# Patient Record
Sex: FEMALE | Race: WHITE | NOT HISPANIC OR LATINO | Employment: FULL TIME | URBAN - METROPOLITAN AREA
[De-identification: names, ages, dates, MRNs, and addresses within clinical notes are randomized per-mention and may not be internally consistent; named-entity substitution may affect disease eponyms.]

---

## 2017-01-06 ENCOUNTER — CLINICAL SUPPORT (OUTPATIENT)
Dept: REHABILITATION | Facility: HOSPITAL | Age: 57
End: 2017-01-06
Attending: OBSTETRICS & GYNECOLOGY
Payer: COMMERCIAL

## 2017-01-06 DIAGNOSIS — R10.2 PELVIC PAIN IN FEMALE: Primary | ICD-10-CM

## 2017-01-06 PROCEDURE — 97163 PT EVAL HIGH COMPLEX 45 MIN: CPT | Mod: PO

## 2017-01-06 PROCEDURE — 97014 ELECTRIC STIMULATION THERAPY: CPT | Mod: PO

## 2017-01-06 NOTE — PROGRESS NOTES
Patient: Berenice Azar [facility] #:  [id number]    Date of treatment: 2017   Time in: 8:02  Time out: 9:00    Berenice is a 56 y.o. female evaluated on 2017    Physician:  Kerline Vincent MD   Diagnosis:   Pelvic pain in female; sciatica    Treatment ordered: PT    Medical History:   Past Medical History   Diagnosis Date    Asthma with allergic rhinitis     Bladder spasm     Dense breasts      heterogeneously/fibrocystic disease    Elevated antinuclear antibody (GUICHO) level     Endometriosis of cervix     Erythromelalgia     Fibromyalgia     Ganglion cyst      left toe    Goiter      MNG    Hashimoto's disease     Hashimoto's thyroiditis     Headache(784.0)     Hypothyroidism      Hashimoto with + Tg ab    Osteoporosis      femoral neck    Raynaud's phenomenon     Rhinitis     Scoliosis     Sleep disorder      type of Narcolepsy ( resolved)    Vitamin D deficiency disease     Vulvodynia         Surgical History:   Past Surgical History   Procedure Laterality Date    Breast surgery       fibrocystic tumor removed    Sinus surgery       2x    Cyst removal       laparoscopic cyst on ovary    Intradermal cyst        removed from left index finger     section       2x    Hysterectomy          Medications:   Current Outpatient Prescriptions   Medication Sig    albuterol (PROVENTIL HFA/VENTOLIN HFA) 200 puff inhaler Inhale 2 puffs into the lungs every 6 (six) hours as needed.      amitriptyline (ELAVIL) 50 MG tablet Take one tablet by mouth nightly. Take with the 10 mg amitriptyline (Patient taking differently: 50 mg. Take one tablet by mouth nightly.)    aspirin (ECOTRIN) 81 MG EC tablet Take 81 mg by mouth once daily.    cholecalciferol, vitamin D3, (VITAMIN D3) 1,000 unit capsule Take 2,000 Units by mouth once daily.     DICLOFENAC SODIUM TOP Apply 1 application topically. Use up tp 5x/day Diclofenac 0.15%/lidocaine 2.25%/prilocaine 2.25% [facility]:  [phone]    estradiol (VAGIFEM) 10 mcg Tab Place 1 tablet (10 mcg total) vaginally twice a week.    estradiol 0.05 mg/24 hr td ptsw (VIVELLE-DOT) 0.05 mg/24 hr Place 1 patch onto the skin once a week.    fluticasone (FLONASE) 50 mcg/actuation nasal spray 1 spray by Each Nare route once daily.    FLUZONE QUAD 5151-0496, PF, 60 mcg (15 mcg x 4)/0.5 mL Syrg     gabapentin (NEURONTIN) 100 MG capsule TAKE 2 CAPSULES (200 MG TOTAL) THREE TIMES A DAY (TAKE 6 CAPSULES DAILY)    guaifenesin (MUCINEX) 600 mg 12 hr tablet Take 600 mg by mouth 2 (two) times daily.    liothyronine (CYTOMEL) 5 MCG Tab Take 3 tablets (15 mcg total) by mouth once daily.    lorazepam (ATIVAN) 1 MG tablet Take 1 tablet (1 mg total) by mouth as directed. 1 tab 30 min prior to MRI, may repeat once if needed    MECOBAL/LEVOMEFOLAT CA/B6 PHOS (METANX ORAL) Take by mouth.    metaxalone (SKELAXIN) 800 MG tablet TAKE 1 TABLET EVERY EVENING (SPASM)    multivitamin capsule Take 1 capsule by mouth once daily.      pilocarpine (SALAGEN) 5 MG Tab Take 1 tablet (5 mg total) by mouth 3 (three) times daily as needed.    PREVIDENT 5000 DRY MOUTH 1.1 % Gel     RESTASIS 0.05 % ophthalmic emulsion     SYNTHROID 25 mcg tablet Take 1 tablet (25 mcg total) by mouth before breakfast.    zolpidem (AMBIEN) 10 mg Tab TAKE ONE TABLET BY MOUTH EVERY NIGHT AT BEDTIME AS NEEDED     No current facility-administered medications for this visit.     Allergies: No Known Allergies   Precautions: universal   OB:GYN History: x 2; endometriosis; Had uterine prolapse through her rectum   Bladder/Bowel History: trouble initiating urine stream, slow stream, trouble emptying bladder completely, urinary incontinence, constipation/straining for movement and dysuria which was relieved with vaginal estrogen. She has interrupted stream.     SUBJECTIVE:   History of current complaint:   Pt reports acupuncture has been helping with her vaginal pain.  GBA cream and  "neurontin still on board as well.  Now increased pudendal pain, that pt attributes to R buttock pain.  Pt has seen a neurologist who ordered an MRI.  She reports that Dr. Vincent would like her to have a TENS unit.  She would like to wait until her MRI to have exercises for hip strengthening per Dr. Vincent's request.  She is making changes to her sleep posture- she now sleeps on her back to minimize her low back/buttock pain and this seems to be working.  She continues to flare in pain in multiple regions due to fibromyalgia.       Date of Onset: buttock symptoms increased in Sept 2016  Symptoms are: increasing   Frequency of Urination: Daytime: about 10 times per day; her ability to empty her bladder varies- she attributes this to her thyroid condition  Nighttime: none   Urine Stream: weak and interrupted   Frequency of Bowel Movements: daily (she reports "great!")  Types of Fluid Intake: water, hot green tea, almond milk  Bladder Leakage: no  Colon Leakage: No    Current Exercise:    Is walking 20 minutes in her house 7 days per week      Pain: Patient reports 2/10 with 0 being the lowest and 10 being the highest in the R anterior hip and groin;  She reports "pudendal" pain as R sided pain at the IT radiating to the posterior thigh- rates 3-4/10.      OBJECTIVE:   Patient received 50 minutes of treatment which consisted of evaluation and 10 minutes of ther ex.     ORTHO SCREEN   Palpation: tenderness to palpation noted over B SI joints, R>L IT, and anterior pelvis on R.    MMT: B LE as follows: B hip flexors and abd/add 4/5; quads and hamstrings 4-/5; ankle DF/PF 4/5.    Treatment Given: instructed on general anatomy/physiology of urinary/bowel system; discussed plan of care with patient; instructed in benefits/risks of treatment; instructed in alternative methods of treatment; instructed in risks of refusing treatment; patient agreed to treatment plan; pt rec'd 10 minutes of TENS to the anterior hip and pelvis " with dual channel setup, hip and pelvic presets on EMPI Select unit.  Intensity dialed to pt's sensory threshold (2.5 on this date).  Pt tolerated treatment well.  She was instructed to monitor her symptoms for the rest of the day, and that we would look at gentle hip strengthening exercises after her MRI results are known (per her request).  Pt verbalized understanding of all.        ASSESSMENT:     History  Co-morbidities and personal factors that may impact the plan of care Examination  Body Structures and Functions, activity limitations and participation restrictions that may impact the plan of care Clinical Presentation   Decision Making/ Complexity Score   Co-morbidities:   Fibromyalgia,               Personal Factors:    Body Regions:pelvis, hip, LE's    Body Systems: musculoskeletal system          Activity limitations: limited sitting tolerance at work, difficulty putting on shoes (bending) due to pain, reports cannot sit in a tub to bathe      Participation Restrictions:          Evolving, unstable 79% impairment noted via pt's score of 79% on PFDI Pain survey (FOTO)       Problem List:   altered posture, pelvic asymmetry, poor knowledge of body mechanics and posture, poor trunk stability, decreased pelvic muscle strength, decreased hip and pelvic flexibility,  Decreased hip and LE strength, decreased sitting tolerance.      Barriers to Learning: none   Educational/Spiritual/Cultural needs:   none   Environmental Barriers: none noted  Abuse/Neglect: no signs  Nutritional Status: thin WF  Fall Risk: none    FUNCTIONAL GOALS: 3 months   1. Pt to report decreased pain with putting on her shoes in the morning.    2. Pt to be I with self mgmt. of symptoms.   3. Pt to be I with HEP for hip girdle strengthening.       PATIENTS GOALS: to be able to manage her symptoms with TENS, and to be able to put on her shoes more easily with less pain.      PLAN:   Therapeutic exercise, Electrical stimulation, manual therapy  as indicated. Will issue EMPI TENS unit if no adverse effects noted after today's treatment.       Physical Therapy Education: anatomy/physiology of pelvis, posture/body mechanics      Frequency/Duration: once per 1-2 weeks for 1-2 months.

## 2017-01-09 ENCOUNTER — PATIENT MESSAGE (OUTPATIENT)
Dept: INTERNAL MEDICINE | Facility: CLINIC | Age: 57
End: 2017-01-09

## 2017-01-09 RX ORDER — ZOLPIDEM TARTRATE 10 MG/1
TABLET ORAL
Qty: 30 TABLET | Refills: 3 | Status: SHIPPED | OUTPATIENT
Start: 2017-01-09 | End: 2017-04-18 | Stop reason: SDUPTHER

## 2017-01-09 NOTE — TELEPHONE ENCOUNTER
Pt called in requesting a refill of the pended medication to be sent to [facility].

## 2017-01-10 ENCOUNTER — LAB VISIT (OUTPATIENT)
Dept: LAB | Facility: HOSPITAL | Age: 57
End: 2017-01-10
Attending: INTERNAL MEDICINE
Payer: COMMERCIAL

## 2017-01-10 DIAGNOSIS — E03.8 OTHER SPECIFIED HYPOTHYROIDISM: ICD-10-CM

## 2017-01-10 LAB
T3FREE SERPL-MCNC: 3.5 PG/ML
T4 FREE SERPL-MCNC: 0.7 NG/DL
TSH SERPL DL<=0.005 MIU/L-ACNC: 0.28 UIU/ML

## 2017-01-10 PROCEDURE — 36415 COLL VENOUS BLD VENIPUNCTURE: CPT

## 2017-01-10 PROCEDURE — 84439 ASSAY OF FREE THYROXINE: CPT

## 2017-01-10 PROCEDURE — 84443 ASSAY THYROID STIM HORMONE: CPT

## 2017-01-10 PROCEDURE — 84481 FREE ASSAY (FT-3): CPT

## 2017-01-16 ENCOUNTER — HOSPITAL ENCOUNTER (OUTPATIENT)
Dept: RADIOLOGY | Facility: HOSPITAL | Age: 57
Discharge: HOME | End: 2017-01-16
Attending: PSYCHIATRY & NEUROLOGY
Payer: COMMERCIAL

## 2017-01-16 DIAGNOSIS — M54.16 LEFT LUMBAR RADICULOPATHY: ICD-10-CM

## 2017-01-16 PROCEDURE — 72148 MRI LUMBAR SPINE W/O DYE: CPT | Mod: TC

## 2017-01-16 PROCEDURE — 72148 MRI LUMBAR SPINE W/O DYE: CPT | Mod: 26,,, | Performed by: RADIOLOGY

## 2017-01-17 ENCOUNTER — OFFICE VISIT (OUTPATIENT)
Dept: INTERNAL MEDICINE | Facility: CLINIC | Age: 57
End: 2017-01-17
Payer: COMMERCIAL

## 2017-01-17 VITALS
HEART RATE: 68 BPM | BODY MASS INDEX: 17.95 KG/M2 | SYSTOLIC BLOOD PRESSURE: 100 MMHG | WEIGHT: 95 LBS | DIASTOLIC BLOOD PRESSURE: 60 MMHG

## 2017-01-17 DIAGNOSIS — E03.8 HYPOTHYROIDISM DUE TO HASHIMOTO'S THYROIDITIS: Primary | ICD-10-CM

## 2017-01-17 DIAGNOSIS — E04.1 THYROID NODULE: ICD-10-CM

## 2017-01-17 DIAGNOSIS — E06.3 HYPOTHYROIDISM DUE TO HASHIMOTO'S THYROIDITIS: Primary | ICD-10-CM

## 2017-01-17 PROCEDURE — 99999 PR PBB SHADOW E&M-EST. PATIENT-LVL III: CPT | Mod: PBBFAC,,, | Performed by: INTERNAL MEDICINE

## 2017-01-17 PROCEDURE — 99214 OFFICE O/P EST MOD 30 MIN: CPT | Mod: S$GLB,,, | Performed by: INTERNAL MEDICINE

## 2017-01-17 PROCEDURE — 1159F MED LIST DOCD IN RCRD: CPT | Mod: S$GLB,,, | Performed by: INTERNAL MEDICINE

## 2017-01-17 NOTE — PROGRESS NOTES
Subjective:       Patient ID: Berenice Hayes is a 56 y.o. female.    Chief Complaint: Follow-up    HPI   Patient is here in follow up of thyroid studies: patient's pain is doing bettter    TSH:0.275  A little low  T4 .7 slightly low  T3  3.5 in good range    Last appt with Endocrine 12/14/2016 Dr. Peguero    Reviewed consult to Neurology: Dr. Seymour    Reviewed   MRI  Impression    Early degenerative disc and facet disease in this patient with ectasia of the nerve root sheath cysts in the lumbar and sacral regions as well as a mild levoscoliosis.      Electronically signed by: EUGENIA OAKES MD  Date: 01/16/17         Ultrasound thyroid  Impression       Multinodular thyroid, with 2 nodules in the right thyroid lobe meeting size criteria for FNA.  Correlate with prior biopsy finding.    Increased vascularity, may reflect thyroiditis.           Impression     Review of Systems  no other new complaints  Objective:      Physical Exam    Thyroid:enlarged nodule  Assessment:       1. Hypothyroidism due to Hashimoto's thyroiditis    2. Thyroid nodule        Plan:       Berenice was seen today for follow-up.    Diagnoses and all orders for this visit:    Hypothyroidism due to Hashimoto's thyroiditis: pain is better, doing on current medications synthroid 37.5 and cytomel 15 daily and will check every 6 weeks  -     TSH; Future  -     T4, free; Future  -     T3, free; Future    Thyroid nodule: ultrasound , no recommendations made by Dr. Espinal, and message sent to Dr. Espinzoa.      Return in about 7 weeks (around 3/7/2017) for Prior tsh, free T4 and free T3.    New Prescriptions    No medications on file       Modified Medications    No medications on file       Orders Placed This Encounter   Procedures    TSH     Standing Status:   Future     Standing Expiration Date:   3/18/2017    T4, free     Standing Status:   Future     Standing Expiration Date:   1/17/2018    T3, free     Standing Status:   Future      Standing Expiration Date:   3/18/2018       Labs, studies and consults associated with this visit were reviewed

## 2017-01-17 NOTE — MR AVS SNAPSHOT
[facility]  [address]  [facility] [address]  Phone: [phone]  Fax: [phone]                  Berenice Hayes   2017 8:00 AM   Office Visit    Description:  Female : 1960   Provider:  Estella Serrano MD   Department:  [facility]           Reason for Visit     Follow-up           Diagnoses this Visit        Comments    Hypothyroidism due to Hashimoto's thyroiditis    -  Primary     Thyroid nodule                To Do List           Future Appointments        Provider Department Dept Phone    2017 5:00 PM La Israel PT, BCB-PMD [facility] [phone]    2/3/2017 8:00 AM La Israel PT, BCB-PMD [facility] [phone]    2017 10:00 AM Leandro Espinoza MD [facility] - Endo/Diab/Metab [phone]      Goals (5 Years of Data)     None      Follow-Up and Disposition     Return in about 7 weeks (around 3/7/2017) for Prior tsh, free T4 and free T3.      [facility]     [facility] -  Assistance  Registered nurses in the [facility] provide clinical advisement, health education, appointment booking, and other advisory services.  Call for this free service at [phone].             Medications           Message regarding Medications     Verify the changes and/or additions to your medication regime listed below are the same as discussed with your clinician today.  If any of these changes or additions are incorrect, please notify your healthcare provider.             Verify that the below list of medications is an accurate representation of the medications you are currently taking.  If none reported, the list may be blank. If incorrect, please contact your healthcare provider. Carry this list with you in case of emergency.           Current Medications     albuterol (PROVENTIL HFA/VENTOLIN HFA) 200 puff inhaler Inhale 2 puffs into the lungs every 6  (six) hours as needed.      amitriptyline (ELAVIL) 50 MG tablet Take one tablet by mouth nightly. Take with the 10 mg amitriptyline    aspirin (ECOTRIN) 81 MG EC tablet Take 81 mg by mouth once daily.    cholecalciferol, vitamin D3, (VITAMIN D3) 1,000 unit capsule Take 2,000 Units by mouth once daily.     DICLOFENAC SODIUM TOP Apply 1 application topically. Use up tp 5x/day Diclofenac 0.15%/lidocaine 2.25%/prilocaine 2.25% [facility]: [phone]    estradiol (VAGIFEM) 10 mcg Tab Place 1 tablet (10 mcg total) vaginally twice a week.    estradiol 0.05 mg/24 hr td ptsw (VIVELLE-DOT) 0.05 mg/24 hr Place 1 patch onto the skin once a week.    fluticasone (FLONASE) 50 mcg/actuation nasal spray 1 spray by Each Nare route once daily.    FLUZONE QUAD 3298-8256, PF, 60 mcg (15 mcg x 4)/0.5 mL Syrg     gabapentin (NEURONTIN) 100 MG capsule TAKE 2 CAPSULES (200 MG TOTAL) THREE TIMES A DAY (TAKE 6 CAPSULES DAILY)    guaifenesin (MUCINEX) 600 mg 12 hr tablet Take 600 mg by mouth 2 (two) times daily.    liothyronine (CYTOMEL) 5 MCG Tab Take 3 tablets (15 mcg total) by mouth once daily.    lorazepam (ATIVAN) 1 MG tablet Take 1 tablet (1 mg total) by mouth as directed. 1 tab 30 min prior to MRI, may repeat once if needed    MECOBAL/LEVOMEFOLAT CA/B6 PHOS (METANX ORAL) Take by mouth.    metaxalone (SKELAXIN) 800 MG tablet TAKE 1 TABLET EVERY EVENING (SPASM)    multivitamin capsule Take 1 capsule by mouth once daily.      pilocarpine (SALAGEN) 5 MG Tab Take 1 tablet (5 mg total) by mouth 3 (three) times daily as needed.    PREVIDENT 5000 DRY MOUTH 1.1 % Gel     RESTASIS 0.05 % ophthalmic emulsion     SYNTHROID 25 mcg tablet Take 1 tablet (25 mcg total) by mouth before breakfast.    zolpidem (AMBIEN) 10 mg Tab TAKE ONE TABLET BY MOUTH EVERY NIGHT AT BEDTIME AS NEEDED           Clinical Reference Information           Vital Signs - Last Recorded  Most recent update: 1/17/2017  8:17 AM by Estella Serrano MD    BP Pulse  Wt LMP BMI    100/60 68 43.1 kg (95 lb) (LMP Unknown) 17.95 kg/m2      Blood Pressure          Most Recent Value    BP  100/60      Allergies as of 1/17/2017     No Known Allergies      Immunizations Administered on Date of Encounter - 1/17/2017     None      Orders Placed During Today's Visit     Future Labs/Procedures Expected by Expires    T3, free  1/17/2017 3/18/2018    T4, free  1/17/2017 1/17/2018    TSH  1/17/2017 (Approximate) 3/18/2017

## 2017-01-23 ENCOUNTER — TELEPHONE (OUTPATIENT)
Dept: INTERNAL MEDICINE | Facility: CLINIC | Age: 57
End: 2017-01-23

## 2017-01-23 ENCOUNTER — PATIENT MESSAGE (OUTPATIENT)
Dept: INTERNAL MEDICINE | Facility: CLINIC | Age: 57
End: 2017-01-23

## 2017-01-23 NOTE — TELEPHONE ENCOUNTER
----- Message from Leandro Espinoza MD sent at 1/23/2017  9:05 AM CST -----  Contact: Estella Serrano  I looked at her ultrasound. Looks like both of the nodules have been biopsied recently and were benign. They haven't grown significantly so I would not repeat the biopsy at this time. She can wait until April to see me.    -Leandro   ----- Message -----     From: Estella Serrano MD     Sent: 1/17/2017   8:29 AM       To: Leandro Espinoza MD    This is a patient of Dr. Espinal who has been referred to you with an appt 4/25. Dr. Espinal had a ultrasound done which stated 2 areas were in the range for biopsy. Do you think she should wait until 4/25.     Estella

## 2017-01-26 ENCOUNTER — PATIENT MESSAGE (OUTPATIENT)
Dept: INTERNAL MEDICINE | Facility: CLINIC | Age: 57
End: 2017-01-26

## 2017-01-26 ENCOUNTER — CLINICAL SUPPORT (OUTPATIENT)
Dept: REHABILITATION | Facility: HOSPITAL | Age: 57
End: 2017-01-26
Attending: OBSTETRICS & GYNECOLOGY
Payer: COMMERCIAL

## 2017-01-26 DIAGNOSIS — R10.2 PELVIC PAIN IN FEMALE: ICD-10-CM

## 2017-01-26 PROCEDURE — 97014 ELECTRIC STIMULATION THERAPY: CPT

## 2017-01-26 PROCEDURE — 97110 THERAPEUTIC EXERCISES: CPT

## 2017-01-26 NOTE — PROGRESS NOTES
Patient: Berenice Azar [facility] #: [id number]   Diagnosis:   Encounter Diagnosis   Name Primary?    Pelvic pain in female       Date of Start of care: 1/6/2017  Date of treatment: 01/26/2017   Time in: 5:00  Time out: 6:00  Total Treatment time: 55 minutes    Subjective: pt reports that she had some vaginal pain after last session's exam.  Lasted about 3 days.      Berenice reported 6/10 pain today.Pain located in the R anterior groin.      Objective:      Treatment:    Patient was instructed in and performed therapeutic exercises to develop strength and coordination for 45 minutes including: walking gym laps for warmup; supine bridging, heel slides, hip abduction, SAQ, unilateral clams.  10 reps each- pt was instructed to stick with 10 reps at home until she can assess her response.  Berenice verbalized understanding of all instruction and was provided with a handout.      The patient received the following supervised modalities: TENS for pain control applied to the pelvis and sacrum (sacral modulation)  x 10 minutes.  Low back presets used, with intensity increased to pt tolerance.      Assessment:  Pt with minimal post exercise soreness- will need to slowly progress her HEP with goal of increasing her hip strength (reports cannot lift her leg to put socks on).       Will the patient continue to benefit from skilled PT intervention? Yes  Medical Necessity is demonstrated by:  Requires skilled supervision to complete and progress HEP  Progress towards goals: fair    New/Revised Goals:  none    Plan:  Continue with established Plan of Care toward PT goals.        PT/PTA met face to face to discuss patient's treatment plan and progress towards established goals.  Treatment will be continued as described in initial report/eval and progress notes.  Patient will be seen by physical therapist every sixth visit and minimally once per month.

## 2017-02-13 ENCOUNTER — PATIENT MESSAGE (OUTPATIENT)
Dept: INTERNAL MEDICINE | Facility: CLINIC | Age: 57
End: 2017-02-13

## 2017-02-20 ENCOUNTER — LAB VISIT (OUTPATIENT)
Dept: LAB | Facility: HOSPITAL | Age: 57
End: 2017-02-20
Attending: INTERNAL MEDICINE
Payer: COMMERCIAL

## 2017-02-20 DIAGNOSIS — E03.8 HYPOTHYROIDISM DUE TO HASHIMOTO'S THYROIDITIS: ICD-10-CM

## 2017-02-20 DIAGNOSIS — E06.3 HYPOTHYROIDISM DUE TO HASHIMOTO'S THYROIDITIS: ICD-10-CM

## 2017-02-20 LAB
T3FREE SERPL-MCNC: 3.3 PG/ML
T4 FREE SERPL-MCNC: 0.77 NG/DL
TSH SERPL DL<=0.005 MIU/L-ACNC: 0.16 UIU/ML

## 2017-02-20 PROCEDURE — 84443 ASSAY THYROID STIM HORMONE: CPT

## 2017-02-20 PROCEDURE — 84481 FREE ASSAY (FT-3): CPT

## 2017-02-20 PROCEDURE — 36415 COLL VENOUS BLD VENIPUNCTURE: CPT

## 2017-02-20 PROCEDURE — 84439 ASSAY OF FREE THYROXINE: CPT

## 2017-02-22 ENCOUNTER — PATIENT MESSAGE (OUTPATIENT)
Dept: OBSTETRICS AND GYNECOLOGY | Facility: CLINIC | Age: 57
End: 2017-02-22

## 2017-02-23 DIAGNOSIS — N95.2 ATROPHIC VAGINITIS: ICD-10-CM

## 2017-02-23 RX ORDER — ESTRADIOL 0.05 MG/D
1 FILM, EXTENDED RELEASE TRANSDERMAL
Qty: 24 PATCH | Refills: 3 | Status: SHIPPED | OUTPATIENT
Start: 2017-02-23 | End: 2017-11-14 | Stop reason: SDUPTHER

## 2017-03-07 ENCOUNTER — OFFICE VISIT (OUTPATIENT)
Dept: INTERNAL MEDICINE | Facility: CLINIC | Age: 57
End: 2017-03-07
Payer: COMMERCIAL

## 2017-03-07 VITALS
DIASTOLIC BLOOD PRESSURE: 58 MMHG | WEIGHT: 97.69 LBS | HEART RATE: 93 BPM | HEIGHT: 62 IN | SYSTOLIC BLOOD PRESSURE: 100 MMHG | BODY MASS INDEX: 17.98 KG/M2

## 2017-03-07 DIAGNOSIS — E03.4 HYPOTHYROIDISM DUE TO ACQUIRED ATROPHY OF THYROID: Primary | ICD-10-CM

## 2017-03-07 PROCEDURE — 1160F RVW MEDS BY RX/DR IN RCRD: CPT | Mod: S$GLB,,, | Performed by: INTERNAL MEDICINE

## 2017-03-07 PROCEDURE — 99999 PR PBB SHADOW E&M-EST. PATIENT-LVL IV: CPT | Mod: PBBFAC,,, | Performed by: INTERNAL MEDICINE

## 2017-03-07 PROCEDURE — 99213 OFFICE O/P EST LOW 20 MIN: CPT | Mod: S$GLB,,, | Performed by: INTERNAL MEDICINE

## 2017-03-07 RX ORDER — AMITRIPTYLINE HYDROCHLORIDE 10 MG/1
10 TABLET, FILM COATED ORAL DAILY
COMMUNITY
Start: 2016-12-16 | End: 2017-03-22 | Stop reason: SDUPTHER

## 2017-03-07 NOTE — MR AVS SNAPSHOT
[facility]  [address]  [facility] [address]  Phone: [phone]  Fax: [phone]                  Berenice Hayes   3/7/2017 8:00 AM   Office Visit    Description:  Female : 1960   Provider:  Estella Serrano MD   Department:  [facility]           Reason for Visit     Follow-up           Diagnoses this Visit        Comments    Hypothyroidism due to acquired atrophy of thyroid    -  Primary            To Do List           Future Appointments        Provider Department Dept Phone    2017 8:30 AM LAB, APPOINTMENT [facility] [phone]    2017 10:00 AM MD [facility] - Endo/Diab/Metab [phone]    2017 8:30 AM MD [facility] - Internal Medicine [phone]      Goals (5 Years of Data)     None      Follow-Up and Disposition     Return in about 8 weeks (around 2017) for Follow up.      [facility]     [facility] -  Assistance  Registered nurses in the [facility] provide clinical advisement, health education, appointment booking, and other advisory services.  Call for this free service at [phone].             Medications           Message regarding Medications     Verify the changes and/or additions to your medication regime listed below are the same as discussed with your clinician today.  If any of these changes or additions are incorrect, please notify your healthcare provider.             Verify that the below list of medications is an accurate representation of the medications you are currently taking.  If none reported, the list may be blank. If incorrect, please contact your healthcare provider. Carry this list with you in case of emergency.           Current Medications     albuterol (PROVENTIL HFA/VENTOLIN HFA) 200 puff inhaler Inhale 2 puffs into the lungs every 6 (six) hours as needed.      amitriptyline (ELAVIL) 10  "MG tablet Take 10 mg by mouth once daily.    amitriptyline (ELAVIL) 50 MG tablet Take one tablet by mouth nightly. Take with the 10 mg amitriptyline    aspirin (ECOTRIN) 81 MG EC tablet Take 81 mg by mouth once daily.    cholecalciferol, vitamin D3, (VITAMIN D3) 1,000 unit capsule Take 2,000 Units by mouth once daily.     DICLOFENAC SODIUM TOP Apply 1 application topically. Use up tp 5x/day Diclofenac 0.15%/lidocaine 2.25%/prilocaine 2.25% [facility]: [phone]    estradiol (VAGIFEM) 10 mcg Tab Place 1 tablet (10 mcg total) vaginally twice a week.    estradiol 0.05 mg/24 hr td ptsw (VIVELLE-DOT) 0.05 mg/24 hr Place 1 patch onto the skin twice a week.    fluticasone (FLONASE) 50 mcg/actuation nasal spray 1 spray by Each Nare route once daily.    gabapentin (NEURONTIN) 100 MG capsule TAKE 2 CAPSULES (200 MG TOTAL) THREE TIMES A DAY (TAKE 6 CAPSULES DAILY)    guaifenesin (MUCINEX) 600 mg 12 hr tablet Take 600 mg by mouth 2 (two) times daily.    liothyronine (CYTOMEL) 5 MCG Tab Take 3 tablets (15 mcg total) by mouth once daily.    MECOBAL/LEVOMEFOLAT CA/B6 PHOS (METANX ORAL) Take by mouth.    metaxalone (SKELAXIN) 800 MG tablet TAKE 1 TABLET EVERY EVENING (SPASM)    multivitamin capsule Take 1 capsule by mouth once daily.      pilocarpine (SALAGEN) 5 MG Tab Take 1 tablet (5 mg total) by mouth 3 (three) times daily as needed.    PREVIDENT 5000 DRY MOUTH 1.1 % Gel     RESTASIS 0.05 % ophthalmic emulsion     SYNTHROID 25 mcg tablet Take 1 tablet (25 mcg total) by mouth before breakfast.    zolpidem (AMBIEN) 10 mg Tab TAKE ONE TABLET BY MOUTH EVERY NIGHT AT BEDTIME AS NEEDED    FLUZONE QUAD 6360-5579, PF, 60 mcg (15 mcg x 4)/0.5 mL Syrg     lorazepam (ATIVAN) 1 MG tablet Take 1 tablet (1 mg total) by mouth as directed. 1 tab 30 min prior to MRI, may repeat once if needed           Clinical Reference Information           Your Vitals Were     BP Pulse Height Weight Last Period BMI    100/58 93 5' 1.5" " (1.562 m) 44.3 kg (97 lb 10.6 oz) (LMP Unknown) 18.15 kg/m2      Blood Pressure          Most Recent Value    BP  (!)  100/58      Allergies as of 3/7/2017     No Known Allergies      Immunizations Administered on Date of Encounter - 3/7/2017     None      Orders Placed During Today's Visit     Future Labs/Procedures Expected by Expires    T3, free  3/7/2017 5/6/2018    T4, free  3/7/2017 3/7/2018    TSH  3/7/2017 (Approximate) 5/6/2017      Language Assistance Services     ATTENTION: Language assistance services are available, free of charge. Please call [phone].      ATENCIÓN: Si habla rafaela, tiene a mcgraw disposición servicios gratuitos de asistencia lingüística. Llame al [phone].     CHÚ Ý: N?u b?n nói Ti?ng Vi?t, có các d?ch v? h? tr? ngôn ng? mi?n phí dành cho b?n. G?i s? [phone].         [facility] complies with applicable Federal civil rights laws and does not discriminate on the basis of race, color, national origin, age, disability, or sex.

## 2017-03-07 NOTE — PROGRESS NOTES
Subjective:       Patient ID: Berenice Hayes is a 56 y.o. female.    Chief Complaint: Follow-up (hypothroidism)    HPI   The last appt was 1/17/2017, last labs 2/20/2017  Last lab  Free T3 3.3 feels the best at 3.5  TSH 0.162  Free T4 0.77    Cytomel 30 mg daily  Synthroid 25 mcg and taking 1.5    Now :  Cytomel 30 mg  Synthroid 25mcg ( when this change her feet swelled, heat increased and palpitations)      Review of Systems    See above  Objective:      Physical Exam   Constitutional: She appears well-developed and well-nourished.   Neck: No JVD present. No thyromegaly present.   Cardiovascular: Normal rate, normal heart sounds and intact distal pulses.    Pulmonary/Chest: Effort normal and breath sounds normal. No respiratory distress.       Assessment:       1. Hypothyroidism due to acquired atrophy of thyroid        Plan:       Plan:  Alternate Cytomel 30/35  Alternate Synthroid 25/37.5     Berenice was seen today for follow-up.    Diagnoses and all orders for this visit:    Hypothyroidism due to acquired atrophy of thyroid    Return in about 8 weeks (around 5/2/2017) for Follow up.    New Prescriptions    No medications on file       Modified Medications    No medications on file       No orders of the defined types were placed in this encounter.      Labs, studies and consults associated with this visit were reviewed

## 2017-03-13 ENCOUNTER — PATIENT MESSAGE (OUTPATIENT)
Dept: INTERNAL MEDICINE | Facility: CLINIC | Age: 57
End: 2017-03-13

## 2017-03-16 ENCOUNTER — PATIENT MESSAGE (OUTPATIENT)
Dept: INTERNAL MEDICINE | Facility: CLINIC | Age: 57
End: 2017-03-16

## 2017-03-16 DIAGNOSIS — E04.2 MULTINODULAR GOITER: ICD-10-CM

## 2017-03-16 DIAGNOSIS — E03.8 HYPOTHYROIDISM DUE TO HASHIMOTO'S THYROIDITIS: ICD-10-CM

## 2017-03-16 DIAGNOSIS — E06.3 HYPOTHYROIDISM DUE TO HASHIMOTO'S THYROIDITIS: ICD-10-CM

## 2017-03-21 DIAGNOSIS — E03.8 HYPOTHYROIDISM DUE TO HASHIMOTO'S THYROIDITIS: ICD-10-CM

## 2017-03-21 DIAGNOSIS — E06.3 HYPOTHYROIDISM DUE TO HASHIMOTO'S THYROIDITIS: ICD-10-CM

## 2017-03-21 DIAGNOSIS — E04.2 MULTINODULAR GOITER: ICD-10-CM

## 2017-03-21 RX ORDER — LIOTHYRONINE SODIUM 5 UG/1
15 TABLET ORAL DAILY
Qty: 270 TABLET | Refills: 3 | Status: CANCELLED | OUTPATIENT
Start: 2017-03-21

## 2017-03-21 RX ORDER — LEVOTHYROXINE SODIUM 25 UG/1
25 TABLET ORAL
Qty: 90 TABLET | Refills: 3 | Status: CANCELLED | OUTPATIENT
Start: 2017-03-21

## 2017-03-21 RX ORDER — AMITRIPTYLINE HYDROCHLORIDE 10 MG/1
10 TABLET, FILM COATED ORAL DAILY
Status: CANCELLED | OUTPATIENT
Start: 2017-03-21

## 2017-03-21 NOTE — TELEPHONE ENCOUNTER
----- Message from Karen Davis MA sent at 3/21/2017  8:36 AM CDT -----  Contact: self - [phone]   Type: Rx    Name of medication(s): SYNTHROID 25 mcg tablet  liothyronine (CYTOMEL) 5 MCG Tab  amitriptyline (ELAVIL) 10 MG tablet    Is this a refill? New rx? Refills     Who prescribed medication?    Pharmacy Name, Phone, & Location: [facility] - [address]    Comments: Please call. Thanks!

## 2017-03-22 RX ORDER — LIOTHYRONINE SODIUM 5 UG/1
TABLET ORAL
Qty: 295 TABLET | Refills: 3 | Status: SHIPPED | OUTPATIENT
Start: 2017-03-22 | End: 2017-11-03 | Stop reason: SDUPTHER

## 2017-03-22 RX ORDER — LEVOTHYROXINE SODIUM 25 UG/1
TABLET ORAL
Qty: 125 TABLET | Refills: 3 | Status: SHIPPED | OUTPATIENT
Start: 2017-03-22 | End: 2017-11-09

## 2017-03-22 RX ORDER — AMITRIPTYLINE HYDROCHLORIDE 10 MG/1
10 TABLET, FILM COATED ORAL DAILY
Qty: 90 TABLET | Refills: 3 | Status: SHIPPED | OUTPATIENT
Start: 2017-03-22 | End: 2018-04-10 | Stop reason: SDUPTHER

## 2017-04-18 DIAGNOSIS — N95.2 ATROPHIC VAGINITIS: ICD-10-CM

## 2017-04-18 RX ORDER — ESTRADIOL 10 UG/1
1 INSERT VAGINAL
Qty: 24 TABLET | Refills: 4 | Status: SHIPPED | OUTPATIENT
Start: 2017-04-20 | End: 2018-11-14 | Stop reason: SDUPTHER

## 2017-04-18 RX ORDER — ZOLPIDEM TARTRATE 10 MG/1
TABLET ORAL
Qty: 30 TABLET | Refills: 2 | Status: SHIPPED | OUTPATIENT
Start: 2017-04-18 | End: 2017-07-20 | Stop reason: SDUPTHER

## 2017-04-19 ENCOUNTER — LAB VISIT (OUTPATIENT)
Dept: LAB | Facility: HOSPITAL | Age: 57
End: 2017-04-19
Attending: INTERNAL MEDICINE
Payer: COMMERCIAL

## 2017-04-19 ENCOUNTER — PATIENT MESSAGE (OUTPATIENT)
Dept: INTERNAL MEDICINE | Facility: CLINIC | Age: 57
End: 2017-04-19

## 2017-04-19 DIAGNOSIS — E03.4 HYPOTHYROIDISM DUE TO ACQUIRED ATROPHY OF THYROID: ICD-10-CM

## 2017-04-19 LAB
T3FREE SERPL-MCNC: 3.2 PG/ML
T4 FREE SERPL-MCNC: 0.67 NG/DL
TSH SERPL DL<=0.005 MIU/L-ACNC: 0.1 UIU/ML

## 2017-04-19 PROCEDURE — 36415 COLL VENOUS BLD VENIPUNCTURE: CPT

## 2017-04-19 PROCEDURE — 84481 FREE ASSAY (FT-3): CPT

## 2017-04-19 PROCEDURE — 84439 ASSAY OF FREE THYROXINE: CPT

## 2017-04-19 PROCEDURE — 84443 ASSAY THYROID STIM HORMONE: CPT

## 2017-04-21 ENCOUNTER — TELEPHONE (OUTPATIENT)
Dept: PAIN MEDICINE | Facility: CLINIC | Age: 57
End: 2017-04-21

## 2017-04-21 NOTE — TELEPHONE ENCOUNTER
----- Message from Dionne Vaca sent at 4/20/2017  5:07 PM CDT -----  Contact: PT  PT would like to change appointment date and time if possible.  PT would like it on 4/25, if there is a 8am slot available.     PT callback @ [phone]

## 2017-04-21 NOTE — TELEPHONE ENCOUNTER
Patient states she was experiencing additional nerve pain on top of her foot. Staff offered patient 4/24/17 or 4/28/17 appointment dates and 7am or 7:15am appointment times for both dates.Patient states she did not want to schedule that early and risk something coming up and her not being able to make it. Patient stated she will see how she does over the weekend and keep the appointment that she has already for 5/11/17 at 8am.    Staff told patient to give us a call if she needs anything further.

## 2017-04-25 ENCOUNTER — OFFICE VISIT (OUTPATIENT)
Dept: ENDOCRINOLOGY | Facility: CLINIC | Age: 57
End: 2017-04-25
Payer: COMMERCIAL

## 2017-04-25 VITALS
WEIGHT: 98.56 LBS | HEART RATE: 92 BPM | SYSTOLIC BLOOD PRESSURE: 102 MMHG | HEIGHT: 61 IN | BODY MASS INDEX: 18.61 KG/M2 | DIASTOLIC BLOOD PRESSURE: 64 MMHG

## 2017-04-25 DIAGNOSIS — E04.2 MULTINODULAR GOITER: ICD-10-CM

## 2017-04-25 DIAGNOSIS — E03.8 HYPOTHYROIDISM DUE TO HASHIMOTO'S THYROIDITIS: Primary | ICD-10-CM

## 2017-04-25 DIAGNOSIS — M81.0 OSTEOPOROSIS, POSTMENOPAUSAL: ICD-10-CM

## 2017-04-25 DIAGNOSIS — E06.3 HYPOTHYROIDISM DUE TO HASHIMOTO'S THYROIDITIS: Primary | ICD-10-CM

## 2017-04-25 PROCEDURE — 1160F RVW MEDS BY RX/DR IN RCRD: CPT | Mod: S$GLB,,, | Performed by: INTERNAL MEDICINE

## 2017-04-25 PROCEDURE — 99999 PR PBB SHADOW E&M-EST. PATIENT-LVL III: CPT | Mod: PBBFAC,,, | Performed by: INTERNAL MEDICINE

## 2017-04-25 PROCEDURE — 99214 OFFICE O/P EST MOD 30 MIN: CPT | Mod: S$GLB,,, | Performed by: INTERNAL MEDICINE

## 2017-04-25 NOTE — PROGRESS NOTES
Subjective:      Patient ID: Berenice Salter is a 57 y.o. female.    Chief Complaint:  Hypothyroidism      History of Present Illness  Ms. Salter presents for follow up of hypothyroidism and osteopenia.    With Hashimoto thyroiditis with high thyroglobulin ab and negative TPO ab that is taking Synthroid 37.5 mcg and 25 mcg on alternating days as well as Cytomel 15 mcg and 17.5 mcg on alternating days.    Also has MNG with right thyroid nodule s/p benign FNA on 8/27/15 and of left thyroid nodule on 8/18/16 consistent with follicular adenoma.  No dysphagia or hoarseness.    Has Osteoporosis and she took Atelvia 35 mg weekly since August/2012 until 2015 that she restarted ERT ( vaginal and transdermal).     She takes a multivitamin and Vit D 2000 units daily. She feels hot and cold. Occ hot flashes. + Tiredness. Weight loss of 5 lbs since last visit on 8/4/16. She is following a gluten free, milk and corn starch free diet.     She feels significant fluctuations in the way she feels based on her T3 levels. Has been having ringing in her ears, brain fog, heart palpitations, nerve pain, heat intolerance and fatigue.     Review of Systems   Constitutional: Negative for chills and fever.   Gastrointestinal: Negative for nausea.   Remainder as above in HPI    Objective:   Physical Exam   Nursing note and vitals reviewed.  Thyroid upper limits of normal in size with multiple nodules palpated  DTR's 2 +  No tremor  No edema  RRR  Lungs CTA b/l    Lab Review:   Results for BERENICE SALTER (MRN [id number]) as of 4/25/2017 10:45   Ref. Range 1/10/2017 08:03 2/20/2017 08:17 4/19/2017 08:25   TSH Latest Ref Range: 0.400 - 4.000 uIU/mL 0.275 (L) 0.162 (L) 0.099 (L)   T3, Free Latest Ref Range: 2.3 - 4.2 pg/mL 3.5 3.3 3.2   Free T4 Latest Ref Range: 0.71 - 1.51 ng/dL 0.70 (L) 0.77 0.67 (L)     Thyroid ultrasound dated 12/2016:  The thyroid gland is not enlarged.  The right lobe measures 5.3 x 1.6 x 1.8 cm and the left lobe measures  3.9 with a 1.2 x 1.2 cm.  There is increased vascularity to both lobes.    There is a heterogeneous nodule in the upper pole of the right thyroid lobe measuring 1.6 x 1.3 x 1.0 cm (previously 1.7 x 0.8 x 1.5 cm).  There is a heterogeneous nodule in the lower pole measuring 2.4 x 1.4 x 2.2 cm (previously 3.1 x 1.3 x 2.1 cm).  These nodules meet size criteria for FNA.  Additionally there are 2 nodules measuring approximately 1 cm not meeting size criteria for FNA.    There is a heterogeneous nodule in the upper pole of the left thyroid lobe measuring 1.4 x 0.8 x 0.8 cm (previously 1.2 x 1.0 x 1.0 cm), and there is a 6 mm nodule in the lower pole of the left thyroid lobe.  These nodules do not meet size criteria for FNA.    There is no evidence of abnormal lymph nodes.    Assessment:     1. Hypothyroidism due to Hashimoto's thyroiditis    2. Multinodular goiter    3. Osteoporosis, postmenopausal      Plan:        1- Hashimoto hypothyroidism and she is clinically euthyroid but biochemically hyperthyroid. Explained risks of worsening osteoporosis and increasing arrhythmia risk. Will repeat TFT's in 1 month and may need to adjust doses if still biochemically hyperthyroid. Explained that I would prefer to measure total T3 levels as this assay is generally more accurate when making dose adjustments.     2- MNG with dominant nodule in right lobe with benign FNA done on 8/27/15 and benign FNA of left thyroid nodule on 8/18/16 that is consistent with a benign follicular adenoma. Will repeat thyroid US in 12/2017.    3- Osteoporosis of the femoral neck by last BMD in 2015 and she is clinically stable. Her vitamin D replaced. She is on ERT by patch and vaginal tabs twice a week. Has been on ERT since May 2015 and off Atelvia 35 mg weekly that was started on August 2012. To continue taking vitamin D 2000 units daily with a multivitamin daily. Will plan to repeat BMD in July 2017.    Will formally test for Celiacs disease.    C  in 6 months    Leandro Espinoza M.D. Staff Endocrinology

## 2017-04-25 NOTE — MR AVS SNAPSHOT
[facility]  [address]  [facility] [address]  Phone: [phone]  Fax: [phone]                  Berenice Hayes   2017 10:00 AM   Office Visit    Description:  Female : 1960   Provider:  Leandro Espinoza MD   Department:  [facility]ab           Reason for Visit     Hypothyroidism           Diagnoses this Visit        Comments    Hypothyroidism due to Hashimoto's thyroiditis    -  Primary     Multinodular goiter         Osteoporosis, postmenopausal                To Do List           Future Appointments        Provider Department Dept Phone    2017 8:30 AM Estella Serrano MD [facility] - Internal Medicine [phone]    2017 8:00 AM Monique Philip MD [facility] - Pain Management [phone]    2017 8:00 AM LAB, APPOINTMENT [facility] [phone]    2017 8:20 AM [facility], DEXA1 [facility]-Bone Mineral Density [phone]      Goals (5 Years of Data)     None      [facility]     [facility] -  Assistance  Unless otherwise directed by your provider, please contact [facility], our nurse care line that is available for  assistance.     Registered nurses in the [facility] provide: appointment scheduling, clinical advisement, health education, and other advisory services.  Call: [phone] (toll free)               Medications           Message regarding Medications     Verify the changes and/or additions to your medication regime listed below are the same as discussed with your clinician today.  If any of these changes or additions are incorrect, please notify your healthcare provider.             Verify that the below list of medications is an accurate representation of the medications you are currently taking.  If none reported, the list may be blank. If incorrect, please contact your healthcare provider. Carry this list with you in case of emergency.            Current Medications     albuterol (PROVENTIL HFA/VENTOLIN HFA) 200 puff inhaler Inhale 2 puffs into the lungs every 6 (six) hours as needed.      amitriptyline (ELAVIL) 10 MG tablet Take 1 tablet (10 mg total) by mouth once daily.    amitriptyline (ELAVIL) 50 MG tablet Take one tablet by mouth nightly. Take with the 10 mg amitriptyline    aspirin (ECOTRIN) 81 MG EC tablet Take 81 mg by mouth once daily.    cholecalciferol, vitamin D3, (VITAMIN D3) 1,000 unit capsule Take 2,000 Units by mouth once daily.     DICLOFENAC SODIUM TOP Apply 1 application topically. Use up tp 5x/day Diclofenac 0.15%/lidocaine 2.25%/prilocaine 2.25% [facility]: [phone]    estradiol (VAGIFEM) 10 mcg Tab Place 1 tablet (10 mcg total) vaginally twice a week.    estradiol 0.05 mg/24 hr td ptsw (VIVELLE-DOT) 0.05 mg/24 hr Place 1 patch onto the skin twice a week.    fluticasone (FLONASE) 50 mcg/actuation nasal spray 1 spray by Each Nare route once daily.    gabapentin (NEURONTIN) 100 MG capsule TAKE 2 CAPSULES (200 MG TOTAL) THREE TIMES A DAY (TAKE 6 CAPSULES DAILY)    guaifenesin (MUCINEX) 600 mg 12 hr tablet Take 600 mg by mouth 2 (two) times daily.    liothyronine (CYTOMEL) 5 MCG Tab Alternate days 3 (15mcg) and 3.5 (17.5) tablets    metaxalone (SKELAXIN) 800 MG tablet TAKE 1 TABLET EVERY EVENING (SPASM)    multivitamin capsule Take 1 capsule by mouth once daily.      pilocarpine (SALAGEN) 5 MG Tab Take 1 tablet (5 mg total) by mouth 3 (three) times daily as needed.    PREVIDENT 5000 DRY MOUTH 1.1 % Gel     RESTASIS 0.05 % ophthalmic emulsion     SYNTHROID 25 mcg tablet Alternate days of 1 (25mcg) and 1.5 (37.5mcg) tablets    zolpidem (AMBIEN) 10 mg Tab TAKE ONE TABLET BY MOUTH EVERY NIGHT AT BEDTIME AS NEEDED    FLUZONE QUAD 3265-1384, PF, 60 mcg (15 mcg x 4)/0.5 mL Syrg     lorazepam (ATIVAN) 1 MG tablet Take 1 tablet (1 mg total) by mouth as directed. 1 tab 30 min prior to MRI, may repeat once if needed     "MECOBAL/LEVOMEFOLAT CA/B6 PHOS (METANX ORAL) Take by mouth.           Clinical Reference Information           Your Vitals Were     BP Pulse Height Weight Last Period BMI    102/64 92 5' 1" (1.549 m) 44.7 kg (98 lb 8.7 oz) (LMP Unknown) 18.62 kg/m2      Blood Pressure          Most Recent Value    BP  102/64      Allergies as of 4/25/2017     No Known Allergies      Immunizations Administered on Date of Encounter - 4/25/2017     None      Orders Placed During Today's Visit     Future Labs/Procedures Expected by Expires    DXA Bone Density Spine And Hip  4/25/2017 4/25/2018    IgA  4/25/2017 6/24/2018    T3  4/25/2017 6/24/2018    T4, free  4/25/2017 6/24/2018    TISSUE TRANSGLUTAMINASE, IGA  4/25/2017 6/24/2018    TSH  4/25/2017 6/24/2018    US Soft Tissue Head Neck Thyroid  4/25/2017 4/25/2018      Instructions    Selenium 200 mcg over the counter once daily       Language Assistance Services     ATTENTION: Language assistance services are available, free of charge. Please call [phone].      ATENCIÓN: Si habla español, tiene a mcgraw disposición servicios gratuitos de asistencia lingüística. Llame al [phone].     SAVANNA Ý: N?u b?n nói Ti?ng Vi?t, có các d?ch v? h? tr? ngôn ng? mi?n phí dành cho b?n. G?i s? [phone].         [facility] complies with applicable Federal civil rights laws and does not discriminate on the basis of race, color, national origin, age, disability, or sex.        "

## 2017-05-02 ENCOUNTER — OFFICE VISIT (OUTPATIENT)
Dept: INTERNAL MEDICINE | Facility: CLINIC | Age: 57
End: 2017-05-02
Payer: COMMERCIAL

## 2017-05-02 VITALS
SYSTOLIC BLOOD PRESSURE: 94 MMHG | DIASTOLIC BLOOD PRESSURE: 62 MMHG | BODY MASS INDEX: 18.73 KG/M2 | HEART RATE: 79 BPM | HEIGHT: 61 IN | WEIGHT: 99.19 LBS | OXYGEN SATURATION: 92 %

## 2017-05-02 DIAGNOSIS — E06.3 HYPOTHYROIDISM DUE TO HASHIMOTO'S THYROIDITIS: Primary | ICD-10-CM

## 2017-05-02 DIAGNOSIS — E03.8 HYPOTHYROIDISM DUE TO HASHIMOTO'S THYROIDITIS: Primary | ICD-10-CM

## 2017-05-02 PROCEDURE — 99214 OFFICE O/P EST MOD 30 MIN: CPT | Mod: S$GLB,,, | Performed by: INTERNAL MEDICINE

## 2017-05-02 PROCEDURE — 99999 PR PBB SHADOW E&M-EST. PATIENT-LVL IV: CPT | Mod: PBBFAC,,, | Performed by: INTERNAL MEDICINE

## 2017-05-02 PROCEDURE — 1160F RVW MEDS BY RX/DR IN RCRD: CPT | Mod: S$GLB,,, | Performed by: INTERNAL MEDICINE

## 2017-05-02 RX ORDER — MELOXICAM 7.5 MG/1
7.5 TABLET ORAL DAILY
Qty: 30 TABLET | Refills: 0 | Status: SHIPPED | OUTPATIENT
Start: 2017-05-02 | End: 2017-05-30 | Stop reason: SDUPTHER

## 2017-05-02 NOTE — MR AVS SNAPSHOT
[facility]  [address]  [facility] [address]  Phone: [phone]  Fax: [phone]                  Berenice Hayes   2017 8:30 AM   Office Visit    Description:  Female : 1960   Provider:  Estella Serrano MD   Department:  [facility]           Reason for Visit     Follow-up           Diagnoses this Visit        Comments    Hypothyroidism due to Hashimoto's thyroiditis    -  Primary            To Do List           Future Appointments        Provider Department Dept Phone    2017 8:00 AM Monique Philip MD [facility] - Pain Management [phone]    2017 8:00 AM LAB, APPOINTMENT [facility] [phone]    2017 8:20 AM [facility], DEXA1 [facility] [phone]      Goals (5 Years of Data)     None      Follow-Up and Disposition     Return in about 7 weeks (around 2017) for Follow up.       These Medications        Disp Refills Start End    meloxicam (MOBIC) 7.5 MG tablet 30 tablet 0 2017     Take 1 tablet (7.5 mg total) by mouth once daily. For inflammation - Oral    Pharmacy: [id number] - [place], [place] - [address]  #: [phone]         [facility]     [facility] -  Assistance  Unless otherwise directed by your provider, please contact [facility], our nurse care line that is available for  assistance.     Registered nurses in the [facility] provide: appointment scheduling, clinical advisement, health education, and other advisory services.  Call: [phone] (toll free)               Medications           Message regarding Medications     Verify the changes and/or additions to your medication regime listed below are the same as discussed with your clinician today.  If any of these changes or additions are incorrect, please notify your healthcare provider.        START taking these NEW medications         Refills    meloxicam (MOBIC) 7.5 MG tablet 0    Sig: Take 1 tablet (7.5 mg total) by mouth once daily. For inflammation    Class: Normal    Route: Oral           Verify that the below list of medications is an accurate representation of the medications you are currently taking.  If none reported, the list may be blank. If incorrect, please contact your healthcare provider. Carry this list with you in case of emergency.           Current Medications     albuterol (PROVENTIL HFA/VENTOLIN HFA) 200 puff inhaler Inhale 2 puffs into the lungs every 6 (six) hours as needed.      amitriptyline (ELAVIL) 10 MG tablet Take 1 tablet (10 mg total) by mouth once daily.    amitriptyline (ELAVIL) 50 MG tablet Take one tablet by mouth nightly. Take with the 10 mg amitriptyline    aspirin (ECOTRIN) 81 MG EC tablet Take 81 mg by mouth once daily.    cholecalciferol, vitamin D3, (VITAMIN D3) 1,000 unit capsule Take 2,000 Units by mouth once daily.     DICLOFENAC SODIUM TOP Apply 1 application topically. Use up tp 5x/day Diclofenac 0.15%/lidocaine 2.25%/prilocaine 2.25% [facility]: [phone]    estradiol (VAGIFEM) 10 mcg Tab Place 1 tablet (10 mcg total) vaginally twice a week.    estradiol 0.05 mg/24 hr td ptsw (VIVELLE-DOT) 0.05 mg/24 hr Place 1 patch onto the skin twice a week.    fluticasone (FLONASE) 50 mcg/actuation nasal spray 1 spray by Each Nare route once daily.    FLUZONE QUAD 8236-6772, PF, 60 mcg (15 mcg x 4)/0.5 mL Syrg     gabapentin (NEURONTIN) 100 MG capsule TAKE 2 CAPSULES (200 MG TOTAL) THREE TIMES A DAY (TAKE 6 CAPSULES DAILY)    guaifenesin (MUCINEX) 600 mg 12 hr tablet Take 600 mg by mouth 2 (two) times daily.    liothyronine (CYTOMEL) 5 MCG Tab Alternate days 3 (15mcg) and 3.5 (17.5) tablets    lorazepam (ATIVAN) 1 MG tablet Take 1 tablet (1 mg total) by mouth as directed. 1 tab 30 min prior to MRI, may repeat once if needed    MECOBAL/LEVOMEFOLAT CA/B6 PHOS (METANX ORAL) Take by mouth.     "metaxalone (SKELAXIN) 800 MG tablet TAKE 1 TABLET EVERY EVENING (SPASM)    multivitamin capsule Take 1 capsule by mouth once daily.      pilocarpine (SALAGEN) 5 MG Tab Take 1 tablet (5 mg total) by mouth 3 (three) times daily as needed.    PREVIDENT 5000 DRY MOUTH 1.1 % Gel     RESTASIS 0.05 % ophthalmic emulsion     SYNTHROID 25 mcg tablet Alternate days of 1 (25mcg) and 1.5 (37.5mcg) tablets    zolpidem (AMBIEN) 10 mg Tab TAKE ONE TABLET BY MOUTH EVERY NIGHT AT BEDTIME AS NEEDED    meloxicam (MOBIC) 7.5 MG tablet Take 1 tablet (7.5 mg total) by mouth once daily. For inflammation           Clinical Reference Information           Your Vitals Were     BP Pulse Height Weight Last Period SpO2    94/62 79 5' 1" (1.549 m) 45 kg (99 lb 3.3 oz) (LMP Unknown) 92%    BMI                18.74 kg/m2          Blood Pressure          Most Recent Value    BP  94/62      Allergies as of 5/2/2017     No Known Allergies      Immunizations Administered on Date of Encounter - 5/2/2017     None      Language Assistance Services     ATTENTION: Language assistance services are available, free of charge. Please call [phone].      ATENCIÓN: Si sethla rafaela, tiene a mcgraw disposición servicios gratuitos de asistencia lingüística. Llame al [phone].     SAVANNA Ý: N?u b?n nói Ti?ng Vi?t, có các d?ch v? h? tr? ngôn ng? mi?n phí dành cho b?n. G?i s? [phone].         [facility] complies with applicable Federal civil rights laws and does not discriminate on the basis of race, color, national origin, age, disability, or sex.        "

## 2017-05-02 NOTE — PROGRESS NOTES
Subjective:       Patient ID: Berenice Hayes is a 57 y.o. female.    Chief Complaint: Follow-up (hypothyroidism)    HPI   Patient feels a little more stable from the thyroid status. At this time biochemically she is hyperthyroid. Patient completed an appt with Dr. Espinoza 4/25/2017. He has ordered celiac antibodies , T3, free T4 and TSH.    Thyroid nodule:MNG    Osteoporosis: BMD ordered in July of 2017    For months patient has been having pain in her shoulders from the pain to the biceps. She has fibromyalgia so at times she cannot tell whether this is different.    Knee pain may be either: notes a pain at the knee.    Several weeks ago she had an 8/10 pain at the top of her foot has affected the dorsal surface. She has an appt with Dr. Philip in [facility] as she tried compounding cream but it is not covered by her insurance and she has had to change prescriptions.    Discussed prescription for Mobic    Lump on the back  Review of Systems    Objective:      Physical Exam   Constitutional: She is oriented to person, place, and time. She appears well-developed and well-nourished. No distress.   Neck: Carotid bruit is not present. No thyromegaly present.   Cardiovascular: Normal rate, regular rhythm and normal heart sounds.  PMI is not displaced.    Pulmonary/Chest: Effort normal and breath sounds normal. No respiratory distress.   Abdominal: Soft. Bowel sounds are normal. She exhibits no distension. There is no tenderness.   Musculoskeletal: She exhibits no edema.   Neurological: She is alert and oriented to person, place, and time.       Assessment:       1. Hypothyroidism due to Hashimoto's thyroiditis        Plan:       Berenice was seen today for follow-up.    Diagnoses and all orders for this visit:    Hypothyroidism due to Hashimoto's thyroiditis: We discussed hypothyroidism, her endocrine appointment as well as her current medications and how this may fit into her overall autoimmune syndromes.    Other  orders  -     meloxicam (MOBIC) 7.5 MG tablet; Take 1 tablet (7.5 mg total) by mouth once daily. For inflammation      Return in about 7 weeks (around 6/22/2017) for Follow up.    New Prescriptions    MELOXICAM (MOBIC) 7.5 MG TABLET    Take 1 tablet (7.5 mg total) by mouth once daily. For inflammation       Modified Medications    No medications on file       No orders of the defined types were placed in this encounter.      Labs, studies and consults associated with this visit were reviewed

## 2017-05-11 ENCOUNTER — OFFICE VISIT (OUTPATIENT)
Dept: PAIN MEDICINE | Facility: CLINIC | Age: 57
End: 2017-05-11
Attending: ANESTHESIOLOGY
Payer: COMMERCIAL

## 2017-05-11 VITALS
HEIGHT: 61 IN | BODY MASS INDEX: 18.73 KG/M2 | RESPIRATION RATE: 20 BRPM | HEART RATE: 86 BPM | TEMPERATURE: 98 F | DIASTOLIC BLOOD PRESSURE: 61 MMHG | WEIGHT: 99.19 LBS | SYSTOLIC BLOOD PRESSURE: 100 MMHG

## 2017-05-11 DIAGNOSIS — G57.01 PIRIFORMIS SYNDROME OF RIGHT SIDE: ICD-10-CM

## 2017-05-11 DIAGNOSIS — R10.2 VAGINAL PAIN: ICD-10-CM

## 2017-05-11 DIAGNOSIS — G57.92 ILIOINGUINAL NEURALGIA OF LEFT SIDE: ICD-10-CM

## 2017-05-11 DIAGNOSIS — M79.671 RIGHT FOOT PAIN: ICD-10-CM

## 2017-05-11 DIAGNOSIS — G57.91 ILIOINGUINAL NEURALGIA, RIGHT: ICD-10-CM

## 2017-05-11 DIAGNOSIS — F41.1 GAD (GENERALIZED ANXIETY DISORDER): ICD-10-CM

## 2017-05-11 DIAGNOSIS — M62.81 MUSCLE WEAKNESS: Primary | ICD-10-CM

## 2017-05-11 DIAGNOSIS — M79.7 FIBROMYALGIA: ICD-10-CM

## 2017-05-11 DIAGNOSIS — G89.4 CHRONIC PAIN DISORDER: ICD-10-CM

## 2017-05-11 DIAGNOSIS — R10.2 VULVAR PAIN: ICD-10-CM

## 2017-05-11 DIAGNOSIS — G57.02 PIRIFORMIS SYNDROME OF LEFT SIDE: ICD-10-CM

## 2017-05-11 PROCEDURE — 99215 OFFICE O/P EST HI 40 MIN: CPT | Mod: S$GLB,,, | Performed by: ANESTHESIOLOGY

## 2017-05-11 PROCEDURE — 1160F RVW MEDS BY RX/DR IN RCRD: CPT | Mod: S$GLB,,, | Performed by: ANESTHESIOLOGY

## 2017-05-11 PROCEDURE — 99999 PR PBB SHADOW E&M-EST. PATIENT-LVL III: CPT | Mod: PBBFAC,,, | Performed by: ANESTHESIOLOGY

## 2017-05-11 NOTE — MR AVS SNAPSHOT
[facility]  [address]  [place] [address]  Phone: [phone]  Fax: [phone]                  Berenice Hayes   2017 8:00 AM   Office Visit    Description:  Female : 1960   Provider:  Monique Philip MD   Department:  [facility]           Diagnoses this Visit        Comments    Muscle weakness    -  Primary     Chronic pain disorder         Fibromyalgia         Piriformis syndrome of right side         Piriformis syndrome of left side         Vaginal pain         Vulvar pain         MARIIA (generalized anxiety disorder)         Right foot pain         Ilioinguinal neuralgia of left side         Ilioinguinal neuralgia, right                To Do List           Future Appointments        Provider Department Dept Phone    2017 8:00 AM Johny Collins DPM [facility] - Podiatry [phone]    2017 8:00 AM LAB, APPOINTMENT [facility] [phone]    2017 8:30 AM Estella Serrano MD [facility] - Internal Medicine [phone]    2017 8:20 AM [facility], DEXA1 [facility] [phone]      Goals (5 Years of Data)     None      [facility]     [facility] -  Assistance  Unless otherwise directed by your provider, please contact [facility], our nurse care line that is available for  assistance.     Registered nurses in the [facility] provide: appointment scheduling, clinical advisement, health education, and other advisory services.  Call: [phone] (toll free)               Medications           Message regarding Medications     Verify the changes and/or additions to your medication regime listed below are the same as discussed with your clinician today.  If any of these changes or additions are incorrect, please notify your healthcare provider.             Verify that the below list of medications is an accurate representation of the medications  you are currently taking.  If none reported, the list may be blank. If incorrect, please contact your healthcare provider. Carry this list with you in case of emergency.           Current Medications     albuterol (PROVENTIL HFA/VENTOLIN HFA) 200 puff inhaler Inhale 2 puffs into the lungs every 6 (six) hours as needed.      amitriptyline (ELAVIL) 10 MG tablet Take 1 tablet (10 mg total) by mouth once daily.    amitriptyline (ELAVIL) 50 MG tablet Take one tablet by mouth nightly. Take with the 10 mg amitriptyline    aspirin (ECOTRIN) 81 MG EC tablet Take 81 mg by mouth once daily.    cholecalciferol, vitamin D3, (VITAMIN D3) 1,000 unit capsule Take 2,000 Units by mouth once daily.     DICLOFENAC SODIUM TOP Apply 1 application topically. Use up tp 5x/day Diclofenac 0.15%/lidocaine 2.25%/prilocaine 2.25% [facility]: [phone]    estradiol (VAGIFEM) 10 mcg Tab Place 1 tablet (10 mcg total) vaginally twice a week.    estradiol 0.05 mg/24 hr td ptsw (VIVELLE-DOT) 0.05 mg/24 hr Place 1 patch onto the skin twice a week.    fluticasone (FLONASE) 50 mcg/actuation nasal spray 1 spray by Each Nare route once daily.    FLUZONE QUAD 9352-3233, PF, 60 mcg (15 mcg x 4)/0.5 mL Syrg     gabapentin (NEURONTIN) 100 MG capsule TAKE 2 CAPSULES (200 MG TOTAL) THREE TIMES A DAY (TAKE 6 CAPSULES DAILY)    guaifenesin (MUCINEX) 600 mg 12 hr tablet Take 600 mg by mouth 2 (two) times daily.    liothyronine (CYTOMEL) 5 MCG Tab Alternate days 3 (15mcg) and 3.5 (17.5) tablets    meloxicam (MOBIC) 7.5 MG tablet Take 1 tablet (7.5 mg total) by mouth once daily. For inflammation    metaxalone (SKELAXIN) 800 MG tablet TAKE 1 TABLET EVERY EVENING (SPASM)    multivitamin capsule Take 1 capsule by mouth once daily.      pilocarpine (SALAGEN) 5 MG Tab Take 1 tablet (5 mg total) by mouth 3 (three) times daily as needed.    PREVIDENT 5000 DRY MOUTH 1.1 % Gel     RESTASIS 0.05 % ophthalmic emulsion     SYNTHROID 25 mcg tablet Alternate  "days of 1 (25mcg) and 1.5 (37.5mcg) tablets    zolpidem (AMBIEN) 10 mg Tab TAKE ONE TABLET BY MOUTH EVERY NIGHT AT BEDTIME AS NEEDED    lorazepam (ATIVAN) 1 MG tablet Take 1 tablet (1 mg total) by mouth as directed. 1 tab 30 min prior to MRI, may repeat once if needed    MECOBAL/LEVOMEFOLAT CA/B6 PHOS (METANX ORAL) Take by mouth.           Clinical Reference Information           Your Vitals Were     BP Pulse Temp Resp Height Weight    100/61 86 97.8 °F (36.6 °C) (Oral) 20 5' 1" (1.549 m) 45 kg (99 lb 3.2 oz)    Last Period BMI             (LMP Unknown) 18.74 kg/m2         Blood Pressure          Most Recent Value    BP  100/61      Allergies as of 5/11/2017     No Known Allergies      Immunizations Administered on Date of Encounter - 5/11/2017     None      Orders Placed During Today's Visit      Normal Orders This Visit    Ambulatory consult to Podiatry       Language Assistance Services     ATTENTION: Language assistance services are available, free of charge. Please call [phone].      ATENCIÓN: Si josef russo, tiene a mcgraw disposición servicios gratuitos de asistencia lingüística. Llame al [phone].     SAVANNA Ý: N?u b?n nói Ti?ng Vi?t, có các d?ch v? h? tr? ngôn ng? mi?n phí dành cho b?n. G?i s? [phone].         [facility] complies with applicable Federal civil rights laws and does not discriminate on the basis of race, color, national origin, age, disability, or sex.        "

## 2017-05-11 NOTE — PROGRESS NOTES
"Subjective:      Patient ID: Berenice Hayes is a 57 y.o. female.    Chief Complaint: No chief complaint on file.    Referred by: No ref. provider found       HPI:     is a 53 yo female who presents today with multiple pain complaints, mostly vaginal pain.  She has been undergoing pelvic floor PT for multiple pelvic pain generators.  During this, she sat on a heating pad, which started the vaginal pain .     Interval History (2/26/2015)  Patient notes 50% pain relief after left pudendal nerve block.  She states relief started within the last 10 days.  After the nerve block, patient sates her pain became worse and she needed to take additional pain medicine to help.  She would like to proceed with the right pudendal nerve block and is requesting a prescription for pain medicine after the injection.    Interval History (5/04/2015):  She returns today for follow up.  She reports that her pain has now slowly returned to baseline.  She reports that the inner thigh pain and posterior "leg crease" pain has all but disappeared after the left pudendal block; however, now it has returned.  She never did get the right-sided pudendal block.  She would like to schedule this today.  She like to schedule this for the first week in June, as her daughter will be having surgery for thyroid cancer coming up.    Of note, she is focusing on multiple areas of pain today, including her right groin/genitofemoral region, lateral ischial bursae regions, right posterior hamstring, and her general pelvic pain.  In addition to this, she also reports occasional numbness and tingling in her right lower leg and her entire foot.  This is worse when sitting    Interval History (10/8/2015):  She returns today for follow up.  She reports that the piriformis injection and bilateral pudendal nerve blocks have been helpful for the pain.  Her right sided pain began worsening following a hormone injection.  She inquires as to whether that " "injection could have irritated her pudendal nerve.  She is also considering hormone pellets and asks whether this could affect it as well.  She also notes some swelling in her left lower leg that is new.    Interval History (11/18/2015):  She returns today for follow up.  She reports that this most recent pudendal block was not helpful for the pain.  She has increased burning following this block, but not the increase in pelvic pain as before.  She is scheduled to try acupuncture.  She is scheduled to see Dr. Feliz for pelvic floor TPIs.  Reports "tailbone pain" that is not worse with sitting or BMs.  Pain in posterior thighs occurs with sitting.    Interval History (5/11/2017):  She returns today for follow up.  She reports a complicated medical course since her last visit. She reports continued groin pain that she reports is the worst today; however, she is focusing more on her back with erh narrative.  She reports low back pain that is bilateral, radiates into her bilateral hips and sometimes into her posterior thighs.  Also, she reports pain in the top of her right foot that hurts when she dorsiflexes her foot and when she plantar flexes her right foot.  Amitriptyline (increased to 60 mg), gabapentin 400 mg QS, and topical cream has been helpful for the pain.    Physical Therapy: Finished with pelvic PT, which is helpful.      Non-pharmacologic Treatment: Rest and deep breathing helps (abd wall pain)         · TENS? No    Pain Medications:         · Currently taking: amitriptyline 60 mg QHS, Rx topical cream.  Gabapentin 400 daily    · Has tried in the past:      · Has not tried: does not want opioids    Blood thinners: No    Interventional Therapies:   · Previous bilateral pudendal nerve blocks:  Worsening pain followed by relief  · Most recent right pudendal:  No relief  · Right piriformis injection:  Temporary relief    Relevant Surgeries:     Affecting sleep? Yes    Affecting daily activities? " Yes    Depressive symptoms? Yes          · SI/HI? 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 This is a new problem. The current episode started 1 to 4 weeks ago. The problem occurs constantly. The quality of the pain is described as burning, throbbing, hot and tingling (deep and sore). The pain is at a severity of 4/10. The symptoms are aggravated by activity, sitting, standing, lying down, bending, touching and walking (pressure). She has tried oral narcotics and injection treatment for the symptoms.   Leg Pain      Vaginal Pain   Associated symptoms include joint swelling.   Pain   Associated symptoms include joint swelling.       Review of Systems   Constitution: Positive for malaise/fatigue.   HENT: Positive for ear pain.         Runny nose   Eyes: Positive for pain.        Vision change   Respiratory: Negative.    Endocrine:        Thyroidism   Hematologic/Lymphatic: Bruises/bleeds easily.   Skin: Positive for color change.        Texture change   Musculoskeletal: Positive for back pain and joint swelling.        Joint stiffness   Gastrointestinal: Positive for constipation.   Genitourinary: Positive for frequency, pelvic pain and vaginal pain.   Neurological: Positive for loss of balance.        Memory loss   Psychiatric/Behavioral: The patient has insomnia and is nervous/anxious.              Past Medical History:   Diagnosis Date    Asthma with allergic rhinitis     Bladder spasm     Dense breasts     heterogeneously/fibrocystic disease    Elevated antinuclear antibody (GUICHO) level     Endometriosis of cervix     Erythromelalgia     Fibromyalgia     Ganglion cyst     left toe    Goiter     MNG    Hashimoto's disease     Hashimoto's thyroiditis     Headache     Hypothyroidism     Hashimoto with + Tg ab     Osteoporosis     femoral neck    Raynaud's phenomenon     Rhinitis     Scoliosis     Sleep disorder     type of Narcolepsy ( resolved)    Vitamin D deficiency disease     Vulvodynia        Past Surgical History:   Procedure Laterality Date    BREAST SURGERY      fibrocystic tumor removed     SECTION      2x    CYST REMOVAL      laparoscopic cyst on ovary    HYSTERECTOMY      intradermal cyst       removed from left index finger    SINUS SURGERY      2x       Review of patient's allergies indicates:  No Known Allergies    Current Outpatient Prescriptions   Medication Sig Dispense Refill    albuterol (PROVENTIL HFA/VENTOLIN HFA) 200 puff inhaler Inhale 2 puffs into the lungs every 6 (six) hours as needed.        amitriptyline (ELAVIL) 10 MG tablet Take 1 tablet (10 mg total) by mouth once daily. 90 tablet 3    amitriptyline (ELAVIL) 50 MG tablet Take one tablet by mouth nightly. Take with the 10 mg amitriptyline (Patient taking differently: 50 mg. Take one tablet by mouth nightly.) 90 tablet 4    aspirin (ECOTRIN) 81 MG EC tablet Take 81 mg by mouth once daily.      cholecalciferol, vitamin D3, (VITAMIN D3) 1,000 unit capsule Take 2,000 Units by mouth once daily.       DICLOFENAC SODIUM TOP Apply 1 application topically. Use up tp 5x/day Diclofenac 0.15%/lidocaine 2.25%/prilocaine 2.25% [facility]: [phone]      estradiol (VAGIFEM) 10 mcg Tab Place 1 tablet (10 mcg total) vaginally twice a week. 24 tablet 4    estradiol 0.05 mg/24 hr td ptsw (VIVELLE-DOT) 0.05 mg/24 hr Place 1 patch onto the skin twice a week. 24 patch 3    fluticasone (FLONASE) 50 mcg/actuation nasal spray 1 spray by Each Nare route once daily. 3 Bottle 3    FLUZONE QUAD 4986-9799, PF, 60 mcg (15 mcg x 4)/0.5 mL Syrg       gabapentin (NEURONTIN) 100 MG capsule TAKE 2 CAPSULES (200 MG TOTAL) THREE TIMES A DAY (TAKE 6 CAPSULES DAILY) 540 capsule 2    guaifenesin (MUCINEX) 600 mg 12 hr tablet Take 600  mg by mouth 2 (two) times daily.      liothyronine (CYTOMEL) 5 MCG Tab Alternate days 3 (15mcg) and 3.5 (17.5) tablets 295 tablet 3    meloxicam (MOBIC) 7.5 MG tablet Take 1 tablet (7.5 mg total) by mouth once daily. For inflammation 30 tablet 0    metaxalone (SKELAXIN) 800 MG tablet TAKE 1 TABLET EVERY EVENING (SPASM) 90 tablet 2    multivitamin capsule Take 1 capsule by mouth once daily.        pilocarpine (SALAGEN) 5 MG Tab Take 1 tablet (5 mg total) by mouth 3 (three) times daily as needed. 270 tablet 3    PREVIDENT 5000 DRY MOUTH 1.1 % Gel       RESTASIS 0.05 % ophthalmic emulsion       SYNTHROID 25 mcg tablet Alternate days of 1 (25mcg) and 1.5 (37.5mcg) tablets 125 tablet 3    zolpidem (AMBIEN) 10 mg Tab TAKE ONE TABLET BY MOUTH EVERY NIGHT AT BEDTIME AS NEEDED 30 tablet 2    lorazepam (ATIVAN) 1 MG tablet Take 1 tablet (1 mg total) by mouth as directed. 1 tab 30 min prior to MRI, may repeat once if needed 2 tablet 0    MECOBAL/LEVOMEFOLAT CA/B6 PHOS (METANX ORAL) Take by mouth.       No current facility-administered medications for this visit.        Family History   Problem Relation Age of Onset    Diabetes Mother     Fibromyalgia Mother     Polymyalgia rheumatica Mother     Macular degeneration Mother     Arthritis Mother     Hypertension Mother     Kidney disease Mother     Pneumonia Mother     Adrenal disorder Mother      adrenal insufficiency    Coronary artery disease Father     Arthritis Father      osteoarthritis    Hypertension Father     Heart disease Father     Diabetes Father     Hyperlipidemia Father     Hyperlipidemia Brother     Cancer Brother      oral    Thyroid cancer Daughter     Cancer Daughter      thyroid    Hypothyroidism Daughter     Breast cancer Neg Hx     Ovarian cancer Neg Hx     Colon cancer Neg Hx        Social History     Social History    Marital status:      Spouse name: N/A    Number of children: N/A    Years of education: N/A  "    Occupational History     [employer]     Social History Main Topics    Smoking status: Never Smoker    Smokeless tobacco: Never Used    Alcohol use No    Drug use: No    Sexual activity: Not Currently     Birth control/ protection: Surgical      Comment: single, RAMOS ov in situ      Other Topics Concern    Not on file     Social History Narrative           Objective:     Vitals:    05/11/17 0815   BP: 100/61   Pulse: 86   Resp: 20   Temp: 97.8 °F (36.6 °C)   TempSrc: Oral   Weight: 45 kg (99 lb 3.2 oz)   Height: 5' 1" (1.549 m)   PainSc:   4   PainLoc: Foot     GEN:  Well developed, well nourished.  No acute distress.   HEENT:  No trauma.  Mucous membranes moist.  Nares patent bilaterally.  PSYCH: Normal affect. Thought content appropriate.  CHEST:  Breathing symmetric.  No audible wheezing.  ABD: Soft, non-tender, non-distended.  SKIN:  Warm, pink, dry.  No rash on exposed areas.    EXT:  No cyanosis, clubbing, or edema.  No color change or changes in nail or hair growth.  NEURO/MUSCULOSKELETAL:  Fully alert, oriented, and appropriate. Speech normal rick. No cranial nerve deficits.   Gait: Normal.  No focal motor deficits.   TTP over bilateral gluteal muscles and right > left piriformis muscle.      Allodynia noted in bilateral ilioinguinal region  TTP over dorsum of right foot around the transtarsal joint on the medial side.  Pain with dorsiflexion and plantar flexion of ankle.        Imaging:        Result Narrative    Two views obtained. lumbar vertebral bodies are normal in height and alignment without significant degenerative change.      Result Impression    As above      Electronically signed by: Valentine Thomas MD  Date: 12/05/14  Time: 16:23         Assessment:       Encounter Diagnoses   Name Primary?    Muscle weakness of core Yes    Chronic pain disorder     Fibromyalgia     Piriformis syndrome of right side     Piriformis syndrome of left side     Vaginal pain     Vulvar pain "     MARIIA (generalized anxiety disorder)     Right foot pain     Ilioinguinal neuralgia of left side     Ilioinguinal neuralgia, right          Plan:       Diagnoses and all orders for this visit:    Muscle weakness of core    Chronic pain disorder    Fibromyalgia    Piriformis syndrome of right side    Piriformis syndrome of left side    Vaginal pain    Vulvar pain    MARIIA (generalized anxiety disorder)    Right foot pain  -     Ambulatory consult to Podiatry    Ilioinguinal neuralgia of left side    Ilioinguinal neuralgia, right     Her pain is consistent with vaginal pain, possibly due to pudendal neuralgia.  She also has a large myofascial component of her pain, that is partially responsible for the relief that she received from previous pudendal blocks. I do believe that she would benefit from pelvic floor trigger point injections.    An MRI is not currently indicated for her symptoms.    I discussed the treatment options with her today, including risks, benefits, and alternatives. All available images were reviewed.     1. Will schedule for bilateral TAP blocks under US and IV sedation.  2. She will work on increasing the gabapentin to 6 pills daily  3. Continue other current medications. Consider increasing amitriptyline to 70 mg QHS.  4. Consider low dose naltrexone therapy.  5. Recommend continuing to perform piriformis stretch as shown in office  6. Recommend TPIs with Dr. Feliz if offered.  7. Let me know how acupuncture goes.  8. Recommend follow-up with Dr. Marinelli  9. Recommend following up on the referral to [facility], as [facility] was not accepting any new patients with her insurance ([employer])  10. Continue Compounding cream for topical use. New Rx sent to [facility] (previous from [facility])  11. RTC 3 weeks after procedure or sooner if needed.    Greater than 60 minutes spent in total in todays visit with the patient, with more than half that time direct face to  face counseling and education with the patient today. We discussed the disease process, prognosis, treatment plan, and risks and benefits.          Ortho/SPM Exam

## 2017-05-12 ENCOUNTER — PATIENT MESSAGE (OUTPATIENT)
Dept: PAIN MEDICINE | Facility: CLINIC | Age: 57
End: 2017-05-12

## 2017-05-19 ENCOUNTER — LAB VISIT (OUTPATIENT)
Dept: LAB | Facility: HOSPITAL | Age: 57
End: 2017-05-19
Payer: COMMERCIAL

## 2017-05-19 DIAGNOSIS — E03.8 HYPOTHYROIDISM DUE TO HASHIMOTO'S THYROIDITIS: ICD-10-CM

## 2017-05-19 DIAGNOSIS — M81.0 OSTEOPOROSIS, POSTMENOPAUSAL: ICD-10-CM

## 2017-05-19 DIAGNOSIS — E06.3 HYPOTHYROIDISM DUE TO HASHIMOTO'S THYROIDITIS: ICD-10-CM

## 2017-05-19 DIAGNOSIS — E04.2 MULTINODULAR GOITER: ICD-10-CM

## 2017-05-19 LAB
IGA SERPL-MCNC: 113 MG/DL
T3 SERPL-MCNC: 111 NG/DL
T4 FREE SERPL-MCNC: 0.58 NG/DL
TSH SERPL DL<=0.005 MIU/L-ACNC: 0.25 UIU/ML

## 2017-05-19 PROCEDURE — 36415 COLL VENOUS BLD VENIPUNCTURE: CPT

## 2017-05-19 PROCEDURE — 83516 IMMUNOASSAY NONANTIBODY: CPT

## 2017-05-19 PROCEDURE — 84439 ASSAY OF FREE THYROXINE: CPT

## 2017-05-19 PROCEDURE — 82784 ASSAY IGA/IGD/IGG/IGM EACH: CPT

## 2017-05-19 PROCEDURE — 84480 ASSAY TRIIODOTHYRONINE (T3): CPT

## 2017-05-19 PROCEDURE — 84443 ASSAY THYROID STIM HORMONE: CPT

## 2017-05-22 LAB — TTG IGA SER IA-ACNC: 3 UNITS

## 2017-05-23 ENCOUNTER — PATIENT MESSAGE (OUTPATIENT)
Dept: ENDOCRINOLOGY | Facility: CLINIC | Age: 57
End: 2017-05-23

## 2017-05-24 ENCOUNTER — PATIENT MESSAGE (OUTPATIENT)
Dept: ENDOCRINOLOGY | Facility: CLINIC | Age: 57
End: 2017-05-24

## 2017-05-30 RX ORDER — MELOXICAM 7.5 MG/1
7.5 TABLET ORAL DAILY
Qty: 30 TABLET | Refills: 3 | Status: SHIPPED | OUTPATIENT
Start: 2017-05-30 | End: 2020-11-18

## 2017-06-02 ENCOUNTER — OFFICE VISIT (OUTPATIENT)
Dept: PODIATRY | Facility: CLINIC | Age: 57
End: 2017-06-02
Payer: COMMERCIAL

## 2017-06-02 VITALS — BODY MASS INDEX: 19.05 KG/M2 | WEIGHT: 100.88 LBS | HEIGHT: 61 IN

## 2017-06-02 DIAGNOSIS — M79.7 FIBROMYALGIA: Primary | ICD-10-CM

## 2017-06-02 DIAGNOSIS — I73.00 RAYNAUD'S DISEASE WITHOUT GANGRENE: ICD-10-CM

## 2017-06-02 DIAGNOSIS — I99.9 VASCULAR DISORDER: ICD-10-CM

## 2017-06-02 DIAGNOSIS — D36.10 NEUROMA: ICD-10-CM

## 2017-06-02 PROCEDURE — 99999 PR PBB SHADOW E&M-EST. PATIENT-LVL IV: CPT | Mod: PBBFAC,,, | Performed by: PODIATRIST

## 2017-06-02 PROCEDURE — 99203 OFFICE O/P NEW LOW 30 MIN: CPT | Mod: S$GLB,,, | Performed by: PODIATRIST

## 2017-06-02 RX ORDER — ALCOHOL 2.38 KG/3.79L
1 GEL TOPICAL 2 TIMES DAILY
Qty: 60 CAPSULE | Refills: 11 | Status: SHIPPED | OUTPATIENT
Start: 2017-06-02 | End: 2017-07-21 | Stop reason: ALTCHOICE

## 2017-06-02 NOTE — PATIENT INSTRUCTIONS
Magnilife pain cream daily.    Metanx    - Pedifix metatarsal pads, Gel ball of foot pad/protector sleeve    (found at [employer], [facility], [facility]....)    - Supportive shoes with stiff or rocker sole (HOKA,  Shape-up, Dansko)    - Full length orthotic brands to consider: spenco, superfeet, sof sole fit series, Powerstep    ([employer], [employer], [employer], [place] [employer] company, [employer] shoes, [employer] store, [employer]ile[employer])    What is Metatarsalgia?  Metatarsalgia is pain in the ball of your foot. It is often caused by wearing shoes with thin soles and high heels. This puts extra pressure on the bones in the ball of the foot. Standing or walking on a hard surface for long periods also puts added pressure on the bones, causing pain. The pain can occur under any of the five metatarsal bones, although it's most common in the second. Bent or twisted toes and bunions can make the problem worse. So can being overweight. Sometimes high arches or arthritis can also cause metatarsalgia.    Inside the ball of your foot  The long bones in the middle of your foot are called the metatarsal bones. Each metatarsal bone ends in the ball of the foot. When you walk, these bones bear the weight of your body as your foot pushes off the ground. If there is more pressure on the end of one bone, it presses on the skin beneath it. This causes pain and inflammation in the ball of the foot (metatarsalgia). A callus (a hard growth of skin) may also form on the ball of the foot.    Symptoms  The most common symptom of metatarsalgia is pain in the ball of the foot. It may feel as if you have a stone in your shoe. The ball of the foot may also become red and inflamed, and a callus may form under the end of the metatarsal bone.   Date Last Reviewed: 9/29/2015  © 5703-1376 [employer]. [address] [place] [address], [place] [address]. All rights reserved. This information is not intended as a  "substitute for professional medical care. Always follow your healthcare professional's instructions.        Treating Metatarsalgia  Metatarsalgia is pain in the "ball of your foot," the area between your arch and your toes. The pain usually starts in one or more of the five bones in this area under the toes (these bones are called the metatarsals). Often, a callus builds up in this area.In most cases, wearing low-heeled, well-cushioned shoes and filing down the callus will relieve the pain. If these steps dont work, your healthcare provider may suggest surgery to remove part of the bone. To take pressure off the ball of your foot and relieve the pain, your healthcare provider may suggest that you do one or more of the following.  Wear shoes with padded soles  Wear shoes with thick padding in the soles. Keep heels low, and make sure the shoe is wide across the ball of your foot and your toes.    Using a metatarsal pad  Put a metatarsal pad in your shoe. This lifts and takes pressure off the painful area. To insert the pad:  · Put a small lipstick percy on the callus.  · Step into the shoe to leave a percy on the insole.  · Peel the backing off the pad. Then put the pad in the shoe just behind the lipstick percy.  Inserts with built-in metatarsal pads may also be available.  Filing the callus  · Soak your foot in warm water for a few minutes to soften the skin.  · Dry the foot.  · Then gently rub the callus with a pumice stone or nail file to remove the hard skin. Stop if bleeding or pain occurs.  · Diabetes should get foot care from healthcare providers familiar with diabetes care.  Date Last Reviewed: 9/29/2015 © 2000-2016 [employer]. [address] [place] [address], [place] [address]. All rights reserved. This information is not intended as a substitute for professional medical care. Always follow your healthcare professional's instructions.    "

## 2017-06-02 NOTE — PROGRESS NOTES
Subjective:      Patient ID: Berenice Hayes is a 57 y.o. female.    Chief Complaint: Foot Problem (right ft.,pat think a pinched nerv) and Foot Pain (PCP Dr. Serrano 5/2/17)    Berenice is a 57 y.o. female who presents to the podiatry clinic  with complaint of  right foot pain. Onset of the symptoms was several months ago. Precipitating event: none known. Current symptoms include: sharp and tingling pains to the top of her right foot especially with pressure to the area. Aggravating factors: tight shoes, anything touching the foot. Symptoms have waxed and waned. Patient has had prior foot problems. Evaluation to date: none. Treatment to date: Compound pain creams and wearing orthotic inserts from the [employer], this has helped some. Patients rates pain 4/10 on pain scale. Of note she also has a history of Raynaud's bilateral and generalized nerve pain in her lower back coming down her legs. She also has a history of Fibromyalgia.     Review of Systems   Constitution: Positive for malaise/fatigue. Negative for chills, fever and weakness.   Cardiovascular: Positive for leg swelling. Negative for claudication.   Respiratory: Negative for shortness of breath.    Skin: Negative for itching, nail changes, poor wound healing and rash.   Musculoskeletal: Positive for arthritis, back pain, joint pain and myalgias. Negative for muscle weakness.   Gastrointestinal: Negative for nausea and vomiting.   Neurological: Positive for numbness and paresthesias. Negative for focal weakness and loss of balance.           Objective:      Physical Exam   Constitutional: She is oriented to person, place, and time. She appears well-developed and well-nourished. No distress.   Cardiovascular:   Pulses:       Dorsalis pedis pulses are 2+ on the right side, and 2+ on the left side.        Posterior tibial pulses are 1+ on the right side, and 1+ on the left side.   < 3 sec capillary refill time to toes 1-5 bilateral. Toes and feet are warm  to touch proximally with distal cooling b/l. There is no hair growth on the feet and toes b/l. There is no edema b/l. There are noted varicosities present b/l.      Musculoskeletal:   Equinus noted b/l ankles with < 10 deg DF noted. MMT 5/5 in DF/PF/Inv/Ev resistance with no reproduction of pain in any direction. Passive range of motion of ankle and pedal joints is painless b/l.    Pain with palpation to the second and third intermetatarsal spaces right foot.      Neurological: She is alert and oriented to person, place, and time. She has normal strength. She displays no atrophy and no tremor. No sensory deficit. She exhibits normal muscle tone.   Negative tinel sign bilateral. Positive tinel sign bilateral. Positive paraesthesias noted to the right foot when palpated along the deep peroneal nerve.   Skin: Skin is warm, dry and intact. No abrasion, no bruising, no burn, no ecchymosis, no laceration, no lesion, no petechiae and no rash noted. She is not diaphoretic. No cyanosis or erythema. No pallor. Nails show no clubbing.   Skin distally is cool to touch. Skin is slightly thin and atrophic throughout.   Psychiatric: She has a normal mood and affect. Her behavior is normal.             Assessment:       Encounter Diagnoses   Name Primary?    Vascular disorder: Erytnromelalgia Yes    Raynaud's disease without gangrene     Fibromyalgia     Neuroma - Right Foot          Plan:       Berenice was seen today for foot problem and foot pain.    Diagnoses and all orders for this visit:    Vascular disorder: Erytnromelalgia    Raynaud's disease without gangrene    Fibromyalgia    Neuroma - Right Foot    Other orders  -     [id number]-algal oil 3 mg-35 mg-2 mg -90.314 mg Cap; Take 1 tablet by mouth 2 (two) times daily.      I counseled the patient on her conditions, their implications and medical management.    Recommended wearing a more supportive shoe with possible metatarsal pads added to her orthotics versus  getting new custom made orthotics with built in metatarsal pads. Gave her a card for Toni Castillo, orthotist, who can recommend shoes and make the orthotic inserts for her.    Recommend continue usage of the pain creams she is already using    Metanx ordered for nerve pain.  Continue 600 mg gabapentin a day, she can go up on this recommend talking with her primary care about increasing this dose.    Return in 6 weeks for further evaluation, orthotics evaluation, recommend she return to her neurologist.    Mauri Mccallum DPM PGY-3  Pager [phone]      I have reviewed and concur with the resident's history, physical, assessment, and plan.  I have personally interviewed and examined the patient at bedside.

## 2017-06-20 ENCOUNTER — OFFICE VISIT (OUTPATIENT)
Dept: INTERNAL MEDICINE | Facility: CLINIC | Age: 57
End: 2017-06-20
Payer: COMMERCIAL

## 2017-06-20 VITALS
HEIGHT: 61 IN | DIASTOLIC BLOOD PRESSURE: 74 MMHG | HEART RATE: 102 BPM | WEIGHT: 100.31 LBS | BODY MASS INDEX: 18.94 KG/M2 | OXYGEN SATURATION: 98 % | SYSTOLIC BLOOD PRESSURE: 119 MMHG

## 2017-06-20 DIAGNOSIS — E05.90 HYPERTHYROIDISM: ICD-10-CM

## 2017-06-20 DIAGNOSIS — R00.2 PALPITATIONS: Primary | ICD-10-CM

## 2017-06-20 PROCEDURE — 93010 ELECTROCARDIOGRAM REPORT: CPT | Mod: S$GLB,,, | Performed by: INTERNAL MEDICINE

## 2017-06-20 PROCEDURE — 99214 OFFICE O/P EST MOD 30 MIN: CPT | Mod: S$GLB,,, | Performed by: INTERNAL MEDICINE

## 2017-06-20 PROCEDURE — 99999 PR PBB SHADOW E&M-EST. PATIENT-LVL V: CPT | Mod: PBBFAC,,, | Performed by: INTERNAL MEDICINE

## 2017-06-20 PROCEDURE — 93005 ELECTROCARDIOGRAM TRACING: CPT | Mod: S$GLB,,, | Performed by: INTERNAL MEDICINE

## 2017-06-20 NOTE — PROGRESS NOTES
Subjective:       Patient ID: Berenice Hayes is a 57 y.o. female.    Chief Complaint: Follow-up    HPI   Patient has noted heart palpitations about 10 days ago, she notes them today and they are better  Cytomel:alternating 15 mcg ( 25 mcg Synthoid) /17.5mcg  ( 37.5)    Dr. Espinoza  T3 111  Free T4 0.58  tsh .252    We reviewed Dr. Espinoza's note.    Patient was on Metanx, and stopped and put on B12 injections.    Review of Systems   Constitutional: Negative for chills, fever and unexpected weight change.   HENT: Negative for trouble swallowing.    Respiratory: Negative for cough, shortness of breath and wheezing.    Cardiovascular: Positive for palpitations. Negative for chest pain.   Gastrointestinal: Negative for abdominal distention, abdominal pain, blood in stool and vomiting.   Musculoskeletal: Negative for back pain.       Objective:      Physical Exam   Constitutional: She is oriented to person, place, and time. She appears well-developed and well-nourished. No distress.   Neck: Carotid bruit is not present. No thyromegaly present.   Cardiovascular: Regular rhythm and normal heart sounds.  PMI is not displaced.    Increased rate   Pulmonary/Chest: Effort normal and breath sounds normal. No respiratory distress.   Abdominal: Soft. Bowel sounds are normal. She exhibits no distension. There is no tenderness.   Musculoskeletal: She exhibits no edema.   Neurological: She is alert and oriented to person, place, and time.       Assessment:       1. Palpitations    2. Hyperthyroidism        Plan:       Synthroid alternating 25/37.5  Cytomel  10 mcg everyday and a prn 2.5 mcg ( this is a change)  This is a reduction in cytomel in the hope of increasing the tsh and decrease the free T4    Form to be filled out    Berenice was seen today for follow-up.    Diagnoses and all orders for this visit:    Palpitations: sinus on EKG no atrial fib, likely related to hyperthyroidism  -     EKG 12-lead; Future    Hyperthyroidism: see  above after discussion      Return in about 1 month (around 7/20/2017) for Follow up thyroid.    New Prescriptions    No medications on file       Modified Medications    No medications on file       Orders Placed This Encounter   Procedures    EKG 12-lead     Standing Status:   Future     Standing Expiration Date:   8/19/2017       Labs, studies and consults associated with this visit were reviewed

## 2017-06-22 ENCOUNTER — DOCUMENTATION ONLY (OUTPATIENT)
Dept: REHABILITATION | Facility: HOSPITAL | Age: 57
End: 2017-06-22

## 2017-06-22 NOTE — DISCHARGE SUMMARY
6/22/2017    Pt has not attended PT sessions since 1/26/2017 and will be discharged from PT services with goal status unknown.

## 2017-06-26 DIAGNOSIS — N95.2 ATROPHIC VAGINITIS: ICD-10-CM

## 2017-06-26 RX ORDER — ESTRADIOL 10 UG/1
INSERT VAGINAL
Qty: 24 TABLET | Refills: 3 | Status: SHIPPED | OUTPATIENT
Start: 2017-06-26 | End: 2017-07-21 | Stop reason: SDUPTHER

## 2017-07-06 ENCOUNTER — PATIENT MESSAGE (OUTPATIENT)
Dept: INTERNAL MEDICINE | Facility: CLINIC | Age: 57
End: 2017-07-06

## 2017-07-12 ENCOUNTER — LAB VISIT (OUTPATIENT)
Dept: LAB | Facility: HOSPITAL | Age: 57
End: 2017-07-12
Attending: INTERNAL MEDICINE
Payer: COMMERCIAL

## 2017-07-12 DIAGNOSIS — Z00.00 WELLNESS EXAMINATION: ICD-10-CM

## 2017-07-12 LAB — T4 FREE SERPL-MCNC: 0.73 NG/DL

## 2017-07-12 PROCEDURE — 84439 ASSAY OF FREE THYROXINE: CPT

## 2017-07-12 PROCEDURE — 84443 ASSAY THYROID STIM HORMONE: CPT

## 2017-07-12 PROCEDURE — 36415 COLL VENOUS BLD VENIPUNCTURE: CPT

## 2017-07-12 PROCEDURE — 84480 ASSAY TRIIODOTHYRONINE (T3): CPT

## 2017-07-14 ENCOUNTER — TELEPHONE (OUTPATIENT)
Dept: INTERNAL MEDICINE | Facility: CLINIC | Age: 57
End: 2017-07-14

## 2017-07-14 ENCOUNTER — PATIENT MESSAGE (OUTPATIENT)
Dept: INTERNAL MEDICINE | Facility: CLINIC | Age: 57
End: 2017-07-14

## 2017-07-14 LAB
T3 SERPL-MCNC: 64 NG/DL
TSH SERPL DL<=0.005 MIU/L-ACNC: 1.29 UIU/ML

## 2017-07-14 NOTE — TELEPHONE ENCOUNTER
Alfie , please make sure that the lab has been called today Friday and that the TSH and T3 were add to the lab that was done on 7/12. Please document this. GML

## 2017-07-14 NOTE — TELEPHONE ENCOUNTER
The tsh and T3 in orders should have been done with the t4 from yesterday. Can you please call the lab and add it to the tube that was done. GML

## 2017-07-19 ENCOUNTER — PATIENT MESSAGE (OUTPATIENT)
Dept: INTERNAL MEDICINE | Facility: CLINIC | Age: 57
End: 2017-07-19

## 2017-07-20 ENCOUNTER — OFFICE VISIT (OUTPATIENT)
Dept: INTERNAL MEDICINE | Facility: CLINIC | Age: 57
End: 2017-07-20
Payer: COMMERCIAL

## 2017-07-20 VITALS
SYSTOLIC BLOOD PRESSURE: 122 MMHG | DIASTOLIC BLOOD PRESSURE: 72 MMHG | BODY MASS INDEX: 19.73 KG/M2 | WEIGHT: 104.5 LBS | HEART RATE: 79 BPM | OXYGEN SATURATION: 99 % | HEIGHT: 61 IN

## 2017-07-20 DIAGNOSIS — R10.2 PELVIC PAIN IN FEMALE: ICD-10-CM

## 2017-07-20 DIAGNOSIS — E07.9 THYROID DISORDER: Primary | ICD-10-CM

## 2017-07-20 PROCEDURE — 99213 OFFICE O/P EST LOW 20 MIN: CPT | Mod: S$GLB,,, | Performed by: INTERNAL MEDICINE

## 2017-07-20 PROCEDURE — 99999 PR PBB SHADOW E&M-EST. PATIENT-LVL III: CPT | Mod: PBBFAC,,, | Performed by: INTERNAL MEDICINE

## 2017-07-20 RX ORDER — PILOCARPINE HYDROCHLORIDE 5 MG/1
5 TABLET, FILM COATED ORAL 3 TIMES DAILY PRN
Qty: 270 TABLET | Refills: 3 | Status: SHIPPED | OUTPATIENT
Start: 2017-07-20 | End: 2018-05-28 | Stop reason: SDUPTHER

## 2017-07-20 RX ORDER — GABAPENTIN 100 MG/1
CAPSULE ORAL
Qty: 630 CAPSULE | Refills: 1 | Status: SHIPPED | OUTPATIENT
Start: 2017-07-20 | End: 2018-01-22 | Stop reason: SDUPTHER

## 2017-07-20 RX ORDER — THYROID 15 MG/1
30 TABLET ORAL DAILY
Qty: 60 TABLET | Refills: 2 | Status: SHIPPED | OUTPATIENT
Start: 2017-07-20 | End: 2017-11-03

## 2017-07-20 RX ORDER — ZOLPIDEM TARTRATE 10 MG/1
TABLET ORAL
Qty: 30 TABLET | Refills: 2 | Status: SHIPPED | OUTPATIENT
Start: 2017-07-20 | End: 2017-08-11 | Stop reason: SDUPTHER

## 2017-07-20 NOTE — PROGRESS NOTES
Subjective:      Patient ID: Berenice Hayes is a 57 y.o. female.    Chief Complaint: Follow-up    HPI:  HPI   Patient is here to discuss thyroid medication  Lab Results   Component Value Date    TSH 1.293 07/12/2017         T3 64         Free T4 .73         4/19/2017: 3.2  At this time she is taking  Synthroid: 25 mcg  Cytomel:5 bid prior to blood work    At this regulation: feet are affected, can hardly work    The patient has not tried armour thyroid and this may work. Assessment feet, fatigue and palpitations and brain    Patient Active Problem List   Diagnosis    Chronic idiopathic constipation    Fibromyalgia    Bladder spasm    Osteoporosis, postmenopausal    Fibrocystic breast changes    Vulvar vestibulodynia    Vulvar pain    Vascular disorder: Erytnromelalgia    Raynaud's disease    Incomplete defecation    Straining during bowel movements    Chronic constipation    Rectocele    Disorder of muscle    Anxiety    Bilateral hand pain    Fatigue    Perimenopause vs Memopause    Menopause syndrome    Pudendal neuralgia    Pelvic pain in female    Beryl    Urinary hesitancy    Hypothyroidism due to Hashimoto's thyroiditis    Multinodular goiter    Encounter for herb and vitamin supplement management    Gluten free diet    Sicca     Past Medical History:   Diagnosis Date    Asthma with allergic rhinitis     Bladder spasm     Dense breasts     heterogeneously/fibrocystic disease    Elevated antinuclear antibody (GUICHO) level     Endometriosis of cervix     Erythromelalgia     Fibromyalgia     Ganglion cyst     left toe    Goiter     MNG    Hashimoto's disease     Hashimoto's thyroiditis     Headache(784.0)     Hypothyroidism     Hashimoto with + Tg ab    Osteoporosis     femoral neck    Raynaud's phenomenon     Rhinitis     Scoliosis     Sleep disorder     type of Narcolepsy ( resolved)    Vitamin D deficiency disease     Vulvodynia      Past Surgical  "History:   Procedure Laterality Date    BREAST SURGERY      fibrocystic tumor removed     SECTION      2x    CYST REMOVAL      laparoscopic cyst on ovary    HYSTERECTOMY      intradermal cyst       removed from left index finger    SINUS SURGERY      2x     Family History   Problem Relation Age of Onset    Diabetes Mother     Fibromyalgia Mother     Polymyalgia rheumatica Mother     Macular degeneration Mother     Arthritis Mother     Hypertension Mother     Kidney disease Mother     Pneumonia Mother     Adrenal disorder Mother      adrenal insufficiency    Coronary artery disease Father     Arthritis Father      osteoarthritis    Hypertension Father     Heart disease Father     Diabetes Father     Hyperlipidemia Father     Hyperlipidemia Brother     Cancer Brother      oral    Thyroid cancer Daughter     Cancer Daughter      thyroid    Hypothyroidism Daughter     Breast cancer Neg Hx     Ovarian cancer Neg Hx     Colon cancer Neg Hx      Review of Systems   More palpitations  Brain fog  Feet are worse  Fatigue is stable  Objective:     Vitals:    17 0901   BP: 122/72   Pulse: 79   SpO2: 99%   Weight: 47.4 kg (104 lb 8 oz)   Height: 5' 1" (1.549 m)   PainSc:   5   PainLoc: Generalized     Body mass index is 19.74 kg/m².  Physical Exam   Constitutional: She is oriented to person, place, and time. She appears well-developed and well-nourished. No distress.   Neck: Carotid bruit is not present. No thyromegaly present.   Cardiovascular: Normal rate, regular rhythm and normal heart sounds.  PMI is not displaced.    Pulmonary/Chest: Effort normal and breath sounds normal. No respiratory distress.   Abdominal: Soft. Bowel sounds are normal. She exhibits no distension. There is no tenderness.   Musculoskeletal: She exhibits no edema.   Neurological: She is alert and oriented to person, place, and time.     Assessment:     1. Thyroid disorder    2. Pelvic pain in female      Plan: "   Berenice was seen today for follow-up.    Diagnoses and all orders for this visit:    Thyroid disorder  Comments:  Will change medication to armour thyroid and begin with 15 mg and may increase to 30 mg a day    Pelvic pain in female  -     gabapentin (NEURONTIN) 100 MG capsule; One in the am, one at noon , one at 5 pm and four at bedtime    Other orders  -     thyroid, pork, (ARMOUR THYROID) 15 mg Tab; Take 2 tablets (30 mg total) by mouth once daily at 6am.  -     zolpidem (AMBIEN) 10 mg Tab; TAKE ONE TABLET BY MOUTH EVERY NIGHT AT BEDTIME AS NEEDED  -     pilocarpine (SALAGEN) 5 MG Tab; Take 1 tablet (5 mg total) by mouth 3 (three) times daily as needed.       Return in about 6 weeks (around 8/31/2017) for Follow up with tsh, t3 , free t4 and free t3.      Medication List          Accurate as of 7/20/17  9:49 AM. If you have any questions, ask your nurse or doctor.               START taking these medications    thyroid (pork) 15 mg Tab  Commonly known as:  ARMOUR THYROID  Take 2 tablets (30 mg total) by mouth once daily at 6am.  Started by:  Estella Serrano MD        CHANGE how you take these medications    * amitriptyline 50 MG tablet  Commonly known as:  ELAVIL  Take one tablet by mouth nightly. Take with the 10 mg amitriptyline  What changed:  · how much to take  · additional instructions  Changed by:  Estella Serrano MD     * amitriptyline 10 MG tablet  Commonly known as:  ELAVIL  Take 1 tablet (10 mg total) by mouth once daily.  What changed:  Another medication with the same name was changed. Make sure you understand how and when to take each.  Changed by:  Estella Serrano MD     gabapentin 100 MG capsule  Commonly known as:  NEURONTIN  One in the am, one at noon , one at 5 pm and four at bedtime  What changed:  See the new instructions.  Changed by:  Estella Serrano MD     liothyronine 5 MCG Tab  Commonly known as:  CYTOMEL  Alternate days 3 (15mcg) and 3.5 (17.5) tablets  What changed:  additional  instructions     SYNTHROID 25 MCG tablet  Generic drug:  levothyroxine  Alternate days of 1 (25mcg) and 1.5 (37.5mcg) tablets  What changed:  additional instructions     zolpidem 10 mg Tab  Commonly known as:  AMBIEN  TAKE ONE TABLET BY MOUTH EVERY NIGHT AT BEDTIME AS NEEDED  What changed:  See the new instructions.  Changed by:  Estella Serrano MD        * This list has 2 medication(s) that are the same as other medications prescribed for you. Read the directions carefully, and ask your doctor or other care provider to review them with you.            CONTINUE taking these medications    aspirin 81 MG EC tablet  Commonly known as:  ECOTRIN     DICLOFENAC SODIUM TOP     * estradiol 0.05 mg/24 hr td ptsw 0.05 mg/24 hr  Commonly known as:  VIVELLE-DOT  Place 1 patch onto the skin twice a week.     * estradiol 10 mcg Tab  Commonly known as:  VAGIFEM  Place 1 tablet (10 mcg total) vaginally twice a week.     * estradiol 10 mcg Tab  INSERT 1 TABLET VAGINALLY TWICE A WEEK     fluticasone 50 mcg/actuation nasal spray  Commonly known as:  FLONASE  1 spray by Each Nare route once daily.     FLUZONE QUAD 2982-8561 (PF) 60 mcg (15 mcg x 4)/0.5 mL Syrg  Generic drug:  flu vac [id number] 36mos up(PF)     dcfmmmuix-B8-[id number]-algal oil 3 mg-35 mg-2 mg -90.314 mg Cap  Take 1 tablet by mouth 2 (two) times daily.     lorazepam 1 MG tablet  Commonly known as:  ATIVAN  Take 1 tablet (1 mg total) by mouth as directed. 1 tab 30 min prior to MRI, may repeat once if needed     meloxicam 7.5 MG tablet  Commonly known as:  MOBIC  TAKE 1 TABLET (7.5 MG TOTAL) BY MOUTH ONCE DAILY. FOR INFLAMMATION     METANX ORAL     metaxalone 800 MG tablet  Commonly known as:  SKELAXIN  TAKE 1 TABLET EVERY EVENING (SPASM)     MUCINEX 600 mg 12 hr tablet  Generic drug:  guaifenesin     multivitamin capsule     pilocarpine 5 MG Tab  Commonly known as:  SALAGEN  Take 1 tablet (5 mg total) by mouth 3 (three) times daily as needed.     PREVIDENT 5000 DRY MOUTH 1.1  % Gel  Generic drug:  sodium fluoride     PROAIR HFA 90 mcg/actuation inhaler  Generic drug:  albuterol     RESTASIS 0.05 % ophthalmic emulsion  Generic drug:  cycloSPORINE     VITAMIN D3 1,000 unit capsule  Generic drug:  cholecalciferol (vitamin D3)        * This list has 3 medication(s) that are the same as other medications prescribed for you. Read the directions carefully, and ask your doctor or other care provider to review them with you.               Where to Get Your Medications      These medications were sent to [facility] [address] [address]    Phone:  [phone]   · gabapentin 100 MG capsule  · pilocarpine 5 MG Tab     These medications were sent to [id number] - [place], [place] - [address]  [address] [facility] [address]    Phone:  [phone]   · thyroid (pork) 15 mg Tab  · zolpidem 10 mg Tab

## 2017-07-21 ENCOUNTER — LAB VISIT (OUTPATIENT)
Dept: LAB | Facility: HOSPITAL | Age: 57
End: 2017-07-21
Attending: INTERNAL MEDICINE
Payer: COMMERCIAL

## 2017-07-21 ENCOUNTER — OFFICE VISIT (OUTPATIENT)
Dept: RHEUMATOLOGY | Facility: CLINIC | Age: 57
End: 2017-07-21
Payer: COMMERCIAL

## 2017-07-21 VITALS
BODY MASS INDEX: 19.29 KG/M2 | TEMPERATURE: 98 F | HEART RATE: 88 BPM | WEIGHT: 104.81 LBS | HEIGHT: 62 IN | SYSTOLIC BLOOD PRESSURE: 103 MMHG | DIASTOLIC BLOOD PRESSURE: 73 MMHG

## 2017-07-21 DIAGNOSIS — M35.00 SICCA, UNSPECIFIED TYPE: ICD-10-CM

## 2017-07-21 DIAGNOSIS — M79.642 BILATERAL HAND PAIN: ICD-10-CM

## 2017-07-21 DIAGNOSIS — M79.7 FIBROMYALGIA: ICD-10-CM

## 2017-07-21 DIAGNOSIS — R53.83 FATIGUE, UNSPECIFIED TYPE: ICD-10-CM

## 2017-07-21 DIAGNOSIS — M79.641 BILATERAL HAND PAIN: ICD-10-CM

## 2017-07-21 DIAGNOSIS — M79.7 FIBROMYALGIA: Primary | ICD-10-CM

## 2017-07-21 LAB
ALBUMIN SERPL BCP-MCNC: 4.2 G/DL
ALP SERPL-CCNC: 67 U/L
ALT SERPL W/O P-5'-P-CCNC: 21 U/L
ANION GAP SERPL CALC-SCNC: 7 MMOL/L
AST SERPL-CCNC: 20 U/L
BASOPHILS # BLD AUTO: 0.04 K/UL
BASOPHILS NFR BLD: 0.8 %
BILIRUB SERPL-MCNC: 0.3 MG/DL
BUN SERPL-MCNC: 19 MG/DL
CALCIUM SERPL-MCNC: 9.2 MG/DL
CCP AB SER IA-ACNC: <0.5 U/ML
CHLORIDE SERPL-SCNC: 99 MMOL/L
CK SERPL-CCNC: 47 U/L
CO2 SERPL-SCNC: 33 MMOL/L
CREAT SERPL-MCNC: 0.7 MG/DL
CRP SERPL-MCNC: 0.3 MG/L
DIFFERENTIAL METHOD: ABNORMAL
EOSINOPHIL # BLD AUTO: 0.3 K/UL
EOSINOPHIL NFR BLD: 4.8 %
ERYTHROCYTE [DISTWIDTH] IN BLOOD BY AUTOMATED COUNT: 12.8 %
EST. GFR  (AFRICAN AMERICAN): >60 ML/MIN/1.73 M^2
EST. GFR  (NON AFRICAN AMERICAN): >60 ML/MIN/1.73 M^2
GLUCOSE SERPL-MCNC: 92 MG/DL
HCT VFR BLD AUTO: 40.3 %
HGB BLD-MCNC: 13.6 G/DL
LYMPHOCYTES # BLD AUTO: 1.1 K/UL
LYMPHOCYTES NFR BLD: 21.1 %
MCH RBC QN AUTO: 29.6 PG
MCHC RBC AUTO-ENTMCNC: 33.7 G/DL
MCV RBC AUTO: 88 FL
MONOCYTES # BLD AUTO: 0.3 K/UL
MONOCYTES NFR BLD: 5.3 %
NEUTROPHILS # BLD AUTO: 3.6 K/UL
NEUTROPHILS NFR BLD: 67.6 %
PLATELET # BLD AUTO: 186 K/UL
PMV BLD AUTO: 9 FL
POTASSIUM SERPL-SCNC: 3.7 MMOL/L
PROT SERPL-MCNC: 7.8 G/DL
RBC # BLD AUTO: 4.59 M/UL
RHEUMATOID FACT SERPL-ACNC: <10 IU/ML
SODIUM SERPL-SCNC: 139 MMOL/L
WBC # BLD AUTO: 5.25 K/UL

## 2017-07-21 PROCEDURE — 99215 OFFICE O/P EST HI 40 MIN: CPT | Mod: S$GLB,,, | Performed by: INTERNAL MEDICINE

## 2017-07-21 PROCEDURE — 86235 NUCLEAR ANTIGEN ANTIBODY: CPT | Mod: 59

## 2017-07-21 PROCEDURE — 83516 IMMUNOASSAY NONANTIBODY: CPT

## 2017-07-21 PROCEDURE — 82550 ASSAY OF CK (CPK): CPT

## 2017-07-21 PROCEDURE — 85025 COMPLETE CBC W/AUTO DIFF WBC: CPT

## 2017-07-21 PROCEDURE — 82085 ASSAY OF ALDOLASE: CPT

## 2017-07-21 PROCEDURE — 86200 CCP ANTIBODY: CPT

## 2017-07-21 PROCEDURE — 86431 RHEUMATOID FACTOR QUANT: CPT

## 2017-07-21 PROCEDURE — 83520 IMMUNOASSAY QUANT NOS NONAB: CPT | Mod: 59

## 2017-07-21 PROCEDURE — 86140 C-REACTIVE PROTEIN: CPT

## 2017-07-21 PROCEDURE — 83520 IMMUNOASSAY QUANT NOS NONAB: CPT

## 2017-07-21 PROCEDURE — 99999 PR PBB SHADOW E&M-EST. PATIENT-LVL IV: CPT | Mod: PBBFAC,,, | Performed by: INTERNAL MEDICINE

## 2017-07-21 PROCEDURE — 86038 ANTINUCLEAR ANTIBODIES: CPT

## 2017-07-21 PROCEDURE — 86235 NUCLEAR ANTIGEN ANTIBODY: CPT

## 2017-07-21 PROCEDURE — 80053 COMPREHEN METABOLIC PANEL: CPT

## 2017-07-21 ASSESSMENT — ROUTINE ASSESSMENT OF PATIENT INDEX DATA (RAPID3)
PAIN SCORE: 10
TOTAL RAPID3 SCORE: 7.33
AM STIFFNESS SCORE: 1, YES
PSYCHOLOGICAL DISTRESS SCORE: 2.2
PATIENT GLOBAL ASSESSMENT SCORE: 8
WHEN YOU AWAKENED IN THE MORNING OVER THE LAST WEEK, PLEASE INDICATE THE AMOUNT OF TIME IT TAKES UNTIL YOU ARE AS LIMBER AS YOU WILL BE FOR THE DAY: HOURS
MDHAQ FUNCTION SCORE: 1.2
FATIGUE SCORE: 7

## 2017-07-21 NOTE — PROGRESS NOTES
"Subjective:       Patient ID: Berenice Hyaes is a 57 y.o. female.    Chief Complaint: Fibromyalgia and Disease Management      HPI:  Berenice Hayes is a 57 y.o. female followed for concerns of autoimmune issues.   Diagnosed with fibromyalgia at age 34.   At age 42 had erythromyelgia with redness all over. She was treated by vascular with a medication but caused Raynauds to develop.  She saw Dr. Hill in vascular.      She has history significant pelvic discomfort since 2009.  She saw therapist for pelvic floor therapy which has helped. 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  She had pudenal nerve block 2/3/15.    She saw Dr. Harmon in the past who felt she had fibriomyalgia.  She tried amitriptyline for some time.   She takes Atelvia for OP.       She has felt bad since 2014.  "Hit with massive heat wave beyond what others feel with menopause."  Mouth was very dry and primary gave pilocarpine.  Spring of 2015 GUICHO was negative.  Previously had hand pains.  X-rays of hands normal.  She was found by endocrine to be hypoglycemic.  Ultrasound thyroid showed nodules that were negative on biopsy.  August 2015 diagnosed as having Hashimotos disease.   Synthroid did not help levels.  She is on Cytomel now.    Saw ENT for throat pain in neck since ultrasound was done.  ENT will biopsy a solid nodule on left lobe thyroid.  CT with contrast showed enlargement in the saliva gland.  Her mother had fibromyalgia and PMR as well as history ETOH abuse as an adult.  Father with history osteoarthritis and required complete thumb reconstruction on one thumb.     Daughter with Hashimotos and thyroid Ca.    INTERVAL HISTORY:  Patient reports steady declined.  She feels heat sensation has improved.  Currently dealing with Synthroid not converting to T3 so started T3 therapy.  She will start [place] thyroid tomorrow.   Abnormal thyroid T3 she feels has worsened her foot pain.    She has trouble staying on feet at work as a " " at [employer].  She has a clean diet off dairy, corn and gluten.  To help with thyroid is using selenium from brazille nuts (4 daily), idodine supplement 2 times a day.  To help foot pain cataplex B and G.  She had rash on left elbow that lasted a month.   Sensitive to bra touching her and seat belt touch.  Two hours of morning stiffness.  Goes to bed at 9 pm and awakens at 4 am.        Review of Systems   Constitutional: Positive for fatigue.   HENT:        Dry mouth   Eyes:        Dry eyes  Red eyes   Respiratory: Negative.    Cardiovascular: Positive for chest pain (Intermittent "once in a while" for couple seconds).   Gastrointestinal: Positive for constipation.   Musculoskeletal: Positive for myalgias.   Skin: Positive for color change and rash.   Allergic/Immunologic: Negative.    Neurological: Positive for headaches.   Hematological: Bruises/bleeds easily.   Psychiatric/Behavioral: Negative.          Objective:   /73 (BP Location: Right arm, Patient Position: Sitting, BP Method: Automatic)   Pulse 88   Temp 97.8 °F (36.6 °C) (Oral)   Ht 5' 1.5" (1.562 m)   Wt 47.5 kg (104 lb 12.8 oz)   LMP  (LMP Unknown)   BMI 19.48 kg/m²      Physical Exam   Constitutional: She is well-developed, well-nourished, and in no distress.   HENT:   Head: Normocephalic and atraumatic.   Eyes: Conjunctivae and EOM are normal.   Neck: Neck supple.   Cardiovascular: Normal rate, regular rhythm and normal heart sounds.    Pulmonary/Chest: Effort normal and breath sounds normal.   Abdominal: Soft. Bowel sounds are normal.   Skin: Skin is warm and dry.     Erythema and prominent vessels around nails  Chest with small pink/red spots that inna   Psychiatric: Mood and affect normal.   Musculoskeletal:   10/18 FM points painful  28 joint count: 0 swollen and 0 tender             LAB    Component      Latest Ref Rng & Units 7/12/2017 5/19/2017 11/23/2016 9/15/2016   Hepatitis C Ab          Negative   Vit D, " 25-Hydroxy      30 - 96 ng/mL   36    TSH      0.400 - 4.000 uIU/mL 1.293 0.252 (L)     Free T4      0.71 - 1.51 ng/dL 0.73 0.58 (L)     T3, Total      60 - 180 ng/dL 64 111     TTG IgA      <20 UNITS  3     IgA      40 - 350 mg/dL  113       Component      Latest Ref Rng & Units 7/23/2016   Anti-SSA Antibody      0.00 - 19.99 EU 2.52   Anti-SSA Interpretation      Negative Negative   Anti-SSB Antibody      0.00 - 19.99 EU 0.69   Anti-SSB Interpretation      Negative Negative   GUICHO Screen      Negative <1:160 Negative <1:160   CRP      0.0 - 8.2 mg/L 0.2   Sed Rate      0 - 20 mm/Hr 1        Assessment:       1. Fibromyalgia. Managed with amitriptyline. Persistent pain and unable to increase amitriptyline due to worsening fatigue.  2. Raynauds. Symptomatic management.  3. Sicca. Chronic.  Negative SSA and SSB in past.  Continue restasis.  Repeat labs.   4. Hand pain b/l. X-ray without changes. Has periodic episodes of pain.  5. Osteoporosis.  No longer on Atelvia now on estradiol patch.  6. Erythromyelgia  7. Sleep disorder. History of narcolepsy. Sleep doctor has given Ritalin bid which helps.  8. Increased heat in body. Improved  9. Menopause  10.  Hashimotos disease  11.  Multinodular goiter   12.  Pudenal nerve pain.  Improved with acupuncture. Worsened with water therapy so stopped.    Plan:       1.  No evidence of rheumatologic disorder.  Patient feels pain improved with treatment of Hashimotos.  Symptoms may be influenced by estradiol therapy.  Repeat GUICHO, SSA and SSB, CRP and ESR, CPK, myositis panel, UA  2.  Patient to keep regular follow ups to monitor for progression to rheumatologic disorder.  3.  Continue fibromyalgia treatment.    RTO 12 months/prn  Patient seen face to face for 40 minutes and greater than 50% spent in counseling regarding concerns for rheumatologic causes for her multiple symptoms.

## 2017-07-22 LAB
ANTI SM/RNP ANTIBODY: 8.63 EU
ANTI-SM/RNP INTERPRETATION: NEGATIVE
ANTI-SSA ANTIBODY: 9.04 EU
ANTI-SSA INTERPRETATION: NEGATIVE
ANTI-SSB ANTIBODY: 1.32 EU
ANTI-SSB INTERPRETATION: NEGATIVE
ENA SCL70 IGG SER IA-ACNC: <0.2 U

## 2017-07-24 ENCOUNTER — PATIENT MESSAGE (OUTPATIENT)
Dept: RHEUMATOLOGY | Facility: CLINIC | Age: 57
End: 2017-07-24

## 2017-07-24 ENCOUNTER — OFFICE VISIT (OUTPATIENT)
Dept: CARDIOLOGY | Facility: CLINIC | Age: 57
End: 2017-07-24
Payer: COMMERCIAL

## 2017-07-24 VITALS
WEIGHT: 103 LBS | DIASTOLIC BLOOD PRESSURE: 67 MMHG | HEIGHT: 61 IN | BODY MASS INDEX: 19.45 KG/M2 | SYSTOLIC BLOOD PRESSURE: 96 MMHG

## 2017-07-24 DIAGNOSIS — I99.9 VASCULAR DISORDER: ICD-10-CM

## 2017-07-24 DIAGNOSIS — Z82.49 FAMILY HISTORY OF ISCHEMIC HEART DISEASE (IHD): ICD-10-CM

## 2017-07-24 DIAGNOSIS — I73.00 RAYNAUD'S DISEASE WITHOUT GANGRENE: Primary | ICD-10-CM

## 2017-07-24 PROBLEM — Z82.79 FAMILY HISTORY OF CONGENITAL HEART DISEASE: Status: ACTIVE | Noted: 2017-07-24

## 2017-07-24 LAB
ALDOLASE SERPL-CCNC: 3.3 U/L
ANA SER QL IF: NORMAL
ENA SCL70 AB SER-ACNC: 12 UNITS

## 2017-07-24 PROCEDURE — 99999 PR PBB SHADOW E&M-EST. PATIENT-LVL II: CPT | Mod: PBBFAC,,, | Performed by: INTERNAL MEDICINE

## 2017-07-24 PROCEDURE — 99214 OFFICE O/P EST MOD 30 MIN: CPT | Mod: S$GLB,,, | Performed by: INTERNAL MEDICINE

## 2017-07-24 NOTE — PROGRESS NOTES
Subjective:   Patient ID:  Berenice Hayes is a 57 y.o. female who presents for follow up of Raynaud Disease; cyanosis of fingers and toes; and erytnromelalgia      HPI:       She is here for follow up of extensive raynaud and erytnromelalgia complicated with severe distal digits pain. No ulcers. Overtime she has found a way to adjust her living and working conditions to avoid any severe issues. She takes aspirin 81 mg daily. She has no macrovascular disease. LUIS MANUEL and WBI in 2013 were unremarkable. ECG 6/2017: NSR.          Patient Active Problem List    Diagnosis Date Noted    Family history of ischemic heart disease (IHD) 07/24/2017       Father with PCI in his 60'        Sicca 06/30/2016    Encounter for herb and vitamin supplement management 03/22/2016     Started on supplements in January ( Teagan Maurer) Neurotrophin PMG ,  Immuplex, Echinacea Premium, Rehmannia Complex, DHEA, Pregnenelone  Cataplex B    Metanx        Gluten free diet 03/22/2016     Gluten free and corn free      Hypothyroidism due to Hashimoto's thyroiditis 10/23/2015    Multinodular goiter 10/23/2015    Pelvic pain in female 05/10/2015    Mittelschmerz 05/10/2015    Urinary hesitancy 05/10/2015    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    Perimenopause vs Memopause 01/27/2015    Menopause syndrome 01/27/2015    Bilateral hand pain 01/22/2015    Fatigue 01/22/2015    Anxiety 09/26/2014    Disorder of muscle 09/17/2014    Incomplete defecation 09/09/2014    Straining during bowel movements 09/09/2014    Chronic constipation 09/09/2014    Rectocele 09/09/2014    Raynaud's disease 04/03/2014    Vascular disorder: Erytnromelalgia 09/27/2013     Consult Vascular Medicine: 9/27/2013: LUIS MANUEL with WBI      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    Vulvar pain 09/10/2013    Fibrocystic breast changes 08/22/2012    Osteoporosis, postmenopausal 08/17/2012    Fibromyalgia     Bladder spasm     Chronic idiopathic constipation 07/16/2012            Right Arm BP - Sittin/60  Left Arm BP - Sittin/67        LABS    LAST HbA1c  Lab Results   Component Value Date    HGBA1C 5.3 2016       Lipid panel  Lab Results   Component Value Date    CHOL 168 2016    CHOL 168 2016    CHOL 151 2015     Lab Results   Component Value Date    HDL 79 (H) 2016    HDL 79 (H) 2016    HDL 62 2015     Lab Results   Component Value Date    LDLCALC 82.0 2016    LDLCALC 82.0 2016    LDLCALC 80.4 2015     Lab Results   Component Value Date    TRIG 35 2016    TRIG 35 2016    TRIG 43 2015     Lab Results   Component Value Date    CHOLHDL 47.0 2016    CHOLHDL 47.0 2016    CHOLHDL 41.1 2015            Review of Systems   Constitution: Positive for weakness. Negative for diaphoresis, night sweats, weight gain and weight loss.   HENT: Negative for congestion.    Eyes: Negative for blurred vision, discharge and double vision.   Cardiovascular: Negative for chest pain, claudication, cyanosis, dyspnea on exertion, irregular heartbeat, leg swelling, near-syncope, orthopnea, palpitations, paroxysmal nocturnal dyspnea and syncope.   Respiratory: Negative for cough, shortness of breath and wheezing.    Endocrine: Negative for cold intolerance, heat intolerance and polyphagia.   Hematologic/Lymphatic: Negative for adenopathy and bleeding problem. Does not bruise/bleed easily.   Skin: Positive for color change. Negative for dry skin and nail changes.   Musculoskeletal: Positive for joint pain and muscle weakness. Negative for arthritis, back pain, falls, myalgias and neck pain.   Gastrointestinal: Negative for bloating, abdominal pain, change in bowel habit and constipation.   Genitourinary: Negative for bladder incontinence, dysuria, flank pain, genital sores and missed menses.   Neurological: Negative for aphonia, brief paralysis, difficulty with concentration and dizziness.    Psychiatric/Behavioral: Negative for altered mental status and memory loss. The patient does not have insomnia.    Allergic/Immunologic: Negative for environmental allergies.       Objective:   Physical Exam   Constitutional: She is oriented to person, place, and time. She appears well-developed and well-nourished. She is not intubated.   HENT:   Head: Normocephalic and atraumatic.   Right Ear: External ear normal.   Left Ear: External ear normal.   Mouth/Throat: Oropharynx is clear and moist.   Eyes: Conjunctivae and EOM are normal. Pupils are equal, round, and reactive to light. Right eye exhibits no discharge. Left eye exhibits no discharge. No scleral icterus.   Neck: Normal range of motion. Neck supple. Normal carotid pulses, no hepatojugular reflux and no JVD present. Carotid bruit is not present. No tracheal deviation present. No thyromegaly present.   Cardiovascular: Normal rate, regular rhythm, S1 normal and S2 normal.   No extrasystoles are present. PMI is not displaced.  Exam reveals no gallop, no S3, no distant heart sounds, no friction rub and no midsystolic click.    No murmur heard.  Pulses:       Carotid pulses are 2+ on the right side, and 2+ on the left side.       Radial pulses are 2+ on the right side, and 2+ on the left side.        Femoral pulses are 2+ on the right side, and 2+ on the left side.       Popliteal pulses are 2+ on the right side, and 2+ on the left side.        Dorsalis pedis pulses are 2+ on the right side, and 2+ on the left side.        Posterior tibial pulses are 2+ on the right side, and 2+ on the left side.   Pulmonary/Chest: Effort normal and breath sounds normal. No accessory muscle usage or stridor. No apnea, no tachypnea and no bradypnea. She is not intubated. No respiratory distress. She has no decreased breath sounds. She has no wheezes. She has no rales. She exhibits no tenderness and no bony tenderness.   Abdominal: She exhibits no distension, no pulsatile liver,  no abdominal bruit, no ascites, no pulsatile midline mass and no mass. There is no tenderness. There is no rebound and no guarding.   Musculoskeletal: Normal range of motion. She exhibits no edema or tenderness.   Lymphadenopathy:     She has no cervical adenopathy.   Neurological: She is alert and oriented to person, place, and time. She has normal reflexes. No cranial nerve deficit. Coordination normal.   Skin: Skin is warm. No rash noted. No erythema. No pallor.       Blanching cyanosis of all toes      No pain      No ulcers           Psychiatric: She has a normal mood and affect. Her behavior is normal. Judgment and thought content normal.       Assessment:     1. Raynaud's disease without gangrene    2. Vascular disorder: Erytnromelalgia    3. Family history of ischemic heart disease (IHD)        Plan:         Continue with current medical plan and lifestyle changes.  Return sooner for concerns or questions. If symptoms persist go to the ED  I have reviewed all pertinent data on this patient       Continue to adjust lifestyle around triggers for either Raynaud or Erytnromelalgia   Exposure to cold or extreme heat      No invasive procedures         I have reviewed the patient's medical history in detail and updated the computerized patient record.    Orders Placed This Encounter   Procedures    Cardiology Lab LUIS MANUEL Resting, Lower Extremities     Standing Status:   Future     Standing Expiration Date:   7/24/2018    Cardiology Lab Wrist Brachial Index (WBI)     Standing Status:   Future     Standing Expiration Date:   7/24/2018       Follow up as scheduled. Return sooner for concerns or questions  Follow up in a year             She expressed verbal understanding and agreed with the plan        Patient's Medications   New Prescriptions    No medications on file   Previous Medications    ALBUTEROL (PROVENTIL HFA/VENTOLIN HFA) 200 PUFF INHALER    Inhale 2 puffs into the lungs every 6 (six) hours as needed.       AMITRIPTYLINE (ELAVIL) 10 MG TABLET    Take 1 tablet (10 mg total) by mouth once daily.    AMITRIPTYLINE (ELAVIL) 50 MG TABLET    Take one tablet by mouth nightly. Take with the 10 mg amitriptyline    ASPIRIN (ECOTRIN) 81 MG EC TABLET    Take 81 mg by mouth once daily.    CHOLECALCIFEROL, VITAMIN D3, (VITAMIN D3) 1,000 UNIT CAPSULE    Take 2,000 Units by mouth once daily.     DICLOFENAC SODIUM TOP    Apply 1 application topically. Use up tp 5x/day Diclofenac 0.15%/lidocaine 2.25%/prilocaine 2.25% [facility]: [phone]    ESTRADIOL (VAGIFEM) 10 MCG TAB    Place 1 tablet (10 mcg total) vaginally twice a week.    ESTRADIOL 0.05 MG/24 HR TD PTSW (VIVELLE-DOT) 0.05 MG/24 HR    Place 1 patch onto the skin twice a week.    FLUTICASONE (FLONASE) 50 MCG/ACTUATION NASAL SPRAY    1 spray by Each Nare route once daily.    FLUZONE QUAD 5850-2474, PF, 60 MCG (15 MCG X 4)/0.5 ML SYRG        GABAPENTIN (NEURONTIN) 100 MG CAPSULE    One in the am, one at noon , one at 5 pm and four at bedtime    GUAIFENESIN (MUCINEX) 600 MG 12 HR TABLET    Take 600 mg by mouth 2 (two) times daily.    LIOTHYRONINE (CYTOMEL) 5 MCG TAB    Alternate days 3 (15mcg) and 3.5 (17.5) tablets    LORAZEPAM (ATIVAN) 1 MG TABLET    Take 1 tablet (1 mg total) by mouth as directed. 1 tab 30 min prior to MRI, may repeat once if needed    MELOXICAM (MOBIC) 7.5 MG TABLET    TAKE 1 TABLET (7.5 MG TOTAL) BY MOUTH ONCE DAILY. FOR INFLAMMATION    METAXALONE (SKELAXIN) 800 MG TABLET    TAKE 1 TABLET EVERY EVENING (SPASM)    MULTIVITAMIN CAPSULE    Take 1 capsule by mouth once daily.      PILOCARPINE (SALAGEN) 5 MG TAB    Take 1 tablet (5 mg total) by mouth 3 (three) times daily as needed.    PREVIDENT 5000 DRY MOUTH 1.1 % GEL        RESTASIS 0.05 % OPHTHALMIC EMULSION        SYNTHROID 25 MCG TABLET    Alternate days of 1 (25mcg) and 1.5 (37.5mcg) tablets    THYROID, PORK, (ARMOUR THYROID) 15 MG TAB    Take 2 tablets (30 mg total) by mouth once daily at  6am.    ZOLPIDEM (AMBIEN) 10 MG TAB    TAKE ONE TABLET BY MOUTH EVERY NIGHT AT BEDTIME AS NEEDED   Modified Medications    No medications on file   Discontinued Medications    No medications on file

## 2017-07-24 NOTE — PATIENT INSTRUCTIONS
Leg Artery Emergencies: Critical Limb Ischemia (CLI)  Critical limb ischemia (CLI) is a condition that can occur over time when your leg arteries are damaged. It is a severe form of peripheral arterial disease (PAD). PAD is caused when leg arteries are narrowed, reducing blood flow. If blood flow to the toe, foot, or leg is completely blocked, the tissue begins to die (gangrene). If this happens, you need medical care right away to restore blood flow and possibly save the leg. But even with the best medical care, it might not be possible to save a severely affected limb.  When do you need emergency care?    CLI can get worse and cause an urgent problem. For example, if you have a wound, it may not heal. This can lead to gangrene. Go to the emergency room right away if you have any of these symptoms:  · A wound that is foul smelling, draining pus, or discolored  · Severe foot or leg pain that occurs suddenly without injury, especially if the foot or leg is cold or numb  Call your healthcare provider if:  · You have a cut or ulcer that does not heal  · You have foot or leg pain that happens when walking but goes away with rest. This may be a sign of blocked arteries.  How is critical limb ischemia diagnosed?  Certain tests may be done to find out if you have CLI. First your provider will carefully examine you, checking for pulses at several places. Other common tests include:  · Ankle-brachial index (LUIS MANUEL). The blood pressure in your ankle is compared with the blood pressure in your arm.  · Duplex ultrasound. Harmless sound waves are used to create images of blood flow in your legs.  · Arteriography. X-ray dye (contrast medium) is injected into the artery using a thin, flexible tube (catheter). This allows blood vessels to be seen easily on X-rays.  How is critical limb ischemia treated?  Possible treatments for CLI include:  · Dissolving or removing a blood clot. To dissolve a clot, a tube (catheter) is put into an  artery in your groin. Clot-busting medicine is put into the tube to dissolve the clot. Or surgery may be done to remove the clot. A cut (incision) is made in the artery at the blocked area. The clot is then removed.  · Angioplasty. A tiny, uninflated balloon is sent to the narrowed area by a catheter. It is then inflated to widen the artery. The balloon is deflated and removed.  · Stenting. After angioplasty, a tiny wire mesh tube (stent) may be put in the artery to help hold it open. The stent is also put in using a catheter.  · Endarterectomy. An incision is made in the artery at the blocked area. The material that blocks the artery is then removed from artery walls.  · Peripheral bypass surgery. A natural or artificial graft is used to bypass the blocked area.  · Amputation. If left untreated, the affected area may have to be removed (amputated).  How can critical limb ischemia emergencies be prevented?  Know the signs and symptoms of a leg artery emergency. If you have diabetes or poor blood circulation, check your feet daily for wounds, sores, blisters, and color changes. If you smoke, stop smoking.  Date Last Reviewed: 6/1/2016  © 9186-2127 [employer]. [address] [place] [address], [place] [address]. All rights reserved. This information is not intended as a substitute for professional medical care. Always follow your healthcare professional's instructions.        Raynaud Disease  Your healthcare provider has told you that you have Raynaud disease. It is also called Raynaud phenomenon or Raynaud syndrome. There is no cure for Raynaud disease, but you can manage it to help prevent attacks.    What are the symptoms of Raynaud disease?  A Raynaud disease attack is often triggered by cold or stress. During an attack, blood vessels suddenly narrow (called vasospasm).  This most often happens in fingers and toes. In rare cases, the nose, ears, or even tongue are affected. Narrowed blood vessels reduce the  blood supply to the area. The area then turns white, then blue. The area may feel numb or painful. As the attack passes, the blood vessels open. The affected area may turn bright red as it warms up, then returns to normal color.  What is the cause of Raynaud disease?  With Raynaud disease, it is believed that blood vessels in the affected areas overrespond to certain triggers, such as cold. This makes them narrow (called vasospasm) much more than in people without the disease. What causes the blood vessels to react so strongly to certain triggers is unknown. In between attacks, the blood vessels are normal and healthy. Attacks dont permanently damage the blood vessels, but may thicken the artery walls.   In some cases, Raynaud disease happens along with another disease or condition. This is often a connective tissue disorder, such as lupus, scleroderma, or rheumatoid arthritis. This is called secondary Raynaud disease (as opposed to primary Raynaud disease discussed above) and may be more severe. If this is the case for you, you and your healthcare provider can discuss treatment for the underlying condition.  What are the risk factors?  Risk factors for Raynaud disease include:  · Women are more likely to get Raynaud disease than men.  · Younger individuals are at higher risk, usually ages 15 to 30.  · Living in colder climates increases risk.  · Having a family member with Raynaud disease increases one's risk.  · Underlying rheumatoid conditions may increase one's risk.   What are possible triggers?  Triggers for Raynaud disease include:   · Cold  · Stress  · Caffeine  · Smoking  · Repetitive movements  · Certain medicines, such as beta-blockers, migraine medicine, birth control pills and others  · Injury  How is Raynaud disease diagnosed?  Your description of your symptoms, a health history, and a physical exam are often enough for a diagnosis. Blood tests and other tests may be done to see if any underlying  conditions are present and rule out other problems.  How is Raynaud disease treated?  There is no cure for Raynaud disease. But you can control symptoms and reduce the number and severity of attacks. For most people, avoiding triggers is enough to limit attacks. Your healthcare provider may suggest the following:  · Take precautions to help prevent your hands and feet from losing circulation. This includes:  ¨ Dressing warmly in cold weather.  ¨ Wear gloves or mittens when your hands may become cold, such as when you use the refrigerator or freezer.  ¨ Avoid stress and caffeine.  ¨ Exercise regularly. This may reduce the number and severity of attacks.   ¨ If you smoke, quitting may improve the condition. This is because smoking causes your blood vessels to narrow and reduces blood flow.  · Soak your hands or feet in warm (not hot) water. Do this at the first sign of attack. Keep soaking until your skin color returns to normal.  In some people, symptoms are persistent or troubling. For these cases, other treatments are a choice. Your healthcare provider can tell you more about the following:  · Prescription medicines that relax and widen blood vessels, such as calcium channel blockers. These may help relieve symptoms.  · Nerve surgery for severe cases that dont respond to other treatments. Surgery removes the nerves that surround the blood vessels in the hands and feet. Without nerve stimulation, the blood vessels stay more relaxed. They are less likely to become very narrow due to stimulus. Nerves may be blocked using injections in some cases.  Most cases of Raynaud disease are not cause for concern. The disease doesnt get worse and isnt likely to cause any permanent damage. If attacks are severe, very prolonged, or very often, skin damage may result. Controlling attacks can help prevent this.  When to seek medical care  The following problems happen rarely, but they can be serious. Call your healthcare provider  right away if you notice any of the following:  · Infection or sores on the skin  · A finger or toe turns black  · The skin breaks open on its own  · A rash develops  · A finger or toe joint becomes painful or swollen   Date Last Reviewed: 1/27/2016  © 1324-9817 [employer]. [address] [place] [address]. All rights reserved. This information is not intended as a substitute for professional medical care. Always follow your healthcare professional's instructions.

## 2017-07-26 LAB — RNA POLYMERASE III ANTIBODIES, IGG, SERUM: 11.5 U

## 2017-08-03 LAB
ANTI-PM/SCL AB: <20 UNITS
ANTI-SS-A 52 KD AB, IGG: 58 UNITS
EJ AB SER QL: NEGATIVE
ENA JO1 AB SER IA-ACNC: <20 UNITS
ENA SM+RNP AB SER IA-ACNC: <20 UNITS
FIBRILLARIN (U3 RNP): NEGATIVE
KU AB SER QL: NEGATIVE
MDA-5 (P140): <20 UNITS
MI2 AB SER QL: NEGATIVE
NXP-2 (P140): <20 UNITS
OJ AB SER QL: NEGATIVE
PL12 AB SER QL: NEGATIVE
PL7 AB SER QL: NEGATIVE
SRP AB SERPL QL: NEGATIVE
TIF1 GAMMA (P155/140): <20 UNITS
U2 SNRNP: NEGATIVE

## 2017-08-09 ENCOUNTER — PATIENT MESSAGE (OUTPATIENT)
Dept: INTERNAL MEDICINE | Facility: CLINIC | Age: 57
End: 2017-08-09

## 2017-08-11 DIAGNOSIS — M79.7 FIBROMYALGIA: ICD-10-CM

## 2017-08-11 RX ORDER — METAXALONE 800 MG/1
TABLET ORAL
Qty: 90 TABLET | Refills: 1 | Status: SHIPPED | OUTPATIENT
Start: 2017-08-11 | End: 2018-01-22 | Stop reason: SDUPTHER

## 2017-08-12 RX ORDER — ZOLPIDEM TARTRATE 10 MG/1
TABLET ORAL
Qty: 30 TABLET | Refills: 1 | Status: SHIPPED | OUTPATIENT
Start: 2017-08-12 | End: 2017-11-09 | Stop reason: SDUPTHER

## 2017-08-22 ENCOUNTER — PATIENT MESSAGE (OUTPATIENT)
Dept: PAIN MEDICINE | Facility: CLINIC | Age: 57
End: 2017-08-22

## 2017-09-01 ENCOUNTER — PATIENT MESSAGE (OUTPATIENT)
Dept: INTERNAL MEDICINE | Facility: CLINIC | Age: 57
End: 2017-09-01

## 2017-09-01 ENCOUNTER — LAB VISIT (OUTPATIENT)
Dept: LAB | Facility: HOSPITAL | Age: 57
End: 2017-09-01
Attending: INTERNAL MEDICINE
Payer: COMMERCIAL

## 2017-09-01 DIAGNOSIS — E07.9 THYROID DISORDER: ICD-10-CM

## 2017-09-01 LAB
T3 SERPL-MCNC: 85 NG/DL
T3FREE SERPL-MCNC: 2.5 PG/ML
T4 FREE SERPL-MCNC: 0.52 NG/DL
TSH SERPL DL<=0.005 MIU/L-ACNC: 2.01 UIU/ML

## 2017-09-01 PROCEDURE — 36415 COLL VENOUS BLD VENIPUNCTURE: CPT

## 2017-09-01 PROCEDURE — 84443 ASSAY THYROID STIM HORMONE: CPT

## 2017-09-01 PROCEDURE — 84481 FREE ASSAY (FT-3): CPT

## 2017-09-01 PROCEDURE — 84439 ASSAY OF FREE THYROXINE: CPT

## 2017-09-01 PROCEDURE — 84480 ASSAY TRIIODOTHYRONINE (T3): CPT

## 2017-09-11 ENCOUNTER — OFFICE VISIT (OUTPATIENT)
Dept: INTERNAL MEDICINE | Facility: CLINIC | Age: 57
End: 2017-09-11
Payer: COMMERCIAL

## 2017-09-11 VITALS
BODY MASS INDEX: 20.27 KG/M2 | HEART RATE: 66 BPM | OXYGEN SATURATION: 100 % | DIASTOLIC BLOOD PRESSURE: 64 MMHG | WEIGHT: 107.38 LBS | SYSTOLIC BLOOD PRESSURE: 119 MMHG | HEIGHT: 61 IN

## 2017-09-11 DIAGNOSIS — E03.9 HYPOTHYROIDISM, UNSPECIFIED TYPE: Primary | ICD-10-CM

## 2017-09-11 PROCEDURE — 3008F BODY MASS INDEX DOCD: CPT | Mod: S$GLB,,, | Performed by: INTERNAL MEDICINE

## 2017-09-11 PROCEDURE — 99214 OFFICE O/P EST MOD 30 MIN: CPT | Mod: S$GLB,,, | Performed by: INTERNAL MEDICINE

## 2017-09-11 PROCEDURE — 99999 PR PBB SHADOW E&M-EST. PATIENT-LVL V: CPT | Mod: PBBFAC,,, | Performed by: INTERNAL MEDICINE

## 2017-09-11 NOTE — PROGRESS NOTES
Subjective:      Patient ID: Berenice Hayes is a 57 y.o. female.    Chief Complaint: Follow-up    HPI:  HPI   Patient is here for follow up of thyroid.  She was not able to tolerate the Meridian thyroid that was given as a trial.    After stopping the Meridian   1/4/ of 5 mcg Cytomel three times a day  Starting on August  By Sept 1 10-12.5 mcg a day  3.75 mcg four times a day  Sat and Sunday: 3.75mcg 5 mcg 3.75 mcg 3.75mcg    She has not had any heart racing for the last 2 weeks. For the last 2 weeks she had a better functioning brain. The best result was that the swelling in the feet and the foot pain has improved.    Patient may have to go by the way she feels. Her current readings reflect 4 weeks but not a true 6 weeks    (low thyroid may cause swelling in the muscles of the feet that traps the nerves according to the research the patient has done)    Patient    I reviewed the Vascular Consult and Rheumatology Consult: no changes were made.      Patient Active Problem List   Diagnosis    Chronic idiopathic constipation    Fibromyalgia    Bladder spasm    Osteoporosis, postmenopausal    Fibrocystic breast changes    Vulvar vestibulodynia    Vulvar pain    Vascular disorder: Erytnromelalgia    Raynaud's disease    Incomplete defecation    Straining during bowel movements    Chronic constipation    Rectocele    Disorder of muscle    Anxiety    Bilateral hand pain    Fatigue    Perimenopause vs Memopause    Menopause syndrome    Pudendal neuralgia    Pelvic pain in female    Mittetammy    Urinary hesitancy    Hypothyroidism due to Hashimoto's thyroiditis    Multinodular goiter    Encounter for herb and vitamin supplement management    Gluten free diet    Sicca    Family history of ischemic heart disease (IHD)     Past Medical History:   Diagnosis Date    Asthma with allergic rhinitis     Bladder spasm     Dense breasts     heterogeneously/fibrocystic disease    Elevated antinuclear  "antibody (GUICHO) level     Endometriosis of cervix     Erythromelalgia     Fibromyalgia     Ganglion cyst     left toe    Goiter     MNG    Hashimoto's disease     Hashimoto's thyroiditis     Headache(784.0)     Hypothyroidism     Hashimoto with + Tg ab    Osteoporosis     femoral neck    Raynaud's phenomenon     Rhinitis     Scoliosis     Sleep disorder     type of Narcolepsy ( resolved)    Vitamin D deficiency disease     Vulvodynia      Past Surgical History:   Procedure Laterality Date    BREAST SURGERY      fibrocystic tumor removed     SECTION      2x    CYST REMOVAL      laparoscopic cyst on ovary    HYSTERECTOMY      intradermal cyst       removed from left index finger    SINUS SURGERY      2x     Family History   Problem Relation Age of Onset    Diabetes Mother     Fibromyalgia Mother     Polymyalgia rheumatica Mother     Macular degeneration Mother     Arthritis Mother     Hypertension Mother     Kidney disease Mother     Pneumonia Mother     Adrenal disorder Mother      adrenal insufficiency    Coronary artery disease Father     Arthritis Father      osteoarthritis    Hypertension Father     Heart disease Father     Diabetes Father     Hyperlipidemia Father     Hyperlipidemia Brother     Cancer Brother      oral    Thyroid cancer Daughter     Cancer Daughter      thyroid    Hypothyroidism Daughter     Breast cancer Neg Hx     Ovarian cancer Neg Hx     Colon cancer Neg Hx      Review of Systems  Objective:     Vitals:    17 0803   BP: 119/64   Pulse: 66   SpO2: 100%   Weight: 48.7 kg (107 lb 5.8 oz)   Height: 5' 1" (1.549 m)   PainSc:   3   PainLoc: Generalized     Body mass index is 20.29 kg/m².  Physical Exam   Constitutional: She is oriented to person, place, and time. She appears well-developed and well-nourished. No distress.   Neck: Carotid bruit is not present. No thyromegaly present.   Cardiovascular: Normal rate, regular rhythm and normal " heart sounds.  PMI is not displaced.    Pulmonary/Chest: Effort normal and breath sounds normal. No respiratory distress.   Abdominal: Soft. Bowel sounds are normal. She exhibits no distension. There is no tenderness.   Musculoskeletal: She exhibits no edema.   Neurological: She is alert and oriented to person, place, and time.     Assessment:     1. Hypothyroidism, unspecified type      Plan:   Berenice was seen today for follow-up.    Diagnoses and all orders for this visit:    Hypothyroidism, unspecified type: difficult to understand her thyroid condition but seems to be responding to Cytomel alone with better mental functions and relief of erythmelalgia. Discussion last 30 -45 minutes.  -     TSH; Future  -     T4, free; Future  -     T3, free; Future  -     T3; Future  -     THYROGLOBULIN AB SCREEN; Future    Return in about 4 weeks (around 10/9/2017) for about October 9th.         Medication List          Accurate as of 9/11/17  8:54 AM. If you have any questions, ask your nurse or doctor.               CHANGE how you take these medications    * amitriptyline 50 MG tablet  Commonly known as:  ELAVIL  Take one tablet by mouth nightly. Take with the 10 mg amitriptyline  What changed:  · how much to take  · additional instructions  Changed by:  Estella Serrano MD     * amitriptyline 10 MG tablet  Commonly known as:  ELAVIL  Take 1 tablet (10 mg total) by mouth once daily.  What changed:  Another medication with the same name was changed. Make sure you understand how and when to take each.  Changed by:  Estella Serrano MD     liothyronine 5 MCG Tab  Commonly known as:  CYTOMEL  Alternate days 3 (15mcg) and 3.5 (17.5) tablets  What changed:  additional instructions     SYNTHROID 25 MCG tablet  Generic drug:  levothyroxine  Alternate days of 1 (25mcg) and 1.5 (37.5mcg) tablets  What changed:  additional instructions        * This list has 2 medication(s) that are the same as other medications prescribed for you. Read  the directions carefully, and ask your doctor or other care provider to review them with you.            CONTINUE taking these medications    aspirin 81 MG EC tablet  Commonly known as:  ECOTRIN     DICLOFENAC SODIUM TOP     * estradiol 0.05 mg/24 hr td ptsw 0.05 mg/24 hr  Commonly known as:  VIVELLE-DOT  Place 1 patch onto the skin twice a week.     * estradiol 10 mcg Tab  Commonly known as:  VAGIFEM  Place 1 tablet (10 mcg total) vaginally twice a week.     fluticasone 50 mcg/actuation nasal spray  Commonly known as:  FLONASE  1 spray by Each Nare route once daily.     FLUZONE QUAD 1830-0943 (PF) 60 mcg (15 mcg x 4)/0.5 mL Syrg  Generic drug:  flu vac [id number] 36mos up(PF)     gabapentin 100 MG capsule  Commonly known as:  NEURONTIN  One in the am, one at noon , one at 5 pm and four at bedtime     lorazepam 1 MG tablet  Commonly known as:  ATIVAN  Take 1 tablet (1 mg total) by mouth as directed. 1 tab 30 min prior to MRI, may repeat once if needed     meloxicam 7.5 MG tablet  Commonly known as:  MOBIC  TAKE 1 TABLET (7.5 MG TOTAL) BY MOUTH ONCE DAILY. FOR INFLAMMATION     metaxalone 800 MG tablet  Commonly known as:  SKELAXIN  TAKE 1 TABLET EVERY EVENING (SPASM)     MUCINEX 600 mg 12 hr tablet  Generic drug:  guaifenesin     multivitamin capsule     pilocarpine 5 MG Tab  Commonly known as:  SALAGEN  Take 1 tablet (5 mg total) by mouth 3 (three) times daily as needed.     PREVIDENT 5000 DRY MOUTH 1.1 % Gel  Generic drug:  sodium fluoride     PROAIR HFA 90 mcg/actuation inhaler  Generic drug:  albuterol     RESTASIS 0.05 % ophthalmic emulsion  Generic drug:  cycloSPORINE     thyroid (pork) 15 mg Tab  Commonly known as:  ARMOUR THYROID  Take 2 tablets (30 mg total) by mouth once daily at 6am.     VITAMIN D3 1,000 unit capsule  Generic drug:  cholecalciferol (vitamin D3)     zolpidem 10 mg Tab  Commonly known as:  AMBIEN  TAKE ONE TABLET BY MOUTH AT BEDTIME AS NEEDED.        * This list has 2 medication(s) that are  the same as other medications prescribed for you. Read the directions carefully, and ask your doctor or other care provider to review them with you.

## 2017-09-11 NOTE — PATIENT INSTRUCTIONS
Procedures Ordered This Visit     T3         T3, free         T4, free         THYROGLOBULIN AB SCREEN         TSH          Change/Modify 0 Orders

## 2017-09-26 ENCOUNTER — PATIENT MESSAGE (OUTPATIENT)
Dept: OBSTETRICS AND GYNECOLOGY | Facility: CLINIC | Age: 57
End: 2017-09-26

## 2017-09-28 ENCOUNTER — PATIENT MESSAGE (OUTPATIENT)
Dept: INTERNAL MEDICINE | Facility: CLINIC | Age: 57
End: 2017-09-28

## 2017-10-02 ENCOUNTER — LAB VISIT (OUTPATIENT)
Dept: LAB | Facility: HOSPITAL | Age: 57
End: 2017-10-02
Attending: INTERNAL MEDICINE
Payer: COMMERCIAL

## 2017-10-02 ENCOUNTER — HOSPITAL ENCOUNTER (OUTPATIENT)
Dept: RADIOLOGY | Facility: CLINIC | Age: 57
Discharge: HOME | End: 2017-10-02
Attending: INTERNAL MEDICINE
Payer: COMMERCIAL

## 2017-10-02 DIAGNOSIS — E04.2 MULTINODULAR GOITER: ICD-10-CM

## 2017-10-02 DIAGNOSIS — E06.3 HYPOTHYROIDISM DUE TO HASHIMOTO'S THYROIDITIS: ICD-10-CM

## 2017-10-02 DIAGNOSIS — E03.9 HYPOTHYROIDISM, UNSPECIFIED TYPE: ICD-10-CM

## 2017-10-02 DIAGNOSIS — M81.0 OSTEOPOROSIS, POSTMENOPAUSAL: ICD-10-CM

## 2017-10-02 DIAGNOSIS — E03.8 HYPOTHYROIDISM DUE TO HASHIMOTO'S THYROIDITIS: ICD-10-CM

## 2017-10-02 LAB
T3 SERPL-MCNC: 86 NG/DL
T3FREE SERPL-MCNC: 2.6 PG/ML
T4 FREE SERPL-MCNC: 0.45 NG/DL
THYROGLOB AB SERPL IA-ACNC: 9.7 IU/ML
TSH SERPL DL<=0.005 MIU/L-ACNC: 0.84 UIU/ML

## 2017-10-02 PROCEDURE — 86800 THYROGLOBULIN ANTIBODY: CPT

## 2017-10-02 PROCEDURE — 84481 FREE ASSAY (FT-3): CPT

## 2017-10-02 PROCEDURE — 36415 COLL VENOUS BLD VENIPUNCTURE: CPT

## 2017-10-02 PROCEDURE — 77080 DXA BONE DENSITY AXIAL: CPT | Mod: 26,,, | Performed by: INTERNAL MEDICINE

## 2017-10-02 PROCEDURE — 77080 DXA BONE DENSITY AXIAL: CPT | Mod: TC

## 2017-10-02 PROCEDURE — 84443 ASSAY THYROID STIM HORMONE: CPT

## 2017-10-02 PROCEDURE — 84480 ASSAY TRIIODOTHYRONINE (T3): CPT

## 2017-10-02 PROCEDURE — 84439 ASSAY OF FREE THYROXINE: CPT

## 2017-10-10 ENCOUNTER — LAB VISIT (OUTPATIENT)
Dept: LAB | Facility: HOSPITAL | Age: 57
End: 2017-10-10
Attending: INTERNAL MEDICINE
Payer: COMMERCIAL

## 2017-10-10 ENCOUNTER — OFFICE VISIT (OUTPATIENT)
Dept: INTERNAL MEDICINE | Facility: CLINIC | Age: 57
End: 2017-10-10
Payer: COMMERCIAL

## 2017-10-10 ENCOUNTER — TELEPHONE (OUTPATIENT)
Dept: UROGYNECOLOGY | Facility: CLINIC | Age: 57
End: 2017-10-10

## 2017-10-10 VITALS
SYSTOLIC BLOOD PRESSURE: 118 MMHG | WEIGHT: 108 LBS | HEIGHT: 60 IN | HEART RATE: 74 BPM | OXYGEN SATURATION: 96 % | BODY MASS INDEX: 21.2 KG/M2 | DIASTOLIC BLOOD PRESSURE: 72 MMHG

## 2017-10-10 DIAGNOSIS — E03.9 ACQUIRED HYPOTHYROIDISM: Primary | ICD-10-CM

## 2017-10-10 DIAGNOSIS — E03.9 ACQUIRED HYPOTHYROIDISM: ICD-10-CM

## 2017-10-10 PROCEDURE — 99999 PR PBB SHADOW E&M-EST. PATIENT-LVL V: CPT | Mod: PBBFAC,,, | Performed by: INTERNAL MEDICINE

## 2017-10-10 PROCEDURE — 84442 ASSAY OF THYROID ACTIVITY: CPT

## 2017-10-10 PROCEDURE — 99214 OFFICE O/P EST MOD 30 MIN: CPT | Mod: S$GLB,,, | Performed by: INTERNAL MEDICINE

## 2017-10-10 PROCEDURE — 84482 T3 REVERSE: CPT

## 2017-10-10 NOTE — TELEPHONE ENCOUNTER
Spoke to pt and scheduled her on 12/7/17 and informed her I'd send an appointment letter in the mail. Pt voiced understanding and call ended.

## 2017-10-10 NOTE — TELEPHONE ENCOUNTER
----- Message from Farrah Daniel sent at 10/10/2017  9:16 AM CDT -----  Pt is wanting a follow up appt with Dr Vincent 737 [phone]

## 2017-10-10 NOTE — PROGRESS NOTES
Subjective:      Patient ID: Berenice Hayes is a 57 y.o. female.    Chief Complaint: Follow-up    HPI:  HPI   9/19/2017 a temporary reduction of cytomel  9/26/2017 3.75 mcg for 3 doses and one 5 mcg  10/3/2017: 3.75 mcg for 2 doses and 5 mg for 2 doses    Symptoms:  Heat sensation for the last few months much better  Feet: much better  Chest bone pain: tried to return  Foggy brain: better    Patient feels that she is better than on the  Synthroid      Patient Active Problem List   Diagnosis    Chronic idiopathic constipation    Fibromyalgia    Bladder spasm    Osteoporosis, postmenopausal    Fibrocystic breast changes    Vulvar vestibulodynia    Vulvar pain    Vascular disorder: Erytnromelalgia    Raynaud's disease    Incomplete defecation    Straining during bowel movements    Chronic constipation    Rectocele    Disorder of muscle    Anxiety    Bilateral hand pain    Fatigue    Perimenopause vs Memopause    Menopause syndrome    Pudendal neuralgia    Pelvic pain in female    Beryl    Urinary hesitancy    Hypothyroidism due to Hashimoto's thyroiditis    Multinodular goiter    Encounter for herb and vitamin supplement management    Gluten free diet    Sicca    Family history of ischemic heart disease (IHD)     Past Medical History:   Diagnosis Date    Asthma with allergic rhinitis     Bladder spasm     Dense breasts     heterogeneously/fibrocystic disease    Elevated antinuclear antibody (GUICHO) level     Endometriosis of cervix     Erythromelalgia     Fibromyalgia     Ganglion cyst     left toe    Goiter     MNG    Hashimoto's disease     Hashimoto's thyroiditis     Headache(784.0)     Hypothyroidism     Hashimoto with + Tg ab    Osteoporosis     femoral neck    Raynaud's phenomenon     Rhinitis     Scoliosis     Sleep disorder     type of Narcolepsy ( resolved)    Vitamin D deficiency disease     Vulvodynia      Past Surgical History:   Procedure Laterality  Date    BREAST SURGERY      fibrocystic tumor removed     SECTION      2x    CYST REMOVAL      laparoscopic cyst on ovary    HYSTERECTOMY      intradermal cyst       removed from left index finger    SINUS SURGERY      2x     Family History   Problem Relation Age of Onset    Diabetes Mother     Fibromyalgia Mother     Polymyalgia rheumatica Mother     Macular degeneration Mother     Arthritis Mother     Hypertension Mother     Kidney disease Mother     Pneumonia Mother     Adrenal disorder Mother      adrenal insufficiency    Coronary artery disease Father     Arthritis Father      osteoarthritis    Hypertension Father     Heart disease Father     Diabetes Father     Hyperlipidemia Father     Hyperlipidemia Brother     Cancer Brother      oral    Thyroid cancer Daughter     Cancer Daughter      thyroid    Hypothyroidism Daughter     Breast cancer Neg Hx     Ovarian cancer Neg Hx     Colon cancer Neg Hx      Review of Systems   See above  Objective:     Vitals:    10/10/17 0820   BP: 118/72   Pulse: 74   SpO2: 96%   Weight: 49 kg (108 lb 0.4 oz)   Height: 5' (1.524 m)   PainSc:   4   PainLoc: Generalized     Body mass index is 21.1 kg/m².  Physical Exam   Constitutional: She is oriented to person, place, and time. She appears well-developed and well-nourished. No distress.   Neck: Carotid bruit is not present. No thyromegaly present.   Cardiovascular: Normal rate, regular rhythm and normal heart sounds.  PMI is not displaced.    Pulmonary/Chest: Effort normal and breath sounds normal. No respiratory distress.   Abdominal: Soft. Bowel sounds are normal. She exhibits no distension. There is no tenderness.   Musculoskeletal: She exhibits no edema.   Neurological: She is alert and oriented to person, place, and time.     Assessment:     1. Acquired hypothyroidism      Plan:   Berenice was seen today for follow-up.    Diagnoses and all orders for this visit:    Acquired hypothyroidism: At  this time will continue to investigate hypothyroid state and will test the following  -     T3, REVERSE; Future  -     THYROXINE BINDING GLOBULIN (TBG); Future      Return in about 1 month (around 11/10/2017) for Annual exam.     Medication List          Accurate as of 10/10/17  9:02 AM. If you have any questions, ask your nurse or doctor.               CHANGE how you take these medications    * amitriptyline 50 MG tablet  Commonly known as:  ELAVIL  Take one tablet by mouth nightly. Take with the 10 mg amitriptyline  What changed:  · how much to take  · additional instructions     * amitriptyline 10 MG tablet  Commonly known as:  ELAVIL  Take 1 tablet (10 mg total) by mouth once daily.  What changed:  Another medication with the same name was changed. Make sure you understand how and when to take each.     liothyronine 5 MCG Tab  Commonly known as:  CYTOMEL  Alternate days 3 (15mcg) and 3.5 (17.5) tablets  What changed:  additional instructions     SYNTHROID 25 MCG tablet  Generic drug:  levothyroxine  Alternate days of 1 (25mcg) and 1.5 (37.5mcg) tablets  What changed:  additional instructions        * This list has 2 medication(s) that are the same as other medications prescribed for you. Read the directions carefully, and ask your doctor or other care provider to review them with you.            CONTINUE taking these medications    aspirin 81 MG EC tablet  Commonly known as:  ECOTRIN     DICLOFENAC SODIUM TOP     * estradiol 0.05 mg/24 hr td ptsw 0.05 mg/24 hr  Commonly known as:  VIVELLE-DOT  Place 1 patch onto the skin twice a week.     * estradiol 10 mcg Tab  Commonly known as:  VAGIFEM  Place 1 tablet (10 mcg total) vaginally twice a week.     fluticasone 50 mcg/actuation nasal spray  Commonly known as:  FLONASE  1 spray by Each Nare route once daily.     FLUZONE QUAD 6136-5775 (PF) 60 mcg (15 mcg x 4)/0.5 mL Syrg  Generic drug:  flu vac [id number] 36mos up(PF)     gabapentin 100 MG capsule  Commonly known  as:  NEURONTIN  One in the am, one at noon , one at 5 pm and four at bedtime     lorazepam 1 MG tablet  Commonly known as:  ATIVAN  Take 1 tablet (1 mg total) by mouth as directed. 1 tab 30 min prior to MRI, may repeat once if needed     meloxicam 7.5 MG tablet  Commonly known as:  MOBIC  TAKE 1 TABLET (7.5 MG TOTAL) BY MOUTH ONCE DAILY. FOR INFLAMMATION     metaxalone 800 MG tablet  Commonly known as:  SKELAXIN  TAKE 1 TABLET EVERY EVENING (SPASM)     MUCINEX 600 mg 12 hr tablet  Generic drug:  guaifenesin     multivitamin capsule     pilocarpine 5 MG Tab  Commonly known as:  SALAGEN  Take 1 tablet (5 mg total) by mouth 3 (three) times daily as needed.     PREVIDENT 5000 DRY MOUTH 1.1 % Gel  Generic drug:  fluoride (sodium)     PROAIR HFA 90 mcg/actuation inhaler  Generic drug:  albuterol     RESTASIS 0.05 % ophthalmic emulsion  Generic drug:  cycloSPORINE     thyroid (pork) 15 mg Tab  Commonly known as:  ARMOUR THYROID  Take 2 tablets (30 mg total) by mouth once daily at 6am.     VITAMIN D3 1,000 unit capsule  Generic drug:  cholecalciferol (vitamin D3)     zolpidem 10 mg Tab  Commonly known as:  AMBIEN  TAKE ONE TABLET BY MOUTH AT BEDTIME AS NEEDED.        * This list has 2 medication(s) that are the same as other medications prescribed for you. Read the directions carefully, and ask your doctor or other care provider to review them with you.

## 2017-10-13 ENCOUNTER — OFFICE VISIT (OUTPATIENT)
Dept: PAIN MEDICINE | Facility: CLINIC | Age: 57
End: 2017-10-13
Attending: ANESTHESIOLOGY
Payer: COMMERCIAL

## 2017-10-13 VITALS — BODY MASS INDEX: 19.88 KG/M2 | WEIGHT: 108 LBS | HEIGHT: 62 IN

## 2017-10-13 DIAGNOSIS — R10.2 VAGINAL PAIN: ICD-10-CM

## 2017-10-13 DIAGNOSIS — R10.2 PELVIC PAIN IN FEMALE: ICD-10-CM

## 2017-10-13 DIAGNOSIS — M62.81 MUSCLE WEAKNESS: ICD-10-CM

## 2017-10-13 DIAGNOSIS — M53.3 COCCYDYNIA: Primary | ICD-10-CM

## 2017-10-13 DIAGNOSIS — M41.20 OTHER IDIOPATHIC SCOLIOSIS, UNSPECIFIED SPINAL REGION: ICD-10-CM

## 2017-10-13 DIAGNOSIS — M79.7 FIBROMYALGIA: ICD-10-CM

## 2017-10-13 DIAGNOSIS — G58.8 PUDENDAL NEURALGIA: ICD-10-CM

## 2017-10-13 DIAGNOSIS — G89.4 CHRONIC PAIN DISORDER: ICD-10-CM

## 2017-10-13 PROCEDURE — 99214 OFFICE O/P EST MOD 30 MIN: CPT | Mod: S$GLB,,, | Performed by: ANESTHESIOLOGY

## 2017-10-13 PROCEDURE — 99999 PR PBB SHADOW E&M-EST. PATIENT-LVL III: CPT | Mod: PBBFAC,,, | Performed by: ANESTHESIOLOGY

## 2017-10-13 NOTE — PROGRESS NOTES
"Subjective:      Patient ID: Berenice Hayes is a 57 y.o. female.    Chief Complaint: No chief complaint on file.    Referred by: No ref. provider found       HPI:     is a 55 yo female who presents today with multiple pain complaints, mostly vaginal pain.  She has been undergoing pelvic floor PT for multiple pelvic pain generators.  During this, she sat on a heating pad, which started the vaginal pain .     Interval History (2/26/2015)  Patient notes 50% pain relief after left pudendal nerve block.  She states relief started within the last 10 days.  After the nerve block, patient sates her pain became worse and she needed to take additional pain medicine to help.  She would like to proceed with the right pudendal nerve block and is requesting a prescription for pain medicine after the injection.    Interval History (5/04/2015):  She returns today for follow up.  She reports that her pain has now slowly returned to baseline.  She reports that the inner thigh pain and posterior "leg crease" pain has all but disappeared after the left pudendal block; however, now it has returned.  She never did get the right-sided pudendal block.  She would like to schedule this today.  She like to schedule this for the first week in June, as her daughter will be having surgery for thyroid cancer coming up.    Of note, she is focusing on multiple areas of pain today, including her right groin/genitofemoral region, lateral ischial bursae regions, right posterior hamstring, and her general pelvic pain.  In addition to this, she also reports occasional numbness and tingling in her right lower leg and her entire foot.  This is worse when sitting    Interval History (10/8/2015):  She returns today for follow up.  She reports that the piriformis injection and bilateral pudendal nerve blocks have been helpful for the pain.  Her right sided pain began worsening following a hormone injection.  She inquires as to whether that " "injection could have irritated her pudendal nerve.  She is also considering hormone pellets and asks whether this could affect it as well.  She also notes some swelling in her left lower leg that is new.    Interval History (11/18/2015):  She returns today for follow up.  She reports that this most recent pudendal block was not helpful for the pain.  She has increased burning following this block, but not the increase in pelvic pain as before.  She is scheduled to try acupuncture.  She is scheduled to see Dr. Feliz for pelvic floor TPIs.  Reports "tailbone pain" that is not worse with sitting or BMs.  Pain in posterior thighs occurs with sitting.    Interval History (5/11/2017):  She returns today for follow up.  She reports a complicated medical course since her last visit. She reports continued groin pain that she reports is the worst today; however, she is focusing more on her back with erh narrative.  She reports low back pain that is bilateral, radiates into her bilateral hips and sometimes into her posterior thighs.  Also, she reports pain in the top of her right foot that hurts when she dorsiflexes her foot and when she plantar flexes her right foot.  Amitriptyline (increased to 60 mg), gabapentin 400 mg QS, and topical cream has been helpful for the pain.    Interval History (10/13/2017):  She returns today for follow up.  She reports that the cream is helpful, but it is too expensive.  Her ilioinguinal pain is improved, as is her vaginal and pudendal pain.  The worse of her pain is in her tailbone, more so on the right.  The pain is worse with sitting.  She also reports that she feels that her shoulders are twisted, which she is attributing to scoliosis.    Physical Therapy: Finished with pelvic PT, which is helpful.      Non-pharmacologic Treatment: Rest and deep breathing helps (abd wall pain).  Acupuncture is helpful         · TENS? No    Pain Medications:         · Currently taking: amitriptyline 60 mg " QHS, Rx topical cream.  Gabapentin 700 daily    · Has tried in the past:      · Has not tried: does not want opioids    Blood thinners: No    Interventional Therapies:   · Previous bilateral pudendal nerve blocks:  Worsening pain followed by relief  · Most recent right pudendal:  No relief  · Right piriformis injection:  Temporary relief    Relevant Surgeries:     Affecting sleep? Yes    Affecting daily activities? Yes    Depressive symptoms? Yes          · SI/HI? 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 This is a new problem. The current episode started 1 to 4 weeks ago. The problem occurs constantly. The quality of the pain is described as burning, throbbing, hot and tingling (deep and sore). The pain is at a severity of 4/10. The symptoms are aggravated by activity, sitting, standing, lying down, bending, touching and walking (pressure). She has tried oral narcotics and injection treatment for the symptoms.   Leg Pain      Vaginal Pain   Associated symptoms include joint swelling.   Pain   Associated symptoms include joint swelling.       Review of Systems   Constitution: Positive for malaise/fatigue.   HENT: Positive for ear pain.         Runny nose   Eyes: Positive for pain.        Vision change   Respiratory: Negative.    Endocrine:        Thyroidism   Hematologic/Lymphatic: Bruises/bleeds easily.   Skin: Positive for color change.        Texture change   Musculoskeletal: Positive for back pain and joint swelling.        Joint stiffness   Gastrointestinal: Positive for constipation.   Genitourinary: Positive for frequency, pelvic pain and vaginal pain.   Neurological: Positive for loss of balance.        Memory loss   Psychiatric/Behavioral: The patient has insomnia and is nervous/anxious.              Past Medical History:    Diagnosis Date    Asthma with allergic rhinitis     Bladder spasm     Dense breasts     heterogeneously/fibrocystic disease    Elevated antinuclear antibody (GUICHO) level     Endometriosis of cervix     Erythromelalgia     Fibromyalgia     Ganglion cyst     left toe    Goiter     MNG    Hashimoto's disease     Hashimoto's thyroiditis     Headache(784.0)     Hypothyroidism     Hashimoto with + Tg ab    Osteoporosis     femoral neck    Raynaud's phenomenon     Rhinitis     Scoliosis     Sleep disorder     type of Narcolepsy ( resolved)    Vitamin D deficiency disease     Vulvodynia        Past Surgical History:   Procedure Laterality Date    BREAST SURGERY      fibrocystic tumor removed     SECTION      2x    CYST REMOVAL      laparoscopic cyst on ovary    HYSTERECTOMY      intradermal cyst       removed from left index finger    SINUS SURGERY      2x       Review of patient's allergies indicates:  No Known Allergies    Current Outpatient Prescriptions   Medication Sig Dispense Refill    albuterol (PROVENTIL HFA/VENTOLIN HFA) 200 puff inhaler Inhale 2 puffs into the lungs every 6 (six) hours as needed.        amitriptyline (ELAVIL) 10 MG tablet Take 1 tablet (10 mg total) by mouth once daily. 90 tablet 3    amitriptyline (ELAVIL) 50 MG tablet Take one tablet by mouth nightly. Take with the 10 mg amitriptyline (Patient taking differently: 50 mg. Take one tablet by mouth nightly.) 90 tablet 4    aspirin (ECOTRIN) 81 MG EC tablet Take 81 mg by mouth once daily.      cholecalciferol, vitamin D3, (VITAMIN D3) 1,000 unit capsule Take 2,000 Units by mouth once daily.       DICLOFENAC SODIUM TOP Apply 1 application topically. Use up tp 5x/day Diclofenac 0.15%/lidocaine 2.25%/prilocaine 2.25% [facility]: [phone]      estradiol (VAGIFEM) 10 mcg Tab Place 1 tablet (10 mcg total) vaginally twice a week. 24 tablet 4    estradiol 0.05 mg/24 hr td ptsw (VIVELLE-DOT) 0.05  mg/24 hr Place 1 patch onto the skin twice a week. 24 patch 3    fluticasone (FLONASE) 50 mcg/actuation nasal spray 1 spray by Each Nare route once daily. 3 Bottle 3    FLUZONE QUAD 7707-5902, PF, 60 mcg (15 mcg x 4)/0.5 mL Syrg       gabapentin (NEURONTIN) 100 MG capsule One in the am, one at noon , one at 5 pm and four at bedtime 630 capsule 1    guaifenesin (MUCINEX) 600 mg 12 hr tablet Take 600 mg by mouth 2 (two) times daily.      liothyronine (CYTOMEL) 5 MCG Tab Alternate days 3 (15mcg) and 3.5 (17.5) tablets (Patient taking differently: On hold) 295 tablet 3    meloxicam (MOBIC) 7.5 MG tablet TAKE 1 TABLET (7.5 MG TOTAL) BY MOUTH ONCE DAILY. FOR INFLAMMATION 30 tablet 3    metaxalone (SKELAXIN) 800 MG tablet TAKE 1 TABLET EVERY EVENING (SPASM) 90 tablet 1    multivitamin capsule Take 1 capsule by mouth once daily.        pilocarpine (SALAGEN) 5 MG Tab Take 1 tablet (5 mg total) by mouth 3 (three) times daily as needed. 270 tablet 3    PREVIDENT 5000 DRY MOUTH 1.1 % Gel       RESTASIS 0.05 % ophthalmic emulsion       SYNTHROID 25 mcg tablet Alternate days of 1 (25mcg) and 1.5 (37.5mcg) tablets (Patient taking differently: On hold at present 7/20) 125 tablet 3    thyroid, pork, (ARMOUR THYROID) 15 mg Tab Take 2 tablets (30 mg total) by mouth once daily at 6am. 60 tablet 2    zolpidem (AMBIEN) 10 mg Tab TAKE ONE TABLET BY MOUTH AT BEDTIME AS NEEDED. 30 tablet 1    lorazepam (ATIVAN) 1 MG tablet Take 1 tablet (1 mg total) by mouth as directed. 1 tab 30 min prior to MRI, may repeat once if needed 2 tablet 0     No current facility-administered medications for this visit.        Family History   Problem Relation Age of Onset    Diabetes Mother     Fibromyalgia Mother     Polymyalgia rheumatica Mother     Macular degeneration Mother     Arthritis Mother     Hypertension Mother     Kidney disease Mother     Pneumonia Mother     Adrenal disorder Mother      adrenal insufficiency    Coronary  "artery disease Father     Arthritis Father      osteoarthritis    Hypertension Father     Heart disease Father     Diabetes Father     Hyperlipidemia Father     Hyperlipidemia Brother     Cancer Brother      oral    Thyroid cancer Daughter     Cancer Daughter      thyroid    Hypothyroidism Daughter     Breast cancer Neg Hx     Ovarian cancer Neg Hx     Colon cancer Neg Hx        Social History     Social History    Marital status:      Spouse name: N/A    Number of children: N/A    Years of education: N/A     Occupational History     [employer]     Social History Main Topics    Smoking status: Never Smoker    Smokeless tobacco: Never Used    Alcohol use No    Drug use: No    Sexual activity: Not Currently     Birth control/ protection: Surgical      Comment: single, RAMOS ov in situ      Other Topics Concern    Not on file     Social History Narrative           Objective:     Vitals:    10/13/17 0820   Weight: 49 kg (108 lb)   Height: 5' 1.5" (1.562 m)   PainSc:   4     GEN:  Well developed, well nourished.  No acute distress.   HEENT:  No trauma.  Mucous membranes moist.  Nares patent bilaterally.  PSYCH: Normal affect. Thought content appropriate.  CHEST:  Breathing symmetric.  No audible wheezing.  ABD: Soft, non-tender, non-distended.  SKIN:  Warm, pink, dry.  No rash on exposed areas.    EXT:  No cyanosis, clubbing, or edema.  No color change or changes in nail or hair growth.  NEURO/MUSCULOSKELETAL:  Fully alert, oriented, and appropriate. Speech normal rick. No cranial nerve deficits.   Gait: Normal.  No focal motor deficits.   TTP over coccyx.  Right shoulder appears higher than left on observation.  This is correctable with suggestion.  No TTP over paraspinals.  Shoulder shrug normal.        Imaging:        Result Narrative    Two views obtained. lumbar vertebral bodies are normal in height and alignment without significant degenerative change.      Result Impression "    As above      Electronically signed by: Valentine Thomas MD  Date: 12/05/14  Time: 16:23         Assessment:       Encounter Diagnoses   Name Primary?    Coccydynia Yes    Chronic pain disorder     Fibromyalgia     Vaginal pain     Pudendal neuralgia     Pelvic pain in female     Muscle weakness of core          Plan:       Diagnoses and all orders for this visit:    Coccydynia    Chronic pain disorder    Fibromyalgia    Vaginal pain    Pudendal neuralgia    Pelvic pain in female    Muscle weakness of core    Other idiopathic scoliosis, unspecified spinal region  -     X-Ray Cervical Spine Complete 5 view; Future  -     X-Ray Thoracic Spine AP Lateral; Future       Her pain is consistent with vaginal pain, possibly due to pudendal neuralgia.  She also has a large myofascial component of her pain, that is partially responsible for the relief that she received from previous pudendal blocks. I do believe that she would benefit from pelvic floor trigger point injections.    An MRI is not currently indicated for her symptoms.    I discussed the treatment options with her today, including risks, benefits, and alternatives. All available images were reviewed.     1. Will schedule for bilateral coccygeal nerve blocks  1. If good relief, repeat PRN  2. If good, but not durable relief, RFA  3. If limited benefit, ganglion impar  2. Continue gabapentin 700 mg daily (1, 1, 1, 4)  3. Continue other current medications. Consider increasing amitriptyline to 70 mg QHS.  4. Consider low dose naltrexone therapy.  5. Will obtain new x-rays to eval for any progress in her scoliosis, though we discuss that this was more likely a myofascial issue.  6. Recommend continuing to perform piriformis stretch as shown in office  7. Recommend TPIs with Dr. Feliz if offered.  8. Continue acupuncture  9. Recommend follow-up with Dr. Marinelli  10. Recommend following up on the referral to [facility] for psychotherapy, as [facility]  [place] was not accepting any new patients with her insurance ([employer])  11. Continue Compounding cream for topical use. New Rx sent to [facility] (previous from [facility])  12. RTC 3 weeks after procedure or sooner if needed.    Greater than 30 minutes spent in total in todays visit with the patient, with more than half that time direct face to face counseling and education with the patient today. We discussed the disease process, prognosis, treatment plan, and risks and benefits.          Ortho/SPM Exam

## 2017-10-16 LAB
T3REVERSE SERPL-MCNC: 6.8 NG/DL (ref 9–27)
T4BG SERPL-MCNC: 11.9 MCG/ML

## 2017-10-17 ENCOUNTER — HOSPITAL ENCOUNTER (OUTPATIENT)
Dept: RADIOLOGY | Facility: HOSPITAL | Age: 57
Discharge: HOME | End: 2017-10-17
Attending: ANESTHESIOLOGY
Payer: COMMERCIAL

## 2017-10-17 DIAGNOSIS — M41.20 OTHER IDIOPATHIC SCOLIOSIS, UNSPECIFIED SPINAL REGION: ICD-10-CM

## 2017-10-17 PROCEDURE — 72070 X-RAY EXAM THORAC SPINE 2VWS: CPT | Mod: 26,,, | Performed by: RADIOLOGY

## 2017-10-17 PROCEDURE — 72050 X-RAY EXAM NECK SPINE 4/5VWS: CPT | Mod: TC

## 2017-10-17 PROCEDURE — 72050 X-RAY EXAM NECK SPINE 4/5VWS: CPT | Mod: 26,,, | Performed by: RADIOLOGY

## 2017-10-17 PROCEDURE — 72070 X-RAY EXAM THORAC SPINE 2VWS: CPT | Mod: TC

## 2017-10-20 ENCOUNTER — OFFICE VISIT (OUTPATIENT)
Dept: OBSTETRICS AND GYNECOLOGY | Facility: CLINIC | Age: 57
End: 2017-10-20
Payer: COMMERCIAL

## 2017-10-20 VITALS
DIASTOLIC BLOOD PRESSURE: 60 MMHG | SYSTOLIC BLOOD PRESSURE: 98 MMHG | WEIGHT: 106.81 LBS | BODY MASS INDEX: 19.66 KG/M2 | HEIGHT: 62 IN

## 2017-10-20 DIAGNOSIS — R10.2 PELVIC PRESSURE IN FEMALE: Primary | ICD-10-CM

## 2017-10-20 LAB
BILIRUB SERPL-MCNC: NEGATIVE MG/DL
BLOOD, POC UA: NEGATIVE
CANDIDA RRNA VAG QL PROBE: NEGATIVE
G VAGINALIS RRNA GENITAL QL PROBE: NEGATIVE
GLUCOSE UR QL STRIP: NORMAL
KETONES UR QL STRIP: NEGATIVE
LEUKOCYTE ESTERASE URINE, POC: NEGATIVE
NITRITE, POC UA: NEGATIVE
PH, POC UA: 7
PROTEIN, POC: NEGATIVE
SPECIFIC GRAVITY, POC UA: 1.01
T VAGINALIS RRNA GENITAL QL PROBE: NEGATIVE
TH/TO: NEGATIVE
UROBILINOGEN, POC UA: NEGATIVE

## 2017-10-20 PROCEDURE — 87480 CANDIDA DNA DIR PROBE: CPT

## 2017-10-20 PROCEDURE — 99999 PR PBB SHADOW E&M-EST. PATIENT-LVL IV: CPT | Mod: PBBFAC,,, | Performed by: ADVANCED PRACTICE MIDWIFE

## 2017-10-20 PROCEDURE — 87660 TRICHOMONAS VAGIN DIR PROBE: CPT

## 2017-10-20 PROCEDURE — 99214 OFFICE O/P EST MOD 30 MIN: CPT | Mod: 25,S$GLB,, | Performed by: ADVANCED PRACTICE MIDWIFE

## 2017-10-20 PROCEDURE — 81000 URINALYSIS NONAUTO W/SCOPE: CPT | Mod: S$GLB,,, | Performed by: ADVANCED PRACTICE MIDWIFE

## 2017-10-20 NOTE — PROGRESS NOTES
Berenice Hayes is a 57 y.o. female  p[facility] with complaint of lower abdominal pressure and difficulty emptying her bladder for 2 weeks. Pt reports that she is followed per Dr. Pichardo for pelvic flooe dysfunction and per Dr. Feliz for vaginal issues.     Pt sees Dr. Feliz for her OB/GYN care.    ROS:  GENERAL: No fever, chills, fatigability or weight loss.  VULVAR: No pain, no lesions and no itching.  VAGINAL: No relaxation, no abnormal bleeding and no lesions.  ABDOMEN: Reports lower abd pressure. Denies nausea. Denies vomiting. No diarrhea. No constipation  BREAST: Denies pain. No lumps. No discharge.  URINARY: No incontinence, no nocturia, no frequency and no dysuria. Reports difficulty emptying her bladder  CARDIOVASCULAR: No chest pain. No shortness of breath. No leg cramps.  NEUROLOGICAL: No headaches. No vision changes.      Review of patient's allergies indicates:  No Known Allergies    Current Outpatient Prescriptions:     albuterol (PROVENTIL HFA/VENTOLIN HFA) 200 puff inhaler, Inhale 2 puffs into the lungs every 6 (six) hours as needed.  , Disp: , Rfl:     amitriptyline (ELAVIL) 10 MG tablet, Take 1 tablet (10 mg total) by mouth once daily., Disp: 90 tablet, Rfl: 3    amitriptyline (ELAVIL) 50 MG tablet, Take one tablet by mouth nightly. Take with the 10 mg amitriptyline (Patient taking differently: 50 mg. Take one tablet by mouth nightly.), Disp: 90 tablet, Rfl: 4    aspirin (ECOTRIN) 81 MG EC tablet, Take 81 mg by mouth once daily., Disp: , Rfl:     cholecalciferol, vitamin D3, (VITAMIN D3) 1,000 unit capsule, Take 2,000 Units by mouth once daily. , Disp: , Rfl:     DICLOFENAC SODIUM TOP, Apply 1 application topically. Use up tp 5x/day Diclofenac 0.15%/lidocaine 2.25%/prilocaine 2.25% [facility]: [phone], Disp: , Rfl:     estradiol (VAGIFEM) 10 mcg Tab, Place 1 tablet (10 mcg total) vaginally twice a week., Disp: 24 tablet, Rfl: 4    estradiol  0.05 mg/24 hr td ptsw (VIVELLE-DOT) 0.05 mg/24 hr, Place 1 patch onto the skin twice a week., Disp: 24 patch, Rfl: 3    fluticasone (FLONASE) 50 mcg/actuation nasal spray, 1 spray by Each Nare route once daily., Disp: 3 Bottle, Rfl: 3    FLUZONE QUAD 6407-5874, PF, 60 mcg (15 mcg x 4)/0.5 mL Syrg, , Disp: , Rfl:     gabapentin (NEURONTIN) 100 MG capsule, One in the am, one at noon , one at 5 pm and four at bedtime, Disp: 630 capsule, Rfl: 1    guaifenesin (MUCINEX) 600 mg 12 hr tablet, Take 600 mg by mouth 2 (two) times daily., Disp: , Rfl:     liothyronine (CYTOMEL) 5 MCG Tab, Alternate days 3 (15mcg) and 3.5 (17.5) tablets (Patient taking differently: On hold), Disp: 295 tablet, Rfl: 3    meloxicam (MOBIC) 7.5 MG tablet, TAKE 1 TABLET (7.5 MG TOTAL) BY MOUTH ONCE DAILY. FOR INFLAMMATION, Disp: 30 tablet, Rfl: 3    metaxalone (SKELAXIN) 800 MG tablet, TAKE 1 TABLET EVERY EVENING (SPASM), Disp: 90 tablet, Rfl: 1    multivitamin capsule, Take 1 capsule by mouth once daily.  , Disp: , Rfl:     pilocarpine (SALAGEN) 5 MG Tab, Take 1 tablet (5 mg total) by mouth 3 (three) times daily as needed., Disp: 270 tablet, Rfl: 3    PREVIDENT 5000 DRY MOUTH 1.1 % Gel, , Disp: , Rfl:     RESTASIS 0.05 % ophthalmic emulsion, , Disp: , Rfl:     SYNTHROID 25 mcg tablet, Alternate days of 1 (25mcg) and 1.5 (37.5mcg) tablets (Patient taking differently: On hold at present 7/20), Disp: 125 tablet, Rfl: 3    thyroid, pork, (ARMOUR THYROID) 15 mg Tab, Take 2 tablets (30 mg total) by mouth once daily at 6am., Disp: 60 tablet, Rfl: 2    zolpidem (AMBIEN) 10 mg Tab, TAKE ONE TABLET BY MOUTH AT BEDTIME AS NEEDED., Disp: 30 tablet, Rfl: 1    lorazepam (ATIVAN) 1 MG tablet, Take 1 tablet (1 mg total) by mouth as directed. 1 tab 30 min prior to MRI, may repeat once if needed, Disp: 2 tablet, Rfl: 0    Past Medical History:   Diagnosis Date    Asthma with allergic rhinitis     Bladder spasm     Dense breasts      "heterogeneously/fibrocystic disease    Elevated antinuclear antibody (GUICHO) level     Endometriosis of cervix     Erythromelalgia     Fibromyalgia     Ganglion cyst     left toe    Goiter     MNG    Hashimoto's disease     Hashimoto's thyroiditis     Headache(784.0)     Hypothyroidism     Hashimoto with + Tg ab    Osteoporosis     femoral neck    Raynaud's phenomenon     Rhinitis     Scoliosis     Sleep disorder     type of Narcolepsy ( resolved)    Vitamin D deficiency disease     Vulvodynia      Past Surgical History:   Procedure Laterality Date    BREAST SURGERY      fibrocystic tumor removed     SECTION      2x    CYST REMOVAL      laparoscopic cyst on ovary    HYSTERECTOMY      intradermal cyst       removed from left index finger    SINUS SURGERY      2x     Social History   Substance Use Topics    Smoking status: Never Smoker    Smokeless tobacco: Never Used    Alcohol use No     OB History    Para Term  AB Living   2 2       2   SAB TAB Ectopic Multiple Live Births                  # Outcome Date GA Lbr Jarrod/2nd Weight Sex Delivery Anes PTL Lv   2 Para            1 Para                   BP 98/60   Ht 5' 1.5" (1.562 m)   Wt 48.5 kg (106 lb 13 oz)   LMP  (LMP Unknown)   BMI 19.86 kg/m²     PHYSICAL EXAM:  GENERAL: Calm and appropriate affect, alert, oriented x4  SKIN: Color appropriate for race, warm and dry, clean and intact with no rashes.  RESP: Even, unlabored breathing  ABDOMEN: Soft, nontender, no masses.  :   Normal external female genitalia without lesions. Normal hair distribution. Adequate perineal body, normal urethral meatus.  Vagina pink and well rugated, no lesions, vaginal discharge - minimal, AFFIRM obtained. Pt reports severe pain with insertion of small speculum  No significant cystocele or rectocele.  Cervix -surgically absent  Uterus - surgically absent.  Adnexa: normal adnexa in size, nontender and no masses. Reports discomfort lower " edge of abdomen with exam        ASSESSMENT / PLAN:    ICD-10-CM ICD-9-CM    1. Pelvic pressure in female R10.2 625.8 Vaginosis Screen by DNA Probe     Pelvic pressure in female  -     Vaginosis Screen by DNA Probe      Discussed negative urinalysis, discussed possible etiology of lower abd pressure. Advised AFFIRM today to rule out any vaginitis and follow up with scheduled appointment with Dr. Feliz and Dr. Pichardo        FOLLOW UP:   Pending lab results, PRN lack of improvement.

## 2017-10-24 ENCOUNTER — TELEPHONE (OUTPATIENT)
Dept: PAIN MEDICINE | Facility: CLINIC | Age: 57
End: 2017-10-24

## 2017-10-24 ENCOUNTER — PATIENT MESSAGE (OUTPATIENT)
Dept: PAIN MEDICINE | Facility: OTHER | Age: 57
End: 2017-10-24

## 2017-10-24 NOTE — TELEPHONE ENCOUNTER
Called patient to let her know her procedure needs a pre determination and that could take up to 15 days , so we would need to reschedule.   Patient very unhappy and wants to know why this was not done   When procedure was first scheduled and she is going to contact her insurance to see why a pre-determination is necessary and will call me back in the morning.

## 2017-10-26 ENCOUNTER — HOSPITAL ENCOUNTER (OUTPATIENT)
Facility: OTHER | Age: 57
Discharge: HOME | End: 2017-10-26
Attending: ANESTHESIOLOGY | Admitting: ANESTHESIOLOGY
Payer: COMMERCIAL

## 2017-10-26 ENCOUNTER — SURGERY (OUTPATIENT)
Age: 57
End: 2017-10-26

## 2017-10-26 VITALS
DIASTOLIC BLOOD PRESSURE: 70 MMHG | HEART RATE: 78 BPM | OXYGEN SATURATION: 100 % | WEIGHT: 106 LBS | RESPIRATION RATE: 18 BRPM | SYSTOLIC BLOOD PRESSURE: 115 MMHG | HEIGHT: 61 IN | TEMPERATURE: 98 F | BODY MASS INDEX: 20.01 KG/M2

## 2017-10-26 DIAGNOSIS — G58.8 PUDENDAL NEURALGIA: Primary | ICD-10-CM

## 2017-10-26 PROCEDURE — S0020 INJECTION, BUPIVICAINE HYDRO: HCPCS | Performed by: ANESTHESIOLOGY

## 2017-10-26 PROCEDURE — 63600175 PHARM REV CODE 636 W HCPCS: Performed by: ANESTHESIOLOGY

## 2017-10-26 PROCEDURE — 25000003 PHARM REV CODE 250: Performed by: ANESTHESIOLOGY

## 2017-10-26 PROCEDURE — 64493 INJ PARAVERT F JNT L/S 1 LEV: CPT | Mod: 50 | Performed by: ANESTHESIOLOGY

## 2017-10-26 PROCEDURE — 64450 NJX AA&/STRD OTHER PN/BRANCH: CPT | Mod: ,,, | Performed by: ANESTHESIOLOGY

## 2017-10-26 PROCEDURE — 25500020 PHARM REV CODE 255: Performed by: ANESTHESIOLOGY

## 2017-10-26 RX ORDER — METHYLPREDNISOLONE ACETATE 40 MG/ML
40 INJECTION, SUSPENSION INTRA-ARTICULAR; INTRALESIONAL; INTRAMUSCULAR; SOFT TISSUE ONCE
Status: DISCONTINUED | OUTPATIENT
Start: 2017-10-26 | End: 2017-10-26 | Stop reason: HOSPADM

## 2017-10-26 RX ORDER — LIDOCAINE HYDROCHLORIDE 10 MG/ML
10 INJECTION INFILTRATION; PERINEURAL ONCE
Status: COMPLETED | OUTPATIENT
Start: 2017-10-26 | End: 2017-10-26

## 2017-10-26 RX ORDER — DEXAMETHASONE SODIUM PHOSPHATE 10 MG/ML
INJECTION INTRAMUSCULAR; INTRAVENOUS
Status: DISCONTINUED | OUTPATIENT
Start: 2017-10-26 | End: 2017-10-26 | Stop reason: HOSPADM

## 2017-10-26 RX ORDER — BUPIVACAINE HYDROCHLORIDE 5 MG/ML
3 INJECTION, SOLUTION EPIDURAL; INTRACAUDAL ONCE
Status: COMPLETED | OUTPATIENT
Start: 2017-10-26 | End: 2017-10-26

## 2017-10-26 RX ORDER — SODIUM CHLORIDE 9 MG/ML
INJECTION, SOLUTION INTRAVENOUS CONTINUOUS
Status: DISCONTINUED | OUTPATIENT
Start: 2017-10-26 | End: 2017-10-26 | Stop reason: HOSPADM

## 2017-10-26 RX ADMIN — BUPIVACAINE HYDROCHLORIDE 10 ML: 5 INJECTION, SOLUTION EPIDURAL; INTRACAUDAL; PERINEURAL at 11:10

## 2017-10-26 RX ADMIN — LIDOCAINE HYDROCHLORIDE 10 ML: 10 INJECTION, SOLUTION INFILTRATION; PERINEURAL at 11:10

## 2017-10-26 RX ADMIN — DEXAMETHASONE SODIUM PHOSPHATE 10 MG: 10 INJECTION, SOLUTION INTRAMUSCULAR; INTRAVENOUS at 11:10

## 2017-10-26 RX ADMIN — IOHEXOL 5 ML: 300 INJECTION, SOLUTION INTRAVENOUS at 11:10

## 2017-10-26 NOTE — DISCHARGE INSTRUCTIONS

## 2017-10-26 NOTE — OP NOTE
"Date: 10/26/2017    Procedure: Coccygeal nerve block    Pre-op diagnosis: Coccydynia [M53.3]    Post-op diagnosis: Coccydynia [M53.3]     Physician: Dr. Monique Philip     Assistant: None    Anesthestia: local/oral niravam    All medications, allergies, and relevant histories were reviewed. No recent antibiotics or infections.  A time-out was taken to verify the correct patient, procedure, laterality, and appropriate medications/allergies.    Procedure: Coccyx injection    Coccyx injection    The procedure was discussed with the patient including complications of nerve damage, bleeding, infection, and failure of pain relief.     Patient was transferred to the procedure for the procedure. NIBP, O2 saturation, and EKG were monitored.  The patient was placed prone with blankets under the abdomen. Fluoroscopic view was obtained for localization of the sacrococcygeal ligament in the lateral view. The sacral area was prepped with chlorhexidine  x3, sterile drape placed over the site, and sterile precautions observed throughout the procedure. Local Xylocaine 1% was injected over the coccyx then a 25 gauge 1.5" needle was advanced to the coccyx. Next, the needle was walked off laterally on the left and advanced 1 mm.  Position was confirmed with fluoroscopy.  After negative aspiration, 0.5cc of Omnipaque 300 contrast showed excellent spread. 1.5 cc of a mixture of 2 cc of bupivacaine 0.5% with Depo-Medrol 10 mg was injected without complication.  Then the needle was redirected to the coccyx at midline then walked off laterally on the right side and advanced 1 mm.  Position was confirmed with fluoroscopy.  After negative aspiration, 0.5cc of Omnipaque 300 contrast showed excellent spread. 1.5 cc of a mixture of 2 cc of bupivacaine 0.5% with Depo-Medrol 80 mg was injected without complication.  The needle was then removed and a Band-Aid dressing applied.     Patient tolerated the procedure well without any complications. The " patient was discharged with a responsible adult.    Future Management:   If helpful, can repeat as needed.      I certify that I provided the above services.  I was present for the entire procedure, which was performed by myself with the assistance of the resident physician.  There were no parts of the procedure that were performed not by myself or without my direct supervision.  .

## 2017-10-31 ENCOUNTER — PATIENT MESSAGE (OUTPATIENT)
Dept: INTERNAL MEDICINE | Facility: CLINIC | Age: 57
End: 2017-10-31

## 2017-11-01 ENCOUNTER — PATIENT MESSAGE (OUTPATIENT)
Dept: ENDOCRINOLOGY | Facility: CLINIC | Age: 57
End: 2017-11-01

## 2017-11-02 ENCOUNTER — TELEPHONE (OUTPATIENT)
Dept: INTERNAL MEDICINE | Facility: CLINIC | Age: 57
End: 2017-11-02

## 2017-11-02 ENCOUNTER — PATIENT MESSAGE (OUTPATIENT)
Dept: INTERNAL MEDICINE | Facility: CLINIC | Age: 57
End: 2017-11-02

## 2017-11-02 DIAGNOSIS — E03.9 HYPOTHYROIDISM, UNSPECIFIED TYPE: Primary | ICD-10-CM

## 2017-11-03 ENCOUNTER — OFFICE VISIT (OUTPATIENT)
Dept: ENDOCRINOLOGY | Facility: CLINIC | Age: 57
End: 2017-11-03
Payer: COMMERCIAL

## 2017-11-03 VITALS
BODY MASS INDEX: 20.14 KG/M2 | DIASTOLIC BLOOD PRESSURE: 56 MMHG | HEART RATE: 72 BPM | HEIGHT: 61 IN | WEIGHT: 106.69 LBS | RESPIRATION RATE: 18 BRPM | SYSTOLIC BLOOD PRESSURE: 90 MMHG

## 2017-11-03 DIAGNOSIS — M81.0 OSTEOPOROSIS, POSTMENOPAUSAL: ICD-10-CM

## 2017-11-03 DIAGNOSIS — E03.8 HYPOTHYROIDISM DUE TO HASHIMOTO'S THYROIDITIS: Primary | ICD-10-CM

## 2017-11-03 DIAGNOSIS — E04.2 MULTINODULAR GOITER: ICD-10-CM

## 2017-11-03 DIAGNOSIS — E06.3 HYPOTHYROIDISM DUE TO HASHIMOTO'S THYROIDITIS: Primary | ICD-10-CM

## 2017-11-03 PROCEDURE — 99999 PR PBB SHADOW E&M-EST. PATIENT-LVL III: CPT | Mod: PBBFAC,,, | Performed by: INTERNAL MEDICINE

## 2017-11-03 PROCEDURE — 99214 OFFICE O/P EST MOD 30 MIN: CPT | Mod: S$GLB,,, | Performed by: INTERNAL MEDICINE

## 2017-11-03 RX ORDER — RISEDRONATE SODIUM 35 MG/1
35 TABLET, DELAYED RELEASE ORAL WEEKLY
Qty: 12 TABLET | Refills: 3 | Status: SHIPPED | OUTPATIENT
Start: 2017-11-03 | End: 2018-11-19 | Stop reason: SDUPTHER

## 2017-11-03 RX ORDER — LIOTHYRONINE SODIUM 5 UG/1
TABLET ORAL
Qty: 350 TABLET | Refills: 3 | Status: SHIPPED | OUTPATIENT
Start: 2017-11-03 | End: 2018-04-11 | Stop reason: SDUPTHER

## 2017-11-03 NOTE — Clinical Note
Needs ultrasound of the thyroid when able. She would like me to do it, so I would schedule it on a Wed. Thank you.

## 2017-11-03 NOTE — PROGRESS NOTES
Subjective:      Patient ID: Berenice Salter is a 57 y.o. female.    Chief Complaint:  Hypothyroidism      History of Present Illness  Ms. Salter presents for follow up of hypothyroidism and osteopenia.    With Hashimoto thyroiditis with high thyroglobulin ab and negative TPO ab that is taking Cytomel 3.75 mcg PO QID. Previously had palpitations when Synthroid was added to the Cytomel. No longer having palpitations on the current regimen and last TSH WNL.    Also has MNG with right thyroid nodule s/p benign FNA on 8/27/15 and of left thyroid nodule on 8/18/16 consistent with follicular adenoma.  No dysphagia or hoarseness. Due for repeat thyroid ultrasound now    Has Osteoporosis and she took Atelvia 35 mg weekly since August/2012 until 2015 that she restarted ERT ( vaginal and transdermal). Has had decrease in BMD at the hip despite ERT.     She takes a multivitamin and Vit D 2000 units daily. She feels hot and cold. Occ hot flashes. + Tiredness.     She feels significant fluctuations in the way she feels based on her T3 levels. Has been having ringing in her ears, brain fog, heart palpitations, nerve pain, heat intolerance and fatigue.     Review of Systems   Constitutional: Negative for chills and fever.   Gastrointestinal: Negative for nausea.   Remainder as above in HPI    Objective:   Physical Exam   Nursing note and vitals reviewed.  Thyroid upper limits of normal in size with multiple nodules palpated  DTR's 2 +  No tremor  No edema  RRR  Lungs CTA b/l    Lab Review:   Results for BERENICE SALTER (MRN [id number]) as of 11/3/2017 18:04   Ref. Range 5/19/2017 08:14 7/12/2017 08:10 9/1/2017 07:45 10/2/2017 09:16 10/10/2017 09:25   TSH Latest Ref Range: 0.400 - 4.000 uIU/mL 0.252 (L) 1.293 2.013 0.845    T3, Total Latest Ref Range: 60 - 180 ng/dL 111 64 85 86    T3, Free Latest Ref Range: 2.3 - 4.2 pg/mL   2.5 2.6    Free T4 Latest Ref Range: 0.71 - 1.51 ng/dL 0.58 (L) 0.73 0.52 (L) 0.45 (L)    Thyroxine Bind  Glob Latest Ref Range: 13.5 - 30.9 mcg/mL     11.9 (L)       Thyroid ultrasound dated 12/2016:  The thyroid gland is not enlarged.  The right lobe measures 5.3 x 1.6 x 1.8 cm and the left lobe measures 3.9 with a 1.2 x 1.2 cm.  There is increased vascularity to both lobes.    There is a heterogeneous nodule in the upper pole of the right thyroid lobe measuring 1.6 x 1.3 x 1.0 cm (previously 1.7 x 0.8 x 1.5 cm).  There is a heterogeneous nodule in the lower pole measuring 2.4 x 1.4 x 2.2 cm (previously 3.1 x 1.3 x 2.1 cm).  These nodules meet size criteria for FNA.  Additionally there are 2 nodules measuring approximately 1 cm not meeting size criteria for FNA.    There is a heterogeneous nodule in the upper pole of the left thyroid lobe measuring 1.4 x 0.8 x 0.8 cm (previously 1.2 x 1.0 x 1.0 cm), and there is a 6 mm nodule in the lower pole of the left thyroid lobe.  These nodules do not meet size criteria for FNA.    There is no evidence of abnormal lymph nodes.    Bone Density 2017:  BONE MINERAL DENSITY RESULTS:  Lumbar Spine: Lumbar bone mineral density L1-L4 is 0.999g/cm2, which is a t-score of -0.4. The z-score is 0.7.    Total Hip: The total hip bone mineral density is 0.706g/cm2.  The t-score is -1.9, and the z-score is -1.2.  Femoral neck BMD is 0.549g/cm2 and the t-score is -2.7.    COMPARISONS:  Date Location BMD T-score  07/21/15 L-spine 0.967 -0.7  Total Hip 0.704 -2.0   Impression       Osteoporosis of the femoral neck. There is a significant increase in BMD at the lumbar spine when compared to previous study (3.2%).     RECOMMENDATIONS:  1) Adequate calcium and Vitamin D therapy  2) Appropriate exercise  3) Consider medical therapy including bisphosphonates, or denosumab.  4) Consider repeat BMD in 2 years.       Assessment:     1. Hypothyroidism due to Hashimoto's thyroiditis    2. Multinodular goiter    3. Osteoporosis, postmenopausal      Plan:        1- Hashimoto hypothyroidism   --Explained  goals of treatment is to keep the TSH in the normal range to avoid CVS and bone complications  --Will continue Cytomel 3.75 mcg QID for now pending upcoming labs    2- MNG with dominant nodule in right lobe with benign FNA done on 8/27/15 and benign FNA of left thyroid nodule on 8/18/16 that is consistent with a benign follicular adenoma  --Will repeat the thyroid ultrasound now    3- Osteoporosis of the femoral neck by last BMD in 2017  --On ERT  --Will restart Actonel 35 mg weekly    RTC in 6 months    Leandro Espinoza M.D. Staff Endocrinology

## 2017-11-04 ENCOUNTER — LAB VISIT (OUTPATIENT)
Dept: LAB | Facility: HOSPITAL | Age: 57
End: 2017-11-04
Attending: INTERNAL MEDICINE
Payer: COMMERCIAL

## 2017-11-04 DIAGNOSIS — E03.9 HYPOTHYROIDISM, UNSPECIFIED TYPE: ICD-10-CM

## 2017-11-04 LAB
ALBUMIN SERPL BCP-MCNC: 3.9 G/DL
ALP SERPL-CCNC: 41 U/L
ALT SERPL W/O P-5'-P-CCNC: 21 U/L
ANION GAP SERPL CALC-SCNC: 8 MMOL/L
AST SERPL-CCNC: 18 U/L
BASOPHILS # BLD AUTO: 0.04 K/UL
BASOPHILS NFR BLD: 0.5 %
BILIRUB SERPL-MCNC: 0.4 MG/DL
BUN SERPL-MCNC: 14 MG/DL
CALCIUM SERPL-MCNC: 9.6 MG/DL
CHLORIDE SERPL-SCNC: 101 MMOL/L
CO2 SERPL-SCNC: 30 MMOL/L
CREAT SERPL-MCNC: 0.7 MG/DL
DIFFERENTIAL METHOD: ABNORMAL
EOSINOPHIL # BLD AUTO: 0.2 K/UL
EOSINOPHIL NFR BLD: 3.1 %
ERYTHROCYTE [DISTWIDTH] IN BLOOD BY AUTOMATED COUNT: 12.5 %
EST. GFR  (AFRICAN AMERICAN): >60 ML/MIN/1.73 M^2
EST. GFR  (NON AFRICAN AMERICAN): >60 ML/MIN/1.73 M^2
GLUCOSE SERPL-MCNC: 93 MG/DL
HCT VFR BLD AUTO: 41.5 %
HGB BLD-MCNC: 13.9 G/DL
LYMPHOCYTES # BLD AUTO: 1.1 K/UL
LYMPHOCYTES NFR BLD: 14.6 %
MCH RBC QN AUTO: 30 PG
MCHC RBC AUTO-ENTMCNC: 33.5 G/DL
MCV RBC AUTO: 89 FL
MONOCYTES # BLD AUTO: 0.6 K/UL
MONOCYTES NFR BLD: 7.7 %
NEUTROPHILS # BLD AUTO: 5.7 K/UL
NEUTROPHILS NFR BLD: 73.8 %
PLATELET # BLD AUTO: 209 K/UL
PMV BLD AUTO: 8.9 FL
POTASSIUM SERPL-SCNC: 5 MMOL/L
PROT SERPL-MCNC: 6.8 G/DL
RBC # BLD AUTO: 4.64 M/UL
SODIUM SERPL-SCNC: 139 MMOL/L
T3 SERPL-MCNC: 106 NG/DL
T3FREE SERPL-MCNC: 2.4 PG/ML
T4 FREE SERPL-MCNC: 0.57 NG/DL
TSH SERPL DL<=0.005 MIU/L-ACNC: 0.72 UIU/ML
WBC # BLD AUTO: 7.75 K/UL

## 2017-11-04 PROCEDURE — 85025 COMPLETE CBC W/AUTO DIFF WBC: CPT

## 2017-11-04 PROCEDURE — 36415 COLL VENOUS BLD VENIPUNCTURE: CPT

## 2017-11-04 PROCEDURE — 84480 ASSAY TRIIODOTHYRONINE (T3): CPT

## 2017-11-04 PROCEDURE — 84439 ASSAY OF FREE THYROXINE: CPT

## 2017-11-04 PROCEDURE — 84481 FREE ASSAY (FT-3): CPT

## 2017-11-04 PROCEDURE — 80053 COMPREHEN METABOLIC PANEL: CPT

## 2017-11-04 PROCEDURE — 84443 ASSAY THYROID STIM HORMONE: CPT

## 2017-11-06 ENCOUNTER — TELEPHONE (OUTPATIENT)
Dept: ENDOCRINOLOGY | Facility: CLINIC | Age: 57
End: 2017-11-06

## 2017-11-06 NOTE — TELEPHONE ENCOUNTER
Ultrasound of the Thyroid scheduled for 12/6/2017 at 8:15am.  Appointment reminder letter mailed to patient's address on file.

## 2017-11-08 ENCOUNTER — PATIENT MESSAGE (OUTPATIENT)
Dept: ENDOCRINOLOGY | Facility: CLINIC | Age: 57
End: 2017-11-08

## 2017-11-09 ENCOUNTER — OFFICE VISIT (OUTPATIENT)
Dept: INTERNAL MEDICINE | Facility: CLINIC | Age: 57
End: 2017-11-09
Payer: COMMERCIAL

## 2017-11-09 ENCOUNTER — DOCUMENTATION ONLY (OUTPATIENT)
Dept: INTERNAL MEDICINE | Facility: CLINIC | Age: 57
End: 2017-11-09

## 2017-11-09 VITALS
BODY MASS INDEX: 20.11 KG/M2 | OXYGEN SATURATION: 97 % | HEIGHT: 61 IN | DIASTOLIC BLOOD PRESSURE: 78 MMHG | SYSTOLIC BLOOD PRESSURE: 118 MMHG | HEART RATE: 66 BPM | WEIGHT: 106.5 LBS

## 2017-11-09 DIAGNOSIS — Z00.00 PHYSICAL EXAM: Primary | ICD-10-CM

## 2017-11-09 DIAGNOSIS — M81.0 OSTEOPOROSIS, POSTMENOPAUSAL: ICD-10-CM

## 2017-11-09 DIAGNOSIS — E06.3 HYPOTHYROIDISM DUE TO HASHIMOTO'S THYROIDITIS: ICD-10-CM

## 2017-11-09 DIAGNOSIS — Z12.31 ENCOUNTER FOR SCREENING MAMMOGRAM FOR BREAST CANCER: ICD-10-CM

## 2017-11-09 DIAGNOSIS — E03.8 HYPOTHYROIDISM DUE TO HASHIMOTO'S THYROIDITIS: ICD-10-CM

## 2017-11-09 DIAGNOSIS — Z12.11 COLON CANCER SCREENING: ICD-10-CM

## 2017-11-09 PROCEDURE — 99396 PREV VISIT EST AGE 40-64: CPT | Mod: S$GLB,,, | Performed by: INTERNAL MEDICINE

## 2017-11-09 PROCEDURE — 99999 PR PBB SHADOW E&M-EST. PATIENT-LVL III: CPT | Mod: PBBFAC,,, | Performed by: INTERNAL MEDICINE

## 2017-11-09 RX ORDER — ZOLPIDEM TARTRATE 10 MG/1
TABLET ORAL
Qty: 30 TABLET | Refills: 2 | Status: SHIPPED | OUTPATIENT
Start: 2017-11-09 | End: 2018-02-09 | Stop reason: SDUPTHER

## 2017-11-09 NOTE — PROGRESS NOTES
Subjective:      Patient ID: Berenice Hayes is a 57 y.o. female.    Chief Complaint: Annual Exam    HPI:  HPI   Patient is here for her annual exam:    Hypothyroidism: Patient is seeing Dr. Espinoza in Endocrine.   Goal: TSH should not go below 0.4  Goal: Free T4 (no adjustment)  Free T3: goal for patient to slowly increase to 3.0 slowly by adjust cytomel.    Current treatment:  Cytomel 5 mg is the pill she works with ( divides a pill in 4 1.25 and will add 1.25 an extra 2 does a week)    3.75 micrograms four times a day  Scheduled an ultrasound Dec 6, 2017: if no problems likely in 6 months  It takes about 6 weeks for the tsh to catch up with treatment change    Went to the ENT: Dr. Kip Martin: 2 shots and antibiotics      Annual exam: 11/9/2017  Colonoscopy 8/6/2012 repeat in 10 years  Mammogram: schedule  Gyn: Nov 14  Optho: 2017  Flu: done  Tetanus:discussed  Shingles:done 12/17/2015  Pneumovax 9/30/2014      Patient Active Problem List   Diagnosis    Fibromyalgia    Bladder spasm    Osteoporosis, postmenopausal    Fibrocystic breast changes    Vulvar vestibulodynia    Vulvar pain    Vascular disorder: Erytnromelalgia    Raynaud's disease    Incomplete defecation    Straining during bowel movements    Chronic constipation    Rectocele    Disorder of muscle    Anxiety    Bilateral hand pain    Fatigue    Perimenopause vs Memopause    Menopause syndrome    Pudendal neuralgia    Pelvic pain in female    Mittetammy    Urinary hesitancy    Hypothyroidism due to Hashimoto's thyroiditis    Multinodular goiter    Encounter for herb and vitamin supplement management    Gluten free diet    Sicca    Family history of ischemic heart disease (IHD)     Past Medical History:   Diagnosis Date    Asthma with allergic rhinitis     Bladder spasm     Dense breasts     heterogeneously/fibrocystic disease    Elevated antinuclear antibody (GUICHO) level     Endometriosis of cervix      "Erythromelalgia     Fibromyalgia     Ganglion cyst     left toe    Goiter     MNG    Hashimoto's disease     Hashimoto's thyroiditis     Headache(784.0)     Hypothyroidism     Hashimoto with + Tg ab    Osteoporosis     femoral neck    Raynaud's phenomenon     Rhinitis     Scoliosis     Sleep disorder     type of Narcolepsy ( resolved)    Vitamin D deficiency disease     Vulvodynia      Past Surgical History:   Procedure Laterality Date    BREAST SURGERY      fibrocystic tumor removed     SECTION      2x    CYST REMOVAL      laparoscopic cyst on ovary    HYSTERECTOMY      intradermal cyst       removed from left index finger    SINUS SURGERY      2x     Family History   Problem Relation Age of Onset    Diabetes Mother     Fibromyalgia Mother     Polymyalgia rheumatica Mother     Macular degeneration Mother     Arthritis Mother     Hypertension Mother     Kidney disease Mother     Pneumonia Mother     Adrenal disorder Mother      adrenal insufficiency    Coronary artery disease Father     Arthritis Father      osteoarthritis    Hypertension Father     Heart disease Father     Diabetes Father     Hyperlipidemia Father     Hyperlipidemia Brother     Cancer Brother      oral    Thyroid cancer Daughter     Cancer Daughter      thyroid    Hypothyroidism Daughter     Breast cancer Neg Hx     Ovarian cancer Neg Hx     Colon cancer Neg Hx      Review of Systems   Constitutional: Negative for chills, fever and unexpected weight change.   HENT: Negative for trouble swallowing.    Respiratory: Negative for cough, shortness of breath and wheezing.    Cardiovascular: Negative for chest pain and palpitations.   Gastrointestinal: Negative for abdominal distention, abdominal pain, blood in stool and vomiting.   Musculoskeletal: Negative for back pain.     Objective:     Vitals:    17 0842   BP: 118/78   Pulse: 66   SpO2: 97%   Weight: 48.3 kg (106 lb 7.7 oz)   Height: 5' 1" " (1.549 m)   PainSc:   4   PainLoc: Foot     Body mass index is 20.12 kg/m².  Physical Exam   Constitutional: She is oriented to person, place, and time. She appears well-developed and well-nourished. No distress.   Neck: Carotid bruit is not present. No thyromegaly present.   Cardiovascular: Normal rate, regular rhythm and normal heart sounds.  PMI is not displaced.    Pulmonary/Chest: Effort normal and breath sounds normal. No respiratory distress.   Abdominal: Soft. Bowel sounds are normal. She exhibits no distension. There is no tenderness.   Musculoskeletal: She exhibits no edema.   Neurological: She is alert and oriented to person, place, and time.     Assessment:     1. Physical exam    2. Colon cancer screening    3. Encounter for screening mammogram for breast cancer    4. Hypothyroidism due to Hashimoto's thyroiditis    5. Osteoporosis, postmenopausal      Plan:   Berenice was seen today for annual exam.    Diagnoses and all orders for this visit:    Physical exam : updated and reviewed    Colon cancer screening  -     Fecal Immunochemical Test (iFOBT); Future    Encounter for screening mammogram for breast cancer  -     Mammo Digital Screening Bilat with Tomosynthesis CAD; Future    Hypothyroidism due to Hashimoto's thyroiditis: discussed Endocrine consult and after ultrasound will decide on interval of labs    Osteoporosis, postmenopausal      Return in about 6 weeks (around 12/19/2017) for Follow up.     Medication List          Accurate as of 11/9/17 11:59 PM. If you have any questions, ask your nurse or doctor.               CHANGE how you take these medications    * amitriptyline 50 MG tablet  Commonly known as:  ELAVIL  Take one tablet by mouth nightly. Take with the 10 mg amitriptyline  What changed:  · how much to take  · additional instructions  Changed by:  Estella Serrano MD     * amitriptyline 10 MG tablet  Commonly known as:  ELAVIL  Take 1 tablet (10 mg total) by mouth once daily.  What changed:   Another medication with the same name was changed. Make sure you understand how and when to take each.  Changed by:  Estella Serrano MD     liothyronine 5 MCG Tab  Commonly known as:  CYTOMEL  Alternate days 3 (15mcg) and 3.5 (17.5) tablets  What changed:  additional instructions     zolpidem 10 mg Tab  Commonly known as:  AMBIEN  TAKE ONE TABLET BY MOUTH AT BEDTIME AS NEEDED.  What changed:  See the new instructions.  Changed by:  Estella Serrano MD        * This list has 2 medication(s) that are the same as other medications prescribed for you. Read the directions carefully, and ask your doctor or other care provider to review them with you.            CONTINUE taking these medications    aspirin 81 MG EC tablet  Commonly known as:  ECOTRIN     DICLOFENAC SODIUM TOP     * estradiol 0.05 mg/24 hr td ptsw 0.05 mg/24 hr  Commonly known as:  VIVELLE-DOT  Place 1 patch onto the skin twice a week.     * estradiol 10 mcg Tab  Commonly known as:  VAGIFEM  Place 1 tablet (10 mcg total) vaginally twice a week.     fluticasone 50 mcg/actuation nasal spray  Commonly known as:  FLONASE  1 spray by Each Nare route once daily.     gabapentin 100 MG capsule  Commonly known as:  NEURONTIN  One in the am, one at noon , one at 5 pm and four at bedtime     meloxicam 7.5 MG tablet  Commonly known as:  MOBIC  TAKE 1 TABLET (7.5 MG TOTAL) BY MOUTH ONCE DAILY. FOR INFLAMMATION     metaxalone 800 MG tablet  Commonly known as:  SKELAXIN  TAKE 1 TABLET EVERY EVENING (SPASM)     MUCINEX 600 mg 12 hr tablet  Generic drug:  guaiFENesin     multivitamin capsule     pilocarpine 5 MG Tab  Commonly known as:  SALAGEN  Take 1 tablet (5 mg total) by mouth 3 (three) times daily as needed.     PREVIDENT 5000 DRY MOUTH 1.1 % Gel  Generic drug:  fluoride (sodium)     PROAIR HFA 90 mcg/actuation inhaler  Generic drug:  albuterol     RESTASIS 0.05 % ophthalmic emulsion  Generic drug:  cycloSPORINE     risedronate 35 mg Tbec  Take 1 tablet (35 mg total)  by mouth once a week.     VITAMIN D3 1,000 unit capsule  Generic drug:  cholecalciferol (vitamin D3)        * This list has 2 medication(s) that are the same as other medications prescribed for you. Read the directions carefully, and ask your doctor or other care provider to review them with you.            STOP taking these medications    FLUZONE QUAD [id number] (PF) 60 mcg (15 mcg x 4)/0.5 mL Syrg  Generic drug:  flu vac [id number] 36mos up(PF)  Stopped by:  Estella Serrano MD     SYNTHROID 25 MCG tablet  Generic drug:  levothyroxine  Stopped by:  Estella Serrano MD           Where to Get Your Medications      These medications were sent to [id number] - [place], [place] - [address] [facility]  [address], [place] [address]    Phone:  [phone]   · zolpidem 10 mg Tab

## 2017-11-11 ENCOUNTER — OFFICE VISIT (OUTPATIENT)
Dept: URGENT CARE | Facility: CLINIC | Age: 57
End: 2017-11-11
Payer: COMMERCIAL

## 2017-11-11 ENCOUNTER — NURSE TRIAGE (OUTPATIENT)
Dept: ADMINISTRATIVE | Facility: CLINIC | Age: 57
End: 2017-11-11

## 2017-11-11 VITALS
HEART RATE: 91 BPM | DIASTOLIC BLOOD PRESSURE: 86 MMHG | WEIGHT: 105 LBS | OXYGEN SATURATION: 96 % | RESPIRATION RATE: 18 BRPM | TEMPERATURE: 98 F | BODY MASS INDEX: 19.83 KG/M2 | HEIGHT: 61 IN | SYSTOLIC BLOOD PRESSURE: 96 MMHG

## 2017-11-11 DIAGNOSIS — R52 BODY ACHES: ICD-10-CM

## 2017-11-11 DIAGNOSIS — H10.9 CONJUNCTIVITIS OF BOTH EYES, UNSPECIFIED CONJUNCTIVITIS TYPE: Primary | ICD-10-CM

## 2017-11-11 DIAGNOSIS — J01.00 ACUTE NON-RECURRENT MAXILLARY SINUSITIS: ICD-10-CM

## 2017-11-11 LAB
CTP QC/QA: YES
FLUAV AG NPH QL: NEGATIVE
FLUBV AG NPH QL: NEGATIVE

## 2017-11-11 PROCEDURE — 99214 OFFICE O/P EST MOD 30 MIN: CPT | Mod: S$GLB,,, | Performed by: SURGERY

## 2017-11-11 PROCEDURE — 87804 INFLUENZA ASSAY W/OPTIC: CPT | Mod: 59,QW,S$GLB, | Performed by: SURGERY

## 2017-11-11 RX ORDER — GENTAMICIN SULFATE 3 MG/ML
1 SOLUTION/ DROPS OPHTHALMIC 3 TIMES DAILY
Qty: 5 ML | Refills: 0 | Status: SHIPPED | OUTPATIENT
Start: 2017-11-11 | End: 2017-11-18

## 2017-11-11 RX ORDER — FLUTICASONE PROPIONATE 50 MCG
2 SPRAY, SUSPENSION (ML) NASAL DAILY
Qty: 1 BOTTLE | Refills: 2 | Status: SHIPPED | OUTPATIENT
Start: 2017-11-11 | End: 2017-12-11

## 2017-11-11 NOTE — TELEPHONE ENCOUNTER
"  Reason for Disposition   MODERATE eye pain (e.g., interferes with normal activities)    Answer Assessment - Initial Assessment Questions  1. EYE DISCHARGE: "Is the discharge in one or both eyes?" "What color is it?" "How much is there?" "When did the discharge start?"       Mucus milky   2. REDNESS OF SCLERA: "Is the redness in one or both eyes?" "When did the redness start?"       Yes   3. EYELIDS: "Are the eyelids red or swollen?" If so, ask: "How much?"       Left eye   4. VISION: "Is there any difficulty seeing clearly?"       Left glassy   5. PAIN: "Is there any pain? If so, ask: "How bad is it?" (Scale 1-10; or mild, moderate, severe)     - MILD (1-3): doesn't interfere with normal activities      - MODERATE (4-7): interferes with normal activities or awakens from sleep     - SEVERE (8-10): excruciating pain, unable to do any normal activities        Left eye pain irritated-moderate    6. CONTACT LENS: "Do you wear contacts?"      No   7. OTHER SYMPTOMS: "Do you have any other symptoms?" (e.g., fever, runny nose, cough)      Sore throat left side and runny nose   8. PREGNANCY: "Is there any chance you are pregnant?       N/A    Protocols used: ST EYE - PUS OR [id number]    "

## 2017-11-11 NOTE — PROGRESS NOTES
"Subjective:       Patient ID: Berenice Hayes is a 57 y.o. female.    Vitals:  height is 5' 1" (1.549 m) and weight is 47.6 kg (105 lb). Her oral temperature is 98 °F (36.7 °C). Her blood pressure is 96/86 and her pulse is 91. Her respiration is 18 and oxygen saturation is 96%.     Chief Complaint: Eye Problem    This is a 57 y.o. female with Past Medical History:  No date: Asthma with allergic rhinitis  No date: Bladder spasm  No date: Dense breasts      Comment: heterogeneously/fibrocystic disease  No date: Elevated antinuclear antibody (GUICHO) level  No date: Endometriosis of cervix  No date: Erythromelalgia  No date: Fibromyalgia  No date: Ganglion cyst      Comment: left toe  No date: Goiter      Comment: MNG  No date: Hashimoto's disease  No date: Hashimoto's thyroiditis  No date: Headache(784.0)  No date: Hypothyroidism      Comment: Hashimoto with + Tg ab  No date: Osteoporosis      Comment: femoral neck  No date: Raynaud's phenomenon  No date: Rhinitis  No date: Scoliosis  No date: Sleep disorder      Comment: type of Narcolepsy ( resolved)  No date: Vitamin D deficiency disease  No date: Vulvodynia   who presents today with a chief complaint of left and right eye redness. She is being treated for sinusitis and feels worsening left maxilla pressure, persistent, purulent mucous drainage, productive cough, body aches, takes care of 88 yo and want s to be sure she doesn't have flu.She is taking cefdinir and bromfed dm.She reports having 2 shots. This was started about 5 days ago. No daytime decongestant or nasal spray. No fever      Eye Problem    Both eyes are affected.This is a new problem. The current episode started today. The problem occurs constantly. The problem has been gradually worsening. There was no injury mechanism. The pain is at a severity of 4/10. The pain is mild. There is no known exposure to pink eye. She does not wear contacts. Associated symptoms include blurred vision and eye redness. " Pertinent negatives include no fever, nausea, photophobia or vomiting. She has tried nothing for the symptoms.     Review of Systems   Constitution: Negative for chills and fever.   HENT: Positive for congestion and sore throat.    Eyes: Positive for blurred vision, pain and redness. Negative for photophobia.   Respiratory: Positive for cough.         Running nose on thurs/Fri.   Gastrointestinal: Negative for nausea and vomiting.   Neurological: Positive for headaches.       Objective:      Physical Exam   Constitutional: She is oriented to person, place, and time. She appears well-developed and well-nourished. She is cooperative.  Non-toxic appearance. She does not appear ill. No distress.   HENT:   Head: Normocephalic and atraumatic.   Right Ear: Hearing, tympanic membrane, external ear and ear canal normal.   Left Ear: Hearing, tympanic membrane, external ear and ear canal normal.   Nose: Mucosal edema and rhinorrhea present. No nasal deformity. No epistaxis. Right sinus exhibits maxillary sinus tenderness. Right sinus exhibits no frontal sinus tenderness. Left sinus exhibits maxillary sinus tenderness. Left sinus exhibits no frontal sinus tenderness.   Mouth/Throat: Uvula is midline, oropharynx is clear and moist and mucous membranes are normal. No trismus in the jaw. Normal dentition. No uvula swelling. No posterior oropharyngeal erythema.   Purulent nasal discharge   Eyes: Lids are normal. Right conjunctiva is injected. Left conjunctiva is injected. No scleral icterus.   Sclera injected bilat   Neck: Trachea normal, full passive range of motion without pain and phonation normal. Neck supple.   Cardiovascular: Normal rate, regular rhythm, normal heart sounds, intact distal pulses and normal pulses.    Pulmonary/Chest: Effort normal and breath sounds normal. No respiratory distress.   Abdominal: Soft. Normal appearance and bowel sounds are normal. She exhibits no distension. There is no tenderness.    Musculoskeletal: Normal range of motion. She exhibits no edema or deformity.   Neurological: She is alert and oriented to person, place, and time. She exhibits normal muscle tone. Coordination normal.   Skin: Skin is warm, dry and intact. She is not diaphoretic. No pallor.   Psychiatric: She has a normal mood and affect. Her speech is normal and behavior is normal. Judgment and thought content normal. Cognition and memory are normal.   Nursing note and vitals reviewed.      Assessment:       1. Conjunctivitis of both eyes, unspecified conjunctivitis type    2. Body aches    3. Acute non-recurrent maxillary sinusitis        Plan:         Conjunctivitis of both eyes, unspecified conjunctivitis type  -     gentamicin (GARAMYCIN) 0.3 % ophthalmic solution; Place 1 drop into both eyes 3 (three) times daily.  Dispense: 5 mL; Refill: 0    Body aches  -     POCT Influenza A/B    Acute non-recurrent maxillary sinusitis  -     loratadine-pseudoephedrine 5-120 mg (CLARITIN-D 12-HOUR) 5-120 mg per tablet; Take 1 tablet by mouth 2 (two) times daily as needed for Allergies.  Dispense: 28 tablet; Refill: 0  -     fluticasone (FLONASE) 50 mcg/actuation nasal spray; 2 sprays by Each Nare route once daily.  Dispense: 1 Bottle; Refill: 2          Patient Instructions     Conjunctivitis, Bacterial    You have an infection in the membranes covering the white part of the eye. This part of the eye is called the conjunctiva. The infection is called conjunctivitis. The most common symptoms of conjunctivitis include a thick, pus-like discharge from the eye, swollen eyelids, redness, eyelids sticking together upon awakening, and a gritty or scratchy feeling in the eye. Your infection was caused by bacteria. It may be treated with medicine. With treatment, the infection takes about 7 to 10 days to resolve.  Home care  · Use prescribed antibiotic eye drops or ointment as directed to treat the infection.  · Apply a warm compress (towel soaked  in warm water) to the affected eye 3 to 4 times a day. Do this just before applying medicine to the eye.  · Use a warm, wet cloth to wipe away crusting of the eyelids in the morning. This is caused by mucus drainage during the night. You may also use saline irrigating solution or artificial tears to rinse away mucus in the eye. Do not put a patch over the eye.  · Wash your hands before and after touching the infected eye. This is to prevent spreading the infection to the other eye, and to other people. Do not share your towels or washcloths with others.  · You may use acetaminophen or ibuprofen to control pain, unless another medicine was prescribed. (Note: If you have chronic liver or kidney disease or have ever had a stomach ulcer or gastrointestinal bleeding, talk with your doctor before using these medicines.)  · Do not wear contact lenses until your eyes have healed and all symptoms are gone.  Follow-up care  Follow up with your healthcare provider, or as advised.  When to seek medical advice  Call your healthcare provider right away if any of these occur:  · Worsening vision  · Increasing pain in the eye  · Increasing swelling or redness of the eyelid  · Redness spreading around the eye  Date Last Reviewed: 6/14/2015  © 5655-1094 [employer]. [address] [place] [address], [place] [address]. All rights reserved. This information is not intended as a substitute for professional medical care. Always follow your healthcare professional's instructions.

## 2017-11-11 NOTE — PATIENT INSTRUCTIONS
Conjunctivitis, Bacterial    You have an infection in the membranes covering the white part of the eye. This part of the eye is called the conjunctiva. The infection is called conjunctivitis. The most common symptoms of conjunctivitis include a thick, pus-like discharge from the eye, swollen eyelids, redness, eyelids sticking together upon awakening, and a gritty or scratchy feeling in the eye. Your infection was caused by bacteria. It may be treated with medicine. With treatment, the infection takes about 7 to 10 days to resolve.  Home care  · Use prescribed antibiotic eye drops or ointment as directed to treat the infection.  · Apply a warm compress (towel soaked in warm water) to the affected eye 3 to 4 times a day. Do this just before applying medicine to the eye.  · Use a warm, wet cloth to wipe away crusting of the eyelids in the morning. This is caused by mucus drainage during the night. You may also use saline irrigating solution or artificial tears to rinse away mucus in the eye. Do not put a patch over the eye.  · Wash your hands before and after touching the infected eye. This is to prevent spreading the infection to the other eye, and to other people. Do not share your towels or washcloths with others.  · You may use acetaminophen or ibuprofen to control pain, unless another medicine was prescribed. (Note: If you have chronic liver or kidney disease or have ever had a stomach ulcer or gastrointestinal bleeding, talk with your doctor before using these medicines.)  · Do not wear contact lenses until your eyes have healed and all symptoms are gone.  Follow-up care  Follow up with your healthcare provider, or as advised.  When to seek medical advice  Call your healthcare provider right away if any of these occur:  · Worsening vision  · Increasing pain in the eye  · Increasing swelling or redness of the eyelid  · Redness spreading around the eye  Date Last Reviewed: 6/14/2015  © 0720-6772 [employer] [address] [place] [address], [place] [address]. All rights reserved. This information is not intended as a substitute for professional medical care. Always follow your healthcare professional's instructions.

## 2017-11-13 ENCOUNTER — PATIENT MESSAGE (OUTPATIENT)
Dept: PAIN MEDICINE | Facility: CLINIC | Age: 57
End: 2017-11-13

## 2017-11-14 ENCOUNTER — OFFICE VISIT (OUTPATIENT)
Dept: OBSTETRICS AND GYNECOLOGY | Facility: CLINIC | Age: 57
End: 2017-11-14
Attending: OBSTETRICS & GYNECOLOGY
Payer: COMMERCIAL

## 2017-11-14 VITALS
DIASTOLIC BLOOD PRESSURE: 62 MMHG | SYSTOLIC BLOOD PRESSURE: 106 MMHG | WEIGHT: 104.5 LBS | HEIGHT: 61 IN | BODY MASS INDEX: 19.73 KG/M2

## 2017-11-14 DIAGNOSIS — R10.2 PELVIC PAIN IN FEMALE: Primary | ICD-10-CM

## 2017-11-14 DIAGNOSIS — Z01.419 WELL WOMAN EXAM WITH ROUTINE GYNECOLOGICAL EXAM: ICD-10-CM

## 2017-11-14 DIAGNOSIS — N95.2 ATROPHIC VAGINITIS: ICD-10-CM

## 2017-11-14 PROCEDURE — 99396 PREV VISIT EST AGE 40-64: CPT | Mod: S$GLB,,, | Performed by: OBSTETRICS & GYNECOLOGY

## 2017-11-14 RX ORDER — ESTRADIOL 0.05 MG/D
1 FILM, EXTENDED RELEASE TRANSDERMAL
Qty: 24 PATCH | Refills: 3 | Status: SHIPPED | OUTPATIENT
Start: 2017-11-16 | End: 2018-02-22 | Stop reason: SDUPTHER

## 2017-11-14 NOTE — PROGRESS NOTES
Subjective:       Patient ID: Berenice Hayes is a 57 y.o. female.    Chief Complaint:  No chief complaint on file.      History of Present Illness  HPI  This 57 yr old P2 female is here for routine exam.  She had pap in  and mammogram is scheduled.   She is on vivelle 0.075 and femhrt.  She is also on synthroid and something like armour thyroid for T3.  Recently had pressure and issue with bladder emptying and pain in pelvis for several days.  She has ovaries. She also has more incont with cough and sneeze and is seeing Dr Dodd in Dec    GYN & OB History  No LMP recorded (lmp unknown). Patient has had a hysterectomy.   Date of Last Pap: 2015    OB History    Para Term  AB Living   2 2       2   SAB TAB Ectopic Multiple Live Births                  # Outcome Date GA Lbr Jarrod/2nd Weight Sex Delivery Anes PTL Lv   2 Para            1 Para                   Review of Systems  Review of Systems   Constitutional: Negative for chills and fever.   Respiratory: Negative for shortness of breath.    Cardiovascular: Negative for chest pain.   Gastrointestinal: Negative for abdominal pain, nausea and vomiting.   Endocrine: Positive for heat intolerance.   Genitourinary: Positive for vaginal pain. Negative for difficulty urinating, dyspareunia, genital sores, menstrual problem, pelvic pain, vaginal bleeding and vaginal discharge.   Skin: Negative for wound.   Hematological: Negative for adenopathy.           Objective:    Physical Exam:   Constitutional: She is oriented to person, place, and time. She appears well-developed and well-nourished.    HENT:   Head: Normocephalic.    Eyes: EOM are normal.    Neck: Normal range of motion.    Cardiovascular: Normal rate.     Pulmonary/Chest: Effort normal. She exhibits no mass and no tenderness. Right breast exhibits no inverted nipple, no mass, no skin change and no tenderness. Left breast exhibits no inverted nipple, no mass, no skin change and no  tenderness.        Abdominal: Soft. She exhibits no distension. There is no tenderness.     Genitourinary: Vagina normal. There is no rash, tenderness or lesion on the right labia. There is no rash, tenderness or lesion on the left labia. Uterus is absent. Uterus is not tender. Cervix is normal. Right adnexum displays no mass, no tenderness and no fullness. Left adnexum displays no mass, no tenderness and no fullness. Cervix exhibits no discharge.           Musculoskeletal: Normal range of motion.       Neurological: She is alert and oriented to person, place, and time.    Skin: Skin is warm and dry.    Psychiatric: She has a normal mood and affect.          Assessment:        1. Pelvic pain in female    2. Atrophic vaginitis    3. Well woman exam with routine gynecological exam               Plan:       schedule ultrasound   contnue estrogen  Follow up with Dr Dodd

## 2017-11-15 ENCOUNTER — PATIENT MESSAGE (OUTPATIENT)
Dept: INTERNAL MEDICINE | Facility: CLINIC | Age: 57
End: 2017-11-15

## 2017-11-15 RX ORDER — FLUCONAZOLE 150 MG/1
150 TABLET ORAL WEEKLY
Qty: 2 TABLET | Refills: 0 | Status: SHIPPED | OUTPATIENT
Start: 2017-11-15 | End: 2017-11-16

## 2017-11-21 ENCOUNTER — HOSPITAL ENCOUNTER (OUTPATIENT)
Dept: RADIOLOGY | Facility: HOSPITAL | Age: 57
Discharge: HOME | End: 2017-11-21
Attending: INTERNAL MEDICINE
Payer: COMMERCIAL

## 2017-11-21 VITALS — WEIGHT: 104 LBS | BODY MASS INDEX: 19.63 KG/M2 | HEIGHT: 61 IN

## 2017-11-21 DIAGNOSIS — Z12.31 ENCOUNTER FOR SCREENING MAMMOGRAM FOR BREAST CANCER: ICD-10-CM

## 2017-11-21 PROCEDURE — 77067 SCR MAMMO BI INCL CAD: CPT | Mod: 26,,, | Performed by: RADIOLOGY

## 2017-11-21 PROCEDURE — 77067 SCR MAMMO BI INCL CAD: CPT | Mod: TC

## 2017-11-21 PROCEDURE — 77063 BREAST TOMOSYNTHESIS BI: CPT | Mod: 26,,, | Performed by: RADIOLOGY

## 2017-12-06 ENCOUNTER — HOSPITAL ENCOUNTER (OUTPATIENT)
Dept: ENDOCRINOLOGY | Facility: CLINIC | Age: 57
Discharge: HOME | End: 2017-12-06
Attending: INTERNAL MEDICINE
Payer: COMMERCIAL

## 2017-12-06 DIAGNOSIS — E04.2 MULTINODULAR GOITER: Primary | ICD-10-CM

## 2017-12-06 DIAGNOSIS — E06.3 HYPOTHYROIDISM DUE TO HASHIMOTO'S THYROIDITIS: ICD-10-CM

## 2017-12-06 DIAGNOSIS — E04.2 MULTINODULAR GOITER: ICD-10-CM

## 2017-12-06 DIAGNOSIS — E03.8 HYPOTHYROIDISM DUE TO HASHIMOTO'S THYROIDITIS: ICD-10-CM

## 2017-12-06 PROCEDURE — 76536 US EXAM OF HEAD AND NECK: CPT | Mod: S$GLB,,, | Performed by: INTERNAL MEDICINE

## 2017-12-07 ENCOUNTER — OFFICE VISIT (OUTPATIENT)
Dept: UROGYNECOLOGY | Facility: CLINIC | Age: 57
End: 2017-12-07
Payer: COMMERCIAL

## 2017-12-07 ENCOUNTER — HOSPITAL ENCOUNTER (OUTPATIENT)
Dept: RADIOLOGY | Facility: OTHER | Age: 57
Discharge: HOME | End: 2017-12-07
Attending: OBSTETRICS & GYNECOLOGY
Payer: COMMERCIAL

## 2017-12-07 VITALS
HEIGHT: 61 IN | WEIGHT: 106.94 LBS | BODY MASS INDEX: 20.19 KG/M2 | DIASTOLIC BLOOD PRESSURE: 70 MMHG | SYSTOLIC BLOOD PRESSURE: 104 MMHG

## 2017-12-07 DIAGNOSIS — F41.9 ANXIETY: ICD-10-CM

## 2017-12-07 DIAGNOSIS — M53.3 COCCYX PAIN: ICD-10-CM

## 2017-12-07 DIAGNOSIS — R19.8 STRAINING DURING BOWEL MOVEMENTS: ICD-10-CM

## 2017-12-07 DIAGNOSIS — R10.2 PELVIC PAIN IN FEMALE: ICD-10-CM

## 2017-12-07 DIAGNOSIS — K59.09 CHRONIC CONSTIPATION: ICD-10-CM

## 2017-12-07 DIAGNOSIS — M62.9 DISORDER OF MUSCLE: ICD-10-CM

## 2017-12-07 DIAGNOSIS — N81.11 CYSTOCELE, MIDLINE: ICD-10-CM

## 2017-12-07 DIAGNOSIS — M81.0 OSTEOPOROSIS, POSTMENOPAUSAL: ICD-10-CM

## 2017-12-07 DIAGNOSIS — N94.810 VULVAR VESTIBULITIS: ICD-10-CM

## 2017-12-07 DIAGNOSIS — I73.00 RAYNAUD'S DISEASE WITHOUT GANGRENE: ICD-10-CM

## 2017-12-07 DIAGNOSIS — M79.7 FIBROMYALGIA: ICD-10-CM

## 2017-12-07 DIAGNOSIS — G58.8 PUDENDAL NEURALGIA: Primary | ICD-10-CM

## 2017-12-07 DIAGNOSIS — R15.0 INCOMPLETE DEFECATION: ICD-10-CM

## 2017-12-07 DIAGNOSIS — R10.2 VULVAR PAIN: ICD-10-CM

## 2017-12-07 DIAGNOSIS — N81.6 RECTOCELE: ICD-10-CM

## 2017-12-07 PROCEDURE — 99999 PR PBB SHADOW E&M-EST. PATIENT-LVL III: CPT | Mod: PBBFAC,,, | Performed by: OBSTETRICS & GYNECOLOGY

## 2017-12-07 PROCEDURE — 99214 OFFICE O/P EST MOD 30 MIN: CPT | Mod: S$GLB,,, | Performed by: OBSTETRICS & GYNECOLOGY

## 2017-12-07 PROCEDURE — 76830 TRANSVAGINAL US NON-OB: CPT | Mod: 26,,, | Performed by: RADIOLOGY

## 2017-12-07 PROCEDURE — 76856 US EXAM PELVIC COMPLETE: CPT | Mod: 26,,, | Performed by: RADIOLOGY

## 2017-12-07 PROCEDURE — 76856 US EXAM PELVIC COMPLETE: CPT | Mod: TC

## 2017-12-07 RX ORDER — CEFDINIR 300 MG/1
CAPSULE ORAL
COMMUNITY
Start: 2017-11-06 | End: 2017-12-07 | Stop reason: ALTCHOICE

## 2017-12-07 RX ORDER — LIDOCAINE AND PRILOCAINE 25; 25 MG/G; MG/G
CREAM TOPICAL
COMMUNITY
Start: 2017-11-20 | End: 2018-11-19 | Stop reason: SDUPTHER

## 2017-12-07 RX ORDER — BROMPHENIRAMINE MALEATE, PSEUDOEPHEDRINE HYDROCHLORIDE, AND DEXTROMETHORPHAN HYDROBROMIDE 2; 30; 10 MG/5ML; MG/5ML; MG/5ML
SYRUP ORAL
COMMUNITY
Start: 2017-11-06 | End: 2018-07-10

## 2017-12-07 NOTE — PATIENT INSTRUCTIONS
Bladder Irritants  Certain foods and drinks have been associated with worsening symptoms of urinary frequency, urgency, urge incontinence, or bladder pain. If you suffer from any of these conditions, you may wish to try eliminating one or more of these foods from your diet and see if your symptoms improve. If bladder symptoms are related to dietary factors, strict adherence to a diet thateliminates the food should bring marked relief in 10 days. Once you are feeling better, you can begin to add foods back into your diet, one at a time. If symptoms return, you will be able to identify the irritant. As you add foods back to your diet it is very important that you drink significant amounts of water.    -----------------------------------------------------------------------------------------------  List of Common Bladder Irritants*  Alcoholic beverages  Apples and apple juice  Cantaloupe  Carbonated beverages  Chili and spicy foods  Chocolate  Citrus fruit  Coffee (including decaffeinated)  Cranberries and cranberry juice  Grapes  Guava  Milk Products: milk, cheese, cottage cheese, yogurt, ice cream  Peaches  Pineapple  Plums  Strawberries  Sugar especially artificial sweeteners, saccharin, aspartame, corn sweeteners, honey, fructose, sucrose, lactose  Tea  Tomatoes and tomato juice  Vitamin B complex  Vinegar  *Most people are not sensitive to ALL of these products; your goal is to find the foods that make YOUR symptoms worse.  ---------------------------------------------------------------------------------------------------    Low-acid fruit substitutions include apricots, papaya, pears and watermelon. Coffee drinkers can drink Kava or other lowacid instant drinks. Tea drinkers can substitute non-citrus herbal and sun brewed teas. Calcium carbonate co-buffered with calcium ascorbate can be substituted for Vitamin C. Prelief is a dietary supplement that works as an acid blocker for the bladder.    Where to get more  information:        Overcoming Bladder Disorders by Chyna Olvera and Gabrielle Cedillo, 1990        You Dont Have to Live with Cystitis! By Grace Thomas, 1988  · [email or web address]://[email or web address]    -----------------------------------------------------------------------    1) Fecal hesitancy, incomplete evacuation:  --continue previous PT exercises  --work on controlling thyroid  --control anxiety/stress  --consider contribution rectocele    2) Constipation:   --continue 2 tsps of fiber every AM and PM (increase if needed)  --try to avoid miralax  --continue oral magnesium  --work on thyroid control    3) Urinary hesitancy:  --PVR normal today  --pelvic floor PT--continue home breathing exercises    4) vaginal pain/pudendal n. Pain in setting of fibromyalgia; new tailbone pain:  --continue elavil 50 mg nightly  --continue gabapentin:  100 mg every AM, 100 mg at 1PM, 100 mg at 5PM,  and 400 mg at night.      --continue using diclofenac cream as needed along EXTERNAL areas of discomfort; do not use internally.  Refill sent (Diclofenac 0.15%/lidocaine 2.25%/prilocaine 2.25%, 3 pumps up to 5 times/day liberally to affected area, rub in thoroughly).  [facility]: [phone].  Needs large tube (120g).  Will call this in for you.    --try to limit ambien use with this  --was seeing Dr. Corona and therapy with Melody Birmingham  --s/p visits with Dr. Weiss for pudendal n. Block left; completed  --repeat pelvic US (5/15):  Normal, cyst resolved  --start core strengthening exercises (modify as needed)  --new tailbone pain somewhat triggered with levator ani palpation  --consider need to restart therapy  --restart PT with La  --continue acupuncture    5)  LUQ/flank fullness  --5/15 US normal (cyst resolved); 12/7/17 US WNL  --no mass palpated  --consider GI consult in future if issue again    6)  thyroid goiter/Hasimoto's:  --follow up with endocrine and   Zach  --follow up with ENT re: nodules    8)  Xerostomia:  --started pilocarpine; prevident mouth shampoo  --try Biotene if desired  --continue to work on thyroid control  --if notice loose stool, may have to increase fiber    7)  Well-woman:  Last pap: None after hyst  Last mammogram:11/17 negatve  Colonoscopy: 8/6/12: Normal, commented on excessive looping.  Repeat due 2022.   DEXA: 2013: Osteoporosis.  Followed by endocrine.     8)  Mixed urinary incontinence, urge > stress:    --Empty bladder every 3 hours.  Empty well: wait a minute, lean forward on toilet.    --Avoid dietary irritants (see sheet).  Keep diary x 3-5 days to determine your irritants.  --KEGELS: do 10 in AM and 10 in PM, holding each x 10 seconds.    --restart pelvic floor PT with La.  GOAL:  See her a few times to get back on track with a daily regimen you can practice to keep vaginal pain at bay (levator ani and obturator internus muscles mostly), use TENS on a weekly basis, learn exercises to help with urinary incontinence, and learn exercises/TENS use to help tailbone pain.   Want to come up with daily regimen you can practice/TENS use long-term to help all of these issues and plan for flares if they happen.   --URGE: consider medication in the future.  Takes 2-4 weeks to see if will have effect.  For dry mouth: get sour, sugar free lozenge or gum.    --STRESS:  Pessary vs. Sling.     9)   Stage 2 cystocele/rectocele:  --very mild currently: not causing major symptoms; CTM  --may be making insertion of vagifem more difficult: to make insertion easier, use some KY jelly on applicator and insert lying down    10)  PLAN: restart PT with La: GOAL:  See her a few times to get back on track with a daily regimen you can practice to keep vaginal pain at bay (levator ani and obturator internus muscles mostly), use TENS on a weekly basis, learn exercises to help with urinary incontinence, and learn exercises/TENS use to help tailbone pain.    Want to come up with daily regimen you can practice/TENS use long-term to help all of these issues and plan for flares if they happen.  Follow up 1 year.    ------------------------------  PT info:  [facility] ([facility]): (p) [phone]. (f) [phone]. Established patients call:  [phone].
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
## 2017-12-20 ENCOUNTER — PATIENT MESSAGE (OUTPATIENT)
Dept: INTERNAL MEDICINE | Facility: CLINIC | Age: 57
End: 2017-12-20

## 2017-12-20 DIAGNOSIS — E06.3 HYPOTHYROIDISM DUE TO HASHIMOTO'S THYROIDITIS: Primary | ICD-10-CM

## 2017-12-20 DIAGNOSIS — E03.8 HYPOTHYROIDISM DUE TO HASHIMOTO'S THYROIDITIS: Primary | ICD-10-CM

## 2017-12-22 ENCOUNTER — PATIENT MESSAGE (OUTPATIENT)
Dept: REHABILITATION | Facility: HOSPITAL | Age: 57
End: 2017-12-22

## 2017-12-22 NOTE — TELEPHONE ENCOUNTER
Ordered This Visit    TSH              T4, free              T3, free              T3               Please schedule

## 2017-12-29 ENCOUNTER — LAB VISIT (OUTPATIENT)
Dept: LAB | Facility: HOSPITAL | Age: 57
End: 2017-12-29
Attending: INTERNAL MEDICINE
Payer: COMMERCIAL

## 2017-12-29 DIAGNOSIS — E06.3 HYPOTHYROIDISM DUE TO HASHIMOTO'S THYROIDITIS: ICD-10-CM

## 2017-12-29 DIAGNOSIS — E03.8 HYPOTHYROIDISM DUE TO HASHIMOTO'S THYROIDITIS: ICD-10-CM

## 2017-12-29 LAB
T3 SERPL-MCNC: 96 NG/DL
T3FREE SERPL-MCNC: 2.6 PG/ML
T4 FREE SERPL-MCNC: 0.56 NG/DL
TSH SERPL DL<=0.005 MIU/L-ACNC: 0.65 UIU/ML

## 2017-12-29 PROCEDURE — 84480 ASSAY TRIIODOTHYRONINE (T3): CPT

## 2017-12-29 PROCEDURE — 84443 ASSAY THYROID STIM HORMONE: CPT

## 2017-12-29 PROCEDURE — 84481 FREE ASSAY (FT-3): CPT

## 2017-12-29 PROCEDURE — 36415 COLL VENOUS BLD VENIPUNCTURE: CPT

## 2017-12-29 PROCEDURE — 84439 ASSAY OF FREE THYROXINE: CPT

## 2018-01-03 ENCOUNTER — HOSPITAL ENCOUNTER (OUTPATIENT)
Dept: ENDOCRINOLOGY | Facility: CLINIC | Age: 58
Discharge: HOME | End: 2018-01-03
Attending: INTERNAL MEDICINE
Payer: COMMERCIAL

## 2018-01-03 DIAGNOSIS — E04.2 MULTINODULAR GOITER: ICD-10-CM

## 2018-01-03 PROCEDURE — 88173 CYTOPATH EVAL FNA REPORT: CPT | Mod: 26,,, | Performed by: PATHOLOGY

## 2018-01-03 PROCEDURE — 76942 ECHO GUIDE FOR BIOPSY: CPT | Mod: S$GLB,,, | Performed by: INTERNAL MEDICINE

## 2018-01-03 PROCEDURE — 10022 US FINE NEEDLE ASPIRATION WITH IMAGING: CPT | Mod: S$GLB,,, | Performed by: INTERNAL MEDICINE

## 2018-01-03 PROCEDURE — 88173 CYTOPATH EVAL FNA REPORT: CPT | Performed by: PATHOLOGY

## 2018-01-05 ENCOUNTER — TELEPHONE (OUTPATIENT)
Dept: ENDOCRINOLOGY | Facility: CLINIC | Age: 58
End: 2018-01-05

## 2018-01-05 NOTE — TELEPHONE ENCOUNTER
Called patient and let her know that the FNA result for the right mid lobe thyroid nodule is benign.

## 2018-01-11 ENCOUNTER — OFFICE VISIT (OUTPATIENT)
Dept: INTERNAL MEDICINE | Facility: CLINIC | Age: 58
End: 2018-01-11
Payer: COMMERCIAL

## 2018-01-11 VITALS
BODY MASS INDEX: 20.19 KG/M2 | OXYGEN SATURATION: 98 % | WEIGHT: 106.94 LBS | DIASTOLIC BLOOD PRESSURE: 64 MMHG | HEART RATE: 76 BPM | SYSTOLIC BLOOD PRESSURE: 116 MMHG | HEIGHT: 61 IN

## 2018-01-11 DIAGNOSIS — E06.3 HYPOTHYROIDISM DUE TO HASHIMOTO'S THYROIDITIS: Primary | ICD-10-CM

## 2018-01-11 DIAGNOSIS — E03.8 HYPOTHYROIDISM DUE TO HASHIMOTO'S THYROIDITIS: Primary | ICD-10-CM

## 2018-01-11 PROCEDURE — 99999 PR PBB SHADOW E&M-EST. PATIENT-LVL IV: CPT | Mod: PBBFAC,,, | Performed by: INTERNAL MEDICINE

## 2018-01-11 PROCEDURE — 99213 OFFICE O/P EST LOW 20 MIN: CPT | Mod: S$GLB,,, | Performed by: INTERNAL MEDICINE

## 2018-01-11 NOTE — PROGRESS NOTES
Subjective:      Patient ID: Berenice Hayes is a 57 y.o. female.    Chief Complaint: Follow-up    HPI:  HPI   Thyroid studies: patient is feeling better, more stable, cannot increase the cytomel any further without triggering her heart rate. She is only on cytomel for hypothyroidism. She had a recent FNA of the right mid lobe of the thyroid which was benign.    Cytomel: 5mcg  3/4 of a pill 4 times a day    TSH 0.647  Free T3 2.6 ( feels well at a  3.5)  Free T4 0.56 ( will only follow the TSH)    Pain: has been able to use less pain cream and gabapentin.    Patient is back on the Actonel, and same vit D  Patient Active Problem List   Diagnosis    Fibromyalgia    Bladder spasm    Osteoporosis, postmenopausal    Fibrocystic breast changes    Vulvar vestibulodynia    Vulvar pain    Vascular disorder: Erytnromelalgia    Raynaud's disease    Incomplete defecation    Straining during bowel movements    Chronic constipation    Rectocele    Disorder of muscle    Anxiety    Bilateral hand pain    Fatigue    Perimenopause vs Memopause    Menopause syndrome    Pudendal neuralgia    Pelvic pain in female    Rommeliza    Urinary hesitancy    Hypothyroidism due to Hashimoto's thyroiditis    Multinodular goiter    Encounter for herb and vitamin supplement management    Gluten free diet    Sicca    Family history of ischemic heart disease (IHD)    Cystocele, midline     Past Medical History:   Diagnosis Date    Asthma with allergic rhinitis     Bladder spasm     Dense breasts     heterogeneously/fibrocystic disease    Elevated antinuclear antibody (GUICHO) level     Endometriosis of cervix     Erythromelalgia     Fibromyalgia     Ganglion cyst     left toe    Goiter     MNG    Hashimoto's disease     Hashimoto's thyroiditis     Headache(784.0)     Hypothyroidism     Hashimoto with + Tg ab    Osteoporosis     femoral neck    Raynaud's phenomenon     Rhinitis     Scoliosis     Sleep  "disorder     type of Narcolepsy ( resolved)    Vitamin D deficiency disease     Vulvodynia      Past Surgical History:   Procedure Laterality Date    BREAST SURGERY      fibrocystic tumor removed     SECTION      2x    CYST REMOVAL      laparoscopic cyst on ovary    HYSTERECTOMY      intradermal cyst       removed from left index finger    SINUS SURGERY      2x     Family History   Problem Relation Age of Onset    Diabetes Mother     Fibromyalgia Mother     Polymyalgia rheumatica Mother     Macular degeneration Mother     Arthritis Mother     Hypertension Mother     Kidney disease Mother     Pneumonia Mother     Adrenal disorder Mother      adrenal insufficiency    Coronary artery disease Father     Arthritis Father      osteoarthritis    Hypertension Father     Heart disease Father     Diabetes Father     Hyperlipidemia Father     Hyperlipidemia Brother     Cancer Brother      oral    Thyroid cancer Daughter     Cancer Daughter      thyroid    Hypothyroidism Daughter     Breast cancer Neg Hx     Ovarian cancer Neg Hx     Colon cancer Neg Hx      Review of Systems   See above  Patient has a good month with the feet  She continues to work with the acupuncturist: castor oil to the feet  Objective:     Vitals:    18 0807   BP: 116/64   Pulse: 76   SpO2: 98%   Weight: 48.5 kg (106 lb 14.8 oz)   Height: 5' 1" (1.549 m)   PainSc: 0-No pain     Body mass index is 20.2 kg/m².  Physical Exam   Constitutional: She is oriented to person, place, and time. She appears well-developed and well-nourished. No distress.   Neck: Carotid bruit is not present. No thyromegaly present.   Cardiovascular: Normal rate, regular rhythm and normal heart sounds.  PMI is not displaced.    Pulmonary/Chest: Effort normal and breath sounds normal. No respiratory distress.   Abdominal: Soft. Bowel sounds are normal. She exhibits no distension. There is no tenderness.   Musculoskeletal: She exhibits no " edema.   Neurological: She is alert and oriented to person, place, and time.     Assessment:     1. Hypothyroidism due to Hashimoto's thyroiditis      Plan:   Berenice was seen today for follow-up.    Diagnoses and all orders for this visit:    Hypothyroidism due to Hashimoto's thyroiditis  -     TSH; Future  -     T4, free; Future  -     CBC auto differential; Future  -     Comprehensive metabolic panel; Future  -     Lipid panel; Future  -     T3, free; Future  -     T3; Future      Follow-up in about 3 months (around 4/11/2018) for cbc, cmp, lipid tsh t3 free t3 and fee t4.     Medication List          Accurate as of 1/11/18  8:59 AM. If you have any questions, ask your nurse or doctor.               CHANGE how you take these medications    * amitriptyline 50 MG tablet  Commonly known as:  ELAVIL  Take one tablet by mouth nightly. Take with the 10 mg amitriptyline  What changed:  · how much to take  · additional instructions     * amitriptyline 10 MG tablet  Commonly known as:  ELAVIL  Take 1 tablet (10 mg total) by mouth once daily.  What changed:  Another medication with the same name was changed. Make sure you understand how and when to take each.     liothyronine 5 MCG Tab  Commonly known as:  CYTOMEL  Alternate days 3 (15mcg) and 3.5 (17.5) tablets  What changed:  additional instructions        * This list has 2 medication(s) that are the same as other medications prescribed for you. Read the directions carefully, and ask your doctor or other care provider to review them with you.            CONTINUE taking these medications    aspirin 81 MG EC tablet  Commonly known as:  ECOTRIN     brompheniramine-pseudoeph-DM 2-30-10 mg/5 mL Syrp     DICLOFENAC SODIUM TOP     * estradiol 10 mcg Tab  Commonly known as:  VAGIFEM  Place 1 tablet (10 mcg total) vaginally twice a week.     * estradiol 0.05 mg/24 hr td ptsw 0.05 mg/24 hr  Commonly known as:  VIVELLE-DOT  Place 1 patch onto the skin twice a week.     fluticasone 50  mcg/actuation nasal spray  Commonly known as:  FLONASE  1 spray by Each Nare route once daily.     gabapentin 100 MG capsule  Commonly known as:  NEURONTIN  One in the am, one at noon , one at 5 pm and four at bedtime     lidocaine-prilocaine cream  Commonly known as:  EMLA     meloxicam 7.5 MG tablet  Commonly known as:  MOBIC  TAKE 1 TABLET (7.5 MG TOTAL) BY MOUTH ONCE DAILY. FOR INFLAMMATION     metaxalone 800 MG tablet  Commonly known as:  SKELAXIN  TAKE 1 TABLET EVERY EVENING (SPASM)     MUCINEX 600 mg 12 hr tablet  Generic drug:  guaiFENesin     multivitamin capsule     pilocarpine 5 MG Tab  Commonly known as:  SALAGEN  Take 1 tablet (5 mg total) by mouth 3 (three) times daily as needed.     PREVIDENT 5000 DRY MOUTH 1.1 % Gel  Generic drug:  fluoride (sodium)     PROAIR HFA 90 mcg/actuation inhaler  Generic drug:  albuterol     RESTASIS 0.05 % ophthalmic emulsion  Generic drug:  cycloSPORINE     risedronate 35 mg Tbec  Take 1 tablet (35 mg total) by mouth once a week.     VITAMIN D3 1,000 unit capsule  Generic drug:  cholecalciferol (vitamin D3)     zolpidem 10 mg Tab  Commonly known as:  AMBIEN  TAKE ONE TABLET BY MOUTH AT BEDTIME AS NEEDED.        * This list has 2 medication(s) that are the same as other medications prescribed for you. Read the directions carefully, and ask your doctor or other care provider to review them with you.

## 2018-01-22 DIAGNOSIS — R10.2 PELVIC PAIN IN FEMALE: ICD-10-CM

## 2018-01-22 DIAGNOSIS — M79.7 FIBROMYALGIA: ICD-10-CM

## 2018-01-22 RX ORDER — AMITRIPTYLINE HYDROCHLORIDE 50 MG/1
TABLET, FILM COATED ORAL
Qty: 90 TABLET | Refills: 4 | Status: SHIPPED | OUTPATIENT
Start: 2018-01-22 | End: 2018-11-01 | Stop reason: SDUPTHER

## 2018-01-22 RX ORDER — METAXALONE 800 MG/1
TABLET ORAL
Qty: 90 TABLET | Refills: 1 | Status: SHIPPED | OUTPATIENT
Start: 2018-01-22 | End: 2018-07-20 | Stop reason: SDUPTHER

## 2018-01-22 RX ORDER — GABAPENTIN 100 MG/1
CAPSULE ORAL
Qty: 630 CAPSULE | Refills: 1 | Status: SHIPPED | OUTPATIENT
Start: 2018-01-22 | End: 2018-07-24 | Stop reason: SDUPTHER

## 2018-02-09 RX ORDER — ZOLPIDEM TARTRATE 10 MG/1
TABLET ORAL
Qty: 30 TABLET | Refills: 1 | Status: SHIPPED | OUTPATIENT
Start: 2018-02-09 | End: 2018-04-13 | Stop reason: SDUPTHER

## 2018-02-22 DIAGNOSIS — N95.2 ATROPHIC VAGINITIS: ICD-10-CM

## 2018-02-22 RX ORDER — ESTRADIOL 0.05 MG/D
FILM, EXTENDED RELEASE TRANSDERMAL
Qty: 24 PATCH | Refills: 3 | Status: SHIPPED | OUTPATIENT
Start: 2018-02-22 | End: 2018-11-14 | Stop reason: SDUPTHER

## 2018-02-25 ENCOUNTER — PATIENT MESSAGE (OUTPATIENT)
Dept: INTERNAL MEDICINE | Facility: CLINIC | Age: 58
End: 2018-02-25

## 2018-04-03 ENCOUNTER — PATIENT MESSAGE (OUTPATIENT)
Dept: INTERNAL MEDICINE | Facility: CLINIC | Age: 58
End: 2018-04-03

## 2018-04-07 ENCOUNTER — OFFICE VISIT (OUTPATIENT)
Dept: URGENT CARE | Facility: CLINIC | Age: 58
End: 2018-04-07
Payer: COMMERCIAL

## 2018-04-07 VITALS
RESPIRATION RATE: 18 BRPM | BODY MASS INDEX: 20.01 KG/M2 | HEART RATE: 90 BPM | OXYGEN SATURATION: 100 % | DIASTOLIC BLOOD PRESSURE: 75 MMHG | WEIGHT: 106 LBS | TEMPERATURE: 97 F | HEIGHT: 61 IN | SYSTOLIC BLOOD PRESSURE: 112 MMHG

## 2018-04-07 DIAGNOSIS — H92.02 LEFT EAR PAIN: Primary | ICD-10-CM

## 2018-04-07 DIAGNOSIS — R51.9 PAIN OF SCALP: ICD-10-CM

## 2018-04-07 PROCEDURE — 99214 OFFICE O/P EST MOD 30 MIN: CPT | Mod: S$GLB,,, | Performed by: PHYSICIAN ASSISTANT

## 2018-04-07 RX ORDER — TRAMADOL HYDROCHLORIDE 50 MG/1
50 TABLET ORAL EVERY 6 HOURS PRN
Qty: 12 TABLET | Refills: 0 | Status: SHIPPED | OUTPATIENT
Start: 2018-04-07 | End: 2018-04-17

## 2018-04-07 NOTE — PROGRESS NOTES
"Subjective:       Patient ID: Berenice Hayes is a 58 y.o. female.    Vitals:  height is 5' 1" (1.549 m) and weight is 48.1 kg (106 lb). Her tympanic temperature is 97.4 °F (36.3 °C). Her blood pressure is 112/75 and her pulse is 90. Her respiration is 18 and oxygen saturation is 100%.     Chief Complaint: Otalgia (left since today )    This is a 58 y.o. female with Past Medical History:  No date: Asthma with allergic rhinitis  No date: Bladder spasm  No date: Dense breasts      Comment: heterogeneously/fibrocystic disease  No date: Elevated antinuclear antibody (GUICHO) level  No date: Endometriosis of cervix  No date: Erythromelalgia  No date: Fibromyalgia  No date: Ganglion cyst      Comment: left toe  No date: Goiter      Comment: MNG  No date: Hashimoto's disease  No date: Hashimoto's thyroiditis  No date: Headache(784.0)  No date: Hypothyroidism      Comment: Hashimoto with + Tg ab  No date: Osteoporosis      Comment: femoral neck  No date: Raynaud's phenomenon  No date: Rhinitis  No date: Scoliosis  No date: Sleep disorder      Comment: type of Narcolepsy ( resolved)  No date: Vitamin D deficiency disease  No date: Vulvodynia   Past Surgical History:  No date: BREAST SURGERY      Comment: fibrocystic tumor removed  No date:  SECTION      Comment: 2x  No date: CYST REMOVAL      Comment: laparoscopic cyst on ovary  No date: HYSTERECTOMY  No date: intradermal cyst       Comment: removed from left index finger  No date: SINUS SURGERY      Comment: 2x  who presents today with a chief complaint of left ear pain today.        Otalgia    There is pain in the left ear. This is a new problem. The current episode started today. The problem occurs constantly. The problem has been gradually worsening. There has been no fever. The pain is at a severity of 6/10. The pain is moderate. Associated symptoms include headaches. Pertinent negatives include no abdominal pain, coughing or sore throat. She has tried nothing " for the symptoms.     Review of Systems   Constitution: Negative for chills, fever and malaise/fatigue.   HENT: Positive for ear pain (left ). Negative for congestion, hoarse voice and sore throat.    Eyes: Negative for discharge and redness.   Cardiovascular: Negative for chest pain, dyspnea on exertion and leg swelling.   Respiratory: Negative for cough, shortness of breath, sputum production and wheezing.    Musculoskeletal: Negative for myalgias.   Gastrointestinal: Negative for abdominal pain and nausea.   Neurological: Positive for headaches.       Objective:      Physical Exam   Constitutional: She is oriented to person, place, and time. She appears well-developed and well-nourished.   HENT:   Head: Normocephalic and atraumatic.       Right Ear: Hearing, tympanic membrane, external ear and ear canal normal.   Left Ear: Hearing, tympanic membrane, external ear and ear canal normal. There is tenderness.   Nose: Nose normal. Right sinus exhibits no maxillary sinus tenderness and no frontal sinus tenderness. Left sinus exhibits no maxillary sinus tenderness and no frontal sinus tenderness.   Mouth/Throat: Uvula is midline and oropharynx is clear and moist.   Eyes: Conjunctivae are normal.   Neck: Normal range of motion. Neck supple. No thyromegaly present.   Cardiovascular: Normal rate and regular rhythm.  Exam reveals no gallop and no friction rub.    No murmur heard.  Pulmonary/Chest: Effort normal and breath sounds normal. She has no wheezes. She has no rales.   Musculoskeletal: Normal range of motion.   Lymphadenopathy:     She has no cervical adenopathy.   Neurological: She is alert and oriented to person, place, and time.   Skin: Skin is warm and dry. No rash noted. No erythema.   Psychiatric: She has a normal mood and affect. Her behavior is normal. Judgment and thought content normal.   Nursing note and vitals reviewed.      4:47 PM - Patient is complaining of left ear and surrounding area pain that  started today.  She has no evidence of otitis media or externa on exam.  No obvious abscess. No rash.  She is TTP in the scalp above and behind the ear.  No palpable lymph nodes in the area.  No erythema or swelling.  This could be nerve pain or possibly early shingles before the rash starts.  I have advised her to watch for any signs of rash or redness, and to return here is something changes.      Assessment:       1. Left ear pain    2. Pain of scalp        Plan:         Left ear pain  -     traMADol (ULTRAM) 50 mg tablet; Take 1 tablet (50 mg total) by mouth every 6 (six) hours as needed for Pain.  Dispense: 12 tablet; Refill: 0    Pain of scalp  -     traMADol (ULTRAM) 50 mg tablet; Take 1 tablet (50 mg total) by mouth every 6 (six) hours as needed for Pain.  Dispense: 12 tablet; Refill: 0      Berenice was seen today for otalgia.    Diagnoses and all orders for this visit:    Left ear pain  -     traMADol (ULTRAM) 50 mg tablet; Take 1 tablet (50 mg total) by mouth every 6 (six) hours as needed for Pain.    Pain of scalp  -     traMADol (ULTRAM) 50 mg tablet; Take 1 tablet (50 mg total) by mouth every 6 (six) hours as needed for Pain.      Patient Instructions     - Rest.    - Drink plenty of fluids.    - Tylenol or Ibuprofen as directed as needed for fever/pain.    - Follow up with your PCP or specialty clinic as directed in the next 1-2 weeks if not improved or as needed.  You can call [phone] to schedule an appointment with the appropriate provider.    - Go to the ED if your symptoms worsen.    Earache, No Infection (Adult)  Earaches can happen without an infection. This occurs when air and fluid build up behind the eardrum causing a feeling of fullness and discomfort and reduced hearing. This is called otitis media with effusion (OME) or serous otitis media. It means there is fluid in the middle ear. It is not the same as acute otitis media, which is typically from infection.  OME can happen when you  have a cold if congestion blocks the passage that drains the middle ear. This passage is called the eustachian tube. OME may also occur with nasal allergies or after a bacterial middle ear infection.    The pain or discomfort may come and go. You may hear clicking or popping sounds when you chew or swallow. You may feel that your balance is off. Or you may hear ringing in the ear.  It often takes from several weeks up to 3 months for the fluid to clear on its own. Oral pain relievers and ear drops help if there is pain. Decongestants and antihistamines sometimes help. Antibiotics don't help since there is no infection. Your doctor may prescribe a nasal spray to help reduce swelling in the nose and eustachian tube. This can allow the ear to drain.  If your OME doesn't improve after 3 months, surgery may be used to drain the fluid and insert a small tube in the eardrum to allow continued drainage.  Because the middle ear fluid can become infected, it is important to watch for signs of an ear infection which may develop later. These signs include increased ear pain, fever, or drainage from the ear.  Home care  The following guidelines will help you care for yourself at home:  · You may use over-the-counter medicine as directed to control pain, unless another medicine was prescribed. If you have chronic liver or kidney disease or ever had a stomach ulcer or GI bleeding, talk with your doctor before using these medicines. Aspirin should never be used in anyone under 18 years of age who is ill with a fever. It may cause severe liver damage.  · You may use over-the-counter decongestants such as phenylephrine or pseudoephedrine. But they are not always helpful. Don't use nasal spray decongestants more than 3 days. Longer use can make congestion worse. Prescription nasal sprays from your doctor don't typically have those restrictions.  · Antihistamines may help if you are also having allergy symptoms.  · You may use  medicines such as guaifenesin to thin mucus and promote drainage.  Follow-up care  Follow up with your healthcare provider or as advised if you are not feeling better after 3 days.  When to seek medical advice  Call your healthcare provider right away if any of the following occur:  · Your ear pain gets worse or does not start to improve   · Fever of 100.4°F (38°C) or higher, or as directed by your healthcare provider  · Fluid or blood draining from the ear  · Headache or sinus pain  · Stiff neck  · Unusual drowsiness or confusion  Date Last Reviewed: 10/1/2016  © 8049-9663 [email or web address]. [address] [place] [address]. All rights reserved. This information is not intended as a substitute for professional medical care. Always follow your healthcare professional's instructions.        Shingles (Herpes Zoster)     Talk to your healthcare provider about the shingles vaccine.     Shingles is also called herpes zoster. It is a painful skin rash caused by the herpes zoster virus. This is the same virus that causes chickenpox. After a person has chickenpox, the virus remains inactive in the nerve cells. Years later, the virus can become active again and travel to the skin. Most people have shingles only once, but it is possible to have it more than once.  What are the risk factors for shingles?  Anyone who has ever had chickenpox can develop shingles. But your risk is greater if you:  · Are 50 years of age or older  · Have an illness that weakens your immune system, such as HIV/AIDS  · Have cancer, especially Hodgkin disease or lymphoma  · Take medicines that weaken your immune system  What are the symptoms of shingles?  · The first sign of shingles is usually pain, burning, tingling, or itching on one part of your face or body. You may also feel as if you have the flu, with fever and chills.  · A red rash with small blisters appears within a few days. The rash may appear as follows:   ¨ The blisters  can occur anywhere, but theyre most common on the back, chest, or abdomen.  ¨ They usually appear on only one side of the body, spreading along the nerve pathway where the virus was inactive.   ¨ The rash can also form around an eye, along one side of the face or neck, or in the mouth.  ¨ In a few people, usually those with weakened immune systems, shingles appear on more than one part of the body at once.  · After a few days, the blisters become dry and form a crust. The crust falls off in days to weeks. The blisters generally do not leave scars.  How is shingles treated?  For most people, shingles heals on its own in a few weeks. But treatment is recommended to help relieve pain, speed healing, and reduce the risk of complications. Antiviral medicines are prescribed within the first 72 hours of the appearance of the rash. To lessen symptoms:  · Apply ice packs (wrapped in a thin towel) or cool compresses, or soak in a cool bath.  · Use calamine lotion to calm itchy skin.  · Ask your healthcare provider about over-the-counter pain relievers. If your pain is severe, your healthcare provider may prescribe stronger pain medicines.  What are the complications of shingles?  Shingles often goes away with no lasting effects. But some people have serious problems long after the blisters have healed:  · Postherpetic neuralgia. This is the most common complication. It is severe nerve pain at the place where the rash used to be. It can last for months, or even years after you have had shingles. Medicines can be prescribed to help relieve the pain and improve quality of life.  · Bacterial infection. Shingles blisters may become infected with bacteria. Antibiotic medicine is used to treat the infection.  · Eye problems. A person with shingles on the face should see his or her healthcare provider right away. Shingles can cause serious problems with vision, and even blindness.  Very rarely shingles can also lead to pneumonia,  hearing problems, brain inflammation, or even death.   When to seek medical care  Contact your healthcare provider if you experience any of the following:  · Symptoms that dont go away with treatment  · A rash or blisters near your eye  · Increased drainage, fever, or rash after treatment, or severe pain that doesnt go away   How can shingles be prevented?  You can only get shingles if you have had chicken pox in the past. Those who have never had chickenpox can get the virus from you. Although instead of developing shingles, the person may get chickenpox. Until your blisters form scabs, avoid contact with others, especially the following:  · Pregnant women who have never had chickenpox or the vaccine  · Infants who were born early (prematurely) or who had low weight at birth  · People with weak immune system (for example, people receiving chemotherapy for cancer, people who have had organ transplants, or people with HIV infections)     The shingles vaccine  If youre 60 years of age or older, ask your healthcare provider if you should receive the shingles vaccine. The vaccine makes it less likely that you will develop shingles. If you do develop shingles, your symptoms will likely be milder than if you hadnt been vaccinated. Note: Although the vaccine is licensed for people 50 years of age or older, the CDC does not recommend the vaccine for those who are 50 to 59 years old.   Date Last Reviewed: 10/1/2016  © 4293-5450 [employer]. [address] [place] [address]. All rights reserved. This information is not intended as a substitute for professional medical care. Always follow your healthcare professional's instructions.        Shingles  Shingles is a viral infection caused by the same virus as chicken pox. Anyone who has had chicken pox may get shingles later in life. The virus stays in the body, but remains dormant (asleep). Shingles often occurs in older persons or persons with lowered  immunity. But it can affect anyone at any age.  Shingles starts as a tingling patch of skin on one side of the body. Small, painful blisters may then appear. The rash does not spread to the rest of the body.  Exposure to shingles cannot cause shingles. However, it can cause chicken pox in anyone who has not had chicken pox or has not been vaccinated. The contagious period ends when all blisters have crusted over (generally about 2 weeks after the illness begins).  After the blisters heal, the affected skin may be sensitive or painful for months (neuralgia). This often gradually goes away.  A shingles vaccine is available. This can help prevent shingles or make it less painful. It is generally recommended for adults over the age of 60 who have had chicken pox in the past, but who have never had shingles. Adults over 60 who have had neither chicken pox nor shingles can prevent both diseases with the chicken pox vaccine. Ask your healthcare provider about these vaccines.  Home care  · Medicines may be prescribed to help relieve pain. Take these medicines as directed. Ask your healthcare provider or pharmacist before using over-the-counter medicines for helping treat pain and itching.  · In certain cases, antiviral medicines may be prescribed to reduce pain, shorten the illness, and prevent neuralgia. Take these medicines as directed.  · Compresses made from a solution of cool water mixed with cornstarch or baking soda may help relieve pain and itching.   · Gently wash skin daily with soap and water to help prevent infection.  Be certain to rinse off all of the soap, which can be irritating.  · Trim fingernails and try not to scratch. Scratching the sores may leave scars.  · Stay home from work or school until all blisters have formed a crust and you are no longer contagious.  Follow-up care  Follow up with your healthcare provider or as directed by our staff.  When to seek medical advice  · Fever of 100.4°F (38°C) or  higher, or as directed by your healthcare provider  · Affected skin is on the face or neck and any of the following occur:  ¨ Headache  ¨ Eye pain  ¨ Changes in vision  ¨ Sores near the eye  ¨ Weakness of facial muscles  · Pain, redness, or swelling of a joint  · Signs of skin infection: colored drainage from the sores, warmth, increasing redness, or increasing pain  Date Last Reviewed: 9/25/2015  © 8005-9953 [employer]. [address] [place] [address]. All rights reserved. This information is not intended as a substitute for professional medical care. Always follow your healthcare professional's instructions.

## 2018-04-07 NOTE — PATIENT INSTRUCTIONS
- Rest.    - Drink plenty of fluids.    - Tylenol or Ibuprofen as directed as needed for fever/pain.    - Follow up with your PCP or specialty clinic as directed in the next 1-2 weeks if not improved or as needed.  You can call [phone] to schedule an appointment with the appropriate provider.    - Go to the ED if your symptoms worsen.    Earache, No Infection (Adult)  Earaches can happen without an infection. This occurs when air and fluid build up behind the eardrum causing a feeling of fullness and discomfort and reduced hearing. This is called otitis media with effusion (OME) or serous otitis media. It means there is fluid in the middle ear. It is not the same as acute otitis media, which is typically from infection.  OME can happen when you have a cold if congestion blocks the passage that drains the middle ear. This passage is called the eustachian tube. OME may also occur with nasal allergies or after a bacterial middle ear infection.    The pain or discomfort may come and go. You may hear clicking or popping sounds when you chew or swallow. You may feel that your balance is off. Or you may hear ringing in the ear.  It often takes from several weeks up to 3 months for the fluid to clear on its own. Oral pain relievers and ear drops help if there is pain. Decongestants and antihistamines sometimes help. Antibiotics don't help since there is no infection. Your doctor may prescribe a nasal spray to help reduce swelling in the nose and eustachian tube. This can allow the ear to drain.  If your OME doesn't improve after 3 months, surgery may be used to drain the fluid and insert a small tube in the eardrum to allow continued drainage.  Because the middle ear fluid can become infected, it is important to watch for signs of an ear infection which may develop later. These signs include increased ear pain, fever, or drainage from the ear.  Home care  The following guidelines will help you care for yourself at  home:  · You may use over-the-counter medicine as directed to control pain, unless another medicine was prescribed. If you have chronic liver or kidney disease or ever had a stomach ulcer or GI bleeding, talk with your doctor before using these medicines. Aspirin should never be used in anyone under 18 years of age who is ill with a fever. It may cause severe liver damage.  · You may use over-the-counter decongestants such as phenylephrine or pseudoephedrine. But they are not always helpful. Don't use nasal spray decongestants more than 3 days. Longer use can make congestion worse. Prescription nasal sprays from your doctor don't typically have those restrictions.  · Antihistamines may help if you are also having allergy symptoms.  · You may use medicines such as guaifenesin to thin mucus and promote drainage.  Follow-up care  Follow up with your healthcare provider or as advised if you are not feeling better after 3 days.  When to seek medical advice  Call your healthcare provider right away if any of the following occur:  · Your ear pain gets worse or does not start to improve   · Fever of 100.4°F (38°C) or higher, or as directed by your healthcare provider  · Fluid or blood draining from the ear  · Headache or sinus pain  · Stiff neck  · Unusual drowsiness or confusion  Date Last Reviewed: 10/1/2016  © 3487-4544 [employer]. [address] [place] [address], [place] [address]. All rights reserved. This information is not intended as a substitute for professional medical care. Always follow your healthcare professional's instructions.        Shingles (Herpes Zoster)     Talk to your healthcare provider about the shingles vaccine.     Shingles is also called herpes zoster. It is a painful skin rash caused by the herpes zoster virus. This is the same virus that causes chickenpox. After a person has chickenpox, the virus remains inactive in the nerve cells. Years later, the virus can become active again and  travel to the skin. Most people have shingles only once, but it is possible to have it more than once.  What are the risk factors for shingles?  Anyone who has ever had chickenpox can develop shingles. But your risk is greater if you:  · Are 50 years of age or older  · Have an illness that weakens your immune system, such as HIV/AIDS  · Have cancer, especially Hodgkin disease or lymphoma  · Take medicines that weaken your immune system  What are the symptoms of shingles?  · The first sign of shingles is usually pain, burning, tingling, or itching on one part of your face or body. You may also feel as if you have the flu, with fever and chills.  · A red rash with small blisters appears within a few days. The rash may appear as follows:   ¨ The blisters can occur anywhere, but theyre most common on the back, chest, or abdomen.  ¨ They usually appear on only one side of the body, spreading along the nerve pathway where the virus was inactive.   ¨ The rash can also form around an eye, along one side of the face or neck, or in the mouth.  ¨ In a few people, usually those with weakened immune systems, shingles appear on more than one part of the body at once.  · After a few days, the blisters become dry and form a crust. The crust falls off in days to weeks. The blisters generally do not leave scars.  How is shingles treated?  For most people, shingles heals on its own in a few weeks. But treatment is recommended to help relieve pain, speed healing, and reduce the risk of complications. Antiviral medicines are prescribed within the first 72 hours of the appearance of the rash. To lessen symptoms:  · Apply ice packs (wrapped in a thin towel) or cool compresses, or soak in a cool bath.  · Use calamine lotion to calm itchy skin.  · Ask your healthcare provider about over-the-counter pain relievers. If your pain is severe, your healthcare provider may prescribe stronger pain medicines.  What are the complications of  shingles?  Shingles often goes away with no lasting effects. But some people have serious problems long after the blisters have healed:  · Postherpetic neuralgia. This is the most common complication. It is severe nerve pain at the place where the rash used to be. It can last for months, or even years after you have had shingles. Medicines can be prescribed to help relieve the pain and improve quality of life.  · Bacterial infection. Shingles blisters may become infected with bacteria. Antibiotic medicine is used to treat the infection.  · Eye problems. A person with shingles on the face should see his or her healthcare provider right away. Shingles can cause serious problems with vision, and even blindness.  Very rarely shingles can also lead to pneumonia, hearing problems, brain inflammation, or even death.   When to seek medical care  Contact your healthcare provider if you experience any of the following:  · Symptoms that dont go away with treatment  · A rash or blisters near your eye  · Increased drainage, fever, or rash after treatment, or severe pain that doesnt go away   How can shingles be prevented?  You can only get shingles if you have had chicken pox in the past. Those who have never had chickenpox can get the virus from you. Although instead of developing shingles, the person may get chickenpox. Until your blisters form scabs, avoid contact with others, especially the following:  · Pregnant women who have never had chickenpox or the vaccine  · Infants who were born early (prematurely) or who had low weight at birth  · People with weak immune system (for example, people receiving chemotherapy for cancer, people who have had organ transplants, or people with HIV infections)     The shingles vaccine  If youre 60 years of age or older, ask your healthcare provider if you should receive the shingles vaccine. The vaccine makes it less likely that you will develop shingles. If you do develop shingles, your  symptoms will likely be milder than if you hadnt been vaccinated. Note: Although the vaccine is licensed for people 50 years of age or older, the CDC does not recommend the vaccine for those who are 50 to 59 years old.   Date Last Reviewed: 10/1/2016  © 0280-7950 [employer]. [address] [place] [address]. All rights reserved. This information is not intended as a substitute for professional medical care. Always follow your healthcare professional's instructions.        Shingles  Shingles is a viral infection caused by the same virus as chicken pox. Anyone who has had chicken pox may get shingles later in life. The virus stays in the body, but remains dormant (asleep). Shingles often occurs in older persons or persons with lowered immunity. But it can affect anyone at any age.  Shingles starts as a tingling patch of skin on one side of the body. Small, painful blisters may then appear. The rash does not spread to the rest of the body.  Exposure to shingles cannot cause shingles. However, it can cause chicken pox in anyone who has not had chicken pox or has not been vaccinated. The contagious period ends when all blisters have crusted over (generally about 2 weeks after the illness begins).  After the blisters heal, the affected skin may be sensitive or painful for months (neuralgia). This often gradually goes away.  A shingles vaccine is available. This can help prevent shingles or make it less painful. It is generally recommended for adults over the age of 60 who have had chicken pox in the past, but who have never had shingles. Adults over 60 who have had neither chicken pox nor shingles can prevent both diseases with the chicken pox vaccine. Ask your healthcare provider about these vaccines.  Home care  · Medicines may be prescribed to help relieve pain. Take these medicines as directed. Ask your healthcare provider or pharmacist before using over-the-counter medicines for helping treat  pain and itching.  · In certain cases, antiviral medicines may be prescribed to reduce pain, shorten the illness, and prevent neuralgia. Take these medicines as directed.  · Compresses made from a solution of cool water mixed with cornstarch or baking soda may help relieve pain and itching.   · Gently wash skin daily with soap and water to help prevent infection.  Be certain to rinse off all of the soap, which can be irritating.  · Trim fingernails and try not to scratch. Scratching the sores may leave scars.  · Stay home from work or school until all blisters have formed a crust and you are no longer contagious.  Follow-up care  Follow up with your healthcare provider or as directed by our staff.  When to seek medical advice  · Fever of 100.4°F (38°C) or higher, or as directed by your healthcare provider  · Affected skin is on the face or neck and any of the following occur:  ¨ Headache  ¨ Eye pain  ¨ Changes in vision  ¨ Sores near the eye  ¨ Weakness of facial muscles  · Pain, redness, or swelling of a joint  · Signs of skin infection: colored drainage from the sores, warmth, increasing redness, or increasing pain  Date Last Reviewed: 9/25/2015  © 1664-7682 [employer]. [address] [place] [address], [place] [address]. All rights reserved. This information is not intended as a substitute for professional medical care. Always follow your healthcare professional's instructions.

## 2018-04-10 RX ORDER — AMITRIPTYLINE HYDROCHLORIDE 10 MG/1
TABLET, FILM COATED ORAL
Qty: 90 TABLET | Refills: 3 | Status: SHIPPED | OUTPATIENT
Start: 2018-04-10 | End: 2018-11-01 | Stop reason: SDUPTHER

## 2018-04-11 DIAGNOSIS — Z80.8 FAMILY HISTORY OF THYROID CANCER: ICD-10-CM

## 2018-04-11 DIAGNOSIS — E03.8 HYPOTHYROIDISM DUE TO HASHIMOTO'S THYROIDITIS: Primary | ICD-10-CM

## 2018-04-11 DIAGNOSIS — E04.2 MULTINODULAR GOITER: ICD-10-CM

## 2018-04-11 DIAGNOSIS — E06.3 HYPOTHYROIDISM DUE TO HASHIMOTO'S THYROIDITIS: Primary | ICD-10-CM

## 2018-04-11 RX ORDER — LIOTHYRONINE SODIUM 5 UG/1
TABLET ORAL
Qty: 350 TABLET | Refills: 3 | Status: SHIPPED | OUTPATIENT
Start: 2018-04-11 | End: 2019-02-25 | Stop reason: SDUPTHER

## 2018-04-13 RX ORDER — ZOLPIDEM TARTRATE 10 MG/1
TABLET ORAL
Qty: 30 TABLET | Refills: 3 | Status: SHIPPED | OUTPATIENT
Start: 2018-04-13 | End: 2018-04-16 | Stop reason: SDUPTHER

## 2018-04-15 ENCOUNTER — PATIENT MESSAGE (OUTPATIENT)
Dept: ENDOCRINOLOGY | Facility: CLINIC | Age: 58
End: 2018-04-15

## 2018-04-15 ENCOUNTER — PATIENT MESSAGE (OUTPATIENT)
Dept: INTERNAL MEDICINE | Facility: CLINIC | Age: 58
End: 2018-04-15

## 2018-04-16 RX ORDER — ZOLPIDEM TARTRATE 10 MG/1
TABLET ORAL
Qty: 30 TABLET | Refills: 3 | Status: SHIPPED | OUTPATIENT
Start: 2018-04-16 | End: 2018-08-28 | Stop reason: SDUPTHER

## 2018-04-17 ENCOUNTER — TELEPHONE (OUTPATIENT)
Dept: ENDOCRINOLOGY | Facility: CLINIC | Age: 58
End: 2018-04-17

## 2018-04-17 NOTE — TELEPHONE ENCOUNTER
----- Message from Ana Beth sent at 4/17/2018  2:44 PM CDT -----  Contact: [facility] -[phone] Ref #[id number]  Name heide not covered -CYTOMEL, but generic is- liothyronine.

## 2018-04-17 NOTE — TELEPHONE ENCOUNTER
Spoke to [facility] and let them know that it was ok to switch the Rx Cytomel to the Generic brand Rx Liothyroinine.

## 2018-05-16 ENCOUNTER — PATIENT MESSAGE (OUTPATIENT)
Dept: INTERNAL MEDICINE | Facility: CLINIC | Age: 58
End: 2018-05-16

## 2018-05-19 ENCOUNTER — LAB VISIT (OUTPATIENT)
Dept: LAB | Facility: HOSPITAL | Age: 58
End: 2018-05-19
Attending: INTERNAL MEDICINE
Payer: COMMERCIAL

## 2018-05-19 DIAGNOSIS — E06.3 HYPOTHYROIDISM DUE TO HASHIMOTO'S THYROIDITIS: ICD-10-CM

## 2018-05-19 DIAGNOSIS — E03.8 HYPOTHYROIDISM DUE TO HASHIMOTO'S THYROIDITIS: ICD-10-CM

## 2018-05-19 LAB
ALBUMIN SERPL BCP-MCNC: 3.9 G/DL
ALP SERPL-CCNC: 38 U/L
ALT SERPL W/O P-5'-P-CCNC: 19 U/L
ANION GAP SERPL CALC-SCNC: 8 MMOL/L
AST SERPL-CCNC: 17 U/L
BASOPHILS # BLD AUTO: 0.03 K/UL
BASOPHILS NFR BLD: 0.6 %
BILIRUB SERPL-MCNC: 0.4 MG/DL
BUN SERPL-MCNC: 13 MG/DL
CALCIUM SERPL-MCNC: 9.4 MG/DL
CHLORIDE SERPL-SCNC: 102 MMOL/L
CHOLEST SERPL-MCNC: 151 MG/DL
CHOLEST/HDLC SERPL: 2 {RATIO}
CO2 SERPL-SCNC: 31 MMOL/L
CREAT SERPL-MCNC: 0.6 MG/DL
DIFFERENTIAL METHOD: ABNORMAL
EOSINOPHIL # BLD AUTO: 0.2 K/UL
EOSINOPHIL NFR BLD: 3.2 %
ERYTHROCYTE [DISTWIDTH] IN BLOOD BY AUTOMATED COUNT: 13.1 %
EST. GFR  (AFRICAN AMERICAN): >60 ML/MIN/1.73 M^2
EST. GFR  (NON AFRICAN AMERICAN): >60 ML/MIN/1.73 M^2
GLUCOSE SERPL-MCNC: 91 MG/DL
HCT VFR BLD AUTO: 42.5 %
HDLC SERPL-MCNC: 77 MG/DL
HDLC SERPL: 51 %
HGB BLD-MCNC: 14.1 G/DL
LDLC SERPL CALC-MCNC: 67.6 MG/DL
LYMPHOCYTES # BLD AUTO: 1.1 K/UL
LYMPHOCYTES NFR BLD: 21 %
MCH RBC QN AUTO: 29.4 PG
MCHC RBC AUTO-ENTMCNC: 33.2 G/DL
MCV RBC AUTO: 89 FL
MONOCYTES # BLD AUTO: 0.5 K/UL
MONOCYTES NFR BLD: 8.4 %
NEUTROPHILS # BLD AUTO: 3.6 K/UL
NEUTROPHILS NFR BLD: 66.6 %
NONHDLC SERPL-MCNC: 74 MG/DL
PLATELET # BLD AUTO: 209 K/UL
PMV BLD AUTO: 8.9 FL
POTASSIUM SERPL-SCNC: 4.4 MMOL/L
PROT SERPL-MCNC: 6.8 G/DL
RBC # BLD AUTO: 4.8 M/UL
SODIUM SERPL-SCNC: 141 MMOL/L
T3 SERPL-MCNC: 102 NG/DL
T3FREE SERPL-MCNC: 2.8 PG/ML
T4 FREE SERPL-MCNC: 0.5 NG/DL
TRIGL SERPL-MCNC: 32 MG/DL
TSH SERPL DL<=0.005 MIU/L-ACNC: 0.9 UIU/ML
WBC # BLD AUTO: 5.37 K/UL

## 2018-05-19 PROCEDURE — 80053 COMPREHEN METABOLIC PANEL: CPT

## 2018-05-19 PROCEDURE — 36415 COLL VENOUS BLD VENIPUNCTURE: CPT

## 2018-05-19 PROCEDURE — 84443 ASSAY THYROID STIM HORMONE: CPT

## 2018-05-19 PROCEDURE — 84439 ASSAY OF FREE THYROXINE: CPT

## 2018-05-19 PROCEDURE — 80061 LIPID PANEL: CPT

## 2018-05-19 PROCEDURE — 84480 ASSAY TRIIODOTHYRONINE (T3): CPT

## 2018-05-19 PROCEDURE — 85025 COMPLETE CBC W/AUTO DIFF WBC: CPT

## 2018-05-19 PROCEDURE — 84481 FREE ASSAY (FT-3): CPT

## 2018-05-28 ENCOUNTER — OFFICE VISIT (OUTPATIENT)
Dept: INTERNAL MEDICINE | Facility: CLINIC | Age: 58
End: 2018-05-28
Payer: COMMERCIAL

## 2018-05-28 VITALS
BODY MASS INDEX: 19.02 KG/M2 | SYSTOLIC BLOOD PRESSURE: 98 MMHG | DIASTOLIC BLOOD PRESSURE: 60 MMHG | WEIGHT: 100.75 LBS | OXYGEN SATURATION: 99 % | HEART RATE: 91 BPM | HEIGHT: 61 IN

## 2018-05-28 DIAGNOSIS — E03.9 HYPOTHYROIDISM IN ADULT: Primary | ICD-10-CM

## 2018-05-28 PROCEDURE — 99213 OFFICE O/P EST LOW 20 MIN: CPT | Mod: S$GLB,,, | Performed by: INTERNAL MEDICINE

## 2018-05-28 PROCEDURE — 3008F BODY MASS INDEX DOCD: CPT | Mod: CPTII,S$GLB,, | Performed by: INTERNAL MEDICINE

## 2018-05-28 PROCEDURE — 99999 PR PBB SHADOW E&M-EST. PATIENT-LVL III: CPT | Mod: PBBFAC,,, | Performed by: INTERNAL MEDICINE

## 2018-05-28 RX ORDER — PILOCARPINE HYDROCHLORIDE 5 MG/1
5 TABLET, FILM COATED ORAL 3 TIMES DAILY PRN
Qty: 270 TABLET | Refills: 3 | Status: SHIPPED | OUTPATIENT
Start: 2018-05-28 | End: 2019-08-05 | Stop reason: SDUPTHER

## 2018-05-28 NOTE — PROGRESS NOTES
Subjective:      Patient ID: Berenice Hayes is a 58 y.o. female.    Chief Complaint: Follow-up    HPI:  HPI   Will discuss the following  Mole on the back  Area on left ankle:   Some trouble with ankle swelling but not as much foot swelling. This seems to be improved  She has noted changes with the Raynauds, face now involved in Raynauds and also ears.  Area on left side: feels like it may be a kink on the left side.: Cologard versus xray  Left side of through feels different: questions whether she should see Dr. Singh  Thyroid studies  Patient Active Problem List   Diagnosis    Fibromyalgia    Bladder spasm    Osteoporosis, postmenopausal    Fibrocystic breast changes    Vulvar vestibulodynia    Vulvar pain    Vascular disorder: Erytnromelalgia    Raynaud's disease    Incomplete defecation    Straining during bowel movements    Chronic constipation    Rectocele    Disorder of muscle    Anxiety    Bilateral hand pain    Fatigue    Perimenopause vs Memopause    Menopause syndrome    Pudendal neuralgia    Pelvic pain in female    [facility]    Urinary hesitancy    Hypothyroidism due to Hashimoto's thyroiditis    Multinodular goiter    Encounter for herb and vitamin supplement management    Gluten free diet    Sicca    Family history of ischemic heart disease (IHD)    Cystocele, midline     Past Medical History:   Diagnosis Date    Asthma with allergic rhinitis     Bladder spasm     Dense breasts     heterogeneously/fibrocystic disease    Elevated antinuclear antibody (GUICHO) level     Endometriosis of cervix     Erythromelalgia     Fibromyalgia     Ganglion cyst     left toe    Goiter     MNG    Hashimoto's disease     Hashimoto's thyroiditis     Headache(784.0)     Hypothyroidism     Hashimoto with + Tg ab    Osteoporosis     femoral neck    Raynaud's phenomenon     Rhinitis     Scoliosis     Sleep disorder     type of Narcolepsy ( resolved)    Vitamin D deficiency  "disease     Vulvodynia      Past Surgical History:   Procedure Laterality Date    BREAST SURGERY      fibrocystic tumor removed     SECTION      2x    CYST REMOVAL      laparoscopic cyst on ovary    HYSTERECTOMY      intradermal cyst       removed from left index finger    SINUS SURGERY      2x     Family History   Problem Relation Age of Onset    Diabetes Mother     Fibromyalgia Mother     Polymyalgia rheumatica Mother     Macular degeneration Mother     Arthritis Mother     Hypertension Mother     Kidney disease Mother     Pneumonia Mother     Adrenal disorder Mother         adrenal insufficiency    Coronary artery disease Father     Arthritis Father         osteoarthritis    Hypertension Father     Heart disease Father     Diabetes Father     Hyperlipidemia Father     Hyperlipidemia Brother     Cancer Brother         oral    Thyroid cancer Daughter     Cancer Daughter         thyroid    Hypothyroidism Daughter     Breast cancer Neg Hx     Ovarian cancer Neg Hx     Colon cancer Neg Hx      Review of Systems: discussion of symptoms    Objective:     Vitals:    18 1502   BP: 98/60   Pulse: 91   SpO2: 99%   Weight: 45.7 kg (100 lb 12 oz)   Height: 5' 1" (1.549 m)   PainSc:   4     Body mass index is 19.04 kg/m².  Physical Exam   Constitutional: She is oriented to person, place, and time. No distress.   Thin, autoimmune disorder   Neck: Carotid bruit is not present. No thyromegaly present.   Cardiovascular: Normal rate, regular rhythm and normal heart sounds.  PMI is not displaced.    Pulmonary/Chest: Effort normal and breath sounds normal. No respiratory distress.   Abdominal: Soft. Bowel sounds are normal. She exhibits no distension. There is no tenderness.   Musculoskeletal: She exhibits no edema.   Neurological: She is alert and oriented to person, place, and time.     Assessment:     1. Hypothyroidism in adult      Plan:   Berenice was seen today for " follow-up.    Diagnoses and all orders for this visit:    Hypothyroidism in adult: reviewed labs and no change in medication.    Other orders  -     pilocarpine (SALAGEN) 5 MG Tab; Take 1 tablet (5 mg total) by mouth 3 (three) times daily as needed.      Follow-up in about 6 months (around 11/12/2018) for Annual exam.     Medication List          Accurate as of 5/28/18 11:59 PM. If you have any questions, ask your nurse or doctor.               CONTINUE taking these medications    * amitriptyline 50 MG tablet  Commonly known as:  ELAVIL  TAKE 1 TABLET NIGHTLY WITH THE 10 MG AMITRIPTYLINE     * amitriptyline 10 MG tablet  Commonly known as:  ELAVIL  TAKE 1 TABLET DAILY     aspirin 81 MG EC tablet  Commonly known as:  ECOTRIN     brompheniramine-pseudoeph-DM 2-30-10 mg/5 mL Syrp     DICLOFENAC SODIUM TOP     * estradiol 10 mcg Tab  Commonly known as:  VAGIFEM  Place 1 tablet (10 mcg total) vaginally twice a week.     * estradiol 0.05 mg/24 hr td ptsw 0.05 mg/24 hr  Commonly known as:  VIVELLE-DOT  APPLY 1 PATCH ONTO SKIN TWICE A WEEK     fluticasone 50 mcg/actuation nasal spray  Commonly known as:  FLONASE  1 spray by Each Nare route once daily.     gabapentin 100 MG capsule  Commonly known as:  NEURONTIN  TAKE 1 CAPSULE IN THE MORNING, 1 CAPSULE AT NOON, 1 CAPSULE AT 5 P.M. AND 4 CAPSULES AT BEDTIME     lidocaine-prilocaine cream  Commonly known as:  EMLA     liothyronine 5 MCG Tab  Commonly known as:  CYTOMEL  Alternate days 3 (15mcg) and 3.5 (17.5) tablets, Brand name medically  necessary     meloxicam 7.5 MG tablet  Commonly known as:  MOBIC  TAKE 1 TABLET (7.5 MG TOTAL) BY MOUTH ONCE DAILY. FOR INFLAMMATION     metaxalone 800 MG tablet  Commonly known as:  SKELAXIN  TAKE 1 TABLET EVERY EVENING (SPASM)     MUCINEX 600 mg 12 hr tablet  Generic drug:  guaiFENesin     multivitamin capsule     pilocarpine 5 MG Tab  Commonly known as:  SALAGEN  Take 1 tablet (5 mg total) by mouth 3 (three) times daily as needed.      PREVIDENT 5000 DRY MOUTH 1.1 % Gel  Generic drug:  fluoride (sodium)     PROAIR HFA 90 mcg/actuation inhaler  Generic drug:  albuterol     RESTASIS 0.05 % ophthalmic emulsion  Generic drug:  cycloSPORINE     risedronate 35 mg Tbec  Take 1 tablet (35 mg total) by mouth once a week.     VITAMIN D3 1,000 unit capsule  Generic drug:  cholecalciferol (vitamin D3)     zolpidem 10 mg Tab  Commonly known as:  AMBIEN  TAKE ONE TABLET BY MOUTH EVERY NIGHT AT BEDTIME AS NEEDED        * This list has 4 medication(s) that are the same as other medications prescribed for you. Read the directions carefully, and ask your doctor or other care provider to review them with you.               Where to Get Your Medications      These medications were sent to [facility] - [address] [address]    Phone:  [phone]   · pilocarpine 5 MG Tab

## 2018-06-04 ENCOUNTER — PATIENT MESSAGE (OUTPATIENT)
Dept: PAIN MEDICINE | Facility: CLINIC | Age: 58
End: 2018-06-04

## 2018-06-13 ENCOUNTER — TELEPHONE (OUTPATIENT)
Dept: CARDIOLOGY | Facility: CLINIC | Age: 58
End: 2018-06-13

## 2018-06-13 ENCOUNTER — PATIENT MESSAGE (OUTPATIENT)
Dept: CARDIOLOGY | Facility: CLINIC | Age: 58
End: 2018-06-13

## 2018-06-13 NOTE — TELEPHONE ENCOUNTER
----- Message from Amalia Park sent at 6/12/2018  4:05 PM CDT -----  Contact: [phone]/ self  Patient requesting to speak with you regarding scheduling ordered US for the beginning of July. Please advise.

## 2018-07-10 ENCOUNTER — OFFICE VISIT (OUTPATIENT)
Dept: RHEUMATOLOGY | Facility: CLINIC | Age: 58
End: 2018-07-10
Payer: COMMERCIAL

## 2018-07-10 VITALS
HEIGHT: 61 IN | HEART RATE: 100 BPM | SYSTOLIC BLOOD PRESSURE: 97 MMHG | BODY MASS INDEX: 18.54 KG/M2 | DIASTOLIC BLOOD PRESSURE: 63 MMHG | WEIGHT: 98.19 LBS

## 2018-07-10 DIAGNOSIS — M79.7 FIBROMYALGIA: Primary | ICD-10-CM

## 2018-07-10 DIAGNOSIS — M35.00 SICCA SYNDROME: ICD-10-CM

## 2018-07-10 DIAGNOSIS — R53.83 FATIGUE, UNSPECIFIED TYPE: ICD-10-CM

## 2018-07-10 PROCEDURE — 3008F BODY MASS INDEX DOCD: CPT | Mod: CPTII,S$GLB,, | Performed by: INTERNAL MEDICINE

## 2018-07-10 PROCEDURE — 99215 OFFICE O/P EST HI 40 MIN: CPT | Mod: S$GLB,,, | Performed by: INTERNAL MEDICINE

## 2018-07-10 PROCEDURE — 99999 PR PBB SHADOW E&M-EST. PATIENT-LVL III: CPT | Mod: PBBFAC,,, | Performed by: INTERNAL MEDICINE

## 2018-07-10 ASSESSMENT — ROUTINE ASSESSMENT OF PATIENT INDEX DATA (RAPID3)
PATIENT GLOBAL ASSESSMENT SCORE: 6.5
FATIGUE SCORE: 3.5
AM STIFFNESS SCORE: 1, YES
MDHAQ FUNCTION SCORE: 1.3
PAIN SCORE: 6.5
TOTAL RAPID3 SCORE: 5.78
PSYCHOLOGICAL DISTRESS SCORE: 4.4
WHEN YOU AWAKENED IN THE MORNING OVER THE LAST WEEK, PLEASE INDICATE THE AMOUNT OF TIME IT TAKES UNTIL YOU ARE AS LIMBER AS YOU WILL BE FOR THE DAY: 2 HOURS

## 2018-07-10 NOTE — PROGRESS NOTES
Rapid3 Question Responses and Scores 7/7/2018   MDHAQ Score 1.3   Psychologic Score 4.4   Pain Score 6.5   When you awakened in the morning OVER THE LAST WEEK, did you feel stiff? Yes   If Yes, please indicate the number of hours until you are as limber as you will be for the day 2   Fatigue Score 3.5   Global Health Score 6.5   RAPID3 Score 5.77       Answers for HPI/ROS submitted by the patient on 7/7/2018   fever: No  eye redness: Yes  headaches: Yes  shortness of breath: No  chest pain: No  trouble swallowing: Yes  diarrhea: No  constipation: Yes  unexpected weight change: No  genital sore: No  dysuria: No  During the last 3 days, have you had a skin rash?: No  Bruises or bleeds easily: Yes  cough: No

## 2018-07-10 NOTE — PROGRESS NOTES
"Subjective:       Patient ID: Berenice Hayes is a 58 y.o. female.    Chief Complaint: Pain      HPI:  Berenice Hayes is a 58 y.o. female followed for concerns of autoimmune issues.   Diagnosed with fibromyalgia at age 34.   At age 42 had erythromyelgia with redness all over. She was treated by vascular with a medication but caused Raynauds to develop.  She saw Dr. Hill in vascular.      She has history significant pelvic discomfort since 2009.  She saw therapist for pelvic floor therapy which has helped. 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  She had pudenal nerve block 2/3/15.     She saw Dr. Harmon in the past who felt she had fibriomyalgia.  She tried amitriptyline for some time.   She takes Atelvia for OP.        She has felt bad since 2014.  "Hit with massive heat wave beyond what others feel with menopause."  Mouth was very dry and primary gave pilocarpine.  Spring of 2015 GUICHO was negative.  Previously had hand pains.  X-rays of hands normal.  She was found by endocrine to be hypoglycemic.  Ultrasound thyroid showed nodules that were negative on biopsy.  August 2015 diagnosed as having Hashimotos disease.   Synthroid did not help levels.  She is on Cytomel now.    Saw ENT for throat pain in neck since ultrasound was done.  ENT will biopsy a solid nodule on left lobe thyroid.  CT with contrast showed enlargement in the saliva gland.  Her mother had fibromyalgia and PMR as well as history ETOH abuse as an adult.  Father had osteoarthritis and required complete thumb reconstruction on one thumb.     Daughter with Hashimotos and thyroid Ca.    Interval History:    Was not converting T4 to T3.  Stopped Cytomel due to heart rate issues.  Stopped Synthroid and increased Cytomel back up slowly.  Sees Dr. Espinoza in endocrine who put her back on Actonel.    Had stressful situation due to losing her father.  Nerve pain responded to treatment.   Knees were starting to give out but improved with nightly " "squats.  Notices significant Raynauds symptoms involving the feet more than erythromyelgia.  Has had bilateral ankle swelling at end of day.  Compression socks have helped some but have to be a little loose.  She is an  and sits long periods which cause more pain.      Hips hurt intermittently R>L.  Feels it is nerve generated.    6/28/18 had severe right hip pain and pain in right second finger tip, wrist and arm.  Was concerned that Ambien may have caused her to get up and injure herself but no bruising occurred.   Cold air creates pain in gums and teeth.  She will see cardiology for ABIs.     Review of Systems   Constitutional: Negative for fever and unexpected weight change.   HENT: Positive for trouble swallowing.    Eyes: Positive for redness.   Respiratory: Negative for cough and shortness of breath.    Cardiovascular: Negative for chest pain.   Gastrointestinal: Positive for constipation. Negative for diarrhea.   Endocrine: Negative.    Genitourinary: Negative for dysuria and genital sores.   Musculoskeletal: Positive for arthralgias.   Skin: Negative for rash.   Allergic/Immunologic: Negative.    Neurological: Positive for headaches.   Hematological: Bruises/bleeds easily.   Psychiatric/Behavioral: Negative.          Objective:   BP 97/63   Pulse 100   Ht 5' 1" (1.549 m)   Wt 44.5 kg (98 lb 3.2 oz)   LMP  (LMP Unknown)   BMI 18.55 kg/m²       Physical Exam   Constitutional: She is oriented to person, place, and time and well-developed, well-nourished, and in no distress.   HENT:   Head: Normocephalic and atraumatic.   Decreased salivary pooling   Eyes: Conjunctivae and EOM are normal.   Decreased tear production   Neck: Neck supple.   Cardiovascular: Normal rate and regular rhythm.    Pulmonary/Chest: Effort normal and breath sounds normal.   Abdominal: Soft. Bowel sounds are normal.   Neurological: She is alert and oriented to person, place, and time. Gait normal.   Skin: Skin is " warm and dry.     Psychiatric: Mood and affect normal.   Musculoskeletal: Normal range of motion.           LABS    Component      Latest Ref Rng & Units 5/19/2018   WBC      3.90 - 12.70 K/uL 5.37   RBC      4.00 - 5.40 M/uL 4.80   Hemoglobin      12.0 - 16.0 g/dL 14.1   Hematocrit      37.0 - 48.5 % 42.5   MCV      82 - 98 fL 89   MCH      27.0 - 31.0 pg 29.4   MCHC      32.0 - 36.0 g/dL 33.2   RDW      11.5 - 14.5 % 13.1   Platelets      150 - 350 K/uL 209   MPV      9.2 - 12.9 fL 8.9 (L)   Gran # (ANC)      1.8 - 7.7 K/uL 3.6   Lymph #      1.0 - 4.8 K/uL 1.1   Mono #      0.3 - 1.0 K/uL 0.5   Eos #      0.0 - 0.5 K/uL 0.2   Baso #      0.00 - 0.20 K/uL 0.03   Gran%      38.0 - 73.0 % 66.6   Lymph%      18.0 - 48.0 % 21.0   Mono%      4.0 - 15.0 % 8.4   Eosinophil%      0.0 - 8.0 % 3.2   Basophil%      0.0 - 1.9 % 0.6   Differential Method       Automated   Sodium      136 - 145 mmol/L 141   Potassium      3.5 - 5.1 mmol/L 4.4   Chloride      95 - 110 mmol/L 102   CO2      23 - 29 mmol/L 31 (H)   Glucose      70 - 110 mg/dL 91   BUN, Bld      6 - 20 mg/dL 13   Creatinine      0.5 - 1.4 mg/dL 0.6   Calcium      8.7 - 10.5 mg/dL 9.4   Total Protein      6.0 - 8.4 g/dL 6.8   Albumin      3.5 - 5.2 g/dL 3.9   Total Bilirubin      0.1 - 1.0 mg/dL 0.4   Alkaline Phosphatase      55 - 135 U/L 38 (L)   AST      10 - 40 U/L 17   ALT      10 - 44 U/L 19   Anion Gap      8 - 16 mmol/L 8   eGFR if African American      >60 mL/min/1.73 m:2 >60   eGFR if non African American      >60 mL/min/1.73 m:2 >60   Cholesterol      120 - 199 mg/dL 151   Triglycerides      30 - 150 mg/dL 32   HDL      40 - 75 mg/dL 77 (H)   LDL Cholesterol      63.0 - 159.0 mg/dL 67.6   HDL/Chol Ratio      20.0 - 50.0 % 51.0 (H)   Total Cholesterol/HDL Ratio      2.0 - 5.0 2.0   Non-HDL Cholesterol      mg/dL 74   TSH      0.400 - 4.000 uIU/mL 0.897   Free T4      0.71 - 1.51 ng/dL 0.50 (L)   T3, Free      2.3 - 4.2 pg/mL 2.8   T3, Total      60 - 180  ng/dL 102        Component      Latest Ref Rng & Units 5/19/2018 7/21/2017   WBC      3.90 - 12.70 K/uL 5.37 5.25   RBC      4.00 - 5.40 M/uL 4.80 4.59   Hemoglobin      12.0 - 16.0 g/dL 14.1 13.6   Hematocrit      37.0 - 48.5 % 42.5 40.3   MCV      82 - 98 fL 89 88   MCH      27.0 - 31.0 pg 29.4 29.6   MCHC      32.0 - 36.0 g/dL 33.2 33.7   RDW      11.5 - 14.5 % 13.1 12.8   Platelets      150 - 350 K/uL 209 186   MPV      9.2 - 12.9 fL 8.9 (L) 9.0 (L)   Gran # (ANC)      1.8 - 7.7 K/uL 3.6 3.6   Lymph #      1.0 - 4.8 K/uL 1.1 1.1   Mono #      0.3 - 1.0 K/uL 0.5 0.3   Eos #      0.0 - 0.5 K/uL 0.2 0.3   Baso #      0.00 - 0.20 K/uL 0.03 0.04   Gran%      38.0 - 73.0 % 66.6 67.6   Lymph%      18.0 - 48.0 % 21.0 21.1   Mono%      4.0 - 15.0 % 8.4 5.3   Eosinophil%      0.0 - 8.0 % 3.2 4.8   Basophil%      0.0 - 1.9 % 0.6 0.8   Differential Method       Automated Automated   Sodium      136 - 145 mmol/L 141 139   Potassium      3.5 - 5.1 mmol/L 4.4 3.7   Chloride      95 - 110 mmol/L 102 99   CO2      23 - 29 mmol/L 31 (H) 33 (H)   Glucose      70 - 110 mg/dL 91 92   BUN, Bld      6 - 20 mg/dL 13 19   Creatinine      0.5 - 1.4 mg/dL 0.6 0.7   Calcium      8.7 - 10.5 mg/dL 9.4 9.2   Total Protein      6.0 - 8.4 g/dL 6.8 7.8   Albumin      3.5 - 5.2 g/dL 3.9 4.2   Total Bilirubin      0.1 - 1.0 mg/dL 0.4 0.3   Alkaline Phosphatase      55 - 135 U/L 38 (L) 67   AST      10 - 40 U/L 17 20   ALT      10 - 44 U/L 19 21   Anion Gap      8 - 16 mmol/L 8 7 (L)   eGFR if African American      >60 mL/min/1.73 m:2 >60 >60.0   eGFR if non African American      >60 mL/min/1.73 m:2 >60 >60.0   Anti-Ashley-1 Antibody      <20 Units  <20   PL-7      Negative  Negative   PL-12      Negative  Negative   EJ      Negative  Negative   OJ      Negative  Negative   SRP      Negative  Negative   MI-2      Negative  Negative   TIF1 GAMMA (P155/140)      <20 Units  <20   MDA-5 (P140) (CADM-140)      <20 Units  <20   NXP-2 (P140)      <20 Units   <20   Anti-PM/Scl Ab      <20 Units  <20   Fibrillarin (U3 RNP)      Negative  Negative   U2 snRNP      Negative  Negative   Anti-U1-RNP  Ab      <20 Units  <20   Ku      Negative  Negative   Anti-SS-A 52 kD Ab, IgG      <20 Units  58 (H)   Specimen UA        Urine, Unspecified   Color, UA      Yellow, Straw, Kari  Yellow   Appearance, UA      Clear  Hazy (A)   pH, UA      5.0 - 8.0  7.0   Specific Gravity, UA      1.005 - 1.030  1.010   Protein, UA      Negative  Negative   Glucose, UA      Negative  Negative   Ketones, UA      Negative  Negative   Bilirubin (UA)      Negative  Negative   Occult Blood UA      Negative  Negative   Nitrite, UA      Negative  Negative   Urobilinogen, UA      <2.0 EU/dL  Negative   Leukocytes, UA      Negative  Negative   Cholesterol      120 - 199 mg/dL 151    Triglycerides      30 - 150 mg/dL 32    HDL      40 - 75 mg/dL 77 (H)    LDL Cholesterol      63.0 - 159.0 mg/dL 67.6    HDL/Chol Ratio      20.0 - 50.0 % 51.0 (H)    Total Cholesterol/HDL Ratio      2.0 - 5.0 2.0    Non-HDL Cholesterol      mg/dL 74    Anti-SSA Antibody      0.00 - 19.99 EU  9.04   Anti-SSA Interpretation      Negative  Negative   Anti-SSB Antibody      0.00 - 19.99 EU  1.32   Anti-SSB Interpretation      Negative  Negative   Anti Sm/RNP Antibody      0.00 - 19.99 EU  8.63   Anti-Sm/RNP Interpretation      Negative  Negative   Aldolase      1.2 - 7.6 U/L  3.3   GUICHO Screen      Negative <1:160  Negative <1:160   CRP      0.0 - 8.2 mg/L  0.3   CPK      20 - 180 U/L  47   CCP Antibodies      <5.0 U/mL  <0.5   Rheumatoid Factor      0.0 - 15.0 IU/mL  <10.0   Scleroderma SCL-      <20 UNITS  12   RNA Polymerase III Antibodies, IgG, Serum      <20.0 (Negative) U  11.5   SCL-70 Antibody      <1.0 (Negative) U  <0.2   Th/To      Not Detected  Negative   TSH      0.400 - 4.000 uIU/mL 0.897    Free T4      0.71 - 1.51 ng/dL 0.50 (L)    T3, Free      2.3 - 4.2 pg/mL 2.8    T3, Total      60 - 180 ng/dL 102       Assessment:       1. Fibromyalgia. Managed with amitriptyline. Persistent pain and unable to increase amitriptyline due to worsening fatigue.  2. Raynauds. Symptomatic management.  3. Sicca. Chronic.  Negative SSA and SSB in past now SSA positive. Continue restasis.  Repeat labs.  Currently doing symptomatic management with Restasis and pilocarpine.   4. Hand pain b/l. X-ray without changes. Has periodic episodes of pain.  5. Osteoporosis.  No longer on Atelvia now on estradiol patch.  6. Erythromyelgia  7. Sleep disorder. History of narcolepsy. Sleep doctor has given Ritalin bid which helps.  8. Increased heat in body. Improved  9. Menopause  10.  Hashimotos disease  11.  Multinodular goiter   12.  Pudenal nerve pain.  Improved with acupuncture which she continues every 2 weeks. Worsened with water therapy so stopped.    Plan:       1.  In past no evidence of rheumatologic disorder.  Now SSA positive on myomarker panel but negative separately.   Repeat GUICHO, SSA and SSB, CRP and ESR, CPK, UA, myomarker panel.  Consider Plaquenil but concerned about polypharymacy.    2.  Patient to keep regular follow ups to monitor for progression to rheumatologic disorder.  3.  Continue fibromyalgia treatment.     RTO 12 months/prn  Patient seen face to face for 40 minutes and greater than 50% spent in counseling regarding concerns for rheumatologic causes for her multiple symptoms.

## 2018-07-11 ENCOUNTER — PATIENT MESSAGE (OUTPATIENT)
Dept: RHEUMATOLOGY | Facility: CLINIC | Age: 58
End: 2018-07-11

## 2018-07-12 ENCOUNTER — PATIENT MESSAGE (OUTPATIENT)
Dept: INTERNAL MEDICINE | Facility: CLINIC | Age: 58
End: 2018-07-12

## 2018-07-20 DIAGNOSIS — M79.7 FIBROMYALGIA: ICD-10-CM

## 2018-07-20 RX ORDER — METAXALONE 800 MG/1
TABLET ORAL
Qty: 90 TABLET | Refills: 1 | Status: SHIPPED | OUTPATIENT
Start: 2018-07-20 | End: 2018-11-01 | Stop reason: SDUPTHER

## 2018-07-23 ENCOUNTER — HOSPITAL ENCOUNTER (OUTPATIENT)
Dept: CARDIOLOGY | Facility: HOSPITAL | Age: 58
Discharge: HOME | End: 2018-07-23
Attending: INTERNAL MEDICINE
Payer: COMMERCIAL

## 2018-07-23 DIAGNOSIS — I73.00 RAYNAUD'S DISEASE WITHOUT GANGRENE: ICD-10-CM

## 2018-07-23 DIAGNOSIS — I99.9 VASCULAR DISORDER: ICD-10-CM

## 2018-07-23 PROCEDURE — 93922 UPR/L XTREMITY ART 2 LEVELS: CPT

## 2018-07-23 PROCEDURE — 93922 UPR/L XTREMITY ART 2 LEVELS: CPT | Mod: 26,,, | Performed by: INTERNAL MEDICINE

## 2018-07-24 DIAGNOSIS — R10.2 PELVIC PAIN IN FEMALE: ICD-10-CM

## 2018-07-24 RX ORDER — GABAPENTIN 100 MG/1
CAPSULE ORAL
Qty: 630 CAPSULE | Refills: 1 | Status: SHIPPED | OUTPATIENT
Start: 2018-07-24 | End: 2018-11-01 | Stop reason: SDUPTHER

## 2018-07-26 ENCOUNTER — PATIENT MESSAGE (OUTPATIENT)
Dept: RHEUMATOLOGY | Facility: CLINIC | Age: 58
End: 2018-07-26

## 2018-07-26 LAB — VASCULAR ANKLE BRACHIAL INDEX (ABI) LEFT: 1.06 (ref 0.9–1.2)

## 2018-07-27 ENCOUNTER — PATIENT MESSAGE (OUTPATIENT)
Dept: RHEUMATOLOGY | Facility: CLINIC | Age: 58
End: 2018-07-27

## 2018-08-13 ENCOUNTER — OFFICE VISIT (OUTPATIENT)
Dept: CARDIOLOGY | Facility: CLINIC | Age: 58
End: 2018-08-13
Payer: COMMERCIAL

## 2018-08-13 VITALS
HEIGHT: 61 IN | BODY MASS INDEX: 18.69 KG/M2 | SYSTOLIC BLOOD PRESSURE: 90 MMHG | WEIGHT: 99 LBS | DIASTOLIC BLOOD PRESSURE: 55 MMHG | HEART RATE: 79 BPM

## 2018-08-13 DIAGNOSIS — I73.00 RAYNAUD'S DISEASE WITHOUT GANGRENE: Primary | ICD-10-CM

## 2018-08-13 DIAGNOSIS — I99.9 VASCULAR DISORDER: ICD-10-CM

## 2018-08-13 PROCEDURE — 99215 OFFICE O/P EST HI 40 MIN: CPT | Mod: S$GLB,,, | Performed by: INTERNAL MEDICINE

## 2018-08-13 PROCEDURE — 3008F BODY MASS INDEX DOCD: CPT | Mod: CPTII,S$GLB,, | Performed by: INTERNAL MEDICINE

## 2018-08-13 PROCEDURE — 99999 PR PBB SHADOW E&M-EST. PATIENT-LVL IV: CPT | Mod: PBBFAC,,, | Performed by: INTERNAL MEDICINE

## 2018-08-13 NOTE — PROGRESS NOTES
Subjective:   Patient ID:  Berenice Hayes is a 58 y.o. female who presents for follow up of Raynaud Disease and erytnomelalgia      HPI:       She is here for follow up of extensive raynaud and erytnromelalgia complicated with severe distal digits pain. No ulcers. Overtime she has found a way to adjust her living and working conditions to avoid any severe issues. Last year has been a tough with more symptoms involving her face in addition to her digits. She takes aspirin 81 mg daily. She has no macrovascular disease. LUIS MANUEL and WBI in 2013 were unremarkable. ECG 6/2017: NSR. She sees rheumatology for hashimoto + sicca.         LUIS MANUEL 7/2018     R 1.06   L 1.06      Patient Active Problem List    Diagnosis Date Noted    Sicca syndrome 07/10/2018    Cystocele, midline 12/07/2017    Family history of ischemic heart disease (IHD) 07/24/2017       Father with PCI in his 60'        Sicca 06/30/2016    Encounter for herb and vitamin supplement management 03/22/2016     Started on supplements in January ( Teagan Maurer) Neurotrophin PMG ,  Immuplex, Echinacea Premium, Rehmannia Complex, DHEA, Pregnenelone  Cataplex B    Metanx        Gluten free diet 03/22/2016     Gluten free and corn free      Hypothyroidism due to Hashimoto's thyroiditis 10/23/2015    Multinodular goiter 10/23/2015    Pelvic pain in female 05/10/2015    Mittelschmerz 05/10/2015    Urinary hesitancy 05/10/2015    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    Perimenopause vs Memopause 01/27/2015    Menopause syndrome 01/27/2015    Bilateral hand pain 01/22/2015    Fatigue 01/22/2015    Anxiety 09/26/2014    Disorder of muscle 09/17/2014    Incomplete defecation 09/09/2014    Straining during bowel movements 09/09/2014    Chronic constipation 09/09/2014    Rectocele 09/09/2014    Raynaud's disease 04/03/2014    Vascular disorder: Erytnromelalgia 09/27/2013     Consult Vascular Medicine: 9/27/2013: LUIS MANUEL with WBI      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     Vulvar pain 09/10/2013    Fibrocystic breast changes 2012    Osteoporosis, postmenopausal 2012    Fibromyalgia     Bladder spasm            Right Arm BP - Sittin/60  Left Arm BP - Sittin/55        LABS    LAST HbA1c  Lab Results   Component Value Date    HGBA1C 5.3 2016       Lipid panel  Lab Results   Component Value Date    CHOL 151 2018    CHOL 168 2016    CHOL 168 2016     Lab Results   Component Value Date    HDL 77 (H) 2018    HDL 79 (H) 2016    HDL 79 (H) 2016     Lab Results   Component Value Date    LDLCALC 67.6 2018    LDLCALC 82.0 2016    LDLCALC 82.0 2016     Lab Results   Component Value Date    TRIG 32 2018    TRIG 35 2016    TRIG 35 2016     Lab Results   Component Value Date    CHOLHDL 51.0 (H) 2018    CHOLHDL 47.0 2016    CHOLHDL 47.0 2016            Review of Systems   Constitution: Positive for weakness. Negative for diaphoresis, night sweats, weight gain and weight loss.   HENT: Negative for congestion.    Eyes: Negative for blurred vision, discharge and double vision.   Cardiovascular: Negative for chest pain, claudication, cyanosis, dyspnea on exertion, irregular heartbeat, leg swelling, near-syncope, orthopnea, palpitations, paroxysmal nocturnal dyspnea and syncope.   Respiratory: Negative for cough, shortness of breath and wheezing.    Endocrine: Negative for cold intolerance, heat intolerance and polyphagia.   Hematologic/Lymphatic: Negative for adenopathy and bleeding problem. Does not bruise/bleed easily.   Skin: Positive for color change. Negative for dry skin and nail changes.   Musculoskeletal: Positive for joint pain and muscle weakness. Negative for arthritis, back pain, falls, myalgias and neck pain.   Gastrointestinal: Negative for bloating, abdominal pain, change in bowel habit and constipation.   Genitourinary: Negative for bladder incontinence, dysuria,  flank pain, genital sores and missed menses.   Neurological: Negative for aphonia, brief paralysis, difficulty with concentration and dizziness.   Psychiatric/Behavioral: Negative for altered mental status and memory loss. The patient does not have insomnia.    Allergic/Immunologic: Negative for environmental allergies.       Objective:   Physical Exam   Constitutional: She is oriented to person, place, and time. She appears well-developed and well-nourished. She is not intubated.   HENT:   Head: Normocephalic and atraumatic.   Right Ear: External ear normal.   Left Ear: External ear normal.   Mouth/Throat: Oropharynx is clear and moist.   Eyes: Conjunctivae and EOM are normal. Pupils are equal, round, and reactive to light. Right eye exhibits no discharge. Left eye exhibits no discharge. No scleral icterus.   Neck: Normal range of motion. Neck supple. Normal carotid pulses, no hepatojugular reflux and no JVD present. Carotid bruit is not present. No tracheal deviation present. No thyromegaly present.   Cardiovascular: Normal rate, regular rhythm, S1 normal and S2 normal.  No extrasystoles are present. PMI is not displaced. Exam reveals no gallop, no S3, no distant heart sounds, no friction rub and no midsystolic click.   No murmur heard.  Pulses:       Carotid pulses are 2+ on the right side, and 2+ on the left side.       Radial pulses are 2+ on the right side, and 2+ on the left side.        Femoral pulses are 2+ on the right side, and 2+ on the left side.       Popliteal pulses are 2+ on the right side, and 2+ on the left side.        Dorsalis pedis pulses are 2+ on the right side, and 2+ on the left side.        Posterior tibial pulses are 2+ on the right side, and 2+ on the left side.   Pulmonary/Chest: Effort normal and breath sounds normal. No accessory muscle usage or stridor. No apnea, no tachypnea and no bradypnea. She is not intubated. No respiratory distress. She has no decreased breath sounds. She has  no wheezes. She has no rales. She exhibits no tenderness and no bony tenderness.   Abdominal: She exhibits no distension, no pulsatile liver, no abdominal bruit, no ascites, no pulsatile midline mass and no mass. There is no tenderness. There is no rebound and no guarding.   Musculoskeletal: Normal range of motion. She exhibits no edema or tenderness.   Lymphadenopathy:     She has no cervical adenopathy.   Neurological: She is alert and oriented to person, place, and time. She has normal reflexes. No cranial nerve deficit. Coordination normal.   Skin: Skin is warm. No rash noted. No erythema. No pallor.       Blanching cyanosis of all toes      No pain      No ulcers           Psychiatric: She has a normal mood and affect. Her behavior is normal. Judgment and thought content normal.       Assessment:     1. Raynaud's disease without gangrene    2. Vascular disorder: Erytnromelalgia        Plan:         Continue with current medical plan and lifestyle changes.  Return sooner for concerns or questions. If symptoms persist go to the ED  I have reviewed all pertinent data on this patient       Continue to adjust lifestyle around triggers for either Raynaud or Erytnromelalgia   Exposure to cold or extreme heat      No invasive procedures   Follow up with PCP, Rheumatology, and Neurology      I have reviewed the patient's medical history in detail and updated the computerized patient record.    Orders Placed This Encounter   Procedures    Cardiology Lab Ankle Brachial Indices W Stress     Standing Status:   Future     Standing Expiration Date:   8/13/2019       Follow up as scheduled. Return sooner for concerns or questions  Follow up in a year             She expressed verbal understanding and agreed with the plan        Patient's Medications   New Prescriptions    No medications on file   Previous Medications    ALBUTEROL (PROVENTIL HFA/VENTOLIN HFA) 200 PUFF INHALER    Inhale 2 puffs into the lungs every 6 (six)  hours as needed.      AMITRIPTYLINE (ELAVIL) 10 MG TABLET    TAKE 1 TABLET DAILY    AMITRIPTYLINE (ELAVIL) 50 MG TABLET    TAKE 1 TABLET NIGHTLY WITH THE 10 MG AMITRIPTYLINE    ASPIRIN (ECOTRIN) 81 MG EC TABLET    Take 81 mg by mouth once daily.    CHOLECALCIFEROL, VITAMIN D3, (VITAMIN D3) 1,000 UNIT CAPSULE    Take 2,000 Units by mouth once daily.     DICLOFENAC SODIUM TOP    Apply 1 application topically. Use up tp 5x/day Diclofenac 0.15%/lidocaine 2.25%/prilocaine 2.25% [facility]: [phone]    ESTRADIOL (VAGIFEM) 10 MCG TAB    Place 1 tablet (10 mcg total) vaginally twice a week.    ESTRADIOL 0.05 MG/24 HR TD PTSW (VIVELLE-DOT) 0.05 MG/24 HR    APPLY 1 PATCH ONTO SKIN TWICE A WEEK    FLUTICASONE (FLONASE) 50 MCG/ACTUATION NASAL SPRAY    1 spray by Each Nare route once daily.    GABAPENTIN (NEURONTIN) 100 MG CAPSULE    TAKE 1 CAPSULE IN THE MORNING, 1 CAPSULE AT NOON, 1 CAPSULE AT 5 P.M. AND 4 CAPSULES AT BEDTIME    GUAIFENESIN (MUCINEX) 600 MG 12 HR TABLET    Take 600 mg by mouth 2 (two) times daily.    LIDOCAINE-PRILOCAINE (EMLA) CREAM        LIOTHYRONINE (CYTOMEL) 5 MCG TAB    Alternate days 3 (15mcg) and 3.5 (17.5) tablets, Brand name medically  necessary    MELOXICAM (MOBIC) 7.5 MG TABLET    TAKE 1 TABLET (7.5 MG TOTAL) BY MOUTH ONCE DAILY. FOR INFLAMMATION    METAXALONE (SKELAXIN) 800 MG TABLET    TAKE 1 TABLET EVERY EVENING (SPASM)    MULTIVITAMIN CAPSULE    Take 1 capsule by mouth once daily.      PILOCARPINE (SALAGEN) 5 MG TAB    Take 1 tablet (5 mg total) by mouth 3 (three) times daily as needed.    PREVIDENT 5000 DRY MOUTH 1.1 % GEL        RESTASIS 0.05 % OPHTHALMIC EMULSION        RISEDRONATE 35 MG TBEC    Take 1 tablet (35 mg total) by mouth once a week.    ZOLPIDEM (AMBIEN) 10 MG TAB    TAKE ONE TABLET BY MOUTH EVERY NIGHT AT BEDTIME AS NEEDED   Modified Medications    No medications on file   Discontinued Medications    No medications on file

## 2018-08-28 ENCOUNTER — DOCUMENTATION ONLY (OUTPATIENT)
Dept: INTERNAL MEDICINE | Facility: CLINIC | Age: 58
End: 2018-08-28

## 2018-08-28 ENCOUNTER — OFFICE VISIT (OUTPATIENT)
Dept: INTERNAL MEDICINE | Facility: CLINIC | Age: 58
End: 2018-08-28
Attending: INTERNAL MEDICINE
Payer: COMMERCIAL

## 2018-08-28 VITALS
DIASTOLIC BLOOD PRESSURE: 62 MMHG | HEIGHT: 61 IN | HEART RATE: 78 BPM | OXYGEN SATURATION: 97 % | BODY MASS INDEX: 18.48 KG/M2 | SYSTOLIC BLOOD PRESSURE: 118 MMHG | WEIGHT: 97.88 LBS

## 2018-08-28 DIAGNOSIS — G47.00 INSOMNIA, UNSPECIFIED TYPE: ICD-10-CM

## 2018-08-28 DIAGNOSIS — E03.8 HYPOTHYROIDISM DUE TO HASHIMOTO'S THYROIDITIS: Primary | ICD-10-CM

## 2018-08-28 DIAGNOSIS — Z12.11 COLON CANCER SCREENING: ICD-10-CM

## 2018-08-28 DIAGNOSIS — G89.4 CHRONIC PAIN SYNDROME: ICD-10-CM

## 2018-08-28 DIAGNOSIS — E06.3 HYPOTHYROIDISM DUE TO HASHIMOTO'S THYROIDITIS: Primary | ICD-10-CM

## 2018-08-28 PROBLEM — Q02: Status: ACTIVE | Noted: 2018-08-28

## 2018-08-28 PROBLEM — Q87.89: Status: ACTIVE | Noted: 2018-08-28

## 2018-08-28 PROBLEM — G80.1: Status: ACTIVE | Noted: 2018-08-28

## 2018-08-28 PROCEDURE — 99214 OFFICE O/P EST MOD 30 MIN: CPT | Mod: S$GLB,,, | Performed by: INTERNAL MEDICINE

## 2018-08-28 PROCEDURE — 3008F BODY MASS INDEX DOCD: CPT | Mod: CPTII,S$GLB,, | Performed by: INTERNAL MEDICINE

## 2018-08-28 PROCEDURE — 99999 PR PBB SHADOW E&M-EST. PATIENT-LVL III: CPT | Mod: PBBFAC,,, | Performed by: INTERNAL MEDICINE

## 2018-08-28 RX ORDER — ZOLPIDEM TARTRATE 10 MG/1
TABLET ORAL
Qty: 30 TABLET | Refills: 3 | Status: SHIPPED | OUTPATIENT
Start: 2018-08-28 | End: 2019-01-16 | Stop reason: SDUPTHER

## 2018-08-28 NOTE — ASSESSMENT & PLAN NOTE
Patient is treated by Dr. Espinoza she is on Cytomel only .  Will check tsh, Free T3 Free T4 and T3

## 2018-09-12 DIAGNOSIS — N95.2 ATROPHIC VAGINITIS: ICD-10-CM

## 2018-09-12 RX ORDER — ESTRADIOL 10 UG/1
TABLET VAGINAL
Qty: 24 TABLET | Refills: 0 | Status: SHIPPED | OUTPATIENT
Start: 2018-09-12 | End: 2022-03-17

## 2018-09-14 ENCOUNTER — PATIENT MESSAGE (OUTPATIENT)
Dept: INTERNAL MEDICINE | Facility: CLINIC | Age: 58
End: 2018-09-14

## 2018-09-27 ENCOUNTER — OFFICE VISIT (OUTPATIENT)
Dept: NEUROLOGY | Facility: CLINIC | Age: 58
End: 2018-09-27
Payer: COMMERCIAL

## 2018-09-27 VITALS
DIASTOLIC BLOOD PRESSURE: 68 MMHG | BODY MASS INDEX: 17.64 KG/M2 | WEIGHT: 95.88 LBS | HEART RATE: 109 BPM | HEIGHT: 62 IN | SYSTOLIC BLOOD PRESSURE: 103 MMHG

## 2018-09-27 DIAGNOSIS — R10.2 PELVIC PAIN IN FEMALE: ICD-10-CM

## 2018-09-27 DIAGNOSIS — M25.551 HIP PAIN, RIGHT: ICD-10-CM

## 2018-09-27 DIAGNOSIS — M79.7 FIBROMYALGIA: ICD-10-CM

## 2018-09-27 DIAGNOSIS — G50.0 TRIGEMINAL NEURALGIA: ICD-10-CM

## 2018-09-27 PROCEDURE — 99999 PR PBB SHADOW E&M-EST. PATIENT-LVL III: CPT | Mod: PBBFAC,,, | Performed by: PSYCHIATRY & NEUROLOGY

## 2018-09-27 PROCEDURE — 99214 OFFICE O/P EST MOD 30 MIN: CPT | Mod: S$GLB,,, | Performed by: PSYCHIATRY & NEUROLOGY

## 2018-09-27 PROCEDURE — 3008F BODY MASS INDEX DOCD: CPT | Mod: CPTII,S$GLB,, | Performed by: PSYCHIATRY & NEUROLOGY

## 2018-09-27 RX ORDER — DULOXETIN HYDROCHLORIDE 20 MG/1
20 CAPSULE, DELAYED RELEASE ORAL DAILY
Qty: 90 CAPSULE | Refills: 3 | Status: SHIPPED | OUTPATIENT
Start: 2018-09-27 | End: 2019-07-02

## 2018-09-27 NOTE — PROGRESS NOTES
[facility] NEUROLOGY  [facility] [place]    Date: September 27, 2018   Patient Name: Berenice Hayes   MRN: [id number]   PCP: Estella Serrano  Referring Provider: No ref. provider found    Assessment:      This is Berenice Hayes, 58 y.o. female with a history of diffuse neuropathic pain now with features of trigeminal neuralgia.  Will out Cymbalta to pain control regimen with increases as needed.  Have urged the patient to follow up with PCP regarding her right hip pain which does not appear to be neuropathic in nature.  Personally reviewed MRI spine which reveals mild levoscoliosis.  Plan:      Problem List Items Addressed This Visit        Neuro    Trigeminal neuralgia    Current Assessment & Plan     -- start cymbalta 20 mg daily  -- continue current amitriptyline  -- continue gabapentin            GI    Pelvic pain in female    Overview     Follows with pain management            Orthopedic    Fibromyalgia    Current Assessment & Plan     -- start cymbalta 20 mg daily  -- continue current amitriptyline  -- continue gabapentin         Hip pain, right    Current Assessment & Plan     -- patient to discuss with PCP                Pipo Seymour MD  [facility]   Department of Neurology    Patient note was created using Dragon Dictation.  Any errors in syntax or even information may not have been identified and edited on initial review prior to signing this note.  Subjective:        HPI:   Ms. Berenice Hayes is a 58 y.o. female presenting in follow-up for multifocal neuropathic pain.  The patient reports that she is experiencing burning neuropathic pain in her hands, ft, and hips her Elavil and gabapentin.  She would usually reports onset of new burning and stinging pain radiating from her years into her lower jaw bilaterally typically provoked cold air.  She states she has to work with a scarf wrapped around her face at her desk.  She states that she notices hip pain  particularly when lying on her right hip that improves with movement.  She states that movement does loosen it at however states that she is only able to tolerate very minimal movement noting that movements as small as bending her neck forward sets off pain throughout her entire body.  She is currently working with an acupuncturist which she does feel has been helpful for her overall pain control.    PAST MEDICAL HISTORY:  Past Medical History:   Diagnosis Date    Asthma with allergic rhinitis     Bladder spasm     Dense breasts     heterogeneously/fibrocystic disease    Elevated antinuclear antibody (GUICHO) level     Endometriosis of cervix     Erythromelalgia     Fibromyalgia     Ganglion cyst     left toe    Goiter     MNG    Hashimoto's disease     Hashimoto's thyroiditis     Headache(784.0)     Hypothyroidism     Hashimoto with + Tg ab    Osteoporosis     femoral neck    Raynaud's phenomenon     Rhinitis     Scoliosis     Sleep disorder     type of Narcolepsy ( resolved)    Vitamin D deficiency disease     Vulvodynia      PAST SURGICAL HISTORY:  Past Surgical History:   Procedure Laterality Date    BLOCK-NERVE-PUDENDAL Bilateral 10/26/2017    Performed by [facility] at [facility] PAIN [facility]GT    BLOCK-NERVE-PUDENDAL Right 10/28/2015    Performed by [facility] at [facility] PAIN [facility]GT    BLOCK-NERVE-PUDENDAL Right 2015    [facility] by [facility] at [facility] PA[facility]GT    BLOCK-NERVE-PUDENDAL Left 2/3/2015    Performed by [facility] at [facility] PAIN [facility]GT    BREAST SURGERY      fibrocystic tumor removed     SECTION      2x    CYST REMOVAL      laparoscopic cyst on ovary    HYSTERECTOMY      intradermal cyst       removed from left index finger    SINUS SURGERY      2x     CURRENT MEDS:  Current Outpatient Medications   Medication Sig Dispense Refill    albuterol (PROVENTIL HFA/VENTOLIN HFA) 200 puff inhaler Inhale 2 puffs into the lungs every 6 (six) hours as  needed.        amitriptyline (ELAVIL) 10 MG tablet TAKE 1 TABLET DAILY 90 tablet 3    amitriptyline (ELAVIL) 50 MG tablet TAKE 1 TABLET NIGHTLY WITH THE 10 MG AMITRIPTYLINE 90 tablet 4    aspirin (ECOTRIN) 81 MG EC tablet Take 81 mg by mouth once daily.      cholecalciferol, vitamin D3, (VITAMIN D3) 1,000 unit capsule Take 2,000 Units by mouth once daily.       estradiol (VAGIFEM) 10 mcg Tab Place 1 tablet (10 mcg total) vaginally twice a week. 24 tablet 4    estradiol 0.05 mg/24 hr td ptsw (VIVELLE-DOT) 0.05 mg/24 hr APPLY 1 PATCH ONTO SKIN TWICE A WEEK 24 patch 3    fluticasone (FLONASE) 50 mcg/actuation nasal spray 1 spray by Each Nare route once daily. 3 Bottle 3    gabapentin (NEURONTIN) 100 MG capsule TAKE 1 CAPSULE IN THE MORNING, 1 CAPSULE AT NOON, 1 CAPSULE AT 5 P.M. AND 4 CAPSULES AT BEDTIME 630 capsule 1    guaifenesin (MUCINEX) 600 mg 12 hr tablet Take 600 mg by mouth 2 (two) times daily.      lidocaine-prilocaine (EMLA) cream       liothyronine (CYTOMEL) 5 MCG Tab Alternate days 3 (15mcg) and 3.5 (17.5) tablets, Brand name medically  necessary 350 tablet 3    meloxicam (MOBIC) 7.5 MG tablet TAKE 1 TABLET (7.5 MG TOTAL) BY MOUTH ONCE DAILY. FOR INFLAMMATION 30 tablet 3    metaxalone (SKELAXIN) 800 MG tablet TAKE 1 TABLET EVERY EVENING (SPASM) 90 tablet 1    multivitamin capsule Take 1 capsule by mouth once daily.        pilocarpine (SALAGEN) 5 MG Tab Take 1 tablet (5 mg total) by mouth 3 (three) times daily as needed. 270 tablet 3    PREVIDENT 5000 DRY MOUTH 1.1 % Gel       RESTASIS 0.05 % ophthalmic emulsion       risedronate 35 mg TbEC Take 1 tablet (35 mg total) by mouth once a week. 12 tablet 3    YUVAFEM 10 mcg Tab INSERT 1 TABLET VAGINALLY TWICE WEEKLY 24 tablet 0    zolpidem (AMBIEN) 10 mg Tab TAKE ONE TABLET BY MOUTH EVERY NIGHT AT BEDTIME AS NEEDED 30 tablet 03    DICLOFENAC SODIUM TOP Apply 1 application topically. Use up tp 5x/day Diclofenac 0.15%/lidocaine  "2.25%/prilocaine 2.25% [facility]: [phone]      DULoxetine (CYMBALTA) 20 MG capsule Take 1 capsule (20 mg total) by mouth once daily. 90 capsule 3    flu vac [id number] 36mos up,PF, 60 mcg (15 mcg x 4)/0.5 mL Syrg to be administered by pharmacist into the muscle 0.5 mL 0     No current facility-administered medications for this visit.      ALLERGIES:  Review of patient's allergies indicates:  No Known Allergies    FAMILY HISTORY:  Family History   Problem Relation Age of Onset    Diabetes Mother     Fibromyalgia Mother     Polymyalgia rheumatica Mother     Macular degeneration Mother     Arthritis Mother     Hypertension Mother     Kidney disease Mother     Pneumonia Mother     Adrenal disorder Mother         adrenal insufficiency    Coronary artery disease Father     Arthritis Father         osteoarthritis    Hypertension Father     Heart disease Father     Diabetes Father     Hyperlipidemia Father     Hyperlipidemia Brother     Cancer Brother         oral    Thyroid cancer Daughter     Cancer Daughter         thyroid    Hypothyroidism Daughter     Breast cancer Neg Hx     Ovarian cancer Neg Hx     Colon cancer Neg Hx      SOCIAL HISTORY:  Social History     Tobacco Use    Smoking status: Never Smoker    Smokeless tobacco: Never Used   Substance Use Topics    Alcohol use: No    Drug use: No     Review of Systems:  12 review of systems is negative except for the symptoms mentioned in HPI.      Objective:     Vitals:    09/27/18 0845   BP: 103/68   Pulse: 109   Weight: 43.5 kg (95 lb 14.4 oz)   Height: 5' 1.5" (1.562 m)     General: NAD, well nourished   Eyes: no tearing, discharge, no erythema   ENT: moist mucous membranes of the oral cavity, nares patent    Neck: Supple, full range of motion  Cardiovascular: Warm and well perfused, pulses equal and symmetrical  Lungs: Normal work of breathing, normal chest wall excursions  Skin: No rash, lesions, or breakdown on " exposed skin  Psychiatry: Mood and affect are appropriate   Abdomen: soft, non tender, non distended  Extremeties: No cyanosis, clubbing or edema. Positive straight leg raise bilaterally.    Neurological   MENTAL STATUS: Alert and oriented to person, place, and time. Attention and concentration within normal limits. Speech without dysarthria, able to name and repeat without difficulty. Recent and remote memory within normal limits   CRANIAL NERVES: Visual fields intact. PERRL. EOMI. Facial sensation intact. Face symmetrical. Hearing grossly intact. Full shoulder shrug bilaterally. Tongue protrudes midline   SENSORY: Sensation is intact to pin and light touch throughout.    MOTOR: Normal bulk and tone.  5/5 deltoid, biceps, triceps, interosseous, hand  bilaterally. 5/5 iliopsoas, knee extension/flexion, foot dorsi/plantarflexion bilaterally.    REFLEXES: Symmetric and1+ throughout.  CEREBELLAR/COORDINATION/GAIT: Gait steady with normal arm swing and stride length. Normal rapid alternating movements.

## 2018-09-27 NOTE — PATIENT INSTRUCTIONS
General Neck and Back Pain    Both neck and back pain are usually caused by injury to the muscles or ligaments of the spine. Sometimes the disks that separate each bone of the spine may cause pain by pressing on a nearby nerve. Back and neck pain may appear after a sudden twisting or bending force (such as in a car accident), or sometimes after a simple awkward movement. In either case, muscle spasm is often present and adds to the pain.  Acute neck and back pain usually gets better in 1 to 2 weeks. Pain related to disk disease, arthritis in the spinal joints or spinal stenosis (narrowing of the spinal canal) can become chronic and last for months or years.  Back and neck pain are common problems. Most people feel better in 1 or 2 weeks, and most of the rest in 1 to 2 months. Most people can remain active.  People experience and describe pain differently.  · Pain can be sharp, stabbing, shooting, aching, cramping, or burning  · Movement, standing, bending, lifting, sitting, or walking may worsen the pain  · Pain can be localized to one spot or area, or it can be more generalized  · Pain can spread or radiate upwards, downwards, to the front, or go down your arms  · Muscle spasm may occur.  Most of the time mechanical problems with the muscles or spine cause the pain. it is usually caused by an injury, whether known or not, to the muscles or ligaments. While illnesses can cause back pain, it is usually not caused by a serious illness. Pain is usually related to physical activity, whether sports, exercise, work, or normal activity. Sometimes it can occur without an identifiable cause. This can happen simply by stretching or moving wrong, without noting pain at the time. Other causes include:  · Overexertion, lifting, pushing, pulling incorrectly or too aggressively.  · Sudden twisting, bending or stretching from an accident (car or fall), or accidental movement.  · Poor posture  · Poor conditioning, lack of regular  exercise  · Spinal disc disease or arthritis  · Stress  · Pregnancy, or illness like appendicitis, bladder or kidney infection, pelvic infections   Home care  · For neck pain: Use a comfortable pillow that supports the head and keeps the spine in a neutral position. The position of the head should not be tilted forward or backward.  · When in bed, try to find a position of comfort. A firm mattress is best. Try lying flat on your back with pillows under your knees. You can also try lying on your side with your knees bent up towards your chest and a pillow between your knees.  · At first, do not try to stretch out the sore spots. If there is a strain, it is not like the good soreness you get after exercising without an injury. In this case, stretching may make it worse.  · Avoid prolonged sitting, long car rides or travel. This puts more stress on the lower back than standing or walking.  · During the first 24 to 72 hours after an injury, apply an ice pack to the painful area for 20 minutes and then remove it for 20 minutes over a period of 60 to 90 minutes or several times a day.   · You can alternate ice and heat therapies. Talk with your healthcare provider about the best treatment for your back or neck pain. As a safety precaution, do not use a heating pad at bedtime. Sleeping with a heating pad can lead to skin burns or tissue damage.  · Therapeutic massage can help relax the back and neck muscles without stretching them.  · Be aware of safe lifting methods and do not lift anything over 15 pounds until all the pain is gone.  Medications  Talk to your healthcare provider before using medicine, especially if you have other medical problems or are taking other medicines.  · You may use over-the-counter medicine to control pain, unless another pain medicine was prescribed. If you have chronic conditions like diabetes, liver or kidney disease, stomach ulcers,  gastrointestinal bleeding, or are taking blood thinner  medicines.  · Be careful if you are given pain medicines, narcotics, or medicine for muscle spasm. They can cause drowsiness, and can affect your coordination, reflexes, and judgment. Do not drive or operate heavy machinery.  Follow-up care  Follow up with your healthcare provider, or as advised. Physical therapy or further tests may be needed.  If X-rays were taken, you will be notified of any new findings that may affect your care.  Call 911  Seek emergency medical care if any of the following occur:  · Trouble breathing  · Confusion  · Very drowsy or trouble awakening  · Fainting or loss of consciousness  · Rapid or very slow heart rate  · Loss of bowel or bladder control  When to seek medical advice  Call your healthcare provider right away if any of these occur:  · Pain becomes worse or spreads into your arms or legs  · Weakness, numbness or pain in one or both arms or legs  · Numbness in the groin area  · Difficulty walking  · Fever of 100.4ºF (38ºC) or higher, or as directed by your healthcare provider  Date Last Reviewed: 7/1/2016 © 2000-2017 [employer]. [address] [place] [address], [place] [address]. All rights reserved. This information is not intended as a substitute for professional medical care. Always follow your healthcare professional's instructions.

## 2018-10-12 ENCOUNTER — PATIENT MESSAGE (OUTPATIENT)
Dept: INTERNAL MEDICINE | Facility: CLINIC | Age: 58
End: 2018-10-12

## 2018-10-12 DIAGNOSIS — E03.9 HYPOTHYROIDISM, UNSPECIFIED TYPE: Primary | ICD-10-CM

## 2018-10-26 ENCOUNTER — LAB VISIT (OUTPATIENT)
Dept: LAB | Facility: HOSPITAL | Age: 58
End: 2018-10-26
Attending: INTERNAL MEDICINE
Payer: COMMERCIAL

## 2018-10-26 DIAGNOSIS — E03.9 HYPOTHYROIDISM, UNSPECIFIED TYPE: ICD-10-CM

## 2018-10-26 LAB
T3 SERPL-MCNC: 89 NG/DL
T3FREE SERPL-MCNC: 2.5 PG/ML
T4 FREE SERPL-MCNC: 0.44 NG/DL
TSH SERPL DL<=0.005 MIU/L-ACNC: 1.34 UIU/ML

## 2018-10-26 PROCEDURE — 84481 FREE ASSAY (FT-3): CPT

## 2018-10-26 PROCEDURE — 84439 ASSAY OF FREE THYROXINE: CPT

## 2018-10-26 PROCEDURE — 84443 ASSAY THYROID STIM HORMONE: CPT

## 2018-10-26 PROCEDURE — 84480 ASSAY TRIIODOTHYRONINE (T3): CPT

## 2018-10-26 PROCEDURE — 36415 COLL VENOUS BLD VENIPUNCTURE: CPT

## 2018-11-01 ENCOUNTER — OFFICE VISIT (OUTPATIENT)
Dept: INTERNAL MEDICINE | Facility: CLINIC | Age: 58
End: 2018-11-01
Payer: COMMERCIAL

## 2018-11-01 VITALS
HEART RATE: 83 BPM | WEIGHT: 92.56 LBS | HEIGHT: 62 IN | OXYGEN SATURATION: 75 % | DIASTOLIC BLOOD PRESSURE: 60 MMHG | BODY MASS INDEX: 17.03 KG/M2 | SYSTOLIC BLOOD PRESSURE: 92 MMHG

## 2018-11-01 DIAGNOSIS — Z00.00 WELLNESS EXAMINATION: ICD-10-CM

## 2018-11-01 DIAGNOSIS — Z00.00 PHYSICAL EXAM: Primary | ICD-10-CM

## 2018-11-01 DIAGNOSIS — M79.7 FIBROMYALGIA: ICD-10-CM

## 2018-11-01 DIAGNOSIS — R10.2 PELVIC PAIN IN FEMALE: ICD-10-CM

## 2018-11-01 DIAGNOSIS — R10.2 PELVIC PAIN: ICD-10-CM

## 2018-11-01 DIAGNOSIS — E03.8 HYPOTHYROIDISM DUE TO HASHIMOTO'S THYROIDITIS: ICD-10-CM

## 2018-11-01 DIAGNOSIS — Z12.31 ENCOUNTER FOR SCREENING MAMMOGRAM FOR BREAST CANCER: ICD-10-CM

## 2018-11-01 DIAGNOSIS — E06.3 HYPOTHYROIDISM DUE TO HASHIMOTO'S THYROIDITIS: ICD-10-CM

## 2018-11-01 PROCEDURE — 99396 PREV VISIT EST AGE 40-64: CPT | Mod: S$GLB,,, | Performed by: INTERNAL MEDICINE

## 2018-11-01 PROCEDURE — 99999 PR PBB SHADOW E&M-EST. PATIENT-LVL IV: CPT | Mod: PBBFAC,,, | Performed by: INTERNAL MEDICINE

## 2018-11-01 RX ORDER — AMITRIPTYLINE HYDROCHLORIDE 50 MG/1
TABLET, FILM COATED ORAL
Qty: 90 TABLET | Refills: 4 | Status: SHIPPED | OUTPATIENT
Start: 2018-11-01 | End: 2019-11-28 | Stop reason: SDUPTHER

## 2018-11-01 RX ORDER — METAXALONE 800 MG/1
TABLET ORAL
Qty: 90 TABLET | Refills: 1 | Status: SHIPPED | OUTPATIENT
Start: 2018-11-01 | End: 2019-06-11 | Stop reason: SDUPTHER

## 2018-11-01 RX ORDER — GABAPENTIN 100 MG/1
CAPSULE ORAL
Qty: 630 CAPSULE | Refills: 1 | Status: SHIPPED | OUTPATIENT
Start: 2018-11-01 | End: 2019-08-05 | Stop reason: SDUPTHER

## 2018-11-01 RX ORDER — AMITRIPTYLINE HYDROCHLORIDE 10 MG/1
10 TABLET, FILM COATED ORAL DAILY
Qty: 90 TABLET | Refills: 3 | Status: SHIPPED | OUTPATIENT
Start: 2018-11-01 | End: 2019-12-20 | Stop reason: SDUPTHER

## 2018-11-01 NOTE — PROGRESS NOTES
Subjective:      Patient ID: Berenice Hayes is a 58 y.o. female.    Chief Complaint: Annual Exam    HPI:  HPI   Discussion:  Added a quarter tablet a day now on:cytomel only  15 mcg plus a 1.25   7 doses spread    Now adding a quarter of a pill to see where she tolerates it  She is a low weight    Current treatment:  Cytomel 5 mcg is the pill she works with ( divides a pill in 4 1.25:    Dr. Seymour : trigeminal neuralgia: wanted one week of cymbalta :could not tolerate med    Concern is she losing weight :gluten free diet    Patient is here for her annual exam:     Hypothyroidism: Patient is seeing Dr. Espinoza in Endocrine.   Goal: TSH should not go below 0.4  Goal: Free T4 (no adjustment)  Free T3: goal for patient to slowly increase to 3.0 slowly by adjust cytomel.        Anat: from the accupuncturist      Annual exam: 11/1/2018  Colonoscopy 8/6/2012 repeat in 10 years  Mammogram: schedule  Gyn: schedule  Optho: yearly  Flu: done  Tetanus:discussed  Shingles:done 12/17/2015  Pneumovax 9/30/2014    Continues band pain along the right groin      Patient Active Problem List   Diagnosis    Fibromyalgia    Bladder spasm    Osteoporosis, postmenopausal    Fibrocystic breast changes    Vulvar vestibulodynia    Vulvar pain    Vascular disorder: Erytnromelalgia    Raynaud's disease    Incomplete defecation    Straining during bowel movements    Chronic constipation    Rectocele    Disorder of muscle    Anxiety    Bilateral hand pain    Fatigue    Perimenopause vs Memopause    Menopause syndrome    Pudendal neuralgia    Pelvic pain in female    Urinary hesitancy    Hypothyroidism due to Hashimoto's thyroiditis    Multinodular goiter    Encounter for herb and vitamin supplement management    Gluten free diet    Sicca    Family history of ischemic heart disease (IHD)    Cystocele, midline    Sicca syndrome    Estefani syndrome    Chronic pain syndrome    Trigeminal neuralgia    Hip pain,  right     Past Medical History:   Diagnosis Date    Asthma with allergic rhinitis     Bladder spasm     Dense breasts     heterogeneously/fibrocystic disease    Elevated antinuclear antibody (GUICHO) level     Endometriosis of cervix     Erythromelalgia     Fibromyalgia     Ganglion cyst     left toe    Goiter     MNG    Hashimoto's disease     Hashimoto's thyroiditis     Headache(784.0)     Hypothyroidism     Hashimoto with + Tg ab    Osteoporosis     femoral neck    Raynaud's phenomenon     Rhinitis     Scoliosis     Sleep disorder     type of Narcolepsy ( resolved)    Vitamin D deficiency disease     Vulvodynia      Past Surgical History:   Procedure Laterality Date    BLOCK-NERVE-PUDENDAL Bilateral 10/26/2017    Performed by [facility]    BLOCK-NERVE-PUDENDAL Right 10/28/2015    [facility]    BLOCK-NERVE-PUDENDAL Right 2015    [facility]    BLOCK-NERVE-PUDENDAL Left 2/3/2015    [facility]    BREAST SURGERY      fibrocystic tumor removed     SECTION      2x    CYST REMOVAL      laparoscopic cyst on ovary    HYSTERECTOMY      intradermal cyst       removed from left index finger    SINUS SURGERY      2x     Family History   Problem Relation Age of Onset    Diabetes Mother     Fibromyalgia Mother     Polymyalgia rheumatica Mother     Macular degeneration Mother     Arthritis Mother     Hypertension Mother     Kidney disease Mother     Pneumonia Mother     Adrenal disorder Mother         adrenal insufficiency    Coronary artery disease Father     Arthritis Father         osteoarthritis    Hypertension Father     Heart disease Father     Diabetes Father     Hyperlipidemia Father     Hyperlipidemia Brother     Cancer Brother         oral    Thyroid cancer Daughter     Cancer Daughter         thyroid     "Hypothyroidism Daughter     Breast cancer Neg Hx     Ovarian cancer Neg Hx     Colon cancer Neg Hx      Review of Systems   discussion  Objective:     Vitals:    11/01/18 0900   BP: 92/60   Pulse: 83   SpO2: (!) 75%   Weight: 42 kg (92 lb 9.5 oz)   Height: 5' 1.5" (1.562 m)   PainSc:   4     Body mass index is 17.21 kg/m².  Physical Exam   Constitutional: She is oriented to person, place, and time. She appears well-developed and well-nourished. No distress.   Neck: Carotid bruit is not present. No thyromegaly present.   Cardiovascular: Normal rate, regular rhythm and normal heart sounds. PMI is not displaced.   Pulmonary/Chest: Effort normal and breath sounds normal. No respiratory distress.   Abdominal: Soft. Bowel sounds are normal. She exhibits no distension. There is no tenderness.   Musculoskeletal: She exhibits no edema.   Neurological: She is alert and oriented to person, place, and time.     Assessment:     1. Physical exam    2. Wellness examination    3. Encounter for screening mammogram for breast cancer    4. Pelvic pain    5. Hypothyroidism due to Hashimoto's thyroiditis    6. Fibromyalgia    7. Pelvic pain in female      Plan:   Berenice was seen today for annual exam.    Diagnoses and all orders for this visit:    Physical exam: updated and reviewed    Wellness examination: updated and reviewed    Encounter for screening mammogram for breast cancer  -     Mammo Digital Screening Bilat w/ Be; Future    Pelvic pain: will see Gyn    Hypothyroidism due to Hashimoto's thyroiditis: adjusting    Fibromyalgia  -     metaxalone (SKELAXIN) 800 MG tablet; TAKE 1 TABLET EVERY EVENING (SPASM)    Pelvic pain in female  -     gabapentin (NEURONTIN) 100 MG capsule; TAKE 1 CAPSULE IN THE MORNING, 1 CAPSULE AT NOON, 1 CAPSULE AT 5 P.M. AND 4 CAPSULES AT BEDTIME    Other orders  -     amitriptyline (ELAVIL) 10 MG tablet; Take 1 tablet (10 mg total) by mouth once daily.  -     amitriptyline (ELAVIL) 50 MG tablet; TAKE " 1 TABLET NIGHTLY WITH THE 10 MG AMITRIPTYLINE      My Fitness Pal    Add 1.25 mcg as tolerated    Consider:  Tdap: tetanus pertussis  New shingles vaccine: SHINGRIX ( 2018) not live, 90%,  2 shots, one at day zero and the 2nd at 2-6 months: any pharmacy can give it.        Mammogram: due after 11/9    Problem List Items Addressed This Visit        pelvic floor PT 2015    Pelvic pain in female    Overview     Follows with pain management         Relevant Medications    gabapentin (NEURONTIN) 100 MG capsule       pelvic floor PT 2017    Pelvic pain in female    Overview     Follows with pain management         Relevant Medications    gabapentin (NEURONTIN) 100 MG capsule       Other    Fibromyalgia    Relevant Medications    metaxalone (SKELAXIN) 800 MG tablet    Hypothyroidism due to Hashimoto's thyroiditis      Other Visit Diagnoses     Physical exam    -  Primary    Wellness examination        Encounter for screening mammogram for breast cancer        Relevant Orders    Mammo Digital Screening Bilat w/ Be    Pelvic pain            Orders Placed This Encounter   Procedures    Mammo Digital Screening Bilat w/ Be     Standing Status:   Future     Standing Expiration Date:   1/1/2020     Order Specific Question:   May the Radiologist modify the order per protocol to meet the clinical needs of the patient?     Answer:   Yes     No Follow-up on file.     Medication List           Accurate as of 11/1/18  9:42 AM. If you have any questions, ask your nurse or doctor.               CHANGE how you take these medications    * amitriptyline 10 MG tablet  Commonly known as:  ELAVIL  Take 1 tablet (10 mg total) by mouth once daily.  What changed:  additional instructions     * amitriptyline 50 MG tablet  Commonly known as:  ELAVIL  TAKE 1 TABLET NIGHTLY WITH THE 10 MG AMITRIPTYLINE  What changed:  Another medication with the same name was changed. Make sure you understand how and when to take each.         * This list has 2  medication(s) that are the same as other medications prescribed for you. Read the directions carefully, and ask your doctor or other care provider to review them with you.            CONTINUE taking these medications    aspirin 81 MG EC tablet  Commonly known as:  ECOTRIN     DICLOFENAC SODIUM TOP     DULoxetine 20 MG capsule  Commonly known as:  CYMBALTA  Take 1 capsule (20 mg total) by mouth once daily.     * estradiol 10 mcg Tab  Commonly known as:  VAGIFEM  Place 1 tablet (10 mcg total) vaginally twice a week.     * estradiol 0.05 mg/24 hr td ptsw 0.05 mg/24 hr  Commonly known as:  VIVELLE-DOT  APPLY 1 PATCH ONTO SKIN TWICE A WEEK     * YUVAFEM 10 mcg Tab  Generic drug:  estradiol  INSERT 1 TABLET VAGINALLY TWICE WEEKLY     fluticasone 50 mcg/actuation nasal spray  Commonly known as:  FLONASE  1 spray by Each Nare route once daily.     FLUZONE QUAD 5103-3528 (PF) 60 mcg (15 mcg x 4)/0.5 mL Syrg  Generic drug:  flu vac [id number] 36mos up(PF)  to be administered by pharmacist into the muscle     gabapentin 100 MG capsule  Commonly known as:  NEURONTIN  TAKE 1 CAPSULE IN THE MORNING, 1 CAPSULE AT NOON, 1 CAPSULE AT 5 P.M. AND 4 CAPSULES AT BEDTIME     lidocaine-prilocaine cream  Commonly known as:  EMLA     liothyronine 5 MCG Tab  Commonly known as:  CYTOMEL  Alternate days 3 (15mcg) and 3.5 (17.5) tablets, Brand name medically  necessary     meloxicam 7.5 MG tablet  Commonly known as:  MOBIC  TAKE 1 TABLET (7.5 MG TOTAL) BY MOUTH ONCE DAILY. FOR INFLAMMATION     metaxalone 800 MG tablet  Commonly known as:  SKELAXIN  TAKE 1 TABLET EVERY EVENING (SPASM)     MUCINEX 600 mg 12 hr tablet  Generic drug:  guaiFENesin     multivitamin capsule     pilocarpine 5 MG Tab  Commonly known as:  SALAGEN  Take 1 tablet (5 mg total) by mouth 3 (three) times daily as needed.     PREVIDENT 5000 DRY MOUTH 1.1 % Gel  Generic drug:  fluoride (sodium)     PROAIR HFA 90 mcg/actuation inhaler  Generic drug:  albuterol     RESTASIS 0.05 %  ophthalmic emulsion  Generic drug:  cycloSPORINE     risedronate 35 mg Tbec  Take 1 tablet (35 mg total) by mouth once a week.     VITAMIN D3 1,000 unit capsule  Generic drug:  cholecalciferol (vitamin D3)     zolpidem 10 mg Tab  Commonly known as:  AMBIEN  TAKE ONE TABLET BY MOUTH EVERY NIGHT AT BEDTIME AS NEEDED         * This list has 3 medication(s) that are the same as other medications prescribed for you. Read the directions carefully, and ask your doctor or other care provider to review them with you.               Where to Get Your Medications      These medications were sent to [facility]  [address] [address]    Phone:  [phone]   · amitriptyline 10 MG tablet  · amitriptyline 50 MG tablet  · gabapentin 100 MG capsule  · metaxalone 800 MG tablet

## 2018-11-01 NOTE — PATIENT INSTRUCTIONS
My Fitness Pal    Add 1.25 mcg as tolerated    Consider:  Tdap: tetanus pertussis  New shingles vaccine: SHINGRIX ( 2018) not live, 90%,  2 shots, one at day zero and the 2nd at 2-6 months: any pharmacy can give it.        Mammogram: due after 11/9

## 2018-11-14 ENCOUNTER — OFFICE VISIT (OUTPATIENT)
Dept: OBSTETRICS AND GYNECOLOGY | Facility: CLINIC | Age: 58
End: 2018-11-14
Payer: COMMERCIAL

## 2018-11-14 VITALS
DIASTOLIC BLOOD PRESSURE: 62 MMHG | WEIGHT: 92 LBS | BODY MASS INDEX: 17.37 KG/M2 | HEIGHT: 61 IN | SYSTOLIC BLOOD PRESSURE: 90 MMHG

## 2018-11-14 DIAGNOSIS — N95.2 ATROPHIC VAGINITIS: ICD-10-CM

## 2018-11-14 DIAGNOSIS — Z12.4 PAP SMEAR FOR CERVICAL CANCER SCREENING: Primary | ICD-10-CM

## 2018-11-14 DIAGNOSIS — Z12.31 SCREENING MAMMOGRAM, ENCOUNTER FOR: ICD-10-CM

## 2018-11-14 PROCEDURE — 99396 PREV VISIT EST AGE 40-64: CPT | Mod: S$GLB,,, | Performed by: OBSTETRICS & GYNECOLOGY

## 2018-11-14 PROCEDURE — 99999 PR PBB SHADOW E&M-EST. PATIENT-LVL III: CPT | Mod: PBBFAC,,, | Performed by: OBSTETRICS & GYNECOLOGY

## 2018-11-14 RX ORDER — ESTRADIOL 0.05 MG/D
1 FILM, EXTENDED RELEASE TRANSDERMAL
Qty: 24 PATCH | Refills: 3 | Status: SHIPPED | OUTPATIENT
Start: 2018-11-15 | End: 2019-11-04 | Stop reason: SDUPTHER

## 2018-11-14 RX ORDER — ESTRADIOL 10 UG/1
1 INSERT VAGINAL
Qty: 24 TABLET | Refills: 4 | Status: SHIPPED | OUTPATIENT
Start: 2018-11-15 | End: 2019-08-21

## 2018-11-14 NOTE — PROGRESS NOTES
"Subjective:       Patient ID: Berenice Hayes is a 58 y.o. female.    Chief Complaint:  Well Woman      History of Present Illness  HPI  This 58 yr old P2 female is here for routine exam and has no gyn complaints.  She is starting to have the "heat" issues.  She was thinking of decreasing her estrogen patches but due to heat issues again will not .  Her thyroid is low again and we discussed and feel her heat issues are due to this.  This usually happens after her evening meal.    She is being worked up for Lupus and getting a plus/minus results.  She is working with her internist with this.  Her father  in May of this yr and they lived to [facility]  She is still employed full time    GYN & OB History  No LMP recorded (lmp unknown). Patient has had a hysterectomy.   Date of Last Pap: 2015    OB History    Para Term  AB Living   2 2 2     2   SAB TAB Ectopic Multiple Live Births                  # Outcome Date GA Lbr Jarrod/2nd Weight Sex Delivery Anes PTL Lv   2 Term            1 Term                   Review of Systems  Review of Systems   Constitutional: Negative for chills and fever.   Respiratory: Negative for shortness of breath.    Cardiovascular: Negative for chest pain.   Gastrointestinal: Negative for abdominal pain, nausea and vomiting.   Endocrine: Positive for heat intolerance.   Genitourinary: Negative for difficulty urinating, dyspareunia, genital sores, menstrual problem, pelvic pain, vaginal bleeding, vaginal discharge and vaginal pain.   Musculoskeletal: Positive for arthralgias and myalgias.   Skin: Negative for wound.   Hematological: Negative for adenopathy.   Psychiatric/Behavioral: Positive for decreased concentration.           Objective:   Physical Exam:   Constitutional: She is oriented to person, place, and time. She appears well-developed and well-nourished.    HENT:   Head: Normocephalic.    Eyes: EOM are normal.    Neck: Normal range of motion.    Cardiovascular: " Normal rate.     Pulmonary/Chest: Effort normal. She exhibits no mass and no tenderness. Right breast exhibits no inverted nipple, no mass, no skin change and no tenderness. Left breast exhibits no inverted nipple, no mass, no skin change and no tenderness.        Abdominal: Soft. She exhibits no distension. There is no tenderness.     Genitourinary: Vagina normal. There is no rash, tenderness or lesion on the right labia. There is no rash, tenderness or lesion on the left labia. Uterus is absent. Uterus is not tender. Cervix is normal. Right adnexum displays no mass, no tenderness and no fullness. Left adnexum displays no mass, no tenderness and no fullness. Cervix exhibits no discharge.           Musculoskeletal: Normal range of motion.       Neurological: She is alert and oriented to person, place, and time.    Skin: Skin is warm and dry.    Psychiatric: She has a normal mood and affect.          Assessment:        1. Pap smear for cervical cancer screening    2. Screening mammogram, encounter for               Plan:      We discussed exercise and she needs all the calories she can take right now.  Unable to do   Will continue the vivelle 0.05  Mammogram yearly  Pap every 5 yrs

## 2018-11-19 RX ORDER — RISEDRONATE SODIUM 35 MG/1
TABLET, DELAYED RELEASE ORAL
Qty: 12 TABLET | Refills: 3 | Status: SHIPPED | OUTPATIENT
Start: 2018-11-19 | End: 2019-10-30 | Stop reason: SDUPTHER

## 2018-11-20 RX ORDER — LIDOCAINE AND PRILOCAINE 25; 25 MG/G; MG/G
CREAM TOPICAL
Qty: 90 G | Refills: 4 | Status: SHIPPED | OUTPATIENT
Start: 2018-11-20 | End: 2020-11-02 | Stop reason: SDUPTHER

## 2018-11-22 ENCOUNTER — PATIENT MESSAGE (OUTPATIENT)
Dept: INTERNAL MEDICINE | Facility: CLINIC | Age: 58
End: 2018-11-22

## 2018-11-27 ENCOUNTER — PATIENT MESSAGE (OUTPATIENT)
Dept: INTERNAL MEDICINE | Facility: CLINIC | Age: 58
End: 2018-11-27

## 2018-11-28 ENCOUNTER — PATIENT MESSAGE (OUTPATIENT)
Dept: INTERNAL MEDICINE | Facility: CLINIC | Age: 58
End: 2018-11-28

## 2018-12-13 ENCOUNTER — HOSPITAL ENCOUNTER (OUTPATIENT)
Dept: RADIOLOGY | Facility: HOSPITAL | Age: 58
Discharge: HOME | End: 2018-12-13
Attending: OBSTETRICS & GYNECOLOGY
Payer: COMMERCIAL

## 2018-12-13 DIAGNOSIS — Z12.31 SCREENING MAMMOGRAM, ENCOUNTER FOR: ICD-10-CM

## 2018-12-13 PROCEDURE — 77063 BREAST TOMOSYNTHESIS BI: CPT | Mod: TC

## 2018-12-13 PROCEDURE — 77063 BREAST TOMOSYNTHESIS BI: CPT | Mod: 26,,, | Performed by: RADIOLOGY

## 2018-12-13 PROCEDURE — 77067 SCR MAMMO BI INCL CAD: CPT | Mod: 26,,, | Performed by: RADIOLOGY

## 2018-12-17 ENCOUNTER — PATIENT MESSAGE (OUTPATIENT)
Dept: OBSTETRICS AND GYNECOLOGY | Facility: CLINIC | Age: 58
End: 2018-12-17

## 2018-12-19 ENCOUNTER — PATIENT MESSAGE (OUTPATIENT)
Dept: INTERNAL MEDICINE | Facility: CLINIC | Age: 58
End: 2018-12-19

## 2018-12-19 RX ORDER — ALBUTEROL SULFATE 90 UG/1
2 AEROSOL, METERED RESPIRATORY (INHALATION) EVERY 6 HOURS PRN
Qty: 18 G | Refills: 2 | Status: SHIPPED | OUTPATIENT
Start: 2018-12-19 | End: 2021-08-20 | Stop reason: SDUPTHER

## 2019-01-16 ENCOUNTER — PATIENT MESSAGE (OUTPATIENT)
Dept: INTERNAL MEDICINE | Facility: CLINIC | Age: 59
End: 2019-01-16

## 2019-01-16 DIAGNOSIS — E06.3 HYPOTHYROIDISM DUE TO HASHIMOTO'S THYROIDITIS: ICD-10-CM

## 2019-01-16 DIAGNOSIS — Z12.11 COLON CANCER SCREENING: ICD-10-CM

## 2019-01-16 DIAGNOSIS — G47.00 INSOMNIA, UNSPECIFIED TYPE: ICD-10-CM

## 2019-01-16 DIAGNOSIS — E03.8 HYPOTHYROIDISM DUE TO HASHIMOTO'S THYROIDITIS: ICD-10-CM

## 2019-01-16 RX ORDER — ZOLPIDEM TARTRATE 10 MG/1
TABLET ORAL
Qty: 30 TABLET | Refills: 3 | Status: SHIPPED | OUTPATIENT
Start: 2019-01-16 | End: 2019-01-16 | Stop reason: SDUPTHER

## 2019-01-17 RX ORDER — ZOLPIDEM TARTRATE 10 MG/1
TABLET ORAL
Qty: 30 TABLET | Refills: 3 | Status: SHIPPED | OUTPATIENT
Start: 2019-01-17 | End: 2019-05-30 | Stop reason: SDUPTHER

## 2019-01-19 ENCOUNTER — OFFICE VISIT (OUTPATIENT)
Dept: URGENT CARE | Facility: CLINIC | Age: 59
End: 2019-01-19
Payer: COMMERCIAL

## 2019-01-19 VITALS
SYSTOLIC BLOOD PRESSURE: 92 MMHG | OXYGEN SATURATION: 96 % | BODY MASS INDEX: 16.99 KG/M2 | HEIGHT: 61 IN | HEART RATE: 99 BPM | TEMPERATURE: 98 F | RESPIRATION RATE: 18 BRPM | DIASTOLIC BLOOD PRESSURE: 63 MMHG | WEIGHT: 90 LBS

## 2019-01-19 DIAGNOSIS — L03.011 CELLULITIS OF RIGHT MIDDLE FINGER: Primary | ICD-10-CM

## 2019-01-19 PROCEDURE — 3008F BODY MASS INDEX DOCD: CPT | Mod: CPTII,S$GLB,, | Performed by: FAMILY MEDICINE

## 2019-01-19 PROCEDURE — 99213 OFFICE O/P EST LOW 20 MIN: CPT | Mod: S$GLB,,, | Performed by: FAMILY MEDICINE

## 2019-01-19 PROCEDURE — 3008F PR BODY MASS INDEX (BMI) DOCUMENTED: ICD-10-PCS | Mod: CPTII,S$GLB,, | Performed by: FAMILY MEDICINE

## 2019-01-19 PROCEDURE — 99213 PR OFFICE/OUTPT VISIT, EST, LEVL III, 20-29 MIN: ICD-10-PCS | Mod: S$GLB,,, | Performed by: FAMILY MEDICINE

## 2019-01-19 RX ORDER — MUPIROCIN 20 MG/G
OINTMENT TOPICAL
Qty: 22 G | Refills: 1 | Status: SHIPPED | OUTPATIENT
Start: 2019-01-19 | End: 2019-07-02

## 2019-01-19 RX ORDER — SULFAMETHOXAZOLE AND TRIMETHOPRIM 800; 160 MG/1; MG/1
1 TABLET ORAL 2 TIMES DAILY
Qty: 20 TABLET | Refills: 0 | Status: SHIPPED | OUTPATIENT
Start: 2019-01-19 | End: 2019-05-08 | Stop reason: ALTCHOICE

## 2019-01-19 NOTE — PROGRESS NOTES
"Subjective:       Patient ID: Berenice Hayes is a 58 y.o. female.    Vitals:  height is 5' 1" (1.549 m) and weight is 40.8 kg (90 lb). Her tympanic temperature is 98 °F (36.7 °C). Her blood pressure is 92/63 and her pulse is 99. Her respiration is 18 and oxygen saturation is 96%.     Chief Complaint: Rash    This is a 58 y.o. female who presents today with a chief complaint of   Fingers on both hands infected. Pt has an autoimmune disease and Raynauds        Rash   This is a new problem. The current episode started more than 1 month ago. The problem has been gradually worsening since onset. The affected locations include the left hand, right hand, right fingers and left fingers. The rash is characterized by blistering, draining, dryness, itchiness, pain, peeling, redness and swelling. It is unknown if there was an exposure to a precipitant. Pertinent negatives include no cough, fever or sore throat. Past treatments include antibiotic cream. The treatment provided mild relief.       Constitution: Negative for chills and fever.   HENT: Negative for facial swelling and sore throat.    Neck: Negative for painful lymph nodes.   Eyes: Negative for eye itching and eyelid swelling.   Respiratory: Negative for cough.    Musculoskeletal: Negative for joint swelling.   Skin: Positive for rash, abrasion and erythema (distal 10 fingers noted). Negative for color change, pale, wound, laceration, lesion, skin thickening/induration, puncture wound, bruising, abscess, avulsion and hives.   Allergic/Immunologic: Negative for environmental allergies, immunocompromised state and hives.   Hematologic/Lymphatic: Negative for swollen lymph nodes.       Objective:      Physical Exam   Skin: Lesion (ulcer noted distal righ middle finger july surrounding erythema with scant clear drainage noted) noted. There is erythema (distal 10 fingers noted).       Assessment:       1. Cellulitis of right middle finger        Plan:         Cellulitis " of right middle finger  -     mupirocin (BACTROBAN) 2 % ointment; Apply to affected area 3 times daily  Dispense: 22 g; Refill: 1  -     sulfamethoxazole-trimethoprim 800-160mg (BACTRIM DS) 800-160 mg Tab; Take 1 tablet by mouth 2 (two) times daily.  Dispense: 20 tablet; Refill: 0    discussed wound care with patient.

## 2019-01-19 NOTE — PATIENT INSTRUCTIONS
Cellulitis  Cellulitis is an infection of the deep layers of skin. A break in the skin, such as a cut or scratch, can let bacteria under the skin. If the bacteria get to deep layers of the skin, it can be serious. If not treated, cellulitis can get into the bloodstream and lymph nodes. The infection can then spread throughout the body. This causes serious illness.  Cellulitis causes the affected skin to become red, swollen, warm, and sore. The reddened areas have a visible border. An open sore may leak fluid (pus). You may have a fever, chills, and pain.  Cellulitis is treated with antibiotics taken for 7 to 10 days. An open sore may be cleaned and covered with cool wet gauze. Symptoms should get better 1 to 2 days after treatment is started. Make sure to take all the antibiotics for the full number of days until they are gone. Keep taking the medicine even if your symptoms go away.  Home care  Follow these tips:  · Limit the use of the part of your body with cellulitis.   · If the infection is on your leg, keep your leg raised while sitting. This will help to reduce swelling.  · Take all of the antibiotic medicine exactly as directed until it is gone. Do not miss any doses, especially during the first 7 days. Dont stop taking the medicine when your symptoms get better.  · Keep the affected area clean and dry.  · Wash your hands with soap and warm water before and after touching your skin. Anyone else who touches your skin should also wash his or her hands. Don't share towels.  Follow-up care  Follow up with your healthcare provider, or as advised. If your infection does not go away on the first antibiotic, your healthcare provider will prescribe a different one.  When to seek medical advice  Call your healthcare provider right away if any of these occur:  · Red areas that spread  · Swelling or pain that gets worse  · Fluid leaking from the skin (pus)  · Fever higher of 100.4º F (38.0º C) or higher after 2 days  on antibiotics  Date Last Reviewed: 9/1/2016  © 8663-5472 [employer]. [address] [place] [address], [place] [address]. All rights reserved. This information is not intended as a substitute for professional medical care. Always follow your healthcare professional's instructions.

## 2019-01-21 ENCOUNTER — TELEPHONE (OUTPATIENT)
Dept: INTERNAL MEDICINE | Facility: CLINIC | Age: 59
End: 2019-01-21

## 2019-01-21 ENCOUNTER — PATIENT MESSAGE (OUTPATIENT)
Dept: INTERNAL MEDICINE | Facility: CLINIC | Age: 59
End: 2019-01-21

## 2019-01-21 ENCOUNTER — PATIENT MESSAGE (OUTPATIENT)
Dept: RHEUMATOLOGY | Facility: CLINIC | Age: 59
End: 2019-01-21

## 2019-01-21 NOTE — TELEPHONE ENCOUNTER
----- Message from Dora Carpenter sent at 1/21/2019 10:24 AM CST -----  Contact: Sen# [facility] Phone# [phone], Fax# [phone]  Alejandro is calling in regards to an escript for the patient's medication for zolpidem (AMBIEN) 10 mg Tabr that was sent over to them at [facility]'s pharmacy on 01/17/2019. Then it was sent to the mail order on the same day and now its coming back as refilled to soon. She said that mail order [facility] sent out the script on the 01/18/2019 but the patient said that she haven't received it yet and she's out of medication. They would like to know if they can have an earlier refill on the medication? Alejandro would like a call back please.

## 2019-01-24 ENCOUNTER — OFFICE VISIT (OUTPATIENT)
Dept: RHEUMATOLOGY | Facility: CLINIC | Age: 59
End: 2019-01-24
Payer: COMMERCIAL

## 2019-01-24 ENCOUNTER — LAB VISIT (OUTPATIENT)
Dept: LAB | Facility: HOSPITAL | Age: 59
End: 2019-01-24
Attending: INTERNAL MEDICINE
Payer: COMMERCIAL

## 2019-01-24 VITALS
HEART RATE: 97 BPM | DIASTOLIC BLOOD PRESSURE: 64 MMHG | SYSTOLIC BLOOD PRESSURE: 98 MMHG | HEIGHT: 61 IN | BODY MASS INDEX: 17.11 KG/M2 | WEIGHT: 90.63 LBS

## 2019-01-24 DIAGNOSIS — R53.83 FATIGUE, UNSPECIFIED TYPE: ICD-10-CM

## 2019-01-24 DIAGNOSIS — M35.00 SICCA SYNDROME: ICD-10-CM

## 2019-01-24 DIAGNOSIS — L53.9 ERYTHEMA OF FINGER: ICD-10-CM

## 2019-01-24 DIAGNOSIS — M35.00 SICCA SYNDROME: Primary | ICD-10-CM

## 2019-01-24 DIAGNOSIS — I73.00 RAYNAUD'S DISEASE WITHOUT GANGRENE: ICD-10-CM

## 2019-01-24 DIAGNOSIS — I73.81 ERYTHROMELALGIA: ICD-10-CM

## 2019-01-24 LAB
BILIRUB UR QL STRIP: NEGATIVE
CLARITY UR REFRACT.AUTO: CLEAR
COLOR UR AUTO: YELLOW
CREAT UR-MCNC: 20 MG/DL
GLUCOSE UR QL STRIP: NEGATIVE
HGB UR QL STRIP: NEGATIVE
KETONES UR QL STRIP: NEGATIVE
LEUKOCYTE ESTERASE UR QL STRIP: NEGATIVE
NITRITE UR QL STRIP: NEGATIVE
PH UR STRIP: 7 [PH] (ref 5–8)
PROT UR QL STRIP: NEGATIVE
PROT UR-MCNC: <7 MG/DL
PROT/CREAT UR: NORMAL MG/G{CREAT}
SP GR UR STRIP: 1.01 (ref 1–1.03)
URN SPEC COLLECT METH UR: NORMAL

## 2019-01-24 PROCEDURE — 99999 PR PBB SHADOW E&M-EST. PATIENT-LVL III: CPT | Mod: PBBFAC,,, | Performed by: INTERNAL MEDICINE

## 2019-01-24 PROCEDURE — 99999 PR PBB SHADOW E&M-EST. PATIENT-LVL III: ICD-10-PCS | Mod: PBBFAC,,, | Performed by: INTERNAL MEDICINE

## 2019-01-24 PROCEDURE — 3008F PR BODY MASS INDEX (BMI) DOCUMENTED: ICD-10-PCS | Mod: CPTII,S$GLB,, | Performed by: INTERNAL MEDICINE

## 2019-01-24 PROCEDURE — 99215 PR OFFICE/OUTPT VISIT, EST, LEVL V, 40-54 MIN: ICD-10-PCS | Mod: S$GLB,,, | Performed by: INTERNAL MEDICINE

## 2019-01-24 PROCEDURE — 99215 OFFICE O/P EST HI 40 MIN: CPT | Mod: S$GLB,,, | Performed by: INTERNAL MEDICINE

## 2019-01-24 PROCEDURE — 3008F BODY MASS INDEX DOCD: CPT | Mod: CPTII,S$GLB,, | Performed by: INTERNAL MEDICINE

## 2019-01-24 PROCEDURE — 81003 URINALYSIS AUTO W/O SCOPE: CPT

## 2019-01-24 PROCEDURE — 82570 ASSAY OF URINE CREATININE: CPT

## 2019-01-24 NOTE — PROGRESS NOTES
"Subjective:       Patient ID: Berenice Hayes is a 58 y.o. female.    Chief Complaint: No chief complaint on file.      HPI:  Berenice Hayes is a 58 y.o. female followed for concerns of autoimmune issues.   Diagnosed with fibromyalgia at age 34.   At age 42 had erythromyelgia with redness all over. She was treated by vascular with a medication but caused Raynauds to develop.  She saw Dr. Hill in vascular.      She has history significant pelvic discomfort since 2009.  She saw therapist for pelvic floor therapy which has helped. 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  She had pudenal nerve block 2/3/15.     She saw Dr. Harmon in the past who felt she had fibriomyalgia.  She tried amitriptyline for some time.   She takes Atelvia for OP.        She has felt bad since 2014.  "Hit with massive heat wave beyond what others feel with menopause."  Mouth was very dry and primary gave pilocarpine.  Spring of 2015 GUICHO was negative.  Previously had hand pains.  X-rays of hands normal.  She was found by endocrine to be hypoglycemic.  Ultrasound thyroid showed nodules that were negative on biopsy.  August 2015 diagnosed as having Hashimotos disease.   Synthroid did not help levels.  She is on Cytomel now.    Saw ENT for throat pain in neck since ultrasound was done.  ENT will biopsy a solid nodule on left lobe thyroid.  CT with contrast showed enlargement in the saliva gland.  Her mother had fibromyalgia and PMR as well as history ETOH abuse as an adult.  Father had osteoarthritis and required complete thumb reconstruction on one thumb.     Daughter with Hashimotos and thyroid Ca.    Interval History:    Now with redness around nails and skin breakdown that began 3 weeks ago.  Seen in ED and given Bactrim and Bactroban for cellulitis.  She did not take pills but is using the ointment.    She has history of erythromyelgia and notices more of the feet.         Review of Systems   Constitutional: Negative for " "fever and unexpected weight change.   HENT: Positive for trouble swallowing.    Eyes: Positive for redness.   Respiratory: Negative for cough and shortness of breath.    Cardiovascular: Negative for chest pain.   Gastrointestinal: Positive for constipation. Negative for diarrhea.   Endocrine: Negative.    Genitourinary: Negative for dysuria and genital sores.   Musculoskeletal: Positive for arthralgias.   Skin: Negative for rash.   Allergic/Immunologic: Negative.    Neurological: Positive for headaches.   Hematological: Bruises/bleeds easily.   Psychiatric/Behavioral: Negative.          Objective:   BP 98/64   Pulse 97   Ht 5' 1" (1.549 m)   Wt 41.1 kg (90 lb 9.7 oz)   LMP  (LMP Unknown)   BMI 17.12 kg/m²      Physical Exam   Constitutional: She is oriented to person, place, and time and well-developed, well-nourished, and in no distress.   HENT:   Head: Normocephalic and atraumatic.   Decreased salivary pooling   Eyes: Conjunctivae and EOM are normal.   Decreased tear production   Neck: Neck supple.   Cardiovascular: Normal rate and regular rhythm.    Pulmonary/Chest: Effort normal and breath sounds normal.   Abdominal: Soft. Bowel sounds are normal.   Neurological: She is alert and oriented to person, place, and time. Gait normal.   Skin: Skin is warm and dry. There is erythema.     Psychiatric: Mood and affect normal.   Musculoskeletal: Normal range of motion.                               LABS    Component      Latest Ref Rng & Units 10/26/2018 7/10/2018   WBC      3.90 - 12.70 K/uL  5.22   RBC      4.00 - 5.40 M/uL  4.85   Hemoglobin      12.0 - 16.0 g/dL  14.4   Hematocrit      37.0 - 48.5 %  44.4   MCV      82 - 98 fL  92   MCH      27.0 - 31.0 pg  29.7   MCHC      32.0 - 36.0 g/dL  32.4   RDW      11.5 - 14.5 %  13.2   Platelets      150 - 350 K/uL  244   MPV      9.2 - 12.9 fL  9.4   Immature Granulocytes      0.0 - 0.5 %  0.4   Gran # (ANC)      1.8 - 7.7 K/uL  3.6   Immature Grans (Abs)      0.00 - " 0.04 K/uL  0.02   Lymph #      1.0 - 4.8 K/uL  1.0   Mono #      0.3 - 1.0 K/uL  0.3   Eos #      0.0 - 0.5 K/uL  0.2   Baso #      0.00 - 0.20 K/uL  0.09   nRBC      0 /100 WBC  0   Gran%      38.0 - 73.0 %  69.5   Lymph%      18.0 - 48.0 %  18.8   Mono%      4.0 - 15.0 %  6.5   Eosinophil%      0.0 - 8.0 %  3.1   Basophil%      0.0 - 1.9 %  1.7   Differential Method        Automated   Sodium      136 - 145 mmol/L  139   Potassium      3.5 - 5.1 mmol/L  3.9   Chloride      95 - 110 mmol/L  96   CO2      23 - 29 mmol/L  33 (H)   Glucose      70 - 110 mg/dL  92   BUN, Bld      6 - 20 mg/dL  16   Creatinine      0.5 - 1.4 mg/dL  0.7   Calcium      8.7 - 10.5 mg/dL  10.3   Total Protein      6.0 - 8.4 g/dL  8.3   Albumin      3.5 - 5.2 g/dL  4.6   Total Bilirubin      0.1 - 1.0 mg/dL  0.3   Alkaline Phosphatase      55 - 135 U/L  64   AST      10 - 40 U/L  22   ALT      10 - 44 U/L  19   Anion Gap      8 - 16 mmol/L  10   eGFR if African American      >60 mL/min/1.73 m:2  >60.0   eGFR if non African American      >60 mL/min/1.73 m:2  >60.0   Anti-Ashley-1 Antibody      <20 Units  <20   PL-7      Negative  Negative   PL-12      Negative  Negative   EJ      Negative  Negative   OJ      Negative  Negative   SRP      Negative  Negative   MI-2      Negative  Negative   TIF1 GAMMA (P155/140)      <20 Units  <20   MDA-5 (P140) (CADM-140)      <20 Units  <20   NXP-2 (P140)      <20 Units  <20   Anti-PM/Scl Ab      <20 Units  <20   Fibrillarin (U3 RNP)      Negative  Negative   U2 snRNP      Negative  Negative   Anti-U1-RNP  Ab      <20 Units  <20   Ku      Negative  Negative   Anti-SS-A 52 kD Ab, IgG      <20 Units  50 (H)   Specimen UA        Urine, Unspecified   Color, UA      Yellow, Straw, Kari  Yellow   Appearance, UA      Clear  Hazy (A)   pH, UA      5.0 - 8.0  7.0   Specific Gravity, UA      1.005 - 1.030  1.010   Protein, UA      Negative  Negative   Glucose, UA      Negative  Negative   Ketones, UA      Negative  1+ (A)    Bilirubin (UA)      Negative  Negative   Occult Blood UA      Negative  Negative   Nitrite, UA      Negative  Negative   Urobilinogen, UA      <2.0 EU/dL  Negative   Leukocytes, UA      Negative  Negative   RBC, UA      0 - 4 /hpf  0   WBC, UA      0 - 5 /hpf  1   Bacteria, UA      None-Occ /hpf  Moderate (A)   Squam Epithel, UA      /hpf  2   Microscopic Comment        SEE COMMENT   Protein, Urine Random      0 - 15 mg/dL  <7   Creatinine, Random Ur      15.0 - 325.0 mg/dL  19.0   Prot/Creat Ratio, Ur      0.00 - 0.20  Unable to calculate   Anti-SSA Antibody      0.00 - 19.99 EU  10.08   Anti-SSA Interpretation      Negative  Negative   Anti-SSB Antibody      0.00 - 19.99 EU  1.63   Anti-SSB Interpretation      Negative  Negative   Complement (C-3)      50 - 180 mg/dL  99   Complement (C-4)      11 - 44 mg/dL  25   ds DNA Ab      Negative 1:10  Negative 1:10   CPK      20 - 180 U/L  48   CRP      0.0 - 8.2 mg/L  0.6   Sed Rate      0 - 20 mm/Hr  10   Vit D, 25-Hydroxy      30 - 96 ng/mL  53   GUICHO Screen      Negative <1:160  Negative <1:160   TSH      0.400 - 4.000 uIU/mL 1.338    Free T4      0.71 - 1.51 ng/dL 0.44 (L)    T3, Free      2.3 - 4.2 pg/mL 2.5    T3, Total      60 - 180 ng/dL 89          Assessment:       1. Fibromyalgia. Managed with amitriptyline. Persistent pain and unable to increase amitriptyline due to worsening fatigue.  2. Raynauds. Symptomatic management. Now with changes around nails but no digital ulcers.   3. Sicca. Chronic.  Negative SSA and SSB in past now SSA positive. Continue restasis.  Repeat labs.  Currently doing symptomatic management with Restasis and pilocarpine.   4. Hand pain b/l. X-ray without changes. Has periodic episodes of pain.  5. Osteoporosis.  No longer on Atelvia now on estradiol patch.  6. Erythromelalgia.  Takes baby aspirin  7. Sleep disorder. History of narcolepsy. Sleep doctor has given Ritalin bid which helps.  8. Increased heat in body. Improved  9.  Menopause  10.  Hashimotos disease  11.  Multinodular goiter   12.  Pudenal nerve pain.  Improved with acupuncture which she continues every 2 weeks. Worsened with water therapy so stopped.    Plan:       1.  In past no evidence of rheumatologic disorder but now with skin changes around nails. Will get dermatology consult.     2.  Patient to keep regular follow ups to monitor for progression to rheumatologic disorder.  3.  Continue fibromyalgia treatment.  4.  Patient unable to use vasodilator for Raynaud's due to low BP  5.  Consider Plaquenil but concerned about polypharymacy.      RTO 4 months/prn  Patient seen face to face for 40 minutes and greater than 50% spent in counseling regarding concerns for rheumatologic causes for her multiple symptoms.

## 2019-01-28 ENCOUNTER — PATIENT MESSAGE (OUTPATIENT)
Dept: RHEUMATOLOGY | Facility: CLINIC | Age: 59
End: 2019-01-28

## 2019-01-30 ENCOUNTER — INITIAL CONSULT (OUTPATIENT)
Dept: DERMATOLOGY | Facility: CLINIC | Age: 59
End: 2019-01-30
Payer: COMMERCIAL

## 2019-01-30 DIAGNOSIS — L98.9 DISEASE OF SKIN AND SUBCUTANEOUS TISSUE: Primary | ICD-10-CM

## 2019-01-30 DIAGNOSIS — I73.81 ERYTHROMELALGIA: ICD-10-CM

## 2019-01-30 DIAGNOSIS — I73.00 RAYNAUD'S DISEASE WITHOUT GANGRENE: ICD-10-CM

## 2019-01-30 PROCEDURE — 99999 PR PBB SHADOW E&M-EST. PATIENT-LVL II: CPT | Mod: PBBFAC,,, | Performed by: DERMATOLOGY

## 2019-01-30 PROCEDURE — 99999 PR PBB SHADOW E&M-EST. PATIENT-LVL II: ICD-10-PCS | Mod: PBBFAC,,, | Performed by: DERMATOLOGY

## 2019-01-30 PROCEDURE — 99202 OFFICE O/P NEW SF 15 MIN: CPT | Mod: S$GLB,,, | Performed by: DERMATOLOGY

## 2019-01-30 PROCEDURE — 99202 PR OFFICE/OUTPT VISIT, NEW, LEVL II, 15-29 MIN: ICD-10-PCS | Mod: S$GLB,,, | Performed by: DERMATOLOGY

## 2019-01-30 RX ORDER — TRIAMCINOLONE ACETONIDE 1 MG/G
OINTMENT TOPICAL
Qty: 30 G | Refills: 1 | Status: SHIPPED | OUTPATIENT
Start: 2019-01-30 | End: 2019-07-02

## 2019-01-30 NOTE — Clinical Note
Hi Dr. Landrum,I know Ms. Hayes already carries a diagnosis of Raynaud's and erythromelalgia, but I think her current findings are more extensive and suggest an underlying CTD like lupus. I offered biopsy of her fingers, but she refused, so instead I gave her a topical steroid to try. This should help with the ulcerations/fissures if they are lupus-related (and not just from her erythromelalgia). Let me know your thoughts or if there's anything else I can do to help.Thanks,Olga

## 2019-01-30 NOTE — PROGRESS NOTES
Subjective:       Patient ID:  Berenice Hayes is a 58 y.o. female who presents for   Chief Complaint   Patient presents with    Rash     Right fingers, x 6 months, painful, redness, bactroban cream for treatment     History of Present Illness: The patient presents with chief complaint of worsening rash on the hands x6 months. Has history of Raynauds and erythromelalgia. Around 6 weeks ago, fingers began getting very edematous at the distal joints and she began developing fissures and ulcerations on the dorsal fingers. Patient went to rheumatologist and had a full lab work up to assess for underlying connective tissue disease, all of which was negative except an anti-SSA Ab.  Location: dorsal hands, distal fingers  Duration: worsening for the past 6 months and more severely 6 weeks ago           Rash  - Initial  Affected locations: right fingers and left fingers  Duration: 6 months  Signs / symptoms: scaling, cracking, pain and redness  Aggravated by: nothing  Relieving factors/Treatments tried: antibiotics  Improvement on treatment: no relief      Pt has had Raynaud's since her early 40's, but fingers did not stay purple until the last 6 months. R hand is worse than L.  Had symptoms of erythromelalgia prior to Raynaud's but it was not formally diagnosed until about 8 yrs ago.  Pain from EM is worse than pain from Raynaud's.   Erythromelalgia affects hands and feet.  Pt has low blood pressure without medication so hasn't been prescribed any calcium channel blockers.   Pt has had weight loss despite trying to increase food intake. States she is up to date with her routine cancer screenings with her PCP.    Pt has had a + GUICHO in the past and her mother and daughter have both had a + GUICHO.    Review of Systems   Constitutional: Positive for weight loss and fatigue. Negative for fever, chills, weight gain, night sweats and malaise.   Skin: Positive for rash, daily sunscreen use and activity-related sunscreen use.  Negative for recent sunburn.   Hematologic/Lymphatic: Bruises/bleeds easily.        Objective:    Physical Exam   Constitutional: She appears well-developed and well-nourished. No distress.   Neurological: She is alert and oriented to person, place, and time. She is not disoriented.   Psychiatric: She has a normal mood and affect.   Skin:   Areas Examined (abnormalities noted in diagram):   Head / Face Inspection Performed  Neck Inspection Performed  Chest / Axilla Inspection Performed  RUE Inspected  LUE Inspection Performed  Nails and Digits Inspection Performed                 Diagram Legend     Erythematous scaling macule/papule c/w actinic keratosis       Vascular papule c/w angioma      Pigmented verrucoid papule/plaque c/w seborrheic keratosis      Yellow umbilicated papule c/w sebaceous hyperplasia      Irregularly shaped tan macule c/w lentigo     1-2 mm smooth white papules consistent with Milia      Movable subcutaneous cyst with punctum c/w epidermal inclusion cyst      Subcutaneous movable cyst c/w pilar cyst      Firm pink to brown papule c/w dermatofibroma      Pedunculated fleshy papule(s) c/w skin tag(s)      Evenly pigmented macule c/w junctional nevus     Mildly variegated pigmented, slightly irregular-bordered macule c/w mildly atypical nevus      Flesh colored to evenly pigmented papule c/w intradermal nevus       Pink pearly papule/plaque c/w basal cell carcinoma      Erythematous hyperkeratotic cursted plaque c/w SCC      Surgical scar with no sign of skin cancer recurrence      Open and closed comedones      Inflammatory papules and pustules      Verrucoid papule consistent consistent with wart     Erythematous eczematous patches and plaques     Dystrophic onycholytic nail with subungual debris c/w onychomycosis     Umbilicated papule    Erythematous-base heme-crusted tan verrucoid plaque consistent with inflamed seborrheic keratosis     Erythematous Silvery Scaling Plaque c/w Psoriasis      See annotation      Assessment / Plan:        Erythromelalgia, Raynaud's, and Disease of skin and subcutaneous tissue  Counseled pt that the skin changes of her fingertips and the dilated capillary loops of her proximal nail folds are concerning for an underlying connective tissue disease such as lupus. Erythromelalgia and Raynaud's are both associated with SLE (and other autoimmune CTDs).  Offered skin biopsy of affected areas to aid in making the diagnosis; however, pt declined biopsy of her fingers today.  Recommended trying a topical steroid since this may help if the ulcerations are due to lupus and not just erythromelalgia. Pt agreed to try this for 2 wks and let me know how her skin responds.  -     triamcinolone acetonide 0.1% (KENALOG) 0.1 % ointment; AAA BID x 1-2 wks then prn flares only  Dispense: 30 g; Refill: 1    She is well aware of the need to regulate her temperature to help with her symptoms.    Regarding other treatment options for erythromelalgia, she is already taking aspirin, gabapentin, and amitriptyline. Pt unable to take calcium channel blockers due to low BP. Can consider adding 10% capsaicin cream, but not while ulcerations are present.    rtc 2-3 wks

## 2019-01-30 NOTE — LETTER
February 1, 2019      Katt Arredondo MD  [address]  [place] [address]           [facility]  [address]  [place] [address]  Phone: [phone]  Fax: [phone]          Patient: Berenice Hayes   MR Number: [id number]   YOB: 1960   Date of Visit: 1/30/2019       Dear Dr. Katt Arredondo:    Thank you for referring Berenice Hayes to me for evaluation. Attached you will find relevant portions of my assessment and plan of care.    If you have questions, please do not hesitate to call me. I look forward to following Berenice Hayes along with you.    Sincerely,    Olga Cobb MD    Enclosure  CC:  No Recipients    If you would like to receive this communication electronically, please contact [email or web address] or [phone] to request more information on [employer] access.    For providers and/or their staff who would like to refer a patient to [facility], please contact us through our one-stop-shop provider referral line, [facility], at [phone].    If you feel you have received this communication in error or would no longer like to receive these types of communications, please e-mail [email or web address]

## 2019-02-05 ENCOUNTER — PATIENT MESSAGE (OUTPATIENT)
Dept: INTERNAL MEDICINE | Facility: CLINIC | Age: 59
End: 2019-02-05

## 2019-02-05 ENCOUNTER — OFFICE VISIT (OUTPATIENT)
Dept: INTERNAL MEDICINE | Facility: CLINIC | Age: 59
End: 2019-02-05
Payer: COMMERCIAL

## 2019-02-05 ENCOUNTER — DOCUMENTATION ONLY (OUTPATIENT)
Dept: INTERNAL MEDICINE | Facility: CLINIC | Age: 59
End: 2019-02-05

## 2019-02-05 VITALS
BODY MASS INDEX: 17.91 KG/M2 | SYSTOLIC BLOOD PRESSURE: 98 MMHG | HEART RATE: 78 BPM | OXYGEN SATURATION: 97 % | DIASTOLIC BLOOD PRESSURE: 60 MMHG | HEIGHT: 60 IN | WEIGHT: 91.25 LBS

## 2019-02-05 DIAGNOSIS — I96 NECROTIC ULCERATION OF FINGERS: Primary | ICD-10-CM

## 2019-02-05 DIAGNOSIS — R63.4 UNINTENTIONAL WEIGHT LOSS: ICD-10-CM

## 2019-02-05 DIAGNOSIS — Z12.11 COLON CANCER SCREENING: ICD-10-CM

## 2019-02-05 PROCEDURE — 3008F PR BODY MASS INDEX (BMI) DOCUMENTED: ICD-10-PCS | Mod: CPTII,S$GLB,, | Performed by: INTERNAL MEDICINE

## 2019-02-05 PROCEDURE — 3008F BODY MASS INDEX DOCD: CPT | Mod: CPTII,S$GLB,, | Performed by: INTERNAL MEDICINE

## 2019-02-05 PROCEDURE — 99214 PR OFFICE/OUTPT VISIT, EST, LEVL IV, 30-39 MIN: ICD-10-PCS | Mod: S$GLB,,, | Performed by: INTERNAL MEDICINE

## 2019-02-05 PROCEDURE — 99999 PR PBB SHADOW E&M-EST. PATIENT-LVL V: CPT | Mod: PBBFAC,,, | Performed by: INTERNAL MEDICINE

## 2019-02-05 PROCEDURE — 99999 PR PBB SHADOW E&M-EST. PATIENT-LVL V: ICD-10-PCS | Mod: PBBFAC,,, | Performed by: INTERNAL MEDICINE

## 2019-02-05 PROCEDURE — 99214 OFFICE O/P EST MOD 30 MIN: CPT | Mod: S$GLB,,, | Performed by: INTERNAL MEDICINE

## 2019-02-05 NOTE — PROGRESS NOTES
Subjective:      Patient ID: Berenice Hayes is a 58 y.o. female.    Chief Complaint: Follow-up    HPI:  HPI   2013 LUIS MANUEL and WBI with digital tracings. Now with marked changes to the fingers of the right hand. She is under the care of Rheumatology and Dermatology. Years ago she had been under the care of Dr. Ferro and in 2013 had WBI with digital screening.    Patient is also losing weight and uncertain of etiology certainly under stress:            6 weeks since the start of changes  Patient Active Problem List   Diagnosis    Fibromyalgia    Bladder spasm    Osteoporosis, postmenopausal    Fibrocystic breast changes    Vulvar vestibulodynia    Vulvar pain    Vascular disorder: Erytnromelalgia    Raynaud's disease    Incomplete defecation    Straining during bowel movements    Chronic constipation    Rectocele    Disorder of muscle    Anxiety    Bilateral hand pain    Fatigue    Perimenopause vs Memopause    Menopause syndrome    Pudendal neuralgia    Pelvic pain in female    Urinary hesitancy    Hypothyroidism due to Hashimoto's thyroiditis    Multinodular goiter    Encounter for herb and vitamin supplement management    Gluten free diet    Sicca    Family history of ischemic heart disease (IHD)    Cystocele, midline    Sicca syndrome    Estefani syndrome    Chronic pain syndrome    Trigeminal neuralgia    Hip pain, right    Erythema of finger    Erythromelalgia     Past Medical History:   Diagnosis Date    Asthma with allergic rhinitis     Bladder spasm     Dense breasts     heterogeneously/fibrocystic disease    Elevated antinuclear antibody (GUICHO) level     Endometriosis of cervix     Erythromelalgia     Fibromyalgia     Ganglion cyst     left toe    Goiter     MNG    Hashimoto's disease     Hashimoto's thyroiditis     Headache(784.0)     Hypothyroidism     Hashimoto with + Tg ab    Osteoporosis     femoral neck    Raynaud's phenomenon     Rhinitis      Scoliosis     Sleep disorder     type of Narcolepsy ( resolved)    Vitamin D deficiency disease     Vulvodynia      Past Surgical History:   Procedure Laterality Date    BLOCK-NERVE-PUDENDAL Bilateral 10/26/2017    [facility]    BLOCK-NERVE-PUDENDAL Right 10/28/2015    [facility]    BLOCK-NERVE-PUDENDAL Right 2015    [facility]    BLOCK-NERVE-PUDENDAL Left 2/3/2015    [facility]    BREAST SURGERY      fibrocystic tumor removed     SECTION      2x    CYST REMOVAL      laparoscopic cyst on ovary    HYSTERECTOMY      intradermal cyst       removed from left index finger    SINUS SURGERY      2x     Family History   Problem Relation Age of Onset    Diabetes Mother     Fibromyalgia Mother     Polymyalgia rheumatica Mother     Macular degeneration Mother     Arthritis Mother     Hypertension Mother     Kidney disease Mother     Pneumonia Mother     Adrenal disorder Mother         adrenal insufficiency    Coronary artery disease Father     Arthritis Father         osteoarthritis    Hypertension Father     Heart disease Father     Diabetes Father     Hyperlipidemia Father     Hyperlipidemia Brother     Cancer Brother         oral    Thyroid cancer Daughter     Cancer Daughter         thyroid    Hypothyroidism Daughter     Breast cancer Neg Hx     Ovarian cancer Neg Hx     Colon cancer Neg Hx     Melanoma Neg Hx      Review of Systems    no other significant  Objective:     Vitals:    19 0834 19 0908   BP:  98/60   Pulse: 78    SpO2: 97%    Weight: 41.4 kg (91 lb 4.3 oz)    Height: 5' (1.524 m)    PainSc:   4    PainLoc: Generalized      Body mass index is 17.83 kg/m².  Physical Exam   Constitutional: She appears well-developed and well-nourished.   Neck: No JVD present. No thyromegaly present.    Cardiovascular: Normal rate, normal heart sounds and intact distal pulses.   Pulmonary/Chest: Effort normal and breath sounds normal. No respiratory distress.   Fingers as above  Assessment:     1. Necrotic ulceration of fingers    2. Unintentional weight loss    3. Colon cancer screening      Plan:   Berenice was seen today for follow-up.    Diagnoses and all orders for this visit:    Necrotic ulceration of fingers  -     Wrist Brachial Indices Extended (Cupid Only); Future    Unintentional weight loss  -     CT Chest Without Contrast; Future  -     CT Abdomen Pelvis  Without Contrast; Future  -     US Soft Tissue Head Neck Thyroid; Future    Colon cancer screening  -     Fecal Immunochemical Test (iFOBT); Future        Problem List Items Addressed This Visit     None      Visit Diagnoses     Necrotic ulceration of fingers    -  Primary    Relevant Orders    Wrist Brachial Indices Extended (Cupid Only)    Unintentional weight loss        Relevant Orders    CT Chest Without Contrast    CT Abdomen Pelvis  Without Contrast    US Soft Tissue Head Neck Thyroid    Colon cancer screening        Relevant Orders    Fecal Immunochemical Test (iFOBT)        Orders Placed This Encounter   Procedures    CT Chest Without Contrast     Standing Status:   Future     Standing Expiration Date:   4/6/2019    CT Abdomen Pelvis  Without Contrast     Standing Status:   Future     Standing Expiration Date:   2/5/2020     Order Specific Question:   Oral/Rectal Contrast instructions:     Answer:   NO Oral Contrast     Order Specific Question:   Special CT ABD Protocol Request?     Answer:   Routine     Order Specific Question:   May the Radiologist modify the order per protocol to meet the clinical needs of the patient?     Answer:   Yes    US Soft Tissue Head Neck Thyroid     Standing Status:   Future     Standing Expiration Date:   2/5/2020     Order Specific Question:   May the Radiologist modify the order per protocol to meet the  clinical needs of the patient?     Answer:   Yes    Fecal Immunochemical Test (iFOBT)     Standing Status:   Future     Standing Expiration Date:   4/5/2020    Wrist Brachial Indices Extended (Cupid Only)     Standing Status:   Future     Standing Expiration Date:   2/5/2020     Scheduling Instructions:      With Digital Tracing and please compare to 11/20/2013     Follow-up in about 2 weeks (around 2/19/2019) for Follow up.     Medication List           Accurate as of 2/5/19  9:38 AM. If you have any questions, ask your nurse or doctor.               CONTINUE taking these medications    albuterol 90 mcg/actuation inhaler  Commonly known as:  VENTOLIN HFA  Inhale 2 puffs into the lungs every 6 (six) hours as needed for Wheezing. Rescue     * amitriptyline 10 MG tablet  Commonly known as:  ELAVIL  Take 1 tablet (10 mg total) by mouth once daily.     * amitriptyline 50 MG tablet  Commonly known as:  ELAVIL  TAKE 1 TABLET NIGHTLY WITH THE 10 MG AMITRIPTYLINE     aspirin 81 MG EC tablet  Commonly known as:  ECOTRIN     DULoxetine 20 MG capsule  Commonly known as:  CYMBALTA  Take 1 capsule (20 mg total) by mouth once daily.     fluticasone 50 mcg/actuation nasal spray  Commonly known as:  FLONASE  1 spray by Each Nare route once daily.     FLUZONE QUAD 2634-6617 (PF) 60 mcg (15 mcg x 4)/0.5 mL Syrg  Generic drug:  flu vac [id number] 36mos up(PF)  to be administered by pharmacist into the muscle     gabapentin 100 MG capsule  Commonly known as:  NEURONTIN  TAKE 1 CAPSULE IN THE MORNING, 1 CAPSULE AT NOON, 1 CAPSULE AT 5 P.M. AND 4 CAPSULES AT BEDTIME     lidocaine-prilocaine cream  Commonly known as:  EMLA  USE AS DIRECTED BY PRESCRIBER OR PACKAGE INSTRUCTIONS     liothyronine 5 MCG Tab  Commonly known as:  CYTOMEL  Alternate days 3 (15mcg) and 3.5 (17.5) tablets, Brand name medically  necessary     meloxicam 7.5 MG tablet  Commonly known as:  MOBIC  TAKE 1 TABLET (7.5 MG TOTAL) BY MOUTH ONCE DAILY. FOR INFLAMMATION      metaxalone 800 MG tablet  Commonly known as:  SKELAXIN  TAKE 1 TABLET EVERY EVENING (SPASM)     MUCINEX 600 mg 12 hr tablet  Generic drug:  guaiFENesin     multivitamin capsule     mupirocin 2 % ointment  Commonly known as:  BACTROBAN  Apply to affected area 3 times daily     pilocarpine 5 MG Tab  Commonly known as:  SALAGEN  Take 1 tablet (5 mg total) by mouth 3 (three) times daily as needed.     PREVIDENT 5000 DRY MOUTH 1.1 % Gel  Generic drug:  fluoride (sodium)     RESTASIS 0.05 % ophthalmic emulsion  Generic drug:  cycloSPORINE     risedronate 35 mg Tbec  TAKE 1 TABLET ONCE A WEEK     sulfamethoxazole-trimethoprim 800-160mg 800-160 mg Tab  Commonly known as:  BACTRIM DS  Take 1 tablet by mouth 2 (two) times daily.     triamcinolone acetonide 0.1% 0.1 % ointment  Commonly known as:  KENALOG  AAA BID x 1-2 wks then prn flares only     VITAMIN D3 1,000 unit capsule  Generic drug:  cholecalciferol (vitamin D3)     * YUVAFEM 10 mcg Tab  Generic drug:  estradiol  INSERT 1 TABLET VAGINALLY TWICE WEEKLY     * estradiol 10 mcg Tab  Commonly known as:  VAGIFEM  Place 1 tablet (10 mcg total) vaginally twice a week.     * estradiol 0.05 mg/24 hr td ptsw 0.05 mg/24 hr  Commonly known as:  VIVELLE-DOT  Place 1 patch onto the skin twice a week.     zolpidem 10 mg Tab  Commonly known as:  AMBIEN  TAKE 1 TABLET BY MOUTH AT BEDTIME AS NEEDED         * This list has 5 medication(s) that are the same as other medications prescribed for you. Read the directions carefully, and ask your doctor or other care provider to review them with you.

## 2019-02-07 ENCOUNTER — HOSPITAL ENCOUNTER (OUTPATIENT)
Dept: RADIOLOGY | Facility: HOSPITAL | Age: 59
Discharge: HOME | End: 2019-02-07
Attending: INTERNAL MEDICINE
Payer: COMMERCIAL

## 2019-02-07 ENCOUNTER — CLINICAL SUPPORT (OUTPATIENT)
Dept: CARDIOLOGY | Facility: CLINIC | Age: 59
End: 2019-02-07
Attending: INTERNAL MEDICINE
Payer: COMMERCIAL

## 2019-02-07 DIAGNOSIS — R63.4 UNINTENTIONAL WEIGHT LOSS: ICD-10-CM

## 2019-02-07 DIAGNOSIS — I96 NECROTIC ULCERATION OF FINGERS: ICD-10-CM

## 2019-02-07 LAB
LEFT BRACHIAL BP: 101 MMHG
LEFT RADIAL BP CV US: 97 MMHG
LEFT ULNAR BP CV US: 112 MMHG
RIGHT BRACHIAL BP CV US: 102 MMHG
RIGHT RADIAL BP CV US: 96 MMHG
RIGHT ULNAR BP CV US: 96 MMHG

## 2019-02-07 PROCEDURE — 74176 CT ABD & PELVIS W/O CONTRAST: CPT | Mod: 26,,, | Performed by: RADIOLOGY

## 2019-02-07 PROCEDURE — 76536 US EXAM OF HEAD AND NECK: CPT | Mod: TC

## 2019-02-07 PROCEDURE — 71250 CT THORAX DX C-: CPT | Mod: 26,,, | Performed by: RADIOLOGY

## 2019-02-07 PROCEDURE — 74176 CT ABD & PELVIS W/O CONTRAST: CPT | Mod: TC

## 2019-02-07 PROCEDURE — 76536 US SOFT TISSUE HEAD NECK THYROID: ICD-10-PCS | Mod: 26,,, | Performed by: RADIOLOGY

## 2019-02-07 PROCEDURE — 71250 CT THORAX DX C-: CPT | Mod: TC

## 2019-02-07 PROCEDURE — 71250 CT CHEST WITHOUT CONTRAST: ICD-10-PCS | Mod: 26,,, | Performed by: RADIOLOGY

## 2019-02-07 PROCEDURE — 74176 CT ABDOMEN PELVIS WITHOUT CONTRAST: ICD-10-PCS | Mod: 26,,, | Performed by: RADIOLOGY

## 2019-02-07 PROCEDURE — 93923 UPR/LXTR ART STDY 3+ LVLS: CPT | Mod: S$GLB,,, | Performed by: INTERNAL MEDICINE

## 2019-02-07 PROCEDURE — 93923 CV US WRIST BRACHIAL INDICES EXTENDED (CUPID ONLY): ICD-10-PCS | Mod: S$GLB,,, | Performed by: INTERNAL MEDICINE

## 2019-02-07 PROCEDURE — 76536 US EXAM OF HEAD AND NECK: CPT | Mod: 26,,, | Performed by: RADIOLOGY

## 2019-02-08 ENCOUNTER — LAB VISIT (OUTPATIENT)
Dept: LAB | Facility: HOSPITAL | Age: 59
End: 2019-02-08
Attending: INTERNAL MEDICINE
Payer: COMMERCIAL

## 2019-02-08 ENCOUNTER — PATIENT MESSAGE (OUTPATIENT)
Dept: INTERNAL MEDICINE | Facility: CLINIC | Age: 59
End: 2019-02-08

## 2019-02-08 ENCOUNTER — PATIENT MESSAGE (OUTPATIENT)
Dept: ENDOCRINOLOGY | Facility: CLINIC | Age: 59
End: 2019-02-08

## 2019-02-08 DIAGNOSIS — E04.2 MULTINODULAR GOITER: ICD-10-CM

## 2019-02-08 DIAGNOSIS — E03.8 HYPOTHYROIDISM DUE TO HASHIMOTO'S THYROIDITIS: Primary | ICD-10-CM

## 2019-02-08 DIAGNOSIS — Z12.11 COLON CANCER SCREENING: ICD-10-CM

## 2019-02-08 DIAGNOSIS — E06.3 HYPOTHYROIDISM DUE TO HASHIMOTO'S THYROIDITIS: Primary | ICD-10-CM

## 2019-02-08 LAB — HEMOCCULT STL QL IA: NEGATIVE

## 2019-02-08 PROCEDURE — 82274 ASSAY TEST FOR BLOOD FECAL: CPT

## 2019-02-09 ENCOUNTER — PATIENT MESSAGE (OUTPATIENT)
Dept: INTERNAL MEDICINE | Facility: CLINIC | Age: 59
End: 2019-02-09

## 2019-02-10 NOTE — TELEPHONE ENCOUNTER
Berenice had a recent thyroid ultrasound looking for malignancy that Dermatology asked us to assess for because of skin changes to her fingers .

## 2019-02-11 ENCOUNTER — PATIENT MESSAGE (OUTPATIENT)
Dept: DERMATOLOGY | Facility: CLINIC | Age: 59
End: 2019-02-11

## 2019-02-12 ENCOUNTER — TELEPHONE (OUTPATIENT)
Dept: ENDOCRINOLOGY | Facility: CLINIC | Age: 59
End: 2019-02-12

## 2019-02-12 NOTE — TELEPHONE ENCOUNTER
----- Message from Juliet Harris RN sent at 2/11/2019  1:56 PM CST -----  Contact: pt      ----- Message -----  From: Ita Hays  Sent: 2/11/2019   1:50 PM  To: [facility] Staff    Pt calling     Dr Espinoza wants pt to have a biopsy       Pt cannot do on the 2/21      Her f/u appt is on the 2/25    Please call with an appt    Ph [phone]     Thanks

## 2019-02-13 ENCOUNTER — PATIENT MESSAGE (OUTPATIENT)
Dept: DERMATOLOGY | Facility: CLINIC | Age: 59
End: 2019-02-13

## 2019-02-14 ENCOUNTER — TELEPHONE (OUTPATIENT)
Dept: DERMATOLOGY | Facility: CLINIC | Age: 59
End: 2019-02-14

## 2019-02-14 ENCOUNTER — PATIENT MESSAGE (OUTPATIENT)
Dept: RHEUMATOLOGY | Facility: CLINIC | Age: 59
End: 2019-02-14

## 2019-02-14 NOTE — TELEPHONE ENCOUNTER
----- Message from Ayaka Huang sent at 2/14/2019  2:04 PM CST -----  Contact: pt at [phone]  Reza pt-Ref to when she can come in for a biopsy.  Has been messaging Reza through [facility].

## 2019-02-15 ENCOUNTER — HOSPITAL ENCOUNTER (OUTPATIENT)
Dept: ENDOCRINOLOGY | Facility: CLINIC | Age: 59
Discharge: HOME | End: 2019-02-15
Attending: INTERNAL MEDICINE
Payer: COMMERCIAL

## 2019-02-15 DIAGNOSIS — E04.2 MULTINODULAR GOITER: ICD-10-CM

## 2019-02-15 PROCEDURE — 10006 US FINE NEEDLE ASPIRATION BIOPSY , ADDL LESION: ICD-10-PCS | Mod: RT,S$GLB,, | Performed by: INTERNAL MEDICINE

## 2019-02-15 PROCEDURE — 10005 PR FINE NEEDLE ASP BIOPSY, W/US GUIDANCE, 1ST LESION: ICD-10-PCS | Mod: RT,S$GLB,, | Performed by: INTERNAL MEDICINE

## 2019-02-15 PROCEDURE — 88173 CYTOLOGY SPECIMEN-FNA NON-RADIOLOGY CLINICIAN PERFORMED W/O ON SITE: ICD-10-PCS | Mod: 26,,, | Performed by: PATHOLOGY

## 2019-02-15 PROCEDURE — 88173 CYTOPATH EVAL FNA REPORT: CPT | Performed by: PATHOLOGY

## 2019-02-15 PROCEDURE — 10006 FNA BX W/US GDN EA ADDL: CPT | Mod: RT,S$GLB,, | Performed by: INTERNAL MEDICINE

## 2019-02-15 PROCEDURE — 88173 CYTOPATH EVAL FNA REPORT: CPT | Mod: 26,,, | Performed by: PATHOLOGY

## 2019-02-15 PROCEDURE — 10005 FNA BX W/US GDN 1ST LES: CPT | Mod: RT,S$GLB,, | Performed by: INTERNAL MEDICINE

## 2019-02-18 ENCOUNTER — PATIENT MESSAGE (OUTPATIENT)
Dept: RHEUMATOLOGY | Facility: CLINIC | Age: 59
End: 2019-02-18

## 2019-02-18 ENCOUNTER — PATIENT MESSAGE (OUTPATIENT)
Dept: ENDOCRINOLOGY | Facility: CLINIC | Age: 59
End: 2019-02-18

## 2019-02-18 ENCOUNTER — LAB VISIT (OUTPATIENT)
Dept: LAB | Facility: HOSPITAL | Age: 59
End: 2019-02-18
Attending: INTERNAL MEDICINE
Payer: COMMERCIAL

## 2019-02-18 DIAGNOSIS — E03.8 HYPOTHYROIDISM DUE TO HASHIMOTO'S THYROIDITIS: ICD-10-CM

## 2019-02-18 DIAGNOSIS — E04.2 MULTINODULAR GOITER: ICD-10-CM

## 2019-02-18 DIAGNOSIS — E06.3 HYPOTHYROIDISM DUE TO HASHIMOTO'S THYROIDITIS: ICD-10-CM

## 2019-02-18 LAB
T3 SERPL-MCNC: 73 NG/DL
T3FREE SERPL-MCNC: 2.1 PG/ML
T4 FREE SERPL-MCNC: 0.47 NG/DL
TSH SERPL DL<=0.005 MIU/L-ACNC: 1.1 UIU/ML

## 2019-02-18 PROCEDURE — 84481 FREE ASSAY (FT-3): CPT

## 2019-02-18 PROCEDURE — 84439 ASSAY OF FREE THYROXINE: CPT

## 2019-02-18 PROCEDURE — 36415 COLL VENOUS BLD VENIPUNCTURE: CPT

## 2019-02-18 PROCEDURE — 84480 ASSAY TRIIODOTHYRONINE (T3): CPT

## 2019-02-18 PROCEDURE — 84443 ASSAY THYROID STIM HORMONE: CPT

## 2019-02-19 ENCOUNTER — OFFICE VISIT (OUTPATIENT)
Dept: INTERNAL MEDICINE | Facility: CLINIC | Age: 59
End: 2019-02-19
Payer: COMMERCIAL

## 2019-02-19 VITALS
DIASTOLIC BLOOD PRESSURE: 60 MMHG | SYSTOLIC BLOOD PRESSURE: 90 MMHG | WEIGHT: 89.31 LBS | BODY MASS INDEX: 16.86 KG/M2 | HEART RATE: 83 BPM | HEIGHT: 61 IN | OXYGEN SATURATION: 99 %

## 2019-02-19 DIAGNOSIS — R63.4 WEIGHT LOSS: ICD-10-CM

## 2019-02-19 DIAGNOSIS — L53.9 ERYTHEMA OF FINGER: Primary | ICD-10-CM

## 2019-02-19 DIAGNOSIS — E04.1 THYROID NODULE: ICD-10-CM

## 2019-02-19 PROCEDURE — 99999 PR PBB SHADOW E&M-EST. PATIENT-LVL V: ICD-10-PCS | Mod: PBBFAC,,, | Performed by: INTERNAL MEDICINE

## 2019-02-19 PROCEDURE — 3008F BODY MASS INDEX DOCD: CPT | Mod: CPTII,S$GLB,, | Performed by: INTERNAL MEDICINE

## 2019-02-19 PROCEDURE — 99214 OFFICE O/P EST MOD 30 MIN: CPT | Mod: S$GLB,,, | Performed by: INTERNAL MEDICINE

## 2019-02-19 PROCEDURE — 3008F PR BODY MASS INDEX (BMI) DOCUMENTED: ICD-10-PCS | Mod: CPTII,S$GLB,, | Performed by: INTERNAL MEDICINE

## 2019-02-19 PROCEDURE — 99214 PR OFFICE/OUTPT VISIT, EST, LEVL IV, 30-39 MIN: ICD-10-PCS | Mod: S$GLB,,, | Performed by: INTERNAL MEDICINE

## 2019-02-19 PROCEDURE — 99999 PR PBB SHADOW E&M-EST. PATIENT-LVL V: CPT | Mod: PBBFAC,,, | Performed by: INTERNAL MEDICINE

## 2019-02-19 NOTE — PROGRESS NOTES
Subjective:      Patient ID: Berenice Hayes is a 58 y.o. female.    Chief Complaint: Follow-up    HPI:  HPI   Fingers, was given Kenalog cream, plan is to change to plaquenil once the eye exam is received by Dr. Landrum. Will then consider biopsy with Dr. Rodriguez.     Dr. Espinoza did receive the message and did biopsy the nodules. The right mid lobe nodule was benign but unfortunately the right inferior nodule was unsatisfactory.    Patient has lost an additional 2 pounds. Her tsh is normal. Discussed protein  Patient Active Problem List   Diagnosis    Fibromyalgia    Bladder spasm    Osteoporosis, postmenopausal    Fibrocystic breast changes    Vulvar vestibulodynia    Vulvar pain    Vascular disorder: Erytnromelalgia    Raynaud's disease    Incomplete defecation    Straining during bowel movements    Chronic constipation    Rectocele    Disorder of muscle    Anxiety    Bilateral hand pain    Fatigue    Perimenopause vs Memopause    Menopause syndrome    Pudendal neuralgia    Pelvic pain in female    Urinary hesitancy    Hypothyroidism due to Hashimoto's thyroiditis    Multinodular goiter    Encounter for herb and vitamin supplement management    Gluten free diet    Sicca    Family history of ischemic heart disease (IHD)    Cystocele, midline    Sicca syndrome    Estefani syndrome    Chronic pain syndrome    Trigeminal neuralgia    Hip pain, right    Erythema of finger    Erythromelalgia     Past Medical History:   Diagnosis Date    Asthma with allergic rhinitis     Bladder spasm     Dense breasts     heterogeneously/fibrocystic disease    Elevated antinuclear antibody (GUICHO) level     Endometriosis of cervix     Erythromelalgia     Fibromyalgia     Ganglion cyst     left toe    Goiter     MNG    Hashimoto's disease     Hashimoto's thyroiditis     Headache(784.0)     Hypothyroidism     Hashimoto with + Tg ab    Osteoporosis     femoral neck    Raynaud's phenomenon      "Rhinitis     Scoliosis     Sleep disorder     type of Narcolepsy ( resolved)    Vitamin D deficiency disease     Vulvodynia      Past Surgical History:   Procedure Laterality Date    BLOCK-NERVE-PUDENDAL Bilateral 10/26/2017    [facility]    BLOCK-NERVE-PUDENDAL Right 10/28/2015    [facility]    BLOCK-NERVE-PUDENDAL Right 2015    [facility]    BLOCK-NERVE-PUDENDAL Left 2/3/2015    [facility]    BREAST SURGERY      fibrocystic tumor removed     SECTION      2x    CYST REMOVAL      laparoscopic cyst on ovary    HYSTERECTOMY      intradermal cyst       removed from left index finger    SINUS SURGERY      2x     Family History   Problem Relation Age of Onset    Diabetes Mother     Fibromyalgia Mother     Polymyalgia rheumatica Mother     Macular degeneration Mother     Arthritis Mother     Hypertension Mother     Kidney disease Mother     Pneumonia Mother     Adrenal disorder Mother         adrenal insufficiency    Coronary artery disease Father     Arthritis Father         osteoarthritis    Hypertension Father     Heart disease Father     Diabetes Father     Hyperlipidemia Father     Hyperlipidemia Brother     Cancer Brother         oral    Thyroid cancer Daughter     Cancer Daughter         thyroid    Hypothyroidism Daughter     Breast cancer Neg Hx     Ovarian cancer Neg Hx     Colon cancer Neg Hx     Melanoma Neg Hx      Review of Systems   No other new systems  No chest pain  No new lung symptoms  Abdomen no new systems  Objective:     Vitals:    19 1303   BP: 90/60   Pulse: 83   SpO2: 99%   Weight: 40.5 kg (89 lb 4.6 oz)   Height: 5' 1" (1.549 m)   PainSc:   4     Body mass index is 16.87 kg/m².  Physical Exam   Constitutional: She appears well-developed and well-nourished.   Neck: No JVD present. No " thyromegaly present.   Cardiovascular: Normal rate, normal heart sounds and intact distal pulses.   Pulmonary/Chest: Effort normal and breath sounds normal. No respiratory distress.      Fingers are less red and do appear to be healing  Assessment:     1. Erythema of finger    2. Thyroid nodule    3. Weight loss      Plan:   Berenice was seen today for follow-up.    Diagnoses and all orders for this visit:    Erythema of finger  Comments:  Improving, plan to start plaquenil and follow up with Rheumatology /Dermatology    Thyroid nodule  Comments:  Follow up with Dr. Espinoza    Weight loss  Comments:  Make sure 50 gram of protein daily        Problem List Items Addressed This Visit     Erythema of finger - Primary      Other Visit Diagnoses     Thyroid nodule        Follow up with Dr. Espinoza    Weight loss        Make sure 50 gram of protein daily        No orders of the defined types were placed in this encounter.    Follow-up in about 2 months (around 4/19/2019) for Follow up.     Medication List           Accurate as of 2/19/19  9:49 PM. If you have any questions, ask your nurse or doctor.               CONTINUE taking these medications    albuterol 90 mcg/actuation inhaler  Commonly known as:  VENTOLIN HFA  Inhale 2 puffs into the lungs every 6 (six) hours as needed for Wheezing. Rescue     * amitriptyline 10 MG tablet  Commonly known as:  ELAVIL  Take 1 tablet (10 mg total) by mouth once daily.     * amitriptyline 50 MG tablet  Commonly known as:  ELAVIL  TAKE 1 TABLET NIGHTLY WITH THE 10 MG AMITRIPTYLINE     aspirin 81 MG EC tablet  Commonly known as:  ECOTRIN     DULoxetine 20 MG capsule  Commonly known as:  CYMBALTA  Take 1 capsule (20 mg total) by mouth once daily.     fluticasone 50 mcg/actuation nasal spray  Commonly known as:  FLONASE  1 spray by Each Nare route once daily.     FLUZONE QUAD 5922-3581 (PF) 60 mcg (15 mcg x 4)/0.5 mL Syrg  Generic drug:  flu vac [id number] 36mos up(PF)  to be administered by  pharmacist into the muscle     gabapentin 100 MG capsule  Commonly known as:  NEURONTIN  TAKE 1 CAPSULE IN THE MORNING, 1 CAPSULE AT NOON, 1 CAPSULE AT 5 P.M. AND 4 CAPSULES AT BEDTIME     lidocaine-prilocaine cream  Commonly known as:  EMLA  USE AS DIRECTED BY PRESCRIBER OR PACKAGE INSTRUCTIONS     liothyronine 5 MCG Tab  Commonly known as:  CYTOMEL  Alternate days 3 (15mcg) and 3.5 (17.5) tablets, Brand name medically  necessary     meloxicam 7.5 MG tablet  Commonly known as:  MOBIC  TAKE 1 TABLET (7.5 MG TOTAL) BY MOUTH ONCE DAILY. FOR INFLAMMATION     metaxalone 800 MG tablet  Commonly known as:  SKELAXIN  TAKE 1 TABLET EVERY EVENING (SPASM)     MUCINEX 600 mg 12 hr tablet  Generic drug:  guaiFENesin     multivitamin capsule     mupirocin 2 % ointment  Commonly known as:  BACTROBAN  Apply to affected area 3 times daily     pilocarpine 5 MG Tab  Commonly known as:  SALAGEN  Take 1 tablet (5 mg total) by mouth 3 (three) times daily as needed.     PREVIDENT 5000 DRY MOUTH 1.1 % Gel  Generic drug:  fluoride (sodium)     RESTASIS 0.05 % ophthalmic emulsion  Generic drug:  cycloSPORINE     risedronate 35 mg Tbec  TAKE 1 TABLET ONCE A WEEK     sulfamethoxazole-trimethoprim 800-160mg 800-160 mg Tab  Commonly known as:  BACTRIM DS  Take 1 tablet by mouth 2 (two) times daily.     triamcinolone acetonide 0.1% 0.1 % ointment  Commonly known as:  KENALOG  AAA BID x 1-2 wks then prn flares only     VITAMIN D3 1,000 unit capsule  Generic drug:  cholecalciferol (vitamin D3)     * YUVAFEM 10 mcg Tab  Generic drug:  estradiol  INSERT 1 TABLET VAGINALLY TWICE WEEKLY     * estradiol 10 mcg Tab  Commonly known as:  VAGIFEM  Place 1 tablet (10 mcg total) vaginally twice a week.     * estradiol 0.05 mg/24 hr td ptsw 0.05 mg/24 hr  Commonly known as:  VIVELLE-DOT  Place 1 patch onto the skin twice a week.     zolpidem 10 mg Tab  Commonly known as:  AMBIEN  TAKE 1 TABLET BY MOUTH AT BEDTIME AS NEEDED         * This list has 5  medication(s) that are the same as other medications prescribed for you. Read the directions carefully, and ask your doctor or other care provider to review them with you.

## 2019-02-20 ENCOUNTER — PATIENT MESSAGE (OUTPATIENT)
Dept: REHABILITATION | Facility: HOSPITAL | Age: 59
End: 2019-02-20

## 2019-02-20 ENCOUNTER — PATIENT MESSAGE (OUTPATIENT)
Dept: RHEUMATOLOGY | Facility: CLINIC | Age: 59
End: 2019-02-20

## 2019-02-20 DIAGNOSIS — M35.00 SICCA, UNSPECIFIED TYPE: Primary | ICD-10-CM

## 2019-02-21 RX ORDER — HYDROXYCHLOROQUINE SULFATE 200 MG/1
200 TABLET, FILM COATED ORAL DAILY
Qty: 60 TABLET | Refills: 2 | Status: SHIPPED | OUTPATIENT
Start: 2019-02-21 | End: 2019-03-23

## 2019-02-21 NOTE — TELEPHONE ENCOUNTER
Plaquenil eye exam sent by patient.  Will scanned into chart.  Will send prescription for Plaquenil 200 mg daily.  Lower does use because the patient weighs only 89 lb per the chart.

## 2019-02-22 ENCOUNTER — PATIENT MESSAGE (OUTPATIENT)
Dept: DERMATOLOGY | Facility: CLINIC | Age: 59
End: 2019-02-22

## 2019-02-25 ENCOUNTER — OFFICE VISIT (OUTPATIENT)
Dept: ENDOCRINOLOGY | Facility: CLINIC | Age: 59
End: 2019-02-25
Payer: COMMERCIAL

## 2019-02-25 VITALS
HEIGHT: 61 IN | HEART RATE: 75 BPM | SYSTOLIC BLOOD PRESSURE: 100 MMHG | WEIGHT: 92.56 LBS | BODY MASS INDEX: 17.47 KG/M2 | DIASTOLIC BLOOD PRESSURE: 57 MMHG

## 2019-02-25 DIAGNOSIS — M81.0 OSTEOPOROSIS, POSTMENOPAUSAL: ICD-10-CM

## 2019-02-25 DIAGNOSIS — E06.3 HYPOTHYROIDISM DUE TO HASHIMOTO'S THYROIDITIS: Primary | ICD-10-CM

## 2019-02-25 DIAGNOSIS — E04.2 MULTINODULAR GOITER: ICD-10-CM

## 2019-02-25 DIAGNOSIS — Z80.8 FAMILY HISTORY OF THYROID CANCER: ICD-10-CM

## 2019-02-25 DIAGNOSIS — E03.8 HYPOTHYROIDISM DUE TO HASHIMOTO'S THYROIDITIS: Primary | ICD-10-CM

## 2019-02-25 DIAGNOSIS — M35.00 SICCA SYNDROME: ICD-10-CM

## 2019-02-25 PROCEDURE — 99214 OFFICE O/P EST MOD 30 MIN: CPT | Mod: S$GLB,,, | Performed by: INTERNAL MEDICINE

## 2019-02-25 PROCEDURE — 99214 PR OFFICE/OUTPT VISIT, EST, LEVL IV, 30-39 MIN: ICD-10-PCS | Mod: S$GLB,,, | Performed by: INTERNAL MEDICINE

## 2019-02-25 PROCEDURE — 3008F PR BODY MASS INDEX (BMI) DOCUMENTED: ICD-10-PCS | Mod: CPTII,S$GLB,, | Performed by: INTERNAL MEDICINE

## 2019-02-25 PROCEDURE — 3008F BODY MASS INDEX DOCD: CPT | Mod: CPTII,S$GLB,, | Performed by: INTERNAL MEDICINE

## 2019-02-25 RX ORDER — LIOTHYRONINE SODIUM 5 UG/1
TABLET ORAL
Qty: 325 TABLET | Refills: 3 | Status: SHIPPED | OUTPATIENT
Start: 2019-02-25 | End: 2020-03-02

## 2019-02-25 NOTE — PROGRESS NOTES
"Subjective:      Patient ID: Berenice Hayes is a 58 y.o. female.    Chief Complaint:  Hypothyroidism due to Hashimoto's thyroiditis      History of Present Illness  Ms. Hayes presents for follow up of hypothyroidism and osteopenia.    With Hashimoto thyroiditis with high thyroglobulin ab and negative TPO ab that was taking Cytomel 3.75 mcg PO QID  However, she has increased her dose on her own totalling 17.5mg per day - 5mcg, 2.5mcg, 5mcg, 2.5mcg, and 2.5mcg.  Notes that she had aches/bone pain and paper cuts. Previously had palpitations when Synthroid was added to the Cytomel. No longer having palpitations on the current regimen and last TSH WNL.    Also has MNG with right thyroid nodule s/p benign FNA on 8/27/15 and of left thyroid nodule on 8/18/16 consistent with follicular adenoma.  No dysphagia or hoarseness. Had repeat US that showed concern for nodules in R lobe.  Subsequent FNA of R mid and R inferior lobe complete.  R mid - benign and R inferior - unsatisfactory. Currently - complains of globus sensation citing that her pills require follow up with food to ensure that it goes down.    Has Osteoporosis and she took Atelvia 35 mg weekly since August/2012 until 2015 that she restarted ERT (vaginal and transdermal). Has had decrease in BMD at the hip despite ERT.   Restarted atlevia 11/2017.  Compliant with regimen.    She takes a multivitamin and Vit D 2000 units daily. She feels hot and cold. Occ hot flashes. + Tiredness.    She feels significant fluctuations in the way she feels based on her T3 levels - mostly when she has low T3 levels. Has been having brain fog, heart palpitations, nerve pain, heat intolerance and fatigue.     Recently started on Plaquenil for possible autoimmune condition and Raynaud's.    Review of Systems   Constitutional: Negative for chills and fever.   Gastrointestinal: Negative for nausea.   Remainder as above in HPI    Objective:  BP (!) 100/57   Pulse 75   Ht 5' 1" (1.549 m) "   Wt 42 kg (92 lb 9.5 oz)   LMP  (LMP Unknown)   BMI 17.50 kg/m²    Physical Exam   Neck: No thyromegaly (Thyroid upper limits of normal in size with multiple nodules palpated) present.   Cardiovascular: Normal rate.   Pulmonary/Chest: Effort normal.   Abdominal: Soft.   Musculoskeletal: She exhibits no edema.   Nursing note and vitals reviewed.  DTR's 2 +  No tremor  No edema      Lab Review:      Ref. Range 5/19/2017 08:14 7/12/2017 08:10 9/1/2017 07:45 10/2/2017 09:16 10/10/2017 09:25   Thyroxine Bind Glob Latest Ref Range: 13.5 - 30.9 mcg/mL     11.9 (L)     TSH   Date Value Ref Range Status   02/18/2019 1.099 0.400 - 4.000 uIU/mL Final     Free T4   Date Value Ref Range Status   02/18/2019 0.47 (L) 0.71 - 1.51 ng/dL Final     T3, Free   Date Value Ref Range Status   02/18/2019 2.1 (L) 2.3 - 4.2 pg/mL Final     Thyroglobulin Ab Screen   Date Value Ref Range Status   10/02/2017 9.7 (H) 0.0 - 3.9 IU/mL Final     Thyroperoxidase Antibodies   Date Value Ref Range Status   08/27/2015 <6.0 <6.0 IU/mL Final         Thyroid ultrasound dated 02/2019:  Thyroid is normal in size.  Right lobe: 5.8 x 1.9 x 1.7 cm.  Left lobe: 3.8 x 1.2 x 0.8 cm.  Multinodular right thyroid parenchyma.  Left thyroid parenchyma is normal.  Hypervascular right thyroid lobe.  Left thyroid lobe demonstrates normal vascularity.  Nodules: 1.1 x 1.3 x 1.0 cm (previously 1.1 x 1.1 x 0.6 cm) mostly solid, well-marginated, heterogeneously echogenic nodule at the right interpolar region with hypoechoic region, few punctate echogenic foci, and increased vascularity; 2.3 x 2.5 x 1.5 cm (previously 2.4 x 2.2 x 1.4 cm) solid, well-marginated, isoechoic nodule at the right lower pole with few punctate echogenic foci and increased peripheral vascularity.  Two additional solid nodules are visualized at the right upper pole measuring up to 1.1 cm, and two subcentimeter solid nodules are visualized in the left thyroid lobe.    Normal morphology cervical lymph  nodes.    IMPRESSION:  Thyroid ultrasound demonstrating a highly suspicious 1.1 cm nodule at the right interpolar region (TI-RADS 5) and a moderately suspicious 2.5 cm nodule at the right lower pole (TI-RADS 4).  Both of these nodules meet criteria for fine-needle aspiration.  Note is made that prior FNA has been performed on a right thyroid nodule, however is unclear which nodule was sampled.    Increased right thyroid vascularity may reflect thyroiditis.        Bone Density 2017:  BONE MINERAL DENSITY RESULTS:  Lumbar Spine: Lumbar bone mineral density L1-L4 is 0.999g/cm2, which is a t-score of -0.4. The z-score is 0.7.    Total Hip: The total hip bone mineral density is 0.706g/cm2.  The t-score is -1.9, and the z-score is -1.2.  Femoral neck BMD is 0.549g/cm2 and the t-score is -2.7.    COMPARISONS:  Date Location BMD T-score  07/21/15 L-spine 0.967 -0.7  Total Hip 0.704 -2.0   Impression   Osteoporosis of the femoral neck. There is a significant increase in BMD at the lumbar spine when compared to previous study (3.2%).     RECOMMENDATIONS:  1) Adequate calcium and Vitamin D therapy  2) Appropriate exercise  3) Consider medical therapy including bisphosphonates, or denosumab.  4) Consider repeat BMD in 2 years.      Assessment:     1. Hypothyroidism due to Hashimoto's thyroiditis    2. Multinodular goiter    3. Osteoporosis, postmenopausal    4. Sicca syndrome       Plan:        1- Hashimoto hypothyroidism   --Explained goals of treatment is to keep the TSH in the normal range to avoid CVS and bone complications.  --Will continue Cytomel 17.5mg per day - 5mcg, 2.5mcg, 5mcg, 2.5mcg, and 2.5mcg for now   --She understands that this is an atypical thyroid hormone replacement regimen and she will let me us know right away if she develops any hyperthyroid symptoms  --We discussed continuing this regimen so long as her TSH is not suppressed   --Discussed that patient should not continue to titrate medications on her  own according to symptoms as some of her symptoms may be related to SICCA syndrome, and she recently started plaquenil therapy.    2- MNG with dominant nodule in right lobe with benign FNA done on 8/27/15 and benign FNA of left thyroid nodule on 8/18/16 that is consistent with a benign follicular adenoma  --Will repeat FNA of R inferior nodule around second week of April to allow for healing.  -- Will consider holding specimen for molecular markers.     3- Osteoporosis of the femoral neck by last BMD in 2017  --On ERT  --Will also continue Actonel 35 mg weekly  --Repeat DXA due 10/2019    4- SICCA syndrome  Recently became SSA+ on labs.  Started on plaquenil (prescribed 2/20/19).  Discussed that it may take a few weeks before patient notes any improvement.    Discussed preference for patient to not titrate cytomel at this time and wait to see if some of her symptoms improve with compliance of plaquenil.  Patient v/u.         RTC in 6 months to one year      Gabriela Oakley MD  Endocrinology Fellow     This case has been reviewed with staff, Dr. Espinoza.

## 2019-02-25 NOTE — PATIENT INSTRUCTIONS
Continue cytomel as currently taking - five times daily totaling 17.5mcg.    Recommend continuing plaquenil as recommended by rheumatology.    Repeat thyroid biopsy ~4/2019.    Repeat bone mineral density study (DXA scan) due 10/2019.

## 2019-02-26 NOTE — PROGRESS NOTES
I have reviewed and concur with the fellow's history, physical, assessment, and plan.  I have personally interviewed the patient and all questions were answered.     Reviewed with patient goals of hypothyroidism treatment. She understands that she is on an atypical thyroid hormone replacement regimen. We have agreed to continue this regimen so long as she doesn't develop hyperthyroid symptoms and her TSH remains in the normal range. She will let us know if she develops hyperthyroid symptoms in the future. Will repeat FNA of right inferior nodule in 6-8 weeks. Started on Plaquenil by rheumatology with hx of Sicca syndrome and Raynaud's.

## 2019-03-01 ENCOUNTER — TELEPHONE (OUTPATIENT)
Dept: ENDOCRINOLOGY | Facility: CLINIC | Age: 59
End: 2019-03-01

## 2019-03-01 NOTE — TELEPHONE ENCOUNTER
----- Message from Ana Beth sent at 3/1/2019  2:45 PM CST -----  Contact: [facility]  .Pharmacy Calling    Reason for call: Change med/PA    Pharm[facility] Name:  [facility]    Prescription Name: liothyronine (CYTOMEL) 5 MCG Tab    Phone Number: [phone], REF [phone]    Additional Information: If generic okay, Cytomel not covered w/insurance, need PA

## 2019-03-08 ENCOUNTER — OFFICE VISIT (OUTPATIENT)
Dept: DERMATOLOGY | Facility: CLINIC | Age: 59
End: 2019-03-08
Payer: COMMERCIAL

## 2019-03-08 DIAGNOSIS — L98.9 DISEASE OF SKIN AND SUBCUTANEOUS TISSUE: Primary | ICD-10-CM

## 2019-03-08 PROCEDURE — 11104 PUNCH BX SKIN SINGLE LESION: CPT | Mod: S$GLB,,, | Performed by: DERMATOLOGY

## 2019-03-08 PROCEDURE — 11104 PR PUNCH BIOPSY, SKIN, SINGLE LESION: ICD-10-PCS | Mod: S$GLB,,, | Performed by: DERMATOLOGY

## 2019-03-08 PROCEDURE — 88305 TISSUE SPECIMEN TO PATHOLOGY, DERMATOLOGY: ICD-10-PCS | Mod: 26,,, | Performed by: PATHOLOGY

## 2019-03-08 PROCEDURE — 99999 PR PBB SHADOW E&M-EST. PATIENT-LVL II: CPT | Mod: PBBFAC,,, | Performed by: DERMATOLOGY

## 2019-03-08 PROCEDURE — 99499 UNLISTED E&M SERVICE: CPT | Mod: S$GLB,,, | Performed by: DERMATOLOGY

## 2019-03-08 PROCEDURE — 99499 NO LOS: ICD-10-PCS | Mod: S$GLB,,, | Performed by: DERMATOLOGY

## 2019-03-08 PROCEDURE — 88305 TISSUE EXAM BY PATHOLOGIST: CPT | Performed by: PATHOLOGY

## 2019-03-08 PROCEDURE — 99999 PR PBB SHADOW E&M-EST. PATIENT-LVL II: ICD-10-PCS | Mod: PBBFAC,,, | Performed by: DERMATOLOGY

## 2019-03-08 NOTE — PROGRESS NOTES
Subjective:       Patient ID:  Berenice Hayes is a 59 y.o. female who presents for   Chief Complaint   Patient presents with    Biopsy     Pt here today for f/u and possible biopsy of right fingers     Rash  - Follow-up  Symptom course: unchanged and worsening  Affected locations: right fingers, left fingers, right hand and left hand  Signs / symptoms: burning, pain, redness and tender  Severity: moderate to severe      Here for biopsy of worsening rash on the hands x6 months. Has history of Raynauds and erythromelalgia.   Currently has only one ulcer, slowly healing. Has been using TAC on fingers with slow improvement.  States she started plaquenil on 2/20/19.    Previous HPI (from 1/30/19):  Around 6 weeks ago, fingers began getting very edematous at the distal joints and she began developing fissures and ulcerations on the dorsal fingers. Patient went to rheumatologist and had a full lab work up to assess for underlying connective tissue disease, all of which was negative except an anti-SSA Ab.  Location: dorsal hands, distal fingers  Duration: worsening for the past 6 months and more severely 6 weeks ago     Pt has had Raynaud's since her early 40's, but fingers did not stay purple until the last 6 months. R hand is worse than L.  Had symptoms of erythromelalgia prior to Raynaud's but it was not formally diagnosed until about 8 yrs ago.  Pain from EM is worse than pain from Raynaud's.   Erythromelalgia affects hands and feet.  Pt has low blood pressure without medication so hasn't been prescribed any calcium channel blockers.   Pt has had weight loss despite trying to increase food intake. States she is up to date with her routine cancer screenings with her PCP.     Pt has had a + GUICHO in the past and her mother and daughter have both had a + GUICHO     Review of Systems   Constitutional: Negative for fever, chills, weight loss, weight gain, fatigue, night sweats and malaise.   Skin: Positive for rash, daily  sunscreen use and activity-related sunscreen use.        Objective:    Physical Exam   Constitutional: She appears well-developed and well-nourished. No distress.   Neurological: She is alert and oriented to person, place, and time. She is not disoriented.   Psychiatric: She has a normal mood and affect.   Skin:   Areas Examined (abnormalities noted in diagram):   Nails and Digits Inspection Performed             Diagram Legend     Erythematous scaling macule/papule c/w actinic keratosis       Vascular papule c/w angioma      Pigmented verrucoid papule/plaque c/w seborrheic keratosis      Yellow umbilicated papule c/w sebaceous hyperplasia      Irregularly shaped tan macule c/w lentigo     1-2 mm smooth white papules consistent with Milia      Movable subcutaneous cyst with punctum c/w epidermal inclusion cyst      Subcutaneous movable cyst c/w pilar cyst      Firm pink to brown papule c/w dermatofibroma      Pedunculated fleshy papule(s) c/w skin tag(s)      Evenly pigmented macule c/w junctional nevus     Mildly variegated pigmented, slightly irregular-bordered macule c/w mildly atypical nevus      Flesh colored to evenly pigmented papule c/w intradermal nevus       Pink pearly papule/plaque c/w basal cell carcinoma      Erythematous hyperkeratotic cursted plaque c/w SCC      Surgical scar with no sign of skin cancer recurrence      Open and closed comedones      Inflammatory papules and pustules      Verrucoid papule consistent consistent with wart     Erythematous eczematous patches and plaques     Dystrophic onycholytic nail with subungual debris c/w onychomycosis     Umbilicated papule    Erythematous-base heme-crusted tan verrucoid plaque consistent with inflamed seborrheic keratosis     Erythematous Silvery Scaling Plaque c/w Psoriasis     See annotation      Assessment / Plan:      Disease of skin and subcutaneous tissue  Punch biopsy procedure note:  Punch biopsy performed after verbal consent obtained.  Area marked and prepped with alcohol. Approximately 0.2cc of 1% lidocaine withOUT epinephrine injected. 3 mm disposable punch used to remove lesion. Hemostasis obtained and biopsy site closed with 2 Prolene sutures. Wound care instructions reviewed with patient and handout given.    -     Tissue Specimen To Pathology, Dermatology  Pathology Orders:     Normal Orders This Visit    Tissue Specimen To Pathology, Dermatology     Questions:    Directional Terms:  Other(comment)    Clinical Information:  r/o lupus vs. vasculitis vs. changes from erythromelalgia vs. other Comment - punch    Specific Site:  R 3rd finger          Follow-up in about 2 weeks (around 3/22/2019) for suture removal and biopsy results.

## 2019-03-08 NOTE — PATIENT INSTRUCTIONS
"Punch Biopsy Wound Care    Your doctor has performed a punch biopsy today.  A band aid and antibiotic ointment has been placed over the site.  This should remain in place for 24 hours.  It is recommended that you keep the area dry for the first 24 hours.  After 24 hours, you may remove the band aid and wash the area with warm soap and water and apply Vaseline jelly.  Many patients prefer to use Neosporin or Bacitracin ointment.  This is acceptable; however know that you can develop an allergy to this medication even if you have used it safely for years.  It is important to keep the area moist.  Letting it dry out and get air slows healing time, will worsen the scar, and make it more difficult to remove the stitches if they were placed.  Band aid is optional after first 24 hours.      If you notice increasing redness, tenderness, pain, or yellow drainage at the biopsy or surgical site, please notify your doctor.  These are signs of an infection.    If your biopsy/surgical site is bleeding, apply firm pressure for 15 minutes straight.  Repeat for another 15 minutes, if it is still bleeding.   If the surgical site continues to bleed, then please contact your doctor.      For [facility] users:   You will receive a [facility] notification after the pathologist has finished reviewing your biopsy specimen. Pathology results, however, will not be released online so you will see a "no content" message. Once your dermatologist reviews and clinically correlates your biopsy results, you will either receive a letter in the mail with the results of a phone call from your doctor's office if further explanation or treatment is warranted.       [address] [place] [address]/ [phone] FAX/ [email or web address]         "

## 2019-03-22 ENCOUNTER — CLINICAL SUPPORT (OUTPATIENT)
Dept: DERMATOLOGY | Facility: CLINIC | Age: 59
End: 2019-03-22
Payer: COMMERCIAL

## 2019-03-22 DIAGNOSIS — Z48.02 VISIT FOR SUTURE REMOVAL: Primary | ICD-10-CM

## 2019-03-22 PROCEDURE — 99999 PR PBB SHADOW E&M-EST. PATIENT-LVL III: ICD-10-PCS | Mod: PBBFAC,,,

## 2019-03-22 PROCEDURE — 99024 PR POST-OP FOLLOW-UP VISIT: ICD-10-PCS | Mod: S$GLB,,, | Performed by: DERMATOLOGY

## 2019-03-22 PROCEDURE — 99999 PR PBB SHADOW E&M-EST. PATIENT-LVL III: CPT | Mod: PBBFAC,,,

## 2019-03-22 PROCEDURE — 99024 POSTOP FOLLOW-UP VISIT: CPT | Mod: S$GLB,,, | Performed by: DERMATOLOGY

## 2019-03-22 NOTE — PROGRESS NOTES
Patient presents for suture removal. The wound is well healed without signs of infection.  The sutures are removed from right third finger. Wound care and activity instructions given. Results pending.      Return prn.

## 2019-04-04 ENCOUNTER — PATIENT MESSAGE (OUTPATIENT)
Dept: DERMATOLOGY | Facility: CLINIC | Age: 59
End: 2019-04-04

## 2019-04-10 ENCOUNTER — TELEPHONE (OUTPATIENT)
Dept: DERMATOLOGY | Facility: CLINIC | Age: 59
End: 2019-04-10

## 2019-04-10 DIAGNOSIS — T69.1XXA CHILBLAINS, INITIAL ENCOUNTER: Primary | ICD-10-CM

## 2019-04-10 RX ORDER — NIACINAMIDE 500 MG
500 TABLET ORAL 3 TIMES DAILY
Qty: 90 TABLET | Refills: 2 | Status: SHIPPED | OUTPATIENT
Start: 2019-04-10 | End: 2019-06-05

## 2019-04-10 NOTE — TELEPHONE ENCOUNTER
FINAL PATHOLOGIC DIAGNOSIS  [place] FINAL DIAGNOSIS:  RIGHT THIRD FINGER, SKIN, [id number], 3/8/2019:  -Superficial dermal telangiectasias with mild mostly lymphocytic perivascular inflammation.  Comment:  Multiple tissue levels examined. PAS-D stain highlights basement membrane zone and vessels of unremarkable  thickness. The histopathologic features are nonspecific but somewhat suggestive of pernio. Clinical correlation is  recommended to determine need for additional biopsy if the problem persists without clinical diagnosis.  Elizabeth Diallo MD      I called Ms. Hayes and discussed biopsy results. I let her know the biopsy suggests pernio, and there is no current evidence for lupus or vasculitis. I recommend checking cryoglobulin, cold agglutinin, and cryofibrinogen levels to eliminate cold-sensitive dysproteinemia and SPEP/immunofixation electrophoresis to exclude a monoclonal gammopathy. My staff will schedule these blood tests with her.    I also let her know that nifedipine is the best treatment for pernio; however, she states her systolic BP runs in the 90's normally. If she wants to try this, she will have to do it carefully under the instruction of her PCP. I also let her know that there is anecdotal evidence that nicotinamide (niacinamide) may help, and she would like to try this first.    She asked me to forward this information on to Dr. Landrum.

## 2019-04-11 ENCOUNTER — TELEPHONE (OUTPATIENT)
Dept: RHEUMATOLOGY | Facility: CLINIC | Age: 59
End: 2019-04-11

## 2019-04-11 NOTE — TELEPHONE ENCOUNTER
----- Message from Olga Cobb MD sent at 4/10/2019  6:58 PM CDT -----  Hi!    Ms. Hayes wanted me to forward these biopsy results and our phone conversation on to you... So I pasted them below!    Let me know if you have any questions,  Olga Cobb    FINAL PATHOLOGIC DIAGNOSIS  [place] FINAL DIAGNOSIS:  RIGHT THIRD FINGER, SKIN, [id number], 3/8/2019:  -Superficial dermal telangiectasias with mild mostly lymphocytic perivascular inflammation.  Comment:  Multiple tissue levels examined. PAS-D stain highlights basement membrane zone and vessels of unremarkable  thickness. The histopathologic features are nonspecific but somewhat suggestive of pernio. Clinical correlation is  recommended to determine need for additional biopsy if the problem persists without clinical diagnosis.  Elizabeth Diallo MD      I called Ms. Hayes and discussed biopsy results. I let her know the biopsy suggests pernio, and there is no current evidence for lupus or vasculitis. I recommend checking cryoglobulin, cold agglutinin, and cryofibrinogen levels to eliminate cold-sensitive dysproteinemia and SPEP/immunofixation electrophoresis to exclude a monoclonal gammopathy. My staff will schedule these blood tests with her.    I also let her know that nifedipine is the best treatment for pernio; however, she states her systolic BP runs in the 90's normally. If she wants to try this, she will have to do it carefully under the instruction of her PCP. I also let her know that there is anecdotal evidence that nicotinamide (niacinamide) may help, and she would like to try this first.

## 2019-04-11 NOTE — TELEPHONE ENCOUNTER
Biopsy suggestive of pernio without any evidence of lupus or vasculitis per derm.  Derm evaluating with additional labs.

## 2019-04-12 ENCOUNTER — LAB VISIT (OUTPATIENT)
Dept: LAB | Facility: HOSPITAL | Age: 59
End: 2019-04-12
Attending: DERMATOLOGY
Payer: COMMERCIAL

## 2019-04-12 DIAGNOSIS — T69.1XXA CHILBLAINS, INITIAL ENCOUNTER: ICD-10-CM

## 2019-04-12 PROCEDURE — 84165 PROTEIN E-PHORESIS SERUM: CPT | Mod: 26,,, | Performed by: PATHOLOGY

## 2019-04-12 PROCEDURE — 86334 IMMUNOFIX E-PHORESIS SERUM: CPT | Mod: 26,,, | Performed by: PATHOLOGY

## 2019-04-12 PROCEDURE — 82595 ASSAY OF CRYOGLOBULIN: CPT

## 2019-04-12 PROCEDURE — 86334 IMMUNOFIX E-PHORESIS SERUM: CPT

## 2019-04-12 PROCEDURE — 86334 PATHOLOGIST INTERPRETATION IFE: ICD-10-PCS | Mod: 26,,, | Performed by: PATHOLOGY

## 2019-04-12 PROCEDURE — 82585 ASSAY OF CRYOFIBRINOGEN: CPT

## 2019-04-12 PROCEDURE — 84165 PATHOLOGIST INTERPRETATION SPE: ICD-10-PCS | Mod: 26,,, | Performed by: PATHOLOGY

## 2019-04-12 PROCEDURE — 84165 PROTEIN E-PHORESIS SERUM: CPT

## 2019-04-12 PROCEDURE — 86157 COLD AGGLUTININ TITER: CPT

## 2019-04-13 LAB — CRYOFIB PLAS QL: NEGATIVE

## 2019-04-14 LAB — CA TITR SERPL: NORMAL TITER

## 2019-04-15 LAB
ALBUMIN SERPL ELPH-MCNC: 4.84 G/DL (ref 3.35–5.55)
ALPHA1 GLOB SERPL ELPH-MCNC: 0.28 G/DL (ref 0.17–0.41)
ALPHA2 GLOB SERPL ELPH-MCNC: 0.61 G/DL (ref 0.43–0.99)
B-GLOBULIN SERPL ELPH-MCNC: 0.71 G/DL (ref 0.5–1.1)
GAMMA GLOB SERPL ELPH-MCNC: 1.27 G/DL (ref 0.67–1.58)
INTERPRETATION SERPL IFE-IMP: NORMAL
PATHOLOGIST INTERPRETATION IFE: NORMAL
PATHOLOGIST INTERPRETATION SPE: NORMAL
PROT SERPL-MCNC: 7.7 G/DL (ref 6–8.4)

## 2019-04-17 ENCOUNTER — HOSPITAL ENCOUNTER (OUTPATIENT)
Dept: ENDOCRINOLOGY | Facility: CLINIC | Age: 59
Discharge: HOME | End: 2019-04-17
Attending: INTERNAL MEDICINE
Payer: COMMERCIAL

## 2019-04-17 DIAGNOSIS — E04.2 MULTINODULAR GOITER: ICD-10-CM

## 2019-04-17 PROCEDURE — 10005 FNA BX W/US GDN 1ST LES: CPT

## 2019-04-17 PROCEDURE — 10005 US FINE NEEDLE ASPIRATION THYROID, FIRST LESION: ICD-10-PCS | Mod: S$GLB,,, | Performed by: INTERNAL MEDICINE

## 2019-04-17 PROCEDURE — 88173 CYTOPATH EVAL FNA REPORT: CPT | Mod: 26,,, | Performed by: PATHOLOGY

## 2019-04-17 PROCEDURE — 88173 CYTOLOGY SPECIMEN-FNA NON-RADIOLOGY CLINICIAN PERFORMED W/O ON SITE: ICD-10-PCS | Mod: 26,,, | Performed by: PATHOLOGY

## 2019-04-17 PROCEDURE — 88173 CYTOPATH EVAL FNA REPORT: CPT | Performed by: PATHOLOGY

## 2019-04-17 PROCEDURE — 10005 FNA BX W/US GDN 1ST LES: CPT | Mod: S$GLB,,, | Performed by: INTERNAL MEDICINE

## 2019-04-22 LAB — CRYOGLOB SER QL: NORMAL

## 2019-04-23 ENCOUNTER — PATIENT MESSAGE (OUTPATIENT)
Dept: ENDOCRINOLOGY | Facility: CLINIC | Age: 59
End: 2019-04-23

## 2019-04-24 ENCOUNTER — PATIENT MESSAGE (OUTPATIENT)
Dept: ENDOCRINOLOGY | Facility: CLINIC | Age: 59
End: 2019-04-24

## 2019-04-24 DIAGNOSIS — E04.2 MULTINODULAR GOITER: Primary | ICD-10-CM

## 2019-05-08 ENCOUNTER — TELEPHONE (OUTPATIENT)
Dept: ENDOCRINOLOGY | Facility: CLINIC | Age: 59
End: 2019-05-08

## 2019-05-08 ENCOUNTER — OFFICE VISIT (OUTPATIENT)
Dept: RHEUMATOLOGY | Facility: CLINIC | Age: 59
End: 2019-05-08
Payer: COMMERCIAL

## 2019-05-08 ENCOUNTER — PATIENT MESSAGE (OUTPATIENT)
Dept: ENDOCRINOLOGY | Facility: CLINIC | Age: 59
End: 2019-05-08

## 2019-05-08 ENCOUNTER — LAB VISIT (OUTPATIENT)
Dept: LAB | Facility: HOSPITAL | Age: 59
End: 2019-05-08
Attending: INTERNAL MEDICINE
Payer: COMMERCIAL

## 2019-05-08 VITALS
BODY MASS INDEX: 17.98 KG/M2 | WEIGHT: 95.25 LBS | SYSTOLIC BLOOD PRESSURE: 102 MMHG | DIASTOLIC BLOOD PRESSURE: 62 MMHG | HEIGHT: 61 IN | HEART RATE: 102 BPM

## 2019-05-08 DIAGNOSIS — I73.00 RAYNAUD'S DISEASE WITHOUT GANGRENE: ICD-10-CM

## 2019-05-08 DIAGNOSIS — M35.00 SJOGREN'S SYNDROME, WITH UNSPECIFIED ORGAN INVOLVEMENT: ICD-10-CM

## 2019-05-08 DIAGNOSIS — R21 RASH: ICD-10-CM

## 2019-05-08 DIAGNOSIS — E03.8 HYPOTHYROIDISM DUE TO HASHIMOTO'S THYROIDITIS: Primary | ICD-10-CM

## 2019-05-08 DIAGNOSIS — M35.00 SJOGREN'S SYNDROME, WITH UNSPECIFIED ORGAN INVOLVEMENT: Primary | ICD-10-CM

## 2019-05-08 DIAGNOSIS — E04.2 MULTINODULAR GOITER: ICD-10-CM

## 2019-05-08 DIAGNOSIS — M79.7 FIBROMYALGIA: ICD-10-CM

## 2019-05-08 DIAGNOSIS — T69.1XXD CHILBLAINS, SUBSEQUENT ENCOUNTER: ICD-10-CM

## 2019-05-08 DIAGNOSIS — I73.81 ERYTHROMELALGIA: ICD-10-CM

## 2019-05-08 DIAGNOSIS — R53.83 FATIGUE, UNSPECIFIED TYPE: ICD-10-CM

## 2019-05-08 DIAGNOSIS — M35.00 SICCA SYNDROME: ICD-10-CM

## 2019-05-08 DIAGNOSIS — E06.3 HYPOTHYROIDISM DUE TO HASHIMOTO'S THYROIDITIS: Primary | ICD-10-CM

## 2019-05-08 PROBLEM — T69.1XXA PERNIO: Status: ACTIVE | Noted: 2019-05-08

## 2019-05-08 LAB
ALBUMIN SERPL BCP-MCNC: 4.1 G/DL (ref 3.5–5.2)
ALP SERPL-CCNC: 64 U/L (ref 55–135)
ALT SERPL W/O P-5'-P-CCNC: 21 U/L (ref 10–44)
ANION GAP SERPL CALC-SCNC: 11 MMOL/L (ref 8–16)
AST SERPL-CCNC: 21 U/L (ref 10–40)
BASOPHILS # BLD AUTO: 0.08 K/UL (ref 0–0.2)
BASOPHILS NFR BLD: 1.4 % (ref 0–1.9)
BILIRUB SERPL-MCNC: 0.3 MG/DL (ref 0.1–1)
BUN SERPL-MCNC: 13 MG/DL (ref 6–20)
C3 SERPL-MCNC: 102 MG/DL (ref 50–180)
C4 SERPL-MCNC: 22 MG/DL (ref 11–44)
CALCIUM SERPL-MCNC: 9.8 MG/DL (ref 8.7–10.5)
CHLORIDE SERPL-SCNC: 101 MMOL/L (ref 95–110)
CK SERPL-CCNC: 65 U/L (ref 20–180)
CO2 SERPL-SCNC: 28 MMOL/L (ref 23–29)
CREAT SERPL-MCNC: 0.7 MG/DL (ref 0.5–1.4)
CRP SERPL-MCNC: 0.3 MG/L (ref 0–8.2)
DIFFERENTIAL METHOD: ABNORMAL
EOSINOPHIL # BLD AUTO: 0.3 K/UL (ref 0–0.5)
EOSINOPHIL NFR BLD: 5.9 % (ref 0–8)
ERYTHROCYTE [DISTWIDTH] IN BLOOD BY AUTOMATED COUNT: 12.8 % (ref 11.5–14.5)
ERYTHROCYTE [SEDIMENTATION RATE] IN BLOOD BY WESTERGREN METHOD: 5 MM/HR (ref 0–36)
EST. GFR  (AFRICAN AMERICAN): >60 ML/MIN/1.73 M^2
EST. GFR  (NON AFRICAN AMERICAN): >60 ML/MIN/1.73 M^2
GLUCOSE SERPL-MCNC: 98 MG/DL (ref 70–110)
HCT VFR BLD AUTO: 44.1 % (ref 37–48.5)
HGB BLD-MCNC: 14.3 G/DL (ref 12–16)
IMM GRANULOCYTES # BLD AUTO: 0.02 K/UL (ref 0–0.04)
IMM GRANULOCYTES NFR BLD AUTO: 0.3 % (ref 0–0.5)
LYMPHOCYTES # BLD AUTO: 0.9 K/UL (ref 1–4.8)
LYMPHOCYTES NFR BLD: 15.3 % (ref 18–48)
MCH RBC QN AUTO: 30.2 PG (ref 27–31)
MCHC RBC AUTO-ENTMCNC: 32.4 G/DL (ref 32–36)
MCV RBC AUTO: 93 FL (ref 82–98)
MONOCYTES # BLD AUTO: 0.4 K/UL (ref 0.3–1)
MONOCYTES NFR BLD: 7.1 % (ref 4–15)
NEUTROPHILS # BLD AUTO: 4.1 K/UL (ref 1.8–7.7)
NEUTROPHILS NFR BLD: 70 % (ref 38–73)
NRBC BLD-RTO: 0 /100 WBC
PLATELET # BLD AUTO: 195 K/UL (ref 150–350)
PMV BLD AUTO: 8.7 FL (ref 9.2–12.9)
POTASSIUM SERPL-SCNC: 5.3 MMOL/L (ref 3.5–5.1)
PROT SERPL-MCNC: 7.5 G/DL (ref 6–8.4)
RBC # BLD AUTO: 4.73 M/UL (ref 4–5.4)
SODIUM SERPL-SCNC: 140 MMOL/L (ref 136–145)
WBC # BLD AUTO: 5.8 K/UL (ref 3.9–12.7)

## 2019-05-08 PROCEDURE — 99215 PR OFFICE/OUTPT VISIT, EST, LEVL V, 40-54 MIN: ICD-10-PCS | Mod: S$GLB,,, | Performed by: INTERNAL MEDICINE

## 2019-05-08 PROCEDURE — 85652 RBC SED RATE AUTOMATED: CPT

## 2019-05-08 PROCEDURE — 86225 DNA ANTIBODY NATIVE: CPT

## 2019-05-08 PROCEDURE — 99999 PR PBB SHADOW E&M-EST. PATIENT-LVL III: ICD-10-PCS | Mod: PBBFAC,,, | Performed by: INTERNAL MEDICINE

## 2019-05-08 PROCEDURE — 3008F PR BODY MASS INDEX (BMI) DOCUMENTED: ICD-10-PCS | Mod: CPTII,S$GLB,, | Performed by: INTERNAL MEDICINE

## 2019-05-08 PROCEDURE — 85025 COMPLETE CBC W/AUTO DIFF WBC: CPT

## 2019-05-08 PROCEDURE — 86140 C-REACTIVE PROTEIN: CPT

## 2019-05-08 PROCEDURE — 86160 COMPLEMENT ANTIGEN: CPT

## 2019-05-08 PROCEDURE — 86160 COMPLEMENT ANTIGEN: CPT | Mod: 59

## 2019-05-08 PROCEDURE — 99215 OFFICE O/P EST HI 40 MIN: CPT | Mod: S$GLB,,, | Performed by: INTERNAL MEDICINE

## 2019-05-08 PROCEDURE — 36415 COLL VENOUS BLD VENIPUNCTURE: CPT

## 2019-05-08 PROCEDURE — 99999 PR PBB SHADOW E&M-EST. PATIENT-LVL III: CPT | Mod: PBBFAC,,, | Performed by: INTERNAL MEDICINE

## 2019-05-08 PROCEDURE — 82550 ASSAY OF CK (CPK): CPT

## 2019-05-08 PROCEDURE — 80053 COMPREHEN METABOLIC PANEL: CPT

## 2019-05-08 PROCEDURE — 3008F BODY MASS INDEX DOCD: CPT | Mod: CPTII,S$GLB,, | Performed by: INTERNAL MEDICINE

## 2019-05-08 RX ORDER — HYDROXYCHLOROQUINE SULFATE 200 MG/1
200 TABLET, FILM COATED ORAL DAILY
COMMUNITY
End: 2019-05-30 | Stop reason: SDUPTHER

## 2019-05-08 ASSESSMENT — ROUTINE ASSESSMENT OF PATIENT INDEX DATA (RAPID3)
AM STIFFNESS SCORE: 1, YES
PSYCHOLOGICAL DISTRESS SCORE: 4.4
TOTAL RAPID3 SCORE: 6.28
FATIGUE SCORE: 8
PAIN SCORE: 7
WHEN YOU AWAKENED IN THE MORNING OVER THE LAST WEEK, PLEASE INDICATE THE AMOUNT OF TIME IT TAKES UNTIL YOU ARE AS LIMBER AS YOU WILL BE FOR THE DAY: 0-2 HOURS
PATIENT GLOBAL ASSESSMENT SCORE: 7.5
MDHAQ FUNCTION SCORE: 1.3

## 2019-05-08 NOTE — PROGRESS NOTES
"Subjective:       Patient ID: Berenice Hayes is a 59 y.o. female.    Chief Complaint: No chief complaint on file.      HPI:  Berenice Hayes is a 59 y.o. female followed for concerns of autoimmune issues.   Diagnosed with fibromyalgia at age 34.   At age 42 had erythromyelgia with redness all over. She was treated by vascular with a medication but caused Raynauds to develop.  She saw Dr. Hill in vascular.      She has history significant pelvic discomfort since 2009.  She saw therapist for pelvic floor therapy which has helped. 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  She had pudenal nerve block 2/3/15.     She saw Dr. Harmon in the past who felt she had fibriomyalgia.  She tried amitriptyline for some time.   She takes Atelvia for OP.        She has felt bad since 2014.  "Hit with massive heat wave beyond what others feel with menopause."  Mouth was very dry and primary gave pilocarpine.  Spring of 2015 GUICHO was negative.  Previously had hand pains.  X-rays of hands normal.  She was found by endocrine to be hypoglycemic.  Ultrasound thyroid showed nodules that were negative on biopsy.  August 2015 diagnosed as having Hashimotos disease.   Synthroid did not help levels.  She is on Cytomel now.    Saw ENT for throat pain in neck since ultrasound was done.  ENT will biopsy a solid nodule on left lobe thyroid.  CT with contrast showed enlargement in the saliva gland.  Her mother had fibromyalgia and PMR as well as history ETOH abuse as an adult.  Father had osteoarthritis and required complete thumb reconstruction on one thumb.     Daughter with Hashimotos and thyroid Ca.    Interval History:    Initially felt bad on Plaquenil started in February 2019 then 6 weeks later had best weekend of her life.  Has cold hands but at same time will have significant body heat with associated sweating lasts for 30 minutes.  This began when thyroid issues started.    Dermatology did skin biopsy and diagnosed pernio " "(no evidence of lupus vasculitis).   She has history of erythromyelgia and notices more of the feet.         Review of Systems   Constitutional: Positive for fatigue and unexpected weight change (weight gain until last year now losing). Negative for fever.   HENT: Positive for trouble swallowing.    Eyes: Positive for redness.   Respiratory: Negative for cough and shortness of breath.    Cardiovascular: Negative for chest pain.   Gastrointestinal: Positive for constipation. Negative for diarrhea.   Endocrine: Negative.    Genitourinary: Negative for dysuria and genital sores.   Musculoskeletal: Positive for arthralgias.   Skin: Negative for rash.        Pernio   Allergic/Immunologic: Negative.    Neurological: Positive for headaches.   Hematological: Positive for adenopathy. Bruises/bleeds easily.   Psychiatric/Behavioral: Negative.          Objective:   /62   Pulse 102   Ht 5' 1" (1.549 m)   Wt 43.2 kg (95 lb 3.8 oz)   LMP  (LMP Unknown)   BMI 18.00 kg/m²      Physical Exam   Constitutional: She is oriented to person, place, and time and well-developed, well-nourished, and in no distress.   HENT:   Head: Normocephalic and atraumatic.   Decreased salivary pooling   Eyes: Conjunctivae and EOM are normal.   Decreased tear production   Neck: Neck supple.   Cardiovascular: Normal rate and regular rhythm.    Pulmonary/Chest: Effort normal and breath sounds normal.   Abdominal: Soft. Bowel sounds are normal.   Neurological: She is alert and oriented to person, place, and time. Gait normal.   Skin: Skin is warm and dry. There is erythema.     Psychiatric: Mood and affect normal.   Musculoskeletal: Normal range of motion.                                       LABS    Component      Latest Ref Rng & Units 4/12/2019 2/18/2019 2/5/2019   Protein, Serum      6.0 - 8.4 g/dL 7.7     Albumin grams/dl      3.35 - 5.55 g/dL 4.84     Alpha-1 grams/dl      0.17 - 0.41 g/dL 0.28     Alpha-2 grams/dl      0.43 - 0.99 g/dL " 0.61     Beta grams/dl      0.50 - 1.10 g/dL 0.71     Gamma grams/dl      0.67 - 1.58 g/dL 1.27     Fecal Immunochemical Test (iFOBT)      Negative   Negative   TSH      0.400 - 4.000 uIU/mL  1.099    Free T4      0.71 - 1.51 ng/dL  0.47 (L)    T3, Total      60 - 180 ng/dL  73    T3, Free      2.3 - 4.2 pg/mL  2.1 (L)    Cryoglobulin, Qual      Absent Absent     Immunofix Interp.       SEE COMMENT     Cold Agglutinin Titer      <1:64 titer <1:64     Cryofibrinogen      Negative Negative     Pathologist Interpretation NEERAJ       REVIEWED     Pathologist Interpretation SPE       REVIEWED       Component      Latest Ref Rng & Units 1/24/2019   WBC      3.90 - 12.70 K/uL 5.38   RBC      4.00 - 5.40 M/uL 4.73   Hemoglobin      12.0 - 16.0 g/dL 14.4   Hematocrit      37.0 - 48.5 % 44.1   MCV      82 - 98 fL 93   MCH      27.0 - 31.0 pg 30.4   MCHC      32.0 - 36.0 g/dL 32.7   RDW      11.5 - 14.5 % 12.6   Platelets      150 - 350 K/uL 221   MPV      9.2 - 12.9 fL 9.2   Immature Granulocytes      0.0 - 0.5 % 0.2   Gran # (ANC)      1.8 - 7.7 K/uL 3.9   Immature Grans (Abs)      0.00 - 0.04 K/uL 0.01   Lymph #      1.0 - 4.8 K/uL 0.9 (L)   Mono #      0.3 - 1.0 K/uL 0.4   Eos #      0.0 - 0.5 K/uL 0.2   Baso #      0.00 - 0.20 K/uL 0.06   nRBC      0 /100 WBC 0   Gran%      38.0 - 73.0 % 72.5   Lymph%      18.0 - 48.0 % 16.0 (L)   Mono%      4.0 - 15.0 % 6.7   Eosinophil%      0.0 - 8.0 % 3.5   Basophil%      0.0 - 1.9 % 1.1   Differential Method       Automated   Sodium      136 - 145 mmol/L 140   Potassium      3.5 - 5.1 mmol/L 3.9   Chloride      95 - 110 mmol/L 98   CO2      23 - 29 mmol/L 32 (H)   Glucose      70 - 110 mg/dL 103   BUN, Bld      6 - 20 mg/dL 13   Creatinine      0.5 - 1.4 mg/dL 0.6   Calcium      8.7 - 10.5 mg/dL 9.7   PROTEIN TOTAL      6.0 - 8.4 g/dL 7.9   Albumin      3.5 - 5.2 g/dL 4.1   BILIRUBIN TOTAL      0.1 - 1.0 mg/dL 0.3   Alkaline Phosphatase      55 - 135 U/L 60   AST      10 - 40 U/L 22    ALT      10 - 44 U/L 21   Anion Gap      8 - 16 mmol/L 10   eGFR if African American      >60 mL/min/1.73 m:2 >60.0   eGFR if non African American      >60 mL/min/1.73 m:2 >60.0   Specimen UA       Urine, Unspecified   Color, UA      Yellow, Straw, Kari Yellow   Appearance, UA      Clear Clear   pH, UA      5.0 - 8.0 7.0   Specific Gravity, UA      1.005 - 1.030 1.010   Protein, UA      Negative Negative   Glucose, UA      Negative Negative   Ketones, UA      Negative Negative   Bilirubin (UA)      Negative Negative   Occult Blood UA      Negative Negative   NITRITE UA      Negative Negative   Leukocytes, UA      Negative Negative   Protein, Urine Random      0 - 15 mg/dL <7   Creatinine, Random Ur      15.0 - 325.0 mg/dL 20.0   Prot/Creat Ratio, Ur      0.00 - 0.20 Unable to calculate   Anti-SSA Antibody      0.00 - 19.99 EU 3.42   Anti-SSA Interpretation      Negative Negative   Complement (C-3)      50 - 180 mg/dL 101   Complement (C-4)      11 - 44 mg/dL 22   CPK      20 - 180 U/L 70   CRP      0.0 - 8.2 mg/L 0.3   ds DNA Ab      Negative 1:10 Negative 1:10   GUICHO Screen      Negative <1:160 Negative <1:160   Scleroderma SCL-      <20 UNITS 9         Assessment:       1. Sjogren syndrome. Chronic.  Negative SSA and SSB in past now SSA positive. Continue restasis.  Repeat labs.  Currently doing symptomatic management with Restasis and pilocarpine.   2. Raynauds. Symptomatic management. Now with changes around nails but no digital ulcers.   3. Fibromyalgia. Managed with amitriptyline. Persistent pain and unable to increase amitriptyline due to worsening fatigue  4. Hand pain b/l. X-ray without changes. Has periodic episodes of pain.  5. Osteoporosis.  No longer on Atelvia. Now on estradiol patch.  6. Erythromelalgia.  Takes baby aspirin  7. Sleep disorder. History of narcolepsy. Sleep doctor has given Ritalin bid which helps.  8. Increased heat in body. Improved  9. Menopause  10.  Hashimotos disease  11.   Multinodular goiter   12.  Pudenal nerve pain.  Improved with acupuncture which she continues every 2 weeks. Worsened with water therapy so stopped.    13.  Pernio.  On niacinamide  14.  Rash on abdomen x3 weeks.  Concern for Addisons in this patient with hypotension  Plan:       1.  Continue Plaquenil .     2.  Patient to keep regular follow ups to monitor for progression to rheumatologic disorder.  3.  Continue fibromyalgia treatment.  4.  Patient unable to use vasodilator for Raynaud's due to low BP  5.  Message to Dr. Espinoza regarding hyperpigmentation in patient with multiple symptoms       RTO 4 months/prn  Patient seen face to face for 40 minutes and greater than 50% spent in counseling regarding concerns for rheumatologic causes for her multiple symptoms.

## 2019-05-08 NOTE — Clinical Note
Ms. Hayes now has hyperpigmentation on her abdomen.  I was wondering if Addisons could be a cause.  She said you have ordered labs on 5/27.  Please review the images in my note and let me know your thoughts.  I will defer evaluation to your discretion.  Thank you.

## 2019-05-09 ENCOUNTER — PATIENT MESSAGE (OUTPATIENT)
Dept: RHEUMATOLOGY | Facility: CLINIC | Age: 59
End: 2019-05-09

## 2019-05-09 LAB — DSDNA AB SER-ACNC: NORMAL [IU]/ML

## 2019-05-14 ENCOUNTER — PATIENT MESSAGE (OUTPATIENT)
Dept: INTERNAL MEDICINE | Facility: CLINIC | Age: 59
End: 2019-05-14

## 2019-05-14 ENCOUNTER — OFFICE VISIT (OUTPATIENT)
Dept: INTERNAL MEDICINE | Facility: CLINIC | Age: 59
End: 2019-05-14
Payer: COMMERCIAL

## 2019-05-14 VITALS
WEIGHT: 92.38 LBS | HEART RATE: 97 BPM | SYSTOLIC BLOOD PRESSURE: 98 MMHG | HEIGHT: 61 IN | BODY MASS INDEX: 17.44 KG/M2 | OXYGEN SATURATION: 98 % | DIASTOLIC BLOOD PRESSURE: 60 MMHG

## 2019-05-14 DIAGNOSIS — R53.83 FATIGUE, UNSPECIFIED TYPE: Primary | ICD-10-CM

## 2019-05-14 PROCEDURE — 99999 PR PBB SHADOW E&M-EST. PATIENT-LVL III: CPT | Mod: PBBFAC,,, | Performed by: INTERNAL MEDICINE

## 2019-05-14 PROCEDURE — 99999 PR PBB SHADOW E&M-EST. PATIENT-LVL III: ICD-10-PCS | Mod: PBBFAC,,, | Performed by: INTERNAL MEDICINE

## 2019-05-14 PROCEDURE — 99213 PR OFFICE/OUTPT VISIT, EST, LEVL III, 20-29 MIN: ICD-10-PCS | Mod: S$GLB,,, | Performed by: INTERNAL MEDICINE

## 2019-05-14 PROCEDURE — 3008F PR BODY MASS INDEX (BMI) DOCUMENTED: ICD-10-PCS | Mod: CPTII,S$GLB,, | Performed by: INTERNAL MEDICINE

## 2019-05-14 PROCEDURE — 99213 OFFICE O/P EST LOW 20 MIN: CPT | Mod: S$GLB,,, | Performed by: INTERNAL MEDICINE

## 2019-05-14 PROCEDURE — 3008F BODY MASS INDEX DOCD: CPT | Mod: CPTII,S$GLB,, | Performed by: INTERNAL MEDICINE

## 2019-05-18 ENCOUNTER — LAB VISIT (OUTPATIENT)
Dept: LAB | Facility: HOSPITAL | Age: 59
End: 2019-05-18
Payer: COMMERCIAL

## 2019-05-18 DIAGNOSIS — E06.3 HYPOTHYROIDISM DUE TO HASHIMOTO'S THYROIDITIS: ICD-10-CM

## 2019-05-18 DIAGNOSIS — E03.8 HYPOTHYROIDISM DUE TO HASHIMOTO'S THYROIDITIS: ICD-10-CM

## 2019-05-18 DIAGNOSIS — E04.2 MULTINODULAR GOITER: ICD-10-CM

## 2019-05-18 LAB
CORTIS SERPL-MCNC: 13.2 UG/DL
T3 SERPL-MCNC: 86 NG/DL (ref 60–180)
T3FREE SERPL-MCNC: 2.5 PG/ML (ref 2.3–4.2)
T4 FREE SERPL-MCNC: 0.56 NG/DL (ref 0.71–1.51)
TSH SERPL DL<=0.005 MIU/L-ACNC: 1.08 UIU/ML (ref 0.4–4)

## 2019-05-18 PROCEDURE — 82533 TOTAL CORTISOL: CPT

## 2019-05-18 PROCEDURE — 84481 FREE ASSAY (FT-3): CPT

## 2019-05-18 PROCEDURE — 84439 ASSAY OF FREE THYROXINE: CPT

## 2019-05-18 PROCEDURE — 84480 ASSAY TRIIODOTHYRONINE (T3): CPT

## 2019-05-18 PROCEDURE — 84443 ASSAY THYROID STIM HORMONE: CPT

## 2019-05-18 PROCEDURE — 36415 COLL VENOUS BLD VENIPUNCTURE: CPT

## 2019-05-18 PROCEDURE — 82024 ASSAY OF ACTH: CPT

## 2019-05-21 LAB — ACTH PLAS-MCNC: 24 PG/ML (ref 0–46)

## 2019-05-22 ENCOUNTER — PATIENT MESSAGE (OUTPATIENT)
Dept: PAIN MEDICINE | Facility: CLINIC | Age: 59
End: 2019-05-22

## 2019-05-29 ENCOUNTER — PATIENT MESSAGE (OUTPATIENT)
Dept: INTERNAL MEDICINE | Facility: CLINIC | Age: 59
End: 2019-05-29

## 2019-05-29 ENCOUNTER — HOSPITAL ENCOUNTER (OUTPATIENT)
Dept: ENDOCRINOLOGY | Facility: CLINIC | Age: 59
Discharge: HOME | End: 2019-05-29
Attending: INTERNAL MEDICINE
Payer: COMMERCIAL

## 2019-05-29 ENCOUNTER — PATIENT MESSAGE (OUTPATIENT)
Dept: RHEUMATOLOGY | Facility: CLINIC | Age: 59
End: 2019-05-29

## 2019-05-29 DIAGNOSIS — Z12.11 COLON CANCER SCREENING: ICD-10-CM

## 2019-05-29 DIAGNOSIS — E03.8 HYPOTHYROIDISM DUE TO HASHIMOTO'S THYROIDITIS: ICD-10-CM

## 2019-05-29 DIAGNOSIS — E06.3 HYPOTHYROIDISM DUE TO HASHIMOTO'S THYROIDITIS: ICD-10-CM

## 2019-05-29 DIAGNOSIS — G47.00 INSOMNIA, UNSPECIFIED TYPE: ICD-10-CM

## 2019-05-29 DIAGNOSIS — E04.2 MULTINODULAR GOITER: ICD-10-CM

## 2019-05-29 DIAGNOSIS — M35.00 SJOGREN'S SYNDROME, WITH UNSPECIFIED ORGAN INVOLVEMENT: Primary | ICD-10-CM

## 2019-05-29 PROCEDURE — 10005 FNA BX W/US GDN 1ST LES: CPT | Mod: S$GLB,,, | Performed by: INTERNAL MEDICINE

## 2019-05-29 PROCEDURE — 10005 FNA BX W/US GDN 1ST LES: CPT

## 2019-05-29 PROCEDURE — 88173 CYTOLOGY SPECIMEN-FNA NON-RADIOLOGY CLINICIAN PERFORMED W/O ON SITE: ICD-10-PCS | Mod: 26,,, | Performed by: PATHOLOGY

## 2019-05-29 PROCEDURE — 88173 CYTOPATH EVAL FNA REPORT: CPT | Performed by: PATHOLOGY

## 2019-05-29 PROCEDURE — 10005 US FINE NEEDLE ASPIRATION BIOPSY, FIRST LESION: ICD-10-PCS | Mod: S$GLB,,, | Performed by: INTERNAL MEDICINE

## 2019-05-29 PROCEDURE — 88173 CYTOPATH EVAL FNA REPORT: CPT | Mod: 26,,, | Performed by: PATHOLOGY

## 2019-05-30 RX ORDER — ZOLPIDEM TARTRATE 10 MG/1
TABLET ORAL
Qty: 30 TABLET | Refills: 3 | Status: SHIPPED | OUTPATIENT
Start: 2019-05-30 | End: 2019-10-03 | Stop reason: SDUPTHER

## 2019-05-30 RX ORDER — HYDROXYCHLOROQUINE SULFATE 200 MG/1
200 TABLET, FILM COATED ORAL DAILY
Qty: 90 TABLET | Refills: 1 | Status: SHIPPED | OUTPATIENT
Start: 2019-05-30 | End: 2019-11-04 | Stop reason: SDUPTHER

## 2019-06-01 ENCOUNTER — PATIENT MESSAGE (OUTPATIENT)
Dept: DERMATOLOGY | Facility: CLINIC | Age: 59
End: 2019-06-01

## 2019-06-01 DIAGNOSIS — T69.1XXA CHILBLAINS, INITIAL ENCOUNTER: ICD-10-CM

## 2019-06-03 ENCOUNTER — PATIENT MESSAGE (OUTPATIENT)
Dept: ENDOCRINOLOGY | Facility: CLINIC | Age: 59
End: 2019-06-03

## 2019-06-04 ENCOUNTER — PATIENT MESSAGE (OUTPATIENT)
Dept: ENDOCRINOLOGY | Facility: CLINIC | Age: 59
End: 2019-06-04

## 2019-06-05 RX ORDER — NIACINAMIDE 500 MG
500 TABLET ORAL 3 TIMES DAILY
Qty: 270 TABLET | Refills: 3 | Status: SHIPPED | OUTPATIENT
Start: 2019-06-05 | End: 2023-07-18 | Stop reason: SDUPTHER

## 2019-06-11 DIAGNOSIS — M79.7 FIBROMYALGIA: ICD-10-CM

## 2019-06-11 RX ORDER — METAXALONE 800 MG/1
TABLET ORAL
Qty: 90 TABLET | Refills: 1 | Status: SHIPPED | OUTPATIENT
Start: 2019-06-11 | End: 2019-07-16

## 2019-06-12 ENCOUNTER — TELEPHONE (OUTPATIENT)
Dept: PAIN MEDICINE | Facility: CLINIC | Age: 59
End: 2019-06-12

## 2019-06-12 ENCOUNTER — PATIENT MESSAGE (OUTPATIENT)
Dept: INTERNAL MEDICINE | Facility: CLINIC | Age: 59
End: 2019-06-12

## 2019-06-13 ENCOUNTER — OFFICE VISIT (OUTPATIENT)
Dept: PAIN MEDICINE | Facility: CLINIC | Age: 59
End: 2019-06-13
Attending: ANESTHESIOLOGY
Payer: COMMERCIAL

## 2019-06-13 VITALS
HEART RATE: 98 BPM | HEIGHT: 61 IN | TEMPERATURE: 97 F | SYSTOLIC BLOOD PRESSURE: 87 MMHG | WEIGHT: 92 LBS | BODY MASS INDEX: 17.37 KG/M2 | DIASTOLIC BLOOD PRESSURE: 60 MMHG | RESPIRATION RATE: 18 BRPM

## 2019-06-13 DIAGNOSIS — R10.2 VAGINAL PAIN: ICD-10-CM

## 2019-06-13 DIAGNOSIS — G89.4 CHRONIC PAIN DISORDER: Primary | ICD-10-CM

## 2019-06-13 DIAGNOSIS — T69.1XXD CHILBLAINS, SUBSEQUENT ENCOUNTER: ICD-10-CM

## 2019-06-13 DIAGNOSIS — M53.3 COCCYDYNIA: ICD-10-CM

## 2019-06-13 DIAGNOSIS — G58.8 PUDENDAL NEURALGIA: ICD-10-CM

## 2019-06-13 DIAGNOSIS — R10.2 PELVIC PAIN IN FEMALE: ICD-10-CM

## 2019-06-13 DIAGNOSIS — M62.81 MUSCLE WEAKNESS: ICD-10-CM

## 2019-06-13 DIAGNOSIS — M79.7 FIBROMYALGIA: ICD-10-CM

## 2019-06-13 PROCEDURE — 99214 PR OFFICE/OUTPT VISIT, EST, LEVL IV, 30-39 MIN: ICD-10-PCS | Mod: S$GLB,,, | Performed by: ANESTHESIOLOGY

## 2019-06-13 PROCEDURE — 99999 PR PBB SHADOW E&M-EST. PATIENT-LVL IV: ICD-10-PCS | Mod: PBBFAC,,, | Performed by: ANESTHESIOLOGY

## 2019-06-13 PROCEDURE — 99999 PR PBB SHADOW E&M-EST. PATIENT-LVL IV: CPT | Mod: PBBFAC,,, | Performed by: ANESTHESIOLOGY

## 2019-06-13 PROCEDURE — 3008F PR BODY MASS INDEX (BMI) DOCUMENTED: ICD-10-PCS | Mod: CPTII,S$GLB,, | Performed by: ANESTHESIOLOGY

## 2019-06-13 PROCEDURE — 3008F BODY MASS INDEX DOCD: CPT | Mod: CPTII,S$GLB,, | Performed by: ANESTHESIOLOGY

## 2019-06-13 PROCEDURE — 99214 OFFICE O/P EST MOD 30 MIN: CPT | Mod: S$GLB,,, | Performed by: ANESTHESIOLOGY

## 2019-06-13 NOTE — PROGRESS NOTES
"Subjective:     Patient ID: Berenice Hayes is a 59 y.o. female.    Chief Complaint: Pain    Consulted by: Dr. Elia Walker III     Disclaimer: This note was generated using voice recognition software.  There may be a typographical errors that were missed during proofreading.      HPI:    Berenice Hayes is a 59 y.o. female who presents today with multiple pain complaints, mostly vaginal pain.  She has been undergoing pelvic floor PT for multiple pelvic pain generators.  During this, she sat on a heating pad, which started the vaginal pain .      Interval History (2/26/2015)  Patient notes 50% pain relief after left pudendal nerve block.  She states relief started within the last 10 days.  After the nerve block, patient sates her pain became worse and she needed to take additional pain medicine to help.  She would like to proceed with the right pudendal nerve block and is requesting a prescription for pain medicine after the injection.     Interval History (5/04/2015):  She returns today for follow up.  She reports that her pain has now slowly returned to baseline.  She reports that the inner thigh pain and posterior "leg crease" pain has all but disappeared after the left pudendal block; however, now it has returned.  She never did get the right-sided pudendal block.  She would like to schedule this today.  She like to schedule this for the first week in June, as her daughter will be having surgery for thyroid cancer coming up.     Of note, she is focusing on multiple areas of pain today, including her right groin/genitofemoral region, lateral ischial bursae regions, right posterior hamstring, and her general pelvic pain.  In addition to this, she also reports occasional numbness and tingling in her right lower leg and her entire foot.  This is worse when sitting     Interval History (10/8/2015):  She returns today for follow up.  She reports that the piriformis injection and bilateral pudendal nerve blocks " "have been helpful for the pain.  Her right sided pain began worsening following a hormone injection.  She inquires as to whether that injection could have irritated her pudendal nerve.  She is also considering hormone pellets and asks whether this could affect it as well.  She also notes some swelling in her left lower leg that is new.     Interval History (11/18/2015):  She returns today for follow up.  She reports that this most recent pudendal block was not helpful for the pain.  She has increased burning following this block, but not the increase in pelvic pain as before.  She is scheduled to try acupuncture.  She is scheduled to see Dr. Feliz for pelvic floor TPIs.  Reports "tailbone pain" that is not worse with sitting or BMs.  Pain in posterior thighs occurs with sitting.     Interval History (5/11/2017):  She returns today for follow up.  She reports a complicated medical course since her last visit. She reports continued groin pain that she reports is the worst today; however, she is focusing more on her back with erh narrative.  She reports low back pain that is bilateral, radiates into her bilateral hips and sometimes into her posterior thighs.  Also, she reports pain in the top of her right foot that hurts when she dorsiflexes her foot and when she plantar flexes her right foot.  Amitriptyline (increased to 60 mg), gabapentin 400 mg QS, and topical cream has been helpful for the pain.     Interval History (10/13/2017):  She returns today for follow up.  She reports that the cream is helpful, but it is too expensive.  Her ilioinguinal pain is improved, as is her vaginal and pudendal pain.  The worse of her pain is in her tailbone, more so on the right.  The pain is worse with sitting.  She also reports that she feels that her shoulders are twisted, which she is attributing to scoliosis.     Interval History (6/13/2019):  She returns today for follow up.  She has had a lot of medical issues since her last " clinic visit.  This has flared up her coccyx pain.  She reports that the compounding cream has been helpful for the pain, but it is getting to be too expensive.  The Amitriptyline, gabapentin, meloxicam have been somewhat helpful.  However, she would like to explore other options.    Physical Therapy: Finished with pelvic PT, which is helpful.       Non-pharmacologic Treatment: Rest and deep breathing helps (abd wall pain).  Acupuncture is helpful         · TENS? No     Pain Medications:         · Currently taking: amitriptyline 60 mg QHS, Rx topical cream.  Gabapentin 800 mg daily (100/100/100/100/400), meloxicam 7.5 mg      · Has tried in the past:    · Opioids: Does not want  · NSAIDs: meloxicam 7.5 mg daily  · Tylenol:  · Muscle relaxants  · TCAs: on amitriptyline  · SNRIs: Not tried  · Anticonvulsants:   · Topical creams: Helpful  · Other:      · Has not tried: does not want opioids     Blood thinners: No     Interventional Therapies:   · Previous bilateral pudendal nerve blocks:  Worsening pain followed by relief  · Most recent right pudendal:  No relief  · Right piriformis injection:  Temporary relief  · 10/2017:  Coccygeal nerve blocks:  90% relief for 3 months, followed by return of pain, but she reports that she was dealing with a lot of medical issues, so she was unsure of the total amount of benefit at the time following 3 months     Relevant Surgeries:      Affecting sleep? Yes     Affecting daily activities? Yes     Depressive symptoms? Yes          · SI/HI? 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    Last 3 PDI Scores 6/13/2019 5/11/2017 11/18/2015   Pain Disability Index (PDI) 49 37 35       Opioid Risk Score     None          GENERAL:  No weight loss, malaise or fevers.  HEENT:   No recent changes in vision or hearing  NECK:  Negative for lumps, no difficulty with  swallowing.  RESPIRATORY:  Negative for cough, wheezing or shortness of breath, patient denies any recent URI.  CARDIOVASCULAR:  Negative for chest pain, leg swelling or palpitations.  GI:  Negative for abdominal discomfort, blood in stools or black stools or change in bowel habits.  MUSCULOSKELETAL:  See HPI.  SKIN:  Negative for lesions, rash, and itching.  PSYCH:  Anxiety, depression.  No other mood disorder or recent psychosocial stressors.    HEMATOLOGY/LYMPHOLOGY:  Negative for prolonged bleeding, bruising easily or swollen nodes.    ENDO: No history of diabetes or thyroid dysfunction  NEURO:   No history of headaches, syncope, paralysis, seizures or tremors.  All other reviewed and negative other than HPI.          Past Medical History:   Diagnosis Date    Asthma with allergic rhinitis     Bladder spasm     Dense breasts     heterogeneously/fibrocystic disease    Elevated antinuclear antibody (GUICHO) level     Endometriosis of cervix     Erythromelalgia     Fibromyalgia     Ganglion cyst     left toe    Goiter     MNG    Hashimoto's disease     Hashimoto's thyroiditis     Headache(784.0)     Hypothyroidism     Hashimoto with + Tg ab    Osteoporosis     femoral neck    Raynaud's phenomenon     Rhinitis     Scoliosis     Sleep disorder     type of Narcolepsy ( resolved)    Vitamin D deficiency disease     Vulvodynia        Past Surgical History:   Procedure Laterality Date    BLOCK-NERVE-PUDENDAL Bilateral 10/26/2017    [facility]    BLOCK-NERVE-PUDENDAL Right 10/28/2015    [facility]    BLOCK-NERVE-PUDENDAL Right 2015    [facility]    BLOCK-NERVE-PUDENDAL Left 2/3/2015    [facility]    BREAST SURGERY      fibrocystic tumor removed     SECTION      2x    CYST REMOVAL      laparoscopic cyst on ovary    HYSTERECTOMY       intradermal cyst       removed from left index finger    SINUS SURGERY      2x       Review of patient's allergies indicates:  No Known Allergies    Current Outpatient Medications   Medication Sig Dispense Refill    albuterol (VENTOLIN HFA) 90 mcg/actuation inhaler Inhale 2 puffs into the lungs every 6 (six) hours as needed for Wheezing. Rescue 18 g 2    amitriptyline (ELAVIL) 10 MG tablet Take 1 tablet (10 mg total) by mouth once daily. 90 tablet 3    amitriptyline (ELAVIL) 50 MG tablet TAKE 1 TABLET NIGHTLY WITH THE 10 MG AMITRIPTYLINE 90 tablet 4    aspirin (ECOTRIN) 81 MG EC tablet Take 81 mg by mouth once daily.      cholecalciferol, vitamin D3, (VITAMIN D3) 1,000 unit capsule Take 2,000 Units by mouth once daily.       estradiol (VAGIFEM) 10 mcg Tab Place 1 tablet (10 mcg total) vaginally twice a week. 24 tablet 4    estradiol 0.05 mg/24 hr td ptsw (VIVELLE-DOT) 0.05 mg/24 hr Place 1 patch onto the skin twice a week. 24 patch 3    fluticasone (FLONASE) 50 mcg/actuation nasal spray 1 spray by Each Nare route once daily. 3 Bottle 3    gabapentin (NEURONTIN) 100 MG capsule TAKE 1 CAPSULE IN THE MORNING, 1 CAPSULE AT NOON, 1 CAPSULE AT 5 P.M. AND 4 CAPSULES AT BEDTIME 630 capsule 1    guaifenesin (MUCINEX) 600 mg 12 hr tablet Take 600 mg by mouth 2 (two) times daily.      hydroxychloroquine (PLAQUENIL) 200 mg tablet Take 1 tablet (200 mg total) by mouth once daily. 90 tablet 1    liothyronine (CYTOMEL) 5 MCG Tab Take 3.5 tablets (17.5 mcg) once daily, Brand name medically  necessary 325 tablet 3    metaxalone (SKELAXIN) 800 MG tablet TAKE 1 TABLET EVERY EVENING (SPASM) 90 tablet 1    multivitamin capsule Take 1 capsule by mouth once daily.        niacinamide 500 mg Tab Take 1 tablet (500 mg total) by mouth 3 (three) times daily. 270 tablet 3    pilocarpine (SALAGEN) 5 MG Tab Take 1 tablet (5 mg total) by mouth 3 (three) times daily as needed. 270 tablet 3    PREVIDENT 5000 DRY MOUTH 1.1 %  Gel       RESTASIS 0.05 % ophthalmic emulsion       risedronate 35 mg TbEC TAKE 1 TABLET ONCE A WEEK 12 tablet 3    YUVAFEM 10 mcg Tab INSERT 1 TABLET VAGINALLY TWICE WEEKLY 24 tablet 0    zolpidem (AMBIEN) 10 mg Tab TAKE 1 TABLET BY MOUTH AT BEDTIME AS NEEDED 30 tablet 3    DULoxetine (CYMBALTA) 20 MG capsule Take 1 capsule (20 mg total) by mouth once daily. 90 capsule 3    lidocaine-prilocaine (EMLA) cream USE AS DIRECTED BY PRESCRIBER OR PACKAGE INSTRUCTIONS 90 g 4    meloxicam (MOBIC) 7.5 MG tablet TAKE 1 TABLET (7.5 MG TOTAL) BY MOUTH ONCE DAILY. FOR INFLAMMATION 30 tablet 3    mupirocin (BACTROBAN) 2 % ointment Apply to affected area 3 times daily 22 g 1    triamcinolone acetonide 0.1% (KENALOG) 0.1 % ointment AAA BID x 1-2 wks then prn flares only 30 g 1    UNABLE TO FIND Apply 240 g topically as needed. Ketoprofen/cyclobenzaprine HCI/Lidocaine (lipomax) 10/2/5% Up to 5 times daily as needed for pain  99     No current facility-administered medications for this visit.        Family History   Problem Relation Age of Onset    Diabetes Mother     Fibromyalgia Mother     Polymyalgia rheumatica Mother     Macular degeneration Mother     Arthritis Mother     Hypertension Mother     Kidney disease Mother     Pneumonia Mother     Adrenal disorder Mother         adrenal insufficiency    Coronary artery disease Father     Arthritis Father         osteoarthritis    Hypertension Father     Heart disease Father     Diabetes Father     Hyperlipidemia Father     Hyperlipidemia Brother     Cancer Brother         oral    Thyroid cancer Daughter     Cancer Daughter         thyroid    Hypothyroidism Daughter     Breast cancer Neg Hx     Ovarian cancer Neg Hx     Colon cancer Neg Hx     Melanoma Neg Hx        Social History     Socioeconomic History    Marital status:      Spouse name: Not on file    Number of children: Not on file    Years of education: Not on file    Highest  "education level: Not on file   Occupational History     Employer: [employer]   Social Needs    Financial resource strain: Not on file    Food insecurity:     Worry: Not on file     Inability: Not on file    Transportation needs:     Medical: Not on file     Non-medical: Not on file   Tobacco Use    Smoking status: Never Smoker    Smokeless tobacco: Never Used   Substance and Sexual Activity    Alcohol use: No    Drug use: No    Sexual activity: Not Currently     Birth control/protection: Surgical     Comment: single, RAMOS ov in situ    Lifestyle    Physical activity:     Days per week: Not on file     Minutes per session: Not on file    Stress: Not on file   Relationships    Social connections:     Talks on phone: Not on file     Gets together: Not on file     Attends [employer] service: Not on file     Active member of club or organization: Not on file     Attends meetings of clubs or organizations: Not on file     Relationship status: Not on file   Other Topics Concern    Are you pregnant or think you may be? Not Asked    Breast-feeding Not Asked   Social History Narrative           Objective:     Vitals:    06/13/19 0857 06/13/19 0912   BP: (!) 87/64 (!) 87/60   Pulse: 102 98   Resp: 18    Temp: 97.2 °F (36.2 °C)    TempSrc: Oral    Weight: 41.7 kg (92 lb)    Height: 5' 1" (1.549 m)    PainSc:   5    PainLoc: Back        GEN:  Well developed, well nourished.  No acute distress.   HEENT:  No trauma.  Mucous membranes moist.  Nares patent bilaterally.  PSYCH: Normal affect. Thought content appropriate.  CHEST:  Breathing symmetric.  No audible wheezing.  ABD: Soft, non-tender, non-distended.  SKIN:  Warm, pink, dry.  No rash on exposed areas.    EXT:  No cyanosis, clubbing, or edema.  No color change or changes in nail or hair growth.  NEURO/MUSCULOSKELETAL:  Fully alert, oriented, and appropriate. Speech normal rick. No cranial nerve deficits.   Gait: Normal.  No focal motor deficits.   TTP " over coccyx.    Previous physical exam:  Right shoulder appears higher than left on observation.  This is correctable with suggestion.  No TTP over paraspinals.  Shoulder shrug normal.      Imaging:        The imaging studies listed below were independently reviewed by me, and I agree with the findings as documented below.     Narrative     EXAMINATION:  CT ABDOMEN PELVIS WITHOUT CONTRAST; CT CHEST WITHOUT CONTRAST    CLINICAL HISTORY:  Weight loss, unintended, non-localized abd pain; Abnormal weight loss    TECHNIQUE:  Axial images of the chest, abdomen, and pelvis were acquired without the use of intravenous or p.o. contrast.  Coronal and sagittal reconstructions were also obtained    COMPARISON:  Ultrasound thyroid from 02/07/2019    FINDINGS:  Thoracic soft tissues: Thyroid is mildly enlarged, better evaluated on recent ultrasound.    Aorta: Normal in course and caliber, without significant atherosclerotic plaque.    Heart: Normal in size. No pericardial effusion.    Ioana/Mediastinum: No significant lymphadenopathy    Lungs: Well aerated, without consolidation or pleural fluid.    Liver: Normal in size and attenuation, with no focal hepatic lesions.    Gallbladder: No calcified gallstones.    Bile Ducts: No evidence of dilated ducts.    Pancreas: No mass or peripancreatic fat stranding.    Spleen: Unremarkable.    Adrenals: Unremarkable.    Kidneys/ Ureters: Normal in size and location.  No hydronephrosis or nephrolithiasis. No ureteral dilatation.    Bladder: No evidence of wall thickening.    Reproductive organs: Uterus is surgically absent.    GI Tract/Mesentery: No evidence of bowel obstruction or inflammation.    Lymph nodes: No lymphadenapathy.    Abdominal wall:  Unremarkable.    Vasculature: No significant atherosclerosis or aneurysm.    Bones: No acute fracture. Age-appropriate degenerative changes.      Impression       Etiology of patient's weight loss is not seen on this exam.    Mild enlargement and  heterogeneity of the thyroid, better evaluated on ultrasound of the thyroid from 02/17/2019.      Electronically signed by: Arnel Santamaria MD  Date: 02/08/2019  Time: 06:39       Result Narrative     Two views obtained. lumbar vertebral bodies are normal in height and alignment without significant degenerative change.       Result Impression     As above      Electronically signed by: Valentine Thomas MD  Date: 12/05/14  Time: 16:23         Assessment:     Encounter Diagnoses   Name Primary?    Chronic pain disorder Yes    Pudendal neuralgia     Coccydynia     Fibromyalgia     Vaginal pain     Pelvic pain in female     Muscle weakness of core     Chilblains, subsequent encounter        Plan:     Berenice was seen today for low-back pain.    Diagnoses and all orders for this visit:    Chronic pain disorder    Pudendal neuralgia    Coccydynia    Fibromyalgia    Vaginal pain    Pelvic pain in female    Muscle weakness of core    Chilblains, subsequent encounter           Her pain is consistent with vaginal pain, possibly due to pudendal neuralgia.  She also has a large myofascial component of her pain, that is partially responsible for the relief that she received from previous pudendal blocks. I do believe that she would benefit from pelvic floor trigger point injections.     An MRI is not currently indicated for her symptoms.     I discussed the treatment options with her today, including risks, benefits, and alternatives. All available images were reviewed.      1. Will schedule for repeat bilateral coccygeal nerve blocks x 2. The procedure was explained in detail, including risks, benefits, and alternatives.  All questions answered.  Consent obtained today.  1. If good relief, repeat PRN  2. If good, but not durable relief, RFA  3. If limited benefit, ganglion impar  2. Trial Pennsaid gel to see if this can be more helpful than the compounding cream  3. Recommend purchasing a coccyx pillow  4. Continue  gabapentin 700 mg daily (1, 1, 1, 4)  5. Continue other current medications. Consider increasing amitriptyline to 70 mg QHS.  6. Consider low dose naltrexone therapy.  7. Will obtain new x-rays to eval for any progress in her scoliosis, though we discuss that this was more likely a myofascial issue.  8. Recommend continuing to perform piriformis stretch as shown in office  9. Continue acupuncture  10. Recommend following up on the referral to [facility] for psychotherapy.  This was not discussed today  11. RTC 3 weeks after procedure or sooner if needed.     Greater than 30 minutes spent in total in todays visit with the patient, with more than half that time direct face to face counseling and education with the patient today. We discussed the disease process, prognosis, treatment plan, and risks and benefits.    Monique Philip MD  06/13/2019     The above plan and management options were discussed at length with patient. Patient is in agreement with the above and verbalized understanding. It will be communicated with the referring physician via electronic record, fax, or mail.

## 2019-06-13 NOTE — PATIENT INSTRUCTIONS
Recommend obtaining a coccyx pillow for your tailbone pain.  You may use this when sitting on hard surfaces as needed.  You may obtain these from you local drug store or online.        I have sent a topical gel for you to use on the area of pain.  This comes form a 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 in [place] called [facility].  Their number is [phone].  They should give you a call, but, if not, you can call them.  You must speak to them prior to them sending the gel to you.  Your copay should be either $0 or $10.  If it is any more than that, you do not have to get this.    Start this twice daily, but you can increase to 4 times daily if needed.

## 2019-06-28 ENCOUNTER — PATIENT MESSAGE (OUTPATIENT)
Dept: PAIN MEDICINE | Facility: OTHER | Age: 59
End: 2019-06-28

## 2019-06-28 ENCOUNTER — TELEPHONE (OUTPATIENT)
Dept: PAIN MEDICINE | Facility: CLINIC | Age: 59
End: 2019-06-28

## 2019-06-28 NOTE — TELEPHONE ENCOUNTER
Let's have her come in for a follow up with Niya or Emma to evaluate and see what testing is appropriate.

## 2019-07-02 ENCOUNTER — OFFICE VISIT (OUTPATIENT)
Dept: PAIN MEDICINE | Facility: CLINIC | Age: 59
End: 2019-07-02
Payer: COMMERCIAL

## 2019-07-02 VITALS
SYSTOLIC BLOOD PRESSURE: 95 MMHG | HEART RATE: 91 BPM | DIASTOLIC BLOOD PRESSURE: 63 MMHG | BODY MASS INDEX: 17.77 KG/M2 | TEMPERATURE: 98 F | HEIGHT: 61 IN | WEIGHT: 94.13 LBS

## 2019-07-02 DIAGNOSIS — M47.816 LUMBAR SPONDYLOSIS: ICD-10-CM

## 2019-07-02 DIAGNOSIS — M51.36 DDD (DEGENERATIVE DISC DISEASE), LUMBAR: ICD-10-CM

## 2019-07-02 DIAGNOSIS — G58.8 PUDENDAL NEURALGIA: ICD-10-CM

## 2019-07-02 DIAGNOSIS — M79.7 FIBROMYALGIA: ICD-10-CM

## 2019-07-02 DIAGNOSIS — M53.3 COCCYDYNIA: Primary | ICD-10-CM

## 2019-07-02 DIAGNOSIS — M54.16 LUMBAR RADICULOPATHY: ICD-10-CM

## 2019-07-02 PROCEDURE — 99999 PR PBB SHADOW E&M-EST. PATIENT-LVL III: CPT | Mod: PBBFAC,,, | Performed by: NURSE PRACTITIONER

## 2019-07-02 PROCEDURE — 3008F BODY MASS INDEX DOCD: CPT | Mod: CPTII,S$GLB,, | Performed by: NURSE PRACTITIONER

## 2019-07-02 PROCEDURE — 99999 PR PBB SHADOW E&M-EST. PATIENT-LVL III: ICD-10-PCS | Mod: PBBFAC,,, | Performed by: NURSE PRACTITIONER

## 2019-07-02 PROCEDURE — 3008F PR BODY MASS INDEX (BMI) DOCUMENTED: ICD-10-PCS | Mod: CPTII,S$GLB,, | Performed by: NURSE PRACTITIONER

## 2019-07-02 PROCEDURE — 99213 PR OFFICE/OUTPT VISIT, EST, LEVL III, 20-29 MIN: ICD-10-PCS | Mod: S$GLB,,, | Performed by: NURSE PRACTITIONER

## 2019-07-02 PROCEDURE — 99213 OFFICE O/P EST LOW 20 MIN: CPT | Mod: S$GLB,,, | Performed by: NURSE PRACTITIONER

## 2019-07-02 NOTE — PROGRESS NOTES
"Subjective:     Patient ID: Berenice Hayes is a 59 y.o. female.    Chief Complaint: Pain    Consulted by: Dr. Elia Walker III     Disclaimer: This note was generated using voice recognition software.  There may be a typographical errors that were missed during proofreading.      HPI:    Berenice Hayes is a 59 y.o. female who presents today with multiple pain complaints, mostly vaginal pain.  She has been undergoing pelvic floor PT for multiple pelvic pain generators.  During this, she sat on a heating pad, which started the vaginal pain .      Interval History (2/26/2015)  Patient notes 50% pain relief after left pudendal nerve block.  She states relief started within the last 10 days.  After the nerve block, patient sates her pain became worse and she needed to take additional pain medicine to help.  She would like to proceed with the right pudendal nerve block and is requesting a prescription for pain medicine after the injection.     Interval History (5/04/2015):  She returns today for follow up.  She reports that her pain has now slowly returned to baseline.  She reports that the inner thigh pain and posterior "leg crease" pain has all but disappeared after the left pudendal block; however, now it has returned.  She never did get the right-sided pudendal block.  She would like to schedule this today.  She like to schedule this for the first week in June, as her daughter will be having surgery for thyroid cancer coming up.     Of note, she is focusing on multiple areas of pain today, including her right groin/genitofemoral region, lateral ischial bursae regions, right posterior hamstring, and her general pelvic pain.  In addition to this, she also reports occasional numbness and tingling in her right lower leg and her entire foot.  This is worse when sitting     Interval History (10/8/2015):  She returns today for follow up.  She reports that the piriformis injection and bilateral pudendal nerve blocks " "have been helpful for the pain.  Her right sided pain began worsening following a hormone injection.  She inquires as to whether that injection could have irritated her pudendal nerve.  She is also considering hormone pellets and asks whether this could affect it as well.  She also notes some swelling in her left lower leg that is new.     Interval History (11/18/2015):  She returns today for follow up.  She reports that this most recent pudendal block was not helpful for the pain.  She has increased burning following this block, but not the increase in pelvic pain as before.  She is scheduled to try acupuncture.  She is scheduled to see Dr. Feliz for pelvic floor TPIs.  Reports "tailbone pain" that is not worse with sitting or BMs.  Pain in posterior thighs occurs with sitting.     Interval History (5/11/2017):  She returns today for follow up.  She reports a complicated medical course since her last visit. She reports continued groin pain that she reports is the worst today; however, she is focusing more on her back with erh narrative.  She reports low back pain that is bilateral, radiates into her bilateral hips and sometimes into her posterior thighs.  Also, she reports pain in the top of her right foot that hurts when she dorsiflexes her foot and when she plantar flexes her right foot.  Amitriptyline (increased to 60 mg), gabapentin 400 mg QS, and topical cream has been helpful for the pain.     Interval History (10/13/2017):  She returns today for follow up.  She reports that the cream is helpful, but it is too expensive.  Her ilioinguinal pain is improved, as is her vaginal and pudendal pain.  The worse of her pain is in her tailbone, more so on the right.  The pain is worse with sitting.  She also reports that she feels that her shoulders are twisted, which she is attributing to scoliosis.     Interval History (6/13/2019):  She returns today for follow up.  She has had a lot of medical issues since her last " clinic visit.  This has flared up her coccyx pain.  She reports that the compounding cream has been helpful for the pain, but it is getting to be too expensive.  The Amitriptyline, gabapentin, meloxicam have been somewhat helpful.  However, she would like to explore other options.    Interval History (7/2/2019);  The patient returns to clinic today for follow up. She reports increased pain that began on last week. She denies any injury or fall. She reports that she twisted over to reach for something and experienced increased pain. She describes this pain as beginning in her tailbone and radiating into both hips and down the back of her leg to mid thigh, left greater than right. She describes this pain as stabbing and burning. She continues to report intermittent radicular pain. Her pain is worse with prolonged sitting and standing. She continues to report benefit with current medication regimen. She denies any other health changes. Her pain today is 5/10.    Physical Therapy: Finished with pelvic PT, which is helpful.       Non-pharmacologic Treatment: Rest and deep breathing helps (abd wall pain).  Acupuncture is helpful         · TENS? No     Pain Medications:         · Currently taking: amitriptyline 60 mg QHS, Rx topical cream.  Gabapentin 800 mg daily (100/100/100/100/400), meloxicam 7.5 mg      · Has tried in the past:    · Opioids: Does not want  · NSAIDs: meloxicam 7.5 mg daily  · Tylenol:  · Muscle relaxants  · TCAs: on amitriptyline  · SNRIs: Not tried  · Anticonvulsants:   · Topical creams: Helpful  · Other:      · Has not tried: does not want opioids     Blood thinners: No     Interventional Therapies:   · Previous bilateral pudendal nerve blocks:  Worsening pain followed by relief  · Most recent right pudendal:  No relief  · Right piriformis injection:  Temporary relief  · 10/2017:  Coccygeal nerve blocks:  90% relief for 3 months, followed by return of pain, but she reports that she was dealing with a  lot of medical issues, so she was unsure of the total amount of benefit at the time following 3 months     Relevant Surgeries:      Affecting sleep? Yes     Affecting daily activities? Yes     Depressive symptoms? Yes          · SI/HI? 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    Last 3 PDI Scores 7/2/2019 6/13/2019 5/11/2017   Pain Disability Index (PDI) 47 [phone]       Opioid Risk Score     None          GENERAL:  No weight loss, malaise or fevers.  HEENT:   No recent changes in vision or hearing  NECK:  Negative for lumps, no difficulty with swallowing.  RESPIRATORY:  Negative for cough, wheezing or shortness of breath, patient denies any recent URI.  CARDIOVASCULAR:  Negative for chest pain, leg swelling or palpitations.  GI:  Negative for abdominal discomfort, blood in stools or black stools or change in bowel habits.  MUSCULOSKELETAL:  See HPI.  SKIN:  Negative for lesions, rash, and itching.  PSYCH:  Anxiety, depression.  No other mood disorder or recent psychosocial stressors.    HEMATOLOGY/LYMPHOLOGY:  Negative for prolonged bleeding, bruising easily or swollen nodes.    ENDO: No history of diabetes. Thyroid disorder.   NEURO:   No history of headaches, syncope, paralysis, seizures or tremors.  All other reviewed and negative other than HPI.          Past Medical History:   Diagnosis Date    Asthma with allergic rhinitis     Bladder spasm     Dense breasts     heterogeneously/fibrocystic disease    Elevated antinuclear antibody (GUICHO) level     Endometriosis of cervix     Erythromelalgia     Fibromyalgia     Ganglion cyst     left toe    Goiter     MNG    Hashimoto's disease     Hashimoto's thyroiditis     Headache(784.0)     Hypothyroidism     Hashimoto with + Tg ab    Osteoporosis     femoral neck    Raynaud's phenomenon     Rhinitis     Scoliosis     Sleep  disorder     type of Narcolepsy ( resolved)    Vitamin D deficiency disease     Vulvodynia        Past Surgical History:   Procedure Laterality Date    BLOCK-NERVE-PUDENDAL Bilateral 10/26/2017    [facility]    BLOCK-NERVE-PUDENDAL Right 10/28/2015    [facility]    BLOCK-NERVE-PUDENDAL Right 2015    [facility]    BLOCK-NERVE-PUDENDAL Left 2/3/2015    [facility]    BREAST SURGERY      fibrocystic tumor removed     SECTION      2x    CYST REMOVAL      laparoscopic cyst on ovary    HYSTERECTOMY      intradermal cyst       removed from left index finger    SINUS SURGERY      2x       Review of patient's allergies indicates:  No Known Allergies    Current Outpatient Medications   Medication Sig Dispense Refill    albuterol (VENTOLIN HFA) 90 mcg/actuation inhaler Inhale 2 puffs into the lungs every 6 (six) hours as needed for Wheezing. Rescue 18 g 2    amitriptyline (ELAVIL) 10 MG tablet Take 1 tablet (10 mg total) by mouth once daily. 90 tablet 3    amitriptyline (ELAVIL) 50 MG tablet TAKE 1 TABLET NIGHTLY WITH THE 10 MG AMITRIPTYLINE 90 tablet 4    aspirin (ECOTRIN) 81 MG EC tablet Take 81 mg by mouth once daily.      cholecalciferol, vitamin D3, (VITAMIN D3) 1,000 unit capsule Take 2,000 Units by mouth once daily.       DULoxetine (CYMBALTA) 20 MG capsule Take 1 capsule (20 mg total) by mouth once daily. 90 capsule 3    estradiol (VAGIFEM) 10 mcg Tab Place 1 tablet (10 mcg total) vaginally twice a week. 24 tablet 4    estradiol 0.05 mg/24 hr td ptsw (VIVELLE-DOT) 0.05 mg/24 hr Place 1 patch onto the skin twice a week. 24 patch 3    fluticasone (FLONASE) 50 mcg/actuation nasal spray 1 spray by Each Nare route once daily. 3 Bottle 3    gabapentin (NEURONTIN) 100 MG capsule TAKE 1 CAPSULE IN THE MORNING, 1 CAPSULE AT NOON, 1  CAPSULE AT 5 P.M. AND 4 CAPSULES AT BEDTIME 630 capsule 1    guaifenesin (MUCINEX) 600 mg 12 hr tablet Take 600 mg by mouth 2 (two) times daily.      hydroxychloroquine (PLAQUENIL) 200 mg tablet Take 1 tablet (200 mg total) by mouth once daily. 90 tablet 1    lidocaine-prilocaine (EMLA) cream USE AS DIRECTED BY PRESCRIBER OR PACKAGE INSTRUCTIONS 90 g 4    liothyronine (CYTOMEL) 5 MCG Tab Take 3.5 tablets (17.5 mcg) once daily, Brand name medically  necessary 325 tablet 3    meloxicam (MOBIC) 7.5 MG tablet TAKE 1 TABLET (7.5 MG TOTAL) BY MOUTH ONCE DAILY. FOR INFLAMMATION 30 tablet 3    metaxalone (SKELAXIN) 800 MG tablet TAKE 1 TABLET EVERY EVENING (SPASM) 90 tablet 1    multivitamin capsule Take 1 capsule by mouth once daily.        mupirocin (BACTROBAN) 2 % ointment Apply to affected area 3 times daily 22 g 1    niacinamide 500 mg Tab Take 1 tablet (500 mg total) by mouth 3 (three) times daily. 270 tablet 3    pilocarpine (SALAGEN) 5 MG Tab Take 1 tablet (5 mg total) by mouth 3 (three) times daily as needed. 270 tablet 3    PREVIDENT 5000 DRY MOUTH 1.1 % Gel       RESTASIS 0.05 % ophthalmic emulsion       risedronate 35 mg TbEC TAKE 1 TABLET ONCE A WEEK 12 tablet 3    triamcinolone acetonide 0.1% (KENALOG) 0.1 % ointment AAA BID x 1-2 wks then prn flares only 30 g 1    UNABLE TO FIND Apply 240 g topically as needed. Ketoprofen/cyclobenzaprine HCI/Lidocaine (lipomax) 10/2/5% Up to 5 times daily as needed for pain  99    YUVAFEM 10 mcg Tab INSERT 1 TABLET VAGINALLY TWICE WEEKLY 24 tablet 0    zolpidem (AMBIEN) 10 mg Tab TAKE 1 TABLET BY MOUTH AT BEDTIME AS NEEDED 30 tablet 3     No current facility-administered medications for this visit.        Family History   Problem Relation Age of Onset    Diabetes Mother     Fibromyalgia Mother     Polymyalgia rheumatica Mother     Macular degeneration Mother     Arthritis Mother     Hypertension Mother     Kidney disease Mother     Pneumonia Mother   "   Adrenal disorder Mother         adrenal insufficiency    Coronary artery disease Father     Arthritis Father         osteoarthritis    Hypertension Father     Heart disease Father     Diabetes Father     Hyperlipidemia Father     Hyperlipidemia Brother     Cancer Brother         oral    Thyroid cancer Daughter     Cancer Daughter         thyroid    Hypothyroidism Daughter     Breast cancer Neg Hx     Ovarian cancer Neg Hx     Colon cancer Neg Hx     Melanoma Neg Hx        Social History     Socioeconomic History    Marital status:      Spouse name: Not on file    Number of children: Not on file    Years of education: Not on file    Highest education level: Not on file   Occupational History     Employer: [employer]   Social Needs    Financial resource strain: Not on file    Food insecurity:     Worry: Not on file     Inability: Not on file    Transportation needs:     Medical: Not on file     Non-medical: Not on file   Tobacco Use    Smoking status: Never Smoker    Smokeless tobacco: Never Used   Substance and Sexual Activity    Alcohol use: No    Drug use: No    Sexual activity: Not Currently     Birth control/protection: Surgical     Comment: single, RAMOS ov in situ    Lifestyle    Physical activity:     Days per week: Not on file     Minutes per session: Not on file    Stress: Not on file   Relationships    Social connections:     Talks on phone: Not on file     Gets together: Not on file     Attends Hindu service: Not on file     Active member of club or organization: Not on file     Attends meetings of clubs or organizations: Not on file     Relationship status: Not on file   Other Topics Concern    Are you pregnant or think you may be? Not Asked    Breast-feeding Not Asked   Social History Narrative           Objective:     Vitals:    07/02/19 0900   BP: 95/63   Pulse: 91   Temp: 97.8 °F (36.6 °C)   Weight: 42.7 kg (94 lb 2.2 oz)   Height: 5' 1" (1.549 m) "   PainSc:   5   PainLoc: Buttocks       GEN:  Well developed, well nourished.  No acute distress.   HEENT:  No trauma.  Mucous membranes moist.  Nares patent bilaterally.  PSYCH: Normal affect. Thought content appropriate.  CHEST:  Breathing symmetric.  No audible wheezing.  ABD: Soft, non-distended.  SKIN:  Warm, pink, dry.  No rash on exposed areas.    EXT:  No cyanosis, clubbing, or edema.  No color change or changes in nail or hair growth.  NEURO/MUSCULOSKELETAL:  Fully alert, oriented, and appropriate. Speech normal rick. No cranial nerve deficits.   Gait: Normal.    Motor Strength: 5/5 motor strength throughout lower extremities.   Sensory: No sensory deficit in the lower extremities.   Reflexes:  2+ and symmetric throughout.  Downgoing Babinski's bilaterally.  No clonus or spasticity.  L-Spine:  Limited ROM with pain on flexion and extension. Positive pain with axial/facet loading bilaterally.  Positive SLR bilaterally.    SI Joint/Hip: Positive BRIDGETTE on the left, negative on the right.  Negative FADIR bilaterally.  Positive TTP over lumbar paraspinals and lumbar spine. Negative TTP bilateral SI joints, hips, piriformis muscles, or GTB.    TTP over coccyx.        Imaging:        The imaging studies listed below were independently reviewed by me, and I agree with the findings as documented below.     Narrative     EXAMINATION:  CT ABDOMEN PELVIS WITHOUT CONTRAST; CT CHEST WITHOUT CONTRAST    CLINICAL HISTORY:  Weight loss, unintended, non-localized abd pain; Abnormal weight loss    TECHNIQUE:  Axial images of the chest, abdomen, and pelvis were acquired without the use of intravenous or p.o. contrast.  Coronal and sagittal reconstructions were also obtained    COMPARISON:  Ultrasound thyroid from 02/07/2019    FINDINGS:  Thoracic soft tissues: Thyroid is mildly enlarged, better evaluated on recent ultrasound.    Aorta: Normal in course and caliber, without significant atherosclerotic plaque.    Heart:  Normal in size. No pericardial effusion.    Ioana/Mediastinum: No significant lymphadenopathy    Lungs: Well aerated, without consolidation or pleural fluid.    Liver: Normal in size and attenuation, with no focal hepatic lesions.    Gallbladder: No calcified gallstones.    Bile Ducts: No evidence of dilated ducts.    Pancreas: No mass or peripancreatic fat stranding.    Spleen: Unremarkable.    Adrenals: Unremarkable.    Kidneys/ Ureters: Normal in size and location.  No hydronephrosis or nephrolithiasis. No ureteral dilatation.    Bladder: No evidence of wall thickening.    Reproductive organs: Uterus is surgically absent.    GI Tract/Mesentery: No evidence of bowel obstruction or inflammation.    Lymph nodes: No lymphadenapathy.    Abdominal wall:  Unremarkable.    Vasculature: No significant atherosclerosis or aneurysm.    Bones: No acute fracture. Age-appropriate degenerative changes.      Impression       Etiology of patient's weight loss is not seen on this exam.    Mild enlargement and heterogeneity of the thyroid, better evaluated on ultrasound of the thyroid from 02/17/2019.      Electronically signed by: Arnel Santamaria MD  Date: 02/08/2019  Time: 06:39       Result Narrative     Two views obtained. lumbar vertebral bodies are normal in height and alignment without significant degenerative change.       Result Impression     As above      Electronically signed by: Valentine Thomas MD  Date: 12/05/14  Time: 16:23         Assessment:     Encounter Diagnoses   Name Primary?    Coccydynia Yes    Pudendal neuralgia     Lumbar radiculopathy     Lumbar spondylosis     DDD (degenerative disc disease), lumbar     Fibromyalgia        Plan:     Berenice was seen today for follow-up.    Diagnoses and all orders for this visit:    Coccydynia  -     MRI Lumbar Spine Without Contrast; Future    Pudendal neuralgia  -     MRI Lumbar Spine Without Contrast; Future    Lumbar radiculopathy  -     MRI Lumbar Spine Without  Contrast; Future    Lumbar spondylosis    DDD (degenerative disc disease), lumbar    Fibromyalgia           Her pain is consistent with vaginal pain, possibly due to pudendal neuralgia.  She also has a large myofascial component of her pain, that is partially responsible for the relief that she received from previous pudendal blocks. I do believe that she would benefit from pelvic floor trigger point injections.       I discussed the treatment options with her today, including risks, benefits, and alternatives. All available images were reviewed.      1. Obtain lumbar MRI.   2. She is currently scheduled for coccygeal nerve blocks. I encouraged her to keep this appointment.   3. Will schedule for repeat bilateral coccygeal nerve blocks x 2. The procedure was explained in detail, including risks, benefits, and alternatives.  All questions answered.  Consent obtained today.  1. If good relief, repeat PRN  2. If good, but not durable relief, RFA  3. If limited benefit, ganglion impar  4. Trial Pennsaid gel to see if this can be more helpful than the compounding cream  5. Recommend purchasing a coccyx pillow  6. Continue gabapentin 700 mg daily (1, 1, 1, 4)  7. Continue other current medications. Consider increasing amitriptyline to 70 mg QHS.  8. Consider low dose naltrexone therapy.  9. Recommend continuing to perform piriformis stretch as shown in office  10. Continue acupuncture  11. Recommend following up on the referral to [facility] for psychotherapy.  This was not discussed today  12. RTC 3 weeks after procedure or sooner if needed. Will call with imaging results.     - Dr. Philip was consulted on the patient and agrees with this plan.    The above plan and management options were discussed at length with patient. Patient is in agreement with the above and verbalized understanding.     Niya Irene, LAURENCE  07/02/2019

## 2019-07-05 ENCOUNTER — PATIENT MESSAGE (OUTPATIENT)
Dept: ENDOCRINOLOGY | Facility: CLINIC | Age: 59
End: 2019-07-05

## 2019-07-05 ENCOUNTER — HOSPITAL ENCOUNTER (OUTPATIENT)
Facility: OTHER | Age: 59
Discharge: HOME | End: 2019-07-05
Attending: ANESTHESIOLOGY | Admitting: ANESTHESIOLOGY
Payer: COMMERCIAL

## 2019-07-05 VITALS
TEMPERATURE: 98 F | OXYGEN SATURATION: 98 % | HEART RATE: 72 BPM | HEIGHT: 61 IN | RESPIRATION RATE: 18 BRPM | SYSTOLIC BLOOD PRESSURE: 120 MMHG | DIASTOLIC BLOOD PRESSURE: 84 MMHG | BODY MASS INDEX: 17.56 KG/M2 | WEIGHT: 93 LBS

## 2019-07-05 DIAGNOSIS — M53.3 COCCYDYNIA: Primary | ICD-10-CM

## 2019-07-05 DIAGNOSIS — G89.29 CHRONIC PAIN: ICD-10-CM

## 2019-07-05 PROCEDURE — 20605 DRAIN/INJ JOINT/BURSA W/O US: CPT | Performed by: ANESTHESIOLOGY

## 2019-07-05 PROCEDURE — 64450 PR NERVE BLOCK INJ, ANES/STEROID, OTHER PERIPHERAL: ICD-10-PCS | Mod: ,,, | Performed by: ANESTHESIOLOGY

## 2019-07-05 PROCEDURE — 64450 NJX AA&/STRD OTHER PN/BRANCH: CPT | Mod: ,,, | Performed by: ANESTHESIOLOGY

## 2019-07-05 PROCEDURE — S0020 INJECTION, BUPIVICAINE HYDRO: HCPCS | Performed by: ANESTHESIOLOGY

## 2019-07-05 PROCEDURE — 77002 NEEDLE LOCALIZATION BY XRAY: CPT | Performed by: ANESTHESIOLOGY

## 2019-07-05 PROCEDURE — 25000003 PHARM REV CODE 250: Performed by: ANESTHESIOLOGY

## 2019-07-05 PROCEDURE — 64450 NJX AA&/STRD OTHER PN/BRANCH: CPT | Performed by: ANESTHESIOLOGY

## 2019-07-05 RX ORDER — LIDOCAINE HYDROCHLORIDE 10 MG/ML
INJECTION INFILTRATION; PERINEURAL
Status: DISCONTINUED | OUTPATIENT
Start: 2019-07-05 | End: 2019-07-05 | Stop reason: HOSPADM

## 2019-07-05 RX ORDER — BUPIVACAINE HYDROCHLORIDE 5 MG/ML
INJECTION, SOLUTION EPIDURAL; INTRACAUDAL
Status: DISCONTINUED | OUTPATIENT
Start: 2019-07-05 | End: 2019-07-05 | Stop reason: HOSPADM

## 2019-07-05 RX ORDER — SODIUM CHLORIDE 9 MG/ML
500 INJECTION, SOLUTION INTRAVENOUS CONTINUOUS
Status: DISCONTINUED | OUTPATIENT
Start: 2019-07-05 | End: 2023-03-06

## 2019-07-05 NOTE — OP NOTE
"Date: 07/05/2019    Procedure: Coccygeal nerve block    Pre-op diagnosis: Coccydynia [M53.3]    Post-op diagnosis: Coccydynia [M53.3]     Physician: Dr. Monique Philip     Assistant: None    Anesthestia: local/oral niravam    All medications, allergies, and relevant histories were reviewed. No recent antibiotics or infections.  A time-out was taken to verify the correct patient, procedure, laterality, and appropriate medications/allergies.    Procedure: Coccyx injection    Coccyx injection    The procedure was discussed with the patient including complications of nerve damage, bleeding, infection, and failure of pain relief.     Patient was transferred to the procedure for the procedure. NIBP, O2 saturation, and EKG were monitored.  The patient was placed prone with blankets under the abdomen. Fluoroscopic view was obtained for localization of the sacrococcygeal ligament in the lateral view. The sacral area was prepped with chlorhexidine  x3, sterile drape placed over the site, and sterile precautions observed throughout the procedure. Local Xylocaine 1% was injected over the coccyx then a 25 gauge 1.5" needle was advanced to the coccyx. Next, the needle was walked off laterally on the left and advanced 1 mm.  Position was confirmed with fluoroscopy.  After negative aspiration, 0.5cc of Omnipaque 300 contrast showed excellent spread. 1 cc of bupivacaine 0.5% was injected without complication.  Then the needle was redirected to the coccyx at midline then walked off laterally on the right side and advanced 1 mm.  Position was confirmed with fluoroscopy.  After negative aspiration, 0.5cc of Omnipaque 300 contrast showed excellent spread. 1 cc of bupivacaine 0.5% was injected without complication.  The needle was then removed and a Band-Aid dressing applied.     Patient tolerated the procedure well without any complications. The patient was discharged with a responsible adult.    Future Management:   If helpful, can " repeat as needed.    Will plan to repeat in 2 weeks.  If he is still obtaining relief, can push this back.    I certify that I provided the above services.  I was present for the entire procedure, which was performed by the fellow physician under my supervision.  There were no parts of the procedure that were performed not by myself or without my direct supervision.

## 2019-07-05 NOTE — DISCHARGE INSTRUCTIONS
Thank you for allowing us to care for you today. You may receive a survey about the care we provided. Your feedback is valuable and helps us provide excellent care throughout the community.     Home Care Instructions for Pain Management:    1. DIET:   You may resume your normal diet today.   2. BATHING:   You may shower with luke warm water. No tub baths or anything that will soak injection sites under water for the next 24 hours.  3. DRESSING:   You may remove your bandage today.   4. ACTIVITY LEVEL:   You may resume your normal activities 24 hrs after your procedure. Nothing strenuous today.  5. MEDICATIONS:   You may resume your normal medications today. To restart blood thinners, ask your doctor.  6. DRIVING    If you have received any sedatives by mouth today, you may not drive for 12 hours.    If you have received any sedation through your IV, you may not drive for 24 hrs.   7. SPECIAL INSTRUCTIONS:   No heat to the injection site for 24 hrs including, hot bath or shower, heating pad, moist heat, or hot tubs.    Use ice pack to injection site for any pain or discomfort.  Apply ice packs for 20 minute intervals as needed.    IF you have diabetes, be sure to monitor your blood sugar more closely. IF your injection contained steroids your blood sugar levels may become higher than normal.    If you are still having pain upon discharge:  Your pain may improve over the next 48 hours. The anesthetic (numbing medication) works immediately to 48 hours. IF your injection contained a steroid (anti-inflammatory medication), it takes approximately 3 days to start feeling relief and 7-10 days to see your greatest results from the medication. It is possible you may need subsequent injections. This would be discussed at your follow up appointment with pain management or your referring doctor.      PLEASE CALL YOUR DOCTOR IF:  1. Redness or swelling around the injection site.  2. Fever of 101 degrees or more  3. Drainage  (pus) from the injection site.  4. For any continuous bleeding (some dried blood over the incision is normal.)    FOR EMERGENCIES:   If any unusual problems or difficulties occur during clinic hours, call [phone] or [phone].

## 2019-07-08 ENCOUNTER — PATIENT MESSAGE (OUTPATIENT)
Dept: PAIN MEDICINE | Facility: OTHER | Age: 59
End: 2019-07-08

## 2019-07-10 ENCOUNTER — HOSPITAL ENCOUNTER (OUTPATIENT)
Dept: RADIOLOGY | Facility: HOSPITAL | Age: 59
Discharge: HOME | End: 2019-07-10
Attending: NURSE PRACTITIONER
Payer: COMMERCIAL

## 2019-07-10 DIAGNOSIS — M53.3 COCCYDYNIA: ICD-10-CM

## 2019-07-10 DIAGNOSIS — G58.8 PUDENDAL NEURALGIA: ICD-10-CM

## 2019-07-10 DIAGNOSIS — M54.16 LUMBAR RADICULOPATHY: ICD-10-CM

## 2019-07-10 PROCEDURE — 72148 MRI LUMBAR SPINE WITHOUT CONTRAST: ICD-10-PCS | Mod: 26,,, | Performed by: RADIOLOGY

## 2019-07-10 PROCEDURE — 72148 MRI LUMBAR SPINE W/O DYE: CPT | Mod: TC

## 2019-07-10 PROCEDURE — 72148 MRI LUMBAR SPINE W/O DYE: CPT | Mod: 26,,, | Performed by: RADIOLOGY

## 2019-07-12 ENCOUNTER — TELEPHONE (OUTPATIENT)
Dept: PAIN MEDICINE | Facility: CLINIC | Age: 59
End: 2019-07-12

## 2019-07-15 ENCOUNTER — PATIENT MESSAGE (OUTPATIENT)
Dept: DERMATOLOGY | Facility: CLINIC | Age: 59
End: 2019-07-15

## 2019-07-15 ENCOUNTER — PATIENT MESSAGE (OUTPATIENT)
Dept: PAIN MEDICINE | Facility: OTHER | Age: 59
End: 2019-07-15

## 2019-07-16 ENCOUNTER — PATIENT MESSAGE (OUTPATIENT)
Dept: INTERNAL MEDICINE | Facility: CLINIC | Age: 59
End: 2019-07-16

## 2019-07-16 DIAGNOSIS — M79.7 FIBROMYALGIA: ICD-10-CM

## 2019-07-16 RX ORDER — METAXALONE 800 MG/1
TABLET ORAL
Qty: 180 TABLET | Refills: 1 | Status: SHIPPED | OUTPATIENT
Start: 2019-07-16 | End: 2020-03-01

## 2019-07-19 ENCOUNTER — HOSPITAL ENCOUNTER (OUTPATIENT)
Facility: OTHER | Age: 59
Discharge: HOME | End: 2019-07-19
Attending: ANESTHESIOLOGY | Admitting: ANESTHESIOLOGY
Payer: COMMERCIAL

## 2019-07-19 VITALS
RESPIRATION RATE: 18 BRPM | SYSTOLIC BLOOD PRESSURE: 96 MMHG | BODY MASS INDEX: 17.56 KG/M2 | TEMPERATURE: 98 F | DIASTOLIC BLOOD PRESSURE: 59 MMHG | WEIGHT: 93 LBS | HEART RATE: 88 BPM | OXYGEN SATURATION: 99 % | HEIGHT: 61 IN

## 2019-07-19 DIAGNOSIS — M53.3 COCCYDYNIA: Primary | ICD-10-CM

## 2019-07-19 PROCEDURE — 20605 DRAIN/INJ JOINT/BURSA W/O US: CPT | Performed by: ANESTHESIOLOGY

## 2019-07-19 PROCEDURE — 25000003 PHARM REV CODE 250: Performed by: ANESTHESIOLOGY

## 2019-07-19 PROCEDURE — 77002 NEEDLE LOCALIZATION BY XRAY: CPT | Performed by: ANESTHESIOLOGY

## 2019-07-19 PROCEDURE — 64450 NJX AA&/STRD OTHER PN/BRANCH: CPT | Mod: ,,, | Performed by: ANESTHESIOLOGY

## 2019-07-19 PROCEDURE — 64450 NJX AA&/STRD OTHER PN/BRANCH: CPT | Performed by: ANESTHESIOLOGY

## 2019-07-19 PROCEDURE — 63600175 PHARM REV CODE 636 W HCPCS: Performed by: ANESTHESIOLOGY

## 2019-07-19 PROCEDURE — S0020 INJECTION, BUPIVICAINE HYDRO: HCPCS | Performed by: ANESTHESIOLOGY

## 2019-07-19 PROCEDURE — 64450 PR NERVE BLOCK INJ, ANES/STEROID, OTHER PERIPHERAL: ICD-10-PCS | Mod: ,,, | Performed by: ANESTHESIOLOGY

## 2019-07-19 RX ORDER — METHYLPREDNISOLONE ACETATE 40 MG/ML
INJECTION, SUSPENSION INTRA-ARTICULAR; INTRALESIONAL; INTRAMUSCULAR; SOFT TISSUE
Status: DISCONTINUED | OUTPATIENT
Start: 2019-07-19 | End: 2019-07-19 | Stop reason: HOSPADM

## 2019-07-19 RX ORDER — ALPRAZOLAM 0.5 MG/1
0.5 TABLET ORAL
Status: DISCONTINUED | OUTPATIENT
Start: 2019-07-19 | End: 2019-07-19 | Stop reason: HOSPADM

## 2019-07-19 RX ORDER — LIDOCAINE HYDROCHLORIDE 10 MG/ML
INJECTION INFILTRATION; PERINEURAL
Status: DISCONTINUED | OUTPATIENT
Start: 2019-07-19 | End: 2019-07-19 | Stop reason: HOSPADM

## 2019-07-19 RX ORDER — BUPIVACAINE HYDROCHLORIDE 5 MG/ML
INJECTION, SOLUTION EPIDURAL; INTRACAUDAL
Status: DISCONTINUED | OUTPATIENT
Start: 2019-07-19 | End: 2019-07-19 | Stop reason: HOSPADM

## 2019-07-19 NOTE — H&P
HPI  Patient presenting for Procedure(s) (LRB):  BLOCK, NERVE COCCYGEAL  2 OF 2  Pt. can drive herself home (N/A)     Patient on Anti-coagulation No     Today patient denies any fevers, chills, nausea, vomiting, changes in health, procedures such as colonoscopy or dental procedure in the last 2 weeks, or infections.    No health changes since previous encounter    Past Medical History:   Diagnosis Date    Asthma with allergic rhinitis     Bladder spasm     Dense breasts     heterogeneously/fibrocystic disease    Elevated antinuclear antibody (GUICHO) level     Endometriosis of cervix     Erythromelalgia     Fibromyalgia     Ganglion cyst     left toe    Goiter     MNG    Hashimoto's disease     Hashimoto's thyroiditis     Headache(784.0)     Hypothyroidism     Hashimoto with + Tg ab    Osteoporosis     femoral neck    Raynaud's phenomenon     Rhinitis     Scoliosis     Sleep disorder     type of Narcolepsy ( resolved)    Vitamin D deficiency disease     Vulvodynia      Past Surgical History:   Procedure Laterality Date    BLOCK, NERVE COCCYGEAL  1 OF 2  Pt. can drive herself home N/A 2019    Performed by [facility]ique Philip [facility] at [facility]    BLOCK-NERVE-PUDENDAL Bilateral 10/26/2017    [facility] at [facility]    BLOCK-NERVE-PUDENDAL Right 10/28/2015    [facility] at [facility]    BLOCK-NERVE-PUDENDAL Right 2015    [facility] at [facility]    BLOCK-NERVE-PUDENDAL Left 2/3/2015    [facility]    BREAST SURGERY      fibrocystic tumor removed     SECTION      2x    CYST REMOVAL      laparoscopic cyst on ovary    HYSTERECTOMY      intradermal cyst       removed from left index finger    SINUS SURGERY      2x     Review of patient's allergies indicates:  No Known Allergies   Current Facility-Administered Medications   Medication    ALPRAZolam tablet 0.5 mg  "    Facility-Administered Medications Ordered in Other Encounters   Medication    0.9%  NaCl infusion       PMHx, PSHx, Allergies, Medications reviewed in epic    ROS negative except pain complaints in HPI    OBJECTIVE:    BP (!) 96/51 (BP Location: Right arm, Patient Position: Sitting)   Pulse 96   Temp 98.1 °F (36.7 °C) (Oral)   Ht 5' 1" (1.549 m)   Wt 42.2 kg (93 lb)   LMP  (LMP Unknown)   SpO2 100%   BMI 17.57 kg/m²     PHYSICAL EXAMINATION:    GENERAL: Well appearing, in no acute distress, alert and oriented x3.  PSYCH:  Mood and affect appropriate.  SKIN: Skin color, texture, turgor normal, no rashes or lesions which will impact the procedure.  CV: RRR with palpation of the radial artery.  PULM: No evidence of respiratory difficulty, symmetric chest rise. Clear to auscultation.  NEURO: Cranial nerves grossly intact.    Plan:    Proceed with procedure as planned Procedure(s) (LRB):  BLOCK, NERVE COCCYGEAL  2 OF 2  Pt. can drive herself home (N/A)    KEENAN Coronado  07/19/2019    I have seen the patient with the fellow physician.  We have come up with the above plan.  The patient is in agreement with our plan. I agree with the above note which I have edited where appropriate.         "

## 2019-07-19 NOTE — DISCHARGE INSTRUCTIONS
Thank you for allowing us to care for you today. You may receive a survey about the care we provided. Your feedback is valuable and helps us provide excellent care throughout the community.     Home Care Instructions for Pain Management:    1. DIET:   You may resume your normal diet today.   2. BATHING:   You may shower with luke warm water. No tub baths or anything that will soak injection sites under water for the next 24 hours.  3. DRESSING:   You may remove your bandage today.   4. ACTIVITY LEVEL:   You may resume your normal activities 24 hrs after your procedure. Nothing strenuous today.  5. MEDICATIONS:   You may resume your normal medications today. To restart blood thinners, ask your doctor.  6. DRIVING    If you have received any sedatives by mouth today, you may not drive for 12 hours.    If you have received any sedation through your IV, you may not drive for 24 hrs.   7. SPECIAL INSTRUCTIONS:   No heat to the injection site for 24 hrs including, hot bath or shower, heating pad, moist heat, or hot tubs.    Use ice pack to injection site for any pain or discomfort.  Apply ice packs for 20 minute intervals as needed.    IF you have diabetes, be sure to monitor your blood sugar more closely. IF your injection contained steroids your blood sugar levels may become higher than normal.    If you are still having pain upon discharge:  Your pain may improve over the next 48 hours. The anesthetic (numbing medication) works immediately to 48 hours. IF your injection contained a steroid (anti-inflammatory medication), it takes approximately 3 days to start feeling relief and 7-10 days to see your greatest results from the medication. It is possible you may need subsequent injections. This would be discussed at your follow up appointment with pain management or your referring doctor.      PLEASE CALL YOUR DOCTOR IF:  1. Redness or swelling around the injection site.  2. Fever of 101 degrees or more  3. Drainage  (pus) from the injection site.  4. For any continuous bleeding (some dried blood over the incision is normal.)    FOR EMERGENCIES:   If any unusual problems or difficulties occur during clinic hours, call [phone] or [phone].

## 2019-07-19 NOTE — OP NOTE
"Date: 07/19/2019    Procedure: Coccygeal nerve block    Pre-op diagnosis: Coccydynia [M53.3]    Post-op diagnosis: Coccydynia [M53.3]     Physician: Dr. Monique Philip     Assistant: None    Anesthestia: local/oral niravam    All medications, allergies, and relevant histories were reviewed. No recent antibiotics or infections.  A time-out was taken to verify the correct patient, procedure, laterality, and appropriate medications/allergies.    Procedure: Coccyx injection    Coccyx injection    The procedure was discussed with the patient including complications of nerve damage, bleeding, infection, and failure of pain relief.     Patient was transferred to the procedure for the procedure. NIBP, O2 saturation, and EKG were monitored.  The patient was placed prone with blankets under the abdomen. Fluoroscopic view was obtained for localization of the sacrococcygeal ligament in the lateral view. The sacral area was prepped with chlorhexidine  x3, sterile drape placed over the site, and sterile precautions observed throughout the procedure. Local Xylocaine 1% was injected over the coccyx then a 25 gauge 1.5" needle was advanced to the coccyx. Next, the needle was walked off laterally on the left and advanced 1 mm.  Position was confirmed with fluoroscopy.  After negative aspiration, 0.5cc of Omnipaque 300 contrast showed excellent spread. 1 cc of bupivacaine 0.5% was injected without complication.  Then the needle was redirected to the coccyx at midline then walked off laterally on the right side and advanced 1 mm.  Position was confirmed with fluoroscopy.  After negative aspiration, 0.5cc of Omnipaque 300 contrast showed excellent spread. 1 cc of bupivacaine 0.5% was injected without complication.  The needle was then removed and a Band-Aid dressing applied.     Patient tolerated the procedure well without any complications. The patient was discharged with a responsible adult.    Future Management:   If helpful, can " repeat as needed.    Will plan to repeat in 2 weeks.  If he is still obtaining relief, can push this back.    I certify that I provided the above services.  I was present for the entire procedure, which was performed by the fellow physician under my supervision.  There were no parts of the procedure that were performed not by myself or without my direct supervision.

## 2019-08-05 DIAGNOSIS — R10.2 PELVIC PAIN IN FEMALE: ICD-10-CM

## 2019-08-05 RX ORDER — GABAPENTIN 100 MG/1
CAPSULE ORAL
Qty: 630 CAPSULE | Refills: 1 | Status: SHIPPED | OUTPATIENT
Start: 2019-08-05 | End: 2020-01-07 | Stop reason: SDUPTHER

## 2019-08-05 RX ORDER — PILOCARPINE HYDROCHLORIDE 5 MG/1
TABLET, FILM COATED ORAL
Qty: 270 TABLET | Refills: 3 | Status: SHIPPED | OUTPATIENT
Start: 2019-08-05 | End: 2020-10-09 | Stop reason: SDUPTHER

## 2019-08-06 ENCOUNTER — LAB VISIT (OUTPATIENT)
Dept: LAB | Facility: HOSPITAL | Age: 59
End: 2019-08-06
Attending: INTERNAL MEDICINE
Payer: COMMERCIAL

## 2019-08-06 ENCOUNTER — PATIENT MESSAGE (OUTPATIENT)
Dept: INTERNAL MEDICINE | Facility: CLINIC | Age: 59
End: 2019-08-06

## 2019-08-06 DIAGNOSIS — E03.9 HYPOTHYROIDISM, UNSPECIFIED TYPE: Primary | ICD-10-CM

## 2019-08-06 DIAGNOSIS — E03.9 HYPOTHYROIDISM, UNSPECIFIED TYPE: ICD-10-CM

## 2019-08-06 PROCEDURE — 84443 ASSAY THYROID STIM HORMONE: CPT

## 2019-08-06 PROCEDURE — 84439 ASSAY OF FREE THYROXINE: CPT

## 2019-08-06 PROCEDURE — 84480 ASSAY TRIIODOTHYRONINE (T3): CPT

## 2019-08-06 PROCEDURE — 84481 FREE ASSAY (FT-3): CPT

## 2019-08-06 PROCEDURE — 36415 COLL VENOUS BLD VENIPUNCTURE: CPT

## 2019-08-07 ENCOUNTER — PATIENT MESSAGE (OUTPATIENT)
Dept: INTERNAL MEDICINE | Facility: CLINIC | Age: 59
End: 2019-08-07

## 2019-08-07 ENCOUNTER — OFFICE VISIT (OUTPATIENT)
Dept: INTERNAL MEDICINE | Facility: CLINIC | Age: 59
End: 2019-08-07
Payer: COMMERCIAL

## 2019-08-07 VITALS
HEART RATE: 90 BPM | OXYGEN SATURATION: 97 % | DIASTOLIC BLOOD PRESSURE: 68 MMHG | WEIGHT: 90.63 LBS | HEIGHT: 61 IN | SYSTOLIC BLOOD PRESSURE: 118 MMHG | BODY MASS INDEX: 17.11 KG/M2

## 2019-08-07 DIAGNOSIS — E06.3 HYPOTHYROIDISM DUE TO HASHIMOTO'S THYROIDITIS: ICD-10-CM

## 2019-08-07 DIAGNOSIS — Z79.890 HORMONE REPLACEMENT THERAPY: ICD-10-CM

## 2019-08-07 DIAGNOSIS — E03.8 HYPOTHYROIDISM DUE TO HASHIMOTO'S THYROIDITIS: ICD-10-CM

## 2019-08-07 DIAGNOSIS — T50.905A ADVERSE EFFECT OF DRUG, INITIAL ENCOUNTER: Primary | ICD-10-CM

## 2019-08-07 LAB
T3 SERPL-MCNC: 75 NG/DL (ref 60–180)
T3FREE SERPL-MCNC: 2.3 PG/ML (ref 2.3–4.2)
T4 FREE SERPL-MCNC: 0.58 NG/DL (ref 0.71–1.51)
TSH SERPL DL<=0.005 MIU/L-ACNC: 2.15 UIU/ML (ref 0.4–4)

## 2019-08-07 PROCEDURE — 3008F PR BODY MASS INDEX (BMI) DOCUMENTED: ICD-10-PCS | Mod: CPTII,S$GLB,, | Performed by: INTERNAL MEDICINE

## 2019-08-07 PROCEDURE — 99214 PR OFFICE/OUTPT VISIT, EST, LEVL IV, 30-39 MIN: ICD-10-PCS | Mod: S$GLB,,, | Performed by: INTERNAL MEDICINE

## 2019-08-07 PROCEDURE — 99999 PR PBB SHADOW E&M-EST. PATIENT-LVL IV: CPT | Mod: PBBFAC,,, | Performed by: INTERNAL MEDICINE

## 2019-08-07 PROCEDURE — 99999 PR PBB SHADOW E&M-EST. PATIENT-LVL IV: ICD-10-PCS | Mod: PBBFAC,,, | Performed by: INTERNAL MEDICINE

## 2019-08-07 PROCEDURE — 99214 OFFICE O/P EST MOD 30 MIN: CPT | Mod: S$GLB,,, | Performed by: INTERNAL MEDICINE

## 2019-08-07 PROCEDURE — 3008F BODY MASS INDEX DOCD: CPT | Mod: CPTII,S$GLB,, | Performed by: INTERNAL MEDICINE

## 2019-08-07 NOTE — PROGRESS NOTES
Subjective:      Patient ID: Berenice Hayes is a 59 y.o. female.    Chief Complaint: Chest Pain (tightness and pain the left arm )    HPI:  HPI   Patient called for a same day appointment to come in and discuss a recent change in her health.  She states that since approximately July 19th when she received a methylprednisolone injection and pain management she has not felt well.  Prior to this her symptoms were stable.  After the injection she felt palpitations and some chest tightness.  We carefully reviewed her medications.  She very closely titrate her Cytomel and found during this situation she had to  decrease her Cytomel significantly.  She requested laboratory studies yesterday which were reviewed with her.  Patient Active Problem List   Diagnosis    Fibromyalgia    Bladder spasm    Osteoporosis, postmenopausal    Fibrocystic breast changes    Vulvar vestibulodynia    Vulvar pain    Vascular disorder: Erytnromelalgia    Raynaud's disease    Incomplete defecation    Straining during bowel movements    Chronic constipation    Rectocele    Disorder of muscle    Anxiety    Bilateral hand pain    Fatigue    Perimenopause vs Memopause    Menopause syndrome    Pudendal neuralgia    Pelvic pain in female    Urinary hesitancy    Hypothyroidism due to Hashimoto's thyroiditis    Multinodular goiter    Encounter for herb and vitamin supplement management    Gluten free diet    Family history of ischemic heart disease (IHD)    Cystocele, midline    Estefani syndrome    Chronic pain syndrome    Trigeminal neuralgia    Hip pain, right    Erythema of finger    Erythromelalgia    Sjogren's syndrome    Pernio    Rash    Chronic pain    Coccydynia     Past Medical History:   Diagnosis Date    Asthma with allergic rhinitis     Bladder spasm     Dense breasts     heterogeneously/fibrocystic disease    Elevated antinuclear antibody (GUICHO) level     Endometriosis of cervix      Erythromelalgia     Fibromyalgia     Ganglion cyst     left toe    Goiter     MNG    Hashimoto's disease     Hashimoto's thyroiditis     Headache(784.0)     Hypothyroidism     Hashimoto with + Tg ab    Osteoporosis     femoral neck    Raynaud's phenomenon     Rhinitis     Scoliosis     Sleep disorder     type of Narcolepsy ( resolved)    Vitamin D deficiency disease     Vulvodynia      Past Surgical History:   Procedure Laterality Date    BLOCK, NERVE COCCYGEAL  1 OF 2  Pt. can drive herself home N/A 2019    [facility]    BLOCK, NERVE COCCYGEAL  2 OF 2  Pt. can drive herself home N/A 2019    [facility]    BLOCK-NERVE-PUDENDAL Bilateral 10/26/2017    Performed by [facility]    BLOCK-NERVE-PUDENDAL Right 10/28/2015    [facility]    BLOCK-NERVE-PUDENDAL Right 2015    [facility]    BLOCK-NERVE-PUDENDAL Left 2/3/2015    [facility]    BREAST SURGERY      fibrocystic tumor removed     SECTION      2x    CYST REMOVAL      laparoscopic cyst on ovary    HYSTERECTOMY      intradermal cyst       removed from left index finger    SINUS SURGERY      2x     Family History   Problem Relation Age of Onset    Diabetes Mother     Fibromyalgia Mother     Polymyalgia rheumatica Mother     Macular degeneration Mother     Arthritis Mother     Hypertension Mother     Kidney disease Mother     Pneumonia Mother     Adrenal disorder Mother         adrenal insufficiency    Coronary artery disease Father     Arthritis Father         osteoarthritis    Hypertension Father     Heart disease Father     Diabetes Father     Hyperlipidemia Father     Hyperlipidemia Brother     Cancer Brother         oral    Thyroid cancer Daughter     Cancer Daughter          "thyroid    Hypothyroidism Daughter     Breast cancer Neg Hx     Ovarian cancer Neg Hx     Colon cancer Neg Hx     Melanoma Neg Hx      Review of Systems see above no other new problems patient does have significant chronic problems  Objective:     Vitals:    08/07/19 1539   BP: 118/68   Pulse: 90   SpO2: 97%   Weight: 41.1 kg (90 lb 9.7 oz)   Height: 5' 1" (1.549 m)   PainSc:   3   PainLoc: Chest     Body mass index is 17.12 kg/m².  Physical Exam   Constitutional: She is oriented to person, place, and time. She appears well-developed and well-nourished. No distress.   Neck: Carotid bruit is not present. No thyromegaly present.   Cardiovascular: Normal rate, regular rhythm and normal heart sounds. PMI is not displaced.   Pulmonary/Chest: Effort normal and breath sounds normal. No respiratory distress.   Abdominal: Soft. Bowel sounds are normal. She exhibits no distension. There is no tenderness.   Musculoskeletal: She exhibits no edema.   Neurological: She is alert and oriented to person, place, and time.     Assessment:     1. Adverse effect of drug, initial encounter    2. Hypothyroidism due to Hashimoto's thyroiditis    3. Hormone replacement therapy      Plan:   Berenice was seen today for chest pain.    Diagnoses and all orders for this visit:    Adverse effect of drug, initial encounter    Hypothyroidism due to Hashimoto's thyroiditis    Hormone replacement therapy      We had over half an hour discussion as to what she could try.  We came to the following recommendation.  She will stop her hormone patch.  Hopefully this will help reduce her symptoms from the methylprednisolone.  If this occurs then she will begin a very slow titration of her Cytomel to previous levels..  She could then add her pregnenolone and lastly reinstitute the estradiol.  She will message me on Monday to determine how she is doing  Problem List Items Addressed This Visit     Hypothyroidism due to Hashimoto's thyroiditis      Other " Visit Diagnoses     Adverse effect of drug, initial encounter    -  Primary    Hormone replacement therapy            No orders of the defined types were placed in this encounter.    No follow-ups on file.     Medication List           Accurate as of 8/7/19  6:04 PM. If you have any questions, ask your nurse or doctor.               CONTINUE taking these medications    albuterol 90 mcg/actuation inhaler  Commonly known as:  VENTOLIN HFA  Inhale 2 puffs into the lungs every 6 (six) hours as needed for Wheezing. Rescue     * amitriptyline 10 MG tablet  Commonly known as:  ELAVIL  Take 1 tablet (10 mg total) by mouth once daily.     * amitriptyline 50 MG tablet  Commonly known as:  ELAVIL  TAKE 1 TABLET NIGHTLY WITH THE 10 MG AMITRIPTYLINE     aspirin 81 MG EC tablet  Commonly known as:  ECOTRIN     fluticasone propionate 50 mcg/actuation nasal spray  Commonly known as:  FLONASE  1 spray by Each Nare route once daily.     gabapentin 100 MG capsule  Commonly known as:  NEURONTIN  TAKE 1 CAPSULE IN THE MORNING, 1 CAPSULE AT NOON, 1 CAPSULE AT 5 P.M. AND 4 CAPSULES AT BEDTIME     hydroxychloroquine 200 mg tablet  Commonly known as:  PLAQUENIL  Take 1 tablet (200 mg total) by mouth once daily.     lidocaine-prilocaine cream  Commonly known as:  EMLA  USE AS DIRECTED BY PRESCRIBER OR PACKAGE INSTRUCTIONS     liothyronine 5 MCG Tab  Commonly known as:  CYTOMEL  Take 3.5 tablets (17.5 mcg) once daily, Brand name medically  necessary     meloxicam 7.5 MG tablet  Commonly known as:  MOBIC  TAKE 1 TABLET (7.5 MG TOTAL) BY MOUTH ONCE DAILY. FOR INFLAMMATION     metaxalone 800 MG tablet  Commonly known as:  SKELAXIN  Take one twice a day as needed for spasm.     MUCINEX 600 mg 12 hr tablet  Generic drug:  guaiFENesin     multivitamin capsule     niacinamide 500 mg Tab  Take 1 tablet (500 mg total) by mouth 3 (three) times daily.     pilocarpine 5 MG Tab  Commonly known as:  SALAGEN  TAKE 1 TABLET THREE TIMES A DAY AS NEEDED      PREVIDENT 5000 DRY MOUTH 1.1 % Gel  Generic drug:  fluoride (sodium)     RESTASIS 0.05 % ophthalmic emulsion  Generic drug:  cycloSPORINE     risedronate 35 mg Tbec  TAKE 1 TABLET ONCE A WEEK     UNABLE TO FIND     VITAMIN D3 1,000 unit capsule  Generic drug:  cholecalciferol (vitamin D3)     * YUVAFEM 10 mcg Tab  Generic drug:  estradiol  INSERT 1 TABLET VAGINALLY TWICE WEEKLY     * estradiol 10 mcg Tab  Commonly known as:  VAGIFEM  Place 1 tablet (10 mcg total) vaginally twice a week.     * estradiol 0.05 mg/24 hr td ptsw 0.05 mg/24 hr  Commonly known as:  VIVELLE-DOT  Place 1 patch onto the skin twice a week.     zolpidem 10 mg Tab  Commonly known as:  AMBIEN  TAKE 1 TABLET BY MOUTH AT BEDTIME AS NEEDED         * This list has 5 medication(s) that are the same as other medications prescribed for you. Read the directions carefully, and ask your doctor or other care provider to review them with you.

## 2019-08-09 ENCOUNTER — PATIENT MESSAGE (OUTPATIENT)
Dept: PAIN MEDICINE | Facility: CLINIC | Age: 59
End: 2019-08-09

## 2019-08-09 DIAGNOSIS — M79.7 FIBROMYALGIA: ICD-10-CM

## 2019-08-09 DIAGNOSIS — G89.4 CHRONIC PAIN DISORDER: Primary | ICD-10-CM

## 2019-08-09 RX ORDER — DICLOFENAC SODIUM 20 MG/G
40 SOLUTION TOPICAL 2 TIMES DAILY PRN
Qty: 112 G | Refills: 5 | Status: SHIPPED | OUTPATIENT
Start: 2019-08-09 | End: 2020-02-05

## 2019-08-19 ENCOUNTER — PATIENT MESSAGE (OUTPATIENT)
Dept: CARDIOLOGY | Facility: CLINIC | Age: 59
End: 2019-08-19

## 2019-08-19 ENCOUNTER — TELEPHONE (OUTPATIENT)
Dept: CARDIOLOGY | Facility: CLINIC | Age: 59
End: 2019-08-19

## 2019-08-19 ENCOUNTER — PATIENT MESSAGE (OUTPATIENT)
Dept: INTERNAL MEDICINE | Facility: CLINIC | Age: 59
End: 2019-08-19

## 2019-08-19 NOTE — TELEPHONE ENCOUNTER
----- Message from Brenda Wood sent at 8/19/2019 10:04 AM CDT -----  Contact: Pt  Patient called requesting the nurse to give her a call in regards to a message she sent earlier.       Patient can be reached at [phone]

## 2019-08-20 ENCOUNTER — PATIENT MESSAGE (OUTPATIENT)
Dept: OBSTETRICS AND GYNECOLOGY | Facility: CLINIC | Age: 59
End: 2019-08-20

## 2019-08-20 DIAGNOSIS — N95.2 ATROPHIC VAGINITIS: ICD-10-CM

## 2019-08-20 NOTE — TELEPHONE ENCOUNTER
Reached out to patient with NP recommendations.    Patient would like to have a stress prior to follow up appt.    Would you please enter orders.    Thanks.

## 2019-08-20 NOTE — TELEPHONE ENCOUNTER
----- Message from EAMON Diaz, ANP sent at 8/19/2019  4:14 PM CDT -----  Can you please follow up with her based on my recommendations

## 2019-08-21 ENCOUNTER — PATIENT MESSAGE (OUTPATIENT)
Dept: OBSTETRICS AND GYNECOLOGY | Facility: CLINIC | Age: 59
End: 2019-08-21

## 2019-08-21 RX ORDER — ESTRADIOL 10 UG/1
1 INSERT VAGINAL
Qty: 32 TABLET | Refills: 4 | Status: SHIPPED | OUTPATIENT
Start: 2019-08-22 | End: 2020-06-10

## 2019-08-22 DIAGNOSIS — Z91.89 AT RISK FOR CORONARY ARTERY DISEASE: ICD-10-CM

## 2019-08-23 NOTE — TELEPHONE ENCOUNTER
I spoke to patient and let her know she has been scheduled on 9/10 for a treadmill stress test. She understood

## 2019-08-26 ENCOUNTER — TELEPHONE (OUTPATIENT)
Dept: CARDIOLOGY | Facility: CLINIC | Age: 59
End: 2019-08-26

## 2019-08-26 NOTE — TELEPHONE ENCOUNTER
----- Message from Min Vincent sent at 8/26/2019  8:38 AM CDT -----  Contact: pt  Type: Needs Medical Advice    Who Called:  pt    Best Call Back Number: [phone]  Additional Information: Would like to discus her stress test on 9/10/19. Please call to advise.

## 2019-09-10 ENCOUNTER — HOSPITAL ENCOUNTER (OUTPATIENT)
Dept: CARDIOLOGY | Facility: HOSPITAL | Age: 59
Discharge: HOME | End: 2019-09-10
Attending: NURSE PRACTITIONER
Payer: COMMERCIAL

## 2019-09-10 ENCOUNTER — HOSPITAL ENCOUNTER (OUTPATIENT)
Dept: RADIOLOGY | Facility: HOSPITAL | Age: 59
Discharge: HOME | End: 2019-09-10
Attending: NURSE PRACTITIONER
Payer: COMMERCIAL

## 2019-09-10 DIAGNOSIS — Z91.89 AT RISK FOR CORONARY ARTERY DISEASE: ICD-10-CM

## 2019-09-10 LAB
CV STRESS BASE HR: 82 BPM
DIASTOLIC BLOOD PRESSURE: 71 MMHG
OHS CV CPX 85 PERCENT MAX PREDICTED HEART RATE MALE: 132
OHS CV CPX MAX PREDICTED HEART RATE: 156
OHS CV CPX PATIENT IS FEMALE: 1
OHS CV CPX PATIENT IS MALE: 0
OHS CV CPX PEAK DIASTOLIC BLOOD PRESSURE: 59 MMHG
OHS CV CPX PEAK HEAR RATE: 125 BPM
OHS CV CPX PEAK RATE PRESSURE PRODUCT: NORMAL
OHS CV CPX PEAK SYSTOLIC BLOOD PRESSURE: 118 MMHG
OHS CV CPX PERCENT MAX PREDICTED HEART RATE ACHIEVED: 80
OHS CV CPX RATE PRESSURE PRODUCT PRESENTING: 8282
STRESS ECHO TARGET HR: 138.55 BPM
SYSTOLIC BLOOD PRESSURE: 101 MMHG

## 2019-09-10 PROCEDURE — 93018 TREADMILL STRESS TEST (CUPID ONLY): ICD-10-PCS | Mod: ,,, | Performed by: INTERNAL MEDICINE

## 2019-09-10 PROCEDURE — 93016 TREADMILL STRESS TEST (CUPID ONLY): ICD-10-PCS | Mod: ,,, | Performed by: INTERNAL MEDICINE

## 2019-09-10 PROCEDURE — A9502 TC99M TETROFOSMIN: HCPCS

## 2019-09-10 PROCEDURE — 78452 NM MYOCARDIAL PERFUSION SPECT MULTI STUDY: ICD-10-PCS | Mod: 26,,, | Performed by: RADIOLOGY

## 2019-09-10 PROCEDURE — 78452 HT MUSCLE IMAGE SPECT MULT: CPT | Mod: 26,,, | Performed by: RADIOLOGY

## 2019-09-10 PROCEDURE — 93016 CV STRESS TEST SUPVJ ONLY: CPT | Mod: ,,, | Performed by: INTERNAL MEDICINE

## 2019-09-10 PROCEDURE — 93017 CV STRESS TEST TRACING ONLY: CPT

## 2019-09-10 PROCEDURE — 93018 CV STRESS TEST I&R ONLY: CPT | Mod: ,,, | Performed by: INTERNAL MEDICINE

## 2019-09-10 PROCEDURE — 63600175 PHARM REV CODE 636 W HCPCS: Performed by: NURSE PRACTITIONER

## 2019-09-10 RX ORDER — REGADENOSON 0.08 MG/ML
0.4 INJECTION, SOLUTION INTRAVENOUS ONCE
Status: COMPLETED | OUTPATIENT
Start: 2019-09-10 | End: 2019-09-10

## 2019-09-10 RX ADMIN — REGADENOSON 0.4 MG: 0.08 INJECTION, SOLUTION INTRAVENOUS at 01:09

## 2019-09-12 ENCOUNTER — TELEPHONE (OUTPATIENT)
Dept: CARDIOLOGY | Facility: CLINIC | Age: 59
End: 2019-09-12

## 2019-09-12 ENCOUNTER — PATIENT MESSAGE (OUTPATIENT)
Dept: CARDIOLOGY | Facility: CLINIC | Age: 59
End: 2019-09-12

## 2019-09-12 NOTE — TELEPHONE ENCOUNTER
----- Message from EAMON Diaz, ANP sent at 9/11/2019  4:16 PM CDT -----  Stress test negative. Follow up with [facility] as scheduled    Thanks  BH

## 2019-09-27 ENCOUNTER — PATIENT OUTREACH (OUTPATIENT)
Dept: ADMINISTRATIVE | Facility: OTHER | Age: 59
End: 2019-09-27

## 2019-09-27 ENCOUNTER — TELEPHONE (OUTPATIENT)
Dept: INTERNAL MEDICINE | Facility: CLINIC | Age: 59
End: 2019-09-27

## 2019-09-27 DIAGNOSIS — Z00.00 WELLNESS EXAMINATION: Primary | ICD-10-CM

## 2019-09-30 ENCOUNTER — OFFICE VISIT (OUTPATIENT)
Dept: CARDIOLOGY | Facility: CLINIC | Age: 59
End: 2019-09-30
Payer: COMMERCIAL

## 2019-09-30 VITALS
HEART RATE: 94 BPM | BODY MASS INDEX: 17.69 KG/M2 | WEIGHT: 93.69 LBS | HEIGHT: 61 IN | DIASTOLIC BLOOD PRESSURE: 68 MMHG | SYSTOLIC BLOOD PRESSURE: 101 MMHG | OXYGEN SATURATION: 100 %

## 2019-09-30 DIAGNOSIS — I73.00 RAYNAUD'S DISEASE WITHOUT GANGRENE: ICD-10-CM

## 2019-09-30 DIAGNOSIS — I99.9 VASCULAR DISORDER: Primary | ICD-10-CM

## 2019-09-30 DIAGNOSIS — I73.81 ERYTHROMELALGIA: ICD-10-CM

## 2019-09-30 PROCEDURE — 3008F PR BODY MASS INDEX (BMI) DOCUMENTED: ICD-10-PCS | Mod: CPTII,S$GLB,, | Performed by: INTERNAL MEDICINE

## 2019-09-30 PROCEDURE — 99215 OFFICE O/P EST HI 40 MIN: CPT | Mod: S$GLB,,, | Performed by: INTERNAL MEDICINE

## 2019-09-30 PROCEDURE — 99999 PR PBB SHADOW E&M-EST. PATIENT-LVL III: ICD-10-PCS | Mod: PBBFAC,,, | Performed by: INTERNAL MEDICINE

## 2019-09-30 PROCEDURE — 99215 PR OFFICE/OUTPT VISIT, EST, LEVL V, 40-54 MIN: ICD-10-PCS | Mod: S$GLB,,, | Performed by: INTERNAL MEDICINE

## 2019-09-30 PROCEDURE — 99999 PR PBB SHADOW E&M-EST. PATIENT-LVL III: CPT | Mod: PBBFAC,,, | Performed by: INTERNAL MEDICINE

## 2019-09-30 PROCEDURE — 3008F BODY MASS INDEX DOCD: CPT | Mod: CPTII,S$GLB,, | Performed by: INTERNAL MEDICINE

## 2019-09-30 NOTE — PATIENT INSTRUCTIONS
Raynaud Disease  Your healthcare provider has told you that you have Raynaud disease. It is also called Raynaud phenomenon or Raynaud syndrome. There is no cure for Raynaud disease, but you can manage it to help prevent attacks.    What are the symptoms of Raynaud disease?  A Raynaud disease attack is often triggered by cold or stress. During an attack, blood vessels suddenly narrow (called vasospasm).  This most often happens in fingers and toes. In rare cases, the nose, ears, or even tongue are affected. Narrowed blood vessels reduce the blood supply to the area. The area then turns white, then blue. The area may feel numb or painful. As the attack passes, the blood vessels open. The affected area may turn bright red as it warms up, then returns to normal color.  What is the cause of Raynaud disease?  With Raynaud disease, it is believed that blood vessels in the affected areas overrespond to certain triggers, such as cold. This makes them narrow (called vasospasm) much more than in people without the disease. What causes the blood vessels to react so strongly to certain triggers is unknown. In between attacks, the blood vessels are normal and healthy. Attacks dont permanently damage the blood vessels, but may thicken the artery walls.   In some cases, Raynaud disease happens along with another disease or condition. This is often a connective tissue disorder, such as lupus, scleroderma, or rheumatoid arthritis. This is called secondary Raynaud disease (as opposed to primary Raynaud disease discussed above) and may be more severe. If this is the case for you, you and your healthcare provider can discuss treatment for the underlying condition.  What are the risk factors?  Risk factors for Raynaud disease include:  · Women are more likely to get Raynaud disease than men.  · Younger individuals are at higher risk, usually ages 15 to 30.  · Living in colder climates increases risk.  · Having a family member with  Raynaud disease increases one's risk.  · Underlying rheumatoid conditions may increase one's risk.   What are possible triggers?  Triggers for Raynaud disease include:   · Cold  · Stress  · Caffeine  · Smoking  · Repetitive movements  · Certain medicines, such as beta-blockers, migraine medicine, birth control pills and others  · Injury  How is Raynaud disease diagnosed?  Your description of your symptoms, a health history, and a physical exam are often enough for a diagnosis. Blood tests and other tests may be done to see if any underlying conditions are present and rule out other problems.  How is Raynaud disease treated?  There is no cure for Raynaud disease. But you can control symptoms and reduce the number and severity of attacks. For most people, avoiding triggers is enough to limit attacks. Your healthcare provider may suggest the following:  · Take precautions to help prevent your hands and feet from losing circulation. This includes:  ¨ Dressing warmly in cold weather.  ¨ Wear gloves or mittens when your hands may become cold, such as when you use the refrigerator or freezer.  ¨ Avoid stress and caffeine.  ¨ Exercise regularly. This may reduce the number and severity of attacks.   ¨ If you smoke, quitting may improve the condition. This is because smoking causes your blood vessels to narrow and reduces blood flow.  · Soak your hands or feet in warm (not hot) water. Do this at the first sign of attack. Keep soaking until your skin color returns to normal.  In some people, symptoms are persistent or troubling. For these cases, other treatments are a choice. Your healthcare provider can tell you more about the following:  · Prescription medicines that relax and widen blood vessels, such as calcium channel blockers. These may help relieve symptoms.  · Nerve surgery for severe cases that dont respond to other treatments. Surgery removes the nerves that surround the blood vessels in the hands and feet. Without  nerve stimulation, the blood vessels stay more relaxed. They are less likely to become very narrow due to stimulus. Nerves may be blocked using injections in some cases.  Most cases of Raynaud disease are not cause for concern. The disease doesnt get worse and isnt likely to cause any permanent damage. If attacks are severe, very prolonged, or very often, skin damage may result. Controlling attacks can help prevent this.  When to seek medical care  The following problems happen rarely, but they can be serious. Call your healthcare provider right away if you notice any of the following:  · Infection or sores on the skin  · A finger or toe turns black  · The skin breaks open on its own  · A rash develops  · A finger or toe joint becomes painful or swollen   Date Last Reviewed: 1/27/2016  © 4801-6272 [employer] [address] [place] [address], [place] [address]. All rights reserved. This information is not intended as a substitute for professional medical care. Always follow your healthcare professional's instructions.

## 2019-10-01 ENCOUNTER — IMMUNIZATION (OUTPATIENT)
Dept: PHARMACY | Facility: CLINIC | Age: 59
End: 2019-10-01
Payer: COMMERCIAL

## 2019-10-03 ENCOUNTER — HOSPITAL ENCOUNTER (OUTPATIENT)
Dept: RADIOLOGY | Facility: CLINIC | Age: 59
Discharge: HOME | End: 2019-10-03
Attending: INTERNAL MEDICINE
Payer: COMMERCIAL

## 2019-10-03 DIAGNOSIS — G47.00 INSOMNIA, UNSPECIFIED TYPE: ICD-10-CM

## 2019-10-03 DIAGNOSIS — M81.0 OSTEOPOROSIS, POSTMENOPAUSAL: ICD-10-CM

## 2019-10-03 DIAGNOSIS — E06.3 HYPOTHYROIDISM DUE TO HASHIMOTO'S THYROIDITIS: ICD-10-CM

## 2019-10-03 DIAGNOSIS — Z12.11 COLON CANCER SCREENING: ICD-10-CM

## 2019-10-03 DIAGNOSIS — E03.8 HYPOTHYROIDISM DUE TO HASHIMOTO'S THYROIDITIS: ICD-10-CM

## 2019-10-03 PROCEDURE — 77080 DXA BONE DENSITY AXIAL: CPT | Mod: TC

## 2019-10-03 PROCEDURE — 77080 DEXA BONE DENSITY SPINE HIP: ICD-10-PCS | Mod: 26,,, | Performed by: INTERNAL MEDICINE

## 2019-10-03 PROCEDURE — 77080 DXA BONE DENSITY AXIAL: CPT | Mod: 26,,, | Performed by: INTERNAL MEDICINE

## 2019-10-03 RX ORDER — ZOLPIDEM TARTRATE 10 MG/1
TABLET ORAL
Qty: 30 TABLET | Refills: 1 | Status: SHIPPED | OUTPATIENT
Start: 2019-10-03 | End: 2019-12-02 | Stop reason: SDUPTHER

## 2019-10-12 ENCOUNTER — LAB VISIT (OUTPATIENT)
Dept: LAB | Facility: HOSPITAL | Age: 59
End: 2019-10-12
Attending: INTERNAL MEDICINE
Payer: COMMERCIAL

## 2019-10-12 DIAGNOSIS — Z00.00 WELLNESS EXAMINATION: ICD-10-CM

## 2019-10-12 LAB
ALBUMIN SERPL BCP-MCNC: 4.2 G/DL (ref 3.5–5.2)
ALP SERPL-CCNC: 40 U/L (ref 55–135)
ALT SERPL W/O P-5'-P-CCNC: 21 U/L (ref 10–44)
ANION GAP SERPL CALC-SCNC: 10 MMOL/L (ref 8–16)
AST SERPL-CCNC: 20 U/L (ref 10–40)
BASOPHILS # BLD AUTO: 0.04 K/UL (ref 0–0.2)
BASOPHILS NFR BLD: 1 % (ref 0–1.9)
BILIRUB SERPL-MCNC: 0.5 MG/DL (ref 0.1–1)
BUN SERPL-MCNC: 13 MG/DL (ref 6–20)
CALCIUM SERPL-MCNC: 9.2 MG/DL (ref 8.7–10.5)
CHLORIDE SERPL-SCNC: 102 MMOL/L (ref 95–110)
CHOLEST SERPL-MCNC: 150 MG/DL (ref 120–199)
CHOLEST/HDLC SERPL: 1.8 {RATIO} (ref 2–5)
CO2 SERPL-SCNC: 29 MMOL/L (ref 23–29)
CREAT SERPL-MCNC: 0.7 MG/DL (ref 0.5–1.4)
DIFFERENTIAL METHOD: ABNORMAL
EOSINOPHIL # BLD AUTO: 0.2 K/UL (ref 0–0.5)
EOSINOPHIL NFR BLD: 4.9 % (ref 0–8)
ERYTHROCYTE [DISTWIDTH] IN BLOOD BY AUTOMATED COUNT: 13.3 % (ref 11.5–14.5)
EST. GFR  (AFRICAN AMERICAN): >60 ML/MIN/1.73 M^2
EST. GFR  (NON AFRICAN AMERICAN): >60 ML/MIN/1.73 M^2
GLUCOSE SERPL-MCNC: 95 MG/DL (ref 70–110)
HCT VFR BLD AUTO: 41.3 % (ref 37–48.5)
HDLC SERPL-MCNC: 85 MG/DL (ref 40–75)
HDLC SERPL: 56.7 % (ref 20–50)
HGB BLD-MCNC: 13.6 G/DL (ref 12–16)
LDLC SERPL CALC-MCNC: 58 MG/DL (ref 63–159)
LYMPHOCYTES # BLD AUTO: 0.8 K/UL (ref 1–4.8)
LYMPHOCYTES NFR BLD: 19 % (ref 18–48)
MCH RBC QN AUTO: 30.1 PG (ref 27–31)
MCHC RBC AUTO-ENTMCNC: 32.9 G/DL (ref 32–36)
MCV RBC AUTO: 91 FL (ref 82–98)
MONOCYTES # BLD AUTO: 0.4 K/UL (ref 0.3–1)
MONOCYTES NFR BLD: 9.9 % (ref 4–15)
NEUTROPHILS # BLD AUTO: 2.6 K/UL (ref 1.8–7.7)
NEUTROPHILS NFR BLD: 65.2 % (ref 38–73)
NONHDLC SERPL-MCNC: 65 MG/DL
PLATELET # BLD AUTO: 198 K/UL (ref 150–350)
PMV BLD AUTO: 8.5 FL (ref 9.2–12.9)
POTASSIUM SERPL-SCNC: 4.4 MMOL/L (ref 3.5–5.1)
PROT SERPL-MCNC: 7.1 G/DL (ref 6–8.4)
RBC # BLD AUTO: 4.52 M/UL (ref 4–5.4)
SODIUM SERPL-SCNC: 141 MMOL/L (ref 136–145)
TRIGL SERPL-MCNC: 35 MG/DL (ref 30–150)
WBC # BLD AUTO: 4.05 K/UL (ref 3.9–12.7)

## 2019-10-12 PROCEDURE — 80061 LIPID PANEL: CPT

## 2019-10-12 PROCEDURE — 36415 COLL VENOUS BLD VENIPUNCTURE: CPT

## 2019-10-12 PROCEDURE — 85025 COMPLETE CBC W/AUTO DIFF WBC: CPT

## 2019-10-12 PROCEDURE — 80053 COMPREHEN METABOLIC PANEL: CPT

## 2019-10-14 ENCOUNTER — PATIENT MESSAGE (OUTPATIENT)
Dept: ENDOCRINOLOGY | Facility: CLINIC | Age: 59
End: 2019-10-14

## 2019-10-22 ENCOUNTER — PATIENT OUTREACH (OUTPATIENT)
Dept: ADMINISTRATIVE | Facility: HOSPITAL | Age: 59
End: 2019-10-22

## 2019-10-30 DIAGNOSIS — E06.3 HYPOTHYROIDISM DUE TO HASHIMOTO'S THYROIDITIS: Primary | ICD-10-CM

## 2019-10-30 DIAGNOSIS — E03.8 HYPOTHYROIDISM DUE TO HASHIMOTO'S THYROIDITIS: Primary | ICD-10-CM

## 2019-10-30 NOTE — PROGRESS NOTES
Subjective:   Patient ID:  Berenice Hayes is a 59 y.o. female who presents for follow up of Raynaud Disease; limb pain; and intermiittent cyanosi      HPI:       She is here for follow up of extensive raynaud and erytnromelalgia complicated with severe distal digits pain. No ulcers. Overtime she has found a way to adjust her living and working conditions to avoid any severe issues. Last year has been a tough with more symptoms involving her face in addition to her digits. She takes aspirin 81 mg daily. She has no macrovascular disease. LUIS MANUEL and [facility] in 2013 were unremarkable. ECG 6/2017: NSR. She sees rheumatology for hashimoto + sicca.         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    Chronic pain syndrome 08/28/2018    Cystocele, midline 12/07/2017    Family history of ischemic heart disease (IHD) 07/24/2017       Father with PCI in his 60'        Encounter for herb and vitamin supplement management 03/22/2016     Started on supplements in January ( Teagan Maurer) Neurotrophin PMG ,  Immuplex, Echinacea Premium, Rehmannia Complex, DHEA, Pregnenelone  Cataplex B    Metanx        Gluten free diet 03/22/2016     Gluten free and corn free      Hypothyroidism due to Hashimoto's thyroiditis 10/23/2015    Multinodular goiter 10/23/2015    Pelvic pain in female 05/10/2015     Follows with pain management      Urinary hesitancy 05/10/2015    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    Perimenopause vs Memopause 01/27/2015    Menopause syndrome 01/27/2015    Bilateral hand  pain 2015    Fatigue 2015    Anxiety 2014    Disorder of muscle 2014    Incomplete defecation 2014    Straining during bowel movements 2014    Chronic constipation 2014    Rectocele 2014    Raynaud's disease 2014    Vascular disorder: Erytnromelalgia 2013     Consult Vascular Medicine: 2013: LUIS MANUEL with [facility]      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    Vulvar pain 09/10/2013    Fibrocystic breast changes 2012    Osteoporosis, postmenopausal 2012    Fibromyalgia     Bladder spasm            Right Arm BP - Sittin/72  Left Arm BP - Sittin/72        LABS    LAST HbA1c  Lab Results   Component Value Date    HGBA1C 5.3 2016       Lipid panel  Lab Results   Component Value Date    CHOL 150 10/12/2019    CHOL 151 2018    CHOL 168 2016    CHOL 168 2016     Lab Results   Component Value Date    HDL 85 (H) 10/12/2019    HDL 77 (H) 2018    HDL 79 (H) 2016    HDL 79 (H) 2016     Lab Results   Component Value Date    LDLCALC 58.0 (L) 10/12/2019    LDLCALC 67.6 2018    LDLCALC 82.0 2016    LDLCALC 82.0 2016     Lab Results   Component Value Date    TRIG 35 10/12/2019    TRIG 32 2018    TRIG 35 2016    TRIG 35 2016     Lab Results   Component Value Date    CHOLHDL 56.7 (H) 10/12/2019    CHOLHDL 51.0 (H) 2018    CHOLHDL 47.0 2016    CHOLHDL 47.0 2016            Review of Systems   Constitution: Negative for diaphoresis, night sweats, weight gain and weight loss.   HENT: Negative for congestion.    Eyes: Negative for blurred vision, discharge and double vision.   Cardiovascular: Negative for chest pain, claudication, cyanosis, dyspnea on exertion, irregular heartbeat, leg swelling, near-syncope, orthopnea, palpitations, paroxysmal nocturnal dyspnea and syncope.   Respiratory: Negative for cough, shortness of breath and wheezing.    Endocrine:  Negative for cold intolerance, heat intolerance and polyphagia.   Hematologic/Lymphatic: Negative for adenopathy and bleeding problem. Does not bruise/bleed easily.   Skin: Positive for color change. Negative for dry skin and nail changes.   Musculoskeletal: Positive for joint pain and muscle weakness. Negative for arthritis, back pain, falls, myalgias and neck pain.   Gastrointestinal: Negative for bloating, abdominal pain, change in bowel habit and constipation.   Genitourinary: Negative for bladder incontinence, dysuria, flank pain, genital sores and missed menses.   Neurological: Positive for weakness. Negative for aphonia, brief paralysis, difficulty with concentration and dizziness.   Psychiatric/Behavioral: Negative for altered mental status and memory loss. The patient does not have insomnia.    Allergic/Immunologic: Negative for environmental allergies.       Objective:   Physical Exam   Constitutional: She is oriented to person, place, and time. She appears well-developed and well-nourished. She is not intubated.   HENT:   Head: Normocephalic and atraumatic.   Right Ear: External ear normal.   Left Ear: External ear normal.   Mouth/Throat: Oropharynx is clear and moist.   Eyes: Pupils are equal, round, and reactive to light. Conjunctivae and EOM are normal. Right eye exhibits no discharge. Left eye exhibits no discharge. No scleral icterus.   Neck: Normal range of motion. Neck supple. Normal carotid pulses, no hepatojugular reflux and no JVD present. Carotid bruit is not present. No tracheal deviation present. No thyromegaly present.   Cardiovascular: Normal rate, regular rhythm, S1 normal and S2 normal.  No extrasystoles are present. PMI is not displaced. Exam reveals no gallop, no S3, no distant heart sounds, no friction rub and no midsystolic click.   No murmur heard.  Pulses:       Carotid pulses are 2+ on the right side, and 2+ on the left side.       Radial pulses are 2+ on the right side, and 2+ on  the left side.        Femoral pulses are 2+ on the right side, and 2+ on the left side.       Popliteal pulses are 2+ on the right side, and 2+ on the left side.        Dorsalis pedis pulses are 2+ on the right side, and 2+ on the left side.        Posterior tibial pulses are 2+ on the right side, and 2+ on the left side.   Pulmonary/Chest: Effort normal and breath sounds normal. No accessory muscle usage or stridor. No apnea, no tachypnea and no bradypnea. She is not intubated. No respiratory distress. She has no decreased breath sounds. She has no wheezes. She has no rales. She exhibits no tenderness and no bony tenderness.   Abdominal: She exhibits no distension, no pulsatile liver, no abdominal bruit, no ascites, no pulsatile midline mass and no mass. There is no tenderness. There is no rebound and no guarding.   Musculoskeletal: Normal range of motion. She exhibits no edema or tenderness.   Lymphadenopathy:     She has no cervical adenopathy.   Neurological: She is alert and oriented to person, place, and time. She has normal reflexes. No cranial nerve deficit. Coordination normal.   Skin: Skin is warm. No rash noted. No erythema. No pallor.       Blanching cyanosis of all toes      No pain      No ulcers           Psychiatric: She has a normal mood and affect. Her behavior is normal. Judgment and thought content normal.       Assessment:     1. Vascular disorder: Erytnromelalgia    2. Raynaud's disease without gangrene    3. Erythromelalgia        Plan:         Continue with current medical plan and lifestyle changes.  Return sooner for concerns or questions. If symptoms persist go to the ED  I have reviewed all pertinent data on this patient       Continue to adjust lifestyle around triggers for either Raynaud or Erytnromelalgia   Exposure to cold or extreme heat      No invasive procedures   Follow up with PCP, Rheumatology, and Neurology      I have reviewed the patient's medical history in detail and  updated the computerized patient record.        Follow up as scheduled. Return sooner for concerns or questions  Follow up in a year             She expressed verbal understanding and agreed with the plan        Patient's Medications   New Prescriptions    No medications on file   Previous Medications    ALBUTEROL (VENTOLIN HFA) 90 MCG/ACTUATION INHALER    Inhale 2 puffs into the lungs every 6 (six) hours as needed for Wheezing. Rescue    AMITRIPTYLINE (ELAVIL) 10 MG TABLET    Take 1 tablet (10 mg total) by mouth once daily.    AMITRIPTYLINE (ELAVIL) 50 MG TABLET    TAKE 1 TABLET NIGHTLY WITH THE 10 MG AMITRIPTYLINE    ASPIRIN (ECOTRIN) 81 MG EC TABLET    Take 81 mg by mouth once daily.    CHOLECALCIFEROL, VITAMIN D3, (VITAMIN D3) 1,000 UNIT CAPSULE    Take 2,000 Units by mouth once daily.     DICLOFENAC SODIUM (PENNSAID) 20 MG/GRAM /ACTUATION(2 %) SOPM    Apply 40 mg topically 2 (two) times daily as needed (pain).    ESTRADIOL (VAGIFEM) 10 MCG TAB    Place 1 tablet (10 mcg total) vaginally 4 (four) times a week.    ESTRADIOL 0.05 MG/24 HR TD PTSW (VIVELLE-DOT) 0.05 MG/24 HR    Place 1 patch onto the skin twice a week.    FLUTICASONE (FLONASE) 50 MCG/ACTUATION NASAL SPRAY    1 spray by Each Nare route once daily.    GABAPENTIN (NEURONTIN) 100 MG CAPSULE    TAKE 1 CAPSULE IN THE MORNING, 1 CAPSULE AT NOON, 1 CAPSULE AT 5 P.M. AND 4 CAPSULES AT BEDTIME    GUAIFENESIN (MUCINEX) 600 MG 12 HR TABLET    Take 600 mg by mouth 2 (two) times daily.    HYDROXYCHLOROQUINE (PLAQUENIL) 200 MG TABLET    Take 1 tablet (200 mg total) by mouth once daily.    LIDOCAINE-PRILOCAINE (EMLA) CREAM    USE AS DIRECTED BY PRESCRIBER OR PACKAGE INSTRUCTIONS    LIOTHYRONINE (CYTOMEL) 5 MCG TAB    Take 3.5 tablets (17.5 mcg) once daily, Brand name medically  necessary    MELOXICAM (MOBIC) 7.5 MG TABLET    TAKE 1 TABLET (7.5 MG TOTAL) BY MOUTH ONCE DAILY. FOR INFLAMMATION    METAXALONE (SKELAXIN) 800 MG TABLET    Take one twice a day as needed  for spasm.    MULTIVITAMIN CAPSULE    Take 1 capsule by mouth once daily.      NIACINAMIDE 500 MG TAB    Take 1 tablet (500 mg total) by mouth 3 (three) times daily.    PILOCARPINE (SALAGEN) 5 MG TAB    TAKE 1 TABLET THREE TIMES A DAY AS NEEDED    PREVIDENT 5000 DRY MOUTH 1.1 % GEL        RESTASIS 0.05 % OPHTHALMIC EMULSION        RISEDRONATE 35 MG TBEC    TAKE 1 TABLET ONCE A WEEK    UNABLE TO FIND    Apply 240 g topically as needed. Ketoprofen/cyclobenzaprine HCI/Lidocaine (lipomax) 10/2/5% Up to 5 times daily as needed for pain    YUVAFEM 10 MCG TAB    INSERT 1 TABLET VAGINALLY TWICE WEEKLY   Modified Medications    Modified Medication Previous Medication    ZOLPIDEM (AMBIEN) 10 MG TAB zolpidem (AMBIEN) 10 mg Tab       TAKE 1 TABLET BY MOUTH AT BEDTIME AS NEEDED    TAKE 1 TABLET BY MOUTH AT BEDTIME AS NEEDED   Discontinued Medications    No medications on file

## 2019-10-31 RX ORDER — RISEDRONATE SODIUM 35 MG/1
TABLET, DELAYED RELEASE ORAL
Qty: 12 TABLET | Refills: 4 | Status: SHIPPED | OUTPATIENT
Start: 2019-10-31 | End: 2020-11-02 | Stop reason: SDUPTHER

## 2019-11-04 DIAGNOSIS — M35.00 SJOGREN'S SYNDROME, WITH UNSPECIFIED ORGAN INVOLVEMENT: ICD-10-CM

## 2019-11-04 DIAGNOSIS — N95.2 ATROPHIC VAGINITIS: ICD-10-CM

## 2019-11-05 ENCOUNTER — OFFICE VISIT (OUTPATIENT)
Dept: INTERNAL MEDICINE | Facility: CLINIC | Age: 59
End: 2019-11-05
Payer: COMMERCIAL

## 2019-11-05 VITALS
BODY MASS INDEX: 18.65 KG/M2 | WEIGHT: 95 LBS | HEIGHT: 60 IN | OXYGEN SATURATION: 98 % | SYSTOLIC BLOOD PRESSURE: 92 MMHG | HEART RATE: 79 BPM | DIASTOLIC BLOOD PRESSURE: 52 MMHG

## 2019-11-05 DIAGNOSIS — Z12.11 COLON CANCER SCREENING: ICD-10-CM

## 2019-11-05 DIAGNOSIS — Z00.00 PHYSICAL EXAM: Primary | ICD-10-CM

## 2019-11-05 DIAGNOSIS — I73.00 RAYNAUD'S DISEASE WITHOUT GANGRENE: ICD-10-CM

## 2019-11-05 DIAGNOSIS — Z00.00 WELLNESS EXAMINATION: ICD-10-CM

## 2019-11-05 DIAGNOSIS — E06.3 HYPOTHYROIDISM DUE TO HASHIMOTO'S THYROIDITIS: ICD-10-CM

## 2019-11-05 DIAGNOSIS — Z23 IMMUNIZATION DUE: ICD-10-CM

## 2019-11-05 DIAGNOSIS — E03.8 HYPOTHYROIDISM DUE TO HASHIMOTO'S THYROIDITIS: ICD-10-CM

## 2019-11-05 PROCEDURE — 99396 PREV VISIT EST AGE 40-64: CPT | Mod: S$GLB,,, | Performed by: INTERNAL MEDICINE

## 2019-11-05 PROCEDURE — 99396 PR PREVENTIVE VISIT,EST,40-64: ICD-10-PCS | Mod: S$GLB,,, | Performed by: INTERNAL MEDICINE

## 2019-11-05 PROCEDURE — 99999 PR PBB SHADOW E&M-EST. PATIENT-LVL V: CPT | Mod: PBBFAC,,, | Performed by: INTERNAL MEDICINE

## 2019-11-05 PROCEDURE — 99999 PR PBB SHADOW E&M-EST. PATIENT-LVL V: ICD-10-PCS | Mod: PBBFAC,,, | Performed by: INTERNAL MEDICINE

## 2019-11-05 RX ORDER — HYDROXYCHLOROQUINE SULFATE 200 MG/1
TABLET, FILM COATED ORAL
Qty: 90 TABLET | Refills: 4 | Status: SHIPPED | OUTPATIENT
Start: 2019-11-05 | End: 2021-01-06

## 2019-11-05 RX ORDER — ESTRADIOL 0.05 MG/D
FILM, EXTENDED RELEASE TRANSDERMAL
Qty: 24 PATCH | Refills: 3 | Status: SHIPPED | OUTPATIENT
Start: 2019-11-05 | End: 2020-11-18 | Stop reason: SDUPTHER

## 2019-11-05 NOTE — PROGRESS NOTES
Subjective:      Patient ID: Berenice Hayes is a 59 y.o. female.    Chief Complaint: Annual Exam    HPI:  HPI   Consultants:  Endocrine: Dr. Espinoza  Cardiology: Dr. Reagan : 9/2019  EKG : negative for ischemia ( Stress Test  Dermatology: Dr. Cobb  Rheumatology: Dr. Landrum Sjogrens  Gyn: Dr. Feliz  Pain: Dr. Philip  Trigeminal neuralgia  Dr. Coppola    Annual exam: 11/5/2019  Colonoscopy 8/6/2012 repeat in 10 years: FIT 2019  Mammogram: schedule  Gyn: schedule  Optho: completed a 6 month for plaquenil  Flu: done  Tetanus:discussed  Shingles:done 12/17/2015  Shingrix discussed  Pneumovax 9/30/2014     Continues band pain along the right groin         Patient Active Problem List   Diagnosis    Fibromyalgia    Bladder spasm    Osteoporosis, postmenopausal    Fibrocystic breast changes    Vulvar vestibulodynia    Vulvar pain    Vascular disorder: Erytnromelalgia    Raynaud's disease    Incomplete defecation    Straining during bowel movements    Chronic constipation    Rectocele    Disorder of muscle    Anxiety    Bilateral hand pain    Fatigue    Perimenopause vs Memopause    Menopause syndrome    Pudendal neuralgia    Pelvic pain in female    Urinary hesitancy    Hypothyroidism due to Hashimoto's thyroiditis    Multinodular goiter    Encounter for herb and vitamin supplement management    Gluten free diet    Family history of ischemic heart disease (IHD)    Cystocele, midline    Estefani syndrome    Chronic pain syndrome    Trigeminal neuralgia    Hip pain, right    Erythema of finger    Erythromelalgia    Sjogren's syndrome    Pernio    Rash    Chronic pain    Coccydynia     Past Medical History:   Diagnosis Date    Asthma with allergic rhinitis     Bladder spasm     Dense breasts     heterogeneously/fibrocystic disease    Elevated antinuclear antibody (GUICHO) level     Endometriosis of cervix     Erythromelalgia     Fibromyalgia     Ganglion cyst     left toe    Goiter      MNG    Hashimoto's disease     Hashimoto's thyroiditis     Headache(784.0)     Hypothyroidism     Hashimoto with + Tg ab    Osteoporosis     femoral neck    Raynaud's phenomenon     Rhinitis     Scoliosis     Sleep disorder     type of Narcolepsy ( resolved)    Vitamin D deficiency disease     Vulvodynia      Past Surgical History:   Procedure Laterality Date    BREAST SURGERY      fibrocystic tumor removed     SECTION      2x    CYST REMOVAL      laparoscopic cyst on ovary    HYSTERECTOMY      INJECTION OF ANESTHETIC AGENT AROUND NERVE N/A 2019    Procedure: BLOCK, NERVE COCCYGEAL  1 OF 2  Pt. can drive herself home;  Surgeon: Monique Philip MD;  Location: [facility], [place], operated by [facility];  Service: Pain Management;  Laterality: N/A;    INJECTION OF ANESTHETIC AGENT AROUND NERVE N/A 2019    Procedure: BLOCK, NERVE COCCYGEAL  2 OF 2  Pt. can drive herself home;  Surgeon: Monique Philip MD;  Location: [facility], [place], operated by [facility];  Service: Pain Management;  Laterality: N/A;    intradermal cyst       removed from left index finger    SINUS SURGERY      2x     Family History   Problem Relation Age of Onset    Diabetes Mother     Fibromyalgia Mother     Polymyalgia rheumatica Mother     Macular degeneration Mother     Arthritis Mother     Hypertension Mother     Kidney disease Mother     Pneumonia Mother     Adrenal disorder Mother         adrenal insufficiency    Coronary artery disease Father     Arthritis Father         osteoarthritis    Hypertension Father     Heart disease Father     Diabetes Father     Hyperlipidemia Father     Hyperlipidemia Brother     Cancer Brother         oral    Thyroid cancer Daughter     Cancer Daughter         thyroid    Hypothyroidism Daughter     Breast cancer Neg Hx     Ovarian cancer Neg Hx     Colon cancer Neg Hx     Melanoma Neg Hx      Review of Systems   Constitutional: Negative for chills, fever and unexpected weight change.   HENT: Negative for  ear pain, nosebleeds, sore throat, trouble swallowing and voice change.    Eyes: Negative for photophobia and visual disturbance.   Respiratory: Negative for cough, shortness of breath and wheezing.    Cardiovascular: Negative for chest pain, palpitations and leg swelling.        Aspercreme with lidocaine and heat patch and she can calm down the left chest pain now.   Gastrointestinal: Negative for abdominal distention, abdominal pain and blood in stool.   Genitourinary: Negative for difficulty urinating, dysuria, flank pain and hematuria.   Musculoskeletal: Negative for back pain, gait problem and joint swelling.   Skin: Negative for color change and rash.   Neurological: Negative for seizures and headaches.   Hematological: Negative for adenopathy. Does not bruise/bleed easily.   Psychiatric/Behavioral: Negative for dysphoric mood and suicidal ideas.     Objective:     Vitals:    11/05/19 0833   BP: (!) 92/52   Pulse: 79   SpO2: 98%   Weight: 43.1 kg (95 lb)   Height: 5' (1.524 m)   PainSc:   4   PainLoc: Back     Body mass index is 18.55 kg/m².  Physical Exam   Constitutional: She is oriented to person, place, and time. She appears well-developed and well-nourished. No distress.   Neck: Carotid bruit is not present. No thyromegaly present.   Cardiovascular: Normal rate, regular rhythm and normal heart sounds. PMI is not displaced.   Pulmonary/Chest: Effort normal and breath sounds normal. No respiratory distress.   Abdominal: Soft. Bowel sounds are normal. She exhibits no distension. There is no tenderness.   Musculoskeletal: She exhibits no edema.   Neurological: She is alert and oriented to person, place, and time.     Assessment:     1. Physical exam    2. Wellness examination    3. Immunization due    4. Colon cancer screening    5. Raynaud's disease without gangrene    6. Hypothyroidism due to Hashimoto's thyroiditis      Plan:   Berenice was seen today for annual exam.    Diagnoses and all orders for this  visit:    Physical exam  Comments:  Chronic autoimmune symptoms    Wellness examination  Comments:  Mammo orders in    Immunization due  Comments:  Due for   Tdpa  Shingrix    Colon cancer screening  -     Fecal Immunochemical Test (iFOBT); Future    Raynaud's disease without gangrene  -     GUICHO Screen w/Reflex; Future    Hypothyroidism due to Hashimoto's thyroiditis  -     TSH; Future  -     T4, free; Future  -     T3, free; Future  -     T3; Future    Continues with significant autoimmune symptoms    Problem List Items Addressed This Visit     Raynaud's disease    Relevant Orders    GUICHO Screen w/Reflex    Hypothyroidism due to Hashimoto's thyroiditis    Relevant Orders    TSH    T4, free    T3, free    T3      Other Visit Diagnoses     Physical exam    -  Primary    Chronic autoimmune symptoms    Wellness examination        Mammo orders in    Immunization due        Due for   Tdpa  Shingrix    Colon cancer screening        Relevant Orders    Fecal Immunochemical Test (iFOBT)        Orders Placed This Encounter   Procedures    Fecal Immunochemical Test (iFOBT)     Standing Status:   Future     Standing Expiration Date:   1/3/2021    GUICHO Screen w/Reflex     Standing Status:   Future     Standing Expiration Date:   1/3/2021    TSH     Standing Status:   Future     Standing Expiration Date:   1/4/2020    T4, free     Standing Status:   Future     Standing Expiration Date:   11/4/2020    T3, free     Standing Status:   Future     Standing Expiration Date:   1/3/2021    T3     Standing Status:   Future     Standing Expiration Date:   1/3/2021     No follow-ups on file.     Medication List           Accurate as of November 5, 2019  9:09 AM. If you have any questions, ask your nurse or doctor.               CONTINUE taking these medications    albuterol 90 mcg/actuation inhaler  Commonly known as:  VENTOLIN HFA  Inhale 2 puffs into the lungs every 6 (six) hours as needed for Wheezing. Rescue     * amitriptyline 10  MG tablet  Commonly known as:  ELAVIL  Take 1 tablet (10 mg total) by mouth once daily.     * amitriptyline 50 MG tablet  Commonly known as:  ELAVIL  TAKE 1 TABLET NIGHTLY WITH THE 10 MG AMITRIPTYLINE     aspirin 81 MG EC tablet  Commonly known as:  ECOTRIN     diclofenac sodium 20 mg/gram /actuation(2 %) Sopm  Commonly known as:  PENNSAID  Apply 40 mg topically 2 (two) times daily as needed (pain).     fluticasone propionate 50 mcg/actuation nasal spray  Commonly known as:  FLONASE  1 spray by Each Nare route once daily.     gabapentin 100 MG capsule  Commonly known as:  NEURONTIN  TAKE 1 CAPSULE IN THE MORNING, 1 CAPSULE AT NOON, 1 CAPSULE AT 5 P.M. AND 4 CAPSULES AT BEDTIME     hydroxychloroquine 200 mg tablet  Commonly known as:  PLAQUENIL  Take 1 tablet (200 mg total) by mouth once daily.     lidocaine-prilocaine cream  Commonly known as:  EMLA  USE AS DIRECTED BY PRESCRIBER OR PACKAGE INSTRUCTIONS     liothyronine 5 MCG Tab  Commonly known as:  CYTOMEL  Take 3.5 tablets (17.5 mcg) once daily, Brand name medically  necessary     meloxicam 7.5 MG tablet  Commonly known as:  MOBIC  TAKE 1 TABLET (7.5 MG TOTAL) BY MOUTH ONCE DAILY. FOR INFLAMMATION     metaxalone 800 MG tablet  Commonly known as:  SKELAXIN  Take one twice a day as needed for spasm.     MUCINEX 600 mg 12 hr tablet  Generic drug:  guaiFENesin     multivitamin capsule     niacinamide 500 mg Tab  Take 1 tablet (500 mg total) by mouth 3 (three) times daily.     pilocarpine 5 MG Tab  Commonly known as:  SALAGEN  TAKE 1 TABLET THREE TIMES A DAY AS NEEDED     PREVIDENT 5000 DRY MOUTH 1.1 % Gel  Generic drug:  fluoride (sodium)     RESTASIS 0.05 % ophthalmic emulsion  Generic drug:  cycloSPORINE     risedronate 35 mg Tbec  TAKE 1 TABLET ONCE A WEEK     UNABLE TO FIND     VITAMIN D3 1,000 unit capsule  Generic drug:  cholecalciferol (vitamin D3)     * YUVAFEM 10 mcg Tab  Generic drug:  estradiol  INSERT 1 TABLET VAGINALLY TWICE WEEKLY     * estradiol 0.05  mg/24 hr td ptsw 0.05 mg/24 hr  Commonly known as:  VIVELLE-DOT  Place 1 patch onto the skin twice a week.     * estradiol 10 mcg Tab  Commonly known as:  VAGIFEM  Place 1 tablet (10 mcg total) vaginally 4 (four) times a week.     zolpidem 10 mg Tab  Commonly known as:  AMBIEN  TAKE 1 TABLET BY MOUTH AT BEDTIME AS NEEDED         * This list has 5 medication(s) that are the same as other medications prescribed for you. Read the directions carefully, and ask your doctor or other care provider to review them with you.

## 2019-11-17 ENCOUNTER — PATIENT OUTREACH (OUTPATIENT)
Dept: ADMINISTRATIVE | Facility: OTHER | Age: 59
End: 2019-11-17

## 2019-11-20 ENCOUNTER — OFFICE VISIT (OUTPATIENT)
Dept: OBSTETRICS AND GYNECOLOGY | Facility: CLINIC | Age: 59
End: 2019-11-20
Payer: COMMERCIAL

## 2019-11-20 VITALS
HEIGHT: 60 IN | BODY MASS INDEX: 18.65 KG/M2 | SYSTOLIC BLOOD PRESSURE: 97 MMHG | WEIGHT: 95 LBS | DIASTOLIC BLOOD PRESSURE: 68 MMHG

## 2019-11-20 DIAGNOSIS — Z12.31 SCREENING MAMMOGRAM, ENCOUNTER FOR: ICD-10-CM

## 2019-11-20 DIAGNOSIS — Z78.0 MENOPAUSE: ICD-10-CM

## 2019-11-20 DIAGNOSIS — Z01.419 WELL WOMAN EXAM WITH ROUTINE GYNECOLOGICAL EXAM: Primary | ICD-10-CM

## 2019-11-20 DIAGNOSIS — Z12.4 PAP SMEAR FOR CERVICAL CANCER SCREENING: ICD-10-CM

## 2019-11-20 PROCEDURE — 99999 PR PBB SHADOW E&M-EST. PATIENT-LVL III: CPT | Mod: PBBFAC,,, | Performed by: OBSTETRICS & GYNECOLOGY

## 2019-11-20 PROCEDURE — 99999 PR PBB SHADOW E&M-EST. PATIENT-LVL III: ICD-10-PCS | Mod: PBBFAC,,, | Performed by: OBSTETRICS & GYNECOLOGY

## 2019-11-20 PROCEDURE — 99396 PREV VISIT EST AGE 40-64: CPT | Mod: S$GLB,,, | Performed by: OBSTETRICS & GYNECOLOGY

## 2019-11-20 PROCEDURE — 99396 PR PREVENTIVE VISIT,EST,40-64: ICD-10-PCS | Mod: S$GLB,,, | Performed by: OBSTETRICS & GYNECOLOGY

## 2019-11-20 NOTE — PROGRESS NOTES
Subjective:       Patient ID: Berenice Hayes is a 59 y.o. female.    Chief Complaint:  Well Woman      History of Present Illness  HPI  This 59 yr old P2 female is here for WWE.  She has had a hyst.  She is on HRT patch and doing well.  She had episode earlier this yr after an epidural with cortizone and made her tachycardic and she stopped the patch for a while but is back on now. She is on vivelle 0.05 and vagifem 10 mg.  She has been having pain in left chest wall and on side and in breast to the back of the nipple.  She thought was related to her heart but had neg stress test etc. She is scheduled for mammogram.  This pain is better and not constant as before  She had a severe bout of Raynaud's and skin broke down on her skin.  She tested negative for Lupus but Dr Landrum [facility] has put her on plaquinil.  GYN & OB History  No LMP recorded (lmp unknown). Patient has had a hysterectomy.   Date of Last Pap: 2015    OB History    Para Term  AB Living   2 2 2     2   SAB TAB Ectopic Multiple Live Births                  # Outcome Date GA Lbr Jarrod/2nd Weight Sex Delivery Anes PTL Lv   2 Term            1 Term                Review of Systems  Review of Systems   Constitutional: Negative for chills and fever.   Respiratory: Negative for shortness of breath.    Cardiovascular: Negative for chest pain.   Gastrointestinal: Negative for abdominal pain, nausea and vomiting.   Endocrine: Positive for heat intolerance.   Genitourinary: Negative for difficulty urinating, dyspareunia, genital sores, menstrual problem, pelvic pain, vaginal bleeding, vaginal discharge and vaginal pain.   Skin: Negative for wound.   Hematological: Negative for adenopathy.           Objective:   Physical Exam:   Constitutional: She is oriented to person, place, and time. She appears well-developed and well-nourished.    HENT:   Head: Normocephalic.    Eyes: EOM are normal.    Neck: Normal range of motion.    Cardiovascular:  Normal rate.     Pulmonary/Chest: Effort normal. She exhibits no mass and no tenderness. Right breast exhibits no inverted nipple, no mass, no skin change and no tenderness. Left breast exhibits no inverted nipple, no mass, no skin change and no tenderness.        Abdominal: Soft. She exhibits no distension. There is no tenderness.     Genitourinary: Vagina normal. There is no rash, tenderness or lesion on the right labia. There is no rash, tenderness or lesion on the left labia. Uterus is absent. Uterus is not tender. Cervix is normal. Right adnexum displays no mass, no tenderness and no fullness. Left adnexum displays no mass, no tenderness and no fullness. Cervix exhibits no discharge.   Genitourinary Comments: Cervix surgically absent           Musculoskeletal: Normal range of motion.       Neurological: She is alert and oriented to person, place, and time.    Skin: Skin is warm and dry.    Psychiatric: She has a normal mood and affect.          Assessment:        1. Well woman exam with routine gynecological exam    2. Menopause    3. Screening mammogram, encounter for    4. Pap smear for cervical cancer screening               Plan:      Continue HRT  Follow up yearly  Pap every 5 yrs  Mammogram yearly

## 2019-11-29 RX ORDER — AMITRIPTYLINE HYDROCHLORIDE 50 MG/1
TABLET, FILM COATED ORAL
Qty: 90 TABLET | Refills: 4 | Status: SHIPPED | OUTPATIENT
Start: 2019-11-29 | End: 2020-11-02 | Stop reason: SDUPTHER

## 2019-12-02 ENCOUNTER — PATIENT MESSAGE (OUTPATIENT)
Dept: INTERNAL MEDICINE | Facility: CLINIC | Age: 59
End: 2019-12-02

## 2019-12-02 DIAGNOSIS — G47.00 INSOMNIA, UNSPECIFIED TYPE: ICD-10-CM

## 2019-12-02 DIAGNOSIS — E03.8 HYPOTHYROIDISM DUE TO HASHIMOTO'S THYROIDITIS: ICD-10-CM

## 2019-12-02 DIAGNOSIS — E06.3 HYPOTHYROIDISM DUE TO HASHIMOTO'S THYROIDITIS: ICD-10-CM

## 2019-12-02 DIAGNOSIS — Z12.11 COLON CANCER SCREENING: ICD-10-CM

## 2019-12-02 RX ORDER — ZOLPIDEM TARTRATE 10 MG/1
10 TABLET ORAL NIGHTLY PRN
Qty: 30 TABLET | Refills: 1 | Status: SHIPPED | OUTPATIENT
Start: 2019-12-02 | End: 2019-12-03 | Stop reason: SDUPTHER

## 2019-12-03 RX ORDER — ZOLPIDEM TARTRATE 10 MG/1
10 TABLET ORAL NIGHTLY PRN
Qty: 30 TABLET | Refills: 1 | Status: SHIPPED | OUTPATIENT
Start: 2019-12-03 | End: 2020-02-05 | Stop reason: SDUPTHER

## 2019-12-10 ENCOUNTER — PATIENT MESSAGE (OUTPATIENT)
Dept: ENDOCRINOLOGY | Facility: CLINIC | Age: 59
End: 2019-12-10

## 2019-12-10 DIAGNOSIS — E04.2 MULTINODULAR GOITER: Primary | ICD-10-CM

## 2019-12-20 RX ORDER — AMITRIPTYLINE HYDROCHLORIDE 10 MG/1
TABLET, FILM COATED ORAL
Qty: 90 TABLET | Refills: 4 | Status: SHIPPED | OUTPATIENT
Start: 2019-12-20 | End: 2020-11-02 | Stop reason: SDUPTHER

## 2019-12-23 ENCOUNTER — HOSPITAL ENCOUNTER (OUTPATIENT)
Dept: RADIOLOGY | Facility: HOSPITAL | Age: 59
Discharge: HOME | End: 2019-12-23
Attending: INTERNAL MEDICINE
Payer: COMMERCIAL

## 2019-12-23 DIAGNOSIS — Z12.31 ENCOUNTER FOR SCREENING MAMMOGRAM FOR BREAST CANCER: ICD-10-CM

## 2019-12-23 PROCEDURE — 77063 MAMMO DIGITAL SCREENING BILAT WITH TOMOSYNTHESIS_CAD: ICD-10-PCS | Mod: 26,,, | Performed by: RADIOLOGY

## 2019-12-23 PROCEDURE — 77067 SCR MAMMO BI INCL CAD: CPT | Mod: 26,,, | Performed by: RADIOLOGY

## 2019-12-23 PROCEDURE — 77067 SCR MAMMO BI INCL CAD: CPT | Mod: TC

## 2019-12-23 PROCEDURE — 77063 BREAST TOMOSYNTHESIS BI: CPT | Mod: 26,,, | Performed by: RADIOLOGY

## 2019-12-23 PROCEDURE — 77067 MAMMO DIGITAL SCREENING BILAT WITH TOMOSYNTHESIS_CAD: ICD-10-PCS | Mod: 26,,, | Performed by: RADIOLOGY

## 2020-01-02 ENCOUNTER — OFFICE VISIT (OUTPATIENT)
Dept: OTOLARYNGOLOGY | Facility: CLINIC | Age: 60
End: 2020-01-02
Payer: COMMERCIAL

## 2020-01-02 VITALS
SYSTOLIC BLOOD PRESSURE: 93 MMHG | BODY MASS INDEX: 17.83 KG/M2 | DIASTOLIC BLOOD PRESSURE: 63 MMHG | HEART RATE: 105 BPM | WEIGHT: 91.25 LBS

## 2020-01-02 DIAGNOSIS — E04.2 MULTINODULAR GOITER: Primary | ICD-10-CM

## 2020-01-02 DIAGNOSIS — R49.0 MUSCLE TENSION DYSPHONIA: ICD-10-CM

## 2020-01-02 DIAGNOSIS — M35.00 SJOGREN'S SYNDROME WITHOUT EXTRAGLANDULAR INVOLVEMENT: ICD-10-CM

## 2020-01-02 PROCEDURE — 99999 PR PBB SHADOW E&M-EST. PATIENT-LVL V: ICD-10-PCS | Mod: PBBFAC,,, | Performed by: OTOLARYNGOLOGY

## 2020-01-02 PROCEDURE — 3008F BODY MASS INDEX DOCD: CPT | Mod: CPTII,S$GLB,, | Performed by: OTOLARYNGOLOGY

## 2020-01-02 PROCEDURE — 99203 PR OFFICE/OUTPT VISIT, NEW, LEVL III, 30-44 MIN: ICD-10-PCS | Mod: S$GLB,,, | Performed by: OTOLARYNGOLOGY

## 2020-01-02 PROCEDURE — 99203 OFFICE O/P NEW LOW 30 MIN: CPT | Mod: S$GLB,,, | Performed by: OTOLARYNGOLOGY

## 2020-01-02 PROCEDURE — 3008F PR BODY MASS INDEX (BMI) DOCUMENTED: ICD-10-PCS | Mod: CPTII,S$GLB,, | Performed by: OTOLARYNGOLOGY

## 2020-01-02 PROCEDURE — 99999 PR PBB SHADOW E&M-EST. PATIENT-LVL V: CPT | Mod: PBBFAC,,, | Performed by: OTOLARYNGOLOGY

## 2020-01-02 NOTE — ASSESSMENT & PLAN NOTE
Her swallowing and voicing discomfort and tension seems related to muscle tension dysphonia, as she has replicable discomfort with palpation of the strap muscles. Whether this relates to her vocal use patterns, occasional heartburn, Sjogren's syndrome, or constellation of autoimmune conditions is unclear. I would like her have a stroboscopy and meet with Speech Pathology for a voice assessment and to discuss management strategies. I do not appreciate a mass or lesion of the oropharynx/tongue base on mirror exam or palpation.    fever

## 2020-01-02 NOTE — ASSESSMENT & PLAN NOTE
She has had several FNA without evidence of carcinoma or suspicious findings. She continues to follow with Dr. Espinoza.

## 2020-01-02 NOTE — PROGRESS NOTES
"[facility]    Subjective:       Patient ID: Berenice Hayes is a 59 y.o. female.    Chief Complaint: multiple nodules /discomfort in throat    HPI  Berenice Hayes is a 59 y.o. female who presents with a 2 month history of left-sided throat discomfort, extending from the back of the tongue to the collar bones. She notes that she gets "bruising" feelings in her throat, localizing them to the viktoriya-hyoid region. She has a history of thyroid issues, as well as some structural issues, with multiple nodules that have been fully evaluated, including with US-FNA. She has several suspicious nodules on the right, but the FNA results have suggested benign pathology. She has been diagnosed with Hashimoto's, as well as Sjogren's and possibly lupus and other autoimmune diseases such as Reynaud's. I had seen her back in 2016 for the goiter. She continues to follow with Dr. Espinoza for the thyroid and Dr. Serrano.    She has moved to a gluten-, dairy-, and corn-free diet. She gets acupuncture for her chronic pain issues. She complains of dysphagia to all consistencies that varies in intensity and changes with increased talking. She notes some hoarseness - she feels like she has "a film" over her voicebox. She denies ear pain currently, but carries a diagnosis of trigeminal neuralgia on the left that is exacerbated by cold air. She is gaining weight currently.     Past Medical History:   Diagnosis Date    Asthma with allergic rhinitis     Bladder spasm     Dense breasts     heterogeneously/fibrocystic disease    Elevated antinuclear antibody (GUICHO) level     Endometriosis of cervix     Erythromelalgia     Fibromyalgia     Ganglion cyst     left toe    Goiter     MNG    Hashimoto's disease     Hashimoto's thyroiditis     Headache(784.0)     Hypothyroidism     Hashimoto with + Tg ab    Osteoporosis     femoral neck    Raynaud's phenomenon     Rhinitis     Scoliosis     Sleep disorder     " type of Narcolepsy ( resolved)    Vitamin D deficiency disease     Vulvodynia        Past Surgical History:   Procedure Laterality Date    BREAST SURGERY      fibrocystic tumor removed     SECTION      2x    CYST REMOVAL      laparoscopic cyst on ovary    HYSTERECTOMY      INJECTION OF ANESTHETIC AGENT AROUND NERVE N/A 2019    Procedure: BLOCK, NERVE COCCYGEAL  1 OF 2  Pt. can drive herself home;  Surgeon: Monique Philip MD;  Location: [facility];  Service: Pain Management;  Laterality: N/A;    INJECTION OF ANESTHETIC AGENT AROUND NERVE N/A 2019    Procedure: BLOCK, NERVE COCCYGEAL  2 OF 2  Pt. can drive herself home;  Surgeon: Monique Philip MD;  Location: [facility];  Service: Pain Management;  Laterality: N/A;    intradermal cyst       removed from left index finger    SINUS SURGERY      2x         Current Outpatient Medications:     albuterol (VENTOLIN HFA) 90 mcg/actuation inhaler, Inhale 2 puffs into the lungs every 6 (six) hours as needed for Wheezing. Rescue, Disp: 18 g, Rfl: 2    amitriptyline (ELAVIL) 10 MG tablet, TAKE 1 TABLET DAILY, Disp: 90 tablet, Rfl: 4    amitriptyline (ELAVIL) 50 MG tablet, TAKE 1 TABLET (50 MG) NIGHTLY WITH THE 10 MG AMITRIPTYLINE, Disp: 90 tablet, Rfl: 4    aspirin (ECOTRIN) 81 MG EC tablet, Take 81 mg by mouth once daily., Disp: , Rfl:     cholecalciferol, vitamin D3, (VITAMIN D3) 1,000 unit capsule, Take 2,000 Units by mouth once daily. , Disp: , Rfl:     diclofenac sodium (PENNSAID) 20 mg/gram /actuation(2 %) sopm, Apply 40 mg topically 2 (two) times daily as needed (pain)., Disp: 112 g, Rfl: 5    estradiol (VAGIFEM) 10 mcg Tab, Place 1 tablet (10 mcg total) vaginally 4 (four) times a week., Disp: 32 tablet, Rfl: 4    estradiol 0.05 mg/24 hr td ptsw (VIVELLE-DOT) 0.05 mg/24 hr, PLACE 1 PATCH ON THE SKIN TWICE A WEEK, Disp: 24 patch, Rfl: 3    fluticasone (FLONASE) 50 mcg/actuation nasal spray, 1 spray by Each Nare route once  daily., Disp: 3 Bottle, Rfl: 3    gabapentin (NEURONTIN) 100 MG capsule, TAKE 1 CAPSULE IN THE MORNING, 1 CAPSULE AT NOON, 1 CAPSULE AT 5 P.M. AND 4 CAPSULES AT BEDTIME, Disp: 630 capsule, Rfl: 1    guaifenesin (MUCINEX) 600 mg 12 hr tablet, Take 600 mg by mouth 2 (two) times daily., Disp: , Rfl:     hydroxychloroquine (PLAQUENIL) 200 mg tablet, TAKE 1 TABLET DAILY, Disp: 90 tablet, Rfl: 4    lidocaine-prilocaine (EMLA) cream, USE AS DIRECTED BY PRESCRIBER OR PACKAGE INSTRUCTIONS, Disp: 90 g, Rfl: 4    liothyronine (CYTOMEL) 5 MCG Tab, Take 3.5 tablets (17.5 mcg) once daily, Brand name medically  necessary, Disp: 325 tablet, Rfl: 3    meloxicam (MOBIC) 7.5 MG tablet, TAKE 1 TABLET (7.5 MG TOTAL) BY MOUTH ONCE DAILY. FOR INFLAMMATION (Patient not taking: Reported on 11/20/2019), Disp: 30 tablet, Rfl: 3    metaxalone (SKELAXIN) 800 MG tablet, Take one twice a day as needed for spasm., Disp: 180 tablet, Rfl: 1    multivitamin capsule, Take 1 capsule by mouth once daily.  , Disp: , Rfl:     niacinamide 500 mg Tab, Take 1 tablet (500 mg total) by mouth 3 (three) times daily., Disp: 270 tablet, Rfl: 3    pilocarpine (SALAGEN) 5 MG Tab, TAKE 1 TABLET THREE TIMES A DAY AS NEEDED, Disp: 270 tablet, Rfl: 3    PREVIDENT 5000 DRY MOUTH 1.1 % Gel, , Disp: , Rfl:     RESTASIS 0.05 % ophthalmic emulsion, , Disp: , Rfl:     risedronate 35 mg TbEC, TAKE 1 TABLET ONCE A WEEK, Disp: 12 tablet, Rfl: 4    UNABLE TO FIND, Apply 240 g topically as needed. Ketoprofen/cyclobenzaprine HCI/Lidocaine (lipomax) 10/2/5% Up to 5 times daily as needed for pain, Disp: , Rfl: 99    YUVAFEM 10 mcg Tab, INSERT 1 TABLET VAGINALLY TWICE WEEKLY, Disp: 24 tablet, Rfl: 0    zolpidem (AMBIEN) 10 mg Tab, Take 1 tablet (10 mg total) by mouth nightly as needed., Disp: 30 tablet, Rfl: 1  No current facility-administered medications for this visit.     Facility-Administered Medications Ordered in Other Visits:     0.9%  NaCl infusion, 500 mL,  Intravenous, Continuous, Monique Philip MD    Review of patient's allergies indicates:  No Known Allergies    Social History     Socioeconomic History    Marital status:      Spouse name: Not on file    Number of children: Not on file    Years of education: Not on file    Highest education level: Not on file   Occupational History     Employer: [employer]   Social Needs    Financial resource strain: Not on file    Food insecurity:     Worry: Not on file     Inability: Not on file    Transportation needs:     Medical: Not on file     Non-medical: Not on file   Tobacco Use    Smoking status: Never Smoker    Smokeless tobacco: Never Used   Substance and Sexual Activity    Alcohol use: No    Drug use: No    Sexual activity: Not Currently     Birth control/protection: Surgical     Comment: single, RAMOS ov in situ    Lifestyle    Physical activity:     Days per week: Not on file     Minutes per session: Not on file    Stress: Not on file   Relationships    Social connections:     Talks on phone: Not on file     Gets together: Not on file     Attends [employer] service: Not on file     Active member of club or organization: Not on file     Attends meetings of clubs or organizations: Not on file     Relationship status: Not on file   Other Topics Concern    Are you pregnant or think you may be? Not Asked    Breast-feeding Not Asked   Social History Narrative           Family History   Problem Relation Age of Onset    Diabetes Mother     Fibromyalgia Mother     Polymyalgia rheumatica Mother     Macular degeneration Mother     Arthritis Mother     Hypertension Mother     Kidney disease Mother     Pneumonia Mother     Adrenal disorder Mother         adrenal insufficiency    Coronary artery disease Father     Arthritis Father         osteoarthritis    Hypertension Father     Heart disease Father     Diabetes Father     Hyperlipidemia Father     Hyperlipidemia Brother     Cancer  Brother         oral    Thyroid cancer Daughter     Cancer Daughter         thyroid    Hypothyroidism Daughter     Breast cancer Neg Hx     Ovarian cancer Neg Hx     Colon cancer Neg Hx     Melanoma Neg Hx        Review of Systems   Constitutional: Positive for fatigue. Negative for activity change, appetite change, chills, fever and unexpected weight change.   HENT: Positive for ear pain, mouth sores, sore throat, trouble swallowing and voice change. Negative for congestion, dental problem, drooling, ear discharge, facial swelling, hearing loss, nosebleeds, postnasal drip, rhinorrhea, sinus pressure, sneezing and tinnitus.    Eyes: Negative for pain and visual disturbance.   Respiratory: Positive for choking. Negative for cough and shortness of breath.    Cardiovascular: Negative for chest pain, palpitations and leg swelling.   Gastrointestinal: Negative for abdominal pain, constipation, diarrhea, nausea and vomiting.   Endocrine: Negative for cold intolerance and heat intolerance.   Genitourinary: Negative for difficulty urinating, dysuria and hematuria.   Musculoskeletal: Positive for arthralgias, myalgias and neck pain. Negative for back pain, gait problem and neck stiffness.   Skin: Positive for rash. Negative for wound.   Allergic/Immunologic: Negative for environmental allergies and immunocompromised state.   Neurological: Positive for speech difficulty. Negative for dizziness, facial asymmetry, weakness, light-headedness, numbness and headaches.   Hematological: Negative for adenopathy. Does not bruise/bleed easily.   Psychiatric/Behavioral: Negative for dysphoric mood. The patient is not nervous/anxious.        Objective:     Physical Exam   Constitutional: She is oriented to person, place, and time. She appears well-developed and well-nourished. No distress.   HENT:   Head: Normocephalic and atraumatic.   Right Ear: Hearing, tympanic membrane, external ear and ear canal normal.   Left Ear: Hearing,  tympanic membrane, external ear and ear canal normal.   Nose: No rhinorrhea, nasal deformity or septal deviation. No epistaxis. Right sinus exhibits no maxillary sinus tenderness and no frontal sinus tenderness. Left sinus exhibits no maxillary sinus tenderness and no frontal sinus tenderness.   Mouth/Throat: Uvula is midline, oropharynx is clear and moist and mucous membranes are normal. She does not have dentures. No oral lesions. No trismus in the jaw. Normal dentition. No dental caries. No oropharyngeal exudate or posterior oropharyngeal edema.   NP: No lesions of posterior wall  OP: No lesions of posterior wall or BOT. BOT is soft to palpation  Larynx: No lesions of glottic or supraglottic larynx. Normal vocal fold mobility    HP: No lesions of pyriform sinuses or postcricoid region  Mirror examination was performed.     Eyes: Pupils are equal, round, and reactive to light. EOM and lids are normal. Right eye exhibits no chemosis. Left eye exhibits no chemosis. No scleral icterus.   Neck: Trachea normal, normal range of motion, full passive range of motion without pain and phonation normal. No muscular tenderness present. Carotid bruit is not present. No thyroid mass and no thyromegaly present.       Cardiovascular: Normal rate, regular rhythm and intact distal pulses.   Pulmonary/Chest: Effort normal. No accessory muscle usage or stridor. No respiratory distress.   Abdominal: Normal appearance. There is no tenderness.   Musculoskeletal:        Right shoulder: She exhibits normal range of motion and no deformity.        Left shoulder: She exhibits normal range of motion and no deformity.   Lymphadenopathy:        Head (right side): No submental and no occipital adenopathy present.        Head (left side): No submental and no occipital adenopathy present.     She has no cervical adenopathy.        Right cervical: No deep cervical and no posterior cervical adenopathy present.       Left cervical: No deep cervical  and no posterior cervical adenopathy present.   Neurological: She is alert and oriented to person, place, and time. No cranial nerve deficit or sensory deficit. Gait normal.   Skin: Skin is warm and intact. No lesion and no rash noted.   Psychiatric: She has a normal mood and affect. Her speech is normal and behavior is normal. Thought content normal.   Vitals reviewed.       Assessment & Plan:       Problem List Items Addressed This Visit     Multinodular goiter - Primary     She has had several FNA without evidence of carcinoma or suspicious findings. She continues to follow with Dr. Espinoza.         Relevant Orders    SLP evaluation    Sjogren's syndrome    Muscle tension dysphonia     Her swallowing and voicing discomfort and tension seems related to muscle tension dysphonia, as she has replicable discomfort with palpation of the strap muscles. Whether this relates to her vocal use patterns, occasional heartburn, Sjogren's syndrome, or constellation of autoimmune conditions is unclear. I would like her have a stroboscopy and meet with Speech Pathology for a voice assessment and to discuss management strategies. I do not appreciate a mass or lesion of the oropharynx/tongue base on mirror exam or palpation.          Relevant Orders    SLP evaluation

## 2020-01-04 ENCOUNTER — PATIENT MESSAGE (OUTPATIENT)
Dept: INTERNAL MEDICINE | Facility: CLINIC | Age: 60
End: 2020-01-04

## 2020-01-04 DIAGNOSIS — R10.2 PELVIC PAIN IN FEMALE: ICD-10-CM

## 2020-01-07 RX ORDER — GABAPENTIN 100 MG/1
CAPSULE ORAL
Qty: 630 CAPSULE | Refills: 1 | Status: SHIPPED | OUTPATIENT
Start: 2020-01-07 | End: 2020-10-09 | Stop reason: SDUPTHER

## 2020-01-07 NOTE — TELEPHONE ENCOUNTER
The prescription will not go in without the exact way she is using the 630 pills. Please ask her. GML

## 2020-01-07 NOTE — TELEPHONE ENCOUNTER
Prior auth done for medication and quantity. [facility]. Awaiting response for prior auth.

## 2020-01-17 ENCOUNTER — HOSPITAL ENCOUNTER (OUTPATIENT)
Dept: ENDOCRINOLOGY | Facility: CLINIC | Age: 60
Discharge: HOME | End: 2020-01-17
Attending: INTERNAL MEDICINE
Payer: COMMERCIAL

## 2020-01-17 ENCOUNTER — PATIENT MESSAGE (OUTPATIENT)
Dept: INTERNAL MEDICINE | Facility: CLINIC | Age: 60
End: 2020-01-17

## 2020-01-17 DIAGNOSIS — R10.84 ABDOMINAL PAIN, GENERALIZED: Primary | ICD-10-CM

## 2020-01-17 DIAGNOSIS — E04.2 MULTINODULAR GOITER: ICD-10-CM

## 2020-01-17 PROCEDURE — 76536 US EXAM OF HEAD AND NECK: CPT | Mod: S$GLB,,, | Performed by: INTERNAL MEDICINE

## 2020-01-17 PROCEDURE — 76536 US SOFT TISSUE HEAD NECK THYROID: ICD-10-PCS | Mod: S$GLB,,, | Performed by: INTERNAL MEDICINE

## 2020-01-18 ENCOUNTER — TELEPHONE (OUTPATIENT)
Dept: ENDOCRINOLOGY | Facility: CLINIC | Age: 60
End: 2020-01-18

## 2020-01-23 ENCOUNTER — TELEPHONE (OUTPATIENT)
Dept: SURGERY | Facility: CLINIC | Age: 60
End: 2020-01-23

## 2020-01-29 ENCOUNTER — PATIENT OUTREACH (OUTPATIENT)
Dept: ADMINISTRATIVE | Facility: OTHER | Age: 60
End: 2020-01-29

## 2020-01-31 ENCOUNTER — OFFICE VISIT (OUTPATIENT)
Dept: SURGERY | Facility: CLINIC | Age: 60
End: 2020-01-31
Payer: COMMERCIAL

## 2020-01-31 VITALS
DIASTOLIC BLOOD PRESSURE: 61 MMHG | HEIGHT: 61 IN | SYSTOLIC BLOOD PRESSURE: 110 MMHG | BODY MASS INDEX: 17.9 KG/M2 | HEART RATE: 111 BPM | WEIGHT: 94.81 LBS

## 2020-01-31 DIAGNOSIS — K59.00 CONSTIPATION, UNSPECIFIED CONSTIPATION TYPE: ICD-10-CM

## 2020-01-31 DIAGNOSIS — R10.12 LUQ ABDOMINAL PAIN: Primary | ICD-10-CM

## 2020-01-31 PROCEDURE — 3008F PR BODY MASS INDEX (BMI) DOCUMENTED: ICD-10-PCS | Mod: CPTII,S$GLB,, | Performed by: COLON & RECTAL SURGERY

## 2020-01-31 PROCEDURE — 99999 PR PBB SHADOW E&M-EST. PATIENT-LVL III: ICD-10-PCS | Mod: PBBFAC,,, | Performed by: COLON & RECTAL SURGERY

## 2020-01-31 PROCEDURE — 99203 PR OFFICE/OUTPT VISIT, NEW, LEVL III, 30-44 MIN: ICD-10-PCS | Mod: S$GLB,,, | Performed by: COLON & RECTAL SURGERY

## 2020-01-31 PROCEDURE — 3008F BODY MASS INDEX DOCD: CPT | Mod: CPTII,S$GLB,, | Performed by: COLON & RECTAL SURGERY

## 2020-01-31 PROCEDURE — 99999 PR PBB SHADOW E&M-EST. PATIENT-LVL III: CPT | Mod: PBBFAC,,, | Performed by: COLON & RECTAL SURGERY

## 2020-01-31 PROCEDURE — 99203 OFFICE O/P NEW LOW 30 MIN: CPT | Mod: S$GLB,,, | Performed by: COLON & RECTAL SURGERY

## 2020-01-31 NOTE — PROGRESS NOTES
[facility] Consultation Note    Reason for Consult:  The primary encounter diagnosis was LUQ abdominal pain. A diagnosis of Constipation, unspecified constipation type was also pertinent to this visit.    PCP: Estella Serrano   No address on file    HPI:  This is a 59 y.o. female here for evaluation of LUQ abdominal pressure. This is a chronic problem that she has been seen in [facility] for in the past. She has an extensive history of pudendal neuralgia, sjogren's, Hoshimoto's, rectocele, and constipation. She feels that stool will become caught in her upper left abdomen for 1-2 days, cause pressure, that will then be relieved by having a bowel movement. To have a bowel movement she will sit on the toilet for 1 hour per day. She rarely will ever feel the sensation of needing to have a bowel movement so she sits daily. She has done pelvic floor Pt in the past for 3-4 sessions, but felt that this did not help. She would not like to proceed with invasive testing such as barium enema or colonoscopy at this time because of the significant pelvic pain she experiences.     Duration - >7 years  Bowel movement frequency - 2 days of bowel movements, followed by 2 days of no bowel movements  Stool consistency - formed initially, followed by large volume softer stool   Blood in stools - no  Digital assistance - no  Laxatives used - none  Constipating medications - has tried suppositories, fiber, stool softeners, and Amitiza in the past.        ROS:  Constitutional: No fevers, +chills, No weight loss  ENT: No allergies  CV: No chest pain  Pulm: No cough, No shortness of breath  Ophtho: No vision changes  GI: see HPI  Derm: erythematous fingertips d/t raynauds  MSK: + arthritis  : No dysuria, No hematuria  Endo: + hot or cold intolerance  Neuro: No syncope, No seizure    Medical History:  has a past medical history of Asthma with allergic rhinitis, Bladder spasm, Dense breasts, Elevated antinuclear antibody  (GUICHO) level, Endometriosis of cervix, Erythromelalgia, Fibromyalgia, Ganglion cyst, Goiter, Hashimoto's disease, Hashimoto's thyroiditis, Headache(784.0), Hypothyroidism, Osteoporosis, Raynaud's phenomenon, Rhinitis, Scoliosis, Sleep disorder, Vitamin D deficiency disease, and Vulvodynia.    Surgical History:  has a past surgical history that includes Breast surgery; Sinus surgery; Cyst Removal; intradermal cyst ;  section; Hysterectomy; Injection of anesthetic agent around nerve (N/A, 2019); and Injection of anesthetic agent around nerve (N/A, 2019).    Family History: family history includes Adrenal disorder in her mother; Arthritis in her father and mother; Cancer in her brother and daughter; Coronary artery disease in her father; Diabetes in her father and mother; Fibromyalgia in her mother; Heart disease in her father; Hyperlipidemia in her brother and father; Hypertension in her father and mother; Hypothyroidism in her daughter; Kidney disease in her mother; Macular degeneration in her mother; Pneumonia in her mother; Polymyalgia rheumatica in her mother; Thyroid cancer in her daughter..     Social History:  reports that she has never smoked. She has never used smokeless tobacco. She reports that she does not drink alcohol or use drugs.    Review of patient's allergies indicates:  No Known Allergies    Current Outpatient Medications   Medication Sig Dispense Refill    amitriptyline (ELAVIL) 10 MG tablet TAKE 1 TABLET DAILY 90 tablet 4    amitriptyline (ELAVIL) 50 MG tablet TAKE 1 TABLET (50 MG) NIGHTLY WITH THE 10 MG AMITRIPTYLINE 90 tablet 4    aspirin (ECOTRIN) 81 MG EC tablet Take 81 mg by mouth once daily.      cholecalciferol, vitamin D3, (VITAMIN D3) 1,000 unit capsule Take 2,000 Units by mouth once daily.       diclofenac sodium (PENNSAID) 20 mg/gram /actuation(2 %) sopm Apply 40 mg topically 2 (two) times daily as needed (pain). 112 g 5    estradiol (VAGIFEM) 10 mcg Tab Place 1  tablet (10 mcg total) vaginally 4 (four) times a week. 32 tablet 4    estradiol 0.05 mg/24 hr td ptsw (VIVELLE-DOT) 0.05 mg/24 hr PLACE 1 PATCH ON THE SKIN TWICE A WEEK 24 patch 3    fluticasone (FLONASE) 50 mcg/actuation nasal spray 1 spray by Each Nare route once daily. 3 Bottle 3    gabapentin (NEURONTIN) 100 MG capsule 1in am 2 in pm and 4 at hs 630 capsule 1    guaifenesin (MUCINEX) 600 mg 12 hr tablet Take 600 mg by mouth 2 (two) times daily.      hydroxychloroquine (PLAQUENIL) 200 mg tablet TAKE 1 TABLET DAILY 90 tablet 4    lidocaine-prilocaine (EMLA) cream USE AS DIRECTED BY PRESCRIBER OR PACKAGE INSTRUCTIONS 90 g 4    liothyronine (CYTOMEL) 5 MCG Tab Take 3.5 tablets (17.5 mcg) once daily, Brand name medically  necessary 325 tablet 3    meloxicam (MOBIC) 7.5 MG tablet TAKE 1 TABLET (7.5 MG TOTAL) BY MOUTH ONCE DAILY. FOR INFLAMMATION 30 tablet 3    metaxalone (SKELAXIN) 800 MG tablet Take one twice a day as needed for spasm. 180 tablet 1    multivitamin capsule Take 1 capsule by mouth once daily.        niacinamide 500 mg Tab Take 1 tablet (500 mg total) by mouth 3 (three) times daily. 270 tablet 3    pilocarpine (SALAGEN) 5 MG Tab TAKE 1 TABLET THREE TIMES A DAY AS NEEDED 270 tablet 3    PREVIDENT 5000 DRY MOUTH 1.1 % Gel       RESTASIS 0.05 % ophthalmic emulsion       risedronate 35 mg TbEC TAKE 1 TABLET ONCE A WEEK 12 tablet 4    UNABLE TO FIND Apply 240 g topically as needed. Ketoprofen/cyclobenzaprine HCI/Lidocaine (lipomax) 10/2/5% Up to 5 times daily as needed for pain  99    YUVAFEM 10 mcg Tab INSERT 1 TABLET VAGINALLY TWICE WEEKLY 24 tablet 0    zolpidem (AMBIEN) 10 mg Tab Take 1 tablet (10 mg total) by mouth nightly as needed. 30 tablet 1    albuterol (VENTOLIN HFA) 90 mcg/actuation inhaler Inhale 2 puffs into the lungs every 6 (six) hours as needed for Wheezing. Rescue 18 g 2     No current facility-administered medications for this visit.      Facility-Administered  "Medications Ordered in Other Visits   Medication Dose Route Frequency Provider Last Rate Last Dose    0.9%  NaCl infusion  500 mL Intravenous Continuous Monique Philip MD               Objective Findings:    Vital Signs:  Blood Pressure 110/61 (BP Location: Left arm, Patient Position: Sitting, BP Method: Large (Automatic))   Pulse (Abnormal) 111   Height 5' 1" (1.549 m)   Weight 43 kg (94 lb 12.8 oz)   Last Menstrual Period  (LMP Unknown)   Body Mass Index 17.91 kg/m²   Body mass index is 17.91 kg/m².    Physical Exam:  General Appearance: Well appearing in no acute distress  Head:   Normocephalic, without obvious abnormality  Eyes:    No scleral icterus, EOMI  ENT: Neck supple, Lips, mucosa, and tongue normal; teeth and gums normal  Abdomen: Soft, non tender, non distended. No hepatosplenomegaly, ascites, or mass  Extremities: 2+ pulses, no clubbing, cyanosis or edema  Skin: No rash      Labs:  Lab Results   Component Value Date    WBC 4.05 10/12/2019    HGB 13.6 10/12/2019    HCT 41.3 10/12/2019     10/12/2019    CHOL 150 10/12/2019    TRIG 35 10/12/2019    HDL 85 (H) 10/12/2019    ALT 21 10/12/2019    AST 20 10/12/2019     10/12/2019    K 4.4 10/12/2019     10/12/2019    CREATININE 0.7 10/12/2019    BUN 13 10/12/2019    CO2 29 10/12/2019    TSH 1.327 11/05/2019    HGBA1C 5.3 11/23/2016       Imaging:  CT without contrast 2/7/2019  -GI Tract/Mesentery: No evidence of bowel obstruction or inflammation.    Defecogram 6/13/14  1.  Moderate sized, 4 to 5 cm, anterior rectocele.  2.  Decreased emptying of the upper rectum and sigmoid with retained contrast despite multiple straining attempts.    Endoscopy:    Colonoscopy 2012 - normal - repeat in 10 years    Assessment:  1. LUQ abdominal pain    2. Constipation, unspecified constipation type           Recommendations:  1. Sitz marker study  2. Will schedule barium enema after reviewing sitz marker study and evaluating her work schedule d/t " the potential downtime she will require after d/t pain     No follow-ups on file.      Order summary:  Orders Placed This Encounter    X-Ray Abdomen AP 1 View    X-Ray Abdomen AP 1 View         Thank you for allowing me to participate in the care of Berenice Lu MD

## 2020-02-03 ENCOUNTER — HOSPITAL ENCOUNTER (OUTPATIENT)
Dept: RADIOLOGY | Facility: HOSPITAL | Age: 60
Discharge: HOME | End: 2020-02-03
Attending: NURSE PRACTITIONER
Payer: COMMERCIAL

## 2020-02-03 DIAGNOSIS — K59.00 CONSTIPATION, UNSPECIFIED CONSTIPATION TYPE: ICD-10-CM

## 2020-02-03 DIAGNOSIS — R10.12 LUQ ABDOMINAL PAIN: ICD-10-CM

## 2020-02-03 PROCEDURE — 74018 RADEX ABDOMEN 1 VIEW: CPT | Mod: 26,,, | Performed by: RADIOLOGY

## 2020-02-03 PROCEDURE — 74018 RADEX ABDOMEN 1 VIEW: CPT | Mod: TC

## 2020-02-03 PROCEDURE — 74018 XR ABDOMEN AP 1 VIEW: ICD-10-PCS | Mod: 26,,, | Performed by: RADIOLOGY

## 2020-02-04 ENCOUNTER — CLINICAL SUPPORT (OUTPATIENT)
Dept: SPEECH THERAPY | Facility: HOSPITAL | Age: 60
End: 2020-02-04
Attending: OTOLARYNGOLOGY
Payer: COMMERCIAL

## 2020-02-04 ENCOUNTER — PATIENT MESSAGE (OUTPATIENT)
Dept: INTERNAL MEDICINE | Facility: CLINIC | Age: 60
End: 2020-02-04

## 2020-02-04 ENCOUNTER — PATIENT OUTREACH (OUTPATIENT)
Dept: ADMINISTRATIVE | Facility: OTHER | Age: 60
End: 2020-02-04

## 2020-02-04 ENCOUNTER — TELEPHONE (OUTPATIENT)
Dept: SURGERY | Facility: CLINIC | Age: 60
End: 2020-02-04

## 2020-02-04 DIAGNOSIS — E03.8 HYPOTHYROIDISM DUE TO HASHIMOTO'S THYROIDITIS: ICD-10-CM

## 2020-02-04 DIAGNOSIS — E06.3 HYPOTHYROIDISM DUE TO HASHIMOTO'S THYROIDITIS: ICD-10-CM

## 2020-02-04 DIAGNOSIS — E04.2 MULTINODULAR GOITER: ICD-10-CM

## 2020-02-04 DIAGNOSIS — R49.0 MUSCLE TENSION DYSPHONIA: ICD-10-CM

## 2020-02-04 DIAGNOSIS — Z12.11 COLON CANCER SCREENING: ICD-10-CM

## 2020-02-04 DIAGNOSIS — G47.00 INSOMNIA, UNSPECIFIED TYPE: ICD-10-CM

## 2020-02-04 PROCEDURE — 92524 BEHAVRAL QUALIT ANALYS VOICE: CPT | Mod: GN

## 2020-02-04 NOTE — PROGRESS NOTES
"Referring provider: Dr. Andrew Singh  Reason for visit:  Behavioral and qualitative analysis of voice and resonance (CPT 88554)     Subjective / History    Berenice Hayes is a 59 y.o. female referred for voice evaluation (CPT 73158, 30157) by Dr. Singh.  She presents with complaints of pain in the back of her throat as well as hoarseness when speaking for prolonged periods of time, which began around 6 months ago. She reports a history of fibromyalgia, hashimoto's, and sjogren's. She reports that she constantly feels "dry." She was assessed by Dr. Singh, and since this time has noted improvement in vocal quality and strain since focusing acupuncture treatment on her laryngeal strap muscles. However, she continues to experience  "good days and bad days" with her voice. She feels pain greater on the left side vs the right when speaking as well as swallowing. On today's evaluation, she reports strain with increased voice use. She is currently working as a  for [employer] and notes this requires frequent phone conversations.     Swallowing: Reports strain/ tight sensation when swallowing, which results in pills becoming stuck.   Breathing: reports exercise-induced asthma    Water: 64 oz     Past Medical History:   Diagnosis Date    Asthma with allergic rhinitis     Bladder spasm     Dense breasts     heterogeneously/fibrocystic disease    Elevated antinuclear antibody (GUICHO) level     Endometriosis of cervix     Erythromelalgia     Fibromyalgia     Ganglion cyst     left toe    Goiter     MNG    Hashimoto's disease     Hashimoto's thyroiditis     Headache(784.0)     Hypothyroidism     Hashimoto with + Tg ab    Osteoporosis     femoral neck    Raynaud's phenomenon     Rhinitis     Scoliosis     Sleep disorder     type of Narcolepsy ( resolved)    Vitamin D deficiency disease     Vulvodynia      Current Outpatient Medications on File Prior to Visit   Medication Sig Dispense " Refill    albuterol (VENTOLIN HFA) 90 mcg/actuation inhaler Inhale 2 puffs into the lungs every 6 (six) hours as needed for Wheezing. Rescue 18 g 2    amitriptyline (ELAVIL) 10 MG tablet TAKE 1 TABLET DAILY 90 tablet 4    amitriptyline (ELAVIL) 50 MG tablet TAKE 1 TABLET (50 MG) NIGHTLY WITH THE 10 MG AMITRIPTYLINE 90 tablet 4    aspirin (ECOTRIN) 81 MG EC tablet Take 81 mg by mouth once daily.      cholecalciferol, vitamin D3, (VITAMIN D3) 1,000 unit capsule Take 2,000 Units by mouth once daily.       diclofenac sodium (PENNSAID) 20 mg/gram /actuation(2 %) sopm Apply 40 mg topically 2 (two) times daily as needed (pain). 112 g 5    estradiol (VAGIFEM) 10 mcg Tab Place 1 tablet (10 mcg total) vaginally 4 (four) times a week. 32 tablet 4    estradiol 0.05 mg/24 hr td ptsw (VIVELLE-DOT) 0.05 mg/24 hr PLACE 1 PATCH ON THE SKIN TWICE A WEEK 24 patch 3    fluticasone (FLONASE) 50 mcg/actuation nasal spray 1 spray by Each Nare route once daily. 3 Bottle 3    gabapentin (NEURONTIN) 100 MG capsule 1in am 2 in pm and 4 at hs 630 capsule 1    guaifenesin (MUCINEX) 600 mg 12 hr tablet Take 600 mg by mouth 2 (two) times daily.      hydroxychloroquine (PLAQUENIL) 200 mg tablet TAKE 1 TABLET DAILY 90 tablet 4    lidocaine-prilocaine (EMLA) cream USE AS DIRECTED BY PRESCRIBER OR PACKAGE INSTRUCTIONS 90 g 4    liothyronine (CYTOMEL) 5 MCG Tab Take 3.5 tablets (17.5 mcg) once daily, Brand name medically  necessary 325 tablet 3    meloxicam (MOBIC) 7.5 MG tablet TAKE 1 TABLET (7.5 MG TOTAL) BY MOUTH ONCE DAILY. FOR INFLAMMATION 30 tablet 3    metaxalone (SKELAXIN) 800 MG tablet Take one twice a day as needed for spasm. 180 tablet 1    multivitamin capsule Take 1 capsule by mouth once daily.        niacinamide 500 mg Tab Take 1 tablet (500 mg total) by mouth 3 (three) times daily. 270 tablet 3    pilocarpine (SALAGEN) 5 MG Tab TAKE 1 TABLET THREE TIMES A DAY AS NEEDED 270 tablet 3    PREVIDENT 5000 DRY MOUTH 1.1 %  "Gel       RESTASIS 0.05 % ophthalmic emulsion       risedronate 35 mg TbEC TAKE 1 TABLET ONCE A WEEK 12 tablet 4    UNABLE TO FIND Apply 240 g topically as needed. Ketoprofen/cyclobenzaprine HCI/Lidocaine (lipomax) 10/2/5% Up to 5 times daily as needed for pain  99    YUVAFEM 10 mcg Tab INSERT 1 TABLET VAGINALLY TWICE WEEKLY 24 tablet 0    zolpidem (AMBIEN) 10 mg Tab Take 1 tablet (10 mg total) by mouth nightly as needed. 30 tablet 1     Current Facility-Administered Medications on File Prior to Visit   Medication Dose Route Frequency Provider Last Rate Last Dose    0.9%  NaCl infusion  500 mL Intravenous Continuous Monique Philip MD           Objective    Perceptual/behavioral assessment  -CAPE-V Overall Score: 20  -Quality: mild intermittent roughness  -Volume: decreased projection  -Pitch: appropriate for age and gender  -Flexibility: instability  -Habitual respiratory pattern: chest/clavicular.    Acoustic/aerodynamic assessment  Multi-Dimensional Voice Program (MDVP) Analysis (sustained "ah")   Baseline Gender-matched norms (F)   Average fundamental frequency (Fo) 219.760 Hz Norm/SD: 243.97 / 27.46   Relative average perturbation 1.615 % Norm/SD: 0.378 / 0.21   Shimmer percent 4.846 % Norm/SD: 1.997 / 0.79   Noise to harmonic ratio 0.162 Norm/SD: 0.112 / 0.01   Voice turbulence index 0.030 Norm/SD: 0.046 / 0.012     Rigid Laryngeal Videostroboscopy: Laryngeal videostroboscopy is indicated to assess the laryngeal vibratory biomechanics and vocal fold oscillation, which cannot be assessed with a plain light examination. This was carried out with a 70 degree endoscope. After verbal consent was obtained, the patient was positioned and the tongue was gently secured with a gauze sponge. The endoscope was passed transorally and positioned to image the larynx and hypopharynx in detail. The following features were examined: laryngeal and hypopharyngeal masses; vocal fold range and symmetry of motion; laryngeal " mucosal edema, erythema, inflammation, and hydration; salivary pooling; and gross laryngeal sensation. During phonation, the vocal folds were assessed for glottal closure; mucosal wave; vocal fold lesions; vibratory periodicity, amplitude, and phase symmetry; and vertical height match. The equipment was removed. The patient tolerated the procedure well without complication. All findings were normal except:  - mild supraglottic hyperfunction    Education / Stimulability Trials   Discussed importance of vocal hygiene including: hydration. Patient not stimulable for improvement with cup bubble SOVT exercises, straw phonation, or cup bubble with straw SOVT exercises as these increased extralaryngeal tension. Patient was stimulable for improved voice using resonance exercises (i.e., resonant /m/ humming in to phrases). Good carryover in to connected speech. Patient discriminated when her vocal quality became strained when completing the exercises. In addition, pt noted feeling a decrease in strain when completing the exercises. Also provided patient with circumlaryngeal massage techniques to aid in decreasing muscle tension when speaking and swallowing. Encouraged home-practice of exercises.    Functional goals  Length Status Goal   Long term Initiated Patient and clinician will facilitate changes in vocal function in order to restore functional use of voice for daily occupational, social, and emotional demands.    Long term Initiated Patient will re-establish phonation with adequate balance of airflow and resonance with decreased muscle tension.    Long term Initiated Patient will achieve improved/normal voice assessed with perceptual scales, acoustic and/or aerodynamic measures within 12 weeks.   Short term Initiated Patient will reduce vocal effort and fatigue by decreasing upper body tension as evidenced by a decrease in symptoms and lack of observable/palpable signs of hyperkinetic muscular behaviors.   Short term  Initiated Patient will complete SOVT exercises and/or resonant-focused exercises 3-5x daily to strengthen and balance the intrinsic laryngeal musculature and maximize glottic closure without medial hyperfunction.   Short term Initiated Patient will discriminate between easy and strained phonation with 80% accuracy.    Short term Initiated Patient will identify the sensations associated with muscle relaxation in the abdominal, thoracic, neck and facial areas during efficient phonation with minimal clinician cue.      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 Singh.  Prognosis for continued improvement is good.     Recommendations / POC    Recommend 2-4 sessions of voice/speech therapy over 6-8 weeks with a speech-language pathologist to optimize glottal postures for improved vocal function, vocal efficiency, and ease of phonation. She should continue the exercises as discussed in session and should contact me with any further questions.

## 2020-02-04 NOTE — TELEPHONE ENCOUNTER
Spoke with patient, informed her that she would need to complete the sitz marker test before we can schedule her for her barium enema.

## 2020-02-05 ENCOUNTER — OFFICE VISIT (OUTPATIENT)
Dept: DERMATOLOGY | Facility: CLINIC | Age: 60
End: 2020-02-05
Payer: COMMERCIAL

## 2020-02-05 ENCOUNTER — LAB VISIT (OUTPATIENT)
Dept: LAB | Facility: HOSPITAL | Age: 60
End: 2020-02-05
Attending: DERMATOLOGY
Payer: COMMERCIAL

## 2020-02-05 DIAGNOSIS — T69.1XXD CHILBLAINS, SUBSEQUENT ENCOUNTER: Primary | ICD-10-CM

## 2020-02-05 DIAGNOSIS — L82.1 SK (SEBORRHEIC KERATOSIS): ICD-10-CM

## 2020-02-05 DIAGNOSIS — T69.1XXD CHILBLAINS, SUBSEQUENT ENCOUNTER: ICD-10-CM

## 2020-02-05 DIAGNOSIS — L60.8 LEUKONYCHIA: ICD-10-CM

## 2020-02-05 DIAGNOSIS — R20.2 NOTALGIA PARESTHETICA: ICD-10-CM

## 2020-02-05 LAB
HBV CORE AB SERPL QL IA: NEGATIVE
HBV SURFACE AB SER-ACNC: NEGATIVE M[IU]/ML
HBV SURFACE AG SERPL QL IA: NEGATIVE
HCV AB SERPL QL IA: NEGATIVE

## 2020-02-05 PROCEDURE — 99214 PR OFFICE/OUTPT VISIT, EST, LEVL IV, 30-39 MIN: ICD-10-PCS | Mod: S$GLB,,, | Performed by: DERMATOLOGY

## 2020-02-05 PROCEDURE — 87340 HEPATITIS B SURFACE AG IA: CPT

## 2020-02-05 PROCEDURE — 99999 PR PBB SHADOW E&M-EST. PATIENT-LVL II: ICD-10-PCS | Mod: PBBFAC,,, | Performed by: DERMATOLOGY

## 2020-02-05 PROCEDURE — 86706 HEP B SURFACE ANTIBODY: CPT

## 2020-02-05 PROCEDURE — 86704 HEP B CORE ANTIBODY TOTAL: CPT

## 2020-02-05 PROCEDURE — 86803 HEPATITIS C AB TEST: CPT

## 2020-02-05 PROCEDURE — 99999 PR PBB SHADOW E&M-EST. PATIENT-LVL II: CPT | Mod: PBBFAC,,, | Performed by: DERMATOLOGY

## 2020-02-05 PROCEDURE — 99214 OFFICE O/P EST MOD 30 MIN: CPT | Mod: S$GLB,,, | Performed by: DERMATOLOGY

## 2020-02-05 PROCEDURE — 36415 COLL VENOUS BLD VENIPUNCTURE: CPT

## 2020-02-05 RX ORDER — ZOLPIDEM TARTRATE 10 MG/1
10 TABLET ORAL NIGHTLY PRN
Qty: 30 TABLET | Refills: 1 | Status: SHIPPED | OUTPATIENT
Start: 2020-02-05 | End: 2020-03-25 | Stop reason: SDUPTHER

## 2020-02-05 NOTE — PROGRESS NOTES
Subjective:       Patient ID:  Berenice Hayes is a 59 y.o. female who presents for   Chief Complaint   Patient presents with    Mole     Back    Spot     right and left fingers     HPI   Pt presents today for mole, back, itching, x 6 months, Tx. None  Pt would like to f/u on pernio of fingers, better, Tx Niacinamide 500mg BID. Went down to twice daily because of the flushing. No longer has open sores on fingers.   Pt feels like she has the flu without the body aches and struggles to get through the day.  Also reports her nails all came off after the last severe pernio flare, and they have grown out with white discoloration (they were all white initially, but now the distal 1/3 is pink).  Also c/o occasional itching on upper back.    Review of Systems   Constitutional: Negative for fever, chills, weight loss, weight gain, fatigue, night sweats and malaise.   Skin: Negative for daily sunscreen use, activity-related sunscreen use and wears hat.   Hematologic/Lymphatic: Bruises/bleeds easily.        Objective:    Physical Exam   Constitutional: She appears well-developed and well-nourished.   Neurological: She is alert and oriented to person, place, and time.   Psychiatric: She has a normal mood and affect.   Skin:   Areas Examined (abnormalities noted in diagram):   Back Inspection Performed  Nails and Digits Inspection Performed                      Diagram Legend     Erythematous scaling macule/papule c/w actinic keratosis       Vascular papule c/w angioma      Pigmented verrucoid papule/plaque c/w seborrheic keratosis      Yellow umbilicated papule c/w sebaceous hyperplasia      Irregularly shaped tan macule c/w lentigo     1-2 mm smooth white papules consistent with Milia      Movable subcutaneous cyst with punctum c/w epidermal inclusion cyst      Subcutaneous movable cyst c/w pilar cyst      Firm pink to brown papule c/w dermatofibroma      Pedunculated fleshy papule(s) c/w skin tag(s)      Evenly  pigmented macule c/w junctional nevus     Mildly variegated pigmented, slightly irregular-bordered macule c/w mildly atypical nevus      Flesh colored to evenly pigmented papule c/w intradermal nevus       Pink pearly papule/plaque c/w basal cell carcinoma      Erythematous hyperkeratotic cursted plaque c/w SCC      Surgical scar with no sign of skin cancer recurrence      Open and closed comedones      Inflammatory papules and pustules      Verrucoid papule consistent consistent with wart     Erythematous eczematous patches and plaques     Dystrophic onycholytic nail with subungual debris c/w onychomycosis     Umbilicated papule    Erythematous-base heme-crusted tan verrucoid plaque consistent with inflamed seborrheic keratosis     Erythematous Silvery Scaling Plaque c/w Psoriasis     See annotation      Assessment / Plan:        Chilblains, subsequent encounter  Improved on niacinamide. Will continue BID  Encouraged f/u with PCP for systemic complaints mentioned above and to ensure she's up to date on cancer screenings.  -     Hepatitis C antibody; Future; Expected date: 02/05/2020  -     Hepatitis B surface antigen; Future; Expected date: 02/05/2020  -     Hepatitis B surface antibody; Future; Expected date: 02/05/2020  -     Hepatitis B core antibody, total; Future; Expected date: 02/05/2020    SK (seborrheic keratosis)  These are benign inherited growths without a malignant potential. Reassurance given to patient. No treatment is necessary.   Treatment of benign, asymptomatic lesions may be considered cosmetic.  Warned about risk of hypo- or hyperpigmentation with treatment and risk of recurrence.    Notalgia paresthetica  Rec: cold compresses and Cerave itch relief cream, refrigerated    Leukonychia  Half and half nails vs Ethan's nails vs consequence of pernio  CMP 10/2019 with no evidence of renal or liver disease.  Will check for viral hepatitis exposure, soledad with hx of pernio.    rtc 3-4 months

## 2020-02-06 ENCOUNTER — TELEPHONE (OUTPATIENT)
Dept: INTERNAL MEDICINE | Facility: CLINIC | Age: 60
End: 2020-02-06

## 2020-02-07 ENCOUNTER — HOSPITAL ENCOUNTER (OUTPATIENT)
Dept: RADIOLOGY | Facility: HOSPITAL | Age: 60
Discharge: HOME | End: 2020-02-07
Attending: NURSE PRACTITIONER
Payer: COMMERCIAL

## 2020-02-07 DIAGNOSIS — R10.12 LUQ ABDOMINAL PAIN: ICD-10-CM

## 2020-02-07 DIAGNOSIS — K59.00 CONSTIPATION, UNSPECIFIED CONSTIPATION TYPE: ICD-10-CM

## 2020-02-07 PROCEDURE — 74018 RADEX ABDOMEN 1 VIEW: CPT | Mod: 26,,, | Performed by: RADIOLOGY

## 2020-02-07 PROCEDURE — 74018 XR ABDOMEN AP 1 VIEW: ICD-10-PCS | Mod: 26,,, | Performed by: RADIOLOGY

## 2020-02-07 PROCEDURE — 74018 RADEX ABDOMEN 1 VIEW: CPT | Mod: TC

## 2020-03-01 DIAGNOSIS — M79.7 FIBROMYALGIA: ICD-10-CM

## 2020-03-01 DIAGNOSIS — E04.2 MULTINODULAR GOITER: ICD-10-CM

## 2020-03-01 DIAGNOSIS — Z80.8 FAMILY HISTORY OF THYROID CANCER: ICD-10-CM

## 2020-03-01 DIAGNOSIS — E06.3 HYPOTHYROIDISM DUE TO HASHIMOTO'S THYROIDITIS: ICD-10-CM

## 2020-03-01 DIAGNOSIS — E03.8 HYPOTHYROIDISM DUE TO HASHIMOTO'S THYROIDITIS: ICD-10-CM

## 2020-03-01 RX ORDER — METAXALONE 800 MG/1
TABLET ORAL
Qty: 180 TABLET | Refills: 4 | Status: SHIPPED | OUTPATIENT
Start: 2020-03-01 | End: 2021-03-23

## 2020-03-02 RX ORDER — LIOTHYRONINE SODIUM 5 UG/1
TABLET ORAL
Qty: 315 TABLET | Refills: 1 | Status: SHIPPED | OUTPATIENT
Start: 2020-03-02 | End: 2020-10-09

## 2020-03-03 ENCOUNTER — TELEPHONE (OUTPATIENT)
Dept: SURGERY | Facility: CLINIC | Age: 60
End: 2020-03-03

## 2020-03-03 NOTE — TELEPHONE ENCOUNTER
----- Message from Dayna Haile sent at 3/3/2020  1:05 PM CST -----  Contact: pt  Reason; Calling to ask a question about testing she may need    Communication: [phone]

## 2020-03-05 ENCOUNTER — PATIENT MESSAGE (OUTPATIENT)
Dept: SURGERY | Facility: CLINIC | Age: 60
End: 2020-03-05

## 2020-03-10 DIAGNOSIS — R10.12 ABDOMINAL PAIN, LUQ: ICD-10-CM

## 2020-03-10 DIAGNOSIS — K59.00 CONSTIPATION, UNSPECIFIED CONSTIPATION TYPE: Primary | ICD-10-CM

## 2020-03-16 ENCOUNTER — PATIENT MESSAGE (OUTPATIENT)
Dept: SURGERY | Facility: CLINIC | Age: 60
End: 2020-03-16

## 2020-03-17 ENCOUNTER — PATIENT MESSAGE (OUTPATIENT)
Dept: RHEUMATOLOGY | Facility: CLINIC | Age: 60
End: 2020-03-17

## 2020-03-24 ENCOUNTER — PATIENT MESSAGE (OUTPATIENT)
Dept: INTERNAL MEDICINE | Facility: CLINIC | Age: 60
End: 2020-03-24

## 2020-03-24 DIAGNOSIS — E06.3 HYPOTHYROIDISM DUE TO HASHIMOTO'S THYROIDITIS: ICD-10-CM

## 2020-03-24 DIAGNOSIS — G47.00 INSOMNIA, UNSPECIFIED TYPE: ICD-10-CM

## 2020-03-24 DIAGNOSIS — Z12.11 COLON CANCER SCREENING: ICD-10-CM

## 2020-03-24 DIAGNOSIS — E03.8 HYPOTHYROIDISM DUE TO HASHIMOTO'S THYROIDITIS: ICD-10-CM

## 2020-03-25 RX ORDER — ZOLPIDEM TARTRATE 10 MG/1
10 TABLET ORAL NIGHTLY PRN
Qty: 30 TABLET | Refills: 4 | Status: SHIPPED | OUTPATIENT
Start: 2020-03-25 | End: 2020-05-28

## 2020-03-26 ENCOUNTER — PATIENT MESSAGE (OUTPATIENT)
Dept: SPEECH THERAPY | Facility: HOSPITAL | Age: 60
End: 2020-03-26

## 2020-03-26 ENCOUNTER — TELEPHONE (OUTPATIENT)
Dept: SPEECH THERAPY | Facility: HOSPITAL | Age: 60
End: 2020-03-26

## 2020-04-02 ENCOUNTER — CLINICAL SUPPORT (OUTPATIENT)
Dept: SPEECH THERAPY | Facility: HOSPITAL | Age: 60
End: 2020-04-02
Payer: COMMERCIAL

## 2020-04-02 DIAGNOSIS — R49.0 MUSCLE TENSION DYSPHONIA: Primary | ICD-10-CM

## 2020-04-02 PROCEDURE — 92507 TX SP LANG VOICE COMM INDIV: CPT | Mod: GN,95 | Performed by: SPEECH-LANGUAGE PATHOLOGIST

## 2020-04-02 NOTE — PROGRESS NOTES
Referring provider: Dr. Andrew Singh  Reason for visit:  Voice treatment (CPT 83724)  Session #1    The patient location is: [place]  The chief complaint leading to consultation is: dysphonia  Visit type: Virtual visit with synchronous audio and video  Total time spent with patient: 40 min  Each patient to whom he or she provides medical services by telemedicine is:  (1) informed of the relationship between the physician and patient and the respective role of any other health care provider with respect to management of the patient; and (2) notified that he or she may decline to receive medical services by telemedicine and may withdraw from such care at any time.    History / Subjective   I had the pleasure of seeing Berenice Hayes for her first treatment session following complete voice evaluation on 2/4/20.  During that time, improvements were noted on resonant voice exercises.  Since evaluation, she reports the exercises have been helpful in helping her find a more comfortable, efficient voice.  She still reports a pulling/tight sensation on her let neck.  Usually does acupuncture for other areas of discomfort but hasn't been able to go.  Has had a bad week with lots of dryness from Sjogrens.  Finds that once her neck muscle tightens it is hard to get it to relax; this usually occurs during speech, especially when she has been talking for a while.      Objective   The primary goal of todays session was to elicit improved voice.  This was targeted using resonant treatment modalities.    Perceptual/behavioral assessment  -CAPE-V Overall Score: 18  -Quality: mild intermittent strain, singh  -Volume: decreased projection  -Pitch: appropriate for age and gender  -Flexibility: diminished  -Habitual respiratory pattern: chest/clavicular    Treatment  Reviewed resonant /m/ into connected.  Instructed on nasal /ankita/ which pt also reported was reduced effort.  Additional strategies discussed included vocal resets during  conversation (mmhmm) as well as talking on the phone with her chin to her chest to reduce extralaryngeal tension.  Discussed possibly introducing manual therapy during in person sessions.  For home practice, clinician reviewed home exercises as practiced during the session with the patient.     Functional goals  Length Status Goal   Long term Ongoing Patient and clinician will facilitate changes in vocal function in order to restore functional use of voice for daily occupational, social, and emotional demands.    Long term Ongoing Patient will re-establish phonation with adequate balance of airflow and resonance with decreased muscle tension.    Long term Ongoing Patient will achieve improved/normal voice assessed with perceptual scales, acoustic and/or aerodynamic measures within 12 weeks.   Short term Ongoing Patient will reduce vocal effort and fatigue by decreasing upper body tension as evidenced by a decrease in symptoms and lack of observable/palpable signs of hyperkinetic muscular behaviors.   Short term Ongoing Patient will complete SOVT exercises and/or resonant-focused exercises 3-5x daily to strengthen and balance the intrinsic laryngeal musculature and maximize glottic closure without medial hyperfunction.   Short term Ongoing Patient will discriminate between easy and strained phonation with 80% accuracy.    Short term Ongoing Patient will identify the sensations associated with muscle relaxation in the abdominal, thoracic, neck and facial areas during efficient phonation with minimal clinician cue.      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 Singh.  Prognosis for continued improvement is good.     Recommendations / POC    Recommend 2-4 sessions of voice/speech therapy over 6-8 weeks with a speech-language pathologist to optimize glottal postures for improved vocal function, vocal efficiency, and ease of phonation. She should  continue the exercises as discussed in session and should contact me with any further questions.

## 2020-04-15 ENCOUNTER — CLINICAL SUPPORT (OUTPATIENT)
Dept: SPEECH THERAPY | Facility: HOSPITAL | Age: 60
End: 2020-04-15
Payer: COMMERCIAL

## 2020-04-15 DIAGNOSIS — R49.0 MUSCLE TENSION DYSPHONIA: Primary | ICD-10-CM

## 2020-04-15 PROCEDURE — 92507 TX SP LANG VOICE COMM INDIV: CPT | Mod: 95,GN | Performed by: SPEECH-LANGUAGE PATHOLOGIST

## 2020-04-15 NOTE — PROGRESS NOTES
Referring provider: Dr. Andrew Sinhg  Reason for visit:  Voice treatment (CPT 33514)  Session #2    The patient location is: [place]  The chief complaint leading to consultation is: dysphonia  Visit type: Virtual visit with synchronous audio and video  Total time spent with patient: 25 min  Each patient to whom he or she provides medical services by telemedicine is:  (1) informed of the relationship between the physician and patient and the respective role of any other health care provider with respect to management of the patient; and (2) notified that he or she may decline to receive medical services by telemedicine and may withdraw from such care at any time.    History / Subjective   I had the pleasure of seeing Berenice Hayes for her first treatment session following complete voice evaluation on 2/4/20.  During that time, improvements were noted on resonant voice exercises.  Since last session on 4/2/20, she reports she was only minimally symptomatic until yesterday.  Had intermittent tension/discomfort with gravelley voice but noted more of an issue yesterday.  However the issue self-resolved by later in the day and she was able to converse with family.  Finds putting chin down during phone calls is helpful.  Still using resonant based exercises on the way to work in the morning.  Feels dryness wasn't as much of an issue over the last week or two.      Objective   The primary goal of todays session was to elicit improved voice.  This was targeted using resonant treatment modalities.    Perceptual/behavioral assessment  -CAPE-V Overall Score: 14  -Quality: mild intermittent strain, singh  -Volume: decreased projection  -Pitch: appropriate for age and gender  -Flexibility: diminished  -Habitual respiratory pattern: chest/clavicular    Treatment  Reviewed nasal /ankita/ into connected speech.  Pt also demonstrated exercises to target higher resonance vs throaty resonance.  Reviewed chin down to release tension.  For  home practice, clinician reviewed home exercises as practiced during the session with the patient.  Reviewed liely multifactorial nature of problem.     Functional goals  Length Status Goal   Long term Ongoing Patient and clinician will facilitate changes in vocal function in order to restore functional use of voice for daily occupational, social, and emotional demands.    Long term Ongoing Patient will re-establish phonation with adequate balance of airflow and resonance with decreased muscle tension.    Long term Ongoing Patient will achieve improved/normal voice assessed with perceptual scales, acoustic and/or aerodynamic measures within 12 weeks.   Short term Ongoing Patient will reduce vocal effort and fatigue by decreasing upper body tension as evidenced by a decrease in symptoms and lack of observable/palpable signs of hyperkinetic muscular behaviors.   Short term Ongoing Patient will complete SOVT exercises and/or resonant-focused exercises 3-5x daily to strengthen and balance the intrinsic laryngeal musculature and maximize glottic closure without medial hyperfunction.   Short term Ongoing Patient will discriminate between easy and strained phonation with 80% accuracy.    Short term Ongoing Patient will identify the sensations associated with muscle relaxation in the abdominal, thoracic, neck and facial areas during efficient phonation with minimal clinician cue.      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 Singh.  Prognosis for continued improvement is good.     Recommendations / POC    Recommend 2-4 sessions of voice/speech therapy over 6-8 weeks with a speech-language pathologist to optimize glottal postures for improved vocal function, vocal efficiency, and ease of phonation. She should continue the exercises as discussed in session and should contact me with any further questions.

## 2020-04-27 ENCOUNTER — PATIENT MESSAGE (OUTPATIENT)
Dept: PAIN MEDICINE | Facility: CLINIC | Age: 60
End: 2020-04-27

## 2020-04-29 ENCOUNTER — PATIENT MESSAGE (OUTPATIENT)
Dept: INTERNAL MEDICINE | Facility: CLINIC | Age: 60
End: 2020-04-29

## 2020-05-06 ENCOUNTER — PATIENT MESSAGE (OUTPATIENT)
Dept: ENDOCRINOLOGY | Facility: CLINIC | Age: 60
End: 2020-05-06

## 2020-05-07 ENCOUNTER — CLINICAL SUPPORT (OUTPATIENT)
Dept: SPEECH THERAPY | Facility: HOSPITAL | Age: 60
End: 2020-05-07
Payer: COMMERCIAL

## 2020-05-07 DIAGNOSIS — M79.7 FIBROMYALGIA: ICD-10-CM

## 2020-05-07 DIAGNOSIS — R49.0 MUSCLE TENSION DYSPHONIA: ICD-10-CM

## 2020-05-07 DIAGNOSIS — G50.0 TRIGEMINAL NEURALGIA: ICD-10-CM

## 2020-05-07 PROCEDURE — 92507 TX SP LANG VOICE COMM INDIV: CPT | Mod: GN,95 | Performed by: SPEECH-LANGUAGE PATHOLOGIST

## 2020-05-07 NOTE — PROGRESS NOTES
Referring provider: Dr. Andrew Singh  Reason for visit:  Voice treatment (CPT 29931)  Session #3    The patient location is: [place]  The chief complaint leading to consultation is: dysphonia  Visit type: Virtual visit with synchronous audio and video  Total time spent with patient: 25 min  Each patient to whom he or she provides medical services by telemedicine is:  (1) informed of the relationship between the physician and patient and the respective role of any other health care provider with respect to management of the patient; and (2) notified that he or she may decline to receive medical services by telemedicine and may withdraw from such care at any time.    History / Subjective   I had the pleasure of seeing Berenice Hayes for her third treatment session following complete voice evaluation on 2/4/20.  During that time, improvements were noted on resonant voice exercises.  Since last session on 4/15/20, she reports a plateau in improvement but with no further deterioration.  She reports intermittent discomfort occasionally but this has improved with the chin down technique.  When the muscle feels strained it's hard to make it not hurt.  She can use the voice exercises for short parts of conversation but has a hard time sustaining it over a longer conversation.  Sometimes helps to put heat on her neck.  Tries to use vocal resets between conversations.      Objective   The primary goal of todays session was to elicit improved voice.  This was targeted using resonant treatment modalities and laryngeal massage.      Perceptual/behavioral assessment  -CAPE-V Overall Score: 14  -Quality: mild intermittent strain, singh  -Volume: decreased projection  -Pitch: appropriate for age and gender  -Flexibility: diminished  -Habitual respiratory pattern: chest/clavicular    Treatment  Reviewed resonant voice /m/ and /ankita/ into connected speech.  Reviewed chin down posture.  Instructed pt on circumlaryngeal massage techniques:  masseters, SCMs, BOT massage, and thyrohyoid massage. She did report mild tenderness in BOT region.  Discussed massaging a few times a day to help reduce tension manually.  Encouraged continued daily practice.  She was amenable to all suggestions.     Functional goals  Length Status Goal   Long term Ongoing Patient and clinician will facilitate changes in vocal function in order to restore functional use of voice for daily occupational, social, and emotional demands.    Long term Ongoing Patient will re-establish phonation with adequate balance of airflow and resonance with decreased muscle tension.    Long term Ongoing Patient will achieve improved/normal voice assessed with perceptual scales, acoustic and/or aerodynamic measures within 12 weeks.   Short term Ongoing Patient will reduce vocal effort and fatigue by decreasing upper body tension as evidenced by a decrease in symptoms and lack of observable/palpable signs of hyperkinetic muscular behaviors.   Short term Ongoing Patient will complete SOVT exercises and/or resonant-focused exercises 3-5x daily to strengthen and balance the intrinsic laryngeal musculature and maximize glottic closure without medial hyperfunction.   Short term Ongoing Patient will discriminate between easy and strained phonation with 80% accuracy.    Short term Ongoing Patient will identify the sensations associated with muscle relaxation in the abdominal, thoracic, neck and facial areas during efficient phonation with minimal clinician cue.      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 Singh.  Prognosis for continued improvement is good.     Recommendations / POC    Recommend 2-4 sessions of voice/speech therapy over 6-8 weeks with a speech-language pathologist to optimize glottal postures for improved vocal function, vocal efficiency, and ease of phonation. She should continue the exercises as discussed in session  and should contact me with any further questions.

## 2020-05-13 ENCOUNTER — PATIENT MESSAGE (OUTPATIENT)
Dept: PAIN MEDICINE | Facility: CLINIC | Age: 60
End: 2020-05-13

## 2020-05-13 ENCOUNTER — TELEPHONE (OUTPATIENT)
Dept: PAIN MEDICINE | Facility: CLINIC | Age: 60
End: 2020-05-13

## 2020-05-13 ENCOUNTER — PATIENT OUTREACH (OUTPATIENT)
Dept: ADMINISTRATIVE | Facility: OTHER | Age: 60
End: 2020-05-13

## 2020-05-13 NOTE — PROGRESS NOTES
Chart reviewed.   Immunizations: Triggered Imm Registry     Orders placed: n/a  Upcoming appts to satisfy SHANITA topics: n/a

## 2020-05-13 NOTE — TELEPHONE ENCOUNTER
Staff contacted the patient to confirm virtual visit 05/15/20 7:30 am with Dr. Philip. Staff informed the patient that Dr. Philip has a 30 minute window from the appointment time to join the video.     Patient did not answer therefore staff left detailed voice messages on the patients home and mobile number.     Staff left a my chart email as well.

## 2020-05-15 ENCOUNTER — PATIENT MESSAGE (OUTPATIENT)
Dept: PAIN MEDICINE | Facility: CLINIC | Age: 60
End: 2020-05-15

## 2020-05-15 ENCOUNTER — OFFICE VISIT (OUTPATIENT)
Dept: PAIN MEDICINE | Facility: CLINIC | Age: 60
End: 2020-05-15
Attending: ANESTHESIOLOGY
Payer: COMMERCIAL

## 2020-05-15 DIAGNOSIS — G89.4 CHRONIC PAIN SYNDROME: ICD-10-CM

## 2020-05-15 DIAGNOSIS — G58.8 PUDENDAL NEURALGIA: Primary | ICD-10-CM

## 2020-05-15 DIAGNOSIS — M53.3 COCCYDYNIA: ICD-10-CM

## 2020-05-15 DIAGNOSIS — G54.0 NEUROGENIC THORACIC OUTLET SYNDROME OF LEFT BRACHIAL PLEXUS: ICD-10-CM

## 2020-05-15 DIAGNOSIS — G50.0 TRIGEMINAL NEURALGIA: ICD-10-CM

## 2020-05-15 PROCEDURE — 99214 PR OFFICE/OUTPT VISIT, EST, LEVL IV, 30-39 MIN: ICD-10-PCS | Mod: 95,,, | Performed by: ANESTHESIOLOGY

## 2020-05-15 PROCEDURE — 99214 OFFICE O/P EST MOD 30 MIN: CPT | Mod: 95,,, | Performed by: ANESTHESIOLOGY

## 2020-05-15 NOTE — PROGRESS NOTES
"  Chronic Pain-Tele-Medicine-Established Note (Follow up visit)      The patient location is: Home  The chief complaint leading to consultation is: Pelvic Floor Pain / Lower Back  Visit type: Virtual visit with synchronous audio and video  Total time spent with patient: 45 minutes  Each patient to whom he or she provides medical services by telemedicine is:  (1) informed of the relationship between the physician and patient and the respective role of any other health care provider with respect to management of the patient; and (2) notified that he or she may decline to receive medical services by telemedicine and may withdraw from such care at any time.    Notes:     SUBJECTIVE:    Berenice Hayes presents tele-medicine appointment for a follow-up appointment for Pelvic Floor Pain    Interval History (2/26/2015)  Patient notes 50% pain relief after left pudendal nerve block.  She states relief started within the last 10 days.  After the nerve block, patient sates her pain became worse and she needed to take additional pain medicine to help.  She would like to proceed with the right pudendal nerve block and is requesting a prescription for pain medicine after the injection.     Interval History (5/04/2015):  She returns today for follow up.  She reports that her pain has now slowly returned to baseline.  She reports that the inner thigh pain and posterior "leg crease" pain has all but disappeared after the left pudendal block; however, now it has returned.  She never did get the right-sided pudendal block.  She would like to schedule this today.  She like to schedule this for the first week in June, as her daughter will be having surgery for thyroid cancer coming up.     Of note, she is focusing on multiple areas of pain today, including her right groin/genitofemoral region, lateral ischial bursae regions, right posterior hamstring, and her general pelvic pain.  In addition to this, she also reports occasional " "numbness and tingling in her right lower leg and her entire foot.  This is worse when sitting     Interval History (10/8/2015):  She returns today for follow up.  She reports that the piriformis injection and bilateral pudendal nerve blocks have been helpful for the pain.  Her right sided pain began worsening following a hormone injection.  She inquires as to whether that injection could have irritated her pudendal nerve.  She is also considering hormone pellets and asks whether this could affect it as well.  She also notes some swelling in her left lower leg that is new.     Interval History (11/18/2015):  She returns today for follow up.  She reports that this most recent pudendal block was not helpful for the pain.  She has increased burning following this block, but not the increase in pelvic pain as before.  She is scheduled to try acupuncture.  She is scheduled to see Dr. Feliz for pelvic floor TPIs.  Reports "tailbone pain" that is not worse with sitting or BMs.  Pain in posterior thighs occurs with sitting.     Interval History (5/11/2017):  She returns today for follow up.  She reports a complicated medical course since her last visit. She reports continued groin pain that she reports is the worst today; however, she is focusing more on her back with erh narrative.  She reports low back pain that is bilateral, radiates into her bilateral hips and sometimes into her posterior thighs.  Also, she reports pain in the top of her right foot that hurts when she dorsiflexes her foot and when she plantar flexes her right foot.  Amitriptyline (increased to 60 mg), gabapentin 400 mg QS, and topical cream has been helpful for the pain.     Interval History (10/13/2017):  She returns today for follow up.  She reports that the cream is helpful, but it is too expensive.  Her ilioinguinal pain is improved, as is her vaginal and pudendal pain.  The worse of her pain is in her tailbone, more so on the right.  The pain is " worse with sitting.  She also reports that she feels that her shoulders are twisted, which she is attributing to scoliosis.     Interval History (6/13/2019):  She returns today for follow up.  She has had a lot of medical issues since her last clinic visit.  This has flared up her coccyx pain.  She reports that the compounding cream has been helpful for the pain, but it is getting to be too expensive.  The Amitriptyline, gabapentin, meloxicam have been somewhat helpful.  However, she would like to explore other options.     Interval History (7/2/2019);  The patient returns to clinic today for follow up. She reports increased pain that began on last week. She denies any injury or fall. She reports that she twisted over to reach for something and experienced increased pain. She describes this pain as beginning in her tailbone and radiating into both hips and down the back of her leg to mid thigh, left greater than right. She describes this pain as stabbing and burning. She continues to report intermittent radicular pain. Her pain is worse with prolonged sitting and standing. She continues to report benefit with current medication regimen. She denies any other health changes. Her pain today is 5/10.  Pain Medications:    Interval History 05/15/20:  Patient with complex medical history with overlapping symptoms.  Currently feels that she is better in her tailbone.  Currently, she is reporting left-sided neck pain.  This pain is worse with cold and prolonged activity.  This is associated with ear pain and left arm pain.  Of note, the ear pain is on the surface of the ear and posterior.  It is difficult to touch that area due to sensitivity.  Her arm pain is tingling, burning pain that radiates into her left arm to her hand.  This is also associated with chest wall pain.  She has had a thorough cardiac workup which was negative.  She was told she had tight strap muscles in her neck.  Bothered by Coccygeal Pain. It is a  2/10 at the moment. It is deep and dull. It does not radiate down any extremity at this point, but it has in the past.   Alleviated by Compound Cream, Sitting Down; ice packs. Aggravated by standing up prolonged times.   Paradoxical response to interventions in the past.     Left Arm Pain is shooting pain down triggered by cold. Alleviated by Lidocaine patch, Aspercream, Acupuncture, Heating Pads  Acupuncture has been suspended due to COVID 19 but was markedly helping out.     - Opioids: n/a  - NSAIDs: n/a  - Anti-Depressants: Elavil 60 mg at bedtime  - Anti-Convulsants: Gabapentin 500-800 mg daily  - Others: Skelaxin, Compound Cream    Physical Therapy/Home Exercise: formerly on pelvic floor PT      Pain Procedures:   · Previous bilateral pudendal nerve blocks:  Worsening pain followed by relief  · Most recent right pudendal:  No relief  · Right piriformis injection:  Temporary relief  · 10/2017:  Coccygeal nerve blocks:  90% relief for 3 months, followed by return of pain, but she reports that she was dealing with a lot of medical issues, so she was unsure of the total amount of benefit at the time following 3 months    Imaging: MRI Lumbar Spine 07/2019    Degenerative findings:    T12-L4: No significant spinal canal stenosis or neural foraminal narrowing.    L4-L5: Mild diffuse posterior disc bulge and mild bilateral facet arthropathy with small right facet effusion without significant spinal canal stenosis.  No neural foraminal narrowing.    L5-S1: No significant spinal canal stenosis.  No neural foraminal narrowing.    Miscellaneous: Note is made of prominent bilateral nerve sheath ectasia/perineural sleeve cyst formation throughout the visualized lumbosacral spine.  Findings similar when compared to MR examination 01/16/2017.    Paraspinal muscles & soft tissues: Normal.    Past Medical History:   Diagnosis Date    Asthma with allergic rhinitis     Bladder spasm     Dense breasts      heterogeneously/fibrocystic disease    Elevated antinuclear antibody (GUICHO) level     Endometriosis of cervix     Erythromelalgia     Fibromyalgia     Ganglion cyst     left toe    Goiter     MNG    Hashimoto's disease     Hashimoto's thyroiditis     Headache(784.0)     Hypothyroidism     Hashimoto with + Tg ab    Osteoporosis     femoral neck    Raynaud's phenomenon     Rhinitis     Scoliosis     Sleep disorder     type of Narcolepsy ( resolved)    Vitamin D deficiency disease     Vulvodynia      Past Surgical History:   Procedure Laterality Date    BREAST SURGERY      fibrocystic tumor removed     SECTION      2x    CYST REMOVAL      laparoscopic cyst on ovary    HYSTERECTOMY      INJECTION OF ANESTHETIC AGENT AROUND NERVE N/A 2019    Procedure: BLOCK, NERVE COCCYGEAL  1 OF 2  Pt. can drive herself home;  Surgeon: Monique Philip MD;  Location: [facility];  Service: Pain Management;  Laterality: N/A;    INJECTION OF ANESTHETIC AGENT AROUND NERVE N/A 2019    Procedure: BLOCK, NERVE COCCYGEAL  2 OF 2  Pt. can drive herself home;  Surgeon: Monique Philip MD;  Location: [facility];  Service: Pain Management;  Laterality: N/A;    intradermal cyst       removed from left index finger    SINUS SURGERY      2x     Social History     Socioeconomic History    Marital status:      Spouse name: Not on file    Number of children: Not on file    Years of education: Not on file    Highest education level: Not on file   Occupational History     Employer: [employer]   Social Needs    Financial resource strain: Not on file    Food insecurity:     Worry: Not on file     Inability: Not on file    Transportation needs:     Medical: Not on file     Non-medical: Not on file   Tobacco Use    Smoking status: Never Smoker    Smokeless tobacco: Never Used   Substance and Sexual Activity    Alcohol use: No    Drug use: No    Sexual activity: Not Currently     Birth  control/protection: Surgical     Comment: single, RAMOS ov in situ    Lifestyle    Physical activity:     Days per week: Not on file     Minutes per session: Not on file    Stress: Not on file   Relationships    Social connections:     Talks on phone: Not on file     Gets together: Not on file     Attends [employer]ism service: Not on file     Active member of club or organization: Not on file     Attends meetings of clubs or organizations: Not on file     Relationship status: Not on file   Other Topics Concern    Are you pregnant or think you may be? Not Asked    Breast-feeding Not Asked   Social History Narrative         Family History   Problem Relation Age of Onset    Diabetes Mother     Fibromyalgia Mother     Polymyalgia rheumatica Mother     Macular degeneration Mother     Arthritis Mother     Hypertension Mother     Kidney disease Mother     Pneumonia Mother     Adrenal disorder Mother         adrenal insufficiency    Coronary artery disease Father     Arthritis Father         osteoarthritis    Hypertension Father     Heart disease Father     Diabetes Father     Hyperlipidemia Father     Hyperlipidemia Brother     Cancer Brother         oral    Thyroid cancer Daughter     Cancer Daughter         thyroid    Hypothyroidism Daughter     Breast cancer Neg Hx     Ovarian cancer Neg Hx     Colon cancer Neg Hx     Melanoma Neg Hx        Review of patient's allergies indicates:  No Known Allergies    Current Outpatient Medications   Medication Sig    albuterol (VENTOLIN HFA) 90 mcg/actuation inhaler Inhale 2 puffs into the lungs every 6 (six) hours as needed for Wheezing. Rescue    amitriptyline (ELAVIL) 10 MG tablet TAKE 1 TABLET DAILY    amitriptyline (ELAVIL) 50 MG tablet TAKE 1 TABLET (50 MG) NIGHTLY WITH THE 10 MG AMITRIPTYLINE    aspirin (ECOTRIN) 81 MG EC tablet Take 81 mg by mouth once daily.    cholecalciferol, vitamin D3, (VITAMIN D3) 1,000 unit capsule Take 2,000 Units  by mouth once daily.     estradiol (VAGIFEM) 10 mcg Tab Place 1 tablet (10 mcg total) vaginally 4 (four) times a week.    estradiol 0.05 mg/24 hr td ptsw (VIVELLE-DOT) 0.05 mg/24 hr PLACE 1 PATCH ON THE SKIN TWICE A WEEK    fluticasone (FLONASE) 50 mcg/actuation nasal spray 1 spray by Each Nare route once daily.    gabapentin (NEURONTIN) 100 MG capsule 1in am 2 in pm and 4 at hs    guaifenesin (MUCINEX) 600 mg 12 hr tablet Take 600 mg by mouth 2 (two) times daily.    hydroxychloroquine (PLAQUENIL) 200 mg tablet TAKE 1 TABLET DAILY    lidocaine-prilocaine (EMLA) cream USE AS DIRECTED BY PRESCRIBER OR PACKAGE INSTRUCTIONS    liothyronine (CYTOMEL) 5 MCG Tab TAKE THREE AND ONE-HALF TABLETS ONCE DAILY    meloxicam (MOBIC) 7.5 MG tablet TAKE 1 TABLET (7.5 MG TOTAL) BY MOUTH ONCE DAILY. FOR INFLAMMATION    metaxalone (SKELAXIN) 800 MG tablet TAKE 1 TABLET TWICE A DAY AS NEEDED FOR SPASM    multivitamin capsule Take 1 capsule by mouth once daily.      niacinamide 500 mg Tab Take 1 tablet (500 mg total) by mouth 3 (three) times daily.    pilocarpine (SALAGEN) 5 MG Tab TAKE 1 TABLET THREE TIMES A DAY AS NEEDED    PREVIDENT 5000 DRY MOUTH 1.1 % Gel     RESTASIS 0.05 % ophthalmic emulsion     risedronate 35 mg TbEC TAKE 1 TABLET ONCE A WEEK    UNABLE TO FIND Apply 240 g topically as needed. Ketoprofen/cyclobenzaprine HCI/Lidocaine (lipomax) 10/2/5% Up to 5 times daily as needed for pain    YUVAFEM 10 mcg Tab INSERT 1 TABLET VAGINALLY TWICE WEEKLY    zolpidem (AMBIEN) 10 mg Tab Take 1 tablet (10 mg total) by mouth nightly as needed.     No current facility-administered medications for this visit.      Facility-Administered Medications Ordered in Other Visits   Medication    0.9%  NaCl infusion       REVIEW OF SYSTEMS:    GENERAL:  No weight loss, malaise or fevers.  HEENT:   No recent changes in vision or hearing  NECK:  Negative for lumps, no difficulty with swallowing.  RESPIRATORY:  Negative for cough,  wheezing or shortness of breath, patient denies any recent URI.  CARDIOVASCULAR:  Negative for chest pain, leg swelling or palpitations.  GI:  Negative for abdominal discomfort, blood in stools or black stools or change in bowel habits.  MUSCULOSKELETAL:  See HPI.  SKIN:  Negative for lesions, rash, and itching.  PSYCH:  No mood disorder or recent psychosocial stressors.  Patients sleep is not disturbed secondary to pain.  HEMATOLOGY/LYMPHOLOGY:  Negative for prolonged bleeding, bruising easily or swollen nodes.  Patient is not currently taking any anti-coagulants  NEURO:   No history of headaches, syncope, paralysis, seizures or tremors.  All other reviewed and negative other than HPI.    OBJECTIVE:    General appearance: Well appearing, in no acute distress, alert and oriented x3.  Psych:  Mood and affect appropriate.  EAST test positive on the left at 30 seconds    ASSESSMENT: 60 y.o. year old female with coccygeal pain, consistent with     1. Pudendal neuralgia     2. Chronic pain syndrome     3. Trigeminal neuralgia     4. Coccydynia     5. Neurogenic thoracic outlet syndrome of left brachial plexus         Her pain is consistent with the above.  Her ear pain is not consistent with trigeminal neuralgia.  Her left sided symptoms seem most consistent with neurogenic thoracic outlet syndrome in addition to a myofascial pain syndrome causing neuritis of the lesser occipital and/or auricular nerve.    We discussed the assessment and recommendations.  All available images were reviewed. We discussed the disease process, prognosis, treatment plan, and risks and benefits. The patient is aware of the risks and benefits of the medications being prescribed, common side effects, and proper usage. The following is the plan we agreed on:     1. Referral to Physical therapy for neurogenic thoracic outlet syndrome  2. Can consider anterior/middle scalene diagnostic injection.  Can also consider left pectoralis minor diagnostic  injection.  3. Can consider Botox or Dysport in the future  4. Discussed low-dose naltrexone.  We can consider this in the future if limited response to the above  5. Continue amitriptyline  6. Continue meloxicam  7. Continue Skelaxin.  We discussed changing this today, but she would like to wait  8. Restart acupuncture when able  9. RTC in 3-6 weeks or sooner if needed.      The above plan and management options were discussed at length with patient. Patient is in agreement with the above and verbalized understanding. It will be communicated with the referring physician via electronic record, fax, or mail.    Lincoln Glass  05/15/2020   7:55 AM    I reviewed the patient with the resident physician.  I have performed a video assessment and we have come up with the above plan.  The patient is in agreement with our plan. I agree with the above note which I have edited where appropriate.    Monique Philip MD  05/15/2020

## 2020-05-20 ENCOUNTER — PATIENT OUTREACH (OUTPATIENT)
Dept: ADMINISTRATIVE | Facility: OTHER | Age: 60
End: 2020-05-20

## 2020-05-21 ENCOUNTER — OFFICE VISIT (OUTPATIENT)
Dept: ENDOCRINOLOGY | Facility: CLINIC | Age: 60
End: 2020-05-21
Payer: COMMERCIAL

## 2020-05-21 DIAGNOSIS — M81.0 OSTEOPOROSIS, POSTMENOPAUSAL: ICD-10-CM

## 2020-05-21 DIAGNOSIS — M35.00 SJOGREN'S SYNDROME WITHOUT EXTRAGLANDULAR INVOLVEMENT: ICD-10-CM

## 2020-05-21 DIAGNOSIS — E04.2 MULTINODULAR GOITER: ICD-10-CM

## 2020-05-21 DIAGNOSIS — E03.8 HYPOTHYROIDISM DUE TO HASHIMOTO'S THYROIDITIS: Primary | ICD-10-CM

## 2020-05-21 DIAGNOSIS — E06.3 HYPOTHYROIDISM DUE TO HASHIMOTO'S THYROIDITIS: Primary | ICD-10-CM

## 2020-05-21 PROCEDURE — 99214 OFFICE O/P EST MOD 30 MIN: CPT | Mod: 95,,, | Performed by: INTERNAL MEDICINE

## 2020-05-21 PROCEDURE — 99214 PR OFFICE/OUTPT VISIT, EST, LEVL IV, 30-39 MIN: ICD-10-PCS | Mod: 95,,, | Performed by: INTERNAL MEDICINE

## 2020-05-21 NOTE — PROGRESS NOTES
Subjective:      Patient ID: Berenice Hayes is a 60 y.o. female.    Chief Complaint:  Hypothyroidism due to Hashimoto's thyroiditis    The patient location is: [place]  The chief complaint leading to consultation is: hypothyroidism, thyroid nodules    Visit type: audiovisual    Face to Face time with patient: 25 min  35 minutes of total time spent on the encounter, which includes face to face time and non-face to face time preparing to see the patient (eg, review of tests), Obtaining and/or reviewing separately obtained history, Documenting clinical information in the electronic or other health record, Independently interpreting results (not separately reported) and communicating results to the patient/family/caregiver, or Care coordination (not separately reported).         Each patient to whom he or she provides medical services by telemedicine is:  (1) informed of the relationship between the physician and patient and the respective role of any other health care provider with respect to management of the patient; and (2) notified that he or she may decline to receive medical services by telemedicine and may withdraw from such care at any time.      History of Present Illness  Ms. Hayes presents for follow up of hypothyroidism and osteopenia.  Last visit 02/2019. Former patient of Dr. Espinal.     With Hashimoto thyroiditis with high thyroglobulin ab and negative TPO ab.   Was started and maintained on T3 only regimen by Dr. Espinal.   Currently she is taking Cytomel totalling 17.5mg per day - 5mcg, 2.5mcg, 5mcg, 2.5mcg, and 2.5mcg.  Previously had palpitations when Synthroid was added to the Cytomel. No longer having palpitations on the current regimen and last TSH WNL. She feels like her hypothyroidism has been relatively stable. She denies any palpitations or tremor.      Also has MNG with right mid thyroid nodule s/p benign FNA in 2015, 2018, 2019 and left lobe nodule with benign FNA in 2016. Right inferior  pole nodule underwent FNA x 3 with non-diagnostic results. Molecular markers were negative on the right inferior nodule in 7/2019. No dysphagia or hoarseness.     Neck ultrasound dated 1/2020:  Impression       1.)  Thyroid gland is normal in size with heterogeneous echotexture and increased vascularity    2.) 1.0 x 0.72 x 0.95 cm solid, isoechoic nodule is seen in the right superior pole    3.) 2.77 x 1.38 x 2.12 cm solid, hypoechoic nodule is seen in the right inferior pole    4.) 1.33 x 0.82 x 1.23 cm solid, hypoechoic nodule is seen in the right mid lobe    5.)  0.40 x 0.47 x 0.59 cm solid, hypoechoic nodule is seen in the right mid lobe    6.) 0.98 x 0.63 x 0.83 cm solid, isoechoic nodule is seen in the left superior pole    RECOMMENDATIONS:  Recommend repeat thyroid ultrasound in one year.          Has Osteoporosis and she took Atelvia 35 mg weekly since August/2012 until 2015 that she restarted ERT (vaginal and transdermal). Has had decrease in BMD at the hip despite ERT.   Restarted atlevia 11/2017.  Compliant with regimen.     She takes a multivitamin and Vit D 2000 units daily.      Follows with rheumatology for Sjogrens. On Plaquenil.     Review of Systems   Constitutional: Negative for chills and fever.   Gastrointestinal: Negative for nausea.       Objective:   Physical Exam   Constitutional: She appears well-developed and well-nourished. No distress.     BP Readings from Last 3 Encounters:   01/31/20 110/61   01/02/20 93/63   11/20/19 97/68     Wt Readings from Last 1 Encounters:   01/31/20 0933 43 kg (94 lb 12.8 oz)       There is no height or weight on file to calculate BMI.    Lab Review:   Lab Results   Component Value Date    HGBA1C 5.3 11/23/2016     Lab Results   Component Value Date    CHOL 150 10/12/2019    HDL 85 (H) 10/12/2019    LDLCALC 58.0 (L) 10/12/2019    TRIG 35 10/12/2019    CHOLHDL 56.7 (H) 10/12/2019     Lab Results   Component Value Date     10/12/2019    K 4.4 10/12/2019      10/12/2019    CO2 29 10/12/2019    GLU 95 10/12/2019    BUN 13 10/12/2019    CREATININE 0.7 10/12/2019    CALCIUM 9.2 10/12/2019    PROT 7.1 10/12/2019    ALBUMIN 4.2 10/12/2019    BILITOT 0.5 10/12/2019    ALKPHOS 40 (L) 10/12/2019    AST 20 10/12/2019    ALT 21 10/12/2019    ANIONGAP 10 10/12/2019    ESTGFRAFRICA >60 10/12/2019    EGFRNONAA >60 10/12/2019    TSH 1.327 11/05/2019         Assessment and Plan     Hypothyroidism due to Hashimoto's thyroiditis  --Explained goals of treatment is to keep the TSH in the normal range to avoid CVS and bone complications.  --Will continue Cytomel 17.5mg per day - 5mcg, 2.5mcg, 5mcg, 2.5mcg, and 2.5mcg for now   --She understands that this is an atypical thyroid hormone replacement regimen and she will let me us know right away if she develops any hyperthyroid symptoms  --We discussed continuing this regimen so long as her TSH is not suppressed and she does not have any palpitations   --Repeat TFT's with next set of labs    Multinodular goiter  --With multiple thyroid nodules  --has had multiple benign FNAs of the right mid lobe nodule, and 1 benign FNA of the left lobe nodule  --she has had 3 nondiagnostic FNAs of the right inferior pole nodule, however, molecular markers were negative on this nodule in 07/2019  --due for repeat ultrasound in 01/2021      Osteoporosis, postmenopausal  --On ERT  --Will also continue Actonel 35 mg weekly  --repeat bone density in 10/2021  --continue vitamin-D supplementation  --stressed importance of falls avoidance      Sjogren's syndrome    Recently became SSA+ on labs.  Started on plaquenil (prescribed 2/20/19).  Discussed that it may take a few weeks before patient notes any improvement.    Discussed preference for patient to not titrate cytomel at this time and wait to see if some of her symptoms improve with compliance of plaquenil.  Patient v/u.        Follow-up in 01/2021    Leandro Espinoza M.D. Staff Endocrinology

## 2020-05-22 ENCOUNTER — PATIENT MESSAGE (OUTPATIENT)
Dept: RHEUMATOLOGY | Facility: CLINIC | Age: 60
End: 2020-05-22

## 2020-05-23 NOTE — ASSESSMENT & PLAN NOTE
Recently became SSA+ on labs.  Started on plaquenil (prescribed 2/20/19).  Discussed that it may take a few weeks before patient notes any improvement.    Discussed preference for patient to not titrate cytomel at this time and wait to see if some of her symptoms improve with compliance of plaquenil.  Patient v/u.

## 2020-05-23 NOTE — ASSESSMENT & PLAN NOTE
--On ERT  --Will also continue Actonel 35 mg weekly  --repeat bone density in 10/2021  --continue vitamin-D supplementation  --stressed importance of falls avoidance

## 2020-05-23 NOTE — ASSESSMENT & PLAN NOTE
--Explained goals of treatment is to keep the TSH in the normal range to avoid CVS and bone complications.  --Will continue Cytomel 17.5mg per day - 5mcg, 2.5mcg, 5mcg, 2.5mcg, and 2.5mcg for now   --She understands that this is an atypical thyroid hormone replacement regimen and she will let me us know right away if she develops any hyperthyroid symptoms  --We discussed continuing this regimen so long as her TSH is not suppressed and she does not have any palpitations   --Repeat TFT's with next set of labs

## 2020-05-27 DIAGNOSIS — E03.8 HYPOTHYROIDISM DUE TO HASHIMOTO'S THYROIDITIS: ICD-10-CM

## 2020-05-27 DIAGNOSIS — Z12.11 COLON CANCER SCREENING: ICD-10-CM

## 2020-05-27 DIAGNOSIS — E06.3 HYPOTHYROIDISM DUE TO HASHIMOTO'S THYROIDITIS: ICD-10-CM

## 2020-05-27 DIAGNOSIS — G47.00 INSOMNIA, UNSPECIFIED TYPE: ICD-10-CM

## 2020-05-28 ENCOUNTER — CLINICAL SUPPORT (OUTPATIENT)
Dept: SPEECH THERAPY | Facility: HOSPITAL | Age: 60
End: 2020-05-28
Payer: COMMERCIAL

## 2020-05-28 DIAGNOSIS — M79.7 FIBROMYALGIA: Primary | ICD-10-CM

## 2020-05-28 DIAGNOSIS — R49.0 MUSCLE TENSION DYSPHONIA: ICD-10-CM

## 2020-05-28 PROCEDURE — 92507 TX SP LANG VOICE COMM INDIV: CPT | Mod: GN,95 | Performed by: SPEECH-LANGUAGE PATHOLOGIST

## 2020-05-28 RX ORDER — ZOLPIDEM TARTRATE 10 MG/1
TABLET ORAL
Qty: 30 TABLET | Refills: 3 | Status: SHIPPED | OUTPATIENT
Start: 2020-05-28 | End: 2020-07-01 | Stop reason: SDUPTHER

## 2020-05-28 NOTE — PROGRESS NOTES
Referring provider: Dr. Andrew Singh  Reason for visit:  Voice treatment (CPT 47830)  Session #4    The patient location is: [place]  The chief complaint leading to consultation is: dysphonia  Visit type: Virtual visit with synchronous audio and video  Total time spent with patient: 30 min  Each patient to whom he or she provides medical services by telemedicine is:  (1) informed of the relationship between the physician and patient and the respective role of any other health care provider with respect to management of the patient; and (2) notified that he or she may decline to receive medical services by telemedicine and may withdraw from such care at any time.    History / Subjective   I had the pleasure of seeing Berenice Hayes for her fourth treatment session following complete voice evaluation on 2/4/20.  During that time, improvements were noted on resonant voice exercises.  Since last session on 5/7/20, she has continued using her resonant voice exercises as well as some CLM.  She reports experiencing a setback last week and describes some prolonged hoarseness/throat discomfort, but feels she is already improving again.  Had f/u with pain management and reports they are querying TOS and referring for more PT.      Objective   The primary goal of todays session was to elicit improved voice.  This was targeted using resonant treatment modalities and laryngeal massage.      Perceptual/behavioral assessment  -CAPE-V Overall Score: 12  -Quality: mild intermittent strain, singh  -Volume: decreased projection  -Pitch: appropriate for age and gender  -Flexibility: diminished  -Habitual respiratory pattern: chest/clavicular    Treatment  Reviewed resonant voice /m/ into connected speech.  Reviewed chin down posture.  Instructed pt on circumlaryngeal massage techniques: BOT massage, and thyrohyoid massage.  Overall pt feeling encouraged by her progress.   Encouraged continued daily practice.  She was amenable to all  suggestions.     Functional goals  Length Status Goal   Long term Met Patient and clinician will facilitate changes in vocal function in order to restore functional use of voice for daily occupational, social, and emotional demands.    Long term Ongoing / Met Patient will re-establish phonation with adequate balance of airflow and resonance with decreased muscle tension.    Long term Met Patient will achieve improved/normal voice assessed with perceptual scales, acoustic and/or aerodynamic measures within 12 weeks.   Short term Ongoing Patient will reduce vocal effort and fatigue by decreasing upper body tension as evidenced by a decrease in symptoms and lack of observable/palpable signs of hyperkinetic muscular behaviors.   Short term Ongoing Patient will complete SOVT exercises and/or resonant-focused exercises 3-5x daily to strengthen and balance the intrinsic laryngeal musculature and maximize glottic closure without medial hyperfunction.   Short term Ongoing Patient will discriminate between easy and strained phonation with 80% accuracy.    Short term Ongoing Patient will identify the sensations associated with muscle relaxation in the abdominal, thoracic, neck and facial areas during efficient phonation with minimal clinician cue.      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 Singh.  She has made progress toward her goals.     Recommendations / POC    Recommend d/c from voice therapy at this time.  Encouraged pt to reach out if she experiences a deterioration but she has plans to begin working with PT.   She should continue the exercises as discussed in session and should contact me with any further questions.   F/u with Dr. Singh PRJENIFER.

## 2020-06-04 ENCOUNTER — PATIENT OUTREACH (OUTPATIENT)
Dept: ADMINISTRATIVE | Facility: OTHER | Age: 60
End: 2020-06-04

## 2020-06-08 ENCOUNTER — OFFICE VISIT (OUTPATIENT)
Dept: RHEUMATOLOGY | Facility: CLINIC | Age: 60
End: 2020-06-08
Payer: COMMERCIAL

## 2020-06-08 DIAGNOSIS — M35.00 SJOGREN'S SYNDROME, WITH UNSPECIFIED ORGAN INVOLVEMENT: Primary | ICD-10-CM

## 2020-06-08 DIAGNOSIS — M79.7 FIBROMYALGIA: ICD-10-CM

## 2020-06-08 DIAGNOSIS — R53.83 FATIGUE, UNSPECIFIED TYPE: ICD-10-CM

## 2020-06-08 DIAGNOSIS — I73.81 ERYTHROMELALGIA: ICD-10-CM

## 2020-06-08 DIAGNOSIS — I73.00 RAYNAUD'S DISEASE WITHOUT GANGRENE: ICD-10-CM

## 2020-06-08 PROCEDURE — 99214 OFFICE O/P EST MOD 30 MIN: CPT | Mod: 95,,, | Performed by: INTERNAL MEDICINE

## 2020-06-08 PROCEDURE — 99214 PR OFFICE/OUTPT VISIT, EST, LEVL IV, 30-39 MIN: ICD-10-PCS | Mod: 95,,, | Performed by: INTERNAL MEDICINE

## 2020-06-08 NOTE — PROGRESS NOTES
Rapid3 Question Responses and Scores 6/6/2020   MDHAQ Score 1.4   Psychologic Score 3.3   Pain Score 6.5   When you awakened in the morning OVER THE LAST WEEK, did you feel stiff? Yes   If Yes, please indicate the number of hours until you are as limber as you will be for the day 2   Fatigue Score 7   Global Health Score 6.5   RAPID3 Score 5.88

## 2020-06-08 NOTE — PROGRESS NOTES
"Subjective:       Patient ID: Berenice Hayes is a 60 y.o. female.    Chief Complaint: Sjogrens    HPI:  Berenice Hayes is a 60 y.o. female followed for concerns of autoimmune issues.   Diagnosed with fibromyalgia at age 34.   At age 42 had erythromyelgia with redness all over. She was treated by vascular with a medication but caused Raynauds to develop.  She saw Dr. Hill in vascular.      She has history significant pelvic discomfort since 2009.  She saw therapist for pelvic floor therapy which has helped. 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  She had pudenal nerve block 2/3/15.     She saw Dr. Harmon in the past who felt she had fibriomyalgia.  She tried amitriptyline for some time.   She takes Atelvia for OP.        She has felt bad since 2014.  "Hit with massive heat wave beyond what others feel with menopause."  Mouth was very dry and primary gave pilocarpine.  Spring of 2015 GUICHO was negative.  Previously had hand pains.  X-rays of hands normal.  She was found by endocrine to be hypoglycemic.  Ultrasound thyroid showed nodules that were negative on biopsy.  August 2015 diagnosed as having Hashimotos disease.   Synthroid did not help levels.  She is on Cytomel now.    Saw ENT for throat pain in neck since ultrasound was done.  ENT will biopsy a solid nodule on left lobe thyroid.  CT with contrast showed enlargement in the saliva gland.  Her mother had fibromyalgia and PMR as well as history ETOH abuse as an adult.  Father had osteoarthritis and required complete thumb reconstruction on one thumb.     Daughter with Hashimotos and thyroid Ca.    Interval History:    Using Niacinamide for fingers and they have not reopened but had to decrease frequency due to erythromyelgia.  Initially felt bad on Plaquenil started in February 2019 then 6 weeks later had best weekend of her life.  Still feels Plaquenil helps but does not make her 100% better.       Dermatology did skin biopsy and diagnosed " nadege (no evidence of lupus vasculitis).   She lost her nails but they are growing back.  Long standing history of erythromyelgia and notices more of the feet.   She gets nerve pain in feet.  Difficulty walking at end of day.  Uses compression sock.   Decreased frequency of pilocarpine due to sweats.   Pain in back of throat also.  Doing speech for strap muscle.    Dr. Philip recommended low dose naltrexone.        Review of Systems   Constitutional: Positive for fatigue and unexpected weight change (weight gain until last year now losing). Negative for fever.   HENT: Positive for trouble swallowing.    Eyes: Positive for redness.   Respiratory: Negative for cough and shortness of breath.    Cardiovascular: Negative for chest pain.   Gastrointestinal: Positive for constipation. Negative for diarrhea.   Endocrine: Negative.    Genitourinary: Negative for dysuria and genital sores.   Musculoskeletal: Positive for arthralgias.   Skin: Negative for rash.        Redness of fingers   Allergic/Immunologic: Negative.    Neurological: Positive for headaches.   Hematological: Positive for adenopathy. Bruises/bleeds easily.   Psychiatric/Behavioral: Negative.          Objective:   LMP  (LMP Unknown)  Virtual     Physical Exam   Constitutional: She is oriented to person, place, and time and well-developed, well-nourished, and in no distress.   HENT:   Head: Normocephalic and atraumatic.   Eyes: Conjunctivae and EOM are normal.   Neck: Neck supple.   Neurological: She is alert and oriented to person, place, and time.   Skin: Skin is warm and dry. There is erythema.     Erythema at periungal area multiple fingers on right hand   Psychiatric: Mood and affect normal.       LABS    Component      Latest Ref Rng & Units 2/5/2020 11/5/2019 10/12/2019   WBC      3.90 - 12.70 K/uL   4.05   RBC      4.00 - 5.40 M/uL   4.52   Hemoglobin      12.0 - 16.0 g/dL   13.6   Hematocrit      37.0 - 48.5 %   41.3   MCV      82 - 98 fL   91   MCH       27.0 - 31.0 pg   30.1   MCHC      32.0 - 36.0 g/dL   32.9   RDW      11.5 - 14.5 %   13.3   Platelets      150 - 350 K/uL   198   MPV      9.2 - 12.9 fL   8.5 (L)   Gran # (ANC)      1.8 - 7.7 K/uL   2.6   Lymph #      1.0 - 4.8 K/uL   0.8 (L)   Mono #      0.3 - 1.0 K/uL   0.4   Eos #      0.0 - 0.5 K/uL   0.2   Baso #      0.00 - 0.20 K/uL   0.04   Gran%      38.0 - 73.0 %   65.2   Lymph%      18.0 - 48.0 %   19.0   Mono%      4.0 - 15.0 %   9.9   Eosinophil%      0.0 - 8.0 %   4.9   Basophil%      0.0 - 1.9 %   1.0   Differential Method         Automated   Sodium      136 - 145 mmol/L   141   Potassium      3.5 - 5.1 mmol/L   4.4   Chloride      95 - 110 mmol/L   102   CO2      23 - 29 mmol/L   29   Glucose      70 - 110 mg/dL   95   BUN, Bld      6 - 20 mg/dL   13   Creatinine      0.5 - 1.4 mg/dL   0.7   Calcium      8.7 - 10.5 mg/dL   9.2   PROTEIN TOTAL      6.0 - 8.4 g/dL   7.1   Albumin      3.5 - 5.2 g/dL   4.2   BILIRUBIN TOTAL      0.1 - 1.0 mg/dL   0.5   Alkaline Phosphatase      55 - 135 U/L   40 (L)   AST      10 - 40 U/L   20   ALT      10 - 44 U/L   21   Anion Gap      8 - 16 mmol/L   10   eGFR if African American      >60 mL/min/1.73 m:2   >60   eGFR if non African American      >60 mL/min/1.73 m:2   >60   Cholesterol      120 - 199 mg/dL   150   Triglycerides      30 - 150 mg/dL   35   HDL      40 - 75 mg/dL   85 (H)   LDL Cholesterol External      63.0 - 159.0 mg/dL   58.0 (L)   Hdl/Cholesterol Ratio      20.0 - 50.0 %   56.7 (H)   Total Cholesterol/HDL Ratio      2.0 - 5.0   1.8 (L)   Non-HDL Cholesterol      mg/dL   65   GUICHO Screen      Negative <1:160  Negative <1:160    TSH      0.400 - 4.000 uIU/mL  1.327    Free T4      0.71 - 1.51 ng/dL  0.57 (L)    T3, Free      2.3 - 4.2 pg/mL  3.0    T3, Total      60 - 180 ng/dL  88    Hepatitis C Ab      Negative Negative     Hepatitis B Surface Ag      Negative Negative     Hep B S Ab       Negative     Hep B Core Total Ab       Negative              Assessment:       1. Sjogren syndrome. Chronic.  Negative SSA and SSB in past now SSA positive. Continue restasis.  Repeat labs.    Currently symptomatic management with Restasis and pilocarpine.   2. Raynauds. Symptomatic management. Still with changes around nails but no digital ulcers.   3. Fibromyalgia. Managed with amitriptyline. Persistent pain and unable to increase amitriptyline due to worsening fatigue  4. Hand pain b/l. X-ray without changes. Has periodic episodes of pain.  5. Osteoporosis.  No longer on Atelvia. Now on estradiol patch.  6. Erythromelalgia.  Takes baby aspirin  7. Sleep disorder. History of narcolepsy. Sleep doctor has given Ritalin bid which helps.  8. Increased heat in body. Improved  9. Menopause  10.  Hashimotos disease  11.  Multinodular goiter   12.  Pudenal nerve pain.  Improved with acupuncture which she continues every 2 weeks. Worsened with water therapy so stopped.    13.  Pernio.  On niacinamide  14.  Rash on abdomen x3 weeks.  Concern for Addisons in this patient with hypotension  15.  Fatigue.  Heavy   16.  Decreased memory  Plan:       1.  Continue Plaquenil.   (Patient due now and will schedule)  2.  Patient to keep regular follow ups to monitor for progression to rheumatologic disorder.  3.  Continue fibromyalgia treatment.  4.  Patient unable to use vasodilator for Raynaud's due to low BP  5.  Patient to consider elevating legs at work and continue compression stockings       RTO 4 months/prn      The patient location is: home  The chief complaint leading to consultation is: Sjogren    Visit type: TELE AUDIOVISUAL:[id number]    Face to Face time with patient: 20  25 minutes of total time spent on the encounter, which includes face to face time and non-face to face time preparing to see the patient (eg, review of tests), Obtaining and/or reviewing separately obtained history, Documenting clinical information in the electronic or other health record, Independently  interpreting results (not separately reported) and communicating results to the patient/family/caregiver, or Care coordination (not separately reported).         Each patient to whom he or she provides medical services by telemedicine is:  (1) informed of the relationship between the physician and patient and the respective role of any other health care provider with respect to management of the patient; and (2) notified that he or she may decline to receive medical services by telemedicine and may withdraw from such care at any time.    Notes: see above note

## 2020-06-13 ENCOUNTER — LAB VISIT (OUTPATIENT)
Dept: LAB | Facility: HOSPITAL | Age: 60
End: 2020-06-13
Attending: INTERNAL MEDICINE
Payer: COMMERCIAL

## 2020-06-13 DIAGNOSIS — I73.81 ERYTHROMELALGIA: ICD-10-CM

## 2020-06-13 DIAGNOSIS — M35.00 SJOGREN'S SYNDROME, WITH UNSPECIFIED ORGAN INVOLVEMENT: ICD-10-CM

## 2020-06-13 DIAGNOSIS — M79.7 FIBROMYALGIA: ICD-10-CM

## 2020-06-13 DIAGNOSIS — R53.83 FATIGUE, UNSPECIFIED TYPE: ICD-10-CM

## 2020-06-13 DIAGNOSIS — I73.00 RAYNAUD'S DISEASE WITHOUT GANGRENE: ICD-10-CM

## 2020-06-13 LAB
BILIRUB UR QL STRIP: NEGATIVE
CLARITY UR REFRACT.AUTO: CLEAR
COLOR UR AUTO: YELLOW
CREAT UR-MCNC: 35 MG/DL (ref 15–325)
GLUCOSE UR QL STRIP: NEGATIVE
HGB UR QL STRIP: NEGATIVE
KETONES UR QL STRIP: NEGATIVE
LEUKOCYTE ESTERASE UR QL STRIP: NEGATIVE
NITRITE UR QL STRIP: NEGATIVE
PH UR STRIP: 6 [PH] (ref 5–8)
PROT UR QL STRIP: NEGATIVE
PROT UR-MCNC: <7 MG/DL (ref 0–15)
PROT/CREAT UR: NORMAL MG/G{CREAT} (ref 0–0.2)
SP GR UR STRIP: 1.01 (ref 1–1.03)
URN SPEC COLLECT METH UR: NORMAL

## 2020-06-13 PROCEDURE — 84156 ASSAY OF PROTEIN URINE: CPT

## 2020-06-13 PROCEDURE — 81003 URINALYSIS AUTO W/O SCOPE: CPT

## 2020-06-15 ENCOUNTER — PATIENT MESSAGE (OUTPATIENT)
Dept: RHEUMATOLOGY | Facility: CLINIC | Age: 60
End: 2020-06-15

## 2020-07-01 ENCOUNTER — PATIENT MESSAGE (OUTPATIENT)
Dept: INTERNAL MEDICINE | Facility: CLINIC | Age: 60
End: 2020-07-01

## 2020-07-01 DIAGNOSIS — E03.8 HYPOTHYROIDISM DUE TO HASHIMOTO'S THYROIDITIS: ICD-10-CM

## 2020-07-01 DIAGNOSIS — Z12.11 COLON CANCER SCREENING: ICD-10-CM

## 2020-07-01 DIAGNOSIS — G47.00 INSOMNIA, UNSPECIFIED TYPE: ICD-10-CM

## 2020-07-01 DIAGNOSIS — E06.3 HYPOTHYROIDISM DUE TO HASHIMOTO'S THYROIDITIS: ICD-10-CM

## 2020-07-02 ENCOUNTER — TELEPHONE (OUTPATIENT)
Dept: INTERNAL MEDICINE | Facility: CLINIC | Age: 60
End: 2020-07-02

## 2020-07-02 DIAGNOSIS — E03.8 HYPOTHYROIDISM DUE TO HASHIMOTO'S THYROIDITIS: ICD-10-CM

## 2020-07-02 DIAGNOSIS — Z12.11 COLON CANCER SCREENING: ICD-10-CM

## 2020-07-02 DIAGNOSIS — G47.00 INSOMNIA, UNSPECIFIED TYPE: ICD-10-CM

## 2020-07-02 DIAGNOSIS — E06.3 HYPOTHYROIDISM DUE TO HASHIMOTO'S THYROIDITIS: ICD-10-CM

## 2020-07-02 RX ORDER — ZOLPIDEM TARTRATE 10 MG/1
TABLET ORAL
Qty: 30 TABLET | Refills: 3 | Status: SHIPPED | OUTPATIENT
Start: 2020-07-02 | End: 2020-07-29 | Stop reason: SDUPTHER

## 2020-07-02 RX ORDER — ZOLPIDEM TARTRATE 10 MG/1
TABLET ORAL
Qty: 30 TABLET | Refills: 3 | Status: CANCELLED | OUTPATIENT
Start: 2020-07-02

## 2020-07-02 NOTE — TELEPHONE ENCOUNTER
Yesterday I sent the prescription to [facility] pharmacy, please call and see if they were able to fill it otherwise I will send it to our pharmacy.    Ambien

## 2020-07-15 DIAGNOSIS — G47.00 INSOMNIA, UNSPECIFIED TYPE: ICD-10-CM

## 2020-07-15 RX ORDER — ZOLPIDEM TARTRATE 10 MG/1
TABLET ORAL
Qty: 30 TABLET | Refills: 3 | Status: CANCELLED | OUTPATIENT
Start: 2020-07-15

## 2020-07-23 NOTE — PATIENT INSTRUCTIONS
New shingles vaccine: SHINGRIX ( 2018) not live, 90%,  2 shots, one at day zero and the 2nd at 2-6 months: any pharmacy can give it.    Tdap     English

## 2020-07-29 ENCOUNTER — OFFICE VISIT (OUTPATIENT)
Dept: INTERNAL MEDICINE | Facility: CLINIC | Age: 60
End: 2020-07-29
Payer: COMMERCIAL

## 2020-07-29 ENCOUNTER — LAB VISIT (OUTPATIENT)
Dept: LAB | Facility: HOSPITAL | Age: 60
End: 2020-07-29
Attending: INTERNAL MEDICINE
Payer: COMMERCIAL

## 2020-07-29 VITALS
OXYGEN SATURATION: 98 % | DIASTOLIC BLOOD PRESSURE: 50 MMHG | HEART RATE: 89 BPM | SYSTOLIC BLOOD PRESSURE: 100 MMHG | HEIGHT: 61 IN | BODY MASS INDEX: 17.61 KG/M2 | WEIGHT: 93.25 LBS

## 2020-07-29 DIAGNOSIS — E03.9 HYPOTHYROIDISM, UNSPECIFIED TYPE: ICD-10-CM

## 2020-07-29 DIAGNOSIS — G47.00 INSOMNIA, UNSPECIFIED TYPE: Primary | ICD-10-CM

## 2020-07-29 DIAGNOSIS — R07.89 DISCOMFORT IN CHEST: ICD-10-CM

## 2020-07-29 DIAGNOSIS — I73.81 ERYTHROMELALGIA: ICD-10-CM

## 2020-07-29 DIAGNOSIS — E06.3 HYPOTHYROIDISM DUE TO HASHIMOTO'S THYROIDITIS: ICD-10-CM

## 2020-07-29 DIAGNOSIS — M35.00 SJOGREN'S SYNDROME, WITH UNSPECIFIED ORGAN INVOLVEMENT: ICD-10-CM

## 2020-07-29 DIAGNOSIS — R68.89 SENSATION OF FEELING HOT: ICD-10-CM

## 2020-07-29 DIAGNOSIS — Z12.31 ENCOUNTER FOR SCREENING MAMMOGRAM FOR BREAST CANCER: ICD-10-CM

## 2020-07-29 DIAGNOSIS — E03.8 HYPOTHYROIDISM DUE TO HASHIMOTO'S THYROIDITIS: ICD-10-CM

## 2020-07-29 LAB
ALBUMIN SERPL BCP-MCNC: 4.1 G/DL (ref 3.5–5.2)
ALP SERPL-CCNC: 54 U/L (ref 55–135)
ALT SERPL W/O P-5'-P-CCNC: 20 U/L (ref 10–44)
ANION GAP SERPL CALC-SCNC: 10 MMOL/L (ref 8–16)
AST SERPL-CCNC: 21 U/L (ref 10–40)
BASOPHILS # BLD AUTO: 0.07 K/UL (ref 0–0.2)
BASOPHILS NFR BLD: 1.3 % (ref 0–1.9)
BILIRUB SERPL-MCNC: 0.3 MG/DL (ref 0.1–1)
BUN SERPL-MCNC: 14 MG/DL (ref 6–20)
CALCIUM SERPL-MCNC: 9.5 MG/DL (ref 8.7–10.5)
CHLORIDE SERPL-SCNC: 101 MMOL/L (ref 95–110)
CHOLEST SERPL-MCNC: 150 MG/DL (ref 120–199)
CHOLEST/HDLC SERPL: 1.8 {RATIO} (ref 2–5)
CO2 SERPL-SCNC: 28 MMOL/L (ref 23–29)
CREAT SERPL-MCNC: 0.7 MG/DL (ref 0.5–1.4)
DIFFERENTIAL METHOD: ABNORMAL
EOSINOPHIL # BLD AUTO: 0.3 K/UL (ref 0–0.5)
EOSINOPHIL NFR BLD: 5.9 % (ref 0–8)
ERYTHROCYTE [DISTWIDTH] IN BLOOD BY AUTOMATED COUNT: 12.8 % (ref 11.5–14.5)
EST. GFR  (AFRICAN AMERICAN): >60 ML/MIN/1.73 M^2
EST. GFR  (NON AFRICAN AMERICAN): >60 ML/MIN/1.73 M^2
GLUCOSE SERPL-MCNC: 91 MG/DL (ref 70–110)
HCT VFR BLD AUTO: 43.6 % (ref 37–48.5)
HDLC SERPL-MCNC: 84 MG/DL (ref 40–75)
HDLC SERPL: 56 % (ref 20–50)
HGB BLD-MCNC: 13.6 G/DL (ref 12–16)
IMM GRANULOCYTES # BLD AUTO: 0.02 K/UL (ref 0–0.04)
IMM GRANULOCYTES NFR BLD AUTO: 0.4 % (ref 0–0.5)
LDLC SERPL CALC-MCNC: 42 MG/DL (ref 63–159)
LYMPHOCYTES # BLD AUTO: 0.9 K/UL (ref 1–4.8)
LYMPHOCYTES NFR BLD: 15.9 % (ref 18–48)
MCH RBC QN AUTO: 30.1 PG (ref 27–31)
MCHC RBC AUTO-ENTMCNC: 31.2 G/DL (ref 32–36)
MCV RBC AUTO: 97 FL (ref 82–98)
MONOCYTES # BLD AUTO: 0.5 K/UL (ref 0.3–1)
MONOCYTES NFR BLD: 8.8 % (ref 4–15)
NEUTROPHILS # BLD AUTO: 3.8 K/UL (ref 1.8–7.7)
NEUTROPHILS NFR BLD: 67.7 % (ref 38–73)
NONHDLC SERPL-MCNC: 66 MG/DL
NRBC BLD-RTO: 0 /100 WBC
PLATELET # BLD AUTO: 205 K/UL (ref 150–350)
PMV BLD AUTO: 9.5 FL (ref 9.2–12.9)
POTASSIUM SERPL-SCNC: 4.2 MMOL/L (ref 3.5–5.1)
PROT SERPL-MCNC: 7.2 G/DL (ref 6–8.4)
RBC # BLD AUTO: 4.52 M/UL (ref 4–5.4)
SODIUM SERPL-SCNC: 139 MMOL/L (ref 136–145)
T3 SERPL-MCNC: 71 NG/DL (ref 60–180)
T4 FREE SERPL-MCNC: 0.57 NG/DL (ref 0.71–1.51)
TRIGL SERPL-MCNC: 120 MG/DL (ref 30–150)
TSH SERPL DL<=0.005 MIU/L-ACNC: 1.01 UIU/ML (ref 0.4–4)
WBC # BLD AUTO: 5.59 K/UL (ref 3.9–12.7)

## 2020-07-29 PROCEDURE — 99999 PR PBB SHADOW E&M-EST. PATIENT-LVL V: ICD-10-PCS | Mod: PBBFAC,,, | Performed by: INTERNAL MEDICINE

## 2020-07-29 PROCEDURE — 99999 PR PBB SHADOW E&M-EST. PATIENT-LVL V: CPT | Mod: PBBFAC,,, | Performed by: INTERNAL MEDICINE

## 2020-07-29 PROCEDURE — 84439 ASSAY OF FREE THYROXINE: CPT

## 2020-07-29 PROCEDURE — 36415 COLL VENOUS BLD VENIPUNCTURE: CPT

## 2020-07-29 PROCEDURE — 85025 COMPLETE CBC W/AUTO DIFF WBC: CPT

## 2020-07-29 PROCEDURE — 84443 ASSAY THYROID STIM HORMONE: CPT

## 2020-07-29 PROCEDURE — 99214 OFFICE O/P EST MOD 30 MIN: CPT | Mod: S$GLB,,, | Performed by: INTERNAL MEDICINE

## 2020-07-29 PROCEDURE — 80053 COMPREHEN METABOLIC PANEL: CPT

## 2020-07-29 PROCEDURE — 3008F BODY MASS INDEX DOCD: CPT | Mod: CPTII,S$GLB,, | Performed by: INTERNAL MEDICINE

## 2020-07-29 PROCEDURE — 99214 PR OFFICE/OUTPT VISIT, EST, LEVL IV, 30-39 MIN: ICD-10-PCS | Mod: S$GLB,,, | Performed by: INTERNAL MEDICINE

## 2020-07-29 PROCEDURE — 84480 ASSAY TRIIODOTHYRONINE (T3): CPT

## 2020-07-29 PROCEDURE — 80061 LIPID PANEL: CPT

## 2020-07-29 PROCEDURE — 3008F PR BODY MASS INDEX (BMI) DOCUMENTED: ICD-10-PCS | Mod: CPTII,S$GLB,, | Performed by: INTERNAL MEDICINE

## 2020-07-29 RX ORDER — ZOLPIDEM TARTRATE 10 MG/1
TABLET ORAL
Qty: 30 TABLET | Refills: 3 | Status: SHIPPED | OUTPATIENT
Start: 2020-07-29 | End: 2020-11-25 | Stop reason: SDUPTHER

## 2020-07-29 NOTE — PROGRESS NOTES
Subjective:      Patient ID: Berenice Hayes is a 60 y.o. female.    Chief Complaint: Follow-up    HPI:  HPI   We updated her Health Maintenance today    Discussed Plaquenil monitoring    Glucose 111 : was in the lat afternoon    Will order her thyroid studies, doing well under the care of Dr. Espinoza    Patient had a list of issues: discussed    Annual exam 11/2019  Consultants:  Endocrine: Dr. Espinoza  Cardiology: Dr. Reagan : 9/2019  EKG : negative for ischemia ( Stress Test  Dermatology: Dr. Cobb  Rheumatology: Dr. Landrum Sjogrens  Gyn: Dr. Feliz  Pain: Dr. Philip  Trigeminal neuralgia  Dr. Coppola     Annual exam: 11/5/2019  Colonoscopy 8/6/2012 repeat in 10 years: FIT 2019  Mammogram: 12/22019  Gyn: will schedule in 11/2020  Optho: completed a 6 month for plaquenil: Dr. Echeverria scheduled  Flu: done  Tetanus:discussed  Shingles:done 12/17/2015  Shingrix : 2 shots completed  Pneumovax 9/30/2014     Continues band pain along the right groin           Patient Active Problem List   Diagnosis    Fibromyalgia    Bladder spasm    Osteoporosis, postmenopausal    Fibrocystic breast changes    Vulvar vestibulodynia    Vulvar pain    Vascular disorder: Erytnromelalgia    Raynaud's disease    Incomplete defecation    Straining during bowel movements    Chronic constipation    Rectocele    Disorder of muscle    Anxiety    Bilateral hand pain    Fatigue    Perimenopause vs Memopause    Menopause syndrome    Pudendal neuralgia    Pelvic pain in female    Urinary hesitancy    Hypothyroidism due to Hashimoto's thyroiditis    Multinodular goiter    Encounter for herb and vitamin supplement management    Gluten free diet    Family history of ischemic heart disease (IHD)    Cystocele, midline    Estefani syndrome    Chronic pain syndrome    Trigeminal neuralgia    Hip pain, right    Erythema of finger    Erythromelalgia    Sjogren's syndrome    Pernio    Rash    Chronic pain    Coccydynia     Muscle tension dysphonia     Past Medical History:   Diagnosis Date    Asthma with allergic rhinitis     Bladder spasm     Dense breasts     heterogeneously/fibrocystic disease    Elevated antinuclear antibody (GUICHO) level     Endometriosis of cervix     Erythromelalgia     Fibromyalgia     Ganglion cyst     left toe    Goiter     MNG    Hashimoto's disease     Hashimoto's thyroiditis     Headache(784.0)     Hypothyroidism     Hashimoto with + Tg ab    Osteoporosis     femoral neck    Raynaud's phenomenon     Rhinitis     Scoliosis     Sleep disorder     type of Narcolepsy ( resolved)    Vitamin D deficiency disease     Vulvodynia      Past Surgical History:   Procedure Laterality Date    BREAST SURGERY      fibrocystic tumor removed     SECTION      2x    CYST REMOVAL      laparoscopic cyst on ovary    HYSTERECTOMY      INJECTION OF ANESTHETIC AGENT AROUND NERVE N/A 2019    Procedure: BLOCK, NERVE COCCYGEAL  1 OF 2  Pt. can drive herself home;  Surgeon: Monique Philip MD;  Location: [facility];  Service: Pain Management;  Laterality: N/A;    INJECTION OF ANESTHETIC AGENT AROUND NERVE N/A 2019    Procedure: BLOCK, NERVE COCCYGEAL  2 OF 2  Pt. can drive herself home;  Surgeon: Monique Philip MD;  Location: [facility];  Service: Pain Management;  Laterality: N/A;    intradermal cyst       removed from left index finger    SINUS SURGERY      2x     Family History   Problem Relation Age of Onset    Diabetes Mother     Fibromyalgia Mother     Polymyalgia rheumatica Mother     Macular degeneration Mother     Arthritis Mother     Hypertension Mother     Kidney disease Mother     Pneumonia Mother     Adrenal disorder Mother         adrenal insufficiency    Coronary artery disease Father     Arthritis Father         osteoarthritis    Hypertension Father     Heart disease Father     Diabetes Father     Hyperlipidemia Father     Hyperlipidemia Brother      "Cancer Brother         oral    Thyroid cancer Daughter     Cancer Daughter         thyroid    Hypothyroidism Daughter     Breast cancer Neg Hx     Ovarian cancer Neg Hx     Colon cancer Neg Hx     Melanoma Neg Hx      Review of Systems   Went through symptoms: discussed but no new needed investigation  Discussed cold symptoms:  Pain in throat with hoarseness and ENT : Dr. Singh  Objective:     Vitals:    07/29/20 0836   BP: (!) 100/50   Pulse: 89   SpO2: 98%   Weight: 42.3 kg (93 lb 4.1 oz)   Height: 5' 1" (1.549 m)   PainSc:   4     Body mass index is 17.62 kg/m².  Physical Exam  Constitutional:       General: She is not in acute distress.     Appearance: She is well-developed.   Neck:      Thyroid: No thyromegaly.      Vascular: No carotid bruit.   Cardiovascular:      Rate and Rhythm: Normal rate and regular rhythm.      Chest Wall: PMI is not displaced.      Heart sounds: Normal heart sounds.   Pulmonary:      Effort: Pulmonary effort is normal. No respiratory distress.      Breath sounds: Normal breath sounds.   Abdominal:      General: Bowel sounds are normal. There is no distension.      Palpations: Abdomen is soft.      Tenderness: There is no abdominal tenderness.   Neurological:      Mental Status: She is alert and oriented to person, place, and time.       Assessment:     1. Insomnia, unspecified type    2. Encounter for screening mammogram for breast cancer    3. Sjogren's syndrome, with unspecified organ involvement    4. Sensation of feeling hot    5. Erythromelalgia    6. Hypothyroidism, unspecified type    7. Discomfort in chest      Plan:   Berenice was seen today for follow-up.    Diagnoses and all orders for this visit:    Insomnia, unspecified type  Comments:  Renewed med  Orders:  -     zolpidem (AMBIEN) 10 mg Tab; TAKE 1 TABLET(10 MG) BY MOUTH EVERY NIGHT AS NEEDED    Encounter for screening mammogram for breast cancer  -     Mammo Digital Screening Bilat w/ Be; Future    Sjogren's " syndrome, with unspecified organ involvement  Comments:  per Rheumatology: SSA positive    Sensation of feeling hot  Comments:  Improved with decreasing the pilocarpine at mid day    Erythromelalgia  Comments:  Backed off nicotinamide: now improved    Hypothyroidism, unspecified type  Comments:  Will check lab  Orders:  -     CBC auto differential; Future  -     Comprehensive metabolic panel; Future  -     Lipid Panel; Future    Discomfort in chest  Comments:  Discussed with Dr. Philip Thoracic Outlet Syndrome: PT was suggested        Problem List Items Addressed This Visit     Erythromelalgia    Sjogren's syndrome      Other Visit Diagnoses     Insomnia, unspecified type    -  Primary    Renewed med    Relevant Medications    zolpidem (AMBIEN) 10 mg Tab    Encounter for screening mammogram for breast cancer        Relevant Orders    Mammo Digital Screening Bilat w/ Be    Sensation of feeling hot        Improved with decreasing the pilocarpine at mid day    Hypothyroidism, unspecified type        Will check lab    Relevant Orders    CBC auto differential    Comprehensive metabolic panel    Lipid Panel    Discomfort in chest        Discussed with Dr. Philip Thoracic Outlet Syndrome: PT was suggested        Orders Placed This Encounter   Procedures    Mammo Digital Screening Bilat w/ Be     Standing Status:   Future     Standing Expiration Date:   9/28/2021     Order Specific Question:   May the Radiologist modify the order per protocol to meet the clinical needs of the patient?     Answer:   Yes    CBC auto differential     Standing Status:   Future     Standing Expiration Date:   10/29/2021    Comprehensive metabolic panel     Standing Status:   Future     Standing Expiration Date:   10/29/2021    Lipid Panel     Standing Status:   Future     Standing Expiration Date:   10/29/2021     Follow up in about 6 months (around 1/29/2021) for Follow up.     Medication List          Accurate as of July 29, 2020  9:14  AM. If you have any questions, ask your nurse or doctor.            CONTINUE taking these medications    albuterol 90 mcg/actuation inhaler  Commonly known as: VENTOLIN HFA  Inhale 2 puffs into the lungs every 6 (six) hours as needed for Wheezing. Rescue     * amitriptyline 50 MG tablet  Commonly known as: ELAVIL  TAKE 1 TABLET (50 MG) NIGHTLY WITH THE 10 MG AMITRIPTYLINE     * amitriptyline 10 MG tablet  Commonly known as: ELAVIL  TAKE 1 TABLET DAILY     aspirin 81 MG EC tablet  Commonly known as: ECOTRIN     fluticasone propionate 50 mcg/actuation nasal spray  Commonly known as: FLONASE  1 spray by Each Nare route once daily.     gabapentin 100 MG capsule  Commonly known as: NEURONTIN  1in am 2 in pm and 4 at hs     hydroxychloroquine 200 mg tablet  Commonly known as: PLAQUENIL  TAKE 1 TABLET DAILY     lidocaine-prilocaine cream  Commonly known as: EMLA  USE AS DIRECTED BY PRESCRIBER OR PACKAGE INSTRUCTIONS     liothyronine 5 MCG Tab  Commonly known as: CYTOMEL  TAKE THREE AND ONE-HALF TABLETS ONCE DAILY     meloxicam 7.5 MG tablet  Commonly known as: MOBIC  TAKE 1 TABLET (7.5 MG TOTAL) BY MOUTH ONCE DAILY. FOR INFLAMMATION     metaxalone 800 MG tablet  Commonly known as: SKELAXIN  TAKE 1 TABLET TWICE A DAY AS NEEDED FOR SPASM     MUCINEX 600 mg 12 hr tablet  Generic drug: guaiFENesin     multivitamin capsule     niacinamide 500 mg Tab  Take 1 tablet (500 mg total) by mouth 3 (three) times daily.     pilocarpine 5 MG Tab  Commonly known as: SALAGEN  TAKE 1 TABLET THREE TIMES A DAY AS NEEDED     PREVIDENT 5000 DRY MOUTH 1.1 % Gel  Generic drug: fluoride (sodium)     RESTASIS 0.05 % ophthalmic emulsion  Generic drug: cycloSPORINE     risedronate 35 mg Tbec  TAKE 1 TABLET ONCE A WEEK     UNABLE TO FIND     VITAMIN D3 25 mcg (1,000 unit) capsule  Generic drug: cholecalciferol (vitamin D3)     * YUVAFEM 10 mcg Tab  Generic drug: estradioL  INSERT 1 TABLET VAGINALLY TWICE WEEKLY     * estradiol 0.05 mg/24 hr td ptsw  0.05 mg/24 hr  Commonly known as: VIVELLE-DOT  PLACE 1 PATCH ON THE SKIN TWICE A WEEK     * estradioL 10 mcg Tab  Commonly known as: VAGIFEM  INSERT 1 TABLET VAGINALLY FOUR TIMES PER WEEK     zolpidem 10 mg Tab  Commonly known as: AMBIEN  TAKE 1 TABLET(10 MG) BY MOUTH EVERY NIGHT AS NEEDED         * This list has 5 medication(s) that are the same as other medications prescribed for you. Read the directions carefully, and ask your doctor or other care provider to review them with you.               Where to Get Your Medications      These medications were sent to [id number] - [place] - [address] [facility]  [address] [facility] [address]    Phone: [phone]   · zolpidem 10 mg Tab

## 2020-07-30 ENCOUNTER — LAB VISIT (OUTPATIENT)
Dept: INTERNAL MEDICINE | Facility: CLINIC | Age: 60
End: 2020-07-30
Payer: COMMERCIAL

## 2020-07-30 ENCOUNTER — TELEPHONE (OUTPATIENT)
Dept: INTERNAL MEDICINE | Facility: CLINIC | Age: 60
End: 2020-07-30

## 2020-07-30 DIAGNOSIS — R50.9 FEVER, UNSPECIFIED FEVER CAUSE: ICD-10-CM

## 2020-07-30 DIAGNOSIS — R50.9 FEVER, UNSPECIFIED FEVER CAUSE: Primary | ICD-10-CM

## 2020-07-30 PROCEDURE — U0003 INFECTIOUS AGENT DETECTION BY NUCLEIC ACID (DNA OR RNA); SEVERE ACUTE RESPIRATORY SYNDROME CORONAVIRUS 2 (SARS-COV-2) (CORONAVIRUS DISEASE [COVID-19]), AMPLIFIED PROBE TECHNIQUE, MAKING USE OF HIGH THROUGHPUT TECHNOLOGIES AS DESCRIBED BY CMS-2020-01-R: HCPCS

## 2020-07-30 NOTE — TELEPHONE ENCOUNTER
Pt informed of results, understanding verbalized.    Pt stated she was fine all day yesterday and last night she had body aches and her temp was 101.6. Stated no symptoms today. Fever and body aches are gone. Hasn't been around anyone who tested positive for the virus. Pt stated she does have autoimmune issues and doesn't know if that brought on the fever and body aches. Informed pt I would make Dr. Serrano aware.

## 2020-07-30 NOTE — TELEPHONE ENCOUNTER
----- Message from Estella Serrano MD sent at 7/29/2020  5:20 PM CDT -----  Labs are good please send her a copy of the studies.

## 2020-07-31 LAB — SARS-COV-2 RNA RESP QL NAA+PROBE: NOT DETECTED

## 2020-08-18 ENCOUNTER — PATIENT MESSAGE (OUTPATIENT)
Dept: INTERNAL MEDICINE | Facility: CLINIC | Age: 60
End: 2020-08-18

## 2020-08-18 DIAGNOSIS — Z20.822 SUSPECTED COVID-19 VIRUS INFECTION: Primary | ICD-10-CM

## 2020-08-19 ENCOUNTER — LAB VISIT (OUTPATIENT)
Dept: INTERNAL MEDICINE | Facility: CLINIC | Age: 60
End: 2020-08-19
Payer: COMMERCIAL

## 2020-08-19 DIAGNOSIS — Z20.822 SUSPECTED COVID-19 VIRUS INFECTION: ICD-10-CM

## 2020-08-19 PROCEDURE — U0003 INFECTIOUS AGENT DETECTION BY NUCLEIC ACID (DNA OR RNA); SEVERE ACUTE RESPIRATORY SYNDROME CORONAVIRUS 2 (SARS-COV-2) (CORONAVIRUS DISEASE [COVID-19]), AMPLIFIED PROBE TECHNIQUE, MAKING USE OF HIGH THROUGHPUT TECHNOLOGIES AS DESCRIBED BY CMS-2020-01-R: HCPCS

## 2020-08-20 LAB — SARS-COV-2 RNA RESP QL NAA+PROBE: NOT DETECTED

## 2020-09-11 ENCOUNTER — PATIENT MESSAGE (OUTPATIENT)
Dept: INTERNAL MEDICINE | Facility: CLINIC | Age: 60
End: 2020-09-11

## 2020-09-28 ENCOUNTER — PATIENT MESSAGE (OUTPATIENT)
Dept: RESEARCH | Facility: OTHER | Age: 60
End: 2020-09-28

## 2020-10-05 ENCOUNTER — PATIENT MESSAGE (OUTPATIENT)
Dept: INTERNAL MEDICINE | Facility: CLINIC | Age: 60
End: 2020-10-05

## 2020-10-05 ENCOUNTER — LAB VISIT (OUTPATIENT)
Dept: LAB | Facility: HOSPITAL | Age: 60
End: 2020-10-05
Attending: INTERNAL MEDICINE
Payer: COMMERCIAL

## 2020-10-05 DIAGNOSIS — R39.9 LOWER URINARY TRACT SYMPTOMS: ICD-10-CM

## 2020-10-05 DIAGNOSIS — R39.9 LOWER URINARY TRACT SYMPTOMS: Primary | ICD-10-CM

## 2020-10-05 LAB
BACTERIA #/AREA URNS AUTO: ABNORMAL /HPF
BILIRUB UR QL STRIP: NEGATIVE
CLARITY UR REFRACT.AUTO: ABNORMAL
COLOR UR AUTO: YELLOW
GLUCOSE UR QL STRIP: NEGATIVE
HGB UR QL STRIP: ABNORMAL
HYALINE CASTS UR QL AUTO: 0 /LPF
KETONES UR QL STRIP: ABNORMAL
LEUKOCYTE ESTERASE UR QL STRIP: NEGATIVE
MICROSCOPIC COMMENT: ABNORMAL
NITRITE UR QL STRIP: NEGATIVE
PH UR STRIP: 6 [PH] (ref 5–8)
PROT UR QL STRIP: ABNORMAL
RBC #/AREA URNS AUTO: >100 /HPF (ref 0–4)
SP GR UR STRIP: 1 (ref 1–1.03)
SQUAMOUS #/AREA URNS AUTO: 1 /HPF
URN SPEC COLLECT METH UR: ABNORMAL
WBC #/AREA URNS AUTO: 9 /HPF (ref 0–5)

## 2020-10-05 PROCEDURE — 87086 URINE CULTURE/COLONY COUNT: CPT

## 2020-10-05 PROCEDURE — 81001 URINALYSIS AUTO W/SCOPE: CPT

## 2020-10-06 ENCOUNTER — PATIENT OUTREACH (OUTPATIENT)
Dept: ADMINISTRATIVE | Facility: OTHER | Age: 60
End: 2020-10-06

## 2020-10-06 ENCOUNTER — OFFICE VISIT (OUTPATIENT)
Dept: UROLOGY | Facility: CLINIC | Age: 60
End: 2020-10-06
Payer: COMMERCIAL

## 2020-10-06 ENCOUNTER — TELEPHONE (OUTPATIENT)
Dept: INTERNAL MEDICINE | Facility: CLINIC | Age: 60
End: 2020-10-06

## 2020-10-06 VITALS
HEART RATE: 98 BPM | BODY MASS INDEX: 17.62 KG/M2 | HEIGHT: 61 IN | DIASTOLIC BLOOD PRESSURE: 73 MMHG | SYSTOLIC BLOOD PRESSURE: 116 MMHG

## 2020-10-06 DIAGNOSIS — R31.0 GROSS HEMATURIA: Primary | ICD-10-CM

## 2020-10-06 DIAGNOSIS — R31.9 HEMATURIA, UNSPECIFIED TYPE: Primary | ICD-10-CM

## 2020-10-06 PROCEDURE — 99999 PR PBB SHADOW E&M-EST. PATIENT-LVL V: CPT | Mod: PBBFAC,,, | Performed by: STUDENT IN AN ORGANIZED HEALTH CARE EDUCATION/TRAINING PROGRAM

## 2020-10-06 PROCEDURE — 3008F BODY MASS INDEX DOCD: CPT | Mod: CPTII,S$GLB,, | Performed by: STUDENT IN AN ORGANIZED HEALTH CARE EDUCATION/TRAINING PROGRAM

## 2020-10-06 PROCEDURE — 99204 PR OFFICE/OUTPT VISIT, NEW, LEVL IV, 45-59 MIN: ICD-10-PCS | Mod: S$GLB,,, | Performed by: STUDENT IN AN ORGANIZED HEALTH CARE EDUCATION/TRAINING PROGRAM

## 2020-10-06 PROCEDURE — 3008F PR BODY MASS INDEX (BMI) DOCUMENTED: ICD-10-PCS | Mod: CPTII,S$GLB,, | Performed by: STUDENT IN AN ORGANIZED HEALTH CARE EDUCATION/TRAINING PROGRAM

## 2020-10-06 PROCEDURE — 99204 OFFICE O/P NEW MOD 45 MIN: CPT | Mod: S$GLB,,, | Performed by: STUDENT IN AN ORGANIZED HEALTH CARE EDUCATION/TRAINING PROGRAM

## 2020-10-06 PROCEDURE — 99999 PR PBB SHADOW E&M-EST. PATIENT-LVL V: ICD-10-PCS | Mod: PBBFAC,,, | Performed by: STUDENT IN AN ORGANIZED HEALTH CARE EDUCATION/TRAINING PROGRAM

## 2020-10-06 RX ORDER — AZITHROMYCIN MONOHYDRATE 10 MG/ML
SOLUTION/ DROPS OPHTHALMIC
COMMUNITY
Start: 2020-08-24

## 2020-10-06 RX ORDER — NITROFURANTOIN 25; 75 MG/1; MG/1
100 CAPSULE ORAL 2 TIMES DAILY
Qty: 14 CAPSULE | Refills: 0 | Status: SHIPPED | OUTPATIENT
Start: 2020-10-06 | End: 2020-10-13

## 2020-10-06 RX ORDER — INFLUENZA A VIRUS A/VICTORIA/2454/2019 IVR-207 (H1N1) ANTIGEN (PROPIOLACTONE INACTIVATED), INFLUENZA A VIRUS A/HONG KONG/2671/2019 IVR-208 (H3N2) ANTIGEN (PROPIOLACTONE INACTIVATED), INFLUENZA B VIRUS B/VICTORIA/705/2018 BVR-11 ANTIGEN (PROPIOLACTONE INACTIVATED), INFLUENZA B VIRUS B/PHUKET/3073/2013 BVR-1B ANTIGEN (PROPIOLACTONE INACTIVATED) 15; 15; 15; 15 UG/.5ML; UG/.5ML; UG/.5ML; UG/.5ML
INJECTION, SUSPENSION INTRAMUSCULAR
COMMUNITY
Start: 2020-09-04 | End: 2021-09-29

## 2020-10-06 RX ORDER — FLUCONAZOLE 150 MG/1
150 TABLET ORAL DAILY
Qty: 1 TABLET | Refills: 0 | Status: SHIPPED | OUTPATIENT
Start: 2020-10-06 | End: 2020-10-07

## 2020-10-06 RX ORDER — FLUORIDE (SODIUM) 1.1 %
PASTE (ML) DENTAL
COMMUNITY
Start: 2020-09-27

## 2020-10-06 NOTE — PROGRESS NOTES
Health Maintenance Due   Topic Date Due    TETANUS VACCINE  03[birth year]09/1980     Updates were requested from care everywhere.  Chart was reviewed for overdue Proactive [facility] Encounters (SHANITA) topics (CRS, Breast Cancer Screening, Eye exam)  Health Maintenance has been updated.  LINKS immunization registry triggered.  Immunizations were reconciled.

## 2020-10-06 NOTE — TELEPHONE ENCOUNTER
Please tell Berenice that although we are waiting on a culture this does not look like an infection.    I would like her to have cbc and cmp and see Urology to start    Please help with lab and Urology
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
## 2020-10-06 NOTE — LETTER
October 6, 2020      Estella Serrano MD  [address]  [facility] [address]           [place] [facility]  [address], [address]  [facility] [address]  Phone: [phone]          Patient: Berenice Hayes   MR Number: [id number]   YOB: 1960   Date of Visit: 10/6/2020       Dear Dr. Estella Serrano:    Thank you for referring Berenice Hayes to me for evaluation. Attached you will find relevant portions of my assessment and plan of care.    If you have questions, please do not hesitate to call me. I look forward to following Berenice Hayes along with you.    Sincerely,    Valentine Koch MD    Enclosure  CC:  No Recipients    If you would like to receive this communication electronically, please contact [email or web address] or [phone] to request more information on [employer] access.    For providers and/or their staff who would like to refer a patient to [facility], please contact us through our one-stop-shop provider referral line, [facility], at [phone].    If you feel you have received this communication in error or would no longer like to receive these types of communications, please e-mail [email or web address]

## 2020-10-06 NOTE — PROGRESS NOTES
Subjective:       Patient ID: Berenice Hayes is a 60 y.o. female.    Chief Complaint: Hematuria  This is a 60 y.o.  female patient that is new to me.  she is referred to me by her PCP Dr. Serrano for gross hematuria.  The patient did experience gross hematuria yesterday. No difficulty urinating. She does have a chronic history of pelvic floor nerve pain, worse on right side.   The patient does take blood thinners Aspirin 81mg.   she does not have a history of stones.   The patient does not have a history of smoking.     Works - works for [employer].     Urine sample obtained around the time of menstrual cycle (if female) - no uterus, no cycles.     Had workup before- no.            Lab Results   Component Value Date    CREATININE 0.7 2020       I personally reviewed the images: CT abd/pelvis without contrast 2019 - Kidneys/ Ureters: Normal in size and location.  No hydronephrosis or nephrolithiasis. No ureteral dilatation.      ---  Past Medical History:   Diagnosis Date    Asthma with allergic rhinitis     Bladder spasm     Dense breasts     heterogeneously/fibrocystic disease    Elevated antinuclear antibody (GUICHO) level     Endometriosis of cervix     Erythromelalgia     Fibromyalgia     Ganglion cyst     left toe    Goiter     MNG    Hashimoto's disease     Hashimoto's thyroiditis     Headache(784.0)     Hypothyroidism     Hashimoto with + Tg ab    Osteoporosis     femoral neck    Raynaud's phenomenon     Rhinitis     Scoliosis     Sleep disorder     type of Narcolepsy ( resolved)    Vitamin D deficiency disease     Vulvodynia        Past Surgical History:   Procedure Laterality Date    BREAST SURGERY      fibrocystic tumor removed     SECTION      2x    CYST REMOVAL      laparoscopic cyst on ovary    HYSTERECTOMY      INJECTION OF ANESTHETIC AGENT AROUND NERVE N/A 2019    Procedure: BLOCK, NERVE COCCYGEAL  1 OF 2  Pt. can drive herself home;  Surgeon: Monique LOZANO  MD Cedrick;  Location: [facility];  Service: Pain Management;  Laterality: N/A;    INJECTION OF ANESTHETIC AGENT AROUND NERVE N/A 7/19/2019    Procedure: BLOCK, NERVE COCCYGEAL  2 OF 2  Pt. can drive herself home;  Surgeon: Monique Philip MD;  Location: [facility];  Service: Pain Management;  Laterality: N/A;    intradermal cyst       removed from left index finger    SINUS SURGERY      2x       Family History   Problem Relation Age of Onset    Diabetes Mother     Fibromyalgia Mother     Polymyalgia rheumatica Mother     Macular degeneration Mother     Arthritis Mother     Hypertension Mother     Kidney disease Mother     Pneumonia Mother     Adrenal disorder Mother         adrenal insufficiency    Coronary artery disease Father     Arthritis Father         osteoarthritis    Hypertension Father     Heart disease Father     Diabetes Father     Hyperlipidemia Father     Hyperlipidemia Brother     Cancer Brother         oral    Thyroid cancer Daughter     Cancer Daughter         thyroid    Hypothyroidism Daughter     Breast cancer Neg Hx     Ovarian cancer Neg Hx     Colon cancer Neg Hx     Melanoma Neg Hx        Social History     Tobacco Use    Smoking status: Never Smoker    Smokeless tobacco: Never Used   Substance Use Topics    Alcohol use: No     Frequency: Never    Drug use: No       Current Outpatient Medications on File Prior to Visit   Medication Sig Dispense Refill    AFLURIA QD 2020-21,3YR UP,,PF, 60 mcg (15 mcg x 4)/0.5 mL Syrg ADM 0.5ML IM UTD      amitriptyline (ELAVIL) 10 MG tablet TAKE 1 TABLET DAILY 90 tablet 4    amitriptyline (ELAVIL) 50 MG tablet TAKE 1 TABLET (50 MG) NIGHTLY WITH THE 10 MG AMITRIPTYLINE 90 tablet 4    aspirin (ECOTRIN) 81 MG EC tablet Take 81 mg by mouth once daily.      AZASITE 1 % Drop INSTILL 1 DROP INTO LEFT EYE BID      cholecalciferol, vitamin D3, (VITAMIN D3) 1,000 unit capsule Take 2,000 Units by mouth once daily.        estradioL (VAGIFEM) 10 mcg Tab INSERT 1 TABLET VAGINALLY FOUR TIMES PER WEEK 32 tablet 5    estradiol 0.05 mg/24 hr td ptsw (VIVELLE-DOT) 0.05 mg/24 hr PLACE 1 PATCH ON THE SKIN TWICE A WEEK 24 patch 3    fluticasone (FLONASE) 50 mcg/actuation nasal spray 1 spray by Each Nare route once daily. 3 Bottle 3    gabapentin (NEURONTIN) 100 MG capsule 1in am 2 in pm and 4 at hs 630 capsule 1    guaifenesin (MUCINEX) 600 mg 12 hr tablet Take 600 mg by mouth 2 (two) times daily.      hydroxychloroquine (PLAQUENIL) 200 mg tablet TAKE 1 TABLET DAILY 90 tablet 4    KETOPROFEN, BULK, TOP Ketoprofen/flexeril/lidocaine (compound)      lidocaine-prilocaine (EMLA) cream USE AS DIRECTED BY PRESCRIBER OR PACKAGE INSTRUCTIONS 90 g 4    liothyronine (CYTOMEL) 5 MCG Tab TAKE THREE AND ONE-HALF TABLETS ONCE DAILY 315 tablet 1    metaxalone (SKELAXIN) 800 MG tablet TAKE 1 TABLET TWICE A DAY AS NEEDED FOR SPASM 180 tablet 4    multivitamin capsule Take 1 capsule by mouth once daily.        niacinamide 500 mg Tab Take 1 tablet (500 mg total) by mouth 3 (three) times daily. 270 tablet 3    pilocarpine (SALAGEN) 5 MG Tab TAKE 1 TABLET THREE TIMES A DAY AS NEEDED 270 tablet 3    PREVIDENT 5000 BOOSTER PLUS 1.1 % Pste       RESTASIS 0.05 % ophthalmic emulsion       risedronate 35 mg TbEC TAKE 1 TABLET ONCE A WEEK 12 tablet 4    UNABLE TO FIND Apply 240 g topically as needed. Ketoprofen/cyclobenzaprine HCI/Lidocaine (lipomax) 10/2/5% Up to 5 times daily as needed for pain  99    YUVAFEM 10 mcg Tab INSERT 1 TABLET VAGINALLY TWICE WEEKLY 24 tablet 0    zolpidem (AMBIEN) 10 mg Tab TAKE 1 TABLET(10 MG) BY MOUTH EVERY NIGHT AS NEEDED 30 tablet 3    albuterol (VENTOLIN HFA) 90 mcg/actuation inhaler Inhale 2 puffs into the lungs every 6 (six) hours as needed for Wheezing. Rescue 18 g 2    meloxicam (MOBIC) 7.5 MG tablet TAKE 1 TABLET (7.5 MG TOTAL) BY MOUTH ONCE DAILY. FOR INFLAMMATION 30 tablet 3    [DISCONTINUED] PREVIDENT 5000  DRY MOUTH 1.1 % Gel        Current Facility-Administered Medications on File Prior to Visit   Medication Dose Route Frequency Provider Last Rate Last Dose    0.9%  NaCl infusion  500 mL Intravenous Continuous Monique Philip MD           Review of patient's allergies indicates:  No Known Allergies    Review of Systems   Constitutional: Negative for activity change.   HENT: Negative for congestion.    Eyes: Negative for visual disturbance.   Respiratory: Negative for shortness of breath.    Cardiovascular: Negative for chest pain.   Gastrointestinal: Negative for abdominal distention.   Musculoskeletal: Negative for gait problem.   Skin: Negative for color change.   Neurological: Negative for dizziness.   Psychiatric/Behavioral: Negative for agitation.       Objective:      Physical Exam  Constitutional:       Appearance: She is well-developed.   HENT:      Head: Normocephalic and atraumatic.   Neck:      Musculoskeletal: Normal range of motion.   Pulmonary:      Effort: Pulmonary effort is normal.   Musculoskeletal: Normal range of motion.   Skin:     General: Skin is warm and dry.   Neurological:      Mental Status: She is alert and oriented to person, place, and time.         Assessment:       1. Gross hematuria        Plan:         1. The patient has experienced gross hematuria.   I counseled the patient on the American Urology Guidelines for a hematuria workup.  The criteria for microscopic hematuria is 3 red blood cells on microscopic urinalysis and the workup is recommended for any patient experiencing gross hematuria. The workup includes a CT urogram and a flexible cystoscopy performed here in the clinic. After answering all of the questions about the workup the patient was amenable in proceeding. Pt underwent BMP today. Schedule CTU and cysto.  2. Urine culture still pending. Will message Dr. Serrano to let me know with cx results return.  3. Macrobid and difulcan sent just in case she needs to  evacuate        Gross hematuria  -     CT Urogram Abd Pelvis W WO; Future; Expected date: 10/06/2020  -     Cystoscopy; Future; Expected date: 10/06/2020

## 2020-10-07 ENCOUNTER — TELEPHONE (OUTPATIENT)
Dept: UROLOGY | Facility: CLINIC | Age: 60
End: 2020-10-07

## 2020-10-07 ENCOUNTER — TELEPHONE (OUTPATIENT)
Dept: INTERNAL MEDICINE | Facility: CLINIC | Age: 60
End: 2020-10-07

## 2020-10-07 ENCOUNTER — PATIENT MESSAGE (OUTPATIENT)
Dept: UROLOGY | Facility: CLINIC | Age: 60
End: 2020-10-07

## 2020-10-07 LAB — BACTERIA UR CULT: NO GROWTH

## 2020-10-07 NOTE — TELEPHONE ENCOUNTER
Spoke with pt and advised per Dr. Koch the urine culutre showed no bacteria, so there is no need for antibiotics.pt voiced understanding.

## 2020-10-07 NOTE — TELEPHONE ENCOUNTER
Spoke with pt and she would like the CPT code for the insurance company to approve the procedure. Sent pt my chart message of CPT code per pt.

## 2020-10-07 NOTE — TELEPHONE ENCOUNTER
----- Message from Valentine Koch MD sent at 10/7/2020  8:11 AM CDT -----  Please let patient know that there was no growth of bacteria on the urine culture, therefore no antibiotics are needed since there was no UTI.

## 2020-10-07 NOTE — TELEPHONE ENCOUNTER
----- Message from Mervat Miramontes sent at 10/7/2020  1:45 PM CDT -----  Regarding: pre authorization  Type:  Needs Medical Advice    Who Called: patient   Reason: patient needs a pre authorization for two procedures with Doctor Koch.    Would the patient rather a call back or a response via [facility]? Call back   Best Call Back Number: [phone]  Additional Information: n/a

## 2020-10-16 ENCOUNTER — HOSPITAL ENCOUNTER (OUTPATIENT)
Dept: RADIOLOGY | Facility: HOSPITAL | Age: 60
Discharge: HOME | End: 2020-10-16
Attending: STUDENT IN AN ORGANIZED HEALTH CARE EDUCATION/TRAINING PROGRAM
Payer: COMMERCIAL

## 2020-10-16 DIAGNOSIS — R31.0 GROSS HEMATURIA: ICD-10-CM

## 2020-10-16 PROCEDURE — 74178 CT ABD&PLV WO CNTR FLWD CNTR: CPT | Mod: TC

## 2020-10-16 PROCEDURE — 74178 CT UROGRAM ABD PELVIS W WO: ICD-10-PCS | Mod: 26,,, | Performed by: RADIOLOGY

## 2020-10-16 PROCEDURE — 74178 CT ABD&PLV WO CNTR FLWD CNTR: CPT | Mod: 26,,, | Performed by: RADIOLOGY

## 2020-10-16 PROCEDURE — 25500020 PHARM REV CODE 255: Performed by: STUDENT IN AN ORGANIZED HEALTH CARE EDUCATION/TRAINING PROGRAM

## 2020-10-16 RX ADMIN — IOHEXOL 125 ML: 350 INJECTION, SOLUTION INTRAVENOUS at 10:10

## 2020-10-28 ENCOUNTER — PATIENT MESSAGE (OUTPATIENT)
Dept: UROLOGY | Facility: CLINIC | Age: 60
End: 2020-10-28

## 2020-10-29 ENCOUNTER — PROCEDURE VISIT (OUTPATIENT)
Dept: UROLOGY | Facility: CLINIC | Age: 60
End: 2020-10-29
Payer: COMMERCIAL

## 2020-10-29 VITALS
BODY MASS INDEX: 17.56 KG/M2 | HEIGHT: 61 IN | SYSTOLIC BLOOD PRESSURE: 109 MMHG | WEIGHT: 93 LBS | TEMPERATURE: 99 F | DIASTOLIC BLOOD PRESSURE: 78 MMHG | HEART RATE: 100 BPM

## 2020-10-29 DIAGNOSIS — D17.71 ANGIOMYOLIPOMA OF KIDNEY: Primary | ICD-10-CM

## 2020-10-29 DIAGNOSIS — R31.0 GROSS HEMATURIA: ICD-10-CM

## 2020-10-29 PROCEDURE — 52000 CYSTOURETHROSCOPY: CPT | Mod: S$GLB,,, | Performed by: STUDENT IN AN ORGANIZED HEALTH CARE EDUCATION/TRAINING PROGRAM

## 2020-10-29 PROCEDURE — 52000 PR CYSTOURETHROSCOPY: ICD-10-PCS | Mod: S$GLB,,, | Performed by: STUDENT IN AN ORGANIZED HEALTH CARE EDUCATION/TRAINING PROGRAM

## 2020-10-29 NOTE — Clinical Note
Virgilio Serrano, thank you for referring this lady for gross hematuria. She underwent a CT urogram and cystoscopy as the workup as recommended by the american urology association and no adverse findings were seen. 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  I defer to you if you think she needs to see nephrology bc she also had proteinuria noted on the urinalysis.  Sincerely,  Dr. Koch

## 2020-10-29 NOTE — PROCEDURES
Procedures   Procedure:   1. Flexible cysto-uretheroscopy    Pre Procedure Diagnosis:  1. Gross hematuria    Post Procedure Diagnosis:  1. Same    Surgeon: Valentine Koch MD    Anesthesia: 2% uro-jet lidocaine jelly for local analgesia    Procedure note:  A flexible cysto-urethroscopy was performed after consent was obtained.  The risks and benefits were explained. 2% lidocaine urojet was used for local analgesia. The genitalia was prepped and draped in the sterile fashion.     The flexible scope was advanced into the urethra and into the bladder. The bilateral ureteral orifices were identified in their normal orthotopic locations. Clear bilateral ureteral efflux was identified. The bladder was completely surveyed in a systematic fashion. No bladder tumors or lesions were seen.     As the flexible cystoscope was being removed, the urethra was evaluated and noted to be normal.     The patient tolerated the procedure well without complication.       Findings in summary:  Normal bladder mucosa  Clear efflux of urine from right and left ureteral orifices seen.   Normal urethra.      Assessment: 60 y.o. female with gross hematuria, proteinuria seen on UA     Plan:  1. Cystoscopy findings (above) - benign.  2. CT urogram also benign - no stones, kidney masses, hydronephrosis, or filling defects. Left midpole AML - that was actually seen on CT in 12/2010.   3. Will obtain yearly US to monitor left AML, if no change and stable, can consider increasing interval between US or discontinuing.  4. Will message Dr. reeves about nephrology.

## 2020-11-01 ENCOUNTER — PATIENT MESSAGE (OUTPATIENT)
Dept: PAIN MEDICINE | Facility: CLINIC | Age: 60
End: 2020-11-01

## 2020-11-01 ENCOUNTER — PATIENT MESSAGE (OUTPATIENT)
Dept: INTERNAL MEDICINE | Facility: CLINIC | Age: 60
End: 2020-11-01

## 2020-11-01 ENCOUNTER — PATIENT MESSAGE (OUTPATIENT)
Dept: ENDOCRINOLOGY | Facility: CLINIC | Age: 60
End: 2020-11-01

## 2020-11-01 DIAGNOSIS — G50.0 TRIGEMINAL NEURALGIA: ICD-10-CM

## 2020-11-01 DIAGNOSIS — G58.8 PUDENDAL NEURALGIA: Primary | ICD-10-CM

## 2020-11-01 DIAGNOSIS — M35.00 SJOGREN'S SYNDROME, WITH UNSPECIFIED ORGAN INVOLVEMENT: ICD-10-CM

## 2020-11-01 DIAGNOSIS — Z80.8 FAMILY HISTORY OF THYROID CANCER: ICD-10-CM

## 2020-11-01 DIAGNOSIS — E04.2 MULTINODULAR GOITER: ICD-10-CM

## 2020-11-01 DIAGNOSIS — E03.8 HYPOTHYROIDISM DUE TO HASHIMOTO'S THYROIDITIS: ICD-10-CM

## 2020-11-01 DIAGNOSIS — E06.3 HYPOTHYROIDISM DUE TO HASHIMOTO'S THYROIDITIS: ICD-10-CM

## 2020-11-02 ENCOUNTER — TELEPHONE (OUTPATIENT)
Dept: INTERNAL MEDICINE | Facility: CLINIC | Age: 60
End: 2020-11-02

## 2020-11-02 ENCOUNTER — LAB VISIT (OUTPATIENT)
Dept: LAB | Facility: HOSPITAL | Age: 60
End: 2020-11-02
Attending: INTERNAL MEDICINE
Payer: COMMERCIAL

## 2020-11-02 DIAGNOSIS — R31.9 HEMATURIA, UNSPECIFIED TYPE: Primary | ICD-10-CM

## 2020-11-02 DIAGNOSIS — R31.9 HEMATURIA, UNSPECIFIED TYPE: ICD-10-CM

## 2020-11-02 PROCEDURE — 82570 ASSAY OF URINE CREATININE: CPT

## 2020-11-02 RX ORDER — LIOTHYRONINE SODIUM 5 UG/1
TABLET ORAL
Qty: 315 TABLET | Refills: 3 | Status: SHIPPED | OUTPATIENT
Start: 2020-11-02 | End: 2020-11-10 | Stop reason: SDUPTHER

## 2020-11-02 RX ORDER — AMITRIPTYLINE HYDROCHLORIDE 50 MG/1
TABLET, FILM COATED ORAL
Qty: 90 TABLET | Refills: 4 | Status: SHIPPED | OUTPATIENT
Start: 2020-11-02 | End: 2022-01-08

## 2020-11-02 RX ORDER — HYDROXYCHLOROQUINE SULFATE 200 MG/1
200 TABLET, FILM COATED ORAL DAILY
Qty: 90 TABLET | Refills: 4 | Status: CANCELLED | OUTPATIENT
Start: 2020-11-02

## 2020-11-02 RX ORDER — AMITRIPTYLINE HYDROCHLORIDE 10 MG/1
10 TABLET, FILM COATED ORAL DAILY
Qty: 90 TABLET | Refills: 4 | Status: SHIPPED | OUTPATIENT
Start: 2020-11-02 | End: 2021-02-11 | Stop reason: SDUPTHER

## 2020-11-02 RX ORDER — LIDOCAINE AND PRILOCAINE 25; 25 MG/G; MG/G
CREAM TOPICAL
Qty: 90 G | Refills: 4 | Status: SHIPPED | OUTPATIENT
Start: 2020-11-02 | End: 2021-12-29

## 2020-11-02 RX ORDER — RISEDRONATE SODIUM 35 MG/1
TABLET, DELAYED RELEASE ORAL
Qty: 12 TABLET | Refills: 4 | Status: SHIPPED | OUTPATIENT
Start: 2020-11-02 | End: 2021-11-08

## 2020-11-02 RX ORDER — PILOCARPINE HYDROCHLORIDE 5 MG/1
5 TABLET, FILM COATED ORAL 3 TIMES DAILY PRN
Qty: 270 TABLET | Refills: 3 | Status: SHIPPED | OUTPATIENT
Start: 2020-11-02 | End: 2022-02-08 | Stop reason: SDUPTHER

## 2020-11-02 NOTE — TELEPHONE ENCOUNTER
Patient is requesting a 90 day prescription for Lidocaine 2.5%/Priloaine 2.5% to [facility].    Please advise.  Thanks

## 2020-11-02 NOTE — TELEPHONE ENCOUNTER
----- Message from Valentine Koch MD sent at 10/29/2020  3:06 PM CDT -----  Hi Dr. Serrano, thank you for referring this lady for gross hematuria. She underwent a CT urogram and cystoscopy as the workup as recommended by the american urology association and no adverse findings were seen. 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 Koch

## 2020-11-02 NOTE — TELEPHONE ENCOUNTER
Please change order for protein creatinine ratio to home collect instead of clinic collect.    Thanks

## 2020-11-02 NOTE — TELEPHONE ENCOUNTER
Please do a Protein creatinine ratio as Dr. Koch noted protein in the urine. Please ask the patient to do this, orders are in.

## 2020-11-03 LAB
CREAT UR-MCNC: 58 MG/DL (ref 15–325)
PROT UR-MCNC: <7 MG/DL (ref 0–15)
PROT/CREAT UR: NORMAL MG/G{CREAT} (ref 0–0.2)

## 2020-11-05 ENCOUNTER — OFFICE VISIT (OUTPATIENT)
Dept: INTERNAL MEDICINE | Facility: CLINIC | Age: 60
End: 2020-11-05
Payer: COMMERCIAL

## 2020-11-05 VITALS
WEIGHT: 91.69 LBS | SYSTOLIC BLOOD PRESSURE: 110 MMHG | BODY MASS INDEX: 17.31 KG/M2 | DIASTOLIC BLOOD PRESSURE: 62 MMHG | OXYGEN SATURATION: 99 % | HEART RATE: 102 BPM | HEIGHT: 61 IN

## 2020-11-05 DIAGNOSIS — S30.810A ABRASION OF LEFT BUTTOCK, INITIAL ENCOUNTER: Primary | ICD-10-CM

## 2020-11-05 PROCEDURE — 99999 PR PBB SHADOW E&M-EST. PATIENT-LVL III: ICD-10-PCS | Mod: PBBFAC,,, | Performed by: INTERNAL MEDICINE

## 2020-11-05 PROCEDURE — 99999 PR PBB SHADOW E&M-EST. PATIENT-LVL III: CPT | Mod: PBBFAC,,, | Performed by: INTERNAL MEDICINE

## 2020-11-05 PROCEDURE — 99213 OFFICE O/P EST LOW 20 MIN: CPT | Mod: S$GLB,,, | Performed by: INTERNAL MEDICINE

## 2020-11-05 PROCEDURE — 3008F PR BODY MASS INDEX (BMI) DOCUMENTED: ICD-10-PCS | Mod: CPTII,S$GLB,, | Performed by: INTERNAL MEDICINE

## 2020-11-05 PROCEDURE — 3008F BODY MASS INDEX DOCD: CPT | Mod: CPTII,S$GLB,, | Performed by: INTERNAL MEDICINE

## 2020-11-05 PROCEDURE — 99213 PR OFFICE/OUTPT VISIT, EST, LEVL III, 20-29 MIN: ICD-10-PCS | Mod: S$GLB,,, | Performed by: INTERNAL MEDICINE

## 2020-11-05 RX ORDER — MUPIROCIN 20 MG/G
OINTMENT TOPICAL 3 TIMES DAILY
Qty: 15 G | Refills: 1 | Status: SHIPPED | OUTPATIENT
Start: 2020-11-05 | End: 2020-11-18

## 2020-11-05 NOTE — PROGRESS NOTES
Subjective:       Patient ID: Berenice Hayes is a 60 y.o. female.    Chief Complaint: No chief complaint on file.    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  Had not felt it last night when she has applied this ointment.  Needs someone to look at it.    Review of Systems   Constitutional: Negative for activity change, chills, fatigue and fever.   HENT: Negative for congestion, ear pain, nosebleeds, postnasal drip, sinus pressure and sore throat.    Eyes: Negative.  Negative for visual disturbance.   Respiratory: Negative for cough, chest tightness, shortness of breath and wheezing.    Cardiovascular: Negative for chest pain.   Gastrointestinal: Negative for abdominal pain, diarrhea, nausea and vomiting.   Genitourinary: Negative for difficulty urinating, dysuria, frequency and urgency.   Musculoskeletal: Negative for arthralgias and neck stiffness.   Skin: Negative for rash.   Neurological: Negative for dizziness, weakness and headaches.   Psychiatric/Behavioral: Negative for sleep disturbance. The patient is not nervous/anxious.        Objective:      Physical Exam  Skin:               Assessment:       1. Abrasion of left buttock, initial encounter        Plan:   Diagnoses and all orders for this visit:    Abrasion of left buttock, initial encounter    Other orders  -     mupirocin (BACTROBAN) 2 % ointment; Apply topically 3 (three) times daily.

## 2020-11-10 DIAGNOSIS — E06.3 HYPOTHYROIDISM DUE TO HASHIMOTO'S THYROIDITIS: ICD-10-CM

## 2020-11-10 DIAGNOSIS — Z80.8 FAMILY HISTORY OF THYROID CANCER: ICD-10-CM

## 2020-11-10 DIAGNOSIS — E04.2 MULTINODULAR GOITER: ICD-10-CM

## 2020-11-10 DIAGNOSIS — E03.8 HYPOTHYROIDISM DUE TO HASHIMOTO'S THYROIDITIS: ICD-10-CM

## 2020-11-10 RX ORDER — LIOTHYRONINE SODIUM 5 UG/1
TABLET ORAL
Qty: 315 TABLET | Refills: 3 | Status: SHIPPED | OUTPATIENT
Start: 2020-11-10 | End: 2021-09-28

## 2020-11-10 NOTE — TELEPHONE ENCOUNTER
----- Message from Ita Hays sent at 11/10/2020  9:52 AM CST -----  Regarding: [facility]  Contact[facility]     calling       Clarifications     quantity   does not match directions     liothyronine (CYTOMEL) 5 MCG Tab 315 tablet 3 11/2/2020    Sig: Take one tablet daily   Sent to pharmacy as: liothyronine (CYTOMEL) 5 MCG Tab     Call [phone]   ref [phone]    Thanks

## 2020-11-17 ENCOUNTER — PATIENT OUTREACH (OUTPATIENT)
Dept: ADMINISTRATIVE | Facility: OTHER | Age: 60
End: 2020-11-17

## 2020-11-17 NOTE — PROGRESS NOTES
Care Everywhere:   Immunization: updated  Health Maintenance: updated  Media Review:   Legacy Review:   Order placed:   Upcoming appts:mammogram 12/28

## 2020-11-18 ENCOUNTER — OFFICE VISIT (OUTPATIENT)
Dept: OBSTETRICS AND GYNECOLOGY | Facility: CLINIC | Age: 60
End: 2020-11-18
Attending: OBSTETRICS & GYNECOLOGY
Payer: COMMERCIAL

## 2020-11-18 VITALS
DIASTOLIC BLOOD PRESSURE: 60 MMHG | BODY MASS INDEX: 17.61 KG/M2 | HEIGHT: 61 IN | SYSTOLIC BLOOD PRESSURE: 104 MMHG | WEIGHT: 93.25 LBS

## 2020-11-18 DIAGNOSIS — N60.11 FIBROCYSTIC BREAST CHANGES OF BOTH BREASTS: ICD-10-CM

## 2020-11-18 DIAGNOSIS — Z01.419 WELL FEMALE EXAM WITH ROUTINE GYNECOLOGICAL EXAM: Primary | ICD-10-CM

## 2020-11-18 DIAGNOSIS — N60.12 FIBROCYSTIC BREAST CHANGES OF BOTH BREASTS: ICD-10-CM

## 2020-11-18 DIAGNOSIS — N95.2 ATROPHIC VAGINITIS: ICD-10-CM

## 2020-11-18 PROCEDURE — 99396 PREV VISIT EST AGE 40-64: CPT | Mod: S$GLB,,, | Performed by: OBSTETRICS & GYNECOLOGY

## 2020-11-18 PROCEDURE — 1125F AMNT PAIN NOTED PAIN PRSNT: CPT | Mod: S$GLB,,, | Performed by: OBSTETRICS & GYNECOLOGY

## 2020-11-18 PROCEDURE — 3008F BODY MASS INDEX DOCD: CPT | Mod: CPTII,S$GLB,, | Performed by: OBSTETRICS & GYNECOLOGY

## 2020-11-18 PROCEDURE — 99999 PR PBB SHADOW E&M-EST. PATIENT-LVL V: CPT | Mod: PBBFAC,,, | Performed by: OBSTETRICS & GYNECOLOGY

## 2020-11-18 PROCEDURE — 1125F PR PAIN SEVERITY QUANTIFIED, PAIN PRESENT: ICD-10-PCS | Mod: S$GLB,,, | Performed by: OBSTETRICS & GYNECOLOGY

## 2020-11-18 PROCEDURE — 99396 PR PREVENTIVE VISIT,EST,40-64: ICD-10-PCS | Mod: S$GLB,,, | Performed by: OBSTETRICS & GYNECOLOGY

## 2020-11-18 PROCEDURE — 3008F PR BODY MASS INDEX (BMI) DOCUMENTED: ICD-10-PCS | Mod: CPTII,S$GLB,, | Performed by: OBSTETRICS & GYNECOLOGY

## 2020-11-18 PROCEDURE — 99999 PR PBB SHADOW E&M-EST. PATIENT-LVL V: ICD-10-PCS | Mod: PBBFAC,,, | Performed by: OBSTETRICS & GYNECOLOGY

## 2020-11-18 RX ORDER — ESTRADIOL 10 UG/1
INSERT VAGINAL
Qty: 36 TABLET | Refills: 3 | Status: SHIPPED | OUTPATIENT
Start: 2020-11-18 | End: 2021-12-03

## 2020-11-18 RX ORDER — ESTRADIOL 0.05 MG/D
FILM, EXTENDED RELEASE TRANSDERMAL
Qty: 24 PATCH | Refills: 3 | Status: SHIPPED | OUTPATIENT
Start: 2020-11-18 | End: 2021-09-28

## 2020-11-18 NOTE — PROGRESS NOTES
SUBJECTIVE:   60 y.o. female   for routine gyn exam. No LMP recorded (lmp unknown). Patient has had a hysterectomy..  She uses Vagifem 3 x week for dryness- Sjogrens.  Has rectocoele.  Has right pelvic pain and pudendal nerve problems.  Saw Dr. Walker 3 for this in the past.  Tried pelvic floor PT without success.  Doing acupuncture.  Requests breast u/s done with [facility] due to fibrocystic breast changes.         Past Medical History:   Diagnosis Date    Asthma with allergic rhinitis     Bladder spasm     Dense breasts     heterogeneously/fibrocystic disease    Elevated antinuclear antibody (GUICHO) level     Endometriosis of cervix     Erythromelalgia     Fibromyalgia     Ganglion cyst     left toe    Goiter     MNG    Hashimoto's disease     Hashimoto's thyroiditis     Headache(784.0)     Hypothyroidism     Hashimoto with + Tg ab    Osteoporosis     femoral neck    Pernio     Pernio 2018    Raynaud's phenomenon     Rhinitis     Scoliosis     Sjogren's syndrome     Sleep disorder     type of Narcolepsy ( resolved)    Vitamin D deficiency disease     Vulvodynia      Past Surgical History:   Procedure Laterality Date    BREAST SURGERY      fibrocystic tumor removed     SECTION      2x    CYST REMOVAL      laparoscopic cyst on ovary    HYSTERECTOMY      INJECTION OF ANESTHETIC AGENT AROUND NERVE N/A 2019    Procedure: BLOCK, NERVE COCCYGEAL  1 OF 2  Pt. can drive herself home;  Surgeon: Monique Philip MD;  Location: [facility];  Service: Pain Management;  Laterality: N/A;    INJECTION OF ANESTHETIC AGENT AROUND NERVE N/A 2019    Procedure: BLOCK, NERVE COCCYGEAL  2 OF 2  Pt. can drive herself home;  Surgeon: Monique Philip MD;  Location: [facility];  Service: Pain Management;  Laterality: N/A;    intradermal cyst       removed from left index finger    SINUS SURGERY      2x     Social History     Socioeconomic History    Marital status:      Spouse  name: Not on file    Number of children: Not on file    Years of education: Not on file    Highest education level: Not on file   Occupational History     Employer: [employer]   Social Needs    Financial resource strain: Not very hard    Food insecurity     Worry: Never true     Inability: Never true    Transportation needs     Medical: No     Non-medical: No   Tobacco Use    Smoking status: Never Smoker    Smokeless tobacco: Never Used   Substance and Sexual Activity    Alcohol use: No     Frequency: Never    Drug use: No    Sexual activity: Not Currently     Birth control/protection: Surgical     Comment: single, RAMOS ov in situ    Lifestyle    Physical activity     Days per week: 0 days     Minutes per session: Not on file    Stress: Only a little   Relationships    Social connections     Talks on phone: Twice a week     Gets together: Never     Attends Synagogue service: Not on file     Active member of club or organization: No     Attends meetings of clubs or organizations: Never     Relationship status:    Other Topics Concern    Are you pregnant or think you may be? Not Asked    Breast-feeding Not Asked   Social History Narrative         Family History   Problem Relation Age of Onset    Diabetes Mother     Fibromyalgia Mother     Polymyalgia rheumatica Mother     Macular degeneration Mother     Arthritis Mother     Hypertension Mother     Kidney disease Mother     Pneumonia Mother     Adrenal disorder Mother         adrenal insufficiency    Coronary artery disease Father     Arthritis Father         osteoarthritis    Hypertension Father     Heart disease Father     Diabetes Father     Hyperlipidemia Father     Hyperlipidemia Brother     Cancer Brother         oral    Thyroid cancer Daughter     Cancer Daughter         thyroid    Hypothyroidism Daughter     Breast cancer Neg Hx     Ovarian cancer Neg Hx     Colon cancer Neg Hx     Melanoma Neg Hx      OB  History    Para Term  AB Living   2 2 2     2   SAB TAB Ectopic Multiple Live Births                  # Outcome Date GA Lbr Jarrod/2nd Weight Sex Delivery Anes PTL Lv   2 Term            1 Term                  Current Outpatient Medications   Medication Sig Dispense Refill    AFLURIA QD -,3YR UP,,PF, 60 mcg (15 mcg x 4)/0.5 mL Syrg ADM 0.5ML IM UTD      albuterol (VENTOLIN HFA) 90 mcg/actuation inhaler Inhale 2 puffs into the lungs every 6 (six) hours as needed for Wheezing. Rescue 18 g 2    amitriptyline (ELAVIL) 10 MG tablet Take 1 tablet (10 mg total) by mouth once daily. 90 tablet 4    amitriptyline (ELAVIL) 50 MG tablet TAKE 1 TABLET (50 MG) NIGHTLY WITH THE 10 MG AMITRIPTYLINE 90 tablet 4    aspirin (ECOTRIN) 81 MG EC tablet Take 81 mg by mouth once daily.      AZASITE 1 % Drop INSTILL 1 DROP INTO LEFT EYE BID      cholecalciferol, vitamin D3, (VITAMIN D3) 1,000 unit capsule Take 2,000 Units by mouth once daily.       estradioL (VAGIFEM) 10 mcg Tab INSERT 1 TABLET VAGINALLY THREE TIMES PER WEEK 36 tablet 3    estradiol 0.05 mg/24 hr td ptsw (VIVELLE-DOT) 0.05 mg/24 hr PLACE 1 PATCH ON THE SKIN TWICE A WEEK 24 patch 3    fluticasone (FLONASE) 50 mcg/actuation nasal spray 1 spray by Each Nare route once daily. 3 Bottle 3    gabapentin (NEURONTIN) 100 MG capsule TAKE 1 CAPSULE IN THE MORNING, 2 CAPSULES IN THE EVENING, AND 4 CAPSULES AT BEDTIME 630 capsule 3    guaifenesin (MUCINEX) 600 mg 12 hr tablet Take 600 mg by mouth 2 (two) times daily.      hydroxychloroquine (PLAQUENIL) 200 mg tablet TAKE 1 TABLET DAILY 90 tablet 4    KETOPROFEN, BULK, TOP Ketoprofen/flexeril/lidocaine (compound)      lidocaine-prilocaine (EMLA) cream Apply topically as needed. 90 g 4    liothyronine (CYTOMEL) 5 MCG Tab Take three and a half tablets by mouth daily (17.5 mcg total) 315 tablet 3    metaxalone (SKELAXIN) 800 MG tablet TAKE 1 TABLET TWICE A DAY AS NEEDED FOR SPASM 180 tablet 4     multivitamin capsule Take 1 capsule by mouth once daily.        niacinamide 500 mg Tab Take 1 tablet (500 mg total) by mouth 3 (three) times daily. 270 tablet 3    pilocarpine (SALAGEN) 5 MG Tab Take 1 tablet (5 mg total) by mouth 3 (three) times daily as needed. 270 tablet 3    PREVIDENT 5000 BOOSTER PLUS 1.1 % Pste       RESTASIS 0.05 % ophthalmic emulsion       risedronate 35 mg TbEC TAKE 1 TABLET ONCE A WEEK 12 tablet 4    UNABLE TO FIND Apply 240 g topically as needed. Ketoprofen/cyclobenzaprine HCI/Lidocaine (lipomax) 10/2/5% Up to 5 times daily as needed for pain  99    YUVAFEM 10 mcg Tab INSERT 1 TABLET VAGINALLY TWICE WEEKLY 24 tablet 0    zolpidem (AMBIEN) 10 mg Tab TAKE 1 TABLET(10 MG) BY MOUTH EVERY NIGHT AS NEEDED 30 tablet 3     No current facility-administered medications for this visit.      Facility-Administered Medications Ordered in Other Visits   Medication Dose Route Frequency Provider Last Rate Last Dose    0.9%  NaCl infusion  500 mL Intravenous Continuous Monique Philip MD         Allergies: Patient has no known allergies.     ROS:  Constitutional: no weight loss, weight gain, fever, fatigue  Eyes:  No vision changes, glasses/contacts  ENT/Mouth: No ulcers, sinus problems, ears ringing, headache  Cardiovascular: No inability to lie flat, chest pain, exercise intolerance, swelling, heart palpitations  Respiratory: No wheezing, coughing blood, shortness of breath, or cough  Gastrointestinal: No diarrhea, bloody stool, nausea/vomiting, constipation, gas, hemorrhoids  Genitourinary: No blood in urine, painful urination, urgency of urination, frequency of urination, incomplete emptying, incontinence, abnormal bleeding, painful periods, heavy periods, vaginal discharge, vaginal odor, painful intercourse, sexual problems, bleeding after intercourse.  Musculoskeletal: No muscle weakness  Skin/Breast: No painful breasts, nipple discharge, masses, rash, ulcers  Neurological: No passing out,  "seizures, numbness, headache  Endocrine: No diabetes, hypothyroid, hyperthyroid, hot flashes, hair loss, abnormal hair growth, acne  Psychiatric: No depression, crying  Hematologic: No bruises, bleeding, swollen lymph nodes, anemia.      OBJECTIVE:   The patient appears well, alert, oriented x 3, in no distress.  /60   Ht 5' 1" (1.549 m)   Wt 42.3 kg (93 lb 4.1 oz)   LMP  (LMP Unknown)   BMI 17.62 kg/m²   NECK: no thyromegaly, trachea midline  SKIN: no acne, striae, hirsutism  CHEST: CTAB  CV: RRR  BREAST EXAM: breasts appear normal, no suspicious masses, no skin or nipple changes or axillary nodes  Bilateral fibrocystic breast changes  ABDOMEN: no hernias, masses, or hepatosplenomegaly  GENITALIA: normal external genitalia, no erythema, no discharge  URETHRA: normal urethra, normal urethral meatus  VAGINA: Normal  CERVIX: absent  UTERUS: absent  ADNEXA: no mass, fullness, tenderness      ASSESSMENT:   1. Well female exam with routine gynecological exam     2. Atrophic vaginitis  estradiol 0.05 mg/24 hr td ptsw (VIVELLE-DOT) 0.05 mg/24 hr    estradioL (VAGIFEM) 10 mcg Tab   3. Fibrocystic breast changes of both breasts  US Breast Bilateral Complete       PLAN:   Orders Placed This Encounter    US Breast Bilateral Complete    estradiol 0.05 mg/24 hr td ptsw (VIVELLE-DOT) 0.05 mg/24 hr    estradioL (VAGIFEM) 10 mcg Tab     Discussed ERT, WHI, healthy lifestyle including regular exercise, healthy eating, etc.  Return to clinic in 1 year    "

## 2020-11-24 ENCOUNTER — PATIENT MESSAGE (OUTPATIENT)
Dept: INTERNAL MEDICINE | Facility: CLINIC | Age: 60
End: 2020-11-24

## 2020-11-24 DIAGNOSIS — G47.00 INSOMNIA, UNSPECIFIED TYPE: ICD-10-CM

## 2020-11-24 DIAGNOSIS — E03.9 HYPOTHYROIDISM, UNSPECIFIED TYPE: Primary | ICD-10-CM

## 2020-11-25 RX ORDER — ZOLPIDEM TARTRATE 10 MG/1
TABLET ORAL
Qty: 30 TABLET | Refills: 3 | Status: SHIPPED | OUTPATIENT
Start: 2020-11-25 | End: 2020-12-28 | Stop reason: SDUPTHER

## 2020-11-27 ENCOUNTER — LAB VISIT (OUTPATIENT)
Dept: LAB | Facility: HOSPITAL | Age: 60
End: 2020-11-27
Attending: INTERNAL MEDICINE
Payer: COMMERCIAL

## 2020-11-27 DIAGNOSIS — E03.9 HYPOTHYROIDISM, UNSPECIFIED TYPE: ICD-10-CM

## 2020-11-27 LAB
T3 SERPL-MCNC: 85 NG/DL (ref 60–180)
T3FREE SERPL-MCNC: 2.8 PG/ML (ref 2.3–4.2)
T4 FREE SERPL-MCNC: 0.59 NG/DL (ref 0.71–1.51)
TSH SERPL DL<=0.005 MIU/L-ACNC: 1.01 UIU/ML (ref 0.4–4)

## 2020-11-27 PROCEDURE — 84439 ASSAY OF FREE THYROXINE: CPT

## 2020-11-27 PROCEDURE — 84443 ASSAY THYROID STIM HORMONE: CPT

## 2020-11-27 PROCEDURE — 36415 COLL VENOUS BLD VENIPUNCTURE: CPT

## 2020-11-27 PROCEDURE — 84481 FREE ASSAY (FT-3): CPT

## 2020-11-27 PROCEDURE — 84480 ASSAY TRIIODOTHYRONINE (T3): CPT

## 2020-12-18 ENCOUNTER — PATIENT OUTREACH (OUTPATIENT)
Dept: ADMINISTRATIVE | Facility: OTHER | Age: 60
End: 2020-12-18

## 2020-12-19 NOTE — PROGRESS NOTES
Chart reviewed.   Immunizations: updated  Orders placed: n/a  Upcoming appts to satisfy SHANITA topics: Mammogram 12/28

## 2020-12-20 ENCOUNTER — PATIENT MESSAGE (OUTPATIENT)
Dept: ENDOCRINOLOGY | Facility: CLINIC | Age: 60
End: 2020-12-20

## 2020-12-23 ENCOUNTER — LAB VISIT (OUTPATIENT)
Dept: LAB | Facility: HOSPITAL | Age: 60
End: 2020-12-23
Attending: DERMATOLOGY
Payer: COMMERCIAL

## 2020-12-23 ENCOUNTER — OFFICE VISIT (OUTPATIENT)
Dept: DERMATOLOGY | Facility: CLINIC | Age: 60
End: 2020-12-23
Payer: COMMERCIAL

## 2020-12-23 DIAGNOSIS — L29.9 SCALP PRURITUS: ICD-10-CM

## 2020-12-23 DIAGNOSIS — L65.0 TELOGEN EFFLUVIUM: ICD-10-CM

## 2020-12-23 DIAGNOSIS — L65.0 TELOGEN EFFLUVIUM: Primary | ICD-10-CM

## 2020-12-23 LAB — 25(OH)D3+25(OH)D2 SERPL-MCNC: 83 NG/ML (ref 30–96)

## 2020-12-23 PROCEDURE — 99999 PR PBB SHADOW E&M-EST. PATIENT-LVL II: ICD-10-PCS | Mod: PBBFAC,,, | Performed by: DERMATOLOGY

## 2020-12-23 PROCEDURE — 99214 OFFICE O/P EST MOD 30 MIN: CPT | Mod: S$GLB,,, | Performed by: DERMATOLOGY

## 2020-12-23 PROCEDURE — 1125F AMNT PAIN NOTED PAIN PRSNT: CPT | Mod: S$GLB,,, | Performed by: DERMATOLOGY

## 2020-12-23 PROCEDURE — 99999 PR PBB SHADOW E&M-EST. PATIENT-LVL II: CPT | Mod: PBBFAC,,, | Performed by: DERMATOLOGY

## 2020-12-23 PROCEDURE — 1125F PR PAIN SEVERITY QUANTIFIED, PAIN PRESENT: ICD-10-PCS | Mod: S$GLB,,, | Performed by: DERMATOLOGY

## 2020-12-23 PROCEDURE — 36415 COLL VENOUS BLD VENIPUNCTURE: CPT

## 2020-12-23 PROCEDURE — 82306 VITAMIN D 25 HYDROXY: CPT

## 2020-12-23 PROCEDURE — 99214 PR OFFICE/OUTPT VISIT, EST, LEVL IV, 30-39 MIN: ICD-10-PCS | Mod: S$GLB,,, | Performed by: DERMATOLOGY

## 2020-12-23 RX ORDER — FLUOCINONIDE TOPICAL SOLUTION USP, 0.05% 0.5 MG/ML
SOLUTION TOPICAL
Qty: 60 ML | Refills: 3 | Status: SHIPPED | OUTPATIENT
Start: 2020-12-23 | End: 2024-03-13
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
## 2020-12-28 ENCOUNTER — HOSPITAL ENCOUNTER (OUTPATIENT)
Dept: RADIOLOGY | Facility: HOSPITAL | Age: 60
Discharge: HOME | End: 2020-12-28
Attending: OBSTETRICS & GYNECOLOGY
Payer: COMMERCIAL

## 2020-12-28 ENCOUNTER — PATIENT MESSAGE (OUTPATIENT)
Dept: INTERNAL MEDICINE | Facility: CLINIC | Age: 60
End: 2020-12-28

## 2020-12-28 ENCOUNTER — HOSPITAL ENCOUNTER (OUTPATIENT)
Dept: RADIOLOGY | Facility: HOSPITAL | Age: 60
Discharge: HOME | End: 2020-12-28
Attending: INTERNAL MEDICINE
Payer: COMMERCIAL

## 2020-12-28 ENCOUNTER — TELEPHONE (OUTPATIENT)
Dept: OBSTETRICS AND GYNECOLOGY | Facility: HOSPITAL | Age: 60
End: 2020-12-28

## 2020-12-28 DIAGNOSIS — N60.11 FIBROCYSTIC BREAST CHANGES OF BOTH BREASTS: ICD-10-CM

## 2020-12-28 DIAGNOSIS — N60.12 FIBROCYSTIC BREAST CHANGES OF BOTH BREASTS: ICD-10-CM

## 2020-12-28 DIAGNOSIS — Z12.31 ENCOUNTER FOR SCREENING MAMMOGRAM FOR MALIGNANT NEOPLASM OF BREAST: Primary | ICD-10-CM

## 2020-12-28 DIAGNOSIS — G47.00 INSOMNIA, UNSPECIFIED TYPE: ICD-10-CM

## 2020-12-28 DIAGNOSIS — Z12.31 ENCOUNTER FOR SCREENING MAMMOGRAM FOR MALIGNANT NEOPLASM OF BREAST: ICD-10-CM

## 2020-12-28 DIAGNOSIS — R92.8 ABNORMAL MAMMOGRAM OF RIGHT BREAST: ICD-10-CM

## 2020-12-28 DIAGNOSIS — Z12.31 ENCOUNTER FOR SCREENING MAMMOGRAM FOR BREAST CANCER: ICD-10-CM

## 2020-12-28 PROCEDURE — 76642 ULTRASOUND BREAST LIMITED: CPT | Mod: TC,RT

## 2020-12-28 PROCEDURE — 77061 MAMMO DIGITAL DIAGNOSTIC RIGHT WITH TOMO: ICD-10-PCS | Mod: 26,RT,, | Performed by: RADIOLOGY

## 2020-12-28 PROCEDURE — 77063 BREAST TOMOSYNTHESIS BI: CPT | Mod: 26,,, | Performed by: RADIOLOGY

## 2020-12-28 PROCEDURE — 77065 DX MAMMO INCL CAD UNI: CPT | Mod: TC,RT

## 2020-12-28 PROCEDURE — 77067 SCR MAMMO BI INCL CAD: CPT | Mod: 26,,, | Performed by: RADIOLOGY

## 2020-12-28 PROCEDURE — 77067 SCR MAMMO BI INCL CAD: CPT | Mod: TC

## 2020-12-28 PROCEDURE — 77061 BREAST TOMOSYNTHESIS UNI: CPT | Mod: TC,RT

## 2020-12-28 PROCEDURE — 77063 MAMMO DIGITAL SCREENING BILAT WITH TOMOSYNTHESIS_CAD: ICD-10-PCS | Mod: 26,,, | Performed by: RADIOLOGY

## 2020-12-28 PROCEDURE — 76642 US BREAST RIGHT LIMITED: ICD-10-PCS | Mod: 26,RT,, | Performed by: RADIOLOGY

## 2020-12-28 PROCEDURE — 77067 MAMMO DIGITAL SCREENING BILAT WITH TOMOSYNTHESIS_CAD: ICD-10-PCS | Mod: 26,,, | Performed by: RADIOLOGY

## 2020-12-28 PROCEDURE — 77065 DX MAMMO INCL CAD UNI: CPT | Mod: 26,RT,, | Performed by: RADIOLOGY

## 2020-12-28 PROCEDURE — 76642 ULTRASOUND BREAST LIMITED: CPT | Mod: 26,RT,, | Performed by: RADIOLOGY

## 2020-12-28 PROCEDURE — 77065 MAMMO DIGITAL DIAGNOSTIC RIGHT WITH TOMO: ICD-10-PCS | Mod: 26,RT,, | Performed by: RADIOLOGY

## 2020-12-28 PROCEDURE — 77061 BREAST TOMOSYNTHESIS UNI: CPT | Mod: 26,RT,, | Performed by: RADIOLOGY

## 2020-12-28 RX ORDER — ZOLPIDEM TARTRATE 10 MG/1
TABLET ORAL
Qty: 30 TABLET | Refills: 3 | Status: SHIPPED | OUTPATIENT
Start: 2020-12-28 | End: 2021-04-26 | Stop reason: SDUPTHER

## 2020-12-28 NOTE — TELEPHONE ENCOUNTER
----- Message from RT Aubrey sent at 12/28/2020 10:26 AM CST -----  Regarding: diagnostic order  Hello,   This patient is here at the [facility]. She had a screening mammogram done which the the Radiologist wants additional images in the RIGHT breast. We would need Diagnostic RIGHT breast order. We already have the ultrasound order if needed. Thank you.

## 2021-01-02 ENCOUNTER — PATIENT MESSAGE (OUTPATIENT)
Dept: OBSTETRICS AND GYNECOLOGY | Facility: CLINIC | Age: 61
End: 2021-01-02

## 2021-01-04 ENCOUNTER — PATIENT MESSAGE (OUTPATIENT)
Dept: ENDOCRINOLOGY | Facility: CLINIC | Age: 61
End: 2021-01-04

## 2021-01-05 ENCOUNTER — TELEPHONE (OUTPATIENT)
Dept: ENDOCRINOLOGY | Facility: CLINIC | Age: 61
End: 2021-01-05

## 2021-01-12 ENCOUNTER — LAB VISIT (OUTPATIENT)
Dept: LAB | Facility: HOSPITAL | Age: 61
End: 2021-01-12
Attending: INTERNAL MEDICINE
Payer: COMMERCIAL

## 2021-01-12 ENCOUNTER — OFFICE VISIT (OUTPATIENT)
Dept: RHEUMATOLOGY | Facility: CLINIC | Age: 61
End: 2021-01-12
Payer: COMMERCIAL

## 2021-01-12 VITALS
SYSTOLIC BLOOD PRESSURE: 111 MMHG | WEIGHT: 95.44 LBS | BODY MASS INDEX: 18.04 KG/M2 | DIASTOLIC BLOOD PRESSURE: 72 MMHG | HEART RATE: 104 BPM

## 2021-01-12 DIAGNOSIS — I73.81 ERYTHROMELALGIA: ICD-10-CM

## 2021-01-12 DIAGNOSIS — M35.00 SJOGREN'S SYNDROME, WITH UNSPECIFIED ORGAN INVOLVEMENT: Primary | ICD-10-CM

## 2021-01-12 DIAGNOSIS — R53.83 FATIGUE, UNSPECIFIED TYPE: ICD-10-CM

## 2021-01-12 DIAGNOSIS — I73.00 RAYNAUD'S DISEASE WITHOUT GANGRENE: ICD-10-CM

## 2021-01-12 DIAGNOSIS — M35.00 SJOGREN'S SYNDROME, WITH UNSPECIFIED ORGAN INVOLVEMENT: ICD-10-CM

## 2021-01-12 DIAGNOSIS — M79.7 FIBROMYALGIA: ICD-10-CM

## 2021-01-12 LAB
ALBUMIN SERPL BCP-MCNC: 4.3 G/DL (ref 3.5–5.2)
ALP SERPL-CCNC: 49 U/L (ref 55–135)
ALT SERPL W/O P-5'-P-CCNC: 22 U/L (ref 10–44)
ANION GAP SERPL CALC-SCNC: 12 MMOL/L (ref 8–16)
AST SERPL-CCNC: 21 U/L (ref 10–40)
BASOPHILS # BLD AUTO: 0.07 K/UL (ref 0–0.2)
BASOPHILS NFR BLD: 1.5 % (ref 0–1.9)
BILIRUB SERPL-MCNC: 0.4 MG/DL (ref 0.1–1)
BUN SERPL-MCNC: 17 MG/DL (ref 6–20)
C3 SERPL-MCNC: 98 MG/DL (ref 50–180)
C4 SERPL-MCNC: 21 MG/DL (ref 11–44)
CALCIUM SERPL-MCNC: 9.6 MG/DL (ref 8.7–10.5)
CHLORIDE SERPL-SCNC: 99 MMOL/L (ref 95–110)
CK SERPL-CCNC: 43 U/L (ref 20–180)
CO2 SERPL-SCNC: 31 MMOL/L (ref 23–29)
CREAT SERPL-MCNC: 0.7 MG/DL (ref 0.5–1.4)
CRP SERPL-MCNC: 0.6 MG/L (ref 0–8.2)
DIFFERENTIAL METHOD: ABNORMAL
EOSINOPHIL # BLD AUTO: 0.2 K/UL (ref 0–0.5)
EOSINOPHIL NFR BLD: 4 % (ref 0–8)
ERYTHROCYTE [DISTWIDTH] IN BLOOD BY AUTOMATED COUNT: 12.8 % (ref 11.5–14.5)
ERYTHROCYTE [SEDIMENTATION RATE] IN BLOOD BY WESTERGREN METHOD: <2 MM/HR (ref 0–36)
EST. GFR  (AFRICAN AMERICAN): >60 ML/MIN/1.73 M^2
EST. GFR  (NON AFRICAN AMERICAN): >60 ML/MIN/1.73 M^2
GLUCOSE SERPL-MCNC: 102 MG/DL (ref 70–110)
HCT VFR BLD AUTO: 42.8 % (ref 37–48.5)
HGB BLD-MCNC: 13.7 G/DL (ref 12–16)
IMM GRANULOCYTES # BLD AUTO: 0.01 K/UL (ref 0–0.04)
IMM GRANULOCYTES NFR BLD AUTO: 0.2 % (ref 0–0.5)
LYMPHOCYTES # BLD AUTO: 0.7 K/UL (ref 1–4.8)
LYMPHOCYTES NFR BLD: 15.5 % (ref 18–48)
MCH RBC QN AUTO: 30.5 PG (ref 27–31)
MCHC RBC AUTO-ENTMCNC: 32 G/DL (ref 32–36)
MCV RBC AUTO: 95 FL (ref 82–98)
MONOCYTES # BLD AUTO: 0.4 K/UL (ref 0.3–1)
MONOCYTES NFR BLD: 8 % (ref 4–15)
NEUTROPHILS # BLD AUTO: 3.4 K/UL (ref 1.8–7.7)
NEUTROPHILS NFR BLD: 70.8 % (ref 38–73)
NRBC BLD-RTO: 0 /100 WBC
PLATELET # BLD AUTO: 197 K/UL (ref 150–350)
PMV BLD AUTO: 9.1 FL (ref 9.2–12.9)
POTASSIUM SERPL-SCNC: 4.6 MMOL/L (ref 3.5–5.1)
PROT SERPL-MCNC: 7.5 G/DL (ref 6–8.4)
RBC # BLD AUTO: 4.49 M/UL (ref 4–5.4)
SODIUM SERPL-SCNC: 142 MMOL/L (ref 136–145)
WBC # BLD AUTO: 4.77 K/UL (ref 3.9–12.7)

## 2021-01-12 PROCEDURE — 36415 COLL VENOUS BLD VENIPUNCTURE: CPT

## 2021-01-12 PROCEDURE — 86160 COMPLEMENT ANTIGEN: CPT

## 2021-01-12 PROCEDURE — 85652 RBC SED RATE AUTOMATED: CPT

## 2021-01-12 PROCEDURE — 82550 ASSAY OF CK (CPK): CPT

## 2021-01-12 PROCEDURE — 99215 OFFICE O/P EST HI 40 MIN: CPT | Mod: S$GLB,,, | Performed by: INTERNAL MEDICINE

## 2021-01-12 PROCEDURE — 86160 COMPLEMENT ANTIGEN: CPT | Mod: 59

## 2021-01-12 PROCEDURE — 99215 PR OFFICE/OUTPT VISIT, EST, LEVL V, 40-54 MIN: ICD-10-PCS | Mod: S$GLB,,, | Performed by: INTERNAL MEDICINE

## 2021-01-12 PROCEDURE — 99999 PR PBB SHADOW E&M-EST. PATIENT-LVL II: CPT | Mod: PBBFAC,,, | Performed by: INTERNAL MEDICINE

## 2021-01-12 PROCEDURE — 3008F BODY MASS INDEX DOCD: CPT | Mod: CPTII,S$GLB,, | Performed by: INTERNAL MEDICINE

## 2021-01-12 PROCEDURE — 86225 DNA ANTIBODY NATIVE: CPT

## 2021-01-12 PROCEDURE — 1125F PR PAIN SEVERITY QUANTIFIED, PAIN PRESENT: ICD-10-PCS | Mod: S$GLB,,, | Performed by: INTERNAL MEDICINE

## 2021-01-12 PROCEDURE — 80053 COMPREHEN METABOLIC PANEL: CPT

## 2021-01-12 PROCEDURE — 99999 PR PBB SHADOW E&M-EST. PATIENT-LVL II: ICD-10-PCS | Mod: PBBFAC,,, | Performed by: INTERNAL MEDICINE

## 2021-01-12 PROCEDURE — 1125F AMNT PAIN NOTED PAIN PRSNT: CPT | Mod: S$GLB,,, | Performed by: INTERNAL MEDICINE

## 2021-01-12 PROCEDURE — 3008F PR BODY MASS INDEX (BMI) DOCUMENTED: ICD-10-PCS | Mod: CPTII,S$GLB,, | Performed by: INTERNAL MEDICINE

## 2021-01-12 PROCEDURE — 85025 COMPLETE CBC W/AUTO DIFF WBC: CPT

## 2021-01-12 PROCEDURE — 86140 C-REACTIVE PROTEIN: CPT

## 2021-01-12 ASSESSMENT — ROUTINE ASSESSMENT OF PATIENT INDEX DATA (RAPID3)
TOTAL RAPID3 SCORE: 5.55
FATIGUE SCORE: 8
MDHAQ FUNCTION SCORE: 1.1
AM STIFFNESS SCORE: 1, YES
PAIN SCORE: 6.5
PATIENT GLOBAL ASSESSMENT SCORE: 6.5
PSYCHOLOGICAL DISTRESS SCORE: 3.3

## 2021-01-13 ENCOUNTER — PATIENT MESSAGE (OUTPATIENT)
Dept: RHEUMATOLOGY | Facility: CLINIC | Age: 61
End: 2021-01-13

## 2021-01-13 LAB — DSDNA AB SER-ACNC: NORMAL [IU]/ML

## 2021-01-14 ENCOUNTER — PATIENT MESSAGE (OUTPATIENT)
Dept: RHEUMATOLOGY | Facility: CLINIC | Age: 61
End: 2021-01-14

## 2021-01-19 ENCOUNTER — TELEPHONE (OUTPATIENT)
Dept: OBSTETRICS AND GYNECOLOGY | Facility: CLINIC | Age: 61
End: 2021-01-19

## 2021-01-29 ENCOUNTER — CLINICAL SUPPORT (OUTPATIENT)
Dept: URGENT CARE | Facility: CLINIC | Age: 61
End: 2021-01-29
Payer: COMMERCIAL

## 2021-01-29 DIAGNOSIS — Z11.59 SCREENING FOR VIRAL DISEASE: Primary | ICD-10-CM

## 2021-01-29 LAB
CTP QC/QA: YES
SARS-COV-2 RDRP RESP QL NAA+PROBE: NEGATIVE

## 2021-01-29 PROCEDURE — U0002: ICD-10-PCS | Mod: QW,S$GLB,, | Performed by: PHYSICIAN ASSISTANT

## 2021-01-29 PROCEDURE — U0002 COVID-19 LAB TEST NON-CDC: HCPCS | Mod: QW,S$GLB,, | Performed by: PHYSICIAN ASSISTANT

## 2021-02-03 ENCOUNTER — PATIENT MESSAGE (OUTPATIENT)
Dept: INTERNAL MEDICINE | Facility: CLINIC | Age: 61
End: 2021-02-03

## 2021-02-03 DIAGNOSIS — R39.9 LOWER URINARY TRACT SYMPTOMS: Primary | ICD-10-CM

## 2021-02-04 ENCOUNTER — PATIENT MESSAGE (OUTPATIENT)
Dept: INTERNAL MEDICINE | Facility: CLINIC | Age: 61
End: 2021-02-04

## 2021-02-04 ENCOUNTER — LAB VISIT (OUTPATIENT)
Dept: LAB | Facility: HOSPITAL | Age: 61
End: 2021-02-04
Attending: INTERNAL MEDICINE
Payer: COMMERCIAL

## 2021-02-04 DIAGNOSIS — R39.9 LOWER URINARY TRACT SYMPTOMS: ICD-10-CM

## 2021-02-04 LAB
BACTERIA #/AREA URNS AUTO: NORMAL /HPF
BILIRUB UR QL STRIP: NEGATIVE
CLARITY UR REFRACT.AUTO: CLEAR
COLOR UR AUTO: YELLOW
GLUCOSE UR QL STRIP: NEGATIVE
HGB UR QL STRIP: NEGATIVE
KETONES UR QL STRIP: ABNORMAL
LEUKOCYTE ESTERASE UR QL STRIP: NEGATIVE
MICROSCOPIC COMMENT: NORMAL
NITRITE UR QL STRIP: NEGATIVE
PH UR STRIP: 6 [PH] (ref 5–8)
PROT UR QL STRIP: NEGATIVE
RBC #/AREA URNS AUTO: 1 /HPF (ref 0–4)
SP GR UR STRIP: 1.01 (ref 1–1.03)
SQUAMOUS #/AREA URNS AUTO: 2 /HPF
URN SPEC COLLECT METH UR: ABNORMAL
WBC #/AREA URNS AUTO: 1 /HPF (ref 0–5)

## 2021-02-04 PROCEDURE — 87086 URINE CULTURE/COLONY COUNT: CPT

## 2021-02-04 PROCEDURE — 81001 URINALYSIS AUTO W/SCOPE: CPT

## 2021-02-06 ENCOUNTER — TELEPHONE (OUTPATIENT)
Dept: INTERNAL MEDICINE | Facility: CLINIC | Age: 61
End: 2021-02-06

## 2021-02-06 LAB — BACTERIA UR CULT: NO GROWTH

## 2021-02-07 ENCOUNTER — PATIENT MESSAGE (OUTPATIENT)
Dept: UROLOGY | Facility: CLINIC | Age: 61
End: 2021-02-07

## 2021-02-09 ENCOUNTER — PATIENT OUTREACH (OUTPATIENT)
Dept: ADMINISTRATIVE | Facility: OTHER | Age: 61
End: 2021-02-09

## 2021-02-09 ENCOUNTER — OFFICE VISIT (OUTPATIENT)
Dept: INTERNAL MEDICINE | Facility: CLINIC | Age: 61
End: 2021-02-09
Payer: COMMERCIAL

## 2021-02-09 ENCOUNTER — PATIENT MESSAGE (OUTPATIENT)
Dept: INTERNAL MEDICINE | Facility: CLINIC | Age: 61
End: 2021-02-09

## 2021-02-09 VITALS
HEART RATE: 70 BPM | OXYGEN SATURATION: 97 % | DIASTOLIC BLOOD PRESSURE: 60 MMHG | HEIGHT: 61 IN | SYSTOLIC BLOOD PRESSURE: 114 MMHG | WEIGHT: 93.25 LBS | BODY MASS INDEX: 17.61 KG/M2

## 2021-02-09 DIAGNOSIS — R39.9 LOWER URINARY TRACT SYMPTOMS: ICD-10-CM

## 2021-02-09 DIAGNOSIS — R10.12 LUQ ABDOMINAL PAIN: ICD-10-CM

## 2021-02-09 DIAGNOSIS — R31.9 HEMATURIA, UNSPECIFIED TYPE: Primary | ICD-10-CM

## 2021-02-09 PROCEDURE — 3008F BODY MASS INDEX DOCD: CPT | Mod: CPTII,S$GLB,, | Performed by: INTERNAL MEDICINE

## 2021-02-09 PROCEDURE — 99999 PR PBB SHADOW E&M-EST. PATIENT-LVL III: CPT | Mod: PBBFAC,,, | Performed by: INTERNAL MEDICINE

## 2021-02-09 PROCEDURE — 99214 PR OFFICE/OUTPT VISIT, EST, LEVL IV, 30-39 MIN: ICD-10-PCS | Mod: S$GLB,,, | Performed by: INTERNAL MEDICINE

## 2021-02-09 PROCEDURE — 3008F PR BODY MASS INDEX (BMI) DOCUMENTED: ICD-10-PCS | Mod: CPTII,S$GLB,, | Performed by: INTERNAL MEDICINE

## 2021-02-09 PROCEDURE — 99214 OFFICE O/P EST MOD 30 MIN: CPT | Mod: S$GLB,,, | Performed by: INTERNAL MEDICINE

## 2021-02-09 PROCEDURE — 99999 PR PBB SHADOW E&M-EST. PATIENT-LVL III: ICD-10-PCS | Mod: PBBFAC,,, | Performed by: INTERNAL MEDICINE

## 2021-02-09 PROCEDURE — 1125F AMNT PAIN NOTED PAIN PRSNT: CPT | Mod: S$GLB,,, | Performed by: INTERNAL MEDICINE

## 2021-02-09 PROCEDURE — 1125F PR PAIN SEVERITY QUANTIFIED, PAIN PRESENT: ICD-10-PCS | Mod: S$GLB,,, | Performed by: INTERNAL MEDICINE

## 2021-02-10 ENCOUNTER — OFFICE VISIT (OUTPATIENT)
Dept: ENDOCRINOLOGY | Facility: CLINIC | Age: 61
End: 2021-02-10
Payer: COMMERCIAL

## 2021-02-10 DIAGNOSIS — E03.8 HYPOTHYROIDISM DUE TO HASHIMOTO'S THYROIDITIS: Primary | ICD-10-CM

## 2021-02-10 DIAGNOSIS — E06.3 HYPOTHYROIDISM DUE TO HASHIMOTO'S THYROIDITIS: Primary | ICD-10-CM

## 2021-02-10 DIAGNOSIS — M81.0 OSTEOPOROSIS, POSTMENOPAUSAL: ICD-10-CM

## 2021-02-10 DIAGNOSIS — E04.2 MULTINODULAR GOITER: ICD-10-CM

## 2021-02-10 DIAGNOSIS — M35.00 SJOGREN'S SYNDROME, WITH UNSPECIFIED ORGAN INVOLVEMENT: ICD-10-CM

## 2021-02-10 PROCEDURE — 99214 OFFICE O/P EST MOD 30 MIN: CPT | Mod: 95,,, | Performed by: INTERNAL MEDICINE

## 2021-02-10 PROCEDURE — 99214 PR OFFICE/OUTPT VISIT, EST, LEVL IV, 30-39 MIN: ICD-10-PCS | Mod: 95,,, | Performed by: INTERNAL MEDICINE

## 2021-02-11 RX ORDER — AMITRIPTYLINE HYDROCHLORIDE 10 MG/1
20 TABLET, FILM COATED ORAL DAILY
Qty: 180 TABLET | Refills: 4 | Status: SHIPPED | OUTPATIENT
Start: 2021-02-11 | End: 2022-02-28 | Stop reason: SDUPTHER

## 2021-02-23 ENCOUNTER — PATIENT MESSAGE (OUTPATIENT)
Dept: RHEUMATOLOGY | Facility: CLINIC | Age: 61
End: 2021-02-23

## 2021-03-25 ENCOUNTER — OFFICE VISIT (OUTPATIENT)
Dept: URGENT CARE | Facility: CLINIC | Age: 61
End: 2021-03-25
Payer: COMMERCIAL

## 2021-03-25 VITALS
RESPIRATION RATE: 16 BRPM | HEART RATE: 109 BPM | BODY MASS INDEX: 17.94 KG/M2 | OXYGEN SATURATION: 98 % | WEIGHT: 95 LBS | DIASTOLIC BLOOD PRESSURE: 67 MMHG | SYSTOLIC BLOOD PRESSURE: 101 MMHG | TEMPERATURE: 99 F | HEIGHT: 61 IN

## 2021-03-25 DIAGNOSIS — J02.9 SORE THROAT: Primary | ICD-10-CM

## 2021-03-25 DIAGNOSIS — Z11.59 SCREENING FOR VIRAL DISEASE: ICD-10-CM

## 2021-03-25 LAB
CTP QC/QA: YES
SARS-COV-2 RDRP RESP QL NAA+PROBE: NEGATIVE

## 2021-03-25 PROCEDURE — 99214 PR OFFICE/OUTPT VISIT, EST, LEVL IV, 30-39 MIN: ICD-10-PCS | Mod: S$GLB,,, | Performed by: FAMILY MEDICINE

## 2021-03-25 PROCEDURE — 3008F PR BODY MASS INDEX (BMI) DOCUMENTED: ICD-10-PCS | Mod: CPTII,S$GLB,, | Performed by: FAMILY MEDICINE

## 2021-03-25 PROCEDURE — 3008F BODY MASS INDEX DOCD: CPT | Mod: CPTII,S$GLB,, | Performed by: FAMILY MEDICINE

## 2021-03-25 PROCEDURE — 99214 OFFICE O/P EST MOD 30 MIN: CPT | Mod: S$GLB,,, | Performed by: FAMILY MEDICINE

## 2021-03-25 PROCEDURE — U0002: ICD-10-PCS | Mod: QW,S$GLB,, | Performed by: FAMILY MEDICINE

## 2021-03-25 PROCEDURE — U0002 COVID-19 LAB TEST NON-CDC: HCPCS | Mod: QW,S$GLB,, | Performed by: FAMILY MEDICINE

## 2021-03-27 ENCOUNTER — NURSE TRIAGE (OUTPATIENT)
Dept: ADMINISTRATIVE | Facility: CLINIC | Age: 61
End: 2021-03-27

## 2021-03-29 ENCOUNTER — HOSPITAL ENCOUNTER (EMERGENCY)
Facility: HOSPITAL | Age: 61
Discharge: HOME | End: 2021-03-29
Attending: EMERGENCY MEDICINE
Payer: COMMERCIAL

## 2021-03-29 ENCOUNTER — PATIENT MESSAGE (OUTPATIENT)
Dept: INTERNAL MEDICINE | Facility: CLINIC | Age: 61
End: 2021-03-29

## 2021-03-29 VITALS
HEART RATE: 98 BPM | SYSTOLIC BLOOD PRESSURE: 117 MMHG | BODY MASS INDEX: 17.56 KG/M2 | TEMPERATURE: 99 F | DIASTOLIC BLOOD PRESSURE: 55 MMHG | WEIGHT: 93 LBS | OXYGEN SATURATION: 100 % | RESPIRATION RATE: 16 BRPM | HEIGHT: 61 IN

## 2021-03-29 DIAGNOSIS — R10.9 ABDOMINAL PAIN: ICD-10-CM

## 2021-03-29 DIAGNOSIS — R10.13 EPIGASTRIC ABDOMINAL PAIN: Primary | ICD-10-CM

## 2021-03-29 DIAGNOSIS — N30.01 ACUTE CYSTITIS WITH HEMATURIA: ICD-10-CM

## 2021-03-29 LAB
ALBUMIN SERPL BCP-MCNC: 4.6 G/DL (ref 3.5–5.2)
ALP SERPL-CCNC: 65 U/L (ref 55–135)
ALT SERPL W/O P-5'-P-CCNC: 25 U/L (ref 10–44)
ANION GAP SERPL CALC-SCNC: 12 MMOL/L (ref 8–16)
AST SERPL-CCNC: 27 U/L (ref 10–40)
BACTERIA #/AREA URNS AUTO: ABNORMAL /HPF
BASOPHILS # BLD AUTO: 0.05 K/UL (ref 0–0.2)
BASOPHILS NFR BLD: 0.4 % (ref 0–1.9)
BILIRUB SERPL-MCNC: 0.5 MG/DL (ref 0.1–1)
BILIRUB UR QL STRIP: NEGATIVE
BUN SERPL-MCNC: 9 MG/DL (ref 6–20)
CALCIUM SERPL-MCNC: 9.2 MG/DL (ref 8.7–10.5)
CHLORIDE SERPL-SCNC: 98 MMOL/L (ref 95–110)
CLARITY UR REFRACT.AUTO: ABNORMAL
CO2 SERPL-SCNC: 27 MMOL/L (ref 23–29)
COLOR UR AUTO: YELLOW
CREAT SERPL-MCNC: 0.7 MG/DL (ref 0.5–1.4)
DIFFERENTIAL METHOD: ABNORMAL
EOSINOPHIL # BLD AUTO: 0 K/UL (ref 0–0.5)
EOSINOPHIL NFR BLD: 0.3 % (ref 0–8)
ERYTHROCYTE [DISTWIDTH] IN BLOOD BY AUTOMATED COUNT: 12.9 % (ref 11.5–14.5)
EST. GFR  (AFRICAN AMERICAN): >60 ML/MIN/1.73 M^2
EST. GFR  (NON AFRICAN AMERICAN): >60 ML/MIN/1.73 M^2
GLUCOSE SERPL-MCNC: 103 MG/DL (ref 70–110)
GLUCOSE UR QL STRIP: NEGATIVE
HCT VFR BLD AUTO: 44 % (ref 37–48.5)
HCV AB SERPL QL IA: NEGATIVE
HGB BLD-MCNC: 14.3 G/DL (ref 12–16)
HGB UR QL STRIP: ABNORMAL
HIV 1+2 AB+HIV1 P24 AG SERPL QL IA: NEGATIVE
HYALINE CASTS UR QL AUTO: 0 /LPF
IMM GRANULOCYTES # BLD AUTO: 0.05 K/UL (ref 0–0.04)
IMM GRANULOCYTES NFR BLD AUTO: 0.4 % (ref 0–0.5)
KETONES UR QL STRIP: ABNORMAL
LEUKOCYTE ESTERASE UR QL STRIP: NEGATIVE
LIPASE SERPL-CCNC: 22 U/L (ref 4–60)
LYMPHOCYTES # BLD AUTO: 0.4 K/UL (ref 1–4.8)
LYMPHOCYTES NFR BLD: 3.1 % (ref 18–48)
MCH RBC QN AUTO: 30.4 PG (ref 27–31)
MCHC RBC AUTO-ENTMCNC: 32.5 G/DL (ref 32–36)
MCV RBC AUTO: 94 FL (ref 82–98)
MICROSCOPIC COMMENT: ABNORMAL
MONOCYTES # BLD AUTO: 0.6 K/UL (ref 0.3–1)
MONOCYTES NFR BLD: 5 % (ref 4–15)
NEUTROPHILS # BLD AUTO: 11.6 K/UL (ref 1.8–7.7)
NEUTROPHILS NFR BLD: 90.8 % (ref 38–73)
NITRITE UR QL STRIP: NEGATIVE
NRBC BLD-RTO: 0 /100 WBC
PH UR STRIP: 5 [PH] (ref 5–8)
PLATELET # BLD AUTO: 194 K/UL (ref 150–450)
PMV BLD AUTO: 9.2 FL (ref 9.2–12.9)
POTASSIUM SERPL-SCNC: 3.7 MMOL/L (ref 3.5–5.1)
PROT SERPL-MCNC: 8.4 G/DL (ref 6–8.4)
PROT UR QL STRIP: ABNORMAL
RBC # BLD AUTO: 4.7 M/UL (ref 4–5.4)
RBC #/AREA URNS AUTO: 83 /HPF (ref 0–4)
SODIUM SERPL-SCNC: 137 MMOL/L (ref 136–145)
SP GR UR STRIP: 1.02 (ref 1–1.03)
SQUAMOUS #/AREA URNS AUTO: 3 /HPF
URN SPEC COLLECT METH UR: ABNORMAL
WBC # BLD AUTO: 12.81 K/UL (ref 3.9–12.7)
WBC #/AREA URNS AUTO: 7 /HPF (ref 0–5)

## 2021-03-29 PROCEDURE — 93010 EKG 12-LEAD: ICD-10-PCS | Mod: ,,, | Performed by: INTERNAL MEDICINE

## 2021-03-29 PROCEDURE — 81001 URINALYSIS AUTO W/SCOPE: CPT | Performed by: EMERGENCY MEDICINE

## 2021-03-29 PROCEDURE — 25000003 PHARM REV CODE 250: Performed by: EMERGENCY MEDICINE

## 2021-03-29 PROCEDURE — 86703 HIV-1/HIV-2 1 RESULT ANTBDY: CPT | Performed by: EMERGENCY MEDICINE

## 2021-03-29 PROCEDURE — 63600175 PHARM REV CODE 636 W HCPCS: Performed by: EMERGENCY MEDICINE

## 2021-03-29 PROCEDURE — 99284 EMERGENCY DEPT VISIT MOD MDM: CPT | Mod: 25

## 2021-03-29 PROCEDURE — 93010 ELECTROCARDIOGRAM REPORT: CPT | Mod: ,,, | Performed by: INTERNAL MEDICINE

## 2021-03-29 PROCEDURE — 99284 EMERGENCY DEPT VISIT MOD MDM: CPT | Mod: ,,, | Performed by: EMERGENCY MEDICINE

## 2021-03-29 PROCEDURE — 99284 PR EMERGENCY DEPT VISIT,LEVEL IV: ICD-10-PCS | Mod: ,,, | Performed by: EMERGENCY MEDICINE

## 2021-03-29 PROCEDURE — 83690 ASSAY OF LIPASE: CPT | Performed by: EMERGENCY MEDICINE

## 2021-03-29 PROCEDURE — 93005 ELECTROCARDIOGRAM TRACING: CPT

## 2021-03-29 PROCEDURE — 85025 COMPLETE CBC W/AUTO DIFF WBC: CPT | Performed by: EMERGENCY MEDICINE

## 2021-03-29 PROCEDURE — 80053 COMPREHEN METABOLIC PANEL: CPT | Performed by: EMERGENCY MEDICINE

## 2021-03-29 PROCEDURE — 96374 THER/PROPH/DIAG INJ IV PUSH: CPT

## 2021-03-29 PROCEDURE — 96375 TX/PRO/DX INJ NEW DRUG ADDON: CPT

## 2021-03-29 PROCEDURE — 86803 HEPATITIS C AB TEST: CPT | Performed by: EMERGENCY MEDICINE

## 2021-03-29 PROCEDURE — 96361 HYDRATE IV INFUSION ADD-ON: CPT

## 2021-03-29 RX ORDER — CEPHALEXIN 500 MG/1
500 CAPSULE ORAL EVERY 12 HOURS
Qty: 14 CAPSULE | Refills: 0 | Status: SHIPPED | OUTPATIENT
Start: 2021-03-29 | End: 2021-04-05

## 2021-03-29 RX ORDER — ONDANSETRON 2 MG/ML
4 INJECTION INTRAMUSCULAR; INTRAVENOUS
Status: COMPLETED | OUTPATIENT
Start: 2021-03-29 | End: 2021-03-29

## 2021-03-29 RX ORDER — MAG HYDROX/ALUMINUM HYD/SIMETH 200-200-20
30 SUSPENSION, ORAL (FINAL DOSE FORM) ORAL
Status: COMPLETED | OUTPATIENT
Start: 2021-03-29 | End: 2021-03-29

## 2021-03-29 RX ORDER — CEFTRIAXONE 1 G/1
1 INJECTION, POWDER, FOR SOLUTION INTRAMUSCULAR; INTRAVENOUS
Status: COMPLETED | OUTPATIENT
Start: 2021-03-29 | End: 2021-03-29

## 2021-03-29 RX ORDER — PANTOPRAZOLE SODIUM 20 MG/1
20 TABLET, DELAYED RELEASE ORAL EVERY MORNING
Qty: 30 TABLET | Refills: 0 | Status: SHIPPED | OUTPATIENT
Start: 2021-03-29 | End: 2021-09-29

## 2021-03-29 RX ADMIN — CEFTRIAXONE 1 G: 1 INJECTION, POWDER, FOR SOLUTION INTRAMUSCULAR; INTRAVENOUS at 01:03

## 2021-03-29 RX ADMIN — SODIUM CHLORIDE 1000 ML: 0.9 INJECTION, SOLUTION INTRAVENOUS at 12:03

## 2021-03-29 RX ADMIN — ALUMINUM HYDROXIDE, MAGNESIUM HYDROXIDE, AND SIMETHICONE 30 ML: 200; 200; 20 SUSPENSION ORAL at 12:03

## 2021-03-29 RX ADMIN — ONDANSETRON 4 MG: 2 INJECTION INTRAMUSCULAR; INTRAVENOUS at 12:03

## 2021-04-22 ENCOUNTER — PATIENT MESSAGE (OUTPATIENT)
Dept: ENDOCRINOLOGY | Facility: CLINIC | Age: 61
End: 2021-04-22

## 2021-04-24 ENCOUNTER — OFFICE VISIT (OUTPATIENT)
Dept: URGENT CARE | Facility: CLINIC | Age: 61
End: 2021-04-24
Payer: COMMERCIAL

## 2021-04-24 ENCOUNTER — NURSE TRIAGE (OUTPATIENT)
Dept: ADMINISTRATIVE | Facility: CLINIC | Age: 61
End: 2021-04-24

## 2021-04-24 VITALS
DIASTOLIC BLOOD PRESSURE: 72 MMHG | SYSTOLIC BLOOD PRESSURE: 107 MMHG | HEIGHT: 61 IN | BODY MASS INDEX: 16.99 KG/M2 | OXYGEN SATURATION: 99 % | TEMPERATURE: 98 F | WEIGHT: 90 LBS | HEART RATE: 105 BPM

## 2021-04-24 DIAGNOSIS — R31.9 HEMATURIA, UNSPECIFIED TYPE: Primary | ICD-10-CM

## 2021-04-24 LAB
BILIRUB UR QL STRIP: NEGATIVE
GLUCOSE UR QL STRIP: NEGATIVE
KETONES UR QL STRIP: POSITIVE
LEUKOCYTE ESTERASE UR QL STRIP: NEGATIVE
PH, POC UA: 5.5 (ref 5–8)
POC BLOOD, URINE: POSITIVE
POC NITRATES, URINE: NEGATIVE
PROT UR QL STRIP: POSITIVE
SP GR UR STRIP: 1 (ref 1–1.03)
UROBILINOGEN UR STRIP-ACNC: NORMAL (ref 0.1–1.1)

## 2021-04-24 PROCEDURE — 81003 POCT URINALYSIS, DIPSTICK, AUTOMATED, W/O SCOPE: ICD-10-PCS | Mod: QW,S$GLB,, | Performed by: FAMILY MEDICINE

## 2021-04-24 PROCEDURE — 3008F PR BODY MASS INDEX (BMI) DOCUMENTED: ICD-10-PCS | Mod: CPTII,S$GLB,, | Performed by: FAMILY MEDICINE

## 2021-04-24 PROCEDURE — 87086 URINE CULTURE/COLONY COUNT: CPT | Performed by: FAMILY MEDICINE

## 2021-04-24 PROCEDURE — 81003 URINALYSIS AUTO W/O SCOPE: CPT | Mod: QW,S$GLB,, | Performed by: FAMILY MEDICINE

## 2021-04-24 PROCEDURE — 99213 PR OFFICE/OUTPT VISIT, EST, LEVL III, 20-29 MIN: ICD-10-PCS | Mod: 25,S$GLB,, | Performed by: FAMILY MEDICINE

## 2021-04-24 PROCEDURE — 99213 OFFICE O/P EST LOW 20 MIN: CPT | Mod: 25,S$GLB,, | Performed by: FAMILY MEDICINE

## 2021-04-24 PROCEDURE — 3008F BODY MASS INDEX DOCD: CPT | Mod: CPTII,S$GLB,, | Performed by: FAMILY MEDICINE

## 2021-04-25 ENCOUNTER — PATIENT MESSAGE (OUTPATIENT)
Dept: INTERNAL MEDICINE | Facility: CLINIC | Age: 61
End: 2021-04-25

## 2021-04-25 DIAGNOSIS — G47.00 INSOMNIA, UNSPECIFIED TYPE: ICD-10-CM

## 2021-04-26 ENCOUNTER — PATIENT MESSAGE (OUTPATIENT)
Dept: UROLOGY | Facility: CLINIC | Age: 61
End: 2021-04-26

## 2021-04-26 LAB — BACTERIA UR CULT: NO GROWTH

## 2021-04-26 RX ORDER — ZOLPIDEM TARTRATE 10 MG/1
TABLET ORAL
Qty: 30 TABLET | Refills: 3 | Status: SHIPPED | OUTPATIENT
Start: 2021-04-26 | End: 2021-08-20 | Stop reason: SDUPTHER

## 2021-04-28 ENCOUNTER — TELEPHONE (OUTPATIENT)
Dept: NEPHROLOGY | Facility: CLINIC | Age: 61
End: 2021-04-28

## 2021-04-29 ENCOUNTER — LAB VISIT (OUTPATIENT)
Dept: LAB | Facility: HOSPITAL | Age: 61
End: 2021-04-29
Attending: INTERNAL MEDICINE
Payer: COMMERCIAL

## 2021-04-29 ENCOUNTER — PATIENT MESSAGE (OUTPATIENT)
Dept: PAIN MEDICINE | Facility: CLINIC | Age: 61
End: 2021-04-29

## 2021-04-29 DIAGNOSIS — E03.8 HYPOTHYROIDISM DUE TO HASHIMOTO'S THYROIDITIS: ICD-10-CM

## 2021-04-29 DIAGNOSIS — E06.3 HYPOTHYROIDISM DUE TO HASHIMOTO'S THYROIDITIS: ICD-10-CM

## 2021-04-29 LAB
T3 SERPL-MCNC: 71 NG/DL (ref 60–180)
T4 FREE SERPL-MCNC: 0.61 NG/DL (ref 0.71–1.51)
TSH SERPL DL<=0.005 MIU/L-ACNC: 1.22 UIU/ML (ref 0.4–4)

## 2021-04-29 PROCEDURE — 84439 ASSAY OF FREE THYROXINE: CPT | Performed by: INTERNAL MEDICINE

## 2021-04-29 PROCEDURE — 36415 COLL VENOUS BLD VENIPUNCTURE: CPT | Performed by: INTERNAL MEDICINE

## 2021-04-29 PROCEDURE — 84480 ASSAY TRIIODOTHYRONINE (T3): CPT | Performed by: INTERNAL MEDICINE

## 2021-04-29 PROCEDURE — 84443 ASSAY THYROID STIM HORMONE: CPT | Performed by: INTERNAL MEDICINE

## 2021-04-30 ENCOUNTER — TELEPHONE (OUTPATIENT)
Dept: NEPHROLOGY | Facility: CLINIC | Age: 61
End: 2021-04-30

## 2021-04-30 ENCOUNTER — PATIENT MESSAGE (OUTPATIENT)
Dept: RHEUMATOLOGY | Facility: CLINIC | Age: 61
End: 2021-04-30

## 2021-04-30 ENCOUNTER — PATIENT MESSAGE (OUTPATIENT)
Dept: ENDOCRINOLOGY | Facility: CLINIC | Age: 61
End: 2021-04-30

## 2021-04-30 DIAGNOSIS — E06.3 HYPOTHYROIDISM DUE TO HASHIMOTO'S THYROIDITIS: Primary | ICD-10-CM

## 2021-04-30 DIAGNOSIS — E03.8 HYPOTHYROIDISM DUE TO HASHIMOTO'S THYROIDITIS: Primary | ICD-10-CM

## 2021-05-06 ENCOUNTER — TELEPHONE (OUTPATIENT)
Dept: RHEUMATOLOGY | Facility: CLINIC | Age: 61
End: 2021-05-06

## 2021-05-06 ENCOUNTER — PATIENT MESSAGE (OUTPATIENT)
Dept: RHEUMATOLOGY | Facility: CLINIC | Age: 61
End: 2021-05-06

## 2021-05-14 ENCOUNTER — TELEPHONE (OUTPATIENT)
Dept: NEPHROLOGY | Facility: CLINIC | Age: 61
End: 2021-05-14

## 2021-05-14 DIAGNOSIS — R31.9 HEMATURIA, UNSPECIFIED TYPE: Primary | ICD-10-CM

## 2021-05-17 ENCOUNTER — LAB VISIT (OUTPATIENT)
Dept: LAB | Facility: HOSPITAL | Age: 61
End: 2021-05-17
Attending: INTERNAL MEDICINE
Payer: COMMERCIAL

## 2021-05-17 DIAGNOSIS — R31.9 HEMATURIA, UNSPECIFIED TYPE: ICD-10-CM

## 2021-05-17 LAB
ANION GAP SERPL CALC-SCNC: 8 MMOL/L (ref 8–16)
BASOPHILS # BLD AUTO: 0.08 K/UL (ref 0–0.2)
BASOPHILS NFR BLD: 1.5 % (ref 0–1.9)
BUN SERPL-MCNC: 16 MG/DL (ref 6–20)
CALCIUM SERPL-MCNC: 10 MG/DL (ref 8.7–10.5)
CHLORIDE SERPL-SCNC: 99 MMOL/L (ref 95–110)
CO2 SERPL-SCNC: 32 MMOL/L (ref 23–29)
CREAT SERPL-MCNC: 0.7 MG/DL (ref 0.5–1.4)
DIFFERENTIAL METHOD: ABNORMAL
EOSINOPHIL # BLD AUTO: 0.2 K/UL (ref 0–0.5)
EOSINOPHIL NFR BLD: 3.8 % (ref 0–8)
ERYTHROCYTE [DISTWIDTH] IN BLOOD BY AUTOMATED COUNT: 13.2 % (ref 11.5–14.5)
EST. GFR  (AFRICAN AMERICAN): >60 ML/MIN/1.73 M^2
EST. GFR  (NON AFRICAN AMERICAN): >60 ML/MIN/1.73 M^2
GLUCOSE SERPL-MCNC: 98 MG/DL (ref 70–110)
HCT VFR BLD AUTO: 41.8 % (ref 37–48.5)
HGB BLD-MCNC: 13.6 G/DL (ref 12–16)
IMM GRANULOCYTES # BLD AUTO: 0.02 K/UL (ref 0–0.04)
IMM GRANULOCYTES NFR BLD AUTO: 0.4 % (ref 0–0.5)
LYMPHOCYTES # BLD AUTO: 0.9 K/UL (ref 1–4.8)
LYMPHOCYTES NFR BLD: 17.5 % (ref 18–48)
MCH RBC QN AUTO: 30.6 PG (ref 27–31)
MCHC RBC AUTO-ENTMCNC: 32.5 G/DL (ref 32–36)
MCV RBC AUTO: 94 FL (ref 82–98)
MONOCYTES # BLD AUTO: 0.5 K/UL (ref 0.3–1)
MONOCYTES NFR BLD: 8.7 % (ref 4–15)
NEUTROPHILS # BLD AUTO: 3.6 K/UL (ref 1.8–7.7)
NEUTROPHILS NFR BLD: 68.1 % (ref 38–73)
NRBC BLD-RTO: 0 /100 WBC
PLATELET # BLD AUTO: 216 K/UL (ref 150–450)
PMV BLD AUTO: 9.7 FL (ref 9.2–12.9)
POTASSIUM SERPL-SCNC: 4.9 MMOL/L (ref 3.5–5.1)
RBC # BLD AUTO: 4.44 M/UL (ref 4–5.4)
SODIUM SERPL-SCNC: 139 MMOL/L (ref 136–145)
WBC # BLD AUTO: 5.26 K/UL (ref 3.9–12.7)

## 2021-05-17 PROCEDURE — 36415 COLL VENOUS BLD VENIPUNCTURE: CPT | Performed by: INTERNAL MEDICINE

## 2021-05-17 PROCEDURE — 85025 COMPLETE CBC W/AUTO DIFF WBC: CPT | Performed by: INTERNAL MEDICINE

## 2021-05-17 PROCEDURE — 80048 BASIC METABOLIC PNL TOTAL CA: CPT | Performed by: INTERNAL MEDICINE

## 2021-05-18 ENCOUNTER — PATIENT OUTREACH (OUTPATIENT)
Dept: ADMINISTRATIVE | Facility: OTHER | Age: 61
End: 2021-05-18

## 2021-05-18 ENCOUNTER — PATIENT MESSAGE (OUTPATIENT)
Dept: NEPHROLOGY | Facility: CLINIC | Age: 61
End: 2021-05-18

## 2021-05-19 ENCOUNTER — OFFICE VISIT (OUTPATIENT)
Dept: NEPHROLOGY | Facility: CLINIC | Age: 61
End: 2021-05-19
Payer: COMMERCIAL

## 2021-05-19 ENCOUNTER — PATIENT MESSAGE (OUTPATIENT)
Dept: UROLOGY | Facility: CLINIC | Age: 61
End: 2021-05-19

## 2021-05-19 VITALS
BODY MASS INDEX: 16.48 KG/M2 | HEART RATE: 108 BPM | OXYGEN SATURATION: 100 % | WEIGHT: 87.31 LBS | HEIGHT: 61 IN | SYSTOLIC BLOOD PRESSURE: 100 MMHG | DIASTOLIC BLOOD PRESSURE: 60 MMHG

## 2021-05-19 DIAGNOSIS — R31.9 HEMATURIA, UNSPECIFIED TYPE: ICD-10-CM

## 2021-05-19 PROCEDURE — 99999 PR PBB SHADOW E&M-EST. PATIENT-LVL V: CPT | Mod: PBBFAC,,, | Performed by: INTERNAL MEDICINE

## 2021-05-19 PROCEDURE — 1126F AMNT PAIN NOTED NONE PRSNT: CPT | Mod: S$GLB,,, | Performed by: INTERNAL MEDICINE

## 2021-05-19 PROCEDURE — 3008F BODY MASS INDEX DOCD: CPT | Mod: CPTII,S$GLB,, | Performed by: INTERNAL MEDICINE

## 2021-05-19 PROCEDURE — 99203 PR OFFICE/OUTPT VISIT, NEW, LEVL III, 30-44 MIN: ICD-10-PCS | Mod: S$GLB,,, | Performed by: INTERNAL MEDICINE

## 2021-05-19 PROCEDURE — 3008F PR BODY MASS INDEX (BMI) DOCUMENTED: ICD-10-PCS | Mod: CPTII,S$GLB,, | Performed by: INTERNAL MEDICINE

## 2021-05-19 PROCEDURE — 1126F PR PAIN SEVERITY QUANTIFIED, NO PAIN PRESENT: ICD-10-PCS | Mod: S$GLB,,, | Performed by: INTERNAL MEDICINE

## 2021-05-19 PROCEDURE — 99203 OFFICE O/P NEW LOW 30 MIN: CPT | Mod: S$GLB,,, | Performed by: INTERNAL MEDICINE

## 2021-05-19 PROCEDURE — 99999 PR PBB SHADOW E&M-EST. PATIENT-LVL V: ICD-10-PCS | Mod: PBBFAC,,, | Performed by: INTERNAL MEDICINE

## 2021-05-20 ENCOUNTER — OFFICE VISIT (OUTPATIENT)
Dept: INTERNAL MEDICINE | Facility: CLINIC | Age: 61
End: 2021-05-20
Payer: COMMERCIAL

## 2021-05-20 VITALS
HEIGHT: 61 IN | SYSTOLIC BLOOD PRESSURE: 90 MMHG | BODY MASS INDEX: 16.94 KG/M2 | DIASTOLIC BLOOD PRESSURE: 70 MMHG | OXYGEN SATURATION: 99 % | HEART RATE: 105 BPM | WEIGHT: 89.75 LBS

## 2021-05-20 DIAGNOSIS — R39.9 LOWER URINARY TRACT SYMPTOMS: ICD-10-CM

## 2021-05-20 DIAGNOSIS — R63.4 ABNORMAL WEIGHT LOSS: Primary | ICD-10-CM

## 2021-05-20 PROCEDURE — 1125F AMNT PAIN NOTED PAIN PRSNT: CPT | Mod: S$GLB,,, | Performed by: INTERNAL MEDICINE

## 2021-05-20 PROCEDURE — 99999 PR PBB SHADOW E&M-EST. PATIENT-LVL V: ICD-10-PCS | Mod: PBBFAC,,, | Performed by: INTERNAL MEDICINE

## 2021-05-20 PROCEDURE — 3008F BODY MASS INDEX DOCD: CPT | Mod: CPTII,S$GLB,, | Performed by: INTERNAL MEDICINE

## 2021-05-20 PROCEDURE — 99999 PR PBB SHADOW E&M-EST. PATIENT-LVL V: CPT | Mod: PBBFAC,,, | Performed by: INTERNAL MEDICINE

## 2021-05-20 PROCEDURE — 3008F PR BODY MASS INDEX (BMI) DOCUMENTED: ICD-10-PCS | Mod: CPTII,S$GLB,, | Performed by: INTERNAL MEDICINE

## 2021-05-20 PROCEDURE — 1125F PR PAIN SEVERITY QUANTIFIED, PAIN PRESENT: ICD-10-PCS | Mod: S$GLB,,, | Performed by: INTERNAL MEDICINE

## 2021-05-20 PROCEDURE — 99214 PR OFFICE/OUTPT VISIT, EST, LEVL IV, 30-39 MIN: ICD-10-PCS | Mod: S$GLB,,, | Performed by: INTERNAL MEDICINE

## 2021-05-20 PROCEDURE — 99214 OFFICE O/P EST MOD 30 MIN: CPT | Mod: S$GLB,,, | Performed by: INTERNAL MEDICINE

## 2021-05-21 ENCOUNTER — PATIENT MESSAGE (OUTPATIENT)
Dept: INTERNAL MEDICINE | Facility: CLINIC | Age: 61
End: 2021-05-21

## 2021-05-21 DIAGNOSIS — R31.9 HEMATURIA, UNSPECIFIED TYPE: Primary | ICD-10-CM

## 2021-05-26 ENCOUNTER — PATIENT MESSAGE (OUTPATIENT)
Dept: INTERNAL MEDICINE | Facility: CLINIC | Age: 61
End: 2021-05-26

## 2021-05-27 ENCOUNTER — HOSPITAL ENCOUNTER (OUTPATIENT)
Dept: ENDOCRINOLOGY | Facility: CLINIC | Age: 61
Discharge: HOME | End: 2021-05-27
Attending: INTERNAL MEDICINE
Payer: COMMERCIAL

## 2021-05-27 DIAGNOSIS — E04.2 MULTINODULAR GOITER: ICD-10-CM

## 2021-05-27 PROCEDURE — 76536 US EXAM OF HEAD AND NECK: CPT | Mod: S$GLB,,, | Performed by: INTERNAL MEDICINE

## 2021-05-27 PROCEDURE — 76536 US SOFT TISSUE HEAD NECK THYROID: ICD-10-PCS | Mod: S$GLB,,, | Performed by: INTERNAL MEDICINE

## 2021-05-28 ENCOUNTER — TELEPHONE (OUTPATIENT)
Dept: PAIN MEDICINE | Facility: CLINIC | Age: 61
End: 2021-05-28

## 2021-05-31 ENCOUNTER — OFFICE VISIT (OUTPATIENT)
Dept: PAIN MEDICINE | Facility: CLINIC | Age: 61
End: 2021-05-31
Payer: COMMERCIAL

## 2021-05-31 ENCOUNTER — HOSPITAL ENCOUNTER (OUTPATIENT)
Dept: RADIOLOGY | Facility: HOSPITAL | Age: 61
Discharge: HOME | End: 2021-05-31
Attending: STUDENT IN AN ORGANIZED HEALTH CARE EDUCATION/TRAINING PROGRAM
Payer: COMMERCIAL

## 2021-05-31 VITALS
WEIGHT: 88.19 LBS | BODY MASS INDEX: 16.65 KG/M2 | HEIGHT: 61 IN | DIASTOLIC BLOOD PRESSURE: 69 MMHG | TEMPERATURE: 98 F | SYSTOLIC BLOOD PRESSURE: 98 MMHG | HEART RATE: 94 BPM | RESPIRATION RATE: 18 BRPM

## 2021-05-31 DIAGNOSIS — G58.8 PUDENDAL NEURALGIA: Primary | ICD-10-CM

## 2021-05-31 DIAGNOSIS — M79.7 FIBROMYALGIA: ICD-10-CM

## 2021-05-31 DIAGNOSIS — M70.62 GREATER TROCHANTERIC BURSITIS OF BOTH HIPS: ICD-10-CM

## 2021-05-31 DIAGNOSIS — M70.61 GREATER TROCHANTERIC BURSITIS OF BOTH HIPS: ICD-10-CM

## 2021-05-31 DIAGNOSIS — M53.3 COCCYDYNIA: ICD-10-CM

## 2021-05-31 DIAGNOSIS — M53.3 SACROILIAC JOINT PAIN: ICD-10-CM

## 2021-05-31 DIAGNOSIS — D17.71 ANGIOMYOLIPOMA OF KIDNEY: ICD-10-CM

## 2021-05-31 PROCEDURE — 99213 OFFICE O/P EST LOW 20 MIN: CPT | Mod: S$GLB,,, | Performed by: NURSE PRACTITIONER

## 2021-05-31 PROCEDURE — 3008F BODY MASS INDEX DOCD: CPT | Mod: CPTII,S$GLB,, | Performed by: NURSE PRACTITIONER

## 2021-05-31 PROCEDURE — 1125F PR PAIN SEVERITY QUANTIFIED, PAIN PRESENT: ICD-10-PCS | Mod: S$GLB,,, | Performed by: NURSE PRACTITIONER

## 2021-05-31 PROCEDURE — 99999 PR PBB SHADOW E&M-EST. PATIENT-LVL V: ICD-10-PCS | Mod: PBBFAC,,, | Performed by: NURSE PRACTITIONER

## 2021-05-31 PROCEDURE — 76770 US EXAM ABDO BACK WALL COMP: CPT | Mod: 26,,, | Performed by: RADIOLOGY

## 2021-05-31 PROCEDURE — 76770 US RETROPERITONEAL COMPLETE: ICD-10-PCS | Mod: 26,,, | Performed by: RADIOLOGY

## 2021-05-31 PROCEDURE — 3008F PR BODY MASS INDEX (BMI) DOCUMENTED: ICD-10-PCS | Mod: CPTII,S$GLB,, | Performed by: NURSE PRACTITIONER

## 2021-05-31 PROCEDURE — 1125F AMNT PAIN NOTED PAIN PRSNT: CPT | Mod: S$GLB,,, | Performed by: NURSE PRACTITIONER

## 2021-05-31 PROCEDURE — 99999 PR PBB SHADOW E&M-EST. PATIENT-LVL V: CPT | Mod: PBBFAC,,, | Performed by: NURSE PRACTITIONER

## 2021-05-31 PROCEDURE — 76770 US EXAM ABDO BACK WALL COMP: CPT | Mod: TC

## 2021-05-31 PROCEDURE — 99213 PR OFFICE/OUTPT VISIT, EST, LEVL III, 20-29 MIN: ICD-10-PCS | Mod: S$GLB,,, | Performed by: NURSE PRACTITIONER

## 2021-06-01 ENCOUNTER — PATIENT MESSAGE (OUTPATIENT)
Dept: UROLOGY | Facility: CLINIC | Age: 61
End: 2021-06-01

## 2021-06-04 ENCOUNTER — HOSPITAL ENCOUNTER (OUTPATIENT)
Dept: RADIOLOGY | Facility: HOSPITAL | Age: 61
Discharge: HOME | End: 2021-06-04
Attending: INTERNAL MEDICINE
Payer: COMMERCIAL

## 2021-06-04 DIAGNOSIS — R63.4 ABNORMAL WEIGHT LOSS: ICD-10-CM

## 2021-06-04 PROCEDURE — 74177 CT ABD & PELVIS W/CONTRAST: CPT | Mod: TC

## 2021-06-04 PROCEDURE — 71260 CT CHEST ABDOMEN PELVIS WITH CONTRAST (XPD): ICD-10-PCS | Mod: 26,,, | Performed by: RADIOLOGY

## 2021-06-04 PROCEDURE — 71260 CT THORAX DX C+: CPT | Mod: 26,,, | Performed by: RADIOLOGY

## 2021-06-04 PROCEDURE — 74177 CT ABD & PELVIS W/CONTRAST: CPT | Mod: 26,,, | Performed by: RADIOLOGY

## 2021-06-04 PROCEDURE — 25500020 PHARM REV CODE 255: Performed by: INTERNAL MEDICINE

## 2021-06-04 PROCEDURE — 74177 CT CHEST ABDOMEN PELVIS WITH CONTRAST (XPD): ICD-10-PCS | Mod: 26,,, | Performed by: RADIOLOGY

## 2021-06-04 RX ADMIN — IOHEXOL 15 ML: 350 INJECTION, SOLUTION INTRAVENOUS at 02:06

## 2021-06-04 RX ADMIN — IOHEXOL 75 ML: 350 INJECTION, SOLUTION INTRAVENOUS at 04:06

## 2021-07-12 ENCOUNTER — PATIENT MESSAGE (OUTPATIENT)
Dept: INTERNAL MEDICINE | Facility: CLINIC | Age: 61
End: 2021-07-12

## 2021-07-21 ENCOUNTER — TELEPHONE (OUTPATIENT)
Dept: RHEUMATOLOGY | Facility: HOSPITAL | Age: 61
End: 2021-07-21

## 2021-07-21 ENCOUNTER — PATIENT MESSAGE (OUTPATIENT)
Dept: ENDOCRINOLOGY | Facility: CLINIC | Age: 61
End: 2021-07-21

## 2021-07-21 DIAGNOSIS — I73.00 RAYNAUD'S DISEASE WITHOUT GANGRENE: ICD-10-CM

## 2021-07-21 DIAGNOSIS — M79.7 FIBROMYALGIA: ICD-10-CM

## 2021-07-21 DIAGNOSIS — I73.81 ERYTHROMELALGIA: ICD-10-CM

## 2021-07-21 DIAGNOSIS — M35.00 SJOGREN'S SYNDROME, WITH UNSPECIFIED ORGAN INVOLVEMENT: Primary | ICD-10-CM

## 2021-07-21 DIAGNOSIS — R53.83 FATIGUE, UNSPECIFIED TYPE: ICD-10-CM

## 2021-07-28 ENCOUNTER — PATIENT MESSAGE (OUTPATIENT)
Dept: PAIN MEDICINE | Facility: CLINIC | Age: 61
End: 2021-07-28

## 2021-07-28 ENCOUNTER — PATIENT MESSAGE (OUTPATIENT)
Dept: DERMATOLOGY | Facility: CLINIC | Age: 61
End: 2021-07-28

## 2021-07-29 ENCOUNTER — PATIENT MESSAGE (OUTPATIENT)
Dept: PAIN MEDICINE | Facility: CLINIC | Age: 61
End: 2021-07-29

## 2021-07-29 ENCOUNTER — TELEPHONE (OUTPATIENT)
Dept: PAIN MEDICINE | Facility: CLINIC | Age: 61
End: 2021-07-29

## 2021-08-20 DIAGNOSIS — G47.00 INSOMNIA, UNSPECIFIED TYPE: ICD-10-CM

## 2021-08-20 RX ORDER — ALBUTEROL SULFATE 90 UG/1
2 AEROSOL, METERED RESPIRATORY (INHALATION) EVERY 6 HOURS PRN
Qty: 18 G | Refills: 2 | Status: SHIPPED | OUTPATIENT
Start: 2021-08-20 | End: 2022-06-23 | Stop reason: SDUPTHER

## 2021-08-20 RX ORDER — ZOLPIDEM TARTRATE 10 MG/1
TABLET ORAL
Qty: 30 TABLET | Refills: 3 | Status: SHIPPED | OUTPATIENT
Start: 2021-08-20 | End: 2021-12-16 | Stop reason: SDUPTHER

## 2021-09-09 ENCOUNTER — PATIENT MESSAGE (OUTPATIENT)
Dept: INTERNAL MEDICINE | Facility: CLINIC | Age: 61
End: 2021-09-09

## 2021-09-09 DIAGNOSIS — Z00.00 WELLNESS EXAMINATION: Primary | ICD-10-CM

## 2021-09-13 ENCOUNTER — PATIENT MESSAGE (OUTPATIENT)
Dept: INTERNAL MEDICINE | Facility: CLINIC | Age: 61
End: 2021-09-13

## 2021-09-18 ENCOUNTER — LAB VISIT (OUTPATIENT)
Dept: LAB | Facility: HOSPITAL | Age: 61
End: 2021-09-18
Attending: INTERNAL MEDICINE
Payer: COMMERCIAL

## 2021-09-18 DIAGNOSIS — Z00.00 WELLNESS EXAMINATION: ICD-10-CM

## 2021-09-18 LAB
ALBUMIN SERPL BCP-MCNC: 3.8 G/DL (ref 3.5–5.2)
ALP SERPL-CCNC: 60 U/L (ref 55–135)
ALT SERPL W/O P-5'-P-CCNC: 21 U/L (ref 10–44)
ANION GAP SERPL CALC-SCNC: 12 MMOL/L (ref 8–16)
AST SERPL-CCNC: 21 U/L (ref 10–40)
BASOPHILS # BLD AUTO: 0.04 K/UL (ref 0–0.2)
BASOPHILS NFR BLD: 1.1 % (ref 0–1.9)
BILIRUB SERPL-MCNC: 0.4 MG/DL (ref 0.1–1)
BUN SERPL-MCNC: 11 MG/DL (ref 6–20)
CALCIUM SERPL-MCNC: 9.5 MG/DL (ref 8.7–10.5)
CHLORIDE SERPL-SCNC: 100 MMOL/L (ref 95–110)
CHOLEST SERPL-MCNC: 126 MG/DL (ref 120–199)
CHOLEST/HDLC SERPL: 2.1 {RATIO} (ref 2–5)
CO2 SERPL-SCNC: 27 MMOL/L (ref 23–29)
CREAT SERPL-MCNC: 0.6 MG/DL (ref 0.5–1.4)
DIFFERENTIAL METHOD: ABNORMAL
EOSINOPHIL # BLD AUTO: 0.1 K/UL (ref 0–0.5)
EOSINOPHIL NFR BLD: 2.3 % (ref 0–8)
ERYTHROCYTE [DISTWIDTH] IN BLOOD BY AUTOMATED COUNT: 12.5 % (ref 11.5–14.5)
EST. GFR  (AFRICAN AMERICAN): >60 ML/MIN/1.73 M^2
EST. GFR  (NON AFRICAN AMERICAN): >60 ML/MIN/1.73 M^2
GLUCOSE SERPL-MCNC: 94 MG/DL (ref 70–110)
HCT VFR BLD AUTO: 42.2 % (ref 37–48.5)
HDLC SERPL-MCNC: 61 MG/DL (ref 40–75)
HDLC SERPL: 48.4 % (ref 20–50)
HGB BLD-MCNC: 13.7 G/DL (ref 12–16)
IMM GRANULOCYTES # BLD AUTO: 0.01 K/UL (ref 0–0.04)
IMM GRANULOCYTES NFR BLD AUTO: 0.3 % (ref 0–0.5)
LDLC SERPL CALC-MCNC: 55.6 MG/DL (ref 63–159)
LYMPHOCYTES # BLD AUTO: 0.6 K/UL (ref 1–4.8)
LYMPHOCYTES NFR BLD: 16.6 % (ref 18–48)
MCH RBC QN AUTO: 29.8 PG (ref 27–31)
MCHC RBC AUTO-ENTMCNC: 32.5 G/DL (ref 32–36)
MCV RBC AUTO: 92 FL (ref 82–98)
MONOCYTES # BLD AUTO: 0.3 K/UL (ref 0.3–1)
MONOCYTES NFR BLD: 9.7 % (ref 4–15)
NEUTROPHILS # BLD AUTO: 2.4 K/UL (ref 1.8–7.7)
NEUTROPHILS NFR BLD: 70 % (ref 38–73)
NONHDLC SERPL-MCNC: 65 MG/DL
NRBC BLD-RTO: 0 /100 WBC
PLATELET # BLD AUTO: 220 K/UL (ref 150–450)
PMV BLD AUTO: 10.1 FL (ref 9.2–12.9)
POTASSIUM SERPL-SCNC: 5.1 MMOL/L (ref 3.5–5.1)
PROT SERPL-MCNC: 7.1 G/DL (ref 6–8.4)
RBC # BLD AUTO: 4.6 M/UL (ref 4–5.4)
SODIUM SERPL-SCNC: 139 MMOL/L (ref 136–145)
TRIGL SERPL-MCNC: 47 MG/DL (ref 30–150)
WBC # BLD AUTO: 3.49 K/UL (ref 3.9–12.7)

## 2021-09-18 PROCEDURE — 85025 COMPLETE CBC W/AUTO DIFF WBC: CPT | Performed by: INTERNAL MEDICINE

## 2021-09-18 PROCEDURE — 80061 LIPID PANEL: CPT | Performed by: INTERNAL MEDICINE

## 2021-09-18 PROCEDURE — 80053 COMPREHEN METABOLIC PANEL: CPT | Performed by: INTERNAL MEDICINE

## 2021-09-18 PROCEDURE — 36415 COLL VENOUS BLD VENIPUNCTURE: CPT | Performed by: INTERNAL MEDICINE

## 2021-09-23 ENCOUNTER — PATIENT MESSAGE (OUTPATIENT)
Dept: INTERNAL MEDICINE | Facility: CLINIC | Age: 61
End: 2021-09-23

## 2021-09-23 DIAGNOSIS — Z00.00 WELLNESS EXAMINATION: Primary | ICD-10-CM

## 2021-09-27 ENCOUNTER — LAB VISIT (OUTPATIENT)
Dept: LAB | Facility: HOSPITAL | Age: 61
End: 2021-09-27
Attending: INTERNAL MEDICINE
Payer: COMMERCIAL

## 2021-09-27 DIAGNOSIS — Z00.00 WELLNESS EXAMINATION: ICD-10-CM

## 2021-09-27 LAB
ESTIMATED AVG GLUCOSE: 105 MG/DL (ref 68–131)
HBA1C MFR BLD: 5.3 % (ref 4–5.6)

## 2021-09-27 PROCEDURE — 83036 HEMOGLOBIN GLYCOSYLATED A1C: CPT | Performed by: INTERNAL MEDICINE

## 2021-09-27 PROCEDURE — 36415 COLL VENOUS BLD VENIPUNCTURE: CPT | Performed by: INTERNAL MEDICINE

## 2021-09-28 ENCOUNTER — PATIENT MESSAGE (OUTPATIENT)
Dept: INTERNAL MEDICINE | Facility: CLINIC | Age: 61
End: 2021-09-28

## 2021-09-28 DIAGNOSIS — E04.2 MULTINODULAR GOITER: ICD-10-CM

## 2021-09-28 DIAGNOSIS — E06.3 HYPOTHYROIDISM DUE TO HASHIMOTO'S THYROIDITIS: ICD-10-CM

## 2021-09-28 DIAGNOSIS — E03.8 HYPOTHYROIDISM DUE TO HASHIMOTO'S THYROIDITIS: ICD-10-CM

## 2021-09-28 DIAGNOSIS — Z80.8 FAMILY HISTORY OF THYROID CANCER: ICD-10-CM

## 2021-09-28 RX ORDER — LIOTHYRONINE SODIUM 5 UG/1
TABLET ORAL
Qty: 315 TABLET | Refills: 3 | Status: SHIPPED | OUTPATIENT
Start: 2021-09-28 | End: 2021-12-09

## 2021-09-29 ENCOUNTER — OFFICE VISIT (OUTPATIENT)
Dept: INTERNAL MEDICINE | Facility: CLINIC | Age: 61
End: 2021-09-29
Payer: COMMERCIAL

## 2021-09-29 VITALS
WEIGHT: 93.5 LBS | DIASTOLIC BLOOD PRESSURE: 56 MMHG | HEIGHT: 61 IN | BODY MASS INDEX: 17.65 KG/M2 | OXYGEN SATURATION: 98 % | SYSTOLIC BLOOD PRESSURE: 92 MMHG | HEART RATE: 81 BPM

## 2021-09-29 DIAGNOSIS — I99.9 VASCULAR DISORDER: ICD-10-CM

## 2021-09-29 DIAGNOSIS — Z12.31 ENCOUNTER FOR SCREENING MAMMOGRAM FOR BREAST CANCER: ICD-10-CM

## 2021-09-29 DIAGNOSIS — M35.00 SJOGREN'S SYNDROME, WITH UNSPECIFIED ORGAN INVOLVEMENT: ICD-10-CM

## 2021-09-29 DIAGNOSIS — M53.3 COCCYDYNIA: Primary | ICD-10-CM

## 2021-09-29 DIAGNOSIS — Z01.84 IMMUNITY STATUS TESTING: ICD-10-CM

## 2021-09-29 PROCEDURE — 3074F SYST BP LT 130 MM HG: CPT | Mod: CPTII,S$GLB,, | Performed by: INTERNAL MEDICINE

## 2021-09-29 PROCEDURE — 3044F HG A1C LEVEL LT 7.0%: CPT | Mod: CPTII,S$GLB,, | Performed by: INTERNAL MEDICINE

## 2021-09-29 PROCEDURE — 3066F NEPHROPATHY DOC TX: CPT | Mod: CPTII,S$GLB,, | Performed by: INTERNAL MEDICINE

## 2021-09-29 PROCEDURE — 3008F PR BODY MASS INDEX (BMI) DOCUMENTED: ICD-10-PCS | Mod: CPTII,S$GLB,, | Performed by: INTERNAL MEDICINE

## 2021-09-29 PROCEDURE — 99999 PR PBB SHADOW E&M-EST. PATIENT-LVL III: ICD-10-PCS | Mod: PBBFAC,,, | Performed by: INTERNAL MEDICINE

## 2021-09-29 PROCEDURE — 99999 PR PBB SHADOW E&M-EST. PATIENT-LVL III: CPT | Mod: PBBFAC,,, | Performed by: INTERNAL MEDICINE

## 2021-09-29 PROCEDURE — 3074F PR MOST RECENT SYSTOLIC BLOOD PRESSURE < 130 MM HG: ICD-10-PCS | Mod: CPTII,S$GLB,, | Performed by: INTERNAL MEDICINE

## 2021-09-29 PROCEDURE — 99214 OFFICE O/P EST MOD 30 MIN: CPT | Mod: S$GLB,,, | Performed by: INTERNAL MEDICINE

## 2021-09-29 PROCEDURE — 99214 PR OFFICE/OUTPT VISIT, EST, LEVL IV, 30-39 MIN: ICD-10-PCS | Mod: S$GLB,,, | Performed by: INTERNAL MEDICINE

## 2021-09-29 PROCEDURE — 3066F PR DOCUMENTATION OF TREATMENT FOR NEPHROPATHY: ICD-10-PCS | Mod: CPTII,S$GLB,, | Performed by: INTERNAL MEDICINE

## 2021-09-29 PROCEDURE — 3044F PR MOST RECENT HEMOGLOBIN A1C LEVEL <7.0%: ICD-10-PCS | Mod: CPTII,S$GLB,, | Performed by: INTERNAL MEDICINE

## 2021-09-29 PROCEDURE — 3008F BODY MASS INDEX DOCD: CPT | Mod: CPTII,S$GLB,, | Performed by: INTERNAL MEDICINE

## 2021-09-29 PROCEDURE — 3078F DIAST BP <80 MM HG: CPT | Mod: CPTII,S$GLB,, | Performed by: INTERNAL MEDICINE

## 2021-09-29 PROCEDURE — 3078F PR MOST RECENT DIASTOLIC BLOOD PRESSURE < 80 MM HG: ICD-10-PCS | Mod: CPTII,S$GLB,, | Performed by: INTERNAL MEDICINE

## 2021-10-04 ENCOUNTER — LAB VISIT (OUTPATIENT)
Dept: LAB | Facility: HOSPITAL | Age: 61
End: 2021-10-04
Attending: INTERNAL MEDICINE
Payer: COMMERCIAL

## 2021-10-04 ENCOUNTER — OFFICE VISIT (OUTPATIENT)
Dept: RHEUMATOLOGY | Facility: CLINIC | Age: 61
End: 2021-10-04
Payer: COMMERCIAL

## 2021-10-04 VITALS
BODY MASS INDEX: 18.53 KG/M2 | HEIGHT: 60 IN | HEART RATE: 98 BPM | SYSTOLIC BLOOD PRESSURE: 100 MMHG | DIASTOLIC BLOOD PRESSURE: 64 MMHG | WEIGHT: 94.38 LBS

## 2021-10-04 DIAGNOSIS — R53.83 FATIGUE, UNSPECIFIED TYPE: ICD-10-CM

## 2021-10-04 DIAGNOSIS — M35.00 SJOGREN'S SYNDROME, WITH UNSPECIFIED ORGAN INVOLVEMENT: Primary | ICD-10-CM

## 2021-10-04 DIAGNOSIS — I73.81 ERYTHROMELALGIA: ICD-10-CM

## 2021-10-04 DIAGNOSIS — I73.00 RAYNAUD'S DISEASE WITHOUT GANGRENE: ICD-10-CM

## 2021-10-04 DIAGNOSIS — D84.9 IMMUNOSUPPRESSION: ICD-10-CM

## 2021-10-04 DIAGNOSIS — M35.00 SJOGREN'S SYNDROME, WITH UNSPECIFIED ORGAN INVOLVEMENT: ICD-10-CM

## 2021-10-04 DIAGNOSIS — M79.7 FIBROMYALGIA: ICD-10-CM

## 2021-10-04 LAB
ALBUMIN SERPL BCP-MCNC: 4.2 G/DL (ref 3.5–5.2)
ALP SERPL-CCNC: 66 U/L (ref 55–135)
ALT SERPL W/O P-5'-P-CCNC: 23 U/L (ref 10–44)
ANION GAP SERPL CALC-SCNC: 12 MMOL/L (ref 8–16)
AST SERPL-CCNC: 22 U/L (ref 10–40)
BASOPHILS # BLD AUTO: 0.06 K/UL (ref 0–0.2)
BASOPHILS NFR BLD: 1.4 % (ref 0–1.9)
BILIRUB SERPL-MCNC: 0.4 MG/DL (ref 0.1–1)
BUN SERPL-MCNC: 17 MG/DL (ref 6–20)
C3 SERPL-MCNC: 95 MG/DL (ref 50–180)
C4 SERPL-MCNC: 21 MG/DL (ref 11–44)
CALCIUM SERPL-MCNC: 9.8 MG/DL (ref 8.7–10.5)
CHLORIDE SERPL-SCNC: 101 MMOL/L (ref 95–110)
CK SERPL-CCNC: 42 U/L (ref 20–180)
CO2 SERPL-SCNC: 26 MMOL/L (ref 23–29)
CREAT SERPL-MCNC: 0.7 MG/DL (ref 0.5–1.4)
CRP SERPL-MCNC: 0.4 MG/L (ref 0–8.2)
DIFFERENTIAL METHOD: ABNORMAL
EOSINOPHIL # BLD AUTO: 0.1 K/UL (ref 0–0.5)
EOSINOPHIL NFR BLD: 2.1 % (ref 0–8)
ERYTHROCYTE [DISTWIDTH] IN BLOOD BY AUTOMATED COUNT: 13.1 % (ref 11.5–14.5)
ERYTHROCYTE [SEDIMENTATION RATE] IN BLOOD BY WESTERGREN METHOD: 4 MM/HR (ref 0–36)
EST. GFR  (AFRICAN AMERICAN): >60 ML/MIN/1.73 M^2
EST. GFR  (NON AFRICAN AMERICAN): >60 ML/MIN/1.73 M^2
GLUCOSE SERPL-MCNC: 87 MG/DL (ref 70–110)
HCT VFR BLD AUTO: 38.7 % (ref 37–48.5)
HGB BLD-MCNC: 12.8 G/DL (ref 12–16)
IMM GRANULOCYTES # BLD AUTO: 0.01 K/UL (ref 0–0.04)
IMM GRANULOCYTES NFR BLD AUTO: 0.2 % (ref 0–0.5)
LYMPHOCYTES # BLD AUTO: 0.6 K/UL (ref 1–4.8)
LYMPHOCYTES NFR BLD: 14.8 % (ref 18–48)
MCH RBC QN AUTO: 30.1 PG (ref 27–31)
MCHC RBC AUTO-ENTMCNC: 33.1 G/DL (ref 32–36)
MCV RBC AUTO: 91 FL (ref 82–98)
MONOCYTES # BLD AUTO: 0.4 K/UL (ref 0.3–1)
MONOCYTES NFR BLD: 8.4 % (ref 4–15)
NEUTROPHILS # BLD AUTO: 3.1 K/UL (ref 1.8–7.7)
NEUTROPHILS NFR BLD: 73.1 % (ref 38–73)
NRBC BLD-RTO: 0 /100 WBC
PLATELET # BLD AUTO: 203 K/UL (ref 150–450)
PMV BLD AUTO: 9.7 FL (ref 9.2–12.9)
POTASSIUM SERPL-SCNC: 4.4 MMOL/L (ref 3.5–5.1)
PROT SERPL-MCNC: 7.3 G/DL (ref 6–8.4)
RBC # BLD AUTO: 4.25 M/UL (ref 4–5.4)
SODIUM SERPL-SCNC: 139 MMOL/L (ref 136–145)
WBC # BLD AUTO: 4.27 K/UL (ref 3.9–12.7)

## 2021-10-04 PROCEDURE — 3044F PR MOST RECENT HEMOGLOBIN A1C LEVEL <7.0%: ICD-10-PCS | Mod: CPTII,S$GLB,, | Performed by: INTERNAL MEDICINE

## 2021-10-04 PROCEDURE — 1160F PR REVIEW ALL MEDS BY PRESCRIBER/CLIN PHARMACIST DOCUMENTED: ICD-10-PCS | Mod: CPTII,S$GLB,, | Performed by: INTERNAL MEDICINE

## 2021-10-04 PROCEDURE — 99214 OFFICE O/P EST MOD 30 MIN: CPT | Mod: S$GLB,,, | Performed by: INTERNAL MEDICINE

## 2021-10-04 PROCEDURE — 36415 COLL VENOUS BLD VENIPUNCTURE: CPT | Performed by: INTERNAL MEDICINE

## 2021-10-04 PROCEDURE — 1160F RVW MEDS BY RX/DR IN RCRD: CPT | Mod: CPTII,S$GLB,, | Performed by: INTERNAL MEDICINE

## 2021-10-04 PROCEDURE — 1159F MED LIST DOCD IN RCRD: CPT | Mod: CPTII,S$GLB,, | Performed by: INTERNAL MEDICINE

## 2021-10-04 PROCEDURE — 85652 RBC SED RATE AUTOMATED: CPT | Performed by: INTERNAL MEDICINE

## 2021-10-04 PROCEDURE — 3066F NEPHROPATHY DOC TX: CPT | Mod: CPTII,S$GLB,, | Performed by: INTERNAL MEDICINE

## 2021-10-04 PROCEDURE — 80053 COMPREHEN METABOLIC PANEL: CPT | Performed by: INTERNAL MEDICINE

## 2021-10-04 PROCEDURE — 85025 COMPLETE CBC W/AUTO DIFF WBC: CPT | Performed by: INTERNAL MEDICINE

## 2021-10-04 PROCEDURE — 3078F DIAST BP <80 MM HG: CPT | Mod: CPTII,S$GLB,, | Performed by: INTERNAL MEDICINE

## 2021-10-04 PROCEDURE — 3066F PR DOCUMENTATION OF TREATMENT FOR NEPHROPATHY: ICD-10-PCS | Mod: CPTII,S$GLB,, | Performed by: INTERNAL MEDICINE

## 2021-10-04 PROCEDURE — 3008F PR BODY MASS INDEX (BMI) DOCUMENTED: ICD-10-PCS | Mod: CPTII,S$GLB,, | Performed by: INTERNAL MEDICINE

## 2021-10-04 PROCEDURE — 99999 PR PBB SHADOW E&M-EST. PATIENT-LVL III: CPT | Mod: PBBFAC,,, | Performed by: INTERNAL MEDICINE

## 2021-10-04 PROCEDURE — 99214 PR OFFICE/OUTPT VISIT, EST, LEVL IV, 30-39 MIN: ICD-10-PCS | Mod: S$GLB,,, | Performed by: INTERNAL MEDICINE

## 2021-10-04 PROCEDURE — 3078F PR MOST RECENT DIASTOLIC BLOOD PRESSURE < 80 MM HG: ICD-10-PCS | Mod: CPTII,S$GLB,, | Performed by: INTERNAL MEDICINE

## 2021-10-04 PROCEDURE — 1159F PR MEDICATION LIST DOCUMENTED IN MEDICAL RECORD: ICD-10-PCS | Mod: CPTII,S$GLB,, | Performed by: INTERNAL MEDICINE

## 2021-10-04 PROCEDURE — 82550 ASSAY OF CK (CPK): CPT | Performed by: INTERNAL MEDICINE

## 2021-10-04 PROCEDURE — 86160 COMPLEMENT ANTIGEN: CPT | Performed by: INTERNAL MEDICINE

## 2021-10-04 PROCEDURE — 3074F SYST BP LT 130 MM HG: CPT | Mod: CPTII,S$GLB,, | Performed by: INTERNAL MEDICINE

## 2021-10-04 PROCEDURE — 3044F HG A1C LEVEL LT 7.0%: CPT | Mod: CPTII,S$GLB,, | Performed by: INTERNAL MEDICINE

## 2021-10-04 PROCEDURE — 3008F BODY MASS INDEX DOCD: CPT | Mod: CPTII,S$GLB,, | Performed by: INTERNAL MEDICINE

## 2021-10-04 PROCEDURE — 99999 PR PBB SHADOW E&M-EST. PATIENT-LVL III: ICD-10-PCS | Mod: PBBFAC,,, | Performed by: INTERNAL MEDICINE

## 2021-10-04 PROCEDURE — 86225 DNA ANTIBODY NATIVE: CPT | Performed by: INTERNAL MEDICINE

## 2021-10-04 PROCEDURE — 86160 COMPLEMENT ANTIGEN: CPT | Mod: 59 | Performed by: INTERNAL MEDICINE

## 2021-10-04 PROCEDURE — 86140 C-REACTIVE PROTEIN: CPT | Performed by: INTERNAL MEDICINE

## 2021-10-04 PROCEDURE — 3074F PR MOST RECENT SYSTOLIC BLOOD PRESSURE < 130 MM HG: ICD-10-PCS | Mod: CPTII,S$GLB,, | Performed by: INTERNAL MEDICINE

## 2021-10-04 ASSESSMENT — ROUTINE ASSESSMENT OF PATIENT INDEX DATA (RAPID3)
PATIENT GLOBAL ASSESSMENT SCORE: 6
MDHAQ FUNCTION SCORE: 1.4
PSYCHOLOGICAL DISTRESS SCORE: 3.3
TOTAL RAPID3 SCORE: 5.72
FATIGUE SCORE: 6
WHEN YOU AWAKENED IN THE MORNING OVER THE LAST WEEK, PLEASE INDICATE THE AMOUNT OF TIME IT TAKES UNTIL YOU ARE AS LIMBER AS YOU WILL BE FOR THE DAY: 1 HOUR
PAIN SCORE: 6.5
AM STIFFNESS SCORE: 1, YES

## 2021-10-05 ENCOUNTER — PATIENT MESSAGE (OUTPATIENT)
Dept: RHEUMATOLOGY | Facility: CLINIC | Age: 61
End: 2021-10-05

## 2021-10-06 LAB — DSDNA AB SER-ACNC: NORMAL [IU]/ML

## 2021-10-13 ENCOUNTER — LAB VISIT (OUTPATIENT)
Dept: LAB | Facility: HOSPITAL | Age: 61
End: 2021-10-13
Attending: INTERNAL MEDICINE
Payer: COMMERCIAL

## 2021-10-13 DIAGNOSIS — Z01.84 IMMUNITY STATUS TESTING: ICD-10-CM

## 2021-10-13 LAB
SARS-COV-2 IGG SERPL IA-ACNC: 620.5 AU/ML
SARS-COV-2 IGG SERPL QL IA: POSITIVE

## 2021-10-13 PROCEDURE — 86769 SARS-COV-2 COVID-19 ANTIBODY: CPT | Performed by: INTERNAL MEDICINE

## 2021-10-13 PROCEDURE — 36415 COLL VENOUS BLD VENIPUNCTURE: CPT | Performed by: INTERNAL MEDICINE

## 2021-10-14 ENCOUNTER — IMMUNIZATION (OUTPATIENT)
Dept: PHARMACY | Facility: CLINIC | Age: 61
End: 2021-10-14
Payer: COMMERCIAL

## 2021-10-19 ENCOUNTER — PATIENT OUTREACH (OUTPATIENT)
Dept: ADMINISTRATIVE | Facility: OTHER | Age: 61
End: 2021-10-19

## 2021-10-21 ENCOUNTER — PATIENT MESSAGE (OUTPATIENT)
Dept: UROLOGY | Facility: CLINIC | Age: 61
End: 2021-10-21

## 2021-10-21 ENCOUNTER — OFFICE VISIT (OUTPATIENT)
Dept: UROLOGY | Facility: CLINIC | Age: 61
End: 2021-10-21
Payer: COMMERCIAL

## 2021-10-21 VITALS — HEIGHT: 60 IN | BODY MASS INDEX: 17.85 KG/M2 | WEIGHT: 90.94 LBS

## 2021-10-21 DIAGNOSIS — D17.9 ANGIOMYOLIPOMA: ICD-10-CM

## 2021-10-21 DIAGNOSIS — R31.0 GROSS HEMATURIA: Primary | ICD-10-CM

## 2021-10-21 PROCEDURE — 3044F PR MOST RECENT HEMOGLOBIN A1C LEVEL <7.0%: ICD-10-PCS | Mod: CPTII,S$GLB,, | Performed by: STUDENT IN AN ORGANIZED HEALTH CARE EDUCATION/TRAINING PROGRAM

## 2021-10-21 PROCEDURE — 1159F MED LIST DOCD IN RCRD: CPT | Mod: CPTII,S$GLB,, | Performed by: STUDENT IN AN ORGANIZED HEALTH CARE EDUCATION/TRAINING PROGRAM

## 2021-10-21 PROCEDURE — 3008F PR BODY MASS INDEX (BMI) DOCUMENTED: ICD-10-PCS | Mod: CPTII,S$GLB,, | Performed by: STUDENT IN AN ORGANIZED HEALTH CARE EDUCATION/TRAINING PROGRAM

## 2021-10-21 PROCEDURE — 1160F RVW MEDS BY RX/DR IN RCRD: CPT | Mod: CPTII,S$GLB,, | Performed by: STUDENT IN AN ORGANIZED HEALTH CARE EDUCATION/TRAINING PROGRAM

## 2021-10-21 PROCEDURE — 99999 PR PBB SHADOW E&M-EST. PATIENT-LVL II: ICD-10-PCS | Mod: PBBFAC,,, | Performed by: STUDENT IN AN ORGANIZED HEALTH CARE EDUCATION/TRAINING PROGRAM

## 2021-10-21 PROCEDURE — 1160F PR REVIEW ALL MEDS BY PRESCRIBER/CLIN PHARMACIST DOCUMENTED: ICD-10-PCS | Mod: CPTII,S$GLB,, | Performed by: STUDENT IN AN ORGANIZED HEALTH CARE EDUCATION/TRAINING PROGRAM

## 2021-10-21 PROCEDURE — 99999 PR PBB SHADOW E&M-EST. PATIENT-LVL II: CPT | Mod: PBBFAC,,, | Performed by: STUDENT IN AN ORGANIZED HEALTH CARE EDUCATION/TRAINING PROGRAM

## 2021-10-21 PROCEDURE — 99214 PR OFFICE/OUTPT VISIT, EST, LEVL IV, 30-39 MIN: ICD-10-PCS | Mod: S$GLB,,, | Performed by: STUDENT IN AN ORGANIZED HEALTH CARE EDUCATION/TRAINING PROGRAM

## 2021-10-21 PROCEDURE — 1159F PR MEDICATION LIST DOCUMENTED IN MEDICAL RECORD: ICD-10-PCS | Mod: CPTII,S$GLB,, | Performed by: STUDENT IN AN ORGANIZED HEALTH CARE EDUCATION/TRAINING PROGRAM

## 2021-10-21 PROCEDURE — 3008F BODY MASS INDEX DOCD: CPT | Mod: CPTII,S$GLB,, | Performed by: STUDENT IN AN ORGANIZED HEALTH CARE EDUCATION/TRAINING PROGRAM

## 2021-10-21 PROCEDURE — 3044F HG A1C LEVEL LT 7.0%: CPT | Mod: CPTII,S$GLB,, | Performed by: STUDENT IN AN ORGANIZED HEALTH CARE EDUCATION/TRAINING PROGRAM

## 2021-10-21 PROCEDURE — 3066F NEPHROPATHY DOC TX: CPT | Mod: CPTII,S$GLB,, | Performed by: STUDENT IN AN ORGANIZED HEALTH CARE EDUCATION/TRAINING PROGRAM

## 2021-10-21 PROCEDURE — 99214 OFFICE O/P EST MOD 30 MIN: CPT | Mod: S$GLB,,, | Performed by: STUDENT IN AN ORGANIZED HEALTH CARE EDUCATION/TRAINING PROGRAM

## 2021-10-21 PROCEDURE — 3066F PR DOCUMENTATION OF TREATMENT FOR NEPHROPATHY: ICD-10-PCS | Mod: CPTII,S$GLB,, | Performed by: STUDENT IN AN ORGANIZED HEALTH CARE EDUCATION/TRAINING PROGRAM

## 2021-11-01 ENCOUNTER — HOSPITAL ENCOUNTER (OUTPATIENT)
Dept: RADIOLOGY | Facility: CLINIC | Age: 61
Discharge: HOME | End: 2021-11-01
Attending: INTERNAL MEDICINE
Payer: COMMERCIAL

## 2021-11-01 DIAGNOSIS — M81.0 OSTEOPOROSIS, POSTMENOPAUSAL: ICD-10-CM

## 2021-11-01 PROCEDURE — 77080 DXA BONE DENSITY AXIAL: CPT | Mod: TC

## 2021-11-01 PROCEDURE — 77080 DEXA BONE DENSITY SPINE HIP: ICD-10-PCS | Mod: 26,,, | Performed by: INTERNAL MEDICINE

## 2021-11-01 PROCEDURE — 77080 DXA BONE DENSITY AXIAL: CPT | Mod: 26,,, | Performed by: INTERNAL MEDICINE

## 2021-11-10 ENCOUNTER — OFFICE VISIT (OUTPATIENT)
Dept: URGENT CARE | Facility: CLINIC | Age: 61
End: 2021-11-10
Payer: COMMERCIAL

## 2021-11-10 VITALS
OXYGEN SATURATION: 96 % | TEMPERATURE: 98 F | WEIGHT: 93 LBS | HEART RATE: 110 BPM | BODY MASS INDEX: 18.26 KG/M2 | DIASTOLIC BLOOD PRESSURE: 64 MMHG | SYSTOLIC BLOOD PRESSURE: 101 MMHG | HEIGHT: 60 IN

## 2021-11-10 DIAGNOSIS — R10.2 CHRONIC PAIN IN FEMALE PELVIS: ICD-10-CM

## 2021-11-10 DIAGNOSIS — M35.00 SJOGREN'S SYNDROME, WITH UNSPECIFIED ORGAN INVOLVEMENT: Primary | ICD-10-CM

## 2021-11-10 DIAGNOSIS — D84.821 IMMUNOCOMPROMISED STATE DUE TO DRUG THERAPY: ICD-10-CM

## 2021-11-10 DIAGNOSIS — G89.29 CHRONIC PAIN IN FEMALE PELVIS: ICD-10-CM

## 2021-11-10 DIAGNOSIS — Z11.59 SCREENING FOR VIRAL DISEASE: ICD-10-CM

## 2021-11-10 DIAGNOSIS — Z79.899 IMMUNOCOMPROMISED STATE DUE TO DRUG THERAPY: ICD-10-CM

## 2021-11-10 LAB
BILIRUB UR QL STRIP: NEGATIVE
CTP QC/QA: YES
CTP QC/QA: YES
GLUCOSE UR QL STRIP: NEGATIVE
KETONES UR QL STRIP: POSITIVE
LEUKOCYTE ESTERASE UR QL STRIP: NEGATIVE
PH, POC UA: 5.5 (ref 5–8)
POC BLOOD, URINE: NEGATIVE
POC MOLECULAR INFLUENZA A AGN: NEGATIVE
POC MOLECULAR INFLUENZA B AGN: NEGATIVE
POC NITRATES, URINE: NEGATIVE
PROT UR QL STRIP: NEGATIVE
SARS-COV-2 RDRP RESP QL NAA+PROBE: NEGATIVE
SP GR UR STRIP: 1.01 (ref 1–1.03)
UROBILINOGEN UR STRIP-ACNC: NORMAL (ref 0.1–1.1)

## 2021-11-10 PROCEDURE — 3066F NEPHROPATHY DOC TX: CPT | Mod: CPTII,S$GLB,, | Performed by: PHYSICIAN ASSISTANT

## 2021-11-10 PROCEDURE — 81003 POCT URINALYSIS, DIPSTICK, AUTOMATED, W/O SCOPE: ICD-10-PCS | Mod: QW,S$GLB,, | Performed by: PHYSICIAN ASSISTANT

## 2021-11-10 PROCEDURE — U0002: ICD-10-PCS | Mod: QW,S$GLB,, | Performed by: PHYSICIAN ASSISTANT

## 2021-11-10 PROCEDURE — 81003 URINALYSIS AUTO W/O SCOPE: CPT | Mod: QW,S$GLB,, | Performed by: PHYSICIAN ASSISTANT

## 2021-11-10 PROCEDURE — 3078F PR MOST RECENT DIASTOLIC BLOOD PRESSURE < 80 MM HG: ICD-10-PCS | Mod: CPTII,S$GLB,, | Performed by: PHYSICIAN ASSISTANT

## 2021-11-10 PROCEDURE — 3078F DIAST BP <80 MM HG: CPT | Mod: CPTII,S$GLB,, | Performed by: PHYSICIAN ASSISTANT

## 2021-11-10 PROCEDURE — 3008F PR BODY MASS INDEX (BMI) DOCUMENTED: ICD-10-PCS | Mod: CPTII,S$GLB,, | Performed by: PHYSICIAN ASSISTANT

## 2021-11-10 PROCEDURE — 3066F PR DOCUMENTATION OF TREATMENT FOR NEPHROPATHY: ICD-10-PCS | Mod: CPTII,S$GLB,, | Performed by: PHYSICIAN ASSISTANT

## 2021-11-10 PROCEDURE — 3074F PR MOST RECENT SYSTOLIC BLOOD PRESSURE < 130 MM HG: ICD-10-PCS | Mod: CPTII,S$GLB,, | Performed by: PHYSICIAN ASSISTANT

## 2021-11-10 PROCEDURE — 1159F MED LIST DOCD IN RCRD: CPT | Mod: CPTII,S$GLB,, | Performed by: PHYSICIAN ASSISTANT

## 2021-11-10 PROCEDURE — 3008F BODY MASS INDEX DOCD: CPT | Mod: CPTII,S$GLB,, | Performed by: PHYSICIAN ASSISTANT

## 2021-11-10 PROCEDURE — 3044F PR MOST RECENT HEMOGLOBIN A1C LEVEL <7.0%: ICD-10-PCS | Mod: CPTII,S$GLB,, | Performed by: PHYSICIAN ASSISTANT

## 2021-11-10 PROCEDURE — 3074F SYST BP LT 130 MM HG: CPT | Mod: CPTII,S$GLB,, | Performed by: PHYSICIAN ASSISTANT

## 2021-11-10 PROCEDURE — 3044F HG A1C LEVEL LT 7.0%: CPT | Mod: CPTII,S$GLB,, | Performed by: PHYSICIAN ASSISTANT

## 2021-11-10 PROCEDURE — 87502 POCT INFLUENZA A/B MOLECULAR: ICD-10-PCS | Mod: QW,S$GLB,, | Performed by: PHYSICIAN ASSISTANT

## 2021-11-10 PROCEDURE — 87502 INFLUENZA DNA AMP PROBE: CPT | Mod: QW,S$GLB,, | Performed by: PHYSICIAN ASSISTANT

## 2021-11-10 PROCEDURE — 1159F PR MEDICATION LIST DOCUMENTED IN MEDICAL RECORD: ICD-10-PCS | Mod: CPTII,S$GLB,, | Performed by: PHYSICIAN ASSISTANT

## 2021-11-10 PROCEDURE — 99214 OFFICE O/P EST MOD 30 MIN: CPT | Mod: S$GLB,,, | Performed by: PHYSICIAN ASSISTANT

## 2021-11-10 PROCEDURE — U0002 COVID-19 LAB TEST NON-CDC: HCPCS | Mod: QW,S$GLB,, | Performed by: PHYSICIAN ASSISTANT

## 2021-11-10 PROCEDURE — 99214 PR OFFICE/OUTPT VISIT, EST, LEVL IV, 30-39 MIN: ICD-10-PCS | Mod: S$GLB,,, | Performed by: PHYSICIAN ASSISTANT

## 2021-11-10 PROCEDURE — 87086 URINE CULTURE/COLONY COUNT: CPT | Performed by: PHYSICIAN ASSISTANT

## 2021-11-11 ENCOUNTER — TELEPHONE (OUTPATIENT)
Dept: URGENT CARE | Facility: CLINIC | Age: 61
End: 2021-11-11
Payer: COMMERCIAL

## 2021-11-11 LAB
BACTERIA UR CULT: NORMAL
BACTERIA UR CULT: NORMAL

## 2021-11-21 ENCOUNTER — PATIENT MESSAGE (OUTPATIENT)
Dept: OBSTETRICS AND GYNECOLOGY | Facility: CLINIC | Age: 61
End: 2021-11-21
Payer: COMMERCIAL

## 2021-11-22 ENCOUNTER — LAB VISIT (OUTPATIENT)
Dept: LAB | Facility: HOSPITAL | Age: 61
End: 2021-11-22
Attending: INTERNAL MEDICINE
Payer: COMMERCIAL

## 2021-11-22 DIAGNOSIS — E06.3 HYPOTHYROIDISM DUE TO HASHIMOTO'S THYROIDITIS: ICD-10-CM

## 2021-11-22 DIAGNOSIS — E03.8 HYPOTHYROIDISM DUE TO HASHIMOTO'S THYROIDITIS: ICD-10-CM

## 2021-11-22 DIAGNOSIS — N95.2 ATROPHIC VAGINITIS: ICD-10-CM

## 2021-11-22 LAB
T3 SERPL-MCNC: 95 NG/DL (ref 60–180)
T4 FREE SERPL-MCNC: 0.48 NG/DL (ref 0.71–1.51)
TSH SERPL DL<=0.005 MIU/L-ACNC: 1.34 UIU/ML (ref 0.4–4)

## 2021-11-22 PROCEDURE — 84480 ASSAY TRIIODOTHYRONINE (T3): CPT | Performed by: INTERNAL MEDICINE

## 2021-11-22 PROCEDURE — 36415 COLL VENOUS BLD VENIPUNCTURE: CPT | Performed by: INTERNAL MEDICINE

## 2021-11-22 PROCEDURE — 84443 ASSAY THYROID STIM HORMONE: CPT | Performed by: INTERNAL MEDICINE

## 2021-11-22 PROCEDURE — 84439 ASSAY OF FREE THYROXINE: CPT | Performed by: INTERNAL MEDICINE

## 2021-11-22 RX ORDER — ESTRADIOL 0.05 MG/D
FILM, EXTENDED RELEASE TRANSDERMAL
Qty: 24 PATCH | Refills: 1 | Status: SHIPPED | OUTPATIENT
Start: 2021-11-22 | End: 2021-11-26 | Stop reason: SDUPTHER

## 2021-11-26 DIAGNOSIS — N95.2 ATROPHIC VAGINITIS: ICD-10-CM

## 2021-11-26 RX ORDER — ESTRADIOL 0.05 MG/D
FILM, EXTENDED RELEASE TRANSDERMAL
Qty: 8 PATCH | Refills: 0 | Status: SHIPPED | OUTPATIENT
Start: 2021-11-26 | End: 2022-02-03

## 2021-11-29 ENCOUNTER — PATIENT OUTREACH (OUTPATIENT)
Dept: ADMINISTRATIVE | Facility: OTHER | Age: 61
End: 2021-11-29
Payer: COMMERCIAL

## 2021-11-30 ENCOUNTER — OFFICE VISIT (OUTPATIENT)
Dept: ENDOCRINOLOGY | Facility: CLINIC | Age: 61
End: 2021-11-30
Payer: COMMERCIAL

## 2021-11-30 VITALS
SYSTOLIC BLOOD PRESSURE: 116 MMHG | DIASTOLIC BLOOD PRESSURE: 70 MMHG | HEIGHT: 61 IN | WEIGHT: 92.06 LBS | BODY MASS INDEX: 17.38 KG/M2

## 2021-11-30 DIAGNOSIS — E03.8 HYPOTHYROIDISM DUE TO HASHIMOTO'S THYROIDITIS: Primary | ICD-10-CM

## 2021-11-30 DIAGNOSIS — M35.00 SJOGREN'S SYNDROME, WITH UNSPECIFIED ORGAN INVOLVEMENT: ICD-10-CM

## 2021-11-30 DIAGNOSIS — E06.3 HYPOTHYROIDISM DUE TO HASHIMOTO'S THYROIDITIS: Primary | ICD-10-CM

## 2021-11-30 DIAGNOSIS — E04.2 MULTINODULAR GOITER: ICD-10-CM

## 2021-11-30 DIAGNOSIS — M81.0 OSTEOPOROSIS, POSTMENOPAUSAL: ICD-10-CM

## 2021-11-30 PROCEDURE — 3066F NEPHROPATHY DOC TX: CPT | Mod: CPTII,S$GLB,, | Performed by: INTERNAL MEDICINE

## 2021-11-30 PROCEDURE — 99214 PR OFFICE/OUTPT VISIT, EST, LEVL IV, 30-39 MIN: ICD-10-PCS | Mod: S$GLB,,, | Performed by: INTERNAL MEDICINE

## 2021-11-30 PROCEDURE — 3066F PR DOCUMENTATION OF TREATMENT FOR NEPHROPATHY: ICD-10-PCS | Mod: CPTII,S$GLB,, | Performed by: INTERNAL MEDICINE

## 2021-11-30 PROCEDURE — 99214 OFFICE O/P EST MOD 30 MIN: CPT | Mod: S$GLB,,, | Performed by: INTERNAL MEDICINE

## 2021-11-30 PROCEDURE — 99999 PR PBB SHADOW E&M-EST. PATIENT-LVL III: CPT | Mod: PBBFAC,,, | Performed by: INTERNAL MEDICINE

## 2021-11-30 PROCEDURE — 99999 PR PBB SHADOW E&M-EST. PATIENT-LVL III: ICD-10-PCS | Mod: PBBFAC,,, | Performed by: INTERNAL MEDICINE

## 2021-12-03 ENCOUNTER — PATIENT MESSAGE (OUTPATIENT)
Dept: INTERNAL MEDICINE | Facility: CLINIC | Age: 61
End: 2021-12-03
Payer: COMMERCIAL

## 2021-12-03 DIAGNOSIS — Z01.84 IMMUNITY STATUS TESTING: Primary | ICD-10-CM

## 2021-12-15 ENCOUNTER — LAB VISIT (OUTPATIENT)
Dept: LAB | Facility: HOSPITAL | Age: 61
End: 2021-12-15
Attending: INTERNAL MEDICINE
Payer: COMMERCIAL

## 2021-12-15 DIAGNOSIS — Z01.84 IMMUNITY STATUS TESTING: ICD-10-CM

## 2021-12-15 LAB
SARS-COV-2 IGG SERPL IA-ACNC: 405.4 AU/ML
SARS-COV-2 IGG SERPL QL IA: POSITIVE

## 2021-12-15 PROCEDURE — 86769 SARS-COV-2 COVID-19 ANTIBODY: CPT | Performed by: INTERNAL MEDICINE

## 2021-12-15 PROCEDURE — 36415 COLL VENOUS BLD VENIPUNCTURE: CPT | Performed by: INTERNAL MEDICINE

## 2021-12-16 ENCOUNTER — PATIENT MESSAGE (OUTPATIENT)
Dept: INTERNAL MEDICINE | Facility: CLINIC | Age: 61
End: 2021-12-16
Payer: COMMERCIAL

## 2021-12-16 DIAGNOSIS — G47.00 INSOMNIA, UNSPECIFIED TYPE: ICD-10-CM

## 2021-12-16 RX ORDER — ZOLPIDEM TARTRATE 10 MG/1
TABLET ORAL
Qty: 30 TABLET | Refills: 3 | Status: SHIPPED | OUTPATIENT
Start: 2021-12-16 | End: 2022-05-23 | Stop reason: SDUPTHER

## 2021-12-22 ENCOUNTER — IMMUNIZATION (OUTPATIENT)
Dept: INTERNAL MEDICINE | Facility: CLINIC | Age: 61
End: 2021-12-22
Payer: COMMERCIAL

## 2021-12-22 ENCOUNTER — OFFICE VISIT (OUTPATIENT)
Dept: INTERNAL MEDICINE | Facility: CLINIC | Age: 61
End: 2021-12-22
Payer: COMMERCIAL

## 2021-12-22 VITALS
HEART RATE: 81 BPM | BODY MASS INDEX: 17.91 KG/M2 | OXYGEN SATURATION: 99 % | WEIGHT: 91.25 LBS | DIASTOLIC BLOOD PRESSURE: 84 MMHG | HEIGHT: 60 IN | SYSTOLIC BLOOD PRESSURE: 128 MMHG

## 2021-12-22 DIAGNOSIS — E06.3 HYPOTHYROIDISM DUE TO HASHIMOTO'S THYROIDITIS: ICD-10-CM

## 2021-12-22 DIAGNOSIS — G89.4 CHRONIC PAIN SYNDROME: ICD-10-CM

## 2021-12-22 DIAGNOSIS — G50.0 TRIGEMINAL NEURALGIA: ICD-10-CM

## 2021-12-22 DIAGNOSIS — Z79.899 ENCOUNTER FOR LONG-TERM (CURRENT) USE OF OTHER MEDICATIONS: ICD-10-CM

## 2021-12-22 DIAGNOSIS — Z76.89 ENCOUNTER TO ESTABLISH CARE WITH NEW DOCTOR: Primary | ICD-10-CM

## 2021-12-22 DIAGNOSIS — M35.00 SJOGREN'S SYNDROME, WITH UNSPECIFIED ORGAN INVOLVEMENT: ICD-10-CM

## 2021-12-22 DIAGNOSIS — E03.8 HYPOTHYROIDISM DUE TO HASHIMOTO'S THYROIDITIS: ICD-10-CM

## 2021-12-22 DIAGNOSIS — I73.00 RAYNAUD'S DISEASE WITHOUT GANGRENE: ICD-10-CM

## 2021-12-22 DIAGNOSIS — Z23 NEED FOR VACCINATION: Primary | ICD-10-CM

## 2021-12-22 DIAGNOSIS — M79.7 FIBROMYALGIA: ICD-10-CM

## 2021-12-22 PROCEDURE — 1159F PR MEDICATION LIST DOCUMENTED IN MEDICAL RECORD: ICD-10-PCS | Mod: CPTII,S$GLB,, | Performed by: FAMILY MEDICINE

## 2021-12-22 PROCEDURE — 3079F DIAST BP 80-89 MM HG: CPT | Mod: CPTII,S$GLB,, | Performed by: FAMILY MEDICINE

## 2021-12-22 PROCEDURE — 3074F PR MOST RECENT SYSTOLIC BLOOD PRESSURE < 130 MM HG: ICD-10-PCS | Mod: CPTII,S$GLB,, | Performed by: FAMILY MEDICINE

## 2021-12-22 PROCEDURE — 99999 PR PBB SHADOW E&M-EST. PATIENT-LVL V: ICD-10-PCS | Mod: PBBFAC,,, | Performed by: FAMILY MEDICINE

## 2021-12-22 PROCEDURE — 99999 PR PBB SHADOW E&M-EST. PATIENT-LVL V: CPT | Mod: PBBFAC,,, | Performed by: FAMILY MEDICINE

## 2021-12-22 PROCEDURE — 99214 OFFICE O/P EST MOD 30 MIN: CPT | Mod: S$GLB,,, | Performed by: FAMILY MEDICINE

## 2021-12-22 PROCEDURE — 3008F PR BODY MASS INDEX (BMI) DOCUMENTED: ICD-10-PCS | Mod: CPTII,S$GLB,, | Performed by: FAMILY MEDICINE

## 2021-12-22 PROCEDURE — 99214 PR OFFICE/OUTPT VISIT, EST, LEVL IV, 30-39 MIN: ICD-10-PCS | Mod: S$GLB,,, | Performed by: FAMILY MEDICINE

## 2021-12-22 PROCEDURE — 3074F SYST BP LT 130 MM HG: CPT | Mod: CPTII,S$GLB,, | Performed by: FAMILY MEDICINE

## 2021-12-22 PROCEDURE — 1159F MED LIST DOCD IN RCRD: CPT | Mod: CPTII,S$GLB,, | Performed by: FAMILY MEDICINE

## 2021-12-22 PROCEDURE — 3066F PR DOCUMENTATION OF TREATMENT FOR NEPHROPATHY: ICD-10-PCS | Mod: CPTII,S$GLB,, | Performed by: FAMILY MEDICINE

## 2021-12-22 PROCEDURE — 3008F BODY MASS INDEX DOCD: CPT | Mod: CPTII,S$GLB,, | Performed by: FAMILY MEDICINE

## 2021-12-22 PROCEDURE — 0064A COVID-19, MRNA, LNP-S, PF, 100 MCG/0.25 ML DOSE VACCINE (MODERNA BOOSTER): CPT | Mod: CV19,PBBFAC | Performed by: INTERNAL MEDICINE

## 2021-12-22 PROCEDURE — 3079F PR MOST RECENT DIASTOLIC BLOOD PRESSURE 80-89 MM HG: ICD-10-PCS | Mod: CPTII,S$GLB,, | Performed by: FAMILY MEDICINE

## 2021-12-22 PROCEDURE — 3066F NEPHROPATHY DOC TX: CPT | Mod: CPTII,S$GLB,, | Performed by: FAMILY MEDICINE

## 2021-12-22 PROCEDURE — 3044F HG A1C LEVEL LT 7.0%: CPT | Mod: CPTII,S$GLB,, | Performed by: FAMILY MEDICINE

## 2021-12-22 PROCEDURE — 3044F PR MOST RECENT HEMOGLOBIN A1C LEVEL <7.0%: ICD-10-PCS | Mod: CPTII,S$GLB,, | Performed by: FAMILY MEDICINE

## 2022-01-06 NOTE — TELEPHONE ENCOUNTER
No new care gaps identified.  Powered by [employer]. Reference number: [id number].   1/06/2022 12:16:32 AM CST

## 2022-01-08 RX ORDER — AMITRIPTYLINE HYDROCHLORIDE 50 MG/1
TABLET, FILM COATED ORAL
Qty: 90 TABLET | Refills: 3 | Status: SHIPPED | OUTPATIENT
Start: 2022-01-08 | End: 2023-05-01 | Stop reason: SDUPTHER

## 2022-02-21 ENCOUNTER — PATIENT MESSAGE (OUTPATIENT)
Dept: ENDOCRINOLOGY | Facility: CLINIC | Age: 62
End: 2022-02-21
Payer: COMMERCIAL

## 2022-02-21 ENCOUNTER — HOSPITAL ENCOUNTER (OUTPATIENT)
Dept: RADIOLOGY | Facility: HOSPITAL | Age: 62
Discharge: HOME | End: 2022-02-21
Attending: INTERNAL MEDICINE
Payer: COMMERCIAL

## 2022-02-21 VITALS — BODY MASS INDEX: 18.36 KG/M2 | WEIGHT: 94 LBS

## 2022-02-21 DIAGNOSIS — Z12.31 ENCOUNTER FOR SCREENING MAMMOGRAM FOR BREAST CANCER: ICD-10-CM

## 2022-02-21 PROCEDURE — 77063 MAMMO DIGITAL SCREENING BILAT WITH TOMO: ICD-10-PCS | Mod: 26,,, | Performed by: RADIOLOGY

## 2022-02-21 PROCEDURE — 77063 BREAST TOMOSYNTHESIS BI: CPT | Mod: TC

## 2022-02-21 PROCEDURE — 77063 BREAST TOMOSYNTHESIS BI: CPT | Mod: 26,,, | Performed by: RADIOLOGY

## 2022-02-21 PROCEDURE — 77067 MAMMO DIGITAL SCREENING BILAT WITH TOMO: ICD-10-PCS | Mod: 26,,, | Performed by: RADIOLOGY

## 2022-02-21 PROCEDURE — 77067 SCR MAMMO BI INCL CAD: CPT | Mod: 26,,, | Performed by: RADIOLOGY

## 2022-02-22 ENCOUNTER — PATIENT OUTREACH (OUTPATIENT)
Dept: ADMINISTRATIVE | Facility: OTHER | Age: 62
End: 2022-02-22
Payer: COMMERCIAL

## 2022-02-23 ENCOUNTER — OFFICE VISIT (OUTPATIENT)
Dept: NEUROLOGY | Facility: CLINIC | Age: 62
End: 2022-02-23
Payer: COMMERCIAL

## 2022-02-23 DIAGNOSIS — D84.9 IMMUNOSUPPRESSED STATUS: ICD-10-CM

## 2022-02-23 DIAGNOSIS — R42 DIZZINESS AND GIDDINESS: ICD-10-CM

## 2022-02-23 DIAGNOSIS — R51.9 NEW ONSET HEADACHE: Primary | ICD-10-CM

## 2022-02-23 DIAGNOSIS — G44.86 CERVICOGENIC HEADACHE: ICD-10-CM

## 2022-02-23 PROCEDURE — 99204 PR OFFICE/OUTPT VISIT, NEW, LEVL IV, 45-59 MIN: ICD-10-PCS | Mod: 95,CR,, | Performed by: NURSE PRACTITIONER

## 2022-02-23 PROCEDURE — 99204 OFFICE O/P NEW MOD 45 MIN: CPT | Mod: 95,CR,, | Performed by: NURSE PRACTITIONER

## 2022-02-23 NOTE — PROGRESS NOTES
The patient location is: [place]  The chief complaint leading to consultation is: headache    Visit type: TELE AUDIOVISUAL:75049    Face to Face time with patient: 30minutes of total time spent on the encounter, which includes face to face time and non-face to face time preparing to see the patient (eg, review of tests), Obtaining and/or reviewing separately obtained history, Documenting clinical information in the electronic or other health record, Independently interpreting results (not separately reported) and communicating results to the patient/family/caregiver, or Care coordination (not separately reported).Each patient to whom he or she provides medical services by telemedicine is:  (1) informed of the relationship between the physician and patient and the respective role of any other health care provider with respect to management of the patient; and (2) notified that he or she may decline to receive medical services by telemedicine and may withdraw from such care at any time.      REFERRING PROVIDER: Self referred  REASON FOR CONSULT: Headaches     61 F with new onset abrupt headache 1/30/22 after standing up while watching [email or web address] game at home alone. Felt like intense pressure/bruising frontal and top of head which grew into a headache she would associate with a migraine even though never had migraines. Had mild dizziness and phonophobia but no nausea/emesis or photophobia. Had neck twinges (chronic pain sufferer) of weakness and pain radiated from back of head/neck to front. She saw chiropractor and also had acupuncture few times which temporarily helped reduce pain. Headache lasted 10 days and she suffered with it at work/had to, then the 11th day it dissipated but still felt mild pressure sensation. She was pain free 1 wk but today feels it beginning again. Advil and existing Skelaxin didn't help. Denies new foods or medications or lifestyle changes or increased stress or head injury.     Hx L temporal  neuralgia and keeps cotton in ear to avoid cold which can trigger pain and has left strap muscle weakness/attending speech therapy    She suffers from multiple auto-immune issues: fibromyalgia, Raynauds' syndrome, Hashimotos thyroiditis, Sjogrens syndrome, chronic pain     [employer]  ROS: Denies double vision or loss of vision  Exam:  General: W/D, W/N  Neurological: Awake, alert, oriented x 3. Speech fluent, no dysarthria. Good attention span. Good fund of knowledge.       IMPRESSION:   New onset headache female>49 yo lingering symptoms, immunocompromised state and chronic pain syndrome  Cervicogenic headache  Sjogrens syndrome on plaquenil    PLAN  MRI brain and c-spine w and w/o. WILL require IV sedation due to claustrophobia  In case pain intensity worsen/returns trial Imitrex 100mg 1/2-1 tab PO q2h prn, max 200mg/24h+- nsaid or ES tylenol OR nurtec odt 75mg qd prn   Pherergan 25mg PO q4-6h prn nausea OR headache    rtc accordingly

## 2022-02-24 ENCOUNTER — TELEPHONE (OUTPATIENT)
Dept: SLEEP MEDICINE | Facility: CLINIC | Age: 62
End: 2022-02-24
Payer: COMMERCIAL

## 2022-02-28 ENCOUNTER — PATIENT MESSAGE (OUTPATIENT)
Dept: INTERNAL MEDICINE | Facility: CLINIC | Age: 62
End: 2022-02-28
Payer: COMMERCIAL

## 2022-02-28 RX ORDER — AMITRIPTYLINE HYDROCHLORIDE 10 MG/1
20 TABLET, FILM COATED ORAL DAILY
Qty: 180 TABLET | Refills: 3 | Status: SHIPPED | OUTPATIENT
Start: 2022-02-28 | End: 2023-02-01

## 2022-02-28 NOTE — TELEPHONE ENCOUNTER
No new care gaps identified.  Powered by [employer]. Reference number: [id number].   2/28/2022 10:22:37 AM CST

## 2022-03-02 ENCOUNTER — PATIENT MESSAGE (OUTPATIENT)
Dept: NEUROLOGY | Facility: CLINIC | Age: 62
End: 2022-03-02
Payer: COMMERCIAL

## 2022-03-02 DIAGNOSIS — G43.009 MIGRAINE WITHOUT AURA AND WITHOUT STATUS MIGRAINOSUS, NOT INTRACTABLE: Primary | ICD-10-CM

## 2022-03-02 RX ORDER — RIMEGEPANT SULFATE 75 MG/75MG
75 TABLET, ORALLY DISINTEGRATING ORAL ONCE AS NEEDED
Qty: 8 TABLET | Refills: 2 | Status: SHIPPED | OUTPATIENT
Start: 2022-03-02 | End: 2022-03-02

## 2022-03-02 RX ORDER — SUMATRIPTAN SUCCINATE 100 MG/1
100 TABLET ORAL
Qty: 9 TABLET | Refills: 1 | Status: SHIPPED | OUTPATIENT
Start: 2022-03-02 | End: 2024-03-13

## 2022-03-06 ENCOUNTER — PATIENT MESSAGE (OUTPATIENT)
Dept: ENDOCRINOLOGY | Facility: CLINIC | Age: 62
End: 2022-03-06
Payer: COMMERCIAL

## 2022-03-06 DIAGNOSIS — M81.0 OSTEOPOROSIS, POSTMENOPAUSAL: Primary | ICD-10-CM

## 2022-03-08 ENCOUNTER — PATIENT MESSAGE (OUTPATIENT)
Dept: ENDOCRINOLOGY | Facility: CLINIC | Age: 62
End: 2022-03-08
Payer: COMMERCIAL

## 2022-03-09 ENCOUNTER — PATIENT MESSAGE (OUTPATIENT)
Dept: ENDOCRINOLOGY | Facility: CLINIC | Age: 62
End: 2022-03-09
Payer: COMMERCIAL

## 2022-03-11 ENCOUNTER — PATIENT MESSAGE (OUTPATIENT)
Dept: NEUROLOGY | Facility: CLINIC | Age: 62
End: 2022-03-11
Payer: COMMERCIAL

## 2022-03-11 ENCOUNTER — TELEPHONE (OUTPATIENT)
Dept: SLEEP MEDICINE | Facility: CLINIC | Age: 62
End: 2022-03-11
Payer: COMMERCIAL

## 2022-03-11 NOTE — TELEPHONE ENCOUNTER
Spoke to the pt and gave her the number for Patient Billing @[phone] to call about the payment that she made for her MRI. Staff also gave her the number for [phone] to have them direct her to the Imaging Center to get reschedule.

## 2022-03-17 ENCOUNTER — OFFICE VISIT (OUTPATIENT)
Dept: OBSTETRICS AND GYNECOLOGY | Facility: CLINIC | Age: 62
End: 2022-03-17
Attending: OBSTETRICS & GYNECOLOGY
Payer: COMMERCIAL

## 2022-03-17 VITALS
BODY MASS INDEX: 18.31 KG/M2 | SYSTOLIC BLOOD PRESSURE: 98 MMHG | DIASTOLIC BLOOD PRESSURE: 50 MMHG | WEIGHT: 93.25 LBS | HEIGHT: 60 IN

## 2022-03-17 DIAGNOSIS — N95.2 ATROPHIC VAGINITIS: ICD-10-CM

## 2022-03-17 DIAGNOSIS — Z01.419 WELL FEMALE EXAM WITH ROUTINE GYNECOLOGICAL EXAM: Primary | ICD-10-CM

## 2022-03-17 PROCEDURE — 1159F PR MEDICATION LIST DOCUMENTED IN MEDICAL RECORD: ICD-10-PCS | Mod: CPTII,S$GLB,, | Performed by: OBSTETRICS & GYNECOLOGY

## 2022-03-17 PROCEDURE — 1160F PR REVIEW ALL MEDS BY PRESCRIBER/CLIN PHARMACIST DOCUMENTED: ICD-10-PCS | Mod: CPTII,S$GLB,, | Performed by: OBSTETRICS & GYNECOLOGY

## 2022-03-17 PROCEDURE — 99396 PREV VISIT EST AGE 40-64: CPT | Mod: S$GLB,,, | Performed by: OBSTETRICS & GYNECOLOGY

## 2022-03-17 PROCEDURE — 1160F RVW MEDS BY RX/DR IN RCRD: CPT | Mod: CPTII,S$GLB,, | Performed by: OBSTETRICS & GYNECOLOGY

## 2022-03-17 PROCEDURE — 99999 PR PBB SHADOW E&M-EST. PATIENT-LVL IV: CPT | Mod: PBBFAC,,, | Performed by: OBSTETRICS & GYNECOLOGY

## 2022-03-17 PROCEDURE — 3078F DIAST BP <80 MM HG: CPT | Mod: CPTII,S$GLB,, | Performed by: OBSTETRICS & GYNECOLOGY

## 2022-03-17 PROCEDURE — 3008F BODY MASS INDEX DOCD: CPT | Mod: CPTII,S$GLB,, | Performed by: OBSTETRICS & GYNECOLOGY

## 2022-03-17 PROCEDURE — 99396 PR PREVENTIVE VISIT,EST,40-64: ICD-10-PCS | Mod: S$GLB,,, | Performed by: OBSTETRICS & GYNECOLOGY

## 2022-03-17 PROCEDURE — 3074F PR MOST RECENT SYSTOLIC BLOOD PRESSURE < 130 MM HG: ICD-10-PCS | Mod: CPTII,S$GLB,, | Performed by: OBSTETRICS & GYNECOLOGY

## 2022-03-17 PROCEDURE — 3078F PR MOST RECENT DIASTOLIC BLOOD PRESSURE < 80 MM HG: ICD-10-PCS | Mod: CPTII,S$GLB,, | Performed by: OBSTETRICS & GYNECOLOGY

## 2022-03-17 PROCEDURE — 1159F MED LIST DOCD IN RCRD: CPT | Mod: CPTII,S$GLB,, | Performed by: OBSTETRICS & GYNECOLOGY

## 2022-03-17 PROCEDURE — 99999 PR PBB SHADOW E&M-EST. PATIENT-LVL IV: ICD-10-PCS | Mod: PBBFAC,,, | Performed by: OBSTETRICS & GYNECOLOGY

## 2022-03-17 PROCEDURE — 3008F PR BODY MASS INDEX (BMI) DOCUMENTED: ICD-10-PCS | Mod: CPTII,S$GLB,, | Performed by: OBSTETRICS & GYNECOLOGY

## 2022-03-17 PROCEDURE — 3074F SYST BP LT 130 MM HG: CPT | Mod: CPTII,S$GLB,, | Performed by: OBSTETRICS & GYNECOLOGY

## 2022-03-17 RX ORDER — ESTRADIOL 0.05 MG/D
FILM, EXTENDED RELEASE TRANSDERMAL
Qty: 24 PATCH | Refills: 3 | Status: SHIPPED | OUTPATIENT
Start: 2022-03-17 | End: 2022-09-21 | Stop reason: SDUPTHER

## 2022-03-17 RX ORDER — ESTRADIOL 0.05 MG/D
FILM, EXTENDED RELEASE TRANSDERMAL
Qty: 24 PATCH | Refills: 0 | Status: CANCELLED | OUTPATIENT
Start: 2022-03-17

## 2022-03-17 RX ORDER — ESTRADIOL 10 UG/1
INSERT VAGINAL
Qty: 36 TABLET | Refills: 3 | Status: SHIPPED | OUTPATIENT
Start: 2022-03-17 | End: 2022-12-13 | Stop reason: SDUPTHER

## 2022-03-17 RX ORDER — RIMEGEPANT SULFATE 75 MG/75MG
TABLET, ORALLY DISINTEGRATING ORAL
COMMUNITY
Start: 2022-03-03

## 2022-03-17 RX ORDER — ESTRADIOL 10 UG/1
INSERT VAGINAL
Status: CANCELLED | OUTPATIENT
Start: 2022-03-17 | End: 2023-03-17

## 2022-03-24 ENCOUNTER — PATIENT MESSAGE (OUTPATIENT)
Dept: DERMATOLOGY | Facility: CLINIC | Age: 62
End: 2022-03-24
Payer: COMMERCIAL

## 2022-03-25 ENCOUNTER — OFFICE VISIT (OUTPATIENT)
Dept: DERMATOLOGY | Facility: CLINIC | Age: 62
End: 2022-03-25
Payer: COMMERCIAL

## 2022-03-25 DIAGNOSIS — L70.0 ACNE VULGARIS: Primary | ICD-10-CM

## 2022-03-25 PROCEDURE — 99214 PR OFFICE/OUTPT VISIT, EST, LEVL IV, 30-39 MIN: ICD-10-PCS | Mod: 95,,, | Performed by: DERMATOLOGY

## 2022-03-25 PROCEDURE — 99214 OFFICE O/P EST MOD 30 MIN: CPT | Mod: 95,,, | Performed by: DERMATOLOGY

## 2022-03-25 PROCEDURE — 1159F MED LIST DOCD IN RCRD: CPT | Mod: CPTII,95,, | Performed by: DERMATOLOGY

## 2022-03-25 PROCEDURE — 1160F RVW MEDS BY RX/DR IN RCRD: CPT | Mod: CPTII,95,, | Performed by: DERMATOLOGY

## 2022-03-25 PROCEDURE — 1160F PR REVIEW ALL MEDS BY PRESCRIBER/CLIN PHARMACIST DOCUMENTED: ICD-10-PCS | Mod: CPTII,95,, | Performed by: DERMATOLOGY

## 2022-03-25 PROCEDURE — 1159F PR MEDICATION LIST DOCUMENTED IN MEDICAL RECORD: ICD-10-PCS | Mod: CPTII,95,, | Performed by: DERMATOLOGY

## 2022-03-25 RX ORDER — TRETINOIN 0.25 MG/G
GEL TOPICAL
Qty: 45 G | Refills: 3 | Status: SHIPPED | OUTPATIENT
Start: 2022-03-25

## 2022-03-25 RX ORDER — CLINDAMYCIN PHOSPHATE 11.9 MG/ML
SOLUTION TOPICAL 2 TIMES DAILY
Qty: 30 ML | Refills: 3 | Status: SHIPPED | OUTPATIENT
Start: 2022-03-25 | End: 2023-02-07

## 2022-03-25 NOTE — PROGRESS NOTES
Subjective:       Patient ID:  Berenice Hayes is a 62 y.o. female who presents for   Chief Complaint   Patient presents with    Acne     HPI  The patient location is: home  The chief complaint leading to consultation is: acne    Visit type: audiovisual    Face to Face time with patient: 25 min  30 minutes of total time spent on the encounter, which includes face to face time and non-face to face time preparing to see the patient (eg, review of tests), Obtaining and/or reviewing separately obtained history, Documenting clinical information in the electronic or other health record, Independently interpreting results (not separately reported) and communicating results to the patient/family/caregiver, or Care coordination (not separately reported).     Each patient to whom he or she provides medical services by telemedicine is:  (1) informed of the relationship between the physician and patient and the respective role of any other health care provider with respect to management of the patient; and (2) notified that he or she may decline to receive medical services by telemedicine and may withdraw from such care at any time.    Notes: Pt presents today via video visit during Covid-19 pandemic for acne on the face since taking calcium supplements. States she had gotten off of calcium because she was having a hard time getting the pills down. Went back on Ca, Mg, Zn and started getting pimples. Stopped it and tried another brand, and the same thing happened.  Starts like a small pink dot with a small white milky center which is soft. They aren't painful. They seem to leave scars after they go away. Didn't take the calcium this week, but this morning, she did get more tiny pinhead-size pimples.  Has been having oily skin over the past couple years, and unsure if it's related to menopause.  Pt has hx of autoimmune disease and pernio, last treated with nicotinamide 500mg BID (couldn't tolerate TID due to flushing).  Hasn't taken the niacinamide recently because she hasn't had any ulcerations of her fingers, and it seemed to make her erythromelalgia worse.    Review of Systems   Constitutional: Negative for fever and chills.   Skin: Negative for itching and rash.        Objective:    Physical Exam   Constitutional: She appears well-developed and well-nourished. No distress.   Neurological: She is alert and oriented to person, place, and time. She is not disoriented.   Psychiatric: She has a normal mood and affect.                      Diagram Legend     Erythematous scaling macule/papule c/w actinic keratosis       Vascular papule c/w angioma      Pigmented verrucoid papule/plaque c/w seborrheic keratosis      Yellow umbilicated papule c/w sebaceous hyperplasia      Irregularly shaped tan macule c/w lentigo     1-2 mm smooth white papules consistent with Milia      Movable subcutaneous cyst with punctum c/w epidermal inclusion cyst      Subcutaneous movable cyst c/w pilar cyst      Firm pink to brown papule c/w dermatofibroma      Pedunculated fleshy papule(s) c/w skin tag(s)      Evenly pigmented macule c/w junctional nevus     Mildly variegated pigmented, slightly irregular-bordered macule c/w mildly atypical nevus      Flesh colored to evenly pigmented papule c/w intradermal nevus       Pink pearly papule/plaque c/w basal cell carcinoma      Erythematous hyperkeratotic cursted plaque c/w SCC      Surgical scar with no sign of skin cancer recurrence      Open and closed comedones      Inflammatory papules and pustules      Verrucoid papule consistent consistent with wart     Erythematous eczematous patches and plaques     Dystrophic onycholytic nail with subungual debris c/w onychomycosis     Umbilicated papule    Erythematous-base heme-crusted tan verrucoid plaque consistent with inflamed seborrheic keratosis     Erythematous Silvery Scaling Plaque c/w Psoriasis     See annotation      Assessment / Plan:        Acne vulgaris,  possibly hormone-related.  Pt states white bumps are soft and not hard like calcium deposits or milia.  -     clindamycin (CLEOCIN T) 1 % external solution; Apply topically 2 (two) times daily. As needed to new breakouts  Dispense: 30 mL; Refill: 3  -     tretinoin (RETIN-A) 0.025 % gel; Apply small amount to entire face qhs. Wash off qam and apply sunscreen.  Dispense: 45 g; Refill: 3  Counseled pt that the retinoid may make the skin dry, red, and irritated. May moisturize daily with a non-comedogenic moisturizer. If still dry, would recommend using the retinoid every other night or less frequently as tolerated. Avoid using around corners of eyes, nose, and mouth.  Patient instructed in importance of daily broad spectrum sunscreen use with spf at least 30. Sun avoidance and topical protection/protective clothing discussed.    rtc 3 months for skin check or sooner for any concerns

## 2022-03-25 NOTE — PATIENT INSTRUCTIONS

## 2022-03-28 ENCOUNTER — PATIENT MESSAGE (OUTPATIENT)
Dept: ENDOCRINOLOGY | Facility: CLINIC | Age: 62
End: 2022-03-28
Payer: COMMERCIAL

## 2022-04-06 ENCOUNTER — PATIENT OUTREACH (OUTPATIENT)
Dept: ADMINISTRATIVE | Facility: OTHER | Age: 62
End: 2022-04-06
Payer: COMMERCIAL

## 2022-04-06 NOTE — PROGRESS NOTES
Health Maintenance Due   Topic Date Due    TETANUS VACCINE  Never done    Pneumococcal Vaccines (Age 0-64) (2 of 4 - PCV13) 09/30/2015     Updates were requested from care everywhere.  Chart was reviewed for overdue Proactive [facility] Encounters ([facility]) topics (CRS, Breast Cancer Screening, Eye exam)  Health Maintenance has been updated.  LINKS immunization registry triggered.  Immunizations were reconciled.

## 2022-04-07 ENCOUNTER — OFFICE VISIT (OUTPATIENT)
Dept: RHEUMATOLOGY | Facility: CLINIC | Age: 62
End: 2022-04-07
Payer: COMMERCIAL

## 2022-04-07 ENCOUNTER — LAB VISIT (OUTPATIENT)
Dept: LAB | Facility: HOSPITAL | Age: 62
End: 2022-04-07
Attending: INTERNAL MEDICINE
Payer: COMMERCIAL

## 2022-04-07 VITALS
WEIGHT: 95 LBS | HEART RATE: 96 BPM | BODY MASS INDEX: 18.65 KG/M2 | HEIGHT: 60 IN | SYSTOLIC BLOOD PRESSURE: 96 MMHG | DIASTOLIC BLOOD PRESSURE: 63 MMHG

## 2022-04-07 DIAGNOSIS — M79.642 BILATERAL HAND PAIN: ICD-10-CM

## 2022-04-07 DIAGNOSIS — M79.641 BILATERAL HAND PAIN: ICD-10-CM

## 2022-04-07 DIAGNOSIS — M35.00 SJOGREN'S SYNDROME, WITH UNSPECIFIED ORGAN INVOLVEMENT: ICD-10-CM

## 2022-04-07 DIAGNOSIS — M79.672 BILATERAL FOOT PAIN: ICD-10-CM

## 2022-04-07 DIAGNOSIS — M25.561 PAIN IN BOTH KNEES, UNSPECIFIED CHRONICITY: ICD-10-CM

## 2022-04-07 DIAGNOSIS — M79.671 BILATERAL FOOT PAIN: ICD-10-CM

## 2022-04-07 DIAGNOSIS — M35.00 SJOGREN'S SYNDROME, WITH UNSPECIFIED ORGAN INVOLVEMENT: Primary | ICD-10-CM

## 2022-04-07 DIAGNOSIS — R53.83 FATIGUE, UNSPECIFIED TYPE: ICD-10-CM

## 2022-04-07 DIAGNOSIS — D84.9 IMMUNOSUPPRESSION: ICD-10-CM

## 2022-04-07 DIAGNOSIS — M25.562 PAIN IN BOTH KNEES, UNSPECIFIED CHRONICITY: ICD-10-CM

## 2022-04-07 DIAGNOSIS — I73.00 RAYNAUD'S DISEASE WITHOUT GANGRENE: ICD-10-CM

## 2022-04-07 DIAGNOSIS — M79.7 FIBROMYALGIA: ICD-10-CM

## 2022-04-07 DIAGNOSIS — I73.81 ERYTHROMELALGIA: ICD-10-CM

## 2022-04-07 LAB
ALBUMIN SERPL BCP-MCNC: 4.3 G/DL (ref 3.5–5.2)
ALP SERPL-CCNC: 71 U/L (ref 55–135)
ALT SERPL W/O P-5'-P-CCNC: 23 U/L (ref 10–44)
ANION GAP SERPL CALC-SCNC: 9 MMOL/L (ref 8–16)
AST SERPL-CCNC: 21 U/L (ref 10–40)
BASOPHILS # BLD AUTO: 0.05 K/UL (ref 0–0.2)
BASOPHILS NFR BLD: 1.2 % (ref 0–1.9)
BILIRUB SERPL-MCNC: 0.3 MG/DL (ref 0.1–1)
BUN SERPL-MCNC: 26 MG/DL (ref 6–20)
C3 SERPL-MCNC: 93 MG/DL (ref 50–180)
C4 SERPL-MCNC: 20 MG/DL (ref 11–44)
CALCIUM SERPL-MCNC: 9.8 MG/DL (ref 8.7–10.5)
CHLORIDE SERPL-SCNC: 99 MMOL/L (ref 95–110)
CK SERPL-CCNC: 49 U/L (ref 20–180)
CO2 SERPL-SCNC: 33 MMOL/L (ref 23–29)
CREAT SERPL-MCNC: 0.7 MG/DL (ref 0.5–1.4)
CRP SERPL-MCNC: 0.4 MG/L (ref 0–8.2)
DIFFERENTIAL METHOD: ABNORMAL
EOSINOPHIL # BLD AUTO: 0.1 K/UL (ref 0–0.5)
EOSINOPHIL NFR BLD: 2.9 % (ref 0–8)
ERYTHROCYTE [DISTWIDTH] IN BLOOD BY AUTOMATED COUNT: 12.7 % (ref 11.5–14.5)
ERYTHROCYTE [SEDIMENTATION RATE] IN BLOOD BY WESTERGREN METHOD: 3 MM/HR (ref 0–36)
EST. GFR  (AFRICAN AMERICAN): >60 ML/MIN/1.73 M^2
EST. GFR  (NON AFRICAN AMERICAN): >60 ML/MIN/1.73 M^2
GLUCOSE SERPL-MCNC: 89 MG/DL (ref 70–110)
HCT VFR BLD AUTO: 41.9 % (ref 37–48.5)
HGB BLD-MCNC: 13.3 G/DL (ref 12–16)
IMM GRANULOCYTES # BLD AUTO: 0.01 K/UL (ref 0–0.04)
IMM GRANULOCYTES NFR BLD AUTO: 0.2 % (ref 0–0.5)
LYMPHOCYTES # BLD AUTO: 0.8 K/UL (ref 1–4.8)
LYMPHOCYTES NFR BLD: 17.9 % (ref 18–48)
MCH RBC QN AUTO: 29.4 PG (ref 27–31)
MCHC RBC AUTO-ENTMCNC: 31.7 G/DL (ref 32–36)
MCV RBC AUTO: 93 FL (ref 82–98)
MONOCYTES # BLD AUTO: 0.4 K/UL (ref 0.3–1)
MONOCYTES NFR BLD: 8.6 % (ref 4–15)
NEUTROPHILS # BLD AUTO: 2.9 K/UL (ref 1.8–7.7)
NEUTROPHILS NFR BLD: 69.2 % (ref 38–73)
NRBC BLD-RTO: 0 /100 WBC
PLATELET # BLD AUTO: 200 K/UL (ref 150–450)
PMV BLD AUTO: 9.8 FL (ref 9.2–12.9)
POTASSIUM SERPL-SCNC: 4.5 MMOL/L (ref 3.5–5.1)
PROT SERPL-MCNC: 7.1 G/DL (ref 6–8.4)
RBC # BLD AUTO: 4.52 M/UL (ref 4–5.4)
SODIUM SERPL-SCNC: 141 MMOL/L (ref 136–145)
WBC # BLD AUTO: 4.18 K/UL (ref 3.9–12.7)

## 2022-04-07 PROCEDURE — 1159F PR MEDICATION LIST DOCUMENTED IN MEDICAL RECORD: ICD-10-PCS | Mod: CPTII,S$GLB,, | Performed by: INTERNAL MEDICINE

## 2022-04-07 PROCEDURE — 3074F SYST BP LT 130 MM HG: CPT | Mod: CPTII,S$GLB,, | Performed by: INTERNAL MEDICINE

## 2022-04-07 PROCEDURE — 86160 COMPLEMENT ANTIGEN: CPT | Mod: 59 | Performed by: INTERNAL MEDICINE

## 2022-04-07 PROCEDURE — 1159F MED LIST DOCD IN RCRD: CPT | Mod: CPTII,S$GLB,, | Performed by: INTERNAL MEDICINE

## 2022-04-07 PROCEDURE — 99999 PR PBB SHADOW E&M-EST. PATIENT-LVL V: CPT | Mod: PBBFAC,,, | Performed by: INTERNAL MEDICINE

## 2022-04-07 PROCEDURE — 3008F PR BODY MASS INDEX (BMI) DOCUMENTED: ICD-10-PCS | Mod: CPTII,S$GLB,, | Performed by: INTERNAL MEDICINE

## 2022-04-07 PROCEDURE — 1160F PR REVIEW ALL MEDS BY PRESCRIBER/CLIN PHARMACIST DOCUMENTED: ICD-10-PCS | Mod: CPTII,S$GLB,, | Performed by: INTERNAL MEDICINE

## 2022-04-07 PROCEDURE — 82550 ASSAY OF CK (CPK): CPT | Performed by: INTERNAL MEDICINE

## 2022-04-07 PROCEDURE — 3078F PR MOST RECENT DIASTOLIC BLOOD PRESSURE < 80 MM HG: ICD-10-PCS | Mod: CPTII,S$GLB,, | Performed by: INTERNAL MEDICINE

## 2022-04-07 PROCEDURE — 86140 C-REACTIVE PROTEIN: CPT | Performed by: INTERNAL MEDICINE

## 2022-04-07 PROCEDURE — 80053 COMPREHEN METABOLIC PANEL: CPT | Performed by: INTERNAL MEDICINE

## 2022-04-07 PROCEDURE — 99214 PR OFFICE/OUTPT VISIT, EST, LEVL IV, 30-39 MIN: ICD-10-PCS | Mod: S$GLB,,, | Performed by: INTERNAL MEDICINE

## 2022-04-07 PROCEDURE — 86160 COMPLEMENT ANTIGEN: CPT | Performed by: INTERNAL MEDICINE

## 2022-04-07 PROCEDURE — 3074F PR MOST RECENT SYSTOLIC BLOOD PRESSURE < 130 MM HG: ICD-10-PCS | Mod: CPTII,S$GLB,, | Performed by: INTERNAL MEDICINE

## 2022-04-07 PROCEDURE — 99214 OFFICE O/P EST MOD 30 MIN: CPT | Mod: S$GLB,,, | Performed by: INTERNAL MEDICINE

## 2022-04-07 PROCEDURE — 3008F BODY MASS INDEX DOCD: CPT | Mod: CPTII,S$GLB,, | Performed by: INTERNAL MEDICINE

## 2022-04-07 PROCEDURE — 1160F RVW MEDS BY RX/DR IN RCRD: CPT | Mod: CPTII,S$GLB,, | Performed by: INTERNAL MEDICINE

## 2022-04-07 PROCEDURE — 86225 DNA ANTIBODY NATIVE: CPT | Performed by: INTERNAL MEDICINE

## 2022-04-07 PROCEDURE — 3078F DIAST BP <80 MM HG: CPT | Mod: CPTII,S$GLB,, | Performed by: INTERNAL MEDICINE

## 2022-04-07 PROCEDURE — 85025 COMPLETE CBC W/AUTO DIFF WBC: CPT | Performed by: INTERNAL MEDICINE

## 2022-04-07 PROCEDURE — 36415 COLL VENOUS BLD VENIPUNCTURE: CPT | Performed by: INTERNAL MEDICINE

## 2022-04-07 PROCEDURE — 99999 PR PBB SHADOW E&M-EST. PATIENT-LVL V: ICD-10-PCS | Mod: PBBFAC,,, | Performed by: INTERNAL MEDICINE

## 2022-04-07 PROCEDURE — 85652 RBC SED RATE AUTOMATED: CPT | Performed by: INTERNAL MEDICINE

## 2022-04-07 NOTE — PROGRESS NOTES
Rapid3 Question Responses and Scores 4/6/2022   MDHAQ Score 1.7   Psychologic Score 2.2   Pain Score 5   When you awakened in the morning OVER THE LAST WEEK, did you feel stiff? Yes   If Yes, please indicate the number of hours until you are as limber as you will be for the day 1.5   Fatigue Score 4   Global Health Score 6   RAPID3 Score 5.56     Answers for HPI/ROS submitted by the patient on 4/6/2022  fever: No  eye redness: Yes  mouth sores: Yes  headaches: No  shortness of breath: Yes  chest pain: No  trouble swallowing: Yes  diarrhea: No  constipation: No  unexpected weight change: No  genital sore: No  dysuria: No  During the last 3 days, have you had a skin rash?: No  Bruises or bleeds easily: Yes  cough: No

## 2022-04-07 NOTE — PROGRESS NOTES
"Subjective:       Patient ID: Berenice Hayes is a 62 y.o. female.    Chief Complaint: Sjogrens    HPI:  Berenice Hayes is a 62 y.o. female followed for concerns of autoimmune issues.   Diagnosed with fibromyalgia at age 34.   At age 42 had erythromyelgia with redness all over. She was treated by vascular with a medication but caused Raynauds to develop.  She saw Dr. Hill in vascular.      She has history significant pelvic discomfort since 2009.  She saw therapist for pelvic floor therapy which has helped. 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  She had pudenal nerve block 2/3/15.     She saw Dr. Harmon in the past who felt she had fibriomyalgia.  She tried amitriptyline for some time.   She takes Atelvia for OP.        She has felt bad since 2014.  "Hit with massive heat wave beyond what others feel with menopause."  Mouth was very dry and primary gave pilocarpine.  Spring of 2015 GUICHO was negative.  Previously had hand pains.  X-rays of hands normal.  She was found by endocrine to be hypoglycemic.  Ultrasound thyroid showed nodules that were negative on biopsy.  August 2015 diagnosed as having Hashimotos disease.   Synthroid did not help levels.  She is on Cytomel now.    Saw ENT for throat pain in neck since ultrasound was done.  ENT will biopsy a solid nodule on left lobe thyroid.  CT with contrast showed enlargement in the saliva gland.  Her mother had fibromyalgia and PMR as well as history ETOH abuse as an adult.  Father had osteoarthritis and required complete thumb reconstruction on one thumb.     Daughter with Hashimotos and thyroid Ca.    Interval History:    Now reports bilateral hand, foot and knee pain.   Difficult to move in morning.   Knee pain improves with walking.        Weight is improving.  Goal weight is 95-97 pounds.   In May to Oct 2021 worked with a dietician.  Did food sensitivity and chemical sensitivity test. Was found to have issues with Tylenol and garlic.   Doing " sunflower butter on bananas.   Eating more proteins with strawberries to absorb iron.   Now sees an acupuncturist weekly.    October 2020 had blood in urine. Evaluated by urology.  Cystoscope and CT scan normal.  Last episode 5/2021.  New urologist wants MRA angiogram with contrast and then he would go into kidneys to get cytology.       She still feels dry eyes and mouth are worse despite using pilocarpine and Restasis.  Mouth gets dry frequently.   She still has episodes Raynauds and erythromyelgia on her feet that improves with elevation and worsen with walking.   No longer using Nicatinamide for fingers since opened blood flow and worsened erythromyelgia.      Initially felt bad on Plaquenil started in February 2019 then 6 weeks later had significant improvement.  Still feels Plaquenil helps symptoms.       In past dermatology did skin biopsy and diagnosed pernio (no evidence of lupus vasculitis).   Long standing history of erythromyelgia.   She gets nerve pain in feet.  Difficulty walking at end of day.  Uses compression sock.   Decreased frequency of pilocarpine due to sweats.     In the past Dr. Philip recommended low dose naltrexone.    10 day migraine that improved with chiropractor.  Awaiting neurology MRI.   Summer 2019 had trigeminal neuralgia on left and now notes ear issues.         Review of Systems   Constitutional: Positive for fatigue. Negative for fever and unexpected weight change.   HENT: Positive for mouth sores and trouble swallowing.    Eyes: Positive for redness.   Respiratory: Negative for cough and shortness of breath.    Cardiovascular: Negative for chest pain.   Gastrointestinal: Positive for constipation. Negative for diarrhea.   Endocrine: Negative.    Genitourinary: Negative for dysuria and genital sores.   Musculoskeletal: Positive for arthralgias.   Skin: Negative for rash.        Redness of fingers  Hair loss with stress   Allergic/Immunologic: Negative.    Neurological: Positive  for headaches.   Hematological: Positive for adenopathy. Bruises/bleeds easily.   Psychiatric/Behavioral: Negative.          Objective:   BP 96/63 (BP Location: Left arm, Patient Position: Sitting, BP Method: Large (Automatic))   Pulse 96   Ht 5' (1.524 m)   Wt 43.1 kg (95 lb 0.3 oz)   LMP  (LMP Unknown)   BMI 18.56 kg/m²  Virtual     Physical Exam   Constitutional: She is oriented to person, place, and time.   HENT:   Head: Normocephalic and atraumatic.   Eyes: Conjunctivae are normal.   Cardiovascular: Normal rate, regular rhythm and normal heart sounds.   Pulmonary/Chest: Effort normal and breath sounds normal.   Abdominal: Soft. Bowel sounds are normal.   Musculoskeletal:         General: No tenderness or deformity.      Cervical back: Neck supple.      Comments: Squaring right 1st CMC  28 joint count: 0 swollen and 0 tender  12/18 FM points painful   Neurological: She is alert and oriented to person, place, and time.   Skin: Skin is warm and dry. There is erythema.   Erythema at periungal area multiple fingers on right hand   Psychiatric: Mood and affect normal.       LABS    Component      Latest Ref Rng & Units 9/27/2021 9/18/2021 5/17/2021   WBC      3.9 - 12.7 K/uL  3.49 (L) 5.26   RBC      4.00 - 5.40 M/uL  4.60 4.44   Hemoglobin      12.0 - 16.0 g/dL  13.7 13.6   Hematocrit      37.0 - 48.5 %  42.2 41.8   MCV      82.0 - 98.0 fL  92 94   MCH      27.0 - 31.0 pg  29.8 30.6   MCHC      32.0 - 36.0 g/dL  32.5 32.5   RDW      11.5 - 14.5 %  12.5 13.2   Platelets      150 - 450 K/uL  220 216   MPV      9.2 - 12.9 fL  10.1 9.7   Immature Granulocytes      0.0 - 0.5 %  0.3 0.4   Gran # (ANC)      1.8 - 7.7 K/uL  2.4 3.6   Immature Grans (Abs)      0.00 - 0.04 K/uL  0.01 0.02   Lymph #      1.0 - 4.8 K/uL  0.6 (L) 0.9 (L)   Mono #      0.3 - 1.0 K/uL  0.3 0.5   Eos #      0.0 - 0.5 K/uL  0.1 0.2   Baso #      0.00 - 0.20 K/uL  0.04 0.08   nRBC      0 /100 WBC  0 0   Gran %      38.0 - 73.0 %  70.0 68.1    Lymph %      18.0 - 48.0 %  16.6 (L) 17.5 (L)   Mono %      4.0 - 15.0 %  9.7 8.7   Eosinophil %      0.0 - 8.0 %  2.3 3.8   Basophil %      0.0 - 1.9 %  1.1 1.5   Differential Method        Automated Automated   Sodium      136 - 145 mmol/L  139 139   Potassium      3.5 - 5.1 mmol/L  5.1 4.9   Chloride      95 - 110 mmol/L  100 99   CO2      23 - 29 mmol/L  27 32 (H)   Glucose      70 - 110 mg/dL  94 98   BUN      6 - 20 mg/dL  11 16   Creatinine      0.5 - 1.4 mg/dL  0.6 0.7   Calcium      8.7 - 10.5 mg/dL  9.5 10.0   PROTEIN TOTAL      6.0 - 8.4 g/dL  7.1    Albumin      3.5 - 5.2 g/dL  3.8    BILIRUBIN TOTAL      0.1 - 1.0 mg/dL  0.4    Alkaline Phosphatase      55 - 135 U/L  60    AST      10 - 40 U/L  21    ALT      10 - 44 U/L  21    Anion Gap      8 - 16 mmol/L  12 8   eGFR if African American      >60 mL/min/1.73 m:2  >60.0 >60.0   eGFR if non African American      >60 mL/min/1.73 m:2  >60.0 >60.0   Specimen UA         Urine, Unspecified   Color, UA      Yellow, Straw, Kari   Straw   Appearance, UA      Clear   Clear   pH, UA      5.0 - 8.0   7.0   Specific Gravity, UA      1.005 - 1.030   1.005   Protein, UA      Negative   Negative   Glucose, UA      Negative   Negative   Ketones, UA      Negative   Negative   Bilirubin (UA)      Negative   Negative   Occult Blood UA      Negative   Negative   NITRITE UA      Negative   Negative   Leukocytes, UA      Negative   Negative   Cholesterol      120 - 199 mg/dL  126    Triglycerides      30 - 150 mg/dL  47    HDL      40 - 75 mg/dL  61    LDL Cholesterol External      63 - 159 mg/dL  55.6 (L)    HDL/Cholesterol Ratio      20.0 - 50.0 %  48.4    Total Cholesterol/HDL Ratio      2.0 - 5.0  2.1    Non-HDL Cholesterol      mg/dL  65    Protein, Urine Random      0 - 15 mg/dL   <7   Creatinine, Urine      15.0 - 325.0 mg/dL   13.0 (L)   Prot/Creat Ratio, Urine      0.00 - 0.20   Unable to calculate   Hemoglobin A1C External      4.0 - 5.6 % 5.3     Estimated Avg  Glucose      68 - 131 mg/dL 105             Assessment:       1. Sjogren syndrome. Chronic.  Negative SSA and SSB in past now SSA positive.  Repeat labs.    Currently symptomatic management with Plaquenil, Restasis and pilocarpine.   2. Raynauds. Symptomatic management. Still with changes around nails but no digital ulcers.   3. Fibromyalgia. Managed with amitriptyline. Persistent pain and unable to increase amitriptyline due to worsening fatigue  4. Hand pain b/l. X-ray without changes. Has periodic episodes of pain.  5. Osteoporosis.  No longer on Atelvia. Still on estradiol patch.  6. Erythromelalgia.  Takes baby aspirin  7. Sleep disorder in the past. History of narcolepsy but no longer a problem. Sleep doctor in past gave Ritalin bid which helped while she took it for 2 weeks but no longer needs it since treatment of Hashimoto.  8. Increased heat in body. Improved  9. Menopause  10.  Hashimotos disease  11.  Multinodular goiter   12.  Pudenal nerve pain.  Improved with acupuncture which she continues every 2 weeks. Worsened with water therapy so stopped.    13.  Pernio.  Planning to speak with dermatology about restarting nicatinamide  14.  Rash on abdomen x3 weeks.  Concern for Addisons raised in this patient with hypotension   15.  Fatigue.  Persistent.  16.  Decreased memory  17.  Stress.  Did eight sessions with counselor  18.  Alopecia  19.  Trigeminal neuralgia on left diagnosed in 2017  20.  Decreased memory.  6 hours of sleep a night but feels it is not enough.  21.  Episode of migraine for 10 days.  Treated by chiropractor and improved after 5 days of treatment.  Saw neurology after virtually.   Plan:       1.  Continue Plaquenil.   (Patient had done in July 2021 and brought photos without report).  2.  Discussed her complaints of side effects with pilocarpine but she is not interested trying Evoxac (cevimiline).  3.  Continue fibromyalgia treatment.  4.  Patient unable to use vasodilator for Raynaud's  due to low BP  5.  Continue elevating legs at work and continue compression stockings  6.  Follow with counselor to help deal with stress of chronic illness.   7.  Follow with derm to discuss nicatinamide.  8.  Encourage regular sleep.  Do activities that bring raquel.  Consider artificial saliva to help with dryness that interrupt sleeing.       RTO 6 months/prn

## 2022-04-11 ENCOUNTER — HOSPITAL ENCOUNTER (OUTPATIENT)
Dept: RADIOLOGY | Facility: HOSPITAL | Age: 62
Discharge: HOME | End: 2022-04-11
Attending: INTERNAL MEDICINE
Payer: COMMERCIAL

## 2022-04-11 ENCOUNTER — PATIENT MESSAGE (OUTPATIENT)
Dept: RHEUMATOLOGY | Facility: CLINIC | Age: 62
End: 2022-04-11
Payer: COMMERCIAL

## 2022-04-11 DIAGNOSIS — I73.00 RAYNAUD'S DISEASE WITHOUT GANGRENE: ICD-10-CM

## 2022-04-11 DIAGNOSIS — M79.641 BILATERAL HAND PAIN: ICD-10-CM

## 2022-04-11 DIAGNOSIS — I73.81 ERYTHROMELALGIA: ICD-10-CM

## 2022-04-11 DIAGNOSIS — M79.672 BILATERAL FOOT PAIN: ICD-10-CM

## 2022-04-11 DIAGNOSIS — M79.7 FIBROMYALGIA: ICD-10-CM

## 2022-04-11 DIAGNOSIS — M35.00 SJOGREN'S SYNDROME, WITH UNSPECIFIED ORGAN INVOLVEMENT: ICD-10-CM

## 2022-04-11 DIAGNOSIS — R53.83 FATIGUE, UNSPECIFIED TYPE: ICD-10-CM

## 2022-04-11 DIAGNOSIS — M79.671 BILATERAL FOOT PAIN: ICD-10-CM

## 2022-04-11 DIAGNOSIS — M25.561 PAIN IN BOTH KNEES, UNSPECIFIED CHRONICITY: ICD-10-CM

## 2022-04-11 DIAGNOSIS — M79.642 BILATERAL HAND PAIN: ICD-10-CM

## 2022-04-11 DIAGNOSIS — M25.562 PAIN IN BOTH KNEES, UNSPECIFIED CHRONICITY: ICD-10-CM

## 2022-04-11 DIAGNOSIS — D84.9 IMMUNOSUPPRESSION: ICD-10-CM

## 2022-04-11 PROCEDURE — 77077 JOINT SURVEY SINGLE VIEW: CPT | Mod: TC

## 2022-04-11 PROCEDURE — 77077 JOINT SURVEY SINGLE VIEW: CPT | Mod: 26,,, | Performed by: RADIOLOGY

## 2022-04-11 PROCEDURE — 77077 XR ARTHRITIS SURVEY: ICD-10-PCS | Mod: 26,,, | Performed by: RADIOLOGY

## 2022-04-12 LAB — DSDNA AB SER-ACNC: NORMAL [IU]/ML

## 2022-05-05 ENCOUNTER — PATIENT MESSAGE (OUTPATIENT)
Dept: INTERNAL MEDICINE | Facility: CLINIC | Age: 62
End: 2022-05-05
Payer: COMMERCIAL

## 2022-05-05 ENCOUNTER — OFFICE VISIT (OUTPATIENT)
Dept: INTERNAL MEDICINE | Facility: CLINIC | Age: 62
End: 2022-05-05
Payer: COMMERCIAL

## 2022-05-05 ENCOUNTER — TELEPHONE (OUTPATIENT)
Dept: INTERNAL MEDICINE | Facility: CLINIC | Age: 62
End: 2022-05-05
Payer: COMMERCIAL

## 2022-05-05 DIAGNOSIS — U07.1 COVID: ICD-10-CM

## 2022-05-05 DIAGNOSIS — U07.1 COVID-19: Primary | ICD-10-CM

## 2022-05-05 PROCEDURE — 1159F PR MEDICATION LIST DOCUMENTED IN MEDICAL RECORD: ICD-10-PCS | Mod: CPTII,95,, | Performed by: FAMILY MEDICINE

## 2022-05-05 PROCEDURE — 99213 OFFICE O/P EST LOW 20 MIN: CPT | Mod: 95,,, | Performed by: FAMILY MEDICINE

## 2022-05-05 PROCEDURE — 1159F MED LIST DOCD IN RCRD: CPT | Mod: CPTII,95,, | Performed by: FAMILY MEDICINE

## 2022-05-05 PROCEDURE — 1160F RVW MEDS BY RX/DR IN RCRD: CPT | Mod: CPTII,95,, | Performed by: FAMILY MEDICINE

## 2022-05-05 PROCEDURE — 1160F PR REVIEW ALL MEDS BY PRESCRIBER/CLIN PHARMACIST DOCUMENTED: ICD-10-PCS | Mod: CPTII,95,, | Performed by: FAMILY MEDICINE

## 2022-05-05 PROCEDURE — 99213 PR OFFICE/OUTPT VISIT, EST, LEVL III, 20-29 MIN: ICD-10-PCS | Mod: 95,,, | Performed by: FAMILY MEDICINE

## 2022-05-05 RX ORDER — BENZONATATE 200 MG/1
200 CAPSULE ORAL 3 TIMES DAILY PRN
Qty: 30 CAPSULE | Refills: 0 | Status: SHIPPED | OUTPATIENT
Start: 2022-05-05 | End: 2022-05-15

## 2022-05-05 NOTE — PROGRESS NOTES
Subjective:       Patient ID: Berenice Hayes is a 62 y.o. female.    Chief Complaint: COVID-19 Concerns    The patient location is:  home  The chief complaint leading to consultation is:  COVID    Visit type: audiovisual    Face to Face time with patient:  8 minutes  13 minutes of total time spent on the encounter, which includes face to face time and non-face to face time preparing to see the patient (eg, review of tests), Obtaining and/or reviewing separately obtained history, Documenting clinical information in the electronic or other health record, Independently interpreting results (not separately reported) and communicating results to the patient/family/caregiver, or Care coordination (not separately reported).         Each patient to whom he or she provides medical services by telemedicine is:  (1) informed of the relationship between the physician and patient and the respective role of any other health care provider with respect to management of the patient; and (2) notified that he or she may decline to receive medical services by telemedicine and may withdraw from such care at any time.    Notes:  62-year-old white female patient of Dr. Lundberg with multiple autoimmune conditions is evaluated through telemedicine visit today secondary to COVID.  She reports that her symptoms began on Sunday with losing her voice which progressed into Monday but by Monday evening she began noticing body aches and headaches.  She took a home COVID test on Tuesday which returned positive.  She has also been experiencing fever, ear pressure, postnasal drip, runny nose, sinus pressure, sore throat, cough occasionally productive of yellowish sputum, generalized body aches, and headaches.  She has been using Mucinex with mild relief.    Review of Systems   Constitutional: Positive for fever. Negative for appetite change, chills and fatigue.   HENT: Positive for ear pain, postnasal drip, rhinorrhea, sinus pressure/congestion and  sore throat. Negative for nasal congestion, hearing loss and tinnitus.    Eyes: Negative for redness, itching and visual disturbance.   Respiratory: Positive for cough (yellowish sputum). Negative for chest tightness, shortness of breath and wheezing.    Cardiovascular: Negative for chest pain and palpitations.   Gastrointestinal: Negative for abdominal pain, constipation, diarrhea, nausea and vomiting.   Genitourinary: Negative for decreased urine volume, difficulty urinating, dysuria, frequency, hematuria and urgency.   Musculoskeletal: Positive for myalgias. Negative for back pain, neck pain and neck stiffness.   Integumentary:  Negative for rash.   Allergic/Immunologic: Positive for environmental allergies.   Neurological: Positive for headaches. Negative for dizziness and light-headedness.   Psychiatric/Behavioral: Negative.          Objective:      Visit performed through video.  No physical exam performed.  Assessment:       Problem List Items Addressed This Visit     COVID      Other Visit Diagnoses     COVID-19    -  Primary    Relevant Medications    benzonatate (TESSALON) 200 MG capsule    Other Relevant Orders    COVID-19 Home Symptom Monitoring  - Duration (days): 14    Ambulatory referral/consult to [facility]          Plan:       COVID-19  -     COVID-19 Home Symptom Monitoring  - Duration (days): 14  -     Ambulatory referral/consult to [facility]; Future; Expected date: 05/06/2022  -     benzonatate (TESSALON) 200 MG capsule; Take 1 capsule (200 mg total) by mouth 3 (three) times daily as needed for Cough.  Dispense: 30 capsule; Refill: 0    COVID      Advil as needed for fever or pain.  Saltwater or Listerine gargle as needed for sore throat.  Continue Mucinex as needed for cough.  Follow-up as needed if symptoms persist or worsen or proceed to urgent care or the emergency room for worsening symptoms such as continued fever, shortness of breath, or chest pain.

## 2022-05-05 NOTE — TELEPHONE ENCOUNTER
----- Message from Kassidy Paniagua MA sent at 5/5/2022  7:59 AM CDT -----  Regarding: FW: Pt called to speak to the nurse regarding her care due to testing both negative and postive for Covid and pt would like a call back this morning asap    ----- Message -----  From: Alejandrina Rea  Sent: 5/5/2022   6:57 AM CDT  To: [facility] Staff  Subject: Pt called to speak to the nurse regarding he#    Patient Advice:    Pt called to speak to the nurse regarding her care due to testing both negative and postive for Covid and pt would like a call back this morning asap. Pt states that she is experiencing cough, body aches, fatigue, problems with her throat and ears.    Pt can be reached at [phone]

## 2022-05-05 NOTE — TELEPHONE ENCOUNTER
Called patient about symptoms. Patient had scheduled an virtual appointment and was given an order for the [facility] infusion. Attempted to call and schedule appt but line was busy. Called patient back and gave her the number to call and schedule herself as well. Educated patient to keep hydrated, take tylenol or ibuprofen which ever one you can tolerate for fever/body aches, and OTC mucinex for cough/congestion. Please go to the UC or ED if condition becomes worse.Patient verbalized understanding.

## 2022-05-06 ENCOUNTER — PATIENT MESSAGE (OUTPATIENT)
Dept: INTERNAL MEDICINE | Facility: CLINIC | Age: 62
End: 2022-05-06
Payer: COMMERCIAL

## 2022-05-14 ENCOUNTER — LAB VISIT (OUTPATIENT)
Dept: LAB | Facility: HOSPITAL | Age: 62
End: 2022-05-14
Attending: INTERNAL MEDICINE
Payer: COMMERCIAL

## 2022-05-14 DIAGNOSIS — E06.3 HYPOTHYROIDISM DUE TO HASHIMOTO'S THYROIDITIS: ICD-10-CM

## 2022-05-14 DIAGNOSIS — E03.8 HYPOTHYROIDISM DUE TO HASHIMOTO'S THYROIDITIS: ICD-10-CM

## 2022-05-14 DIAGNOSIS — M81.0 OSTEOPOROSIS, POSTMENOPAUSAL: ICD-10-CM

## 2022-05-14 LAB
25(OH)D3+25(OH)D2 SERPL-MCNC: 89 NG/ML (ref 30–96)
ALBUMIN SERPL BCP-MCNC: 4.3 G/DL (ref 3.5–5.2)
ANION GAP SERPL CALC-SCNC: 12 MMOL/L (ref 8–16)
BUN SERPL-MCNC: 18 MG/DL (ref 6–20)
CALCIUM SERPL-MCNC: 10.3 MG/DL (ref 8.7–10.5)
CHLORIDE SERPL-SCNC: 99 MMOL/L (ref 95–110)
CO2 SERPL-SCNC: 28 MMOL/L (ref 23–29)
CREAT SERPL-MCNC: 0.7 MG/DL (ref 0.5–1.4)
EST. GFR  (AFRICAN AMERICAN): >60 ML/MIN/1.73 M^2
EST. GFR  (NON AFRICAN AMERICAN): >60 ML/MIN/1.73 M^2
GLUCOSE SERPL-MCNC: 100 MG/DL (ref 70–110)
PHOSPHATE SERPL-MCNC: 3.9 MG/DL (ref 2.7–4.5)
POTASSIUM SERPL-SCNC: 4.6 MMOL/L (ref 3.5–5.1)
SODIUM SERPL-SCNC: 139 MMOL/L (ref 136–145)
T3 SERPL-MCNC: 116 NG/DL (ref 60–180)
T4 FREE SERPL-MCNC: 0.52 NG/DL (ref 0.71–1.51)
TSH SERPL DL<=0.005 MIU/L-ACNC: 1.34 UIU/ML (ref 0.4–4)

## 2022-05-14 PROCEDURE — 84443 ASSAY THYROID STIM HORMONE: CPT | Performed by: INTERNAL MEDICINE

## 2022-05-14 PROCEDURE — 84480 ASSAY TRIIODOTHYRONINE (T3): CPT | Performed by: INTERNAL MEDICINE

## 2022-05-14 PROCEDURE — 82306 VITAMIN D 25 HYDROXY: CPT | Performed by: INTERNAL MEDICINE

## 2022-05-14 PROCEDURE — 80069 RENAL FUNCTION PANEL: CPT | Performed by: INTERNAL MEDICINE

## 2022-05-14 PROCEDURE — 36415 COLL VENOUS BLD VENIPUNCTURE: CPT | Performed by: INTERNAL MEDICINE

## 2022-05-14 PROCEDURE — 84439 ASSAY OF FREE THYROXINE: CPT | Performed by: INTERNAL MEDICINE

## 2022-05-16 ENCOUNTER — PATIENT OUTREACH (OUTPATIENT)
Dept: ADMINISTRATIVE | Facility: OTHER | Age: 62
End: 2022-05-16
Payer: COMMERCIAL

## 2022-05-16 NOTE — PROGRESS NOTES
Health Maintenance Due   Topic Date Due    TETANUS VACCINE  Never done    Pneumococcal Vaccines (Age 0-64) (2 - PCV) 09/30/2015    COVID-19 Vaccine (4 - Booster for Moderna series) 04/22/2022    Colorectal Cancer Screening  08/06/2022     Updates were requested from care everywhere.  Chart was reviewed for overdue Proactive [facility] Encounters ([facility]) topics (CRS, Breast Cancer Screening, Eye exam)  Health Maintenance has been updated.  LINKS immunization registry triggered.  Immunizations were reconciled.

## 2022-05-17 ENCOUNTER — OFFICE VISIT (OUTPATIENT)
Dept: ENDOCRINOLOGY | Facility: CLINIC | Age: 62
End: 2022-05-17
Payer: COMMERCIAL

## 2022-05-17 VITALS
HEIGHT: 61 IN | SYSTOLIC BLOOD PRESSURE: 118 MMHG | WEIGHT: 91.94 LBS | BODY MASS INDEX: 17.36 KG/M2 | DIASTOLIC BLOOD PRESSURE: 72 MMHG

## 2022-05-17 DIAGNOSIS — E06.3 HYPOTHYROIDISM DUE TO HASHIMOTO'S THYROIDITIS: Primary | ICD-10-CM

## 2022-05-17 DIAGNOSIS — M35.00 SJOGREN'S SYNDROME, WITH UNSPECIFIED ORGAN INVOLVEMENT: ICD-10-CM

## 2022-05-17 DIAGNOSIS — M81.0 OSTEOPOROSIS, POSTMENOPAUSAL: ICD-10-CM

## 2022-05-17 DIAGNOSIS — E04.2 MULTINODULAR GOITER: ICD-10-CM

## 2022-05-17 DIAGNOSIS — E03.8 HYPOTHYROIDISM DUE TO HASHIMOTO'S THYROIDITIS: Primary | ICD-10-CM

## 2022-05-17 PROCEDURE — 3074F SYST BP LT 130 MM HG: CPT | Mod: CPTII,S$GLB,, | Performed by: INTERNAL MEDICINE

## 2022-05-17 PROCEDURE — 3008F PR BODY MASS INDEX (BMI) DOCUMENTED: ICD-10-PCS | Mod: CPTII,S$GLB,, | Performed by: INTERNAL MEDICINE

## 2022-05-17 PROCEDURE — 1160F PR REVIEW ALL MEDS BY PRESCRIBER/CLIN PHARMACIST DOCUMENTED: ICD-10-PCS | Mod: CPTII,S$GLB,, | Performed by: INTERNAL MEDICINE

## 2022-05-17 PROCEDURE — 3078F PR MOST RECENT DIASTOLIC BLOOD PRESSURE < 80 MM HG: ICD-10-PCS | Mod: CPTII,S$GLB,, | Performed by: INTERNAL MEDICINE

## 2022-05-17 PROCEDURE — 99999 PR PBB SHADOW E&M-EST. PATIENT-LVL V: ICD-10-PCS | Mod: PBBFAC,,, | Performed by: INTERNAL MEDICINE

## 2022-05-17 PROCEDURE — 99214 PR OFFICE/OUTPT VISIT, EST, LEVL IV, 30-39 MIN: ICD-10-PCS | Mod: S$GLB,,, | Performed by: INTERNAL MEDICINE

## 2022-05-17 PROCEDURE — 1159F MED LIST DOCD IN RCRD: CPT | Mod: CPTII,S$GLB,, | Performed by: INTERNAL MEDICINE

## 2022-05-17 PROCEDURE — 3074F PR MOST RECENT SYSTOLIC BLOOD PRESSURE < 130 MM HG: ICD-10-PCS | Mod: CPTII,S$GLB,, | Performed by: INTERNAL MEDICINE

## 2022-05-17 PROCEDURE — 1160F RVW MEDS BY RX/DR IN RCRD: CPT | Mod: CPTII,S$GLB,, | Performed by: INTERNAL MEDICINE

## 2022-05-17 PROCEDURE — 3078F DIAST BP <80 MM HG: CPT | Mod: CPTII,S$GLB,, | Performed by: INTERNAL MEDICINE

## 2022-05-17 PROCEDURE — 1159F PR MEDICATION LIST DOCUMENTED IN MEDICAL RECORD: ICD-10-PCS | Mod: CPTII,S$GLB,, | Performed by: INTERNAL MEDICINE

## 2022-05-17 PROCEDURE — 99999 PR PBB SHADOW E&M-EST. PATIENT-LVL V: CPT | Mod: PBBFAC,,, | Performed by: INTERNAL MEDICINE

## 2022-05-17 PROCEDURE — 99214 OFFICE O/P EST MOD 30 MIN: CPT | Mod: S$GLB,,, | Performed by: INTERNAL MEDICINE

## 2022-05-17 PROCEDURE — 3008F BODY MASS INDEX DOCD: CPT | Mod: CPTII,S$GLB,, | Performed by: INTERNAL MEDICINE

## 2022-05-17 RX ORDER — BENZONATATE 200 MG/1
200 CAPSULE ORAL 3 TIMES DAILY PRN
Qty: 30 CAPSULE | Refills: 2 | Status: SHIPPED | OUTPATIENT
Start: 2022-05-17 | End: 2022-05-27

## 2022-05-17 NOTE — ASSESSMENT & PLAN NOTE
--With multiple thyroid nodules  --has had multiple benign FNAs of the right mid lobe nodule, and 1 benign FNA of the left lobe nodule  --she has had 3 nondiagnostic FNAs of the right inferior pole nodule, however, molecular markers were negative on this nodule in 07/2019  --Last ultrasound with stable nodules in 5/2021  --Repeat thyroid ultrasound scheduled on 06/01/2022

## 2022-05-17 NOTE — ASSESSMENT & PLAN NOTE
--Clinically and biochemically euthyroid  --Explained goals of treatment is to keep the TSH in the normal range to avoid CVS and bone complications.  --Will continue Cytomel 15 mcg TID  --TSH remains normal  --She understands that this is an atypical thyroid hormone replacement regimen and she will let me us know right away if she develops any hyperthyroid symptoms  --We discussed continuing this regimen so long as her TSH is not suppressed and she does not have any palpitations

## 2022-05-17 NOTE — ASSESSMENT & PLAN NOTE
--On ERT  --She had a decline at the hip on ERT and risedronate  --Previously reviewed options and possibly starting an injection or infusion and she desires to stay on the current regimen  --Since her last visit she has now been getting adequate calcium through a combination of diet and supplementation  --She is also planning to increase weight-bearing exercise  --Will continue risedronate 35 mg weekly (restarted in 11/2017)  --repeat bone density in 11/2023  --continue vitamin-D supplementation  --stressed importance of falls avoidance

## 2022-05-17 NOTE — PROGRESS NOTES
Subjective:      Patient ID: Berenice Hayes is a 62 y.o. female.    Chief Complaint:  Hypothyroidism      History of Present Illness  Ms. Hayes presents for follow up of hypothyroidism and osteopenia.  Last visit  11/2021. Former patient of Dr. Espinal.     She is recovered from COVID in 4/2022 however she lost 5 lbs has been working to regain the weight and add some additional weight.      With Hashimoto thyroiditis with high thyroglobulin ab and negative TPO ab.   Was started and maintained on T3 only regimen by Dr. Espinal.   Currently she is taking Cytomel totalling 15 mcg per day (5 mcg TID).  Previously had palpitations when Synthroid was added to the Cytomel. No longer having palpitations on the current regimen and TSH has remained WNL. She feels like her hypothyroidism has been relatively stable. She denies any palpitations or tremor.   She has been hesitant to change back to T4 only or a combined T4/T3 regimen     Also has MNG with right mid thyroid nodule s/p benign FNA in 2015, 2018, 2019 and left lobe nodule with benign FNA in 2016. Right inferior pole nodule underwent FNA x 3 with non-diagnostic results. Molecular markers were negative on the right inferior nodule in 7/2019.     She has some chronic mild dysphagia with solid/dry foods that has not changed. No hoarseness or voice changes.     Neck ultrasound dated 5/2021:  Impression:     1.  Thyroid gland is normal in size with heterogenous echotexture.     2.  Five nodules in the right thyroid described above.  All are unchanged since the previous study.     3.  Two nodules in the left thyroid described above. None meet criteria for fine-needle aspiration.     RECOMMENDATIONS:  Follow-up ultrasound in 1 year is recommended.     Has Osteoporosis and she took Atelvia 35 mg weekly since August/2012 until 2015 that she restarted ERT (vaginal and transdermal). Had decrease in BMD at the hip despite ERT.    Restarted risedronate 35 mg weekly  in 11/2017.  Compliant with regimen.     Recent BMD stable at the hip but showed decline at the spine.     She takes a multivitamin and Vit D 2000 units daily.     She is currently taking oral calcium lactate twice daily. She is also getting a fair amount of calcium through her diet (eats cheese, broccoli and walnuts daily).     Follows with rheumatology for Sjogrens. On Plaquenil.     Review of Systems   Constitutional: Negative for chills and fever.   Gastrointestinal: Negative for nausea.       Objective:   Physical Exam  Vitals and nursing note reviewed.       BP Readings from Last 3 Encounters:   05/17/22 118/72   04/07/22 96/63   03/17/22 (!) 98/50     Wt Readings from Last 1 Encounters:   05/17/22 0932 41.7 kg (91 lb 14.9 oz)       Body mass index is 17.37 kg/m².    Lab Review:   Lab Results   Component Value Date    HGBA1C 5.3 09/27/2021     Lab Results   Component Value Date    CHOL 126 09/18/2021    HDL 61 09/18/2021    LDLCALC 55.6 (L) 09/18/2021    TRIG 47 09/18/2021    CHOLHDL 48.4 09/18/2021     Lab Results   Component Value Date     05/14/2022    K 4.6 05/14/2022    CL 99 05/14/2022    CO2 28 05/14/2022     05/14/2022    BUN 18 05/14/2022    CREATININE 0.7 05/14/2022    CALCIUM 10.3 05/14/2022    PROT 7.1 04/07/2022    ALBUMIN 4.3 05/14/2022    BILITOT 0.3 04/07/2022    ALKPHOS 71 04/07/2022    AST 21 04/07/2022    ALT 23 04/07/2022    ANIONGAP 12 05/14/2022    ESTGFRAFRICA >60.0 05/14/2022    EGFRNONAA >60.0 05/14/2022    TSH 1.342 05/14/2022         Assessment and Plan     Hypothyroidism due to Hashimoto's thyroiditis  --Clinically and biochemically euthyroid  --Explained goals of treatment is to keep the TSH in the normal range to avoid CVS and bone complications.  --Will continue Cytomel 15 mcg TID  --TSH remains normal  --She understands that this is an atypical thyroid hormone replacement regimen and she will let me us know right away if she develops any hyperthyroid symptoms  --We  discussed continuing this regimen so long as her TSH is not suppressed and she does not have any palpitations         Multinodular goiter  --With multiple thyroid nodules  --has had multiple benign FNAs of the right mid lobe nodule, and 1 benign FNA of the left lobe nodule  --she has had 3 nondiagnostic FNAs of the right inferior pole nodule, however, molecular markers were negative on this nodule in 07/2019  --Last ultrasound with stable nodules in 5/2021  --Repeat thyroid ultrasound scheduled on 06/01/2022      Osteoporosis, postmenopausal  --On ERT  --She had a decline at the hip on ERT and risedronate  --Previously reviewed options and possibly starting an injection or infusion and she desires to stay on the current regimen  --Since her last visit she has now been getting adequate calcium through a combination of diet and supplementation  --She is also planning to increase weight-bearing exercise  --Will continue risedronate 35 mg weekly (restarted in 11/2017)  --repeat bone density in 11/2023  --continue vitamin-D supplementation  --stressed importance of falls avoidance      Sjogren's syndrome  Followed by rheumatology  Started on plaquenil (prescribed 2/20/19)            Follow up in 6 months with labs prior to appt      Leandro Espinoza M.D. Staff Endocrinology

## 2022-05-23 ENCOUNTER — PATIENT MESSAGE (OUTPATIENT)
Dept: INTERNAL MEDICINE | Facility: CLINIC | Age: 62
End: 2022-05-23
Payer: COMMERCIAL

## 2022-05-23 DIAGNOSIS — G47.00 INSOMNIA, UNSPECIFIED TYPE: ICD-10-CM

## 2022-05-23 RX ORDER — ZOLPIDEM TARTRATE 10 MG/1
TABLET ORAL
Qty: 30 TABLET | Refills: 2 | Status: SHIPPED | OUTPATIENT
Start: 2022-05-23 | End: 2022-09-12 | Stop reason: SDUPTHER

## 2022-05-23 NOTE — TELEPHONE ENCOUNTER
No new care gaps identified.  [facility] Embedded Care Gaps. Reference number: [id number]. 5/23/2022   1:20:44 PM CDT

## 2022-06-01 ENCOUNTER — HOSPITAL ENCOUNTER (OUTPATIENT)
Dept: ENDOCRINOLOGY | Facility: CLINIC | Age: 62
Discharge: HOME | End: 2022-06-01
Attending: INTERNAL MEDICINE
Payer: COMMERCIAL

## 2022-06-01 DIAGNOSIS — E04.2 MULTINODULAR GOITER: ICD-10-CM

## 2022-06-01 PROCEDURE — 76536 US EXAM OF HEAD AND NECK: CPT | Mod: S$GLB,,, | Performed by: INTERNAL MEDICINE

## 2022-06-01 PROCEDURE — 76536 US SOFT TISSUE HEAD NECK THYROID: ICD-10-PCS | Mod: S$GLB,,, | Performed by: INTERNAL MEDICINE

## 2022-06-23 ENCOUNTER — PATIENT MESSAGE (OUTPATIENT)
Dept: INTERNAL MEDICINE | Facility: CLINIC | Age: 62
End: 2022-06-23
Payer: COMMERCIAL

## 2022-06-23 DIAGNOSIS — M79.7 FIBROMYALGIA: ICD-10-CM

## 2022-06-23 RX ORDER — METAXALONE 800 MG/1
TABLET ORAL
Qty: 180 TABLET | Refills: 3 | Status: SHIPPED | OUTPATIENT
Start: 2022-06-23 | End: 2023-10-09 | Stop reason: SDUPTHER

## 2022-06-23 RX ORDER — ALBUTEROL SULFATE 90 UG/1
2 AEROSOL, METERED RESPIRATORY (INHALATION) EVERY 6 HOURS PRN
Qty: 18 G | Refills: 2 | Status: SHIPPED | OUTPATIENT
Start: 2022-06-23 | End: 2024-03-13

## 2022-06-23 NOTE — TELEPHONE ENCOUNTER
No new care gaps identified.  [facility] Embedded Care Gaps. Reference number: [id number]. 6/23/2022   1:35:12 PM CDT

## 2022-06-23 NOTE — TELEPHONE ENCOUNTER
No new care gaps identified.  [facility] Embedded Care Gaps. Reference number: [id number]. 6/23/2022   10:06:33 AM ALINE

## 2022-06-23 NOTE — TELEPHONE ENCOUNTER
----- Message from Kathie Harry sent at 6/23/2022  1:27 PM CDT -----  Contact: [phone]  Requesting an RX refill or new RX.  Is this a refill or new RX: New  RX name and strength (copy/paste from chart):  albuterol (VENTOLIN HFA) 90 mcg/actuation inhaler  Is this a 30 day or 90 day RX:   Pharmacy name and phone # (copy/paste from chart):    [facility] - [address] [address]  Phone: [phone] Fax: [phone]          The doctors have asked that we provide their patients with the following 2 reminders -- prescription refills can take up to 72 hours, and a friendly reminder that in the future you can use your [facility] account to request refills: Yes

## 2022-06-25 ENCOUNTER — LAB VISIT (OUTPATIENT)
Dept: LAB | Facility: HOSPITAL | Age: 62
End: 2022-06-25
Attending: INTERNAL MEDICINE
Payer: COMMERCIAL

## 2022-06-25 DIAGNOSIS — M79.671 BILATERAL FOOT PAIN: ICD-10-CM

## 2022-06-25 DIAGNOSIS — I73.00 RAYNAUD'S DISEASE WITHOUT GANGRENE: ICD-10-CM

## 2022-06-25 DIAGNOSIS — I73.81 ERYTHROMELALGIA: ICD-10-CM

## 2022-06-25 DIAGNOSIS — D84.9 IMMUNOSUPPRESSION: ICD-10-CM

## 2022-06-25 DIAGNOSIS — M35.00 SJOGREN'S SYNDROME, WITH UNSPECIFIED ORGAN INVOLVEMENT: ICD-10-CM

## 2022-06-25 DIAGNOSIS — R53.83 FATIGUE, UNSPECIFIED TYPE: ICD-10-CM

## 2022-06-25 DIAGNOSIS — M79.642 BILATERAL HAND PAIN: ICD-10-CM

## 2022-06-25 DIAGNOSIS — M79.7 FIBROMYALGIA: ICD-10-CM

## 2022-06-25 DIAGNOSIS — M25.561 PAIN IN BOTH KNEES, UNSPECIFIED CHRONICITY: ICD-10-CM

## 2022-06-25 DIAGNOSIS — M79.641 BILATERAL HAND PAIN: ICD-10-CM

## 2022-06-25 DIAGNOSIS — M79.672 BILATERAL FOOT PAIN: ICD-10-CM

## 2022-06-25 DIAGNOSIS — M25.562 PAIN IN BOTH KNEES, UNSPECIFIED CHRONICITY: ICD-10-CM

## 2022-06-25 LAB
ALBUMIN SERPL BCP-MCNC: 4.1 G/DL (ref 3.5–5.2)
ALP SERPL-CCNC: 67 U/L (ref 55–135)
ALT SERPL W/O P-5'-P-CCNC: 25 U/L (ref 10–44)
ANION GAP SERPL CALC-SCNC: 9 MMOL/L (ref 8–16)
AST SERPL-CCNC: 26 U/L (ref 10–40)
BASOPHILS # BLD AUTO: 0.07 K/UL (ref 0–0.2)
BASOPHILS NFR BLD: 1 % (ref 0–1.9)
BILIRUB SERPL-MCNC: 0.4 MG/DL (ref 0.1–1)
BUN SERPL-MCNC: 18 MG/DL (ref 6–20)
C3 SERPL-MCNC: 83 MG/DL (ref 50–180)
C4 SERPL-MCNC: 18 MG/DL (ref 11–44)
CALCIUM SERPL-MCNC: 10 MG/DL (ref 8.7–10.5)
CHLORIDE SERPL-SCNC: 101 MMOL/L (ref 95–110)
CK SERPL-CCNC: 40 U/L (ref 20–180)
CO2 SERPL-SCNC: 30 MMOL/L (ref 23–29)
CREAT SERPL-MCNC: 0.6 MG/DL (ref 0.5–1.4)
CRP SERPL-MCNC: 0.5 MG/L (ref 0–8.2)
DIFFERENTIAL METHOD: ABNORMAL
EOSINOPHIL # BLD AUTO: 0.2 K/UL (ref 0–0.5)
EOSINOPHIL NFR BLD: 2.2 % (ref 0–8)
ERYTHROCYTE [DISTWIDTH] IN BLOOD BY AUTOMATED COUNT: 13.2 % (ref 11.5–14.5)
ERYTHROCYTE [SEDIMENTATION RATE] IN BLOOD BY PHOTOMETRIC METHOD: <2 MM/HR (ref 0–36)
EST. GFR  (AFRICAN AMERICAN): >60 ML/MIN/1.73 M^2
EST. GFR  (NON AFRICAN AMERICAN): >60 ML/MIN/1.73 M^2
GLUCOSE SERPL-MCNC: 97 MG/DL (ref 70–110)
HCT VFR BLD AUTO: 40 % (ref 37–48.5)
HGB BLD-MCNC: 12.7 G/DL (ref 12–16)
IMM GRANULOCYTES # BLD AUTO: 0.02 K/UL (ref 0–0.04)
IMM GRANULOCYTES NFR BLD AUTO: 0.3 % (ref 0–0.5)
LYMPHOCYTES # BLD AUTO: 0.7 K/UL (ref 1–4.8)
LYMPHOCYTES NFR BLD: 10.3 % (ref 18–48)
MCH RBC QN AUTO: 30.2 PG (ref 27–31)
MCHC RBC AUTO-ENTMCNC: 31.8 G/DL (ref 32–36)
MCV RBC AUTO: 95 FL (ref 82–98)
MONOCYTES # BLD AUTO: 0.4 K/UL (ref 0.3–1)
MONOCYTES NFR BLD: 6.6 % (ref 4–15)
NEUTROPHILS # BLD AUTO: 5.3 K/UL (ref 1.8–7.7)
NEUTROPHILS NFR BLD: 79.6 % (ref 38–73)
NRBC BLD-RTO: 0 /100 WBC
PLATELET # BLD AUTO: 188 K/UL (ref 150–450)
PMV BLD AUTO: 10.4 FL (ref 9.2–12.9)
POTASSIUM SERPL-SCNC: 4 MMOL/L (ref 3.5–5.1)
PROT SERPL-MCNC: 7.1 G/DL (ref 6–8.4)
RBC # BLD AUTO: 4.2 M/UL (ref 4–5.4)
SODIUM SERPL-SCNC: 140 MMOL/L (ref 136–145)
WBC # BLD AUTO: 6.67 K/UL (ref 3.9–12.7)

## 2022-06-25 PROCEDURE — 36415 COLL VENOUS BLD VENIPUNCTURE: CPT | Performed by: INTERNAL MEDICINE

## 2022-06-25 PROCEDURE — 85025 COMPLETE CBC W/AUTO DIFF WBC: CPT | Performed by: INTERNAL MEDICINE

## 2022-06-25 PROCEDURE — 86140 C-REACTIVE PROTEIN: CPT | Performed by: INTERNAL MEDICINE

## 2022-06-25 PROCEDURE — 82550 ASSAY OF CK (CPK): CPT | Performed by: INTERNAL MEDICINE

## 2022-06-25 PROCEDURE — 80053 COMPREHEN METABOLIC PANEL: CPT | Performed by: INTERNAL MEDICINE

## 2022-06-25 PROCEDURE — 85652 RBC SED RATE AUTOMATED: CPT | Performed by: INTERNAL MEDICINE

## 2022-06-25 PROCEDURE — 86160 COMPLEMENT ANTIGEN: CPT | Mod: 59 | Performed by: INTERNAL MEDICINE

## 2022-06-25 PROCEDURE — 86160 COMPLEMENT ANTIGEN: CPT | Performed by: INTERNAL MEDICINE

## 2022-06-25 PROCEDURE — 86225 DNA ANTIBODY NATIVE: CPT | Performed by: INTERNAL MEDICINE

## 2022-06-27 ENCOUNTER — PATIENT MESSAGE (OUTPATIENT)
Dept: RHEUMATOLOGY | Facility: CLINIC | Age: 62
End: 2022-06-27
Payer: COMMERCIAL

## 2022-06-27 LAB — DSDNA AB SER-ACNC: NORMAL [IU]/ML

## 2022-07-04 ENCOUNTER — PATIENT MESSAGE (OUTPATIENT)
Dept: RHEUMATOLOGY | Facility: CLINIC | Age: 62
End: 2022-07-04
Payer: COMMERCIAL

## 2022-07-04 DIAGNOSIS — M35.00 SJOGREN'S SYNDROME, WITH UNSPECIFIED ORGAN INVOLVEMENT: ICD-10-CM

## 2022-07-05 RX ORDER — HYDROXYCHLOROQUINE SULFATE 200 MG/1
200 TABLET, FILM COATED ORAL DAILY
Qty: 90 TABLET | Refills: 0 | Status: SHIPPED | OUTPATIENT
Start: 2022-07-05 | End: 2022-09-23

## 2022-08-08 ENCOUNTER — TELEPHONE (OUTPATIENT)
Dept: PAIN MEDICINE | Facility: CLINIC | Age: 62
End: 2022-08-08
Payer: COMMERCIAL

## 2022-08-08 NOTE — TELEPHONE ENCOUNTER
This message is for patient in regards to his/her appointment 08/09/22 at 8:40a   With Niya Irene NP.      [facility] Policy: For the safety of all patients and staff members.     Patient Visitor policy: During this visit we're informing all patients that face mask are required.     If you have any questions or concerns please contact [phone]

## 2022-08-09 ENCOUNTER — TELEPHONE (OUTPATIENT)
Dept: PAIN MEDICINE | Facility: CLINIC | Age: 62
End: 2022-08-09

## 2022-08-09 ENCOUNTER — OFFICE VISIT (OUTPATIENT)
Dept: PAIN MEDICINE | Facility: CLINIC | Age: 62
End: 2022-08-09
Payer: COMMERCIAL

## 2022-08-09 ENCOUNTER — PATIENT MESSAGE (OUTPATIENT)
Dept: PAIN MEDICINE | Facility: CLINIC | Age: 62
End: 2022-08-09

## 2022-08-09 VITALS
OXYGEN SATURATION: 100 % | SYSTOLIC BLOOD PRESSURE: 87 MMHG | BODY MASS INDEX: 17.37 KG/M2 | WEIGHT: 92 LBS | HEART RATE: 100 BPM | TEMPERATURE: 98 F | HEIGHT: 61 IN | DIASTOLIC BLOOD PRESSURE: 55 MMHG | RESPIRATION RATE: 18 BRPM

## 2022-08-09 DIAGNOSIS — G58.8 PUDENDAL NEURALGIA: Primary | ICD-10-CM

## 2022-08-09 DIAGNOSIS — M79.7 FIBROMYALGIA: ICD-10-CM

## 2022-08-09 DIAGNOSIS — M47.812 CERVICAL SPONDYLOSIS: ICD-10-CM

## 2022-08-09 DIAGNOSIS — G50.0 TRIGEMINAL NEURALGIA: ICD-10-CM

## 2022-08-09 DIAGNOSIS — R10.2 PELVIC PAIN IN FEMALE: ICD-10-CM

## 2022-08-09 DIAGNOSIS — M50.30 DDD (DEGENERATIVE DISC DISEASE), CERVICAL: ICD-10-CM

## 2022-08-09 PROCEDURE — 99213 PR OFFICE/OUTPT VISIT, EST, LEVL III, 20-29 MIN: ICD-10-PCS | Mod: S$GLB,,, | Performed by: NURSE PRACTITIONER

## 2022-08-09 PROCEDURE — 3008F PR BODY MASS INDEX (BMI) DOCUMENTED: ICD-10-PCS | Mod: CPTII,S$GLB,, | Performed by: NURSE PRACTITIONER

## 2022-08-09 PROCEDURE — 1159F PR MEDICATION LIST DOCUMENTED IN MEDICAL RECORD: ICD-10-PCS | Mod: CPTII,S$GLB,, | Performed by: NURSE PRACTITIONER

## 2022-08-09 PROCEDURE — 3074F SYST BP LT 130 MM HG: CPT | Mod: CPTII,S$GLB,, | Performed by: NURSE PRACTITIONER

## 2022-08-09 PROCEDURE — 99213 OFFICE O/P EST LOW 20 MIN: CPT | Mod: S$GLB,,, | Performed by: NURSE PRACTITIONER

## 2022-08-09 PROCEDURE — 1160F PR REVIEW ALL MEDS BY PRESCRIBER/CLIN PHARMACIST DOCUMENTED: ICD-10-PCS | Mod: CPTII,S$GLB,, | Performed by: NURSE PRACTITIONER

## 2022-08-09 PROCEDURE — 3078F DIAST BP <80 MM HG: CPT | Mod: CPTII,S$GLB,, | Performed by: NURSE PRACTITIONER

## 2022-08-09 PROCEDURE — 1159F MED LIST DOCD IN RCRD: CPT | Mod: CPTII,S$GLB,, | Performed by: NURSE PRACTITIONER

## 2022-08-09 PROCEDURE — 99999 PR PBB SHADOW E&M-EST. PATIENT-LVL V: ICD-10-PCS | Mod: PBBFAC,,, | Performed by: NURSE PRACTITIONER

## 2022-08-09 PROCEDURE — 3074F PR MOST RECENT SYSTOLIC BLOOD PRESSURE < 130 MM HG: ICD-10-PCS | Mod: CPTII,S$GLB,, | Performed by: NURSE PRACTITIONER

## 2022-08-09 PROCEDURE — 1160F RVW MEDS BY RX/DR IN RCRD: CPT | Mod: CPTII,S$GLB,, | Performed by: NURSE PRACTITIONER

## 2022-08-09 PROCEDURE — 3008F BODY MASS INDEX DOCD: CPT | Mod: CPTII,S$GLB,, | Performed by: NURSE PRACTITIONER

## 2022-08-09 PROCEDURE — 3078F PR MOST RECENT DIASTOLIC BLOOD PRESSURE < 80 MM HG: ICD-10-PCS | Mod: CPTII,S$GLB,, | Performed by: NURSE PRACTITIONER

## 2022-08-09 PROCEDURE — 99999 PR PBB SHADOW E&M-EST. PATIENT-LVL V: CPT | Mod: PBBFAC,,, | Performed by: NURSE PRACTITIONER

## 2022-08-09 RX ORDER — LIDOCAINE AND PRILOCAINE 25; 25 MG/G; MG/G
CREAM TOPICAL
Qty: 90 G | Refills: 3 | Status: SHIPPED | OUTPATIENT
Start: 2022-08-09 | End: 2023-10-09 | Stop reason: SDUPTHER

## 2022-08-09 RX ORDER — GABAPENTIN 100 MG/1
CAPSULE ORAL
Qty: 450 CAPSULE | Refills: 3 | Status: SHIPPED | OUTPATIENT
Start: 2022-08-09 | End: 2023-10-09 | Stop reason: SDUPTHER

## 2022-08-09 NOTE — TELEPHONE ENCOUNTER
----- Message from Niya Irene NP sent at 8/9/2022  1:07 PM CDT -----  Regarding: RE: medication  Please let patient know that I spoke with [facility]. I gave a verbal refill for her previous cream and canceled the newer order.     Niya Mendez  ----- Message -----  From: Angélica Daniel MA  Sent: 8/9/2022  12:53 PM CDT  To: Niya Irene NP  Subject: medication                                       Good afternoon Niya ,    Patient above is stating that Hi, I got a call from [facility] that the prescription you sent in today is ketoprofen, amitriptlin, gabapentin and lidocaine. I may have used that in the past but I'm using ketoprofen, cyclonezaprine and lidocaine. I would prefer to stay with this one. Please send a new prescription    However I did reply to the patient and inform that we will send a message over to you to further assist.      I am leaving for today, so you can follow up with Andrea Owens

## 2022-08-09 NOTE — PROGRESS NOTES
Chronic patient Established Note (Follow up visit)      SUBJECTIVE:    Interval History 8/9/2022:  The patient returns to clinic today for follow up of pelvic pain. She has not been seen in over a year. She reports that she has been managing her widespread pain through acupuncture. She also sees a chiropractor with benefit. She reports increased neck pain and headaches that began in February. She did see Neurology. She reports headaches to the frontal area that is throbbing and pressure in nature. She reports neck pain, worse with flexion and extension. She denies any radicular arm pain. She continues to report pelvic pain. Her pain is well controlled with acupuncture, compound cream, and Gabapentin. Her pain today is 6/10.    nterval History 5/31/2021:  Berenice Hayes presents to the clinic for a follow-up appointment for pelvic pain. She continues to report pelvic and vaginal pain. This pain is worse with prolonged sitting. She also reports tailbone and hip pain. She denies any radicular leg pain. Her pain is worse with bending forward. She also has difficulty sleeping due to pain. She continues to participate in acupuncture with benefit. She continues to use the compound cream. She also takes Gabapentin and Skelaxin with benefit. She denies any other health changes. Her pain today is 4/10.    Interval History 05/15/20:  Patient with complex medical history with overlapping symptoms.  Currently feels that she is better in her tailbone.  Currently, she is reporting left-sided neck pain.  This pain is worse with cold and prolonged activity.  This is associated with ear pain and left arm pain.  Of note, the ear pain is on the surface of the ear and posterior.  It is difficult to touch that area due to sensitivity.  Her arm pain is tingling, burning pain that radiates into her left arm to her hand.  This is also associated with chest wall pain.  She has had a thorough cardiac workup which was negative.  She was  told she had tight strap muscles in her neck.  Bothered by Coccygeal Pain. It is a 2/10 at the moment. It is deep and dull. It does not radiate down any extremity at this point, but it has in the past.   Alleviated by Compound Cream, Sitting Down; ice packs. Aggravated by standing up prolonged times.   Paradoxical response to interventions in the past.     Left Arm Pain is shooting pain down triggered by cold. Alleviated by Lidocaine patch, Aspercream, Acupuncture, Heating Pads  Acupuncture has been suspended due to COVID 19 but was markedly helping out.     Interval History 7/2/2019;  The patient returns to clinic today for follow up. She reports increased pain that began on last week. She denies any injury or fall. She reports that she twisted over to reach for something and experienced increased pain. She describes this pain as beginning in her tailbone and radiating into both hips and down the back of her leg to mid thigh, left greater than right. She describes this pain as stabbing and burning. She continues to report intermittent radicular pain. Her pain is worse with prolonged sitting and standing. She continues to report benefit with current medication regimen. She denies any other health changes. Her pain today is 5/10.    Pain Disability Index Review:  Last 3 PDI Scores 8/9/2022 5/31/2021 7/2/2019   Pain Disability Index (PDI) 28 40 47       Pain Medications:  Gabapentin  Skelaxin  Elavil  Compound cream     Opioid Contract: N/A     report:  Not applicable    Pain Procedures:   2/3/2015- Left pudendal nerve block  6/2/2015- Right pudendal nerve block  10/28/2015- Right pudendal nerve block  10/26/2017- Bilateral coccygeal nerve block  7/5/2019- Coccygeal nerve block  7/19/2019- Coccygeal nerve block    Physical Therapy/Home Exercise: yes    Imaging:   MRI Lumbar Spine 7/10/2019:  COMPARISON:  CT abdomen/pelvis without contrast 02/07/2019; MR lumbar spine without contrast  01/16/2017     FINDINGS:  Alignment: Mild levoconvex scoliosis.  Sagittal alignment preserved.     Vertebrae: Normal marrow signal. No fracture.     Discs: Satisfactory height and signal.     Cord: Normal.  Conus terminates at L2.     Degenerative findings:     T12-L4: No significant spinal canal stenosis or neural foraminal narrowing.     L4-L5: Mild diffuse posterior disc bulge and mild bilateral facet arthropathy with small right facet effusion without significant spinal canal stenosis.  No neural foraminal narrowing.     L5-S1: No significant spinal canal stenosis.  No neural foraminal narrowing.     Miscellaneous: Note is made of prominent bilateral nerve sheath ectasia/perineural sleeve cyst formation throughout the visualized lumbosacral spine.  Findings similar when compared to MR examination 01/16/2017.     Paraspinal muscles & soft tissues: Normal.        Impression:     Mild spondylosis, as above.  No spinal canal stenosis, significant neural foraminal narrowing or focal disc abnormality.     Nerve sheath ectasia/ perineural cyst formation, unchanged from the 01/16/2017 MRI exam.    Allergies: Review of patient's allergies indicates:  No Known Allergies    Current Medications:   Current Outpatient Medications   Medication Sig Dispense Refill    amitriptyline (ELAVIL) 10 MG tablet Take 2 tablets (20 mg total) by mouth once daily. With the 50 mg for a total of 70 mg 180 tablet 3    amitriptyline (ELAVIL) 50 MG tablet TAKE 1 TABLET NIGHTLY WITH THE 10 MG AMITRIPTYLINE 90 tablet 3    aspirin (ECOTRIN) 81 MG EC tablet Take 81 mg by mouth once daily.      clindamycin (CLEOCIN T) 1 % external solution Apply topically 2 (two) times daily. As needed to new breakouts 30 mL 3    estradioL (VAGIFEM) 10 mcg Tab INSERT 1 TABLET VAGINALLY THREE TIMES A WEEK 36 tablet 3    estradiol 0.05 mg/24 hr td ptsw (VIVELLE-DOT) 0.05 mg/24 hr Apply to skin and change twice weekly 24 patch 3    gabapentin (NEURONTIN) 100 MG  capsule TAKE 1 CAPSULE IN THE MORNING AND 4 CAPSULES AT BEDTIME 450 capsule 3    hydrOXYchloroQUINE (PLAQUENIL) 200 mg tablet Take 1 tablet (200 mg total) by mouth once daily. 90 tablet 0    KETOPROFEN, BULK, TOP Ketoprofen/flexeril/lidocaine (compound)      LIDOcaine-prilocaine (EMLA) cream APPLY TOPICALLY AS NEEDED 90 g 3    liothyronine (CYTOMEL) 5 MCG Tab TAKE THREE AND ONE-HALF TABLETS ONCE DAILY 315 tablet 3    metaxalone (SKELAXIN) 800 MG tablet TAKE 1 TABLET TWICE A DAY AS NEEDED FOR SPASM 180 tablet 3    pilocarpine (SALAGEN) 5 MG Tab Take 1 tablet (5 mg total) by mouth 3 (three) times daily as needed. 270 tablet 3    PREVIDENT 5000 BOOSTER PLUS 1.1 % Pste       RESTASIS 0.05 % ophthalmic emulsion       risedronate 35 mg TbEC TAKE 1 TABLET ONCE A WEEK 12 tablet 3    tretinoin (RETIN-A) 0.025 % gel Apply small amount to entire face qhs. Wash off qam and apply sunscreen. 45 g 3    zolpidem (AMBIEN) 10 mg Tab TAKE 1 TABLET(10 MG) BY MOUTH EVERY NIGHT AS NEEDED 30 tablet 2    albuterol (VENTOLIN HFA) 90 mcg/actuation inhaler Inhale 2 puffs into the lungs every 6 (six) hours as needed for Wheezing. Rescue 18 g 2    AZASITE 1 % Drop INSTILL 1 DROP INTO LEFT EYE BID      fluocinonide (LIDEX) 0.05 % external solution AAA scalp qday - bid prn pruritus (Patient not taking: Reported on 8/9/2022) 60 mL 3    fluticasone (FLONASE) 50 mcg/actuation nasal spray 1 spray by Each Nare route once daily. 3 Bottle 3    niacinamide 500 mg Tab Take 1 tablet (500 mg total) by mouth 3 (three) times daily. (Patient not taking: No sig reported) 270 tablet 3    NURTEC 75 mg odt Take by mouth.      sumatriptan (IMITREX) 100 MG tablet Take 1 tablet (100 mg total) by mouth every 2 (two) hours as needed for Migraine. 9 tablet 1    UNABLE TO FIND Apply 240 g topically as needed. Ketoprofen/cyclobenzaprine HCI/Lidocaine (lipomax) 10/2/5% Up to 5 times daily as needed for pain  99     No current facility-administered  medications for this visit.     Facility-Administered Medications Ordered in Other Visits   Medication Dose Route Frequency Provider Last Rate Last Admin    0.9%  NaCl infusion  500 mL Intravenous Continuous Monique Philip MD           REVIEW OF SYSTEMS:    GENERAL:  No weight loss, malaise or fevers.  HEENT:  Negative for frequent or significant headaches.  NECK:  Negative for lumps, goiter, pain and significant neck swelling.  RESPIRATORY:  Negative for cough, wheezing or shortness of breath.  CARDIOVASCULAR:  Negative for chest pain, leg swelling or palpitations.  GI:  Negative for abdominal discomfort, blood in stools or black stools or change in bowel habits.  MUSCULOSKELETAL:  See HPI.  SKIN:  Negative for lesions, rash, and itching.  PSYCH:  Negative for sleep disturbance, mood disorder and recent psychosocial stressors.  HEMATOLOGY/LYMPHOLOGY:  Negative for prolonged bleeding, bruising easily or swollen nodes.  NEURO:   No history of headaches, syncope, paralysis, seizures or tremors.  ENDO: Thyroid disorder  All other reviewed and negative other than HPI.    Past Medical History:  Past Medical History:   Diagnosis Date    Asthma with allergic rhinitis     Bladder spasm     Dense breasts     heterogeneously/fibrocystic disease    Elevated antinuclear antibody (GUICHO) level     Endometriosis of cervix     Erythromelalgia     Fibromyalgia     Ganglion cyst     left toe    Goiter     MNG    Hashimoto's disease     Hashimoto's thyroiditis     Headache(784.0)     Hypothyroidism     Hashimoto with + Tg ab    Osteoporosis     femoral neck    Pernio     Pernio 2018    Raynaud's phenomenon     Rhinitis     Scoliosis     Sjogren's syndrome     Sleep disorder     type of Narcolepsy ( resolved)    Vitamin D deficiency disease     Vulvodynia        Past Surgical History:  Past Surgical History:   Procedure Laterality Date    BREAST SURGERY      fibrocystic tumor removed     SECTION       2x    CYST REMOVAL      laparoscopic cyst on ovary    HYSTERECTOMY      INJECTION OF ANESTHETIC AGENT AROUND NERVE N/A 7/5/2019    Procedure: BLOCK, NERVE COCCYGEAL  1 OF 2  Pt. can drive herself home;  Surgeon: Monique Philip MD;  Location: [facility];  Service: Pain Management;  Laterality: N/A;    INJECTION OF ANESTHETIC AGENT AROUND NERVE N/A 7/19/2019    Procedure: BLOCK, NERVE COCCYGEAL  2 OF 2  Pt. can drive herself home;  Surgeon: Monique Philip MD;  Location: [facility];  Service: Pain Management;  Laterality: N/A;    intradermal cyst       removed from left index finger    SINUS SURGERY      2x       Family History:  Family History   Problem Relation Age of Onset    Diabetes Mother     Fibromyalgia Mother     Polymyalgia rheumatica Mother     Macular degeneration Mother     Arthritis Mother     Hypertension Mother     Kidney disease Mother     Pneumonia Mother     Adrenal disorder Mother         adrenal insufficiency    Coronary artery disease Father     Arthritis Father         osteoarthritis    Hypertension Father     Heart disease Father     Diabetes Father     Hyperlipidemia Father     Hyperlipidemia Brother     Cancer Brother         oral    Thyroid cancer Daughter     Cancer Daughter         thyroid    Hypothyroidism Daughter     Breast cancer Neg Hx     Ovarian cancer Neg Hx     Colon cancer Neg Hx     Melanoma Neg Hx        Social History:  Social History     Socioeconomic History    Marital status:    Occupational History     Employer: [employer]   Tobacco Use    Smoking status: Never Smoker    Smokeless tobacco: Never Used   Substance and Sexual Activity    Alcohol use: No    Drug use: No    Sexual activity: Not Currently     Birth control/protection: Surgical     Comment: single, RAMOS ov in situ    Social History Narrative         Social Determinants of Health     Financial Resource Strain: Low Risk     Difficulty of Paying Living  "Expenses: Not hard at all   Food Insecurity: No Food Insecurity    Worried About Running Out of Food in the Last Year: Never true    Ran Out of Food in the Last Year: Never true   Transportation Needs: No Transportation Needs    Lack of Transportation (Medical): No    Lack of Transportation (Non-Medical): No   Physical Activity: Unknown    Days of Exercise per Week: Patient refused    Minutes of Exercise per Session: 20 min   Stress: No Stress Concern Present    Feeling of Stress : Not at all   Social Connections: Unknown    Frequency of Communication with Friends and Family: Three times a week    Frequency of Social Gatherings with Friends and Family: Once a week    Active Member of Clubs or Organizations: No    Attends Club or Organization Meetings: Never    Marital Status:    Housing Stability: Low Risk     Unable to Pay for Housing in the Last Year: No    Number of Places Lived in the Last Year: 1    Unstable Housing in the Last Year: No       OBJECTIVE:    BP (!) 87/55 (BP Location: Left arm, Patient Position: Sitting, BP Method: Small (Automatic))   Pulse 100   Temp 98.1 °F (36.7 °C) (Temporal)   Resp 18   Ht 5' 1" (1.549 m)   Wt 41.7 kg (92 lb)   LMP  (LMP Unknown)   SpO2 100%   BMI 17.38 kg/m²     PHYSICAL EXAMINATION:    General appearance: Well appearing, in no acute distress, alert and oriented x3.  Psych:  Mood and affect appropriate.  Skin: Skin color, texture, turgor normal, no rashes or lesions, in both upper and lower body.  Head/face:  Atraumatic, normocephalic.  Neck: There is pain with palpation over cervical paraspinals and trapezius muscles bilaterally. Spurling's negative bilaterally. Limited ROM with pain on flexion and extension.   Cor: RRR  Pulm: Symmetric chest rise, no respiratory distress noted.   Back: Straight leg raising in the sitting position is negative to radicular pain bilaterally. There is no pain with palpation over lumbar facet joints. Limited ROM " with pain on flexion. Negative facet loading bilaterally.   Extremities:  No deformities, edema, or skin discoloration. Good capillary refill.  Musculoskeletal: There is pain with palpation over bilateral SI joints and coccyx. Bilateral upper and lower extremity strength is normal and symmetric.  No atrophy or tone abnormalities are noted.  Neuro: Bilateral lower extremity coordination and muscle stretch reflexes are physiologic and symmetric.  Plantar response are downgoing. No loss of sensation is noted.  Gait: Normal.    ASSESSMENT: 62 y.o. year old female with pain, consistent with the followin. Pudendal neuralgia  LIDOcaine-prilocaine (EMLA) cream   2. Pelvic pain in female  gabapentin (NEURONTIN) 100 MG capsule   3. Fibromyalgia     4. Trigeminal neuralgia  LIDOcaine-prilocaine (EMLA) cream   5. Cervical spondylosis     6. DDD (degenerative disc disease), cervical           PLAN:     - Previous imaging was reviewed and discussed with the patient today.    - Consider repeat pudendal block in the future.     - Consider cervical imaging if pain worsens.     - I have stressed the importance of physical activity and a home exercise plan to help with pain and improve health.    - Continue acupuncture and chiropractic care.     - Continue compound cream, new prescription faxed today.     - Continue Gabapentin 100 mg in the morning and 400 mg at bedtime. Refill provided.     - RTC PRN.     - Counseled patient regarding the importance of activity modification, constant sleeping habits and physical therapy.    The above plan and management options were discussed at length with patient. Patient is in agreement with the above and verbalized understanding.    Niya Irene  2022

## 2022-08-09 NOTE — TELEPHONE ENCOUNTER
Patient was contacted and informed that the provider contacted [facility] and confirmed the refill

## 2022-08-10 ENCOUNTER — PATIENT MESSAGE (OUTPATIENT)
Dept: PAIN MEDICINE | Facility: CLINIC | Age: 62
End: 2022-08-10
Payer: COMMERCIAL

## 2022-08-10 ENCOUNTER — TELEPHONE (OUTPATIENT)
Dept: PAIN MEDICINE | Facility: CLINIC | Age: 62
End: 2022-08-10
Payer: COMMERCIAL

## 2022-08-10 NOTE — TELEPHONE ENCOUNTER
Patient was left an voicemail informing that medication should be at the pharmacy also the other medication patient was inquiring about is canceled.

## 2022-08-10 NOTE — TELEPHONE ENCOUNTER
----- Message from Niya Irene NP sent at 8/9/2022  1:07 PM CDT -----  Regarding: RE: medication  Please let patient know that I spoke with [facility]. I gave a verbal refill for her previous cream and canceled the newer order.     Niya Mendez  ----- Message -----  From: Angélica Daniel MA  Sent: 8/9/2022  12:53 PM CDT  To: Niya Irene NP  Subject: medication                                       Good afternoon Niya ,    Patient above is stating that Hi, I got a call from [facility] that the prescription you sent in today is ketoprofen, amitriptlin, gabapentin and lidocaine. I may have used that in the past but I'm using ketoprofen, cyclonezaprine and lidocaine. I would prefer to stay with this one. Please send a new prescription    However I did reply to the patient and inform that we will send a message over to you to further assist.      I am leaving for today, so you can follow up with Andrea Owens

## 2022-08-11 ENCOUNTER — PATIENT MESSAGE (OUTPATIENT)
Dept: INTERNAL MEDICINE | Facility: CLINIC | Age: 62
End: 2022-08-11
Payer: COMMERCIAL

## 2022-08-11 DIAGNOSIS — Z00.00 ANNUAL PHYSICAL EXAM: Primary | ICD-10-CM

## 2022-08-17 ENCOUNTER — PATIENT MESSAGE (OUTPATIENT)
Dept: PAIN MEDICINE | Facility: CLINIC | Age: 62
End: 2022-08-17
Payer: COMMERCIAL

## 2022-08-24 ENCOUNTER — PATIENT MESSAGE (OUTPATIENT)
Dept: UROLOGY | Facility: CLINIC | Age: 62
End: 2022-08-24
Payer: COMMERCIAL

## 2022-08-24 DIAGNOSIS — R30.0 DYSURIA: Primary | ICD-10-CM

## 2022-09-10 ENCOUNTER — LAB VISIT (OUTPATIENT)
Dept: LAB | Facility: HOSPITAL | Age: 62
End: 2022-09-10
Attending: INTERNAL MEDICINE
Payer: COMMERCIAL

## 2022-09-10 ENCOUNTER — PATIENT MESSAGE (OUTPATIENT)
Dept: PAIN MEDICINE | Facility: CLINIC | Age: 62
End: 2022-09-10
Payer: COMMERCIAL

## 2022-09-10 DIAGNOSIS — Z00.00 ANNUAL PHYSICAL EXAM: ICD-10-CM

## 2022-09-10 DIAGNOSIS — E06.3 HYPOTHYROIDISM DUE TO HASHIMOTO'S THYROIDITIS: ICD-10-CM

## 2022-09-10 DIAGNOSIS — E03.8 HYPOTHYROIDISM DUE TO HASHIMOTO'S THYROIDITIS: ICD-10-CM

## 2022-09-10 LAB
ALBUMIN SERPL BCP-MCNC: 4.4 G/DL (ref 3.5–5.2)
ALP SERPL-CCNC: 64 U/L (ref 55–135)
ALT SERPL W/O P-5'-P-CCNC: 29 U/L (ref 10–44)
ANION GAP SERPL CALC-SCNC: 8 MMOL/L (ref 8–16)
AST SERPL-CCNC: 26 U/L (ref 10–40)
BILIRUB SERPL-MCNC: 0.4 MG/DL (ref 0.1–1)
BUN SERPL-MCNC: 17 MG/DL (ref 6–20)
CALCIUM SERPL-MCNC: 9.9 MG/DL (ref 8.7–10.5)
CHLORIDE SERPL-SCNC: 99 MMOL/L (ref 95–110)
CHOLEST SERPL-MCNC: 143 MG/DL (ref 120–199)
CHOLEST/HDLC SERPL: 1.6 {RATIO} (ref 2–5)
CO2 SERPL-SCNC: 29 MMOL/L (ref 23–29)
CREAT SERPL-MCNC: 0.6 MG/DL (ref 0.5–1.4)
EST. GFR  (NO RACE VARIABLE): >60 ML/MIN/1.73 M^2
ESTIMATED AVG GLUCOSE: 103 MG/DL (ref 68–131)
GLUCOSE SERPL-MCNC: 78 MG/DL (ref 70–110)
HBA1C MFR BLD: 5.2 % (ref 4–5.6)
HDLC SERPL-MCNC: 87 MG/DL (ref 40–75)
HDLC SERPL: 60.8 % (ref 20–50)
LDLC SERPL CALC-MCNC: 49 MG/DL (ref 63–159)
NONHDLC SERPL-MCNC: 56 MG/DL
POTASSIUM SERPL-SCNC: 4.6 MMOL/L (ref 3.5–5.1)
PROT SERPL-MCNC: 7.4 G/DL (ref 6–8.4)
SODIUM SERPL-SCNC: 136 MMOL/L (ref 136–145)
T3 SERPL-MCNC: 75 NG/DL (ref 60–180)
T4 FREE SERPL-MCNC: 0.52 NG/DL (ref 0.71–1.51)
TRIGL SERPL-MCNC: 35 MG/DL (ref 30–150)
TSH SERPL DL<=0.005 MIU/L-ACNC: 1.89 UIU/ML (ref 0.4–4)

## 2022-09-10 PROCEDURE — 80061 LIPID PANEL: CPT | Performed by: FAMILY MEDICINE

## 2022-09-10 PROCEDURE — 80053 COMPREHEN METABOLIC PANEL: CPT | Performed by: FAMILY MEDICINE

## 2022-09-10 PROCEDURE — 84439 ASSAY OF FREE THYROXINE: CPT | Performed by: INTERNAL MEDICINE

## 2022-09-10 PROCEDURE — 84443 ASSAY THYROID STIM HORMONE: CPT | Performed by: INTERNAL MEDICINE

## 2022-09-10 PROCEDURE — 84480 ASSAY TRIIODOTHYRONINE (T3): CPT | Performed by: INTERNAL MEDICINE

## 2022-09-10 PROCEDURE — 36415 COLL VENOUS BLD VENIPUNCTURE: CPT | Performed by: INTERNAL MEDICINE

## 2022-09-10 PROCEDURE — 83036 HEMOGLOBIN GLYCOSYLATED A1C: CPT | Performed by: FAMILY MEDICINE

## 2022-09-12 ENCOUNTER — PATIENT MESSAGE (OUTPATIENT)
Dept: INTERNAL MEDICINE | Facility: CLINIC | Age: 62
End: 2022-09-12
Payer: COMMERCIAL

## 2022-09-12 DIAGNOSIS — G47.00 INSOMNIA, UNSPECIFIED TYPE: ICD-10-CM

## 2022-09-12 RX ORDER — ZOLPIDEM TARTRATE 10 MG/1
TABLET ORAL
Qty: 30 TABLET | Refills: 2 | Status: SHIPPED | OUTPATIENT
Start: 2022-09-12 | End: 2023-03-24 | Stop reason: SDUPTHER

## 2022-09-12 NOTE — TELEPHONE ENCOUNTER
No new care gaps identified.  [facility] Embedded Care Gaps. Reference number: [id number]. 9/12/2022   8:00:56 AM CDT

## 2022-09-20 ENCOUNTER — OFFICE VISIT (OUTPATIENT)
Dept: INTERNAL MEDICINE | Facility: CLINIC | Age: 62
End: 2022-09-20
Payer: COMMERCIAL

## 2022-09-20 VITALS
DIASTOLIC BLOOD PRESSURE: 62 MMHG | WEIGHT: 95 LBS | BODY MASS INDEX: 17.94 KG/M2 | OXYGEN SATURATION: 95 % | SYSTOLIC BLOOD PRESSURE: 100 MMHG | HEIGHT: 61 IN | HEART RATE: 99 BPM

## 2022-09-20 DIAGNOSIS — E06.3 HYPOTHYROIDISM DUE TO HASHIMOTO'S THYROIDITIS: ICD-10-CM

## 2022-09-20 DIAGNOSIS — R51.9 CHRONIC NONINTRACTABLE HEADACHE, UNSPECIFIED HEADACHE TYPE: ICD-10-CM

## 2022-09-20 DIAGNOSIS — Z12.12 ENCOUNTER FOR COLORECTAL CANCER SCREENING: ICD-10-CM

## 2022-09-20 DIAGNOSIS — Z79.899 ENCOUNTER FOR LONG-TERM (CURRENT) USE OF OTHER MEDICATIONS: ICD-10-CM

## 2022-09-20 DIAGNOSIS — Z12.11 ENCOUNTER FOR COLORECTAL CANCER SCREENING: ICD-10-CM

## 2022-09-20 DIAGNOSIS — E03.8 HYPOTHYROIDISM DUE TO HASHIMOTO'S THYROIDITIS: ICD-10-CM

## 2022-09-20 DIAGNOSIS — Z00.00 ANNUAL PHYSICAL EXAM: Primary | ICD-10-CM

## 2022-09-20 DIAGNOSIS — G89.29 CHRONIC NONINTRACTABLE HEADACHE, UNSPECIFIED HEADACHE TYPE: ICD-10-CM

## 2022-09-20 DIAGNOSIS — E04.2 MULTINODULAR GOITER: ICD-10-CM

## 2022-09-20 DIAGNOSIS — G50.0 TRIGEMINAL NEURALGIA: ICD-10-CM

## 2022-09-20 PROBLEM — U07.1 COVID: Status: RESOLVED | Noted: 2022-05-05 | Resolved: 2022-09-20

## 2022-09-20 PROCEDURE — 3078F PR MOST RECENT DIASTOLIC BLOOD PRESSURE < 80 MM HG: ICD-10-PCS | Mod: CPTII,S$GLB,, | Performed by: FAMILY MEDICINE

## 2022-09-20 PROCEDURE — 99999 PR PBB SHADOW E&M-EST. PATIENT-LVL V: CPT | Mod: PBBFAC,,, | Performed by: FAMILY MEDICINE

## 2022-09-20 PROCEDURE — 3044F PR MOST RECENT HEMOGLOBIN A1C LEVEL <7.0%: ICD-10-PCS | Mod: CPTII,S$GLB,, | Performed by: FAMILY MEDICINE

## 2022-09-20 PROCEDURE — 3074F SYST BP LT 130 MM HG: CPT | Mod: CPTII,S$GLB,, | Performed by: FAMILY MEDICINE

## 2022-09-20 PROCEDURE — 3008F BODY MASS INDEX DOCD: CPT | Mod: CPTII,S$GLB,, | Performed by: FAMILY MEDICINE

## 2022-09-20 PROCEDURE — 1159F PR MEDICATION LIST DOCUMENTED IN MEDICAL RECORD: ICD-10-PCS | Mod: CPTII,S$GLB,, | Performed by: FAMILY MEDICINE

## 2022-09-20 PROCEDURE — 99999 PR PBB SHADOW E&M-EST. PATIENT-LVL V: ICD-10-PCS | Mod: PBBFAC,,, | Performed by: FAMILY MEDICINE

## 2022-09-20 PROCEDURE — 99396 PR PREVENTIVE VISIT,EST,40-64: ICD-10-PCS | Mod: S$GLB,,, | Performed by: FAMILY MEDICINE

## 2022-09-20 PROCEDURE — 3078F DIAST BP <80 MM HG: CPT | Mod: CPTII,S$GLB,, | Performed by: FAMILY MEDICINE

## 2022-09-20 PROCEDURE — 1159F MED LIST DOCD IN RCRD: CPT | Mod: CPTII,S$GLB,, | Performed by: FAMILY MEDICINE

## 2022-09-20 PROCEDURE — 3044F HG A1C LEVEL LT 7.0%: CPT | Mod: CPTII,S$GLB,, | Performed by: FAMILY MEDICINE

## 2022-09-20 PROCEDURE — 3074F PR MOST RECENT SYSTOLIC BLOOD PRESSURE < 130 MM HG: ICD-10-PCS | Mod: CPTII,S$GLB,, | Performed by: FAMILY MEDICINE

## 2022-09-20 PROCEDURE — 99396 PREV VISIT EST AGE 40-64: CPT | Mod: S$GLB,,, | Performed by: FAMILY MEDICINE

## 2022-09-20 PROCEDURE — 3008F PR BODY MASS INDEX (BMI) DOCUMENTED: ICD-10-PCS | Mod: CPTII,S$GLB,, | Performed by: FAMILY MEDICINE

## 2022-09-20 NOTE — PROGRESS NOTES
Subjective:       Patient ID: Berenice Hayes is a 62 y.o. female.    Chief Complaint: Annual Exam    HPI    Berenice Hayes is a 62 y.o. female for checkup.    Labs prior to visit. Review.    #Endo: Hypothyroid, Multinodular goiter, Osteoporosis  - est w/ Dr. Espinoza,  5/2022 -- f/u 6m w/ labs  - reg: Cytomel 15 tid  - has not done well w/ synthroid in the past  - thryoid u/s 6/2022 -- f/u 1yr  - taking Risedronate 35mg qwk since 11/2017  - repeat bone density in 11/2023     #Neuro: Trigeminal neuralgia (Lt)  - est w/ NP Adi,  2/2022  - new referral today    #Pain med: Pelvic pain  - est w/ NP Teto,  8/2022     #Rheum: Sjogren's, Fibromyalgia, Erythromelalgia, Raynauds  - est w/ Dr. Landrum-Estelle,  4/2022 -- rtc 6m  - taking Plaquenil  - unable to use vasodilator for Raynauds due to low bp  - taking Elavil 60-70mg daily; Gabapentin 400mg qd     #Uro: Gross hematuria  - est w/ Dr. Koch,  10/2021  - has seen Dr. Kenya Méndez as well  - random eps from 10/2020 to 5/2021     #Pelvic floor disorder  - phys therapy in past    #Obgyn:  - est w/ Dr. Sheikh,  3/2022    Review of Systems   Constitutional:  Negative for activity change and unexpected weight change.   HENT:  Positive for hearing loss and trouble swallowing. Negative for rhinorrhea.    Eyes:  Negative for discharge and visual disturbance.   Respiratory:  Negative for chest tightness and wheezing.    Cardiovascular:  Negative for chest pain and palpitations.   Gastrointestinal:  Negative for blood in stool, constipation, diarrhea and vomiting.   Endocrine: Negative for polydipsia and polyuria.   Genitourinary:  Negative for difficulty urinating, dysuria, hematuria and menstrual problem.   Musculoskeletal:  Positive for arthralgias and neck pain. Negative for joint swelling.   Neurological:  Negative for weakness and headaches.   Psychiatric/Behavioral:  Negative for confusion and dysphoric mood.        Past Medical History:   Diagnosis Date     Asthma with allergic rhinitis     Bladder spasm     COVID 5/5/2022    Dense breasts     heterogeneously/fibrocystic disease    Elevated antinuclear antibody (GUICHO) level     Endometriosis of cervix     Erythromelalgia     Fibromyalgia     Ganglion cyst     left toe    Goiter     MNG    Hashimoto's disease     Hashimoto's thyroiditis     Headache(784.0)     Hypothyroidism     Hashimoto with + Tg ab    Osteoporosis     femoral neck    Pernio     Pernio 2018    Raynaud's phenomenon     Rhinitis     Scoliosis     Sjogren's syndrome     Sleep disorder     type of Narcolepsy ( resolved)    Vitamin D deficiency disease     Vulvodynia         Prior to Admission medications    Medication Sig Start Date End Date Taking? Authorizing Provider   albuterol (VENTOLIN HFA) 90 mcg/actuation inhaler Inhale 2 puffs into the lungs every 6 (six) hours as needed for Wheezing. Rescue 6/23/22 6/23/23  Adam Lundberg MD   amitriptyline (ELAVIL) 10 MG tablet Take 2 tablets (20 mg total) by mouth once daily. With the 50 mg for a total of 70 mg 2/28/22   Adam Lundberg MD   amitriptyline (ELAVIL) 50 MG tablet TAKE 1 TABLET NIGHTLY WITH THE 10 MG AMITRIPTYLINE 1/8/22   Chandler Freed MD   aspirin (ECOTRIN) 81 MG EC tablet Take 81 mg by mouth once daily.    Historical Provider   AZASITE 1 % Drop INSTILL 1 DROP INTO LEFT EYE BID 8/24/20   Historical Provider   clindamycin (CLEOCIN T) 1 % external solution Apply topically 2 (two) times daily. As needed to new breakouts 3/25/22   Olga Cobb MD   estradioL (VAGIFEM) 10 mcg Tab INSERT 1 TABLET VAGINALLY THREE TIMES A WEEK 3/17/22   Sandra Sheikh MD   estradiol 0.05 mg/24 hr td ptsw (VIVELLE-DOT) 0.05 mg/24 hr Apply to skin and change twice weekly 3/17/22   Sandra Sheikh MD   fluocinonide (LIDEX) 0.05 % external solution AAA scalp qday - bid prn pruritus  Patient not taking: Reported on 8/9/2022 12/23/20   Olga Cobb MD   fluticasone (FLONASE) 50 mcg/actuation nasal  spray 1 spray by Each Nare route once daily. 6/15/15   Estella Serrano MD   gabapentin (NEURONTIN) 100 MG capsule TAKE 1 CAPSULE IN THE MORNING AND 4 CAPSULES AT BEDTIME 8/9/22   Niya Irene NP   hydrOXYchloroQUINE (PLAQUENIL) 200 mg tablet Take 1 tablet (200 mg total) by mouth once daily. 7/5/22   Katt Arredondo MD   KETOPROFEN, BULK, TOP Ketoprofen/flexeril/lidocaine (compound) 9/11/20   Historical Provider   LIDOcaine-prilocaine (EMLA) cream Apply topically as needed. 8/9/22   Niya Irene NP   liothyronine (CYTOMEL) 5 MCG Tab TAKE THREE AND ONE-HALF TABLETS ONCE DAILY 12/9/21   Leandro Espinoza MD   metaxalone (SKELAXIN) 800 MG tablet TAKE 1 TABLET TWICE A DAY AS NEEDED FOR SPASM 6/23/22   Adam Lundberg MD   niacinamide 500 mg Tab Take 1 tablet (500 mg total) by mouth 3 (three) times daily.  Patient not taking: No sig reported 6/5/19   Olga Cobb MD   NURTEC 75 mg odt Take by mouth. 3/3/22   Historical Provider   pilocarpine (SALAGEN) 5 MG Tab Take 1 tablet (5 mg total) by mouth 3 (three) times daily as needed. 2/8/22   Adam Lundberg MD   PREVIDENT 5000 BOOSTER PLUS 1.1 % Pste  9/27/20   Historical Provider   RESTASIS 0.05 % ophthalmic emulsion  5/29/14   Historical Provider   risedronate 35 mg TbEC TAKE 1 TABLET ONCE A WEEK 11/8/21   Leandro Espinoza MD   sumatriptan (IMITREX) 100 MG tablet Take 1 tablet (100 mg total) by mouth every 2 (two) hours as needed for Migraine. 3/2/22 4/1/22  Nallely Joshi NP   tretinoin (RETIN-A) 0.025 % gel Apply small amount to entire face qhs. Wash off qam and apply sunscreen. 3/25/22   Olga Cobb MD   UNABLE TO FIND Apply 240 g topically as needed. Ketoprofen/cyclobenzaprine HCI/Lidocaine (lipomax) 10/2/5% Up to 5 times daily as needed for pain 5/22/19   Historical Provider   zolpidem (AMBIEN) 10 mg Tab TAKE 1 TABLET(10 MG) BY MOUTH EVERY NIGHT AS NEEDED 9/12/22   Adam Lundberg MD        Past medical history, surgical history, and family  "medical history reviewed and updated as appropriate.    Medications and allergies reviewed.     Objective:          Vitals:    09/20/22 0823   BP: 100/62   BP Location: Right arm   Patient Position: Sitting   BP Method: Small (Manual)   Pulse: 99   SpO2: 95%   Weight: 43.1 kg (95 lb 0.3 oz)   Height: 5' 1" (1.549 m)     Body mass index is 17.95 kg/m².  Physical Exam  Vitals and nursing note reviewed.   Constitutional:       General: She is not in acute distress.     Appearance: She is not ill-appearing, toxic-appearing or diaphoretic.   Cardiovascular:      Rate and Rhythm: Normal rate and regular rhythm.      Pulses: Normal pulses.      Heart sounds: Normal heart sounds. No murmur heard.  Pulmonary:      Effort: Pulmonary effort is normal. No respiratory distress.   Neurological:      Mental Status: She is alert and oriented to person, place, and time.   Psychiatric:         Mood and Affect: Mood normal.         Behavior: Behavior normal.       Lab Results   Component Value Date    WBC 6.67 06/25/2022    HGB 12.7 06/25/2022    HCT 40.0 06/25/2022     06/25/2022    CHOL 143 09/10/2022    TRIG 35 09/10/2022    HDL 87 (H) 09/10/2022    ALT 29 09/10/2022    AST 26 09/10/2022     09/10/2022    K 4.6 09/10/2022    CL 99 09/10/2022    CREATININE 0.6 09/10/2022    BUN 17 09/10/2022    CO2 29 09/10/2022    TSH 1.885 09/10/2022    HGBA1C 5.2 09/10/2022       Assessment:       1. Annual physical exam    2. Trigeminal neuralgia    3. Chronic nonintractable headache, unspecified headache type    4. Encounter for colorectal cancer screening    5. Encounter for long-term (current) use of other medications    6. Hypothyroidism due to Hashimoto's thyroiditis    7. Multinodular goiter          Plan:   1. Annual physical exam    2. Trigeminal neuralgia  -     Ambulatory referral/consult to Neurology    3. Chronic nonintractable headache, unspecified headache type  -     Ambulatory referral/consult to Neurology    4. " Encounter for colorectal cancer screening  -     Fecal Immunochemical Test (iFOBT)    5. Encounter for long-term (current) use of other medications    6. Hypothyroidism due to Hashimoto's thyroiditis    7. Multinodular goiter      Health maintenance reviewed with patient.     No follow-ups on file.    Adam Lundberg MD  Family Medicine / Primary Care  [facility]  9/20/2022

## 2022-09-21 ENCOUNTER — PATIENT MESSAGE (OUTPATIENT)
Dept: OBSTETRICS AND GYNECOLOGY | Facility: CLINIC | Age: 62
End: 2022-09-21
Payer: COMMERCIAL

## 2022-09-21 DIAGNOSIS — N95.2 ATROPHIC VAGINITIS: ICD-10-CM

## 2022-09-21 NOTE — TELEPHONE ENCOUNTER
Patient sent a portal request to send an additional 1 month Vivelle patch refill to [facility] at [address] [place] [address] in [place]. Patient states she lost the medication.

## 2022-09-22 RX ORDER — ESTRADIOL 0.05 MG/D
FILM, EXTENDED RELEASE TRANSDERMAL
Qty: 12 PATCH | Refills: 0 | Status: SHIPPED | OUTPATIENT
Start: 2022-09-22 | End: 2022-10-21

## 2022-09-24 ENCOUNTER — LAB VISIT (OUTPATIENT)
Dept: LAB | Facility: HOSPITAL | Age: 62
End: 2022-09-24
Attending: INTERNAL MEDICINE
Payer: COMMERCIAL

## 2022-09-24 DIAGNOSIS — M79.7 FIBROMYALGIA: ICD-10-CM

## 2022-09-24 DIAGNOSIS — M79.672 BILATERAL FOOT PAIN: ICD-10-CM

## 2022-09-24 DIAGNOSIS — M25.561 PAIN IN BOTH KNEES, UNSPECIFIED CHRONICITY: ICD-10-CM

## 2022-09-24 DIAGNOSIS — M79.671 BILATERAL FOOT PAIN: ICD-10-CM

## 2022-09-24 DIAGNOSIS — M25.562 PAIN IN BOTH KNEES, UNSPECIFIED CHRONICITY: ICD-10-CM

## 2022-09-24 DIAGNOSIS — M79.641 BILATERAL HAND PAIN: ICD-10-CM

## 2022-09-24 DIAGNOSIS — D84.9 IMMUNOSUPPRESSION: ICD-10-CM

## 2022-09-24 DIAGNOSIS — M79.642 BILATERAL HAND PAIN: ICD-10-CM

## 2022-09-24 DIAGNOSIS — R53.83 FATIGUE, UNSPECIFIED TYPE: ICD-10-CM

## 2022-09-24 DIAGNOSIS — M35.00 SJOGREN'S SYNDROME, WITH UNSPECIFIED ORGAN INVOLVEMENT: ICD-10-CM

## 2022-09-24 DIAGNOSIS — I73.81 ERYTHROMELALGIA: ICD-10-CM

## 2022-09-24 DIAGNOSIS — I73.00 RAYNAUD'S DISEASE WITHOUT GANGRENE: ICD-10-CM

## 2022-09-24 LAB
ALBUMIN SERPL BCP-MCNC: 4.1 G/DL (ref 3.5–5.2)
ALP SERPL-CCNC: 58 U/L (ref 55–135)
ALT SERPL W/O P-5'-P-CCNC: 24 U/L (ref 10–44)
ANION GAP SERPL CALC-SCNC: 8 MMOL/L (ref 8–16)
AST SERPL-CCNC: 23 U/L (ref 10–40)
BASOPHILS # BLD AUTO: 0.06 K/UL (ref 0–0.2)
BASOPHILS NFR BLD: 1.2 % (ref 0–1.9)
BILIRUB SERPL-MCNC: 0.4 MG/DL (ref 0.1–1)
BUN SERPL-MCNC: 19 MG/DL (ref 6–20)
C3 SERPL-MCNC: 92 MG/DL (ref 50–180)
C4 SERPL-MCNC: 18 MG/DL (ref 11–44)
CALCIUM SERPL-MCNC: 9.1 MG/DL (ref 8.7–10.5)
CHLORIDE SERPL-SCNC: 100 MMOL/L (ref 95–110)
CK SERPL-CCNC: 45 U/L (ref 20–180)
CO2 SERPL-SCNC: 30 MMOL/L (ref 23–29)
CREAT SERPL-MCNC: 0.6 MG/DL (ref 0.5–1.4)
CRP SERPL-MCNC: 0.3 MG/L (ref 0–8.2)
DIFFERENTIAL METHOD: ABNORMAL
EOSINOPHIL # BLD AUTO: 0.2 K/UL (ref 0–0.5)
EOSINOPHIL NFR BLD: 3.7 % (ref 0–8)
ERYTHROCYTE [DISTWIDTH] IN BLOOD BY AUTOMATED COUNT: 12.5 % (ref 11.5–14.5)
ERYTHROCYTE [SEDIMENTATION RATE] IN BLOOD BY PHOTOMETRIC METHOD: <2 MM/HR (ref 0–36)
EST. GFR  (NO RACE VARIABLE): >60 ML/MIN/1.73 M^2
GLUCOSE SERPL-MCNC: 89 MG/DL (ref 70–110)
HCT VFR BLD AUTO: 38.1 % (ref 37–48.5)
HGB BLD-MCNC: 12.4 G/DL (ref 12–16)
IMM GRANULOCYTES # BLD AUTO: 0.01 K/UL (ref 0–0.04)
IMM GRANULOCYTES NFR BLD AUTO: 0.2 % (ref 0–0.5)
LYMPHOCYTES # BLD AUTO: 0.7 K/UL (ref 1–4.8)
LYMPHOCYTES NFR BLD: 14.9 % (ref 18–48)
MCH RBC QN AUTO: 30.4 PG (ref 27–31)
MCHC RBC AUTO-ENTMCNC: 32.5 G/DL (ref 32–36)
MCV RBC AUTO: 93 FL (ref 82–98)
MONOCYTES # BLD AUTO: 0.4 K/UL (ref 0.3–1)
MONOCYTES NFR BLD: 7.1 % (ref 4–15)
NEUTROPHILS # BLD AUTO: 3.6 K/UL (ref 1.8–7.7)
NEUTROPHILS NFR BLD: 72.9 % (ref 38–73)
NRBC BLD-RTO: 0 /100 WBC
PLATELET # BLD AUTO: 193 K/UL (ref 150–450)
PMV BLD AUTO: 10.5 FL (ref 9.2–12.9)
POTASSIUM SERPL-SCNC: 4 MMOL/L (ref 3.5–5.1)
PROT SERPL-MCNC: 6.8 G/DL (ref 6–8.4)
RBC # BLD AUTO: 4.08 M/UL (ref 4–5.4)
SODIUM SERPL-SCNC: 138 MMOL/L (ref 136–145)
WBC # BLD AUTO: 4.91 K/UL (ref 3.9–12.7)

## 2022-09-24 PROCEDURE — 85025 COMPLETE CBC W/AUTO DIFF WBC: CPT | Performed by: INTERNAL MEDICINE

## 2022-09-24 PROCEDURE — 86160 COMPLEMENT ANTIGEN: CPT | Mod: 59 | Performed by: INTERNAL MEDICINE

## 2022-09-24 PROCEDURE — 86225 DNA ANTIBODY NATIVE: CPT | Performed by: INTERNAL MEDICINE

## 2022-09-24 PROCEDURE — 85652 RBC SED RATE AUTOMATED: CPT | Performed by: INTERNAL MEDICINE

## 2022-09-24 PROCEDURE — 80053 COMPREHEN METABOLIC PANEL: CPT | Performed by: INTERNAL MEDICINE

## 2022-09-24 PROCEDURE — 36415 COLL VENOUS BLD VENIPUNCTURE: CPT | Performed by: INTERNAL MEDICINE

## 2022-09-24 PROCEDURE — 86140 C-REACTIVE PROTEIN: CPT | Performed by: INTERNAL MEDICINE

## 2022-09-24 PROCEDURE — 82550 ASSAY OF CK (CPK): CPT | Performed by: INTERNAL MEDICINE

## 2022-09-24 PROCEDURE — 86160 COMPLEMENT ANTIGEN: CPT | Performed by: INTERNAL MEDICINE

## 2022-09-26 LAB — DSDNA AB SER-ACNC: NORMAL [IU]/ML

## 2022-10-04 ENCOUNTER — OFFICE VISIT (OUTPATIENT)
Dept: RHEUMATOLOGY | Facility: CLINIC | Age: 62
End: 2022-10-04
Payer: COMMERCIAL

## 2022-10-04 VITALS
HEIGHT: 60 IN | HEART RATE: 94 BPM | WEIGHT: 97.69 LBS | DIASTOLIC BLOOD PRESSURE: 59 MMHG | BODY MASS INDEX: 19.18 KG/M2 | SYSTOLIC BLOOD PRESSURE: 91 MMHG

## 2022-10-04 DIAGNOSIS — I73.00 RAYNAUD'S DISEASE WITHOUT GANGRENE: ICD-10-CM

## 2022-10-04 DIAGNOSIS — M35.00 SJOGREN'S SYNDROME, WITH UNSPECIFIED ORGAN INVOLVEMENT: Primary | ICD-10-CM

## 2022-10-04 DIAGNOSIS — M79.7 FIBROMYALGIA: ICD-10-CM

## 2022-10-04 DIAGNOSIS — I99.9 VASCULAR DISORDER: ICD-10-CM

## 2022-10-04 DIAGNOSIS — R53.83 FATIGUE, UNSPECIFIED TYPE: ICD-10-CM

## 2022-10-04 PROCEDURE — 3008F BODY MASS INDEX DOCD: CPT | Mod: CPTII,S$GLB,, | Performed by: INTERNAL MEDICINE

## 2022-10-04 PROCEDURE — 1159F MED LIST DOCD IN RCRD: CPT | Mod: CPTII,S$GLB,, | Performed by: INTERNAL MEDICINE

## 2022-10-04 PROCEDURE — 3078F PR MOST RECENT DIASTOLIC BLOOD PRESSURE < 80 MM HG: ICD-10-PCS | Mod: CPTII,S$GLB,, | Performed by: INTERNAL MEDICINE

## 2022-10-04 PROCEDURE — 1160F RVW MEDS BY RX/DR IN RCRD: CPT | Mod: CPTII,S$GLB,, | Performed by: INTERNAL MEDICINE

## 2022-10-04 PROCEDURE — 3074F SYST BP LT 130 MM HG: CPT | Mod: CPTII,S$GLB,, | Performed by: INTERNAL MEDICINE

## 2022-10-04 PROCEDURE — 99214 OFFICE O/P EST MOD 30 MIN: CPT | Mod: S$GLB,,, | Performed by: INTERNAL MEDICINE

## 2022-10-04 PROCEDURE — 99999 PR PBB SHADOW E&M-EST. PATIENT-LVL V: CPT | Mod: PBBFAC,,, | Performed by: INTERNAL MEDICINE

## 2022-10-04 PROCEDURE — 3074F PR MOST RECENT SYSTOLIC BLOOD PRESSURE < 130 MM HG: ICD-10-PCS | Mod: CPTII,S$GLB,, | Performed by: INTERNAL MEDICINE

## 2022-10-04 PROCEDURE — 3044F PR MOST RECENT HEMOGLOBIN A1C LEVEL <7.0%: ICD-10-PCS | Mod: CPTII,S$GLB,, | Performed by: INTERNAL MEDICINE

## 2022-10-04 PROCEDURE — 1160F PR REVIEW ALL MEDS BY PRESCRIBER/CLIN PHARMACIST DOCUMENTED: ICD-10-PCS | Mod: CPTII,S$GLB,, | Performed by: INTERNAL MEDICINE

## 2022-10-04 PROCEDURE — 1159F PR MEDICATION LIST DOCUMENTED IN MEDICAL RECORD: ICD-10-PCS | Mod: CPTII,S$GLB,, | Performed by: INTERNAL MEDICINE

## 2022-10-04 PROCEDURE — 99214 PR OFFICE/OUTPT VISIT, EST, LEVL IV, 30-39 MIN: ICD-10-PCS | Mod: S$GLB,,, | Performed by: INTERNAL MEDICINE

## 2022-10-04 PROCEDURE — 3078F DIAST BP <80 MM HG: CPT | Mod: CPTII,S$GLB,, | Performed by: INTERNAL MEDICINE

## 2022-10-04 PROCEDURE — 99999 PR PBB SHADOW E&M-EST. PATIENT-LVL V: ICD-10-PCS | Mod: PBBFAC,,, | Performed by: INTERNAL MEDICINE

## 2022-10-04 PROCEDURE — 3044F HG A1C LEVEL LT 7.0%: CPT | Mod: CPTII,S$GLB,, | Performed by: INTERNAL MEDICINE

## 2022-10-04 PROCEDURE — 3008F PR BODY MASS INDEX (BMI) DOCUMENTED: ICD-10-PCS | Mod: CPTII,S$GLB,, | Performed by: INTERNAL MEDICINE

## 2022-10-04 ASSESSMENT — ROUTINE ASSESSMENT OF PATIENT INDEX DATA (RAPID3)
PATIENT GLOBAL ASSESSMENT SCORE: 5.5
PAIN SCORE: 6
TOTAL RAPID3 SCORE: 5.28
FATIGUE SCORE: 5.5
AM STIFFNESS SCORE: 1, YES
WHEN YOU AWAKENED IN THE MORNING OVER THE LAST WEEK, PLEASE INDICATE THE AMOUNT OF TIME IT TAKES UNTIL YOU ARE AS LIMBER AS YOU WILL BE FOR THE DAY: 1 YR
MDHAQ FUNCTION SCORE: 1.3
PSYCHOLOGICAL DISTRESS SCORE: 1.1

## 2022-10-04 NOTE — PROGRESS NOTES
"Subjective:       Patient ID: Berenice Hayes is a 62 y.o. female.    Chief Complaint: Sjogrens    HPI:  Berenice Hayes is a 62 y.o. female followed for concerns of autoimmune issues.   Diagnosed with fibromyalgia at age 34.   At age 42 had erythromyelgia with redness all over. She was treated by vascular with a medication but caused Raynauds to develop.  She saw Dr. Hill in vascular.      She has history significant pelvic discomfort since 2009.  She saw therapist for pelvic floor therapy which has helped. 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  She had pudenal nerve block 2/3/15.     She saw Dr. Harmon in the past who felt she had fibriomyalgia.  She tried amitriptyline for some time.   She takes Atelvia for OP.        She has felt bad since 2014.  "Hit with massive heat wave beyond what others feel with menopause."  Mouth was very dry and primary gave pilocarpine.  Spring of 2015 GUICHO was negative.  Previously had hand pains.  X-rays of hands normal.  She was found by endocrine to be hypoglycemic.  Ultrasound thyroid showed nodules that were negative on biopsy.  August 2015 diagnosed as having Hashimotos disease.   Synthroid did not help levels.  She is on Cytomel now.    Saw ENT for throat pain in neck since ultrasound was done.  ENT will biopsy a solid nodule on left lobe thyroid.  CT with contrast showed enlargement in the saliva gland.  Her mother had fibromyalgia and PMR as well as history ETOH abuse as an adult.  Father had osteoarthritis and required complete thumb reconstruction on one thumb.     In past dermatology did skin biopsy and diagnosed pernio (no evidence of lupus vasculitis).   Daughter with Hashimotos and thyroid Ca.    In May to Oct 2021 worked with a dietician.  Did food sensitivity and chemical sensitivity test. Was found to have issues with Tylenol and garlic.       Interval History:    Brush burn sensation in back of throat when swallowing.   Rash at left elbow for last " month.    Four weeks ago started gaining weight out of no where.     Will see neurology December 2022 due to memory issues.   History of migraine that improved with chiropractor and in summer 2019 had trigeminal neuralgia on left.    Now reports bilateral hand, foot and knee pain.   Difficult to move in morning.   Knee pain improves with walking.      In May to Oct 2021 worked with a dietician.  Did food sensitivity and chemical sensitivity test. Was found to have issues with Tylenol and garlic.   Doing sunflower butter on bananas.   Eating more proteins with strawberries to absorb iron.   Still sees an acupuncturist.    October 2020 had blood in urine. Evaluated by urology.  Cystoscope and CT scan normal.  Last episode 5/2021.  New urologist wants MRA angiogram with contrast and then he would go into kidneys to get cytology.       Continues to have dry eyes and mouth that are worse despite using pilocarpine and Restasis.  Mouth gets dry frequently.   She continues to have episodes Raynauds and erythromyelgia on her feet that improves with elevation and worsen with walking.  No skin changes    In past Nicatinamide for fingers since opened blood flow and worsened erythromyelgia.  Long standing history of erythromyelgia.   She gets nerve pain in feet.  Difficulty walking at end of day.  Uses compression sock.   Decreased frequency of pilocarpine due to sweats.     In the past Dr. Philip recommended low dose naltrexone.          Review of Systems   Constitutional:  Positive for fatigue. Negative for fever and unexpected weight change.   HENT:  Positive for mouth sores and trouble swallowing.    Eyes:  Positive for redness.   Respiratory:  Negative for cough and shortness of breath.    Cardiovascular:  Negative for chest pain.   Gastrointestinal:  Positive for constipation. Negative for diarrhea.   Endocrine: Negative.    Genitourinary:  Negative for dysuria and genital sores.   Musculoskeletal:  Positive for  arthralgias.   Skin:  Negative for rash.        Redness of fingers  Hair loss with stress   Allergic/Immunologic: Negative.    Neurological:  Positive for headaches.   Hematological:  Positive for adenopathy. Bruises/bleeds easily.   Psychiatric/Behavioral: Negative.         Objective:   BP (!) 91/59   Pulse 94   Ht 5' (1.524 m)   Wt 44.3 kg (97 lb 10.6 oz)   LMP  (LMP Unknown)   BMI 19.07 kg/m²  Virtual     Physical Exam   Constitutional: She is oriented to person, place, and time.   HENT:   Head: Normocephalic and atraumatic.   Eyes: Conjunctivae are normal.   Cardiovascular: Normal rate, regular rhythm and normal heart sounds.   Pulmonary/Chest: Effort normal and breath sounds normal.   Abdominal: Soft. Bowel sounds are normal.   Musculoskeletal:         General: No tenderness or deformity.      Cervical back: Neck supple.      Comments: Squaring right 1st CMC  28 joint count: 0 swollen and 0 tender  12/18 FM points painful   Neurological: She is alert and oriented to person, place, and time.   Skin: Skin is warm and dry. There is erythema.   Erythema at periungal area multiple fingers on right hand   Psychiatric: Mood and affect normal.     LABS    Component      Latest Ref Rng & Units 9/24/2022   WBC      3.90 - 12.70 K/uL 4.91   RBC      4.00 - 5.40 M/uL 4.08   Hemoglobin      12.0 - 16.0 g/dL 12.4   Hematocrit      37.0 - 48.5 % 38.1   MCV      82 - 98 fL 93   MCH      27.0 - 31.0 pg 30.4   MCHC      32.0 - 36.0 g/dL 32.5   RDW      11.5 - 14.5 % 12.5   Platelets      150 - 450 K/uL 193   MPV      9.2 - 12.9 fL 10.5   Immature Granulocytes      0.0 - 0.5 % 0.2   Gran # (ANC)      1.8 - 7.7 K/uL 3.6   Immature Grans (Abs)      0.00 - 0.04 K/uL 0.01   Lymph #      1.0 - 4.8 K/uL 0.7 (L)   Mono #      0.3 - 1.0 K/uL 0.4   Eos #      0.0 - 0.5 K/uL 0.2   Baso #      0.00 - 0.20 K/uL 0.06   nRBC      0 /100 WBC 0   Gran %      38.0 - 73.0 % 72.9   Lymph %      18.0 - 48.0 % 14.9 (L)   Mono %      4.0 - 15.0 %  7.1   Eosinophil %      0.0 - 8.0 % 3.7   Basophil %      0.0 - 1.9 % 1.2   Differential Method       Automated   Sodium      136 - 145 mmol/L 138   Potassium      3.5 - 5.1 mmol/L 4.0   Chloride      95 - 110 mmol/L 100   CO2      23 - 29 mmol/L 30 (H)   Glucose      70 - 110 mg/dL 89   BUN      6 - 20 mg/dL 19   Creatinine      0.5 - 1.4 mg/dL 0.6   Calcium      8.7 - 10.5 mg/dL 9.1   PROTEIN TOTAL      6.0 - 8.4 g/dL 6.8   Albumin      3.5 - 5.2 g/dL 4.1   BILIRUBIN TOTAL      0.1 - 1.0 mg/dL 0.4   Alkaline Phosphatase      55 - 135 U/L 58   AST      10 - 40 U/L 23   ALT      10 - 44 U/L 24   Anion Gap      8 - 16 mmol/L 8   eGFR      >60 mL/min/1.73 m:2 >60.0   Specimen UA       Urine, Unspecified   Color, UA      Yellow, Straw, Kari Yellow   Appearance, UA      Clear Clear   pH, UA      5.0 - 8.0 7.0   Specific Gravity, UA      1.005 - 1.030 1.010   Protein, UA      Negative Negative   Glucose, UA      Negative Negative   Ketones, UA      Negative Negative   Bilirubin (UA)      Negative Negative   Occult Blood UA      Negative Negative   NITRITE UA      Negative Negative   Leukocytes, UA      Negative Negative   Protein, Urine Random      0 - 15 mg/dL <7   Creatinine, Urine      15.0 - 325.0 mg/dL 31.0   Prot/Creat Ratio, Urine      0.00 - 0.20 Unable to calculate   Complement (C-4)      11 - 44 mg/dL 18   Complement (C-3)      50 - 180 mg/dL 92   ds DNA Ab      Negative 1:10 Negative 1:10   CPK      20 - 180 U/L 45   Sed Rate      0 - 36 mm/Hr <2   CRP      0.0 - 8.2 mg/L 0.3           Assessment:       1. Sjogren syndrome. Chronic.  Negative SSA and SSB in past now SSA positive.  Repeat labs.    Currently symptomatic management with Plaquenil, Restasis and pilocarpine.   2. Raynauds. Symptomatic management. Still with changes around nails but no digital ulcers.   3. Fibromyalgia. Managed with amitriptyline. Persistent pain and unable to increase amitriptyline due to worsening fatigue  4. Hand pain b/l. X-ray  without changes. Has periodic episodes of pain.  5. Osteoporosis.  No longer on Atelvia. Still on estradiol patch.  6. Erythromelalgia.  Takes baby aspirin  7. Sleep disorder in the past. History of narcolepsy but no longer a problem. Sleep doctor in past gave Ritalin bid which helped while she took it for 2 weeks but no longer needs it since treatment of Hashimoto.  8. Increased heat in body. Improved  9. Menopause  10.  Hashimotos disease  11.  Multinodular goiter   12.  Pudenal nerve pain.  Improved with acupuncture which she continues every 2 weeks. Worsened with water therapy so stopped.    13.  Pernio.  Planning to speak with dermatology about restarting nicatinamide  14.  Rash on abdomen x3 weeks.  Concern for Addisons raised in this patient with hypotension   15.  Fatigue.  Persistent.  16.  Decreased memory  17.  Stress.  Did eight sessions with counselor  18.  Alopecia  19.  Trigeminal neuralgia on left diagnosed in 2017  20.  Decreased memory.  6 hours of sleep a night but feels it is not enough.  21.  Episode of migraine for 10 days.  Treated by chiropractor and improved after 5 days of treatment.  Saw neurology after virtually.   Plan:       1.  Continue Plaquenil.   (Patient had done in April 2022).  2.  Discussed her complaints of side effects with pilocarpine but she is not interested trying Evoxac (cevimiline).  3.  Continue fibromyalgia treatment.  4.  Patient unable to use vasodilator for Raynaud's due to low BP  5.  Continue elevating legs at work and continue compression stockings  6.  Follow with counselor to help deal with stress of chronic illness.   7.  Follow with derm to discuss nicatinamide.  8.  Encourage regular sleep.  Do activities that bring raquel.  Consider artificial saliva to help with dryness that interrupt sleeing.       RTO 6 months/prn

## 2022-10-17 ENCOUNTER — LAB VISIT (OUTPATIENT)
Dept: LAB | Facility: HOSPITAL | Age: 62
End: 2022-10-17
Attending: FAMILY MEDICINE
Payer: COMMERCIAL

## 2022-10-17 DIAGNOSIS — Z12.11 ENCOUNTER FOR COLORECTAL CANCER SCREENING: ICD-10-CM

## 2022-10-17 DIAGNOSIS — Z12.12 ENCOUNTER FOR COLORECTAL CANCER SCREENING: ICD-10-CM

## 2022-10-17 PROCEDURE — 82274 ASSAY TEST FOR BLOOD FECAL: CPT | Performed by: FAMILY MEDICINE

## 2022-10-20 LAB — HEMOCCULT STL QL IA: NEGATIVE

## 2022-10-31 ENCOUNTER — PATIENT MESSAGE (OUTPATIENT)
Dept: RHEUMATOLOGY | Facility: CLINIC | Age: 62
End: 2022-10-31
Payer: COMMERCIAL

## 2022-11-09 ENCOUNTER — PATIENT MESSAGE (OUTPATIENT)
Dept: NEUROLOGY | Facility: CLINIC | Age: 62
End: 2022-11-09
Payer: COMMERCIAL

## 2022-11-11 ENCOUNTER — OFFICE VISIT (OUTPATIENT)
Dept: ENDOCRINOLOGY | Facility: CLINIC | Age: 62
End: 2022-11-11
Payer: COMMERCIAL

## 2022-11-11 DIAGNOSIS — Z80.8 FAMILY HISTORY OF THYROID CANCER: ICD-10-CM

## 2022-11-11 DIAGNOSIS — M81.0 OSTEOPOROSIS, POSTMENOPAUSAL: ICD-10-CM

## 2022-11-11 DIAGNOSIS — E04.2 MULTINODULAR GOITER: ICD-10-CM

## 2022-11-11 DIAGNOSIS — E03.8 HYPOTHYROIDISM DUE TO HASHIMOTO'S THYROIDITIS: Primary | ICD-10-CM

## 2022-11-11 DIAGNOSIS — M35.00 SJOGREN'S SYNDROME, WITH UNSPECIFIED ORGAN INVOLVEMENT: ICD-10-CM

## 2022-11-11 DIAGNOSIS — E06.3 HYPOTHYROIDISM DUE TO HASHIMOTO'S THYROIDITIS: Primary | ICD-10-CM

## 2022-11-11 PROCEDURE — 1160F RVW MEDS BY RX/DR IN RCRD: CPT | Mod: CPTII,95,, | Performed by: INTERNAL MEDICINE

## 2022-11-11 PROCEDURE — 1160F PR REVIEW ALL MEDS BY PRESCRIBER/CLIN PHARMACIST DOCUMENTED: ICD-10-PCS | Mod: CPTII,95,, | Performed by: INTERNAL MEDICINE

## 2022-11-11 PROCEDURE — 99214 PR OFFICE/OUTPT VISIT, EST, LEVL IV, 30-39 MIN: ICD-10-PCS | Mod: 95,,, | Performed by: INTERNAL MEDICINE

## 2022-11-11 PROCEDURE — 3044F HG A1C LEVEL LT 7.0%: CPT | Mod: CPTII,95,, | Performed by: INTERNAL MEDICINE

## 2022-11-11 PROCEDURE — 99214 OFFICE O/P EST MOD 30 MIN: CPT | Mod: 95,,, | Performed by: INTERNAL MEDICINE

## 2022-11-11 PROCEDURE — 3044F PR MOST RECENT HEMOGLOBIN A1C LEVEL <7.0%: ICD-10-PCS | Mod: CPTII,95,, | Performed by: INTERNAL MEDICINE

## 2022-11-11 PROCEDURE — 1159F PR MEDICATION LIST DOCUMENTED IN MEDICAL RECORD: ICD-10-PCS | Mod: CPTII,95,, | Performed by: INTERNAL MEDICINE

## 2022-11-11 PROCEDURE — 1159F MED LIST DOCD IN RCRD: CPT | Mod: CPTII,95,, | Performed by: INTERNAL MEDICINE

## 2022-11-11 RX ORDER — LIOTHYRONINE SODIUM 5 UG/1
TABLET ORAL
Qty: 315 TABLET | Refills: 3 | Status: SHIPPED | OUTPATIENT
Start: 2022-11-11 | End: 2023-10-07 | Stop reason: SDUPTHER

## 2022-11-11 NOTE — ASSESSMENT & PLAN NOTE
--With multiple thyroid nodules  --has had multiple benign FNAs of the right mid lobe nodule, and 1 benign FNA of the left lobe nodule  --she has had 3 nondiagnostic FNAs of the right inferior pole nodule, however, molecular markers were negative on this nodule in 07/2019  --Last ultrasound with stable nodules in 6/2022  --Repeat thyroid ultrasound due in 6/2023

## 2022-11-11 NOTE — ASSESSMENT & PLAN NOTE
--On ERT  --She had a decline at the hip on ERT and risedronate  --Previously reviewed options and possibly starting an injection or infusion and she desires to stay on the current regimen  --Since her last visit she has now been getting adequate calcium through a combination of diet and supplementation  --She has increased weight-bearing exercise and has been able to gain 4 lbs  --Will continue risedronate 35 mg weekly (restarted in 11/2017)  --repeat bone density in 11/2023  --continue vitamin-D supplementation  --stressed importance of falls avoidance

## 2022-11-11 NOTE — ASSESSMENT & PLAN NOTE
--Clinically and biochemically euthyroid  --Explained goals of treatment is to keep the TSH in the normal range to avoid CVS and bone complications.  --Will continue Cytomel 5 mcg TID  --TSH remains normal  --She understands that this is an atypical thyroid hormone replacement regimen and she will let me us know right away if she develops any hyperthyroid symptoms  --We discussed continuing this regimen so long as her TSH is not suppressed and she does not have any palpitations

## 2022-11-11 NOTE — PROGRESS NOTES
Subjective:      Patient ID: Berenice Hayes is a 62 y.o. female.    Chief Complaint:  Hypothyroidism    The patient location is: Home  The chief complaint leading to consultation is: Hypothyroidism    Visit type: audiovisual    Face to Face time with patient: 15 min  20 minutes of total time spent on the encounter, which includes face to face time and non-face to face time preparing to see the patient (eg, review of tests), Obtaining and/or reviewing separately obtained history, Documenting clinical information in the electronic or other health record, Independently interpreting results (not separately reported) and communicating results to the patient/family/caregiver, or Care coordination (not separately reported).     Each patient to whom he or she provides medical services by telemedicine is:  (1) informed of the relationship between the physician and patient and the respective role of any other health care provider with respect to management of the patient; and (2) notified that he or she may decline to receive medical services by telemedicine and may withdraw from such care at any time.    History of Present Illness  Ms. Haeys presents for follow up of hypothyroidism and osteopenia.  Last visit 5/2022.     With Hashimoto thyroiditis with high thyroglobulin ab and negative TPO ab.   Was started and maintained on T3 only regimen by Dr. Espinal.   Currently she is taking Cytomel totalling 15 mcg per day (5 mcg TID).  Previously had palpitations when Synthroid was added to the Cytomel. No longer having palpitations on the current regimen and TSH has remained WNL. She feels like her hypothyroidism has been relatively stable. She denies any palpitations or tremor.   She has been hesitant to change back to T4 only or a combined T4/T3 regimen.     Also has MNG with right mid thyroid nodule s/p benign FNA in 2015, 2018, 2019 and left lobe nodule with benign FNA in 2016. Right inferior pole nodule underwent FNA x 3  with non-diagnostic results. Molecular markers were negative on the right inferior nodule in 7/2019.      She has some chronic mild dysphagia with solid/dry foods that has not changed. No hoarseness or voice changes.      Neck ultrasound dated 6/2022:  Impression:     1.  Thyroid gland is normal in size with diffusely heterogenous echotexture.     2.  Three nodules in the right thyroid described above.  None meet criteria for fine-needle aspiration.     3.  Two nodules in the left thyroid described above. None meet criteria for fine-needle aspiration.     RECOMMENDATIONS:  Follow-up ultrasound in 1 year is recommended.       Has Osteoporosis and she took Atelvia 35 mg weekly since August/2012 until 2015 when she restarted ERT (vaginal and transdermal). Had decrease in BMD at the hip despite ERT.    Restarted risedronate 35 mg weekly in 11/2017.  Compliant with regimen.     Recent BMD stable at the hip but showed decline at the spine in 11/2021.    Since her last visit she has increased her exercise and has gained 4 lbs.      She takes a multivitamin and Vit D 2000 units daily.      She is currently taking oral calcium lactate twice daily. She is also getting a fair amount of calcium through her diet (eats cheese, broccoli and walnuts daily).     Follows with rheumatology for Sjogrens. On Plaquenil.     Review of Systems   Constitutional:  Negative for chills and fever.   Gastrointestinal:  Negative for nausea.     Objective:   Physical Exam  Constitutional:       General: She is not in acute distress.     Appearance: Normal appearance. She is not ill-appearing.   Neurological:      Mental Status: She is alert.     BP Readings from Last 3 Encounters:   10/04/22 (!) 91/59   09/20/22 100/62   08/09/22 (!) 87/55     Wt Readings from Last 1 Encounters:   10/04/22 1134 44.3 kg (97 lb 10.6 oz)       There is no height or weight on file to calculate BMI.    Lab Review:   Lab Results   Component Value Date    HGBA1C 5.2  09/10/2022     Lab Results   Component Value Date    CHOL 143 09/10/2022    HDL 87 (H) 09/10/2022    LDLCALC 49.0 (L) 09/10/2022    TRIG 35 09/10/2022    CHOLHDL 60.8 (H) 09/10/2022     Lab Results   Component Value Date     09/24/2022    K 4.0 09/24/2022     09/24/2022    CO2 30 (H) 09/24/2022    GLU 89 09/24/2022    BUN 19 09/24/2022    CREATININE 0.6 09/24/2022    CALCIUM 9.1 09/24/2022    PROT 6.8 09/24/2022    ALBUMIN 4.1 09/24/2022    BILITOT 0.4 09/24/2022    ALKPHOS 58 09/24/2022    AST 23 09/24/2022    ALT 24 09/24/2022    ANIONGAP 8 09/24/2022    ESTGFRAFRICA >60.0 06/25/2022    EGFRNONAA >60.0 06/25/2022    TSH 1.885 09/10/2022         Assessment and Plan     Hypothyroidism due to Hashimoto's thyroiditis  --Clinically and biochemically euthyroid  --Explained goals of treatment is to keep the TSH in the normal range to avoid CVS and bone complications.  --Will continue Cytomel 5 mcg TID  --TSH remains normal  --She understands that this is an atypical thyroid hormone replacement regimen and she will let me us know right away if she develops any hyperthyroid symptoms  --We discussed continuing this regimen so long as her TSH is not suppressed and she does not have any palpitations         Multinodular goiter  --With multiple thyroid nodules  --has had multiple benign FNAs of the right mid lobe nodule, and 1 benign FNA of the left lobe nodule  --she has had 3 nondiagnostic FNAs of the right inferior pole nodule, however, molecular markers were negative on this nodule in 07/2019  --Last ultrasound with stable nodules in 6/2022  --Repeat thyroid ultrasound due in 6/2023      Osteoporosis, postmenopausal  --On ERT  --She had a decline at the hip on ERT and risedronate  --Previously reviewed options and possibly starting an injection or infusion and she desires to stay on the current regimen  --Since her last visit she has now been getting adequate calcium through a combination of diet and  supplementation  --She has increased weight-bearing exercise and has been able to gain 4 lbs  --Will continue risedronate 35 mg weekly (restarted in 11/2017)  --repeat bone density in 11/2023  --continue vitamin-D supplementation  --stressed importance of falls avoidance      Sjogren's syndrome  Followed by rheumatology  Started on plaquenil (prescribed 2/20/19)          CTX and vitamin D added to labs on 12/17, follow up in 6 months with TFT's and ultrasound prior to appt    Leandro Espinoza M.D. Staff Endocrinology

## 2022-12-13 ENCOUNTER — PATIENT MESSAGE (OUTPATIENT)
Dept: OBSTETRICS AND GYNECOLOGY | Facility: CLINIC | Age: 62
End: 2022-12-13
Payer: COMMERCIAL

## 2022-12-13 ENCOUNTER — PATIENT MESSAGE (OUTPATIENT)
Dept: RHEUMATOLOGY | Facility: CLINIC | Age: 62
End: 2022-12-13
Payer: COMMERCIAL

## 2022-12-13 DIAGNOSIS — N95.2 ATROPHIC VAGINITIS: ICD-10-CM

## 2022-12-13 DIAGNOSIS — Z12.31 ENCOUNTER FOR SCREENING MAMMOGRAM FOR MALIGNANT NEOPLASM OF BREAST: Primary | ICD-10-CM

## 2022-12-13 RX ORDER — ESTRADIOL 10 UG/1
INSERT VAGINAL
Qty: 36 TABLET | Refills: 0 | Status: SHIPPED | OUTPATIENT
Start: 2022-12-13 | End: 2023-03-06 | Stop reason: SDUPTHER

## 2022-12-17 ENCOUNTER — LAB VISIT (OUTPATIENT)
Dept: LAB | Facility: HOSPITAL | Age: 62
End: 2022-12-17
Attending: INTERNAL MEDICINE
Payer: COMMERCIAL

## 2022-12-17 DIAGNOSIS — M25.561 PAIN IN BOTH KNEES, UNSPECIFIED CHRONICITY: ICD-10-CM

## 2022-12-17 DIAGNOSIS — E03.8 HYPOTHYROIDISM DUE TO HASHIMOTO'S THYROIDITIS: ICD-10-CM

## 2022-12-17 DIAGNOSIS — M81.0 OSTEOPOROSIS, POSTMENOPAUSAL: ICD-10-CM

## 2022-12-17 DIAGNOSIS — M79.641 BILATERAL HAND PAIN: ICD-10-CM

## 2022-12-17 DIAGNOSIS — M79.7 FIBROMYALGIA: ICD-10-CM

## 2022-12-17 DIAGNOSIS — I73.00 RAYNAUD'S DISEASE WITHOUT GANGRENE: ICD-10-CM

## 2022-12-17 DIAGNOSIS — M35.00 SJOGREN'S SYNDROME, WITH UNSPECIFIED ORGAN INVOLVEMENT: ICD-10-CM

## 2022-12-17 DIAGNOSIS — M79.671 BILATERAL FOOT PAIN: ICD-10-CM

## 2022-12-17 DIAGNOSIS — D84.9 IMMUNOSUPPRESSION: ICD-10-CM

## 2022-12-17 DIAGNOSIS — M25.562 PAIN IN BOTH KNEES, UNSPECIFIED CHRONICITY: ICD-10-CM

## 2022-12-17 DIAGNOSIS — I73.81 ERYTHROMELALGIA: ICD-10-CM

## 2022-12-17 DIAGNOSIS — M79.642 BILATERAL HAND PAIN: ICD-10-CM

## 2022-12-17 DIAGNOSIS — E06.3 HYPOTHYROIDISM DUE TO HASHIMOTO'S THYROIDITIS: ICD-10-CM

## 2022-12-17 DIAGNOSIS — M79.672 BILATERAL FOOT PAIN: ICD-10-CM

## 2022-12-17 DIAGNOSIS — R53.83 FATIGUE, UNSPECIFIED TYPE: ICD-10-CM

## 2022-12-17 LAB
25(OH)D3+25(OH)D2 SERPL-MCNC: 55 NG/ML (ref 30–96)
ALBUMIN SERPL BCP-MCNC: 4.3 G/DL (ref 3.5–5.2)
ALP SERPL-CCNC: 59 U/L (ref 55–135)
ALT SERPL W/O P-5'-P-CCNC: 23 U/L (ref 10–44)
ANION GAP SERPL CALC-SCNC: 11 MMOL/L (ref 8–16)
AST SERPL-CCNC: 24 U/L (ref 10–40)
BASOPHILS # BLD AUTO: 0.05 K/UL (ref 0–0.2)
BASOPHILS NFR BLD: 1.2 % (ref 0–1.9)
BILIRUB SERPL-MCNC: 0.6 MG/DL (ref 0.1–1)
BUN SERPL-MCNC: 13 MG/DL (ref 6–20)
C3 SERPL-MCNC: 93 MG/DL (ref 50–180)
C4 SERPL-MCNC: 18 MG/DL (ref 11–44)
CALCIUM SERPL-MCNC: 9.6 MG/DL (ref 8.7–10.5)
CHLORIDE SERPL-SCNC: 101 MMOL/L (ref 95–110)
CK SERPL-CCNC: 47 U/L (ref 20–180)
CO2 SERPL-SCNC: 29 MMOL/L (ref 23–29)
CREAT SERPL-MCNC: 0.6 MG/DL (ref 0.5–1.4)
CRP SERPL-MCNC: 0.3 MG/L (ref 0–8.2)
DIFFERENTIAL METHOD: ABNORMAL
EOSINOPHIL # BLD AUTO: 0.2 K/UL (ref 0–0.5)
EOSINOPHIL NFR BLD: 4.7 % (ref 0–8)
ERYTHROCYTE [DISTWIDTH] IN BLOOD BY AUTOMATED COUNT: 12.8 % (ref 11.5–14.5)
ERYTHROCYTE [SEDIMENTATION RATE] IN BLOOD BY PHOTOMETRIC METHOD: <2 MM/HR (ref 0–36)
EST. GFR  (NO RACE VARIABLE): >60 ML/MIN/1.73 M^2
GLUCOSE SERPL-MCNC: 84 MG/DL (ref 70–110)
HCT VFR BLD AUTO: 42.7 % (ref 37–48.5)
HGB BLD-MCNC: 13.6 G/DL (ref 12–16)
IMM GRANULOCYTES # BLD AUTO: 0.01 K/UL (ref 0–0.04)
IMM GRANULOCYTES NFR BLD AUTO: 0.2 % (ref 0–0.5)
LYMPHOCYTES # BLD AUTO: 0.6 K/UL (ref 1–4.8)
LYMPHOCYTES NFR BLD: 15.3 % (ref 18–48)
MCH RBC QN AUTO: 30 PG (ref 27–31)
MCHC RBC AUTO-ENTMCNC: 31.9 G/DL (ref 32–36)
MCV RBC AUTO: 94 FL (ref 82–98)
MONOCYTES # BLD AUTO: 0.4 K/UL (ref 0.3–1)
MONOCYTES NFR BLD: 9.9 % (ref 4–15)
NEUTROPHILS # BLD AUTO: 2.8 K/UL (ref 1.8–7.7)
NEUTROPHILS NFR BLD: 68.7 % (ref 38–73)
NRBC BLD-RTO: 0 /100 WBC
PLATELET # BLD AUTO: 179 K/UL (ref 150–450)
PMV BLD AUTO: 10.5 FL (ref 9.2–12.9)
POTASSIUM SERPL-SCNC: 4.3 MMOL/L (ref 3.5–5.1)
PROT SERPL-MCNC: 7.3 G/DL (ref 6–8.4)
RBC # BLD AUTO: 4.53 M/UL (ref 4–5.4)
SODIUM SERPL-SCNC: 141 MMOL/L (ref 136–145)
WBC # BLD AUTO: 4.04 K/UL (ref 3.9–12.7)

## 2022-12-17 PROCEDURE — 82306 VITAMIN D 25 HYDROXY: CPT | Performed by: INTERNAL MEDICINE

## 2022-12-17 PROCEDURE — 82523 COLLAGEN CROSSLINKS: CPT | Performed by: INTERNAL MEDICINE

## 2022-12-17 PROCEDURE — 36415 COLL VENOUS BLD VENIPUNCTURE: CPT | Performed by: INTERNAL MEDICINE

## 2022-12-17 PROCEDURE — 86160 COMPLEMENT ANTIGEN: CPT | Performed by: INTERNAL MEDICINE

## 2022-12-17 PROCEDURE — 85652 RBC SED RATE AUTOMATED: CPT | Performed by: INTERNAL MEDICINE

## 2022-12-17 PROCEDURE — 82550 ASSAY OF CK (CPK): CPT | Performed by: INTERNAL MEDICINE

## 2022-12-17 PROCEDURE — 86140 C-REACTIVE PROTEIN: CPT | Performed by: INTERNAL MEDICINE

## 2022-12-17 PROCEDURE — 85025 COMPLETE CBC W/AUTO DIFF WBC: CPT | Performed by: INTERNAL MEDICINE

## 2022-12-17 PROCEDURE — 80053 COMPREHEN METABOLIC PANEL: CPT | Performed by: INTERNAL MEDICINE

## 2022-12-17 PROCEDURE — 86160 COMPLEMENT ANTIGEN: CPT | Mod: 59 | Performed by: INTERNAL MEDICINE

## 2022-12-17 PROCEDURE — 86225 DNA ANTIBODY NATIVE: CPT | Performed by: INTERNAL MEDICINE

## 2022-12-19 LAB — DSDNA AB SER-ACNC: NORMAL [IU]/ML

## 2022-12-20 ENCOUNTER — PATIENT MESSAGE (OUTPATIENT)
Dept: RHEUMATOLOGY | Facility: CLINIC | Age: 62
End: 2022-12-20
Payer: COMMERCIAL

## 2022-12-20 LAB — COLLAGEN CTX SERPL-MCNC: 89 PG/ML

## 2023-01-03 ENCOUNTER — PATIENT MESSAGE (OUTPATIENT)
Dept: ENDOCRINOLOGY | Facility: CLINIC | Age: 63
End: 2023-01-03
Payer: COMMERCIAL

## 2023-01-05 ENCOUNTER — PATIENT MESSAGE (OUTPATIENT)
Dept: INTERNAL MEDICINE | Facility: CLINIC | Age: 63
End: 2023-01-05
Payer: COMMERCIAL

## 2023-01-06 ENCOUNTER — TELEPHONE (OUTPATIENT)
Dept: INTERNAL MEDICINE | Facility: CLINIC | Age: 63
End: 2023-01-06
Payer: COMMERCIAL

## 2023-01-06 NOTE — TELEPHONE ENCOUNTER
Called patient back to discuss her request for a water pill.  Her cardiologist is no longer in [place] so patient is aware that she needs a new cardiologist. Patient states that their are not many Drs that specialize in Reynaurds disease. Did discuss going to  of which patient declined. Attempted to schedule her with another provider, but she was not able to continue conversation due to at an appt. Will attempt to call her back.

## 2023-01-07 ENCOUNTER — TELEPHONE (OUTPATIENT)
Dept: INTERNAL MEDICINE | Facility: CLINIC | Age: 63
End: 2023-01-07
Payer: COMMERCIAL

## 2023-01-07 NOTE — TELEPHONE ENCOUNTER
Attempted to call pt back about patients complaints of ankle swelling but no response at this time. Left VM to call office when available.

## 2023-01-24 NOTE — PROGRESS NOTES
[facility]  eMERGENCY dEPARTMENT eNCOUnter      Pt Name: Leigh Ann Dwyer  MRN: [id number]  [place] 1958  Date of evaluation: 1/24/2023  Provider: Polly Parmar, [address] [place] [address]       Chief Complaint   Patient presents with    Leg Pain     Left leg, no known injury         HISTORY OF PRESENT ILLNESS   (Location/Symptom, Timing/Onset,Context/Setting, Quality, Duration, Modifying Factors, Severity)  Note limiting factors. Leigh Ann Dwyer is a 60 y.o. female with history including type 2 diabetes, hypertension, hyperlipidemia, COPD, stroke, ulcerative colitis, MI, and chronic anticoagulation with Plavix who presents to the emergency department with complaint of left leg pain. The main historian is patient's . He states that last night she began having severe pain in the left lower leg. He denies any recent trauma. He notes mild swelling but states that that is been bilateral and is chronic for her. He also states she has a history of cellulitis but denies any redness or localized warmth. He states her legs feel cold but that is baseline for her as well. He states it appears that the pain comes in waves and that she will be inconsolable when it becomes severe. NursingNotes were reviewed. REVIEW OF SYSTEMS    (2-9 systems for level 4, 10 or more for level 5)     Review of Systems   Unable to perform ROS: Other (AMS at baseline)          PAST MEDICALHISTORY     Past Medical History:   Diagnosis Date    Anxiety     ASCVD (arteriosclerotic cardiovascular disease)     Carrier of group B Streptococcus     Cellulitis     Colitis     COPD (chronic obstructive pulmonary disease) (HCC)     Costochondritis     CVA (cerebral vascular accident) ([facility] [address]     Depression     Edema     Fracture of sternum     non union post surgery.      Gallstones     Grief reaction     H/O superior vena cava filter placement     Hyperlipidemia     Hypertension     MI (myocardial infarction) (HCC)     MRSA (methicillin SUBJECTIVE:   62 y.o. female   for routine gyn exam. No LMP recorded (lmp unknown). Patient has had a hysterectomy..  She has no unusual complaints.  She desires refill of meds for menopause sx. Desires to continue.  H/o osteoporosis on Actonel.         Past Medical History:   Diagnosis Date    Asthma with allergic rhinitis     Bladder spasm     Dense breasts     heterogeneously/fibrocystic disease    Elevated antinuclear antibody (GUICHO) level     Endometriosis of cervix     Erythromelalgia     Fibromyalgia     Ganglion cyst     left toe    Goiter     MNG    Hashimoto's disease     Hashimoto's thyroiditis     Headache(784.0)     Hypothyroidism     Hashimoto with + Tg ab    Osteoporosis     femoral neck    Pernio     Pernio 2018    Raynaud's phenomenon     Rhinitis     Scoliosis     Sjogren's syndrome     Sleep disorder     type of Narcolepsy ( resolved)    Vitamin D deficiency disease     Vulvodynia      Past Surgical History:   Procedure Laterality Date    BREAST SURGERY      fibrocystic tumor removed     SECTION      2x    CYST REMOVAL      laparoscopic cyst on ovary    HYSTERECTOMY      INJECTION OF ANESTHETIC AGENT AROUND NERVE N/A 2019    Procedure: BLOCK, NERVE COCCYGEAL  1 OF 2  Pt. can drive herself home;  Surgeon: Monique Philip MD;  Location: [facility];  Service: Pain Management;  Laterality: N/A;    INJECTION OF ANESTHETIC AGENT AROUND NERVE N/A 2019    Procedure: BLOCK, NERVE COCCYGEAL  2 OF 2  Pt. can drive herself home;  Surgeon: Monique Philip MD;  Location: [facility];  Service: Pain Management;  Laterality: N/A;    intradermal cyst       removed from left index finger    SINUS SURGERY      2x     Social History     Socioeconomic History    Marital status:    Occupational History     Employer: [employer]   Tobacco Use    Smoking status: Never Smoker    Smokeless tobacco: Never Used   Substance and Sexual Activity     Alcohol use: No    Drug use: No    Sexual activity: Not Currently     Birth control/protection: Surgical     Comment: single, RAMOS ov in situ    Social History Narrative         Social Determinants of Health     Financial Resource Strain: Medium Risk    Difficulty of Paying Living Expenses: Somewhat hard   Food Insecurity: No Food Insecurity    Worried About Running Out of Food in the Last Year: Never true    Ran Out of Food in the Last Year: Never true   Transportation Needs: No Transportation Needs    Lack of Transportation (Medical): No    Lack of Transportation (Non-Medical): No   Physical Activity: Insufficiently Active    Days of Exercise per Week: 5 days    Minutes of Exercise per Session: 20 min   Stress: No Stress Concern Present    Feeling of Stress : Only a little   Social Connections: Unknown    Frequency of Communication with Friends and Family: Twice a week    Frequency of Social Gatherings with Friends and Family: Once a week    Active Member of Clubs or Organizations: No    Attends Club or Organization Meetings: Never    Marital Status:    Housing Stability: Low Risk     Unable to Pay for Housing in the Last Year: No    Number of Places Lived in the Last Year: 1    Unstable Housing in the Last Year: No     Family History   Problem Relation Age of Onset    Diabetes Mother     Fibromyalgia Mother     Polymyalgia rheumatica Mother     Macular degeneration Mother     Arthritis Mother     Hypertension Mother     Kidney disease Mother     Pneumonia Mother     Adrenal disorder Mother         adrenal insufficiency    Coronary artery disease Father     Arthritis Father         osteoarthritis    Hypertension Father     Heart disease Father     Diabetes Father     Hyperlipidemia Father     Hyperlipidemia Brother     Cancer Brother         oral    Thyroid cancer Daughter     Cancer Daughter         thyroid    Hypothyroidism Daughter     Breast cancer Neg Hx   resistant Staphylococcus aureus)     Neuropathy     Obesity     Pseudarthrosis sternal    RA (rheumatoid arthritis) (HCC)     Rosacea     Sinus bradycardia     TIA (transient ischemic attack)     Venous thromboembolism          SURGICAL HISTORY       Past Surgical History:   Procedure Laterality Date    ADENOIDECTOMY      APPENDECTOMY      CARDIAC CATHETERIZATION  3/2009    CARDIAC CATHETERIZATION  11/5/14  JDT    EF 50%, patent bypass grafts    CHOLECYSTECTOMY  2011    COLONOSCOPY  06/03/2010    Dr. Betsey Abernathy: distal colon having ulcerative lesions c/w UC    COLONOSCOPY  2009    pancolitis    COLONOSCOPY      CORONARY ARTERY BYPASS GRAFT  3/2009    Dr Paola Santoyo, LIMA to LAD, SVGs to OM & PDA    GASTRIC BYPASS SURGERY  2012    HIATAL HERNIA REPAIR  2011    HYSTERECTOMY (CERVIX STATUS UNKNOWN)      KNEE SURGERY      OH COLONOSCOPY FLX DX W/COLLJ SPEC WHEN PFRMD N/A 3/12/2018    Dr Juani Macias rectal inflammation consistent with U.C.-Active proctitis--3 yr recall    Doug Gan  2010    Dr. Juan Alberto Abdi     Previous Medications    AMLODIPINE (NORVASC) 2.5 MG TABLET    Take 1 tablet by mouth daily    ATORVASTATIN (LIPITOR) 40 MG TABLET    Take 1 tablet by mouth at bedtime    BISACODYL (DULCOLAX) 5 MG EC TABLET    Take 1 tablet by mouth daily    CLOPIDOGREL (PLAVIX) 75 MG TABLET    Take 1 tablet by mouth daily    DOCUSATE SODIUM (COLACE, DULCOLAX) 100 MG CAPS    Take 100 mg by mouth 2 times daily    DULAGLUTIDE (TRULICITY) 1.5 HX/7.5VS SC INJECTION    Inject 0.5 mLs into the skin once a week    ESCITALOPRAM (LEXAPRO) 5 MG TABLET    Take 1 tablet by mouth daily    FOLIC ACID (FOLVITE) 1 MG TABLET    Take 1 tablet by mouth daily    GABAPENTIN (NEURONTIN) 300 MG CAPSULE    Take 1 capsule by mouth at bedtime for 30 days.  Max Daily Amount: 300 mg    HYDROXYCHLOROQUINE (PLAQUENIL) 200 MG TABLET    Ovarian cancer Neg Hx     Colon cancer Neg Hx     Melanoma Neg Hx      OB History    Para Term  AB Living   2 2 2     2   SAB IAB Ectopic Multiple Live Births                  # Outcome Date GA Lbr Jarrod/2nd Weight Sex Delivery Anes PTL Lv   2 Term            1 Term                  Current Outpatient Medications   Medication Sig Dispense Refill    albuterol (VENTOLIN HFA) 90 mcg/actuation inhaler Inhale 2 puffs into the lungs every 6 (six) hours as needed for Wheezing. Rescue 18 g 2    amitriptyline (ELAVIL) 10 MG tablet Take 2 tablets (20 mg total) by mouth once daily. With the 50 mg for a total of 70 mg 180 tablet 3    amitriptyline (ELAVIL) 50 MG tablet TAKE 1 TABLET NIGHTLY WITH THE 10 MG AMITRIPTYLINE 90 tablet 3    aspirin (ECOTRIN) 81 MG EC tablet Take 81 mg by mouth once daily.      AZASITE 1 % Drop INSTILL 1 DROP INTO LEFT EYE BID      fluocinonide (LIDEX) 0.05 % external solution AAA scalp qday - bid prn pruritus 60 mL 3    fluticasone (FLONASE) 50 mcg/actuation nasal spray 1 spray by Each Nare route once daily. 3 Bottle 3    gabapentin (NEURONTIN) 100 MG capsule TAKE 1 CAPSULE IN THE MORNING AND 4 CAPSULES AT BEDTIME 450 capsule 3    hydrOXYchloroQUINE (PLAQUENIL) 200 mg tablet TAKE 1 TABLET DAILY 90 tablet 3    KETOPROFEN, BULK, TOP Ketoprofen/flexeril/lidocaine (compound)      LIDOcaine-prilocaine (EMLA) cream APPLY TOPICALLY AS NEEDED 90 g 3    liothyronine (CYTOMEL) 5 MCG Tab TAKE THREE AND ONE-HALF TABLETS ONCE DAILY 315 tablet 3    metaxalone (SKELAXIN) 800 MG tablet TAKE 1 TABLET TWICE A DAY AS NEEDED FOR SPASM 180 tablet 3    niacinamide 500 mg Tab Take 1 tablet (500 mg total) by mouth 3 (three) times daily. 270 tablet 3    NURTEC 75 mg odt Take by mouth.      pilocarpine (SALAGEN) 5 MG Tab Take 1 tablet (5 mg total) by mouth 3 (three) times daily as needed. 270 tablet 3    PREVIDENT 5000 BOOSTER PLUS 1.1 % Pste       RESTASIS 0.05 % ophthalmic emulsion        Take 1 tablet by mouth 2 times daily    MICONAZOLE (MICOTIN) 2 % POWDER    Apply topically 2 times daily. MULTIPLE VITAMIN (MULTIVITAMIN) TABS TABLET    Take 1 tablet by mouth daily    POLYETHYLENE GLYCOL (GLYCOLAX) 17 G PACKET    Take 17 g by mouth daily as needed for Constipation       ALLERGIES     Methotrexate derivatives    FAMILY HISTORY       Family History   Problem Relation Age of Onset    Prostate Cancer Father     Heart Failure Father     Cancer Father     Colon Cancer Neg Hx     Colon Polyps Neg Hx     Liver Cancer Neg Hx     Liver Disease Neg Hx     Esophageal Cancer Neg Hx     Rectal Cancer Neg Hx     Stomach Cancer Neg Hx           SOCIAL HISTORY       Social History     Socioeconomic History    Marital status:    Tobacco Use    Smoking status: Former     Packs/day: 2.00     Years: 32.00     Pack years: 64.00     Types: Cigarettes     Quit date: 3/3/2009     Years since quittin.9    Smokeless tobacco: Never   Vaping Use    Vaping Use: Never used   Substance and Sexual Activity    Alcohol use: Yes     Comment: Socially    Drug use: No       SCREENINGS    Rosser Coma Scale  Eye Opening: Spontaneous  Best Verbal Response: Oriented  Best Motor Response: Obeys commands  Isa Coma Scale Score: 15        PHYSICAL EXAM    (up to 7 for level 4, 8 or more for level 5)     ED Triage Vitals [23 1425]   BP Temp Temp src Heart Rate Resp SpO2 Height Weight   (!) 159/86 97.7 °F (36.5 °C) -- 73 18 96 % -- 195 lb (88.5 kg)       Physical Exam  Vitals and nursing note reviewed. Constitutional:       General: She is not in acute distress. Appearance: Normal appearance. She is normal weight. She is not ill-appearing, toxic-appearing or diaphoretic. HENT:      Head: Normocephalic and atraumatic. Cardiovascular:      Rate and Rhythm: Normal rate and regular rhythm. Pulmonary:      Effort: Pulmonary effort is normal. No respiratory distress. Chest:      Chest wall: No tenderness. Musculoskeletal:      Lumbar back: No swelling, deformity, tenderness or bony tenderness. Normal range of motion. Negative right straight leg raise test and negative left straight leg raise test.      Comments: Mild swelling of bilateral lower extremities with decreased pulses bilaterally. Bilateral limbs are cool to touch which patient's  states is chronic. Bilateral lower extremities are otherwise negative for deformity, bony tenderness, redness, or warmth. No signs of cellulitis. Skin:     General: Skin is warm and dry. Neurological:      Mental Status: She is alert and oriented to person, place, and time. Mental status is at baseline. DIAGNOSTIC RESULTS     RADIOLOGY:  Non-plain film images such as CT, Ultrasound and MRI are read by the radiologist. Plain radiographic images are visualized and preliminarily interpreted bythe emergency physician with the below findings:    XR TIBIA FIBULA LEFT (2 VIEWS)   Final Result   No acute osseous abnormality. VL Extremity Venous Left         CTA ABDOMINAL AORTA W BILAT RUNOFF W CONTRAST    (Results Pending)   Vascular Lab Prelim  Duplex venous scan of LLE shows no evidence for dvt, svt, reflux noted at this time. Incidental finding: left Popliteal artery occlusion. Final report pending. Rodney Hamilton Vascular Tech    LABS:  Labs Reviewed   CBC - Abnormal; Notable for the following components:       Result Value    WBC 14.8 (*)     Hemoglobin 11.2 (*)     Hematocrit 36.3 (*)     MCV 79.4 (*)     MCH 24.5 (*)     MCHC 30.9 (*)     RDW 17.6 (*)     Platelets 035 (*)     MPV 9.3 (*)     All other components within normal limits   COMPREHENSIVE METABOLIC PANEL - Abnormal; Notable for the following components:     Total Protein 6.5 (*)     Albumin 3.4 (*)     Alkaline Phosphatase 177 (*)     AST 37 (*)     All other components within normal limits   COVID-19, RAPID   APTT   PROTIME-INR   APTT   APTT       All other labs were within normal range or not risedronate 35 mg TbEC TAKE 1 TABLET ONCE A WEEK 12 tablet 3    sumatriptan (IMITREX) 100 MG tablet Take 1 tablet (100 mg total) by mouth every 2 (two) hours as needed for Migraine. 9 tablet 1    UNABLE TO FIND Apply 240 g topically as needed. Ketoprofen/cyclobenzaprine HCI/Lidocaine (lipomax) 10/2/5% Up to 5 times daily as needed for pain  99    zolpidem (AMBIEN) 10 mg Tab TAKE 1 TABLET(10 MG) BY MOUTH EVERY NIGHT AS NEEDED 30 tablet 3    estradioL (VAGIFEM) 10 mcg Tab INSERT 1 TABLET VAGINALLY THREE TIMES A WEEK 36 tablet 3    estradiol 0.05 mg/24 hr td ptsw (VIVELLE-DOT) 0.05 mg/24 hr Apply to skin and change twice weekly 24 patch 3     No current facility-administered medications for this visit.     Facility-Administered Medications Ordered in Other Visits   Medication Dose Route Frequency Provider Last Rate Last Admin    0.9%  NaCl infusion  500 mL Intravenous Continuous Monique Philip MD         Allergies: Patient has no known allergies.     ROS:  Constitutional: no weight loss, weight gain, fever, fatigue  Eyes:  No vision changes, glasses/contacts  ENT/Mouth: No ulcers, sinus problems, ears ringing, headache  Cardiovascular: No inability to lie flat, chest pain, exercise intolerance, swelling, heart palpitations  Respiratory: No wheezing, coughing blood, shortness of breath, or cough  Gastrointestinal: No diarrhea, bloody stool, nausea/vomiting, constipation, gas, hemorrhoids  Genitourinary: No blood in urine, painful urination, urgency of urination, frequency of urination, incomplete emptying, incontinence, abnormal bleeding, painful periods, heavy periods, vaginal discharge, vaginal odor, painful intercourse, sexual problems, bleeding after intercourse.  Musculoskeletal: No muscle weakness  Skin/Breast: No painful breasts, nipple discharge, masses, rash, ulcers  Neurological: No passing out, seizures, numbness, headache  Endocrine: No diabetes, hypothyroid, hyperthyroid, hot flashes, hair loss,  abnormal hair growth, acne  Psychiatric: No depression, crying  Hematologic: No bruises, bleeding, swollen lymph nodes, anemia.      OBJECTIVE:   The patient appears well, alert, oriented x 3, in no distress.  BP (!) 98/50   Ht 5' (1.524 m)   Wt 42.3 kg (93 lb 4.1 oz)   LMP  (LMP Unknown)   BMI 18.21 kg/m²   NECK: no thyromegaly, trachea midline  SKIN: no acne, striae, hirsutism  CHEST: CTAB  CV: RRR  BREAST EXAM: breasts appear normal, no suspicious masses, no skin or nipple changes or axillary nodes  ABDOMEN: no hernias, masses, or hepatosplenomegaly  GENITALIA: normal external genitalia, no erythema, no discharge  URETHRA: normal urethra, normal urethral meatus  VAGINA: Normal  CERVIX: absent  UTERUS: absent  ADNEXA: no mass, fullness, tenderness      ASSESSMENT:   1. Well female exam with routine gynecological exam     2. Atrophic vaginitis  estradioL (VAGIFEM) 10 mcg Tab    estradiol 0.05 mg/24 hr td ptsw (VIVELLE-DOT) 0.05 mg/24 hr       PLAN:   Orders Placed This Encounter    estradioL (VAGIFEM) 10 mcg Tab    estradiol 0.05 mg/24 hr td ptsw (VIVELLE-DOT) 0.05 mg/24 hr     Discussed HRT, WHI, healthy lifestyle including regular exercise, healthy eating, etc.  Return to clinic in 1 year     returned as of this dictation. EMERGENCY DEPARTMENT COURSE and DIFFERENTIAL DIAGNOSIS/MDM:   Vitals:    Vitals:    01/24/23 1425   BP: (!) 159/86   Pulse: 73   Resp: 18   Temp: 97.7 °F (36.5 °C)   SpO2: 96%   Weight: 195 lb (88.5 kg)       MDM  Patient is a 60-year-old female who presents the ER with complaint of left leg pain. On physical exam, no signs of bony tenderness, redness, or warmth. Due to the posterior tenderness of the calf, ultrasound was obtained. This was concerning for suspected popliteal arterial occlusion. The patient does have cool limbs bilaterally as well as decreased pulses bilaterally. I did reach out to Dr. Rochelle Ignacio who recommends an abdominal aortic scan with runoff bilaterally as well as starting on a heparin drip. Plain films of the left leg was negative for signs of acute bony fracture or malalignment. Spoke with Che Arroyo with hospitalist group for report. Patient's  is agreeable to this admission. CONSULTS:  Dr Rochelle Ignacio, Vascular Surgeon  Dr Nicolasa Pedersen, Accepting hospitalist    FINAL IMPRESSION      1.  Left popliteal artery occlusion [facility])          DISPOSITION/PLAN   DISPOSITION Admitted 01/24/2023 05:16:14 PM        (Please note that portions of this note were completed with a voice recognition program.  Efforts were made to edit thedictations but occasionally words are mis-transcribed.)    FREDA Everett (electronically signed)     Susi Sherman [id number]  01/24/23 4912

## 2023-01-26 ENCOUNTER — HOSPITAL ENCOUNTER (OUTPATIENT)
Dept: RADIOLOGY | Facility: HOSPITAL | Age: 63
Discharge: HOME | End: 2023-01-26
Attending: STUDENT IN AN ORGANIZED HEALTH CARE EDUCATION/TRAINING PROGRAM
Payer: COMMERCIAL

## 2023-01-26 DIAGNOSIS — D17.9 ANGIOMYOLIPOMA: ICD-10-CM

## 2023-01-26 PROCEDURE — 76770 US EXAM ABDO BACK WALL COMP: CPT | Mod: 26,,, | Performed by: RADIOLOGY

## 2023-01-26 PROCEDURE — 76770 US RETROPERITONEAL COMPLETE: ICD-10-PCS | Mod: 26,,, | Performed by: RADIOLOGY

## 2023-01-26 PROCEDURE — 76770 US EXAM ABDO BACK WALL COMP: CPT | Mod: TC

## 2023-01-30 ENCOUNTER — PATIENT MESSAGE (OUTPATIENT)
Dept: INTERNAL MEDICINE | Facility: CLINIC | Age: 63
End: 2023-01-30
Payer: COMMERCIAL

## 2023-01-30 ENCOUNTER — OFFICE VISIT (OUTPATIENT)
Dept: UROLOGY | Facility: CLINIC | Age: 63
End: 2023-01-30
Payer: COMMERCIAL

## 2023-01-30 VITALS
HEIGHT: 60 IN | WEIGHT: 96.31 LBS | HEART RATE: 99 BPM | BODY MASS INDEX: 18.91 KG/M2 | DIASTOLIC BLOOD PRESSURE: 68 MMHG | SYSTOLIC BLOOD PRESSURE: 101 MMHG

## 2023-01-30 DIAGNOSIS — D17.9 ANGIOMYOLIPOMA: Primary | ICD-10-CM

## 2023-01-30 PROCEDURE — 1160F PR REVIEW ALL MEDS BY PRESCRIBER/CLIN PHARMACIST DOCUMENTED: ICD-10-PCS | Mod: CPTII,S$GLB,, | Performed by: STUDENT IN AN ORGANIZED HEALTH CARE EDUCATION/TRAINING PROGRAM

## 2023-01-30 PROCEDURE — 99214 OFFICE O/P EST MOD 30 MIN: CPT | Mod: S$GLB,,, | Performed by: STUDENT IN AN ORGANIZED HEALTH CARE EDUCATION/TRAINING PROGRAM

## 2023-01-30 PROCEDURE — 99999 PR PBB SHADOW E&M-EST. PATIENT-LVL V: CPT | Mod: PBBFAC,,, | Performed by: STUDENT IN AN ORGANIZED HEALTH CARE EDUCATION/TRAINING PROGRAM

## 2023-01-30 PROCEDURE — 3008F PR BODY MASS INDEX (BMI) DOCUMENTED: ICD-10-PCS | Mod: CPTII,S$GLB,, | Performed by: STUDENT IN AN ORGANIZED HEALTH CARE EDUCATION/TRAINING PROGRAM

## 2023-01-30 PROCEDURE — 99214 PR OFFICE/OUTPT VISIT, EST, LEVL IV, 30-39 MIN: ICD-10-PCS | Mod: S$GLB,,, | Performed by: STUDENT IN AN ORGANIZED HEALTH CARE EDUCATION/TRAINING PROGRAM

## 2023-01-30 PROCEDURE — 3078F PR MOST RECENT DIASTOLIC BLOOD PRESSURE < 80 MM HG: ICD-10-PCS | Mod: CPTII,S$GLB,, | Performed by: STUDENT IN AN ORGANIZED HEALTH CARE EDUCATION/TRAINING PROGRAM

## 2023-01-30 PROCEDURE — 99999 PR PBB SHADOW E&M-EST. PATIENT-LVL V: ICD-10-PCS | Mod: PBBFAC,,, | Performed by: STUDENT IN AN ORGANIZED HEALTH CARE EDUCATION/TRAINING PROGRAM

## 2023-01-30 PROCEDURE — 3074F SYST BP LT 130 MM HG: CPT | Mod: CPTII,S$GLB,, | Performed by: STUDENT IN AN ORGANIZED HEALTH CARE EDUCATION/TRAINING PROGRAM

## 2023-01-30 PROCEDURE — 1159F MED LIST DOCD IN RCRD: CPT | Mod: CPTII,S$GLB,, | Performed by: STUDENT IN AN ORGANIZED HEALTH CARE EDUCATION/TRAINING PROGRAM

## 2023-01-30 PROCEDURE — 3074F PR MOST RECENT SYSTOLIC BLOOD PRESSURE < 130 MM HG: ICD-10-PCS | Mod: CPTII,S$GLB,, | Performed by: STUDENT IN AN ORGANIZED HEALTH CARE EDUCATION/TRAINING PROGRAM

## 2023-01-30 PROCEDURE — 3008F BODY MASS INDEX DOCD: CPT | Mod: CPTII,S$GLB,, | Performed by: STUDENT IN AN ORGANIZED HEALTH CARE EDUCATION/TRAINING PROGRAM

## 2023-01-30 PROCEDURE — 1160F RVW MEDS BY RX/DR IN RCRD: CPT | Mod: CPTII,S$GLB,, | Performed by: STUDENT IN AN ORGANIZED HEALTH CARE EDUCATION/TRAINING PROGRAM

## 2023-01-30 PROCEDURE — 3078F DIAST BP <80 MM HG: CPT | Mod: CPTII,S$GLB,, | Performed by: STUDENT IN AN ORGANIZED HEALTH CARE EDUCATION/TRAINING PROGRAM

## 2023-01-30 PROCEDURE — 1159F PR MEDICATION LIST DOCUMENTED IN MEDICAL RECORD: ICD-10-PCS | Mod: CPTII,S$GLB,, | Performed by: STUDENT IN AN ORGANIZED HEALTH CARE EDUCATION/TRAINING PROGRAM

## 2023-01-30 RX ORDER — ESTRADIOL 0.05 MG/D
FILM, EXTENDED RELEASE TRANSDERMAL
COMMUNITY
Start: 2022-12-14 | End: 2023-03-06 | Stop reason: SDUPTHER

## 2023-01-30 NOTE — PROGRESS NOTES
Subjective:       Patient ID: Berenice Hayes is a 62 y.o. female.    Chief Complaint: Follow-up (US results)      This is a 62 y.o.  female patient that is an established patient of mine.  She was referred to me by her PCP Dr. Serrano for gross hematuria in 10/2020.  No difficulty urinating. She does have a chronic history of pelvic floor nerve pain, worse on right side. The patient does take blood thinners Aspirin 81mg. she does not have a history of stones. The patient does not have a history of smoking. Works - works for [employer]. Urine sample obtained around the time of menstrual cycle (if female) - no uterus, no cycles.   At the time urine sample also demonstrated proteinuria. She underwent a benign cystoscopy 10/29/2020 with me in the office. CT urogram 10/16/20 also benign - no stones, kidney masses, hydronephrosis, or filling defects. 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     Read her paper notes in media. Summary:  Gross hematuria episodes 10/5/20, 2/3/21, 3/28/21, 4/24/21, 5/18/21.   Consulted with Dr. Kenya Méndez, who recommended cystoscopy, selective cytologies and MRA angiogram with contrast.     10/21/21  Interestingly and gratefully, I was glad to learn from her that she has not had any episodes of gross hematuria since 5/2021. She does not recall any medication changes or any changes in her day do day habits that would have been contributory.     Daughter - speciatly pharmacist works at [employer], cancer [employer]  Son - previously was a , unfortunately did suffer with addiction; currently living at home with Ms. Ng.     US 5/31/21 - left AML -  0.6 x 0.5 x 0.7 cm echogenic focus in the medial left kidney, consistent with known angiomyolipoma.    1/30/23  Here to f/u renal US 1/26/23 - Right kidney: The right kidney measures 9.2 cm.  No cortical thinning. No loss of corticomedullary distinction.  Resistive index measures 0.65.  No mass. 0.2 cm nonobstructing stone in  the midpole.  Mild hydronephrosis versus caliectasis, similar to prior exam.     Left kidney: The left kidney measures 10.3 cm. No cortical thinning. No loss of corticomedullary distinction.  Resistive index measures 0.68.  0.6 x 0.6 x 0.8 cm hyperechoic lesion in the midpole, similar to prior exam and consistent with known angiomyolipoma.  0.2 cm nonobstructing renal stone in the midpole.  No hydronephrosis.     The bladder is partially distended at the time of scanning and has an unremarkable appearance.  Bilateral urine jets were observed.     Impression:     Stable left angiomyolipoma.  No new lesions identified.     Mild right hydronephrosis versus caliectasis, similar to prior exam.      Lab Results   Component Value Date    CREATININE 0.6 2022       ---  Past Medical History:   Diagnosis Date    Asthma with allergic rhinitis     Bladder spasm     COVID 2022    Dense breasts     heterogeneously/fibrocystic disease    Elevated antinuclear antibody (GUICHO) level     Endometriosis of cervix     Erythromelalgia     Fibromyalgia     Ganglion cyst     left toe    Goiter     MNG    Hashimoto's disease     Hashimoto's thyroiditis     Headache(784.0)     Hypothyroidism     Hashimoto with + Tg ab    Kidney stone     Osteoporosis     femoral neck    Pernio     Pernio 2018    Raynaud's phenomenon     Rhinitis     Scoliosis     Sjogren's syndrome     Sleep disorder     type of Narcolepsy ( resolved)    Vitamin D deficiency disease     Vulvodynia        Past Surgical History:   Procedure Laterality Date    BREAST SURGERY      fibrocystic tumor removed     SECTION      2x    CYST REMOVAL      laparoscopic cyst on ovary    HYSTERECTOMY      INJECTION OF ANESTHETIC AGENT AROUND NERVE N/A 2019    Procedure: BLOCK, NERVE COCCYGEAL  1 OF 2  Pt. can drive herself home;  Surgeon: Monique Philip MD;  Location: [facility];  Service: Pain Management;  Laterality: N/A;    INJECTION OF ANESTHETIC AGENT AROUND  NERVE N/A 7/19/2019    Procedure: BLOCK, NERVE COCCYGEAL  2 OF 2  Pt. can drive herself home;  Surgeon: Monique Philip MD;  Location: [facility];  Service: Pain Management;  Laterality: N/A;    intradermal cyst       removed from left index finger    SINUS SURGERY      2x       Family History   Problem Relation Age of Onset    Diabetes Mother     Fibromyalgia Mother     Polymyalgia rheumatica Mother     Macular degeneration Mother     Arthritis Mother     Hypertension Mother     Kidney disease Mother     Pneumonia Mother     Adrenal disorder Mother         adrenal insufficiency    Coronary artery disease Father     Arthritis Father         osteoarthritis    Hypertension Father     Heart disease Father     Diabetes Father     Hyperlipidemia Father     Hyperlipidemia Brother     Cancer Brother         oral    Thyroid cancer Daughter     Cancer Daughter         thyroid    Hypothyroidism Daughter     Breast cancer Neg Hx     Ovarian cancer Neg Hx     Colon cancer Neg Hx     Melanoma Neg Hx        Social History     Tobacco Use    Smoking status: Never    Smokeless tobacco: Never   Substance Use Topics    Alcohol use: No    Drug use: No       Current Outpatient Medications on File Prior to Visit   Medication Sig Dispense Refill    albuterol (VENTOLIN HFA) 90 mcg/actuation inhaler Inhale 2 puffs into the lungs every 6 (six) hours as needed for Wheezing. Rescue 18 g 2    amitriptyline (ELAVIL) 10 MG tablet Take 2 tablets (20 mg total) by mouth once daily. With the 50 mg for a total of 70 mg 180 tablet 3    amitriptyline (ELAVIL) 50 MG tablet TAKE 1 TABLET NIGHTLY WITH THE 10 MG AMITRIPTYLINE 90 tablet 3    aspirin (ECOTRIN) 81 MG EC tablet Take 81 mg by mouth once daily.      AZASITE 1 % Drop INSTILL 1 DROP INTO LEFT EYE BID      clindamycin (CLEOCIN T) 1 % external solution Apply topically 2 (two) times daily. As needed to new breakouts 30 mL 3    estradioL (VAGIFEM) 10 mcg Tab INSERT 1 TABLET VAGINALLY THREE TIMES A  WEEK 36 tablet 0    estradiol 0.05 mg/24 hr td ptsw (VIVELLE-DOT) 0.05 mg/24 hr       fluocinonide (LIDEX) 0.05 % external solution AAA scalp qday - bid prn pruritus 60 mL 3    fluticasone (FLONASE) 50 mcg/actuation nasal spray 1 spray by Each Nare route once daily. 3 Bottle 3    gabapentin (NEURONTIN) 100 MG capsule TAKE 1 CAPSULE IN THE MORNING AND 4 CAPSULES AT BEDTIME 450 capsule 3    hydrOXYchloroQUINE (PLAQUENIL) 200 mg tablet Take 1 tablet (200 mg total) by mouth once daily. 90 tablet 0    ketoprofen 10 % CrPk APPLY UP TO 4.8 GRAMS TO PAINFUL AREAS UP TO FIVE TIMES DAILY. RUB IN WELL      KETOPROFEN, BULK, TOP Ketoprofen/flexeril/lidocaine (compound)      LIDOcaine-prilocaine (EMLA) cream Apply topically as needed. 90 g 3    liothyronine (CYTOMEL) 5 MCG Tab TAKE THREE AND ONE-HALF TABLETS ONCE DAILY 315 tablet 3    metaxalone (SKELAXIN) 800 MG tablet TAKE 1 TABLET TWICE A DAY AS NEEDED FOR SPASM 180 tablet 3    niacinamide 500 mg Tab Take 1 tablet (500 mg total) by mouth 3 (three) times daily. 270 tablet 3    NURTEC 75 mg odt Take by mouth.      pilocarpine (SALAGEN) 5 MG Tab Take 1 tablet (5 mg total) by mouth 3 (three) times daily as needed. 270 tablet 3    PREVIDENT 5000 BOOSTER PLUS 1.1 % Pste       RESTASIS 0.05 % ophthalmic emulsion       risedronate 35 mg TbEC TAKE 1 TABLET ONCE A WEEK 12 tablet 3    sumatriptan (IMITREX) 100 MG tablet Take 1 tablet (100 mg total) by mouth every 2 (two) hours as needed for Migraine. 9 tablet 1    tretinoin (RETIN-A) 0.025 % gel Apply small amount to entire face qhs. Wash off qam and apply sunscreen. 45 g 3    UNABLE TO FIND Apply 240 g topically as needed. Ketoprofen/cyclobenzaprine HCI/Lidocaine (lipomax) 10/2/5% Up to 5 times daily as needed for pain  99    zolpidem (AMBIEN) 10 mg Tab TAKE 1 TABLET(10 MG) BY MOUTH EVERY NIGHT AS NEEDED 30 tablet 2     Current Facility-Administered Medications on File Prior to Visit   Medication Dose Route Frequency Provider Last Rate  Last Admin    0.9%  NaCl infusion  500 mL Intravenous Continuous Monique Philip MD           Review of patient's allergies indicates:  No Known Allergies    Review of Systems   Constitutional:  Negative for activity change.   HENT:  Negative for congestion.    Eyes:  Negative for visual disturbance.   Respiratory:  Negative for shortness of breath.    Cardiovascular:  Negative for chest pain.   Gastrointestinal:  Negative for abdominal distention.   Musculoskeletal:  Negative for gait problem.   Skin:  Negative for color change.   Neurological:  Negative for dizziness.   Psychiatric/Behavioral:  Negative for agitation.      Objective:      Physical Exam  Constitutional:       Appearance: She is well-developed.   HENT:      Head: Normocephalic and atraumatic.   Pulmonary:      Effort: Pulmonary effort is normal.   Musculoskeletal:         General: Normal range of motion.      Cervical back: Normal range of motion.   Skin:     General: Skin is warm and dry.   Neurological:      Mental Status: She is alert and oriented to person, place, and time.       Assessment:       1. Angiomyolipoma        Plan:           Plan:  Schedule renal US 1/2024.   If develops gross hematuria she will contact me and I will order a BMP and MRA abdomen w/wo contrast (angiography). She will need this copied on a disc to bring to Dr. Kenya Méndez at [facility]. I can write a prescription for her to  at the clinic  so radiology dept can copy that to a disc for her.  She will need some sort of sedation/anxiolytic for the MRA given her nerve pain issues and claustrophobia. Can revisit in future if MRA is needed - valium versus conscious sedation which I can arrange with radiology scheduler.      Angiomyolipoma  -     US Retroperitoneal Complete; Future; Expected date: 01/30/2024

## 2023-01-30 NOTE — TELEPHONE ENCOUNTER
"Pt is requesting     "Hi Dr Lundberg, I spoke with your nurse earlier this month and understand fluid pill would not be a good choice for the swelling of my ankle and feet. I would like to try ultrasound therapy. Since Dr Fleming is no longer available please advise if this is an order you can put in or should I be referred to a vascular doctor. I appreciate your assistance! Berenice"    "

## 2023-01-31 ENCOUNTER — OFFICE VISIT (OUTPATIENT)
Dept: INTERNAL MEDICINE | Facility: CLINIC | Age: 63
End: 2023-01-31
Payer: COMMERCIAL

## 2023-01-31 VITALS
WEIGHT: 96.31 LBS | HEIGHT: 60 IN | BODY MASS INDEX: 18.91 KG/M2 | DIASTOLIC BLOOD PRESSURE: 60 MMHG | SYSTOLIC BLOOD PRESSURE: 100 MMHG

## 2023-01-31 DIAGNOSIS — R60.9 EDEMA, UNSPECIFIED TYPE: Primary | ICD-10-CM

## 2023-01-31 DIAGNOSIS — I99.9 VASCULAR DISORDER: ICD-10-CM

## 2023-01-31 DIAGNOSIS — I73.00 RAYNAUD'S DISEASE WITHOUT GANGRENE: ICD-10-CM

## 2023-01-31 PROCEDURE — 3008F BODY MASS INDEX DOCD: CPT | Mod: CPTII,S$GLB,, | Performed by: FAMILY MEDICINE

## 2023-01-31 PROCEDURE — 99214 PR OFFICE/OUTPT VISIT, EST, LEVL IV, 30-39 MIN: ICD-10-PCS | Mod: S$GLB,,, | Performed by: FAMILY MEDICINE

## 2023-01-31 PROCEDURE — 1159F MED LIST DOCD IN RCRD: CPT | Mod: CPTII,S$GLB,, | Performed by: FAMILY MEDICINE

## 2023-01-31 PROCEDURE — 3008F PR BODY MASS INDEX (BMI) DOCUMENTED: ICD-10-PCS | Mod: CPTII,S$GLB,, | Performed by: FAMILY MEDICINE

## 2023-01-31 PROCEDURE — 3074F SYST BP LT 130 MM HG: CPT | Mod: CPTII,S$GLB,, | Performed by: FAMILY MEDICINE

## 2023-01-31 PROCEDURE — 3078F DIAST BP <80 MM HG: CPT | Mod: CPTII,S$GLB,, | Performed by: FAMILY MEDICINE

## 2023-01-31 PROCEDURE — 99214 OFFICE O/P EST MOD 30 MIN: CPT | Mod: S$GLB,,, | Performed by: FAMILY MEDICINE

## 2023-01-31 PROCEDURE — 99999 PR PBB SHADOW E&M-EST. PATIENT-LVL IV: ICD-10-PCS | Mod: PBBFAC,,, | Performed by: FAMILY MEDICINE

## 2023-01-31 PROCEDURE — 1159F PR MEDICATION LIST DOCUMENTED IN MEDICAL RECORD: ICD-10-PCS | Mod: CPTII,S$GLB,, | Performed by: FAMILY MEDICINE

## 2023-01-31 PROCEDURE — 99999 PR PBB SHADOW E&M-EST. PATIENT-LVL IV: CPT | Mod: PBBFAC,,, | Performed by: FAMILY MEDICINE

## 2023-01-31 PROCEDURE — 3078F PR MOST RECENT DIASTOLIC BLOOD PRESSURE < 80 MM HG: ICD-10-PCS | Mod: CPTII,S$GLB,, | Performed by: FAMILY MEDICINE

## 2023-01-31 PROCEDURE — 3074F PR MOST RECENT SYSTOLIC BLOOD PRESSURE < 130 MM HG: ICD-10-PCS | Mod: CPTII,S$GLB,, | Performed by: FAMILY MEDICINE

## 2023-01-31 NOTE — PROGRESS NOTES
Subjective:       Patient ID: Berenice Hayes is a 62 y.o. female. PCP Adam Lundberg MD     Chief Complaint: No chief complaint on file.    Patient is here for  appt for pedal edema  Hx of severe Raynaud's & vascular disorder Erytnromelalgia   She has pedal edema not resolved on low salt diet, compression hose, & elevation.  She asked her PCP for fluid pills to help with it but he said they were too dangerous give her low BP  She is here today to find a solution.  She read about ultrasound treatments - I am not familiar with them.  After discussion she agreed the best solution for now may be to see a specialist in very complicated vascular cases, and I recommended Dr Dash,dejuan huerta will put in a referral      Review of patient's allergies indicates:  No Known Allergies    Social History     Tobacco Use    Smoking status: Never    Smokeless tobacco: Never   Substance Use Topics    Alcohol use: No    Drug use: No       Family History   Problem Relation Age of Onset    Diabetes Mother     Fibromyalgia Mother     Polymyalgia rheumatica Mother     Macular degeneration Mother     Arthritis Mother     Hypertension Mother     Kidney disease Mother     Pneumonia Mother     Adrenal disorder Mother         adrenal insufficiency    Coronary artery disease Father     Arthritis Father         osteoarthritis    Hypertension Father     Heart disease Father     Diabetes Father     Hyperlipidemia Father     Hyperlipidemia Brother     Cancer Brother         oral    Thyroid cancer Daughter     Cancer Daughter         thyroid    Hypothyroidism Daughter     Breast cancer Neg Hx     Ovarian cancer Neg Hx     Colon cancer Neg Hx     Melanoma Neg Hx        Past Surgical History:   Procedure Laterality Date    BREAST SURGERY      fibrocystic tumor removed     SECTION      2x    CYST REMOVAL      laparoscopic cyst on ovary    HYSTERECTOMY      INJECTION OF ANESTHETIC AGENT AROUND NERVE N/A 2019    Procedure: BLOCK, NERVE  COCCYGEAL  1 OF 2  Pt. can drive herself home;  Surgeon: Monique Philip MD;  Location: [facility]IN [facility];  Service: Pain Management;  Laterality: N/A;    INJECTION OF ANESTHETIC AGENT AROUND NERVE N/A 7/19/2019    Procedure: BLOCK, NERVE COCCYGEAL  2 OF 2  Pt. can drive herself home;  Surgeon: Monique Philip MD;  Location: [facility];  Service: Pain Management;  Laterality: N/A;    intradermal cyst       removed from left index finger    SINUS SURGERY      2x       Patient Active Problem List   Diagnosis    Fibromyalgia    Bladder spasm    Osteoporosis, postmenopausal    Fibrocystic breast changes    Vulvar vestibulodynia    Vascular disorder: Erytnromelalgia    Raynaud's disease    Incomplete defecation    Chronic constipation    Rectocele    Disorder of muscle    Anxiety    Bilateral hand pain    Fatigue    Menopause syndrome    Pudendal neuralgia    Urinary hesitancy    Hypothyroidism due to Hashimoto's thyroiditis    Multinodular goiter    Encounter for herb and vitamin supplement management    Gluten free diet    Family history of ischemic heart disease (IHD)    Cystocele, midline    Estefani syndrome    Chronic pain syndrome    Trigeminal neuralgia    Hip pain, right    Sjogren's syndrome    Pernio    Coccydynia    Muscle tension dysphonia    Encounter for long-term (current) use of other medications    Chronic nonintractable headache       Current Outpatient Medications on File Prior to Visit   Medication Sig Dispense Refill    albuterol (VENTOLIN HFA) 90 mcg/actuation inhaler Inhale 2 puffs into the lungs every 6 (six) hours as needed for Wheezing. Rescue 18 g 2    amitriptyline (ELAVIL) 10 MG tablet Take 2 tablets (20 mg total) by mouth once daily. With the 50 mg for a total of 70 mg 180 tablet 3    amitriptyline (ELAVIL) 50 MG tablet TAKE 1 TABLET NIGHTLY WITH THE 10 MG AMITRIPTYLINE 90 tablet 3    aspirin (ECOTRIN) 81 MG EC tablet Take 81 mg by mouth once daily.      AZASITE 1 % Drop INSTILL 1 DROP INTO  LEFT EYE BID      clindamycin (CLEOCIN T) 1 % external solution Apply topically 2 (two) times daily. As needed to new breakouts 30 mL 3    estradioL (VAGIFEM) 10 mcg Tab INSERT 1 TABLET VAGINALLY THREE TIMES A WEEK 36 tablet 0    estradiol 0.05 mg/24 hr td ptsw (VIVELLE-DOT) 0.05 mg/24 hr       fluocinonide (LIDEX) 0.05 % external solution AAA scalp qday - bid prn pruritus 60 mL 3    fluticasone (FLONASE) 50 mcg/actuation nasal spray 1 spray by Each Nare route once daily. 3 Bottle 3    gabapentin (NEURONTIN) 100 MG capsule TAKE 1 CAPSULE IN THE MORNING AND 4 CAPSULES AT BEDTIME 450 capsule 3    hydrOXYchloroQUINE (PLAQUENIL) 200 mg tablet Take 1 tablet (200 mg total) by mouth once daily. 90 tablet 0    ketoprofen 10 % CrPk APPLY UP TO 4.8 GRAMS TO PAINFUL AREAS UP TO FIVE TIMES DAILY. RUB IN WELL      KETOPROFEN, BULK, TOP Ketoprofen/flexeril/lidocaine (compound)      LIDOcaine-prilocaine (EMLA) cream Apply topically as needed. 90 g 3    liothyronine (CYTOMEL) 5 MCG Tab TAKE THREE AND ONE-HALF TABLETS ONCE DAILY 315 tablet 3    metaxalone (SKELAXIN) 800 MG tablet TAKE 1 TABLET TWICE A DAY AS NEEDED FOR SPASM 180 tablet 3    niacinamide 500 mg Tab Take 1 tablet (500 mg total) by mouth 3 (three) times daily. 270 tablet 3    NURTEC 75 mg odt Take by mouth.      pilocarpine (SALAGEN) 5 MG Tab Take 1 tablet (5 mg total) by mouth 3 (three) times daily as needed. 270 tablet 3    PREVIDENT 5000 BOOSTER PLUS 1.1 % Pste       RESTASIS 0.05 % ophthalmic emulsion       risedronate 35 mg TbEC TAKE 1 TABLET ONCE A WEEK 12 tablet 3    sumatriptan (IMITREX) 100 MG tablet Take 1 tablet (100 mg total) by mouth every 2 (two) hours as needed for Migraine. 9 tablet 1    tretinoin (RETIN-A) 0.025 % gel Apply small amount to entire face qhs. Wash off qam and apply sunscreen. 45 g 3    UNABLE TO FIND Apply 240 g topically as needed. Ketoprofen/cyclobenzaprine HCI/Lidocaine (lipomax) 10/2/5% Up to 5 times daily as needed for pain  99     zolpidem (AMBIEN) 10 mg Tab TAKE 1 TABLET(10 MG) BY MOUTH EVERY NIGHT AS NEEDED 30 tablet 2     Current Facility-Administered Medications on File Prior to Visit   Medication Dose Route Frequency Provider Last Rate Last Admin    0.9%  NaCl infusion  500 mL Intravenous Continuous Monique Philip MD               Review of Systems   Constitutional:  Negative for chills and fever.   HENT:  Negative for ear pain.    Eyes:  Negative for pain.   Respiratory:  Negative for chest tightness.    Cardiovascular:  Positive for leg swelling. Negative for chest pain.        +pedal edema   Gastrointestinal:  Negative for abdominal pain.   Genitourinary:  Negative for flank pain.   Musculoskeletal:  Negative for gait problem.   Neurological:  Negative for syncope.   Psychiatric/Behavioral:  Negative for behavioral problems.      Objective:      Physical Exam  Musculoskeletal:      Comments: Feet with mild to moderate pedal edema       Assessment:       1. Edema, unspecified type    2. Raynaud's disease without gangrene    3. Vascular disorder: Erytnromelalgia        Plan:       Berenice was seen today for leg swelling.    Diagnoses and all orders for this visit:    Edema, unspecified type  -     Ambulatory referral/consult to Cardiology; Future    Raynaud's disease without gangrene  -     Ambulatory referral/consult to Cardiology; Future    Vascular disorder: Erytnromelalgia  -     Ambulatory referral/consult to Cardiology; Future - Dr Dash    Cc: PCP    30 min spent with pt, 90% of it in discussion aobut her vascular history and problems and possible solutions

## 2023-02-20 ENCOUNTER — OFFICE VISIT (OUTPATIENT)
Dept: CARDIOLOGY | Facility: CLINIC | Age: 63
End: 2023-02-20
Payer: COMMERCIAL

## 2023-02-20 VITALS
DIASTOLIC BLOOD PRESSURE: 67 MMHG | HEIGHT: 60 IN | HEART RATE: 96 BPM | BODY MASS INDEX: 18.91 KG/M2 | SYSTOLIC BLOOD PRESSURE: 93 MMHG | WEIGHT: 96.31 LBS

## 2023-02-20 DIAGNOSIS — I83.12 VARICOSE VEINS OF BOTH LOWER EXTREMITIES WITH INFLAMMATION: ICD-10-CM

## 2023-02-20 DIAGNOSIS — I99.9 VASCULAR DISORDER: ICD-10-CM

## 2023-02-20 DIAGNOSIS — R60.0 EDEMA OF BOTH LOWER EXTREMITIES: ICD-10-CM

## 2023-02-20 DIAGNOSIS — M35.0B SJOGREN'S SYNDROME WITH VASCULITIS: ICD-10-CM

## 2023-02-20 DIAGNOSIS — M79.7 FIBROMYALGIA: ICD-10-CM

## 2023-02-20 DIAGNOSIS — Z82.49 FAMILY HISTORY OF ISCHEMIC HEART DISEASE (IHD): ICD-10-CM

## 2023-02-20 DIAGNOSIS — I73.81 ERYTHROMELALGIA: ICD-10-CM

## 2023-02-20 DIAGNOSIS — R60.9 EDEMA, UNSPECIFIED TYPE: Primary | ICD-10-CM

## 2023-02-20 DIAGNOSIS — I83.11 VARICOSE VEINS OF BOTH LOWER EXTREMITIES WITH INFLAMMATION: ICD-10-CM

## 2023-02-20 DIAGNOSIS — R60.9 DEPENDENT EDEMA: ICD-10-CM

## 2023-02-20 DIAGNOSIS — I73.00 RAYNAUD'S DISEASE WITHOUT GANGRENE: ICD-10-CM

## 2023-02-20 PROCEDURE — 99205 PR OFFICE/OUTPT VISIT, NEW, LEVL V, 60-74 MIN: ICD-10-PCS | Mod: S$GLB,,, | Performed by: INTERNAL MEDICINE

## 2023-02-20 PROCEDURE — 1159F MED LIST DOCD IN RCRD: CPT | Mod: CPTII,S$GLB,, | Performed by: INTERNAL MEDICINE

## 2023-02-20 PROCEDURE — 3008F PR BODY MASS INDEX (BMI) DOCUMENTED: ICD-10-PCS | Mod: CPTII,S$GLB,, | Performed by: INTERNAL MEDICINE

## 2023-02-20 PROCEDURE — 99205 OFFICE O/P NEW HI 60 MIN: CPT | Mod: S$GLB,,, | Performed by: INTERNAL MEDICINE

## 2023-02-20 PROCEDURE — 99999 PR PBB SHADOW E&M-EST. PATIENT-LVL V: CPT | Mod: PBBFAC,,, | Performed by: INTERNAL MEDICINE

## 2023-02-20 PROCEDURE — 3074F SYST BP LT 130 MM HG: CPT | Mod: CPTII,S$GLB,, | Performed by: INTERNAL MEDICINE

## 2023-02-20 PROCEDURE — 3078F PR MOST RECENT DIASTOLIC BLOOD PRESSURE < 80 MM HG: ICD-10-PCS | Mod: CPTII,S$GLB,, | Performed by: INTERNAL MEDICINE

## 2023-02-20 PROCEDURE — 99999 PR PBB SHADOW E&M-EST. PATIENT-LVL V: ICD-10-PCS | Mod: PBBFAC,,, | Performed by: INTERNAL MEDICINE

## 2023-02-20 PROCEDURE — 3008F BODY MASS INDEX DOCD: CPT | Mod: CPTII,S$GLB,, | Performed by: INTERNAL MEDICINE

## 2023-02-20 PROCEDURE — 3078F DIAST BP <80 MM HG: CPT | Mod: CPTII,S$GLB,, | Performed by: INTERNAL MEDICINE

## 2023-02-20 PROCEDURE — 1159F PR MEDICATION LIST DOCUMENTED IN MEDICAL RECORD: ICD-10-PCS | Mod: CPTII,S$GLB,, | Performed by: INTERNAL MEDICINE

## 2023-02-20 PROCEDURE — 3074F PR MOST RECENT SYSTOLIC BLOOD PRESSURE < 130 MM HG: ICD-10-PCS | Mod: CPTII,S$GLB,, | Performed by: INTERNAL MEDICINE

## 2023-02-20 RX ORDER — BUMETANIDE 0.5 MG/1
0.5 TABLET ORAL
Qty: 90 TABLET | Refills: 3 | Status: SHIPPED | OUTPATIENT
Start: 2023-02-20 | End: 2024-03-13

## 2023-02-20 NOTE — PROGRESS NOTES
"[facility]      Chief Complaint   Patient presents with    Edema       Patient ID: Berenice Hayes is a 62 y.o. female with raynaud's phenomenon, erytnromelalgia, fibromyalgia, Hashimoto's Thyroiditis, who presents for an initial appointment.  Pertinent history/events are as follows:     -Pt follow by Dr. Reagan in the past for raynaud's phenomenon, erytnromelalgia (per his note on 9/30/2019):  "She is here for follow up of extensive raynaud and erytnromelalgia complicated with severe distal digits pain. No ulcers. Overtime she has found a way to adjust her living and working conditions to avoid any severe issues. Last year has been a tough with more symptoms involving her face in addition to her digits. She takes aspirin 81 mg daily. She has no macrovascular disease. LUIS MANUEL and WBI in 2013 were unremarkable. ECG 6/2017: NSR. She sees rheumatology for hashimoto + sicca.  Plan:  Continue with current medical plan and lifestyle changes.  Return sooner for concerns or questions. If symptoms persist go to the ED  I have reviewed all pertinent data on this patient    Continue to adjust lifestyle around triggers for either Raynaud or Erytnromelalgia  Exposure to cold or extreme heat   No invasive procedures   Follow up with PCP, Rheumatology, and Neurology"    HPI:  Mrs. Hayes reports persistent leg swelling starting 2 months ago.  States she wears OTC compression hose daily with no improvement.  She has no claudication or tissue loss.     Past Medical History:   Diagnosis Date    Asthma with allergic rhinitis     Bladder spasm     COVID 05/05/2022    Dense breasts     heterogeneously/fibrocystic disease    Elevated antinuclear antibody (GUICHO) level     Endometriosis of cervix     Erythromelalgia     Fibromyalgia     Ganglion cyst     left toe    Goiter     MNG    Hashimoto's disease     Hashimoto's thyroiditis     Headache(784.0)     Hypothyroidism     Hashimoto with + Tg ab    Kidney stone     Osteoporosis "     femoral neck    Pernio     Pernio 2018    Raynaud's phenomenon     Rhinitis     Scoliosis     Sjogren's syndrome     Sleep disorder     type of Narcolepsy ( resolved)    Vitamin D deficiency disease     Vulvodynia      Past Surgical History:   Procedure Laterality Date    BREAST SURGERY      fibrocystic tumor removed     SECTION      2x    CYST REMOVAL      laparoscopic cyst on ovary    HYSTERECTOMY      INJECTION OF ANESTHETIC AGENT AROUND NERVE N/A 2019    Procedure: BLOCK, NERVE COCCYGEAL  1 OF 2  Pt. can drive herself home;  Surgeon: Monique Philip MD;  Location: [facility]IN [facility];  Service: Pain Management;  Laterality: N/A;    INJECTION OF ANESTHETIC AGENT AROUND NERVE N/A 2019    Procedure: BLOCK, NERVE COCCYGEAL  2 OF 2  Pt. can drive herself home;  Surgeon: Monique Philip MD;  Location: [facility]IN [facility];  Service: Pain Management;  Laterality: N/A;    intradermal cyst       removed from left index finger    SINUS SURGERY      2x     Social History     Socioeconomic History    Marital status:    Occupational History     Employer: [employer]   Tobacco Use    Smoking status: Never    Smokeless tobacco: Never   Substance and Sexual Activity    Alcohol use: No    Drug use: No    Sexual activity: Not Currently     Birth control/protection: Surgical     Comment: single, RAMOS ov in situ    Social History Narrative         Social Determinants of Health     Financial Resource Strain: Low Risk     Difficulty of Paying Living Expenses: Not very hard   Food Insecurity: No Food Insecurity    Worried About Running Out of Food in the Last Year: Never true    Ran Out of Food in the Last Year: Never true   Transportation Needs: No Transportation Needs    Lack of Transportation (Medical): No    Lack of Transportation (Non-Medical): No   Physical Activity: Inactive    Days of Exercise per Week: 0 days    Minutes of Exercise per Session: 0 min   Stress: No Stress Concern Present    Feeling of  Stress : Not at all   Social Connections: Unknown    Frequency of Communication with Friends and Family: Three times a week    Frequency of Social Gatherings with Friends and Family: Once a week    Active Member of Clubs or Organizations: No    Attends Club or Organization Meetings: Never    Marital Status:    Housing Stability: Low Risk     Unable to Pay for Housing in the Last Year: No    Number of Places Lived in the Last Year: 1    Unstable Housing in the Last Year: No     Family History   Problem Relation Age of Onset    Diabetes Mother     Fibromyalgia Mother     Polymyalgia rheumatica Mother     Macular degeneration Mother     Arthritis Mother     Hypertension Mother     Kidney disease Mother     Pneumonia Mother     Adrenal disorder Mother         adrenal insufficiency    Coronary artery disease Father     Arthritis Father         osteoarthritis    Hypertension Father     Heart disease Father     Diabetes Father     Hyperlipidemia Father     Hyperlipidemia Brother     Cancer Brother         oral    Thyroid cancer Daughter     Cancer Daughter         thyroid    Hypothyroidism Daughter     Breast cancer Neg Hx     Ovarian cancer Neg Hx     Colon cancer Neg Hx     Melanoma Neg Hx        Review of patient's allergies indicates:  No Known Allergies    Medication List with Changes/Refills   Current Medications    ALBUTEROL (VENTOLIN HFA) 90 MCG/ACTUATION INHALER    Inhale 2 puffs into the lungs every 6 (six) hours as needed for Wheezing. Rescue    AMITRIPTYLINE (ELAVIL) 10 MG TABLET    TAKE 2 TABLETS ONCE DAILY WITH THE 50 MG FOR A TOTAL OF 70 MG    AMITRIPTYLINE (ELAVIL) 50 MG TABLET    TAKE 1 TABLET NIGHTLY WITH THE 10 MG AMITRIPTYLINE    ASPIRIN (ECOTRIN) 81 MG EC TABLET    Take 81 mg by mouth once daily.    AZASITE 1 % DROP    INSTILL 1 DROP INTO LEFT EYE BID    CLINDAMYCIN (CLEOCIN T) 1 % EXTERNAL SOLUTION    APPLY TOPICALLY TO THE AFFECTED AREA TWICE DAILY AS NEEDED FOR BREAKOUTS    ESTRADIOL  (VAGIFEM) 10 MCG TAB    INSERT 1 TABLET VAGINALLY THREE TIMES A WEEK    ESTRADIOL 0.05 MG/24 HR TD PTSW (VIVELLE-DOT) 0.05 MG/24 HR        FLUOCINONIDE (LIDEX) 0.05 % EXTERNAL SOLUTION    AAA scalp qday - bid prn pruritus    FLUTICASONE (FLONASE) 50 MCG/ACTUATION NASAL SPRAY    1 spray by Each Nare route once daily.    GABAPENTIN (NEURONTIN) 100 MG CAPSULE    TAKE 1 CAPSULE IN THE MORNING AND 4 CAPSULES AT BEDTIME    HYDROXYCHLOROQUINE (PLAQUENIL) 200 MG TABLET    Take 1 tablet (200 mg total) by mouth once daily.    KETOPROFEN 10 % CRPK    APPLY UP TO 4.8 GRAMS TO PAINFUL AREAS UP TO FIVE TIMES DAILY. RUB IN WELL    KETOPROFEN, BULK, TOP    Ketoprofen/flexeril/lidocaine (compound)    LIDOCAINE-PRILOCAINE (EMLA) CREAM    Apply topically as needed.    LIOTHYRONINE (CYTOMEL) 5 MCG TAB    TAKE THREE AND ONE-HALF TABLETS ONCE DAILY    METAXALONE (SKELAXIN) 800 MG TABLET    TAKE 1 TABLET TWICE A DAY AS NEEDED FOR SPASM    NIACINAMIDE 500 MG TAB    Take 1 tablet (500 mg total) by mouth 3 (three) times daily.    NURTEC 75 MG ODT    Take by mouth.    PILOCARPINE (SALAGEN) 5 MG TAB    Take 1 tablet (5 mg total) by mouth 3 (three) times daily as needed.    PREVIDENT 5000 BOOSTER PLUS 1.1 % PSTE        RESTASIS 0.05 % OPHTHALMIC EMULSION        RISEDRONATE 35 MG TBEC    TAKE 1 TABLET ONCE A WEEK    SUMATRIPTAN (IMITREX) 100 MG TABLET    Take 1 tablet (100 mg total) by mouth every 2 (two) hours as needed for Migraine.    TRETINOIN (RETIN-A) 0.025 % GEL    Apply small amount to entire face qhs. Wash off qam and apply sunscreen.    UNABLE TO FIND    Apply 240 g topically as needed. Ketoprofen/cyclobenzaprine HCI/Lidocaine (lipomax) 10/2/5% Up to 5 times daily as needed for pain    ZOLPIDEM (AMBIEN) 10 MG TAB    TAKE 1 TABLET(10 MG) BY MOUTH EVERY NIGHT AS NEEDED       Review of Systems  Constitution: Denies chills, fever, and sweats.  HENT: Denies headaches or blurry vision.  Cardiovascular: Denies chest pain or irregular heart  beat.  Respiratory: Denies cough or shortness of breath.  Gastrointestinal: Denies abdominal pain, nausea, or vomiting.  Musculoskeletal: Positive for leg swelling.   Neurological: Denies dizziness or focal weakness.  Psychiatric/Behavioral: Normal mental status.  Hematologic/Lymphatic: Denies bleeding problem or easy bruising/bleeding.  Skin: Denies rash or suspicious lesions    Physical Examination  BP 93/67   Pulse 96   Ht 5' (1.524 m)   Wt 43.7 kg (96 lb 5.5 oz)   LMP  (LMP Unknown)   BMI 18.82 kg/m²     Constitutional: No acute distress, conversant  HEENT: Sclera anicteric, Pupils equal, round and reactive to light, extraocular motions intact, Oropharynx clear  Neck: No JVD, no carotid bruits  Cardiovascular: regular rate and rhythm, no murmur, rubs or gallops, normal S1/S2  Pulmonary: Clear to auscultation bilaterally  Abdominal: Abdomen soft, nontender, nondistended, positive bowel sounds  Extremities: BLE's with trace pitting edema at ankles (L>R) with erythema and no warmth, prominent varicose veins   Pulses:  Carotid pulses are 2+ on the right side, and 2+ on the left side.  Radial pulses are 2+ on the right side, and 2+ on the left side.   Femoral pulses are 2+ on the right side, and 2+ on the left side.  Popliteal pulses are 2+ on the right side, and 2+ on the left side.   Dorsalis pedis pulses are 2+ on the right side, and 2+ on the left side.   Posterior tibial pulses are 2+ on the right side, and 2+ on the left side.    Skin: No ecchymosis, erythema, or ulcers  Psych: Alert and oriented x 3, appropriate affect  Neuro: CNII-XII intact, no focal deficits    Labs:  Most Recent Data  CBC:   Lab Results   Component Value Date    WBC 4.04 12/17/2022    HGB 13.6 12/17/2022    HCT 42.7 12/17/2022     12/17/2022    MCV 94 12/17/2022    RDW 12.8 12/17/2022     BMP:   Lab Results   Component Value Date     12/17/2022    K 4.3 12/17/2022     12/17/2022    CO2 29 12/17/2022    BUN 13  12/17/2022    CREATININE 0.6 12/17/2022    GLU 84 12/17/2022    CALCIUM 9.6 12/17/2022    MG 2.2 07/06/2007    PHOS 3.9 05/14/2022     LFTS;   Lab Results   Component Value Date    PROT 7.3 12/17/2022    ALBUMIN 4.3 12/17/2022    BILITOT 0.6 12/17/2022    AST 24 12/17/2022    ALKPHOS 59 12/17/2022    ALT 23 12/17/2022     COAGS: No results found for: INR, PROTIME, PTT  FLP:   Lab Results   Component Value Date    CHOL 143 09/10/2022    HDL 87 (H) 09/10/2022    LDLCALC 49.0 (L) 09/10/2022    TRIG 35 09/10/2022    CHOLHDL 60.8 (H) 09/10/2022     CARDIAC: No results found for: TROPONINI, CKMB, BNP    Imaging:    Assessment/Plan:  Berenice Hayes is a 62 y.o. female with raynaud's phenomenon, erytnromelalgia, fibromyalgia, Hashimoto's Thyroiditis, who presents for an initial appointment.     BLE Edema- Due to possible venous insufficiency and dependent edema from sitting most of the day.  Check BLE venous reflux study and LUIS MANUEL study.  Start bumex 0.5 mg on Mon, Wed, Fri, as tolerated by pt's blood pressure.  Check cmp in 1 week.  Pt to limit sodium intake to 2,000 mg daily.  Elevate legs when resting. Continue graduated compression hose.     3. Raynaud's disease without gangrene- Stable.  Continue current management.     3. Erytnromelalgia- Stable.  Continue ASA 81 mg daily.    Follow up in 3 months     Total duration of face to face visit time 30 minutes.  Total time spent counseling greater than fifty percent of total visit time.  Counseling included discussion regarding imaging findings, diagnosis, possibilities, treatment options, risks and benefits.  The patient had many questions regarding the options and long-term effects.    Isidro Dash MD, PhD  Interventional Cardiology

## 2023-02-20 NOTE — PATIENT INSTRUCTIONS
Assessment/Plan:  Berenice Hayes is a 62 y.o. female with raynaud's phenomenon, erytnromelalgia, fibromyalgia, Hashimoto's Thyroiditis, who presents for an initial appointment.     BLE Edema- Due to possible venous insufficiency and dependent edema from sitting most of the day.  Check BLE venous reflux study and LUIS MANUEL study.  Start bumex 0.5 mg on Mon, Wed, Fri, as tolerated by pt's blood pressure.  Check cmp in 1 week.  Pt to limit sodium intake to 2,000 mg daily.  Elevate legs when resting. Continue graduated compression hose.     3. Raynaud's disease without gangrene- Stable.  Continue current management.     3. Erytnromelalgia- Stable.  Continue ASA 81 mg daily.    Follow up in 3 months

## 2023-03-04 ENCOUNTER — LAB VISIT (OUTPATIENT)
Dept: LAB | Facility: HOSPITAL | Age: 63
End: 2023-03-04
Attending: INTERNAL MEDICINE
Payer: COMMERCIAL

## 2023-03-04 DIAGNOSIS — R60.9 EDEMA, UNSPECIFIED TYPE: ICD-10-CM

## 2023-03-04 LAB
ALBUMIN SERPL BCP-MCNC: 4.1 G/DL (ref 3.5–5.2)
ALP SERPL-CCNC: 74 U/L (ref 55–135)
ALT SERPL W/O P-5'-P-CCNC: 23 U/L (ref 10–44)
ANION GAP SERPL CALC-SCNC: 7 MMOL/L (ref 8–16)
AST SERPL-CCNC: 22 U/L (ref 10–40)
BILIRUB SERPL-MCNC: 0.3 MG/DL (ref 0.1–1)
BUN SERPL-MCNC: 18 MG/DL (ref 6–20)
CALCIUM SERPL-MCNC: 9.7 MG/DL (ref 8.7–10.5)
CHLORIDE SERPL-SCNC: 98 MMOL/L (ref 95–110)
CO2 SERPL-SCNC: 32 MMOL/L (ref 23–29)
CREAT SERPL-MCNC: 0.6 MG/DL (ref 0.5–1.4)
EST. GFR  (NO RACE VARIABLE): >60 ML/MIN/1.73 M^2
GLUCOSE SERPL-MCNC: 100 MG/DL (ref 70–110)
POTASSIUM SERPL-SCNC: 4.2 MMOL/L (ref 3.5–5.1)
PROT SERPL-MCNC: 7.2 G/DL (ref 6–8.4)
SODIUM SERPL-SCNC: 137 MMOL/L (ref 136–145)

## 2023-03-04 PROCEDURE — 80053 COMPREHEN METABOLIC PANEL: CPT | Performed by: INTERNAL MEDICINE

## 2023-03-04 PROCEDURE — 36415 COLL VENOUS BLD VENIPUNCTURE: CPT | Performed by: INTERNAL MEDICINE

## 2023-03-06 ENCOUNTER — OFFICE VISIT (OUTPATIENT)
Dept: OBSTETRICS AND GYNECOLOGY | Facility: CLINIC | Age: 63
End: 2023-03-06
Payer: COMMERCIAL

## 2023-03-06 VITALS
BODY MASS INDEX: 18.02 KG/M2 | HEIGHT: 61 IN | DIASTOLIC BLOOD PRESSURE: 70 MMHG | WEIGHT: 95.44 LBS | SYSTOLIC BLOOD PRESSURE: 100 MMHG

## 2023-03-06 DIAGNOSIS — Z01.419 WELL FEMALE EXAM WITH ROUTINE GYNECOLOGICAL EXAM: Primary | ICD-10-CM

## 2023-03-06 DIAGNOSIS — N95.2 ATROPHIC VAGINITIS: ICD-10-CM

## 2023-03-06 PROBLEM — R60.9 DEPENDENT EDEMA: Status: RESOLVED | Noted: 2023-02-20 | Resolved: 2023-03-06

## 2023-03-06 PROBLEM — Z79.899 ENCOUNTER FOR LONG-TERM (CURRENT) USE OF OTHER MEDICATIONS: Status: RESOLVED | Noted: 2021-12-22 | Resolved: 2023-03-06

## 2023-03-06 PROCEDURE — 1160F RVW MEDS BY RX/DR IN RCRD: CPT | Mod: CPTII,S$GLB,, | Performed by: OBSTETRICS & GYNECOLOGY

## 2023-03-06 PROCEDURE — 1160F PR REVIEW ALL MEDS BY PRESCRIBER/CLIN PHARMACIST DOCUMENTED: ICD-10-PCS | Mod: CPTII,S$GLB,, | Performed by: OBSTETRICS & GYNECOLOGY

## 2023-03-06 PROCEDURE — 99396 PREV VISIT EST AGE 40-64: CPT | Mod: S$GLB,,, | Performed by: OBSTETRICS & GYNECOLOGY

## 2023-03-06 PROCEDURE — 1159F PR MEDICATION LIST DOCUMENTED IN MEDICAL RECORD: ICD-10-PCS | Mod: CPTII,S$GLB,, | Performed by: OBSTETRICS & GYNECOLOGY

## 2023-03-06 PROCEDURE — 3008F BODY MASS INDEX DOCD: CPT | Mod: CPTII,S$GLB,, | Performed by: OBSTETRICS & GYNECOLOGY

## 2023-03-06 PROCEDURE — 1159F MED LIST DOCD IN RCRD: CPT | Mod: CPTII,S$GLB,, | Performed by: OBSTETRICS & GYNECOLOGY

## 2023-03-06 PROCEDURE — 3008F PR BODY MASS INDEX (BMI) DOCUMENTED: ICD-10-PCS | Mod: CPTII,S$GLB,, | Performed by: OBSTETRICS & GYNECOLOGY

## 2023-03-06 PROCEDURE — 99999 PR PBB SHADOW E&M-EST. PATIENT-LVL V: ICD-10-PCS | Mod: PBBFAC,,, | Performed by: OBSTETRICS & GYNECOLOGY

## 2023-03-06 PROCEDURE — 3078F PR MOST RECENT DIASTOLIC BLOOD PRESSURE < 80 MM HG: ICD-10-PCS | Mod: CPTII,S$GLB,, | Performed by: OBSTETRICS & GYNECOLOGY

## 2023-03-06 PROCEDURE — 3074F SYST BP LT 130 MM HG: CPT | Mod: CPTII,S$GLB,, | Performed by: OBSTETRICS & GYNECOLOGY

## 2023-03-06 PROCEDURE — 99999 PR PBB SHADOW E&M-EST. PATIENT-LVL V: CPT | Mod: PBBFAC,,, | Performed by: OBSTETRICS & GYNECOLOGY

## 2023-03-06 PROCEDURE — 3078F DIAST BP <80 MM HG: CPT | Mod: CPTII,S$GLB,, | Performed by: OBSTETRICS & GYNECOLOGY

## 2023-03-06 PROCEDURE — 3074F PR MOST RECENT SYSTOLIC BLOOD PRESSURE < 130 MM HG: ICD-10-PCS | Mod: CPTII,S$GLB,, | Performed by: OBSTETRICS & GYNECOLOGY

## 2023-03-06 PROCEDURE — 99396 PR PREVENTIVE VISIT,EST,40-64: ICD-10-PCS | Mod: S$GLB,,, | Performed by: OBSTETRICS & GYNECOLOGY

## 2023-03-06 RX ORDER — ESTRADIOL 10 UG/1
INSERT VAGINAL
Qty: 24 TABLET | Refills: 3 | Status: SHIPPED | OUTPATIENT
Start: 2023-03-06 | End: 2023-06-22

## 2023-03-06 RX ORDER — ESTRADIOL 0.05 MG/D
1 FILM, EXTENDED RELEASE TRANSDERMAL
Qty: 24 PATCH | Refills: 3 | Status: SHIPPED | OUTPATIENT
Start: 2023-03-06 | End: 2023-10-07 | Stop reason: SDUPTHER

## 2023-03-06 NOTE — PROGRESS NOTES
SUBJECTIVE:   62 y.o. female   for routine gyn exam. No LMP recorded (lmp unknown). Patient has had a hysterectomy..  She desires refill of Vagifem and Vivelle patch for menopause sx.         Past Medical History:   Diagnosis Date    Asthma with allergic rhinitis     Bladder spasm     Dense breasts     heterogeneously/fibrocystic disease    Elevated antinuclear antibody (GUICHO) level     Erythromelalgia     Fibromyalgia     Ganglion cyst     left toe    Goiter     MNG    Hashimoto's disease     Hashimoto's thyroiditis     Headache(784.0)     Hypothyroidism     Hashimoto with + Tg ab    Kidney stone     Osteoporosis     femoral neck    Pernio 2018    Raynaud's phenomenon     Rhinitis     Scoliosis     Sjogren's syndrome     Sleep disorder     type of Narcolepsy ( resolved)    Vitamin D deficiency disease     Vulvodynia      Past Surgical History:   Procedure Laterality Date    BREAST SURGERY      fibrocystic tumor removed     SECTION      2x    CYST REMOVAL      laparoscopic cyst on ovary    HYSTERECTOMY      INJECTION OF ANESTHETIC AGENT AROUND NERVE N/A 2019    Procedure: BLOCK, NERVE COCCYGEAL  1 OF 2  Pt. can drive herself home;  Surgeon: Monique Philip MD;  Location: [facility]IN [facility];  Service: Pain Management;  Laterality: N/A;    INJECTION OF ANESTHETIC AGENT AROUND NERVE N/A 2019    Procedure: BLOCK, NERVE COCCYGEAL  2 OF 2  Pt. can drive herself home;  Surgeon: Monique Philip MD;  Location: [facility]IN [facility]GT;  Service: Pain Management;  Laterality: N/A;    intradermal cyst       removed from left index finger    SINUS SURGERY      2x     Social History     Socioeconomic History    Marital status:    Occupational History     Employer: [employer]   Tobacco Use    Smoking status: Never    Smokeless tobacco: Never   Substance and Sexual Activity    Alcohol use: No    Drug use: No    Sexual activity: Not Currently     Birth control/protection: Surgical     Comment: single, RAMOS ov in  situ    Social History Narrative         Social Determinants of Health     Financial Resource Strain: Low Risk     Difficulty of Paying Living Expenses: Not very hard   Food Insecurity: No Food Insecurity    Worried About Running Out of Food in the Last Year: Never true    Ran Out of Food in the Last Year: Never true   Transportation Needs: No Transportation Needs    Lack of Transportation (Medical): No    Lack of Transportation (Non-Medical): No   Physical Activity: Inactive    Days of Exercise per Week: 0 days    Minutes of Exercise per Session: 0 min   Stress: No Stress Concern Present    Feeling of Stress : Not at all   Social Connections: Unknown    Frequency of Communication with Friends and Family: Three times a week    Frequency of Social Gatherings with Friends and Family: Once a week    Active Member of Clubs or Organizations: No    Attends Club or Organization Meetings: Never    Marital Status:    Housing Stability: Low Risk     Unable to Pay for Housing in the Last Year: No    Number of Places Lived in the Last Year: 1    Unstable Housing in the Last Year: No     Family History   Problem Relation Age of Onset    Diabetes Mother     Fibromyalgia Mother     Polymyalgia rheumatica Mother     Macular degeneration Mother     Arthritis Mother     Hypertension Mother     Kidney disease Mother     Pneumonia Mother     Adrenal disorder Mother         adrenal insufficiency    Coronary artery disease Father     Arthritis Father         osteoarthritis    Hypertension Father     Heart disease Father     Diabetes Father     Hyperlipidemia Father     Hyperlipidemia Brother     Cancer Brother         oral    Thyroid cancer Daughter     Cancer Daughter         thyroid    Hypothyroidism Daughter     Breast cancer Neg Hx     Ovarian cancer Neg Hx     Colon cancer Neg Hx     Melanoma Neg Hx      OB History    Para Term  AB Living   2 2 2     2   SAB IAB Ectopic Multiple Live Births                   # Outcome Date GA Lbr Jarrod/2nd Weight Sex Delivery Anes PTL Lv   2 Term            1 Term                  Current Outpatient Medications   Medication Sig Dispense Refill    albuterol (VENTOLIN HFA) 90 mcg/actuation inhaler Inhale 2 puffs into the lungs every 6 (six) hours as needed for Wheezing. Rescue 18 g 2    amitriptyline (ELAVIL) 10 MG tablet TAKE 2 TABLETS ONCE DAILY WITH THE 50 MG FOR A TOTAL OF 70  tablet 3    amitriptyline (ELAVIL) 50 MG tablet TAKE 1 TABLET NIGHTLY WITH THE 10 MG AMITRIPTYLINE 90 tablet 3    aspirin (ECOTRIN) 81 MG EC tablet Take 81 mg by mouth once daily.      AZASITE 1 % Drop INSTILL 1 DROP INTO LEFT EYE BID      bumetanide (BUMEX) 0.5 MG Tab Take 1 tablet (0.5 mg total) by mouth every Mon, Wed, Fri. 90 tablet 3    clindamycin (CLEOCIN T) 1 % external solution APPLY TOPICALLY TO THE AFFECTED AREA TWICE DAILY AS NEEDED FOR BREAKOUTS 30 mL 3    fluocinonide (LIDEX) 0.05 % external solution AAA scalp qday - bid prn pruritus 60 mL 3    fluticasone (FLONASE) 50 mcg/actuation nasal spray 1 spray by Each Nare route once daily. 3 Bottle 3    gabapentin (NEURONTIN) 100 MG capsule TAKE 1 CAPSULE IN THE MORNING AND 4 CAPSULES AT BEDTIME 450 capsule 3    hydrOXYchloroQUINE (PLAQUENIL) 200 mg tablet Take 1 tablet (200 mg total) by mouth once daily. 90 tablet 0    ketoprofen 10 % CrPk APPLY UP TO 4.8 GRAMS TO PAINFUL AREAS UP TO FIVE TIMES DAILY. RUB IN WELL      KETOPROFEN, BULK, TOP Ketoprofen/flexeril/lidocaine (compound)      LIDOcaine-prilocaine (EMLA) cream Apply topically as needed. 90 g 3    liothyronine (CYTOMEL) 5 MCG Tab TAKE THREE AND ONE-HALF TABLETS ONCE DAILY 315 tablet 3    metaxalone (SKELAXIN) 800 MG tablet TAKE 1 TABLET TWICE A DAY AS NEEDED FOR SPASM 180 tablet 3    niacinamide 500 mg Tab Take 1 tablet (500 mg total) by mouth 3 (three) times daily. 270 tablet 3    NURTEC 75 mg odt Take by mouth.      pilocarpine (SALAGEN) 5 MG Tab Take 1 tablet (5 mg total) by mouth 3  (three) times daily as needed. 270 tablet 3    PREVIDENT 5000 BOOSTER PLUS 1.1 % Pste       RESTASIS 0.05 % ophthalmic emulsion       risedronate 35 mg TbEC TAKE 1 TABLET ONCE A WEEK 12 tablet 3    tretinoin (RETIN-A) 0.025 % gel Apply small amount to entire face qhs. Wash off qam and apply sunscreen. 45 g 3    UNABLE TO FIND Apply 240 g topically as needed. Ketoprofen/cyclobenzaprine HCI/Lidocaine (lipomax) 10/2/5% Up to 5 times daily as needed for pain  99    zolpidem (AMBIEN) 10 mg Tab TAKE 1 TABLET(10 MG) BY MOUTH EVERY NIGHT AS NEEDED 30 tablet 2    estradioL (VAGIFEM) 10 mcg Tab INSERT 1 TABLET VAGINALLY TWICE A WEEK 24 tablet 3    estradiol 0.05 mg/24 hr td ptsw (VIVELLE-DOT) 0.05 mg/24 hr Place 1 patch onto the skin twice a week. 24 patch 3    sumatriptan (IMITREX) 100 MG tablet Take 1 tablet (100 mg total) by mouth every 2 (two) hours as needed for Migraine. 9 tablet 1     No current facility-administered medications for this visit.     Allergies: Patient has no known allergies.     ROS:  Constitutional: no weight loss, weight gain, fever, fatigue  Eyes:  No vision changes, glasses/contacts  ENT/Mouth: No ulcers, sinus problems, ears ringing, headache  Cardiovascular: No inability to lie flat, chest pain, exercise intolerance, swelling, heart palpitations  Respiratory: No wheezing, coughing blood, shortness of breath, or cough  Gastrointestinal: No diarrhea, bloody stool, nausea/vomiting, constipation, gas, hemorrhoids  Genitourinary: No blood in urine, painful urination, urgency of urination, frequency of urination, incomplete emptying, incontinence, abnormal bleeding, painful periods, heavy periods, vaginal discharge, vaginal odor, painful intercourse, sexual problems, bleeding after intercourse.  Musculoskeletal: No muscle weakness  Skin/Breast: No painful breasts, nipple discharge, masses, rash, ulcers  Neurological: No passing out, seizures, numbness, headache  Endocrine: No diabetes, hypothyroid,  "hyperthyroid, hot flashes, hair loss, abnormal hair growth, acne  Psychiatric: No depression, crying  Hematologic: No bruises, bleeding, swollen lymph nodes, anemia.      OBJECTIVE:   The patient appears well, alert, oriented x 3, in no distress.  /70 (BP Location: Right arm, Patient Position: Sitting, BP Method: Medium (Manual))   Ht 5' 1" (1.549 m)   Wt 43.3 kg (95 lb 7.4 oz)   LMP  (LMP Unknown)   BMI 18.04 kg/m²   NECK: no thyromegaly, trachea midline  SKIN: no acne, striae, hirsutism  CHEST: CTAB  CV: RRR  BREAST EXAM: breasts appear normal, no suspicious masses, no skin or nipple changes or axillary nodes  ABDOMEN: no hernias, masses, or hepatosplenomegaly  GENITALIA: normal external genitalia, no erythema, no discharge  URETHRA: normal urethra, normal urethral meatus  VAGINA: Normal  CERVIX: absent  UTERUS: absent  ADNEXA: no mass, fullness, tenderness      ASSESSMENT:   1. Well female exam with routine gynecological exam        2. Atrophic vaginitis  estradioL (VAGIFEM) 10 mcg Tab          PLAN:   Orders Placed This Encounter    estradioL (VAGIFEM) 10 mcg Tab    estradiol 0.05 mg/24 hr td ptsw (VIVELLE-DOT) 0.05 mg/24 hr     Discussed HRT, WHI, healthy lifestyle including regular exercise, healthy eating, etc.  Return to clinic in 1 year    "

## 2023-03-14 ENCOUNTER — PATIENT MESSAGE (OUTPATIENT)
Dept: NEUROLOGY | Facility: CLINIC | Age: 63
End: 2023-03-14
Payer: COMMERCIAL

## 2023-03-16 ENCOUNTER — HOSPITAL ENCOUNTER (OUTPATIENT)
Dept: CARDIOLOGY | Facility: HOSPITAL | Age: 63
Discharge: HOME | End: 2023-03-16
Attending: INTERNAL MEDICINE
Payer: COMMERCIAL

## 2023-03-16 DIAGNOSIS — R60.9 EDEMA, UNSPECIFIED TYPE: ICD-10-CM

## 2023-03-16 LAB
LEFT ABI: 0.99
LEFT ARM BP: 93 MMHG
LEFT DORSALIS PEDIS: 97 MMHG
LEFT GREAT SAPHENOUS DISTAL THIGH DIA: 0.25 CM
LEFT GREAT SAPHENOUS JUNCTION DIA: 0.24 CM
LEFT GREAT SAPHENOUS KNEE DIA: 0.18 CM
LEFT GREAT SAPHENOUS MIDDLE THIGH DIA: 0.16 CM
LEFT GREAT SAPHENOUS PROXIMAL CALF DIA: 0.26 CM
LEFT POSTERIOR TIBIAL: 94 MMHG
LEFT SMALL SAPHENOUS KNEE DIA: 0.2 CM
LEFT SMALL SAPHENOUS SPJ DIA: 0.14 CM
RIGHT ABI: 0.96
RIGHT ARM BP: 98 MMHG
RIGHT DORSALIS PEDIS: 94 MMHG
RIGHT GREAT SAPHENOUS DISTAL THIGH DIA: 0.26 CM
RIGHT GREAT SAPHENOUS JUNCTION DIA: 0.38 CM
RIGHT GREAT SAPHENOUS KNEE DIA: 0.22 CM
RIGHT GREAT SAPHENOUS MIDDLE THIGH DIA: 0.26 CM
RIGHT GREAT SAPHENOUS MIDDLE THIGH REFLUX: 691 MS
RIGHT GREAT SAPHENOUS PROXIMAL CALF DIA: 0.26 CM
RIGHT POSTERIOR TIBIAL: 93 MMHG
RIGHT SMALL SAPHENOUS KNEE DIA: 0.22 CM
RIGHT SMALL SAPHENOUS SPJ DIA: 0.15 CM

## 2023-03-16 PROCEDURE — 93970 EXTREMITY STUDY: CPT | Mod: TC

## 2023-03-16 PROCEDURE — 93922 ANKLE BRACHIAL INDICES (ABI): ICD-10-PCS | Mod: 26,,, | Performed by: INTERNAL MEDICINE

## 2023-03-16 PROCEDURE — 93922 UPR/L XTREMITY ART 2 LEVELS: CPT | Mod: 26,,, | Performed by: INTERNAL MEDICINE

## 2023-03-16 PROCEDURE — 93922 UPR/L XTREMITY ART 2 LEVELS: CPT

## 2023-03-16 PROCEDURE — 93970 EXTREMITY STUDY: CPT | Mod: 26,,, | Performed by: INTERNAL MEDICINE

## 2023-03-16 PROCEDURE — 93970 CV US LOWER VENOUS INSUFFICIENCY BILATERAL (CUPID ONLY): ICD-10-PCS | Mod: 26,,, | Performed by: INTERNAL MEDICINE

## 2023-03-17 ENCOUNTER — PATIENT MESSAGE (OUTPATIENT)
Dept: RHEUMATOLOGY | Facility: CLINIC | Age: 63
End: 2023-03-17
Payer: COMMERCIAL

## 2023-03-18 ENCOUNTER — LAB VISIT (OUTPATIENT)
Dept: LAB | Facility: HOSPITAL | Age: 63
End: 2023-03-18
Attending: INTERNAL MEDICINE
Payer: COMMERCIAL

## 2023-03-18 DIAGNOSIS — M79.671 BILATERAL FOOT PAIN: ICD-10-CM

## 2023-03-18 DIAGNOSIS — I73.00 RAYNAUD'S DISEASE WITHOUT GANGRENE: ICD-10-CM

## 2023-03-18 DIAGNOSIS — M79.642 BILATERAL HAND PAIN: ICD-10-CM

## 2023-03-18 DIAGNOSIS — M35.00 SJOGREN'S SYNDROME, WITH UNSPECIFIED ORGAN INVOLVEMENT: ICD-10-CM

## 2023-03-18 DIAGNOSIS — M79.672 BILATERAL FOOT PAIN: ICD-10-CM

## 2023-03-18 DIAGNOSIS — M25.561 PAIN IN BOTH KNEES, UNSPECIFIED CHRONICITY: ICD-10-CM

## 2023-03-18 DIAGNOSIS — M79.641 BILATERAL HAND PAIN: ICD-10-CM

## 2023-03-18 DIAGNOSIS — M25.562 PAIN IN BOTH KNEES, UNSPECIFIED CHRONICITY: ICD-10-CM

## 2023-03-18 DIAGNOSIS — M79.7 FIBROMYALGIA: ICD-10-CM

## 2023-03-18 DIAGNOSIS — D84.9 IMMUNOSUPPRESSION: ICD-10-CM

## 2023-03-18 DIAGNOSIS — R53.83 FATIGUE, UNSPECIFIED TYPE: ICD-10-CM

## 2023-03-18 DIAGNOSIS — I73.81 ERYTHROMELALGIA: ICD-10-CM

## 2023-03-18 LAB
ALBUMIN SERPL BCP-MCNC: 4.4 G/DL (ref 3.5–5.2)
ALP SERPL-CCNC: 61 U/L (ref 55–135)
ALT SERPL W/O P-5'-P-CCNC: 28 U/L (ref 10–44)
ANION GAP SERPL CALC-SCNC: 8 MMOL/L (ref 8–16)
AST SERPL-CCNC: 25 U/L (ref 10–40)
BASOPHILS # BLD AUTO: 0.07 K/UL (ref 0–0.2)
BASOPHILS NFR BLD: 1.4 % (ref 0–1.9)
BILIRUB SERPL-MCNC: 0.6 MG/DL (ref 0.1–1)
BUN SERPL-MCNC: 19 MG/DL (ref 8–23)
C3 SERPL-MCNC: 104 MG/DL (ref 50–180)
C4 SERPL-MCNC: 23 MG/DL (ref 11–44)
CALCIUM SERPL-MCNC: 10.1 MG/DL (ref 8.7–10.5)
CHLORIDE SERPL-SCNC: 99 MMOL/L (ref 95–110)
CK SERPL-CCNC: 38 U/L (ref 20–180)
CO2 SERPL-SCNC: 33 MMOL/L (ref 23–29)
CREAT SERPL-MCNC: 0.7 MG/DL (ref 0.5–1.4)
CRP SERPL-MCNC: 0.5 MG/L (ref 0–8.2)
DIFFERENTIAL METHOD: ABNORMAL
EOSINOPHIL # BLD AUTO: 0.1 K/UL (ref 0–0.5)
EOSINOPHIL NFR BLD: 2.5 % (ref 0–8)
ERYTHROCYTE [DISTWIDTH] IN BLOOD BY AUTOMATED COUNT: 12.9 % (ref 11.5–14.5)
ERYTHROCYTE [SEDIMENTATION RATE] IN BLOOD BY PHOTOMETRIC METHOD: 4 MM/HR (ref 0–36)
EST. GFR  (NO RACE VARIABLE): >60 ML/MIN/1.73 M^2
GLUCOSE SERPL-MCNC: 88 MG/DL (ref 70–110)
HCT VFR BLD AUTO: 43 % (ref 37–48.5)
HGB BLD-MCNC: 13.8 G/DL (ref 12–16)
IMM GRANULOCYTES # BLD AUTO: 0.01 K/UL (ref 0–0.04)
IMM GRANULOCYTES NFR BLD AUTO: 0.2 % (ref 0–0.5)
LYMPHOCYTES # BLD AUTO: 0.6 K/UL (ref 1–4.8)
LYMPHOCYTES NFR BLD: 12.7 % (ref 18–48)
MCH RBC QN AUTO: 29.9 PG (ref 27–31)
MCHC RBC AUTO-ENTMCNC: 32.1 G/DL (ref 32–36)
MCV RBC AUTO: 93 FL (ref 82–98)
MONOCYTES # BLD AUTO: 0.4 K/UL (ref 0.3–1)
MONOCYTES NFR BLD: 7.8 % (ref 4–15)
NEUTROPHILS # BLD AUTO: 3.7 K/UL (ref 1.8–7.7)
NEUTROPHILS NFR BLD: 75.4 % (ref 38–73)
NRBC BLD-RTO: 0 /100 WBC
PLATELET # BLD AUTO: 185 K/UL (ref 150–450)
PMV BLD AUTO: 10.3 FL (ref 9.2–12.9)
POTASSIUM SERPL-SCNC: 4.5 MMOL/L (ref 3.5–5.1)
PROT SERPL-MCNC: 7.5 G/DL (ref 6–8.4)
RBC # BLD AUTO: 4.62 M/UL (ref 4–5.4)
SODIUM SERPL-SCNC: 140 MMOL/L (ref 136–145)
WBC # BLD AUTO: 4.87 K/UL (ref 3.9–12.7)

## 2023-03-18 PROCEDURE — 86160 COMPLEMENT ANTIGEN: CPT | Mod: 59 | Performed by: INTERNAL MEDICINE

## 2023-03-18 PROCEDURE — 86160 COMPLEMENT ANTIGEN: CPT | Performed by: INTERNAL MEDICINE

## 2023-03-18 PROCEDURE — 85652 RBC SED RATE AUTOMATED: CPT | Performed by: INTERNAL MEDICINE

## 2023-03-18 PROCEDURE — 82550 ASSAY OF CK (CPK): CPT | Performed by: INTERNAL MEDICINE

## 2023-03-18 PROCEDURE — 36415 COLL VENOUS BLD VENIPUNCTURE: CPT | Performed by: INTERNAL MEDICINE

## 2023-03-18 PROCEDURE — 85025 COMPLETE CBC W/AUTO DIFF WBC: CPT | Performed by: INTERNAL MEDICINE

## 2023-03-18 PROCEDURE — 86140 C-REACTIVE PROTEIN: CPT | Performed by: INTERNAL MEDICINE

## 2023-03-18 PROCEDURE — 86225 DNA ANTIBODY NATIVE: CPT | Performed by: INTERNAL MEDICINE

## 2023-03-18 PROCEDURE — 80053 COMPREHEN METABOLIC PANEL: CPT | Performed by: INTERNAL MEDICINE

## 2023-03-20 ENCOUNTER — PATIENT MESSAGE (OUTPATIENT)
Dept: RHEUMATOLOGY | Facility: CLINIC | Age: 63
End: 2023-03-20
Payer: COMMERCIAL

## 2023-03-20 LAB — DSDNA AB SER-ACNC: NORMAL [IU]/ML

## 2023-03-22 ENCOUNTER — HOSPITAL ENCOUNTER (OUTPATIENT)
Dept: RADIOLOGY | Facility: HOSPITAL | Age: 63
Discharge: HOME | End: 2023-03-22
Attending: OBSTETRICS & GYNECOLOGY
Payer: COMMERCIAL

## 2023-03-22 VITALS — WEIGHT: 95 LBS | BODY MASS INDEX: 17.95 KG/M2

## 2023-03-22 DIAGNOSIS — Z12.31 ENCOUNTER FOR SCREENING MAMMOGRAM FOR MALIGNANT NEOPLASM OF BREAST: ICD-10-CM

## 2023-03-22 PROCEDURE — 77067 SCR MAMMO BI INCL CAD: CPT | Mod: 26,,, | Performed by: RADIOLOGY

## 2023-03-22 PROCEDURE — 77067 SCR MAMMO BI INCL CAD: CPT | Mod: TC

## 2023-03-22 PROCEDURE — 77067 MAMMO DIGITAL SCREENING BILAT WITH TOMO: ICD-10-PCS | Mod: 26,,, | Performed by: RADIOLOGY

## 2023-03-22 PROCEDURE — 77063 MAMMO DIGITAL SCREENING BILAT WITH TOMO: ICD-10-PCS | Mod: 26,,, | Performed by: RADIOLOGY

## 2023-03-22 PROCEDURE — 77063 BREAST TOMOSYNTHESIS BI: CPT | Mod: 26,,, | Performed by: RADIOLOGY

## 2023-03-24 ENCOUNTER — PATIENT MESSAGE (OUTPATIENT)
Dept: INTERNAL MEDICINE | Facility: CLINIC | Age: 63
End: 2023-03-24
Payer: COMMERCIAL

## 2023-03-24 DIAGNOSIS — G47.00 INSOMNIA, UNSPECIFIED TYPE: ICD-10-CM

## 2023-03-24 RX ORDER — ZOLPIDEM TARTRATE 10 MG/1
TABLET ORAL
Qty: 30 TABLET | Refills: 0 | Status: SHIPPED | OUTPATIENT
Start: 2023-03-24 | End: 2023-05-25 | Stop reason: SDUPTHER

## 2023-03-24 NOTE — TELEPHONE ENCOUNTER
No new care gaps identified.  [facility] Embedded Care Gaps. Reference number: [id number]. 3/24/2023   8:15:05 AM CDT

## 2023-04-17 ENCOUNTER — LAB VISIT (OUTPATIENT)
Dept: LAB | Facility: HOSPITAL | Age: 63
End: 2023-04-17
Attending: INTERNAL MEDICINE
Payer: COMMERCIAL

## 2023-04-17 DIAGNOSIS — E03.8 HYPOTHYROIDISM DUE TO HASHIMOTO'S THYROIDITIS: ICD-10-CM

## 2023-04-17 DIAGNOSIS — M81.0 OSTEOPOROSIS, POSTMENOPAUSAL: ICD-10-CM

## 2023-04-17 DIAGNOSIS — E06.3 HYPOTHYROIDISM DUE TO HASHIMOTO'S THYROIDITIS: ICD-10-CM

## 2023-04-17 LAB
25(OH)D3+25(OH)D2 SERPL-MCNC: 97 NG/ML (ref 30–96)
T3 SERPL-MCNC: 74 NG/DL (ref 60–180)
T4 FREE SERPL-MCNC: 0.53 NG/DL (ref 0.71–1.51)
TSH SERPL DL<=0.005 MIU/L-ACNC: 3.48 UIU/ML (ref 0.4–4)

## 2023-04-17 PROCEDURE — 84480 ASSAY TRIIODOTHYRONINE (T3): CPT | Performed by: INTERNAL MEDICINE

## 2023-04-17 PROCEDURE — 84439 ASSAY OF FREE THYROXINE: CPT | Performed by: INTERNAL MEDICINE

## 2023-04-17 PROCEDURE — 84443 ASSAY THYROID STIM HORMONE: CPT | Performed by: INTERNAL MEDICINE

## 2023-04-17 PROCEDURE — 82306 VITAMIN D 25 HYDROXY: CPT | Performed by: INTERNAL MEDICINE

## 2023-04-17 PROCEDURE — 36415 COLL VENOUS BLD VENIPUNCTURE: CPT | Performed by: INTERNAL MEDICINE

## 2023-04-24 ENCOUNTER — TELEPHONE (OUTPATIENT)
Dept: RHEUMATOLOGY | Facility: CLINIC | Age: 63
End: 2023-04-24

## 2023-04-24 ENCOUNTER — OFFICE VISIT (OUTPATIENT)
Dept: RHEUMATOLOGY | Facility: CLINIC | Age: 63
End: 2023-04-24
Payer: COMMERCIAL

## 2023-04-24 VITALS
BODY MASS INDEX: 19.27 KG/M2 | HEIGHT: 60 IN | DIASTOLIC BLOOD PRESSURE: 62 MMHG | WEIGHT: 98.13 LBS | HEART RATE: 100 BPM | SYSTOLIC BLOOD PRESSURE: 95 MMHG

## 2023-04-24 DIAGNOSIS — R53.83 FATIGUE, UNSPECIFIED TYPE: ICD-10-CM

## 2023-04-24 DIAGNOSIS — M79.7 FIBROMYALGIA: ICD-10-CM

## 2023-04-24 DIAGNOSIS — I73.81 ERYTHROMELALGIA: ICD-10-CM

## 2023-04-24 DIAGNOSIS — M35.0B SJOGREN'S SYNDROME WITH VASCULITIS: Primary | ICD-10-CM

## 2023-04-24 DIAGNOSIS — I73.00 RAYNAUD'S DISEASE WITHOUT GANGRENE: ICD-10-CM

## 2023-04-24 PROCEDURE — 3074F SYST BP LT 130 MM HG: CPT | Mod: CPTII,S$GLB,, | Performed by: INTERNAL MEDICINE

## 2023-04-24 PROCEDURE — 1159F PR MEDICATION LIST DOCUMENTED IN MEDICAL RECORD: ICD-10-PCS | Mod: CPTII,S$GLB,, | Performed by: INTERNAL MEDICINE

## 2023-04-24 PROCEDURE — 1160F RVW MEDS BY RX/DR IN RCRD: CPT | Mod: CPTII,S$GLB,, | Performed by: INTERNAL MEDICINE

## 2023-04-24 PROCEDURE — 99999 PR PBB SHADOW E&M-EST. PATIENT-LVL III: ICD-10-PCS | Mod: PBBFAC,,, | Performed by: INTERNAL MEDICINE

## 2023-04-24 PROCEDURE — 3074F PR MOST RECENT SYSTOLIC BLOOD PRESSURE < 130 MM HG: ICD-10-PCS | Mod: CPTII,S$GLB,, | Performed by: INTERNAL MEDICINE

## 2023-04-24 PROCEDURE — 3078F DIAST BP <80 MM HG: CPT | Mod: CPTII,S$GLB,, | Performed by: INTERNAL MEDICINE

## 2023-04-24 PROCEDURE — 99999 PR PBB SHADOW E&M-EST. PATIENT-LVL III: CPT | Mod: PBBFAC,,, | Performed by: INTERNAL MEDICINE

## 2023-04-24 PROCEDURE — 99214 OFFICE O/P EST MOD 30 MIN: CPT | Mod: S$GLB,,, | Performed by: INTERNAL MEDICINE

## 2023-04-24 PROCEDURE — 3008F BODY MASS INDEX DOCD: CPT | Mod: CPTII,S$GLB,, | Performed by: INTERNAL MEDICINE

## 2023-04-24 PROCEDURE — 99214 PR OFFICE/OUTPT VISIT, EST, LEVL IV, 30-39 MIN: ICD-10-PCS | Mod: S$GLB,,, | Performed by: INTERNAL MEDICINE

## 2023-04-24 PROCEDURE — 1159F MED LIST DOCD IN RCRD: CPT | Mod: CPTII,S$GLB,, | Performed by: INTERNAL MEDICINE

## 2023-04-24 PROCEDURE — 3008F PR BODY MASS INDEX (BMI) DOCUMENTED: ICD-10-PCS | Mod: CPTII,S$GLB,, | Performed by: INTERNAL MEDICINE

## 2023-04-24 PROCEDURE — 1160F PR REVIEW ALL MEDS BY PRESCRIBER/CLIN PHARMACIST DOCUMENTED: ICD-10-PCS | Mod: CPTII,S$GLB,, | Performed by: INTERNAL MEDICINE

## 2023-04-24 PROCEDURE — 3078F PR MOST RECENT DIASTOLIC BLOOD PRESSURE < 80 MM HG: ICD-10-PCS | Mod: CPTII,S$GLB,, | Performed by: INTERNAL MEDICINE

## 2023-04-24 ASSESSMENT — ROUTINE ASSESSMENT OF PATIENT INDEX DATA (RAPID3)
PAIN SCORE: 7
WHEN YOU AWAKENED IN THE MORNING OVER THE LAST WEEK, PLEASE INDICATE THE AMOUNT OF TIME IT TAKES UNTIL YOU ARE AS LIMBER AS YOU WILL BE FOR THE DAY: 1 HOUR
MDHAQ FUNCTION SCORE: 1.3
FATIGUE SCORE: 5.5
AM STIFFNESS SCORE: 1, YES
TOTAL RAPID3 SCORE: 5.78
PSYCHOLOGICAL DISTRESS SCORE: 2.2
PATIENT GLOBAL ASSESSMENT SCORE: 6

## 2023-04-24 NOTE — PROGRESS NOTES
"Subjective:       Patient ID: Berenice Hayes is a 63 y.o. female.    Chief Complaint: Sjogrens    HPI:  Berenice Hayes is a 63 y.o. female followed for concerns of autoimmune issues.   Diagnosed with fibromyalgia at age 34.   At age 42 had erythromyelgia with redness all over. She was treated by vascular with a medication but caused Raynauds to develop.  She saw Dr. Hill in vascular.      She has history significant pelvic discomfort since 2009.  She saw therapist for pelvic floor therapy which has helped. 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  She had pudenal nerve block 2/3/15.     She saw Dr. Harmon in the past who felt she had fibriomyalgia.  She tried amitriptyline for some time.   She takes Atelvia for OP.        She has felt bad since 2014.  "Hit with massive heat wave beyond what others feel with menopause."  Mouth was very dry and primary gave pilocarpine.  Spring of 2015 GUICHO was negative.  Previously had hand pains.  X-rays of hands normal.  She was found by endocrine to be hypoglycemic.  Ultrasound thyroid showed nodules that were negative on biopsy.  August 2015 diagnosed as having Hashimotos disease.   Synthroid did not help levels.  She is on Cytomel now.    Saw ENT for throat pain in neck since ultrasound was done.  ENT will biopsy a solid nodule on left lobe thyroid.  CT with contrast showed enlargement in the saliva gland.  Her mother had fibromyalgia and PMR as well as history ETOH abuse as an adult.  Father had osteoarthritis and required complete thumb reconstruction on one thumb.     In past dermatology did skin biopsy and diagnosed pernio (no evidence of lupus vasculitis).   Daughter with Hashimotos and thyroid Ca.    In May to Oct 2021 worked with a dietician.  Did food sensitivity and chemical sensitivity test. Was found to have issues with Tylenol and garlic.       Interval History:    Saw Dr. Isidro Dash vascular medicine who did full work up that showed venous " insufficiency and gave low dose bumetanide 0.5 mg to 3 times a week.   She has not started bumetanide yet due to preparing for daughter's wedding.     Santo burn sensation in back of throat when swallowing.   Rash at left elbow for last month.    Four weeks ago started gaining weight out of no where.     She did not see neurology December 2022 for memory issues due to the department cancelling and she needs to reschedule.   History of migraine that improved with chiropractor and in summer 2019 had trigeminal neuralgia on left.    She will see usual pain management provider Niya Richard that she sees once a year and provides compounded pain cream.    Now reports red discoloration of hand similar to 2019 when she developed ulcers below nail especially right 3rd finger that led her to lose nails.   She continues to have episodes Raynauds and erythromyelgia on her feet that improves with elevation and worsen with walking.  Long standing history of erythromyelgia.   She gets nerve pain in feet.  Difficulty walking at end of day.  Uses compression sock.   Feels Sjogrens is getting worse with dry eyes and mouth despite using pilocarpine and Restasis. Pain 4/10 ache all over with nerve pain.   Difficult to move in morning.   Sitting worsens pain and it improves with walking.      In May to Oct 2021 worked with a dietician.  Did food sensitivity and chemical sensitivity test. Was found to have issues with Tylenol and garlic.   Doing sunflower butter on bananas.   Eating more proteins with strawberries to absorb iron.   Still sees an acupuncturist.        Review of Systems   Constitutional:  Positive for fatigue. Negative for fever and unexpected weight change.   HENT:  Positive for mouth sores and trouble swallowing.    Eyes:  Positive for redness.   Respiratory:  Negative for cough and shortness of breath.    Cardiovascular:  Negative for chest pain.   Gastrointestinal:  Positive for constipation. Negative for diarrhea.    Endocrine: Negative.    Genitourinary:  Negative for dysuria and genital sores.   Musculoskeletal:  Positive for arthralgias.   Skin:  Negative for rash.        Redness of fingers  Hair loss with stress   Allergic/Immunologic: Negative.    Neurological:  Positive for headaches.   Hematological:  Positive for adenopathy. Bruises/bleeds easily.   Psychiatric/Behavioral: Negative.         Objective:   BP 95/62   Pulse 100   Ht 5' (1.524 m)   Wt 44.5 kg (98 lb 1.7 oz)   LMP  (LMP Unknown)   BMI 19.16 kg/m²  Virtual     Physical Exam   Constitutional: She is oriented to person, place, and time.   HENT:   Head: Normocephalic and atraumatic.   Eyes: Conjunctivae are normal.   Cardiovascular: Normal rate, regular rhythm and normal heart sounds.   Pulmonary/Chest: Effort normal and breath sounds normal.   Abdominal: Soft. Bowel sounds are normal.   Musculoskeletal:         General: No tenderness or deformity.      Cervical back: Neck supple.      Comments: Squaring right 1st CMC  28 joint count: 0 swollen and 0 tender  12/18 FM points painful   Neurological: She is alert and oriented to person, place, and time.   Skin: Skin is warm and dry. There is erythema.   Erythema at periungal area multiple fingers on right hand   Psychiatric: Mood and affect normal.     LABS    Component      Latest Ref Rng & Units 4/17/2023 3/18/2023 12/17/2022   WBC      3.90 - 12.70 K/uL  4.87    RBC      4.00 - 5.40 M/uL  4.62    Hemoglobin      12.0 - 16.0 g/dL  13.8    Hematocrit      37.0 - 48.5 %  43.0    MCV      82 - 98 fL  93    MCH      27.0 - 31.0 pg  29.9    MCHC      32.0 - 36.0 g/dL  32.1    RDW      11.5 - 14.5 %  12.9    Platelets      150 - 450 K/uL  185    MPV      9.2 - 12.9 fL  10.3    Immature Granulocytes      0.0 - 0.5 %  0.2    Gran # (ANC)      1.8 - 7.7 K/uL  3.7    Immature Grans (Abs)      0.00 - 0.04 K/uL  0.01    Lymph #      1.0 - 4.8 K/uL  0.6 (L)    Mono #      0.3 - 1.0 K/uL  0.4    Eos #      0.0 - 0.5 K/uL   0.1    Baso #      0.00 - 0.20 K/uL  0.07    nRBC      0 /100 WBC  0    Gran %      38.0 - 73.0 %  75.4 (H)    Lymph %      18.0 - 48.0 %  12.7 (L)    Mono %      4.0 - 15.0 %  7.8    Eosinophil %      0.0 - 8.0 %  2.5    Basophil %      0.0 - 1.9 %  1.4    Differential Method        Automated    Sodium      136 - 145 mmol/L  140    Potassium      3.5 - 5.1 mmol/L  4.5    Chloride      95 - 110 mmol/L  99    CO2      23 - 29 mmol/L  33 (H)    Glucose      70 - 110 mg/dL  88    BUN      8 - 23 mg/dL  19    Creatinine      0.5 - 1.4 mg/dL  0.7    Calcium      8.7 - 10.5 mg/dL  10.1    PROTEIN TOTAL      6.0 - 8.4 g/dL  7.5    Albumin      3.5 - 5.2 g/dL  4.4    BILIRUBIN TOTAL      0.1 - 1.0 mg/dL  0.6    Alkaline Phosphatase      55 - 135 U/L  61    AST      10 - 40 U/L  25    ALT      10 - 44 U/L  28    Anion Gap      8 - 16 mmol/L  8    eGFR      >60 mL/min/1.73 m:2  >60.0    Specimen UA        Urine, Clean Catch    Color, UA      Yellow, Straw, Kari  Yellow    Appearance, UA      Clear  Clear    pH, UA      5.0 - 8.0  6.0    Specific Gravity, UA      1.005 - 1.030  1.010    Protein, UA      Negative  Negative    Glucose, UA      Negative  Negative    Ketones, UA      Negative  Negative    Bilirubin (UA)      Negative  Negative    Occult Blood UA      Negative  Negative    NITRITE UA      Negative  Negative    Leukocytes, UA      Negative  Negative    Protein, Urine Random      0 - 15 mg/dL  <7    Creatinine, Urine      15.0 - 325.0 mg/dL  29.0    Prot/Creat Ratio, Urine      0.00 - 0.20  Unable to calculate    C Telopeptide (CTX), Serum      pg/mL   89 (L)   Complement (C-4)      11 - 44 mg/dL  23    Complement (C-3)      50 - 180 mg/dL  104    ds DNA Ab      Negative 1:10  Negative 1:10    CPK      20 - 180 U/L  38    Sed Rate      0 - 36 mm/Hr  4    CRP      0.0 - 8.2 mg/L  0.5    Vit D, 25-Hydroxy      30 - 96 ng/mL 97 (H)     TSH      0.400 - 4.000 uIU/mL 3.481     Free T4      0.71 - 1.51 ng/dL 0.53 (L)      T3, Total      60 - 180 ng/dL 74             Assessment:       1. Sjogren syndrome. Chronic.  Negative SSA and SSB in past now SSA positive.  Monitor labs.    Currently symptomatic management with Plaquenil, Restasis and pilocarpine.   2. Raynauds. Symptomatic management. Still with changes around nails but no digital ulcers.  Anxiety worsens.  3. Fibromyalgia. Managed with amitriptyline. Persistent pain and unable to increase amitriptyline due to worsening fatigue  4. Hand pain b/l. X-ray without changes. Has periodic episodes of pain.  5. Osteoporosis.  Now risedronate. Still on estradiol patch.  6. Erythromelalgia.  Takes baby aspirin  7. Sleep disorder in the past. History of narcolepsy but no longer a problem. Sleep doctor in past gave Ritalin bid which helped while she took it for 2 weeks but no longer needs it since treatment of Hashimoto.  8. Increased heat in body. Improved  9. Menopause  10.  Hashimotos disease  11.  Multinodular goiter   12.  Pudenal nerve pain.  Improved with acupuncture which she continues every 1-2 weeks. Worsened with water therapy so stopped.    13.  Pernio.  Planning to speak with dermatology about restarting nicatinamide  14.  Rash on abdomen x3 weeks.  Concern for Addisons raised in this patient with hypotension   15.  Fatigue.  Persistent.  16.  Decreased memory  17.  Stress.  Did eight sessions with counselor.  Notes anxiety worsens Raynauds.   18.  Alopecia  19.  Trigeminal neuralgia on left diagnosed in 2017  20.  Decreased memory.  6 hours of sleep a night but feels it is not enough.  21.  Episode of migraine for 10 days.  Treated by chiropractor and improved after 5 days of treatment.  Saw neurology after virtually.   22.  Migraine.  Managed by chiropractor every 2 weeks.    Plan:       1.  Continue Plaquenil.   (Patient had done in March 2023).  2.  Discussed her complaints of side effects with pilocarpine but she is not interested trying Evoxac (cevimiline).  3.   Continue fibromyalgia treatment.  4.  Patient unable to use vasodilator for Raynaud's due to low BP  5.  Continue elevating legs at work and continue compression stockings  6.  Follow with counselor to help deal with stress of chronic illness.   7.  Follow with derm to discuss nicatinamide.  8.  Encourage regular sleep.  Do activities that bring raquel.  Consider artificial saliva to help with dryness that interrupt sleeing.    9.  Patient requesting Xanax for anxiety since it increases pain and Raynaud's.  Patient to discuss with primary.       RTO 6 months/prn

## 2023-04-30 ENCOUNTER — PATIENT MESSAGE (OUTPATIENT)
Dept: OBSTETRICS AND GYNECOLOGY | Facility: CLINIC | Age: 63
End: 2023-04-30
Payer: COMMERCIAL

## 2023-05-01 ENCOUNTER — OFFICE VISIT (OUTPATIENT)
Dept: OBSTETRICS AND GYNECOLOGY | Facility: CLINIC | Age: 63
End: 2023-05-01
Payer: COMMERCIAL

## 2023-05-01 ENCOUNTER — PATIENT MESSAGE (OUTPATIENT)
Dept: INTERNAL MEDICINE | Facility: CLINIC | Age: 63
End: 2023-05-01
Payer: COMMERCIAL

## 2023-05-01 VITALS
DIASTOLIC BLOOD PRESSURE: 60 MMHG | SYSTOLIC BLOOD PRESSURE: 100 MMHG | WEIGHT: 94.56 LBS | BODY MASS INDEX: 18.56 KG/M2 | HEIGHT: 60 IN

## 2023-05-01 DIAGNOSIS — F40.10 SOCIAL ANXIETY DISORDER: Primary | ICD-10-CM

## 2023-05-01 DIAGNOSIS — N90.89 VULVAL LESION: Primary | ICD-10-CM

## 2023-05-01 PROCEDURE — 99999 PR PBB SHADOW E&M-EST. PATIENT-LVL III: CPT | Mod: PBBFAC,,, | Performed by: OBSTETRICS & GYNECOLOGY

## 2023-05-01 PROCEDURE — 1160F PR REVIEW ALL MEDS BY PRESCRIBER/CLIN PHARMACIST DOCUMENTED: ICD-10-PCS | Mod: CPTII,S$GLB,, | Performed by: OBSTETRICS & GYNECOLOGY

## 2023-05-01 PROCEDURE — 3008F BODY MASS INDEX DOCD: CPT | Mod: CPTII,S$GLB,, | Performed by: OBSTETRICS & GYNECOLOGY

## 2023-05-01 PROCEDURE — 1159F PR MEDICATION LIST DOCUMENTED IN MEDICAL RECORD: ICD-10-PCS | Mod: CPTII,S$GLB,, | Performed by: OBSTETRICS & GYNECOLOGY

## 2023-05-01 PROCEDURE — 3074F SYST BP LT 130 MM HG: CPT | Mod: CPTII,S$GLB,, | Performed by: OBSTETRICS & GYNECOLOGY

## 2023-05-01 PROCEDURE — 3074F PR MOST RECENT SYSTOLIC BLOOD PRESSURE < 130 MM HG: ICD-10-PCS | Mod: CPTII,S$GLB,, | Performed by: OBSTETRICS & GYNECOLOGY

## 2023-05-01 PROCEDURE — 3078F PR MOST RECENT DIASTOLIC BLOOD PRESSURE < 80 MM HG: ICD-10-PCS | Mod: CPTII,S$GLB,, | Performed by: OBSTETRICS & GYNECOLOGY

## 2023-05-01 PROCEDURE — 99999 PR PBB SHADOW E&M-EST. PATIENT-LVL III: ICD-10-PCS | Mod: PBBFAC,,, | Performed by: OBSTETRICS & GYNECOLOGY

## 2023-05-01 PROCEDURE — 87070 CULTURE OTHR SPECIMN AEROBIC: CPT | Performed by: OBSTETRICS & GYNECOLOGY

## 2023-05-01 PROCEDURE — 3008F PR BODY MASS INDEX (BMI) DOCUMENTED: ICD-10-PCS | Mod: CPTII,S$GLB,, | Performed by: OBSTETRICS & GYNECOLOGY

## 2023-05-01 PROCEDURE — 99213 OFFICE O/P EST LOW 20 MIN: CPT | Mod: S$GLB,,, | Performed by: OBSTETRICS & GYNECOLOGY

## 2023-05-01 PROCEDURE — 1159F MED LIST DOCD IN RCRD: CPT | Mod: CPTII,S$GLB,, | Performed by: OBSTETRICS & GYNECOLOGY

## 2023-05-01 PROCEDURE — 1160F RVW MEDS BY RX/DR IN RCRD: CPT | Mod: CPTII,S$GLB,, | Performed by: OBSTETRICS & GYNECOLOGY

## 2023-05-01 PROCEDURE — 87102 FUNGUS ISOLATION CULTURE: CPT | Performed by: OBSTETRICS & GYNECOLOGY

## 2023-05-01 PROCEDURE — 3078F DIAST BP <80 MM HG: CPT | Mod: CPTII,S$GLB,, | Performed by: OBSTETRICS & GYNECOLOGY

## 2023-05-01 PROCEDURE — 99213 PR OFFICE/OUTPT VISIT, EST, LEVL III, 20-29 MIN: ICD-10-PCS | Mod: S$GLB,,, | Performed by: OBSTETRICS & GYNECOLOGY

## 2023-05-01 RX ORDER — ALPRAZOLAM 0.25 MG/1
0.25 TABLET ORAL DAILY PRN
Qty: 3 TABLET | Refills: 0 | Status: SHIPPED | OUTPATIENT
Start: 2023-05-01 | End: 2023-05-31

## 2023-05-01 RX ORDER — AMITRIPTYLINE HYDROCHLORIDE 50 MG/1
TABLET, FILM COATED ORAL
Qty: 90 TABLET | Refills: 3 | Status: SHIPPED | OUTPATIENT
Start: 2023-05-01

## 2023-05-01 RX ORDER — KETOCONAZOLE 20 MG/G
CREAM TOPICAL 2 TIMES DAILY
Qty: 30 G | Refills: 0 | Status: SHIPPED | OUTPATIENT
Start: 2023-05-01

## 2023-05-01 RX ORDER — AMITRIPTYLINE HYDROCHLORIDE 50 MG/1
50 TABLET, FILM COATED ORAL NIGHTLY
Qty: 7 TABLET | Refills: 0 | Status: SHIPPED | OUTPATIENT
Start: 2023-05-01 | End: 2024-04-30

## 2023-05-01 NOTE — PROGRESS NOTES
SUBJECTIVE:   63 y.o. female   for gyn exam. No LMP recorded (lmp unknown). Patient has had a hysterectomy..  She started 3 days ago with itching and irritation in midline of vulvar hair line.  Has dark-reddish color quarter size spot on the skin.  It is flat but it has some flakiness like dandruff.  Has dandruff on head at times.  It hurts to the touch. No drainage, ulcer, pustule of vulvar lesion.  Never had this problem before.         Past Medical History:   Diagnosis Date    Asthma with allergic rhinitis     Bladder spasm     Dense breasts     heterogeneously/fibrocystic disease    Elevated antinuclear antibody (GUICHO) level     Erythromelalgia     Fibromyalgia     Ganglion cyst     left toe    Goiter     MNG    Headache(784.0)     Hypothyroidism     Hashimoto with + Tg ab    Kidney stone     Osteoporosis     femoral neck    Pernio 2018    Raynaud's phenomenon     Rhinitis     Scoliosis     Sjogren's syndrome     Sleep disorder     type of Narcolepsy ( resolved)    Vitamin D deficiency disease     Vulvodynia      Past Surgical History:   Procedure Laterality Date    BREAST SURGERY      fibrocystic tumor removed     SECTION      2x    CYST REMOVAL      laparoscopic cyst on ovary    HYSTERECTOMY      INJECTION OF ANESTHETIC AGENT AROUND NERVE N/A 2019    Procedure: BLOCK, NERVE COCCYGEAL  1 OF 2  Pt. can drive herself home;  Surgeon: Monique Philip MD;  Location: [facility];  Service: Pain Management;  Laterality: N/A;    INJECTION OF ANESTHETIC AGENT AROUND NERVE N/A 2019    Procedure: BLOCK, NERVE COCCYGEAL  2 OF 2  Pt. can drive herself home;  Surgeon: Monique Philip MD;  Location: [facility];  Service: Pain Management;  Laterality: N/A;    intradermal cyst       removed from left index finger    SINUS SURGERY      2x     Social History     Socioeconomic History    Marital status:    Occupational History     Employer: [employer]   Tobacco Use    Smoking status: Never     Smokeless tobacco: Never   Substance and Sexual Activity    Alcohol use: No    Drug use: No    Sexual activity: Not Currently     Birth control/protection: Surgical     Comment: single, RAMOS ov in situ    Social History Narrative         Social Determinants of Health     Financial Resource Strain: Low Risk     Difficulty of Paying Living Expenses: Not very hard   Food Insecurity: No Food Insecurity    Worried About Running Out of Food in the Last Year: Never true    Ran Out of Food in the Last Year: Never true   Transportation Needs: No Transportation Needs    Lack of Transportation (Medical): No    Lack of Transportation (Non-Medical): No   Physical Activity: Inactive    Days of Exercise per Week: 0 days    Minutes of Exercise per Session: 0 min   Stress: No Stress Concern Present    Feeling of Stress : Not at all   Social Connections: Unknown    Frequency of Communication with Friends and Family: Three times a week    Frequency of Social Gatherings with Friends and Family: Once a week    Active Member of Clubs or Organizations: No    Attends Club or Organization Meetings: Never    Marital Status:    Housing Stability: Low Risk     Unable to Pay for Housing in the Last Year: No    Number of Places Lived in the Last Year: 1    Unstable Housing in the Last Year: No     Family History   Problem Relation Age of Onset    Diabetes Mother     Fibromyalgia Mother     Polymyalgia rheumatica Mother     Macular degeneration Mother     Arthritis Mother     Hypertension Mother     Kidney disease Mother     Pneumonia Mother     Adrenal disorder Mother         adrenal insufficiency    Coronary artery disease Father     Arthritis Father         osteoarthritis    Hypertension Father     Heart disease Father     Diabetes Father     Hyperlipidemia Father     Hyperlipidemia Brother     Cancer Brother         oral    Thyroid cancer Daughter     Cancer Daughter         thyroid    Hypothyroidism Daughter     Breast  cancer Neg Hx     Ovarian cancer Neg Hx     Colon cancer Neg Hx     Melanoma Neg Hx      OB History    Para Term  AB Living   2 2 2     2   SAB IAB Ectopic Multiple Live Births                  # Outcome Date GA Lbr Jarrod/2nd Weight Sex Delivery Anes PTL Lv   2 Term            1 Term                  Current Outpatient Medications   Medication Sig Dispense Refill    albuterol (VENTOLIN HFA) 90 mcg/actuation inhaler Inhale 2 puffs into the lungs every 6 (six) hours as needed for Wheezing. Rescue 18 g 2    amitriptyline (ELAVIL) 10 MG tablet TAKE 2 TABLETS ONCE DAILY WITH THE 50 MG FOR A TOTAL OF 70  tablet 3    amitriptyline (ELAVIL) 50 MG tablet TAKE 1 TABLET NIGHTLY WITH THE 10 MG AMITRIPTYLINE 90 tablet 3    aspirin (ECOTRIN) 81 MG EC tablet Take 81 mg by mouth once daily.      AZASITE 1 % Drop INSTILL 1 DROP INTO LEFT EYE BID      bumetanide (BUMEX) 0.5 MG Tab Take 1 tablet (0.5 mg total) by mouth every Mon, Wed, Fri. 90 tablet 3    clindamycin (CLEOCIN T) 1 % external solution APPLY TOPICALLY TO THE AFFECTED AREA TWICE DAILY AS NEEDED FOR BREAKOUTS 30 mL 3    estradioL (VAGIFEM) 10 mcg Tab INSERT 1 TABLET VAGINALLY TWICE A WEEK 24 tablet 3    estradiol 0.05 mg/24 hr td ptsw (VIVELLE-DOT) 0.05 mg/24 hr Place 1 patch onto the skin twice a week. 24 patch 3    fluocinonide (LIDEX) 0.05 % external solution AAA scalp qday - bid prn pruritus 60 mL 3    fluticasone (FLONASE) 50 mcg/actuation nasal spray 1 spray by Each Nare route once daily. 3 Bottle 3    gabapentin (NEURONTIN) 100 MG capsule TAKE 1 CAPSULE IN THE MORNING AND 4 CAPSULES AT BEDTIME 450 capsule 3    hydrOXYchloroQUINE (PLAQUENIL) 200 mg tablet TAKE 1 TABLET DAILY 90 tablet 3    ketoprofen 10 % CrPk APPLY UP TO 4.8 GRAMS TO PAINFUL AREAS UP TO FIVE TIMES DAILY. RUB IN WELL      KETOPROFEN, BULK, TOP Ketoprofen/flexeril/lidocaine (compound)      LIDOcaine-prilocaine (EMLA) cream Apply topically as needed. 90 g 3    liothyronine (CYTOMEL) 5  MCG Tab TAKE THREE AND ONE-HALF TABLETS ONCE DAILY 315 tablet 3    metaxalone (SKELAXIN) 800 MG tablet TAKE 1 TABLET TWICE A DAY AS NEEDED FOR SPASM 180 tablet 3    niacinamide 500 mg Tab Take 1 tablet (500 mg total) by mouth 3 (three) times daily. 270 tablet 3    NURTEC 75 mg odt Take by mouth.      pilocarpine (SALAGEN) 5 MG Tab Take 1 tablet (5 mg total) by mouth 3 (three) times daily as needed. 270 tablet 3    PREVIDENT 5000 BOOSTER PLUS 1.1 % Pste       RESTASIS 0.05 % ophthalmic emulsion       risedronate 35 mg TbEC TAKE 1 TABLET ONCE A WEEK 12 tablet 3    tretinoin (RETIN-A) 0.025 % gel Apply small amount to entire face qhs. Wash off qam and apply sunscreen. 45 g 3    UNABLE TO FIND Apply 240 g topically as needed. Ketoprofen/cyclobenzaprine HCI/Lidocaine (lipomax) 10/2/5% Up to 5 times daily as needed for pain  99    zolpidem (AMBIEN) 10 mg Tab TAKE 1 TABLET(10 MG) BY MOUTH EVERY NIGHT AS NEEDED 30 tablet 0    ketoconazole (NIZORAL) 2 % cream Apply topically 2 (two) times daily. Apply daily to affected area 30 g 0    sumatriptan (IMITREX) 100 MG tablet Take 1 tablet (100 mg total) by mouth every 2 (two) hours as needed for Migraine. 9 tablet 1     No current facility-administered medications for this visit.     Allergies: Patient has no known allergies.     ROS:  Constitutional: no weight loss, weight gain, fever, fatigue  Eyes:  No vision changes, glasses/contacts  ENT/Mouth: No ulcers, sinus problems, ears ringing, headache  Cardiovascular: No inability to lie flat, chest pain, exercise intolerance, swelling, heart palpitations  Respiratory: No wheezing, coughing blood, shortness of breath, or cough  Gastrointestinal: No diarrhea, bloody stool, nausea/vomiting, constipation, gas, hemorrhoids  Genitourinary: No blood in urine, painful urination, urgency of urination, frequency of urination, incomplete emptying, incontinence, abnormal bleeding, painful periods, heavy periods, vaginal discharge, vaginal odor,  painful intercourse, sexual problems, bleeding after intercourse.  Musculoskeletal: No muscle weakness  Skin/Breast: No painful breasts, nipple discharge, masses, rash, ulcers  Neurological: No passing out, seizures, numbness, headache  Endocrine: No diabetes, hypothyroid, hyperthyroid, hot flashes, hair loss, abnormal hair growth, acne  Psychiatric: No depression, crying  Hematologic: No bruises, bleeding, swollen lymph nodes, anemia.      OBJECTIVE:   The patient appears well, alert, oriented x 3, in no distress.  /60 (BP Location: Right arm, Patient Position: Sitting, BP Method: Medium (Manual))   Ht 5' (1.524 m)   Wt 42.9 kg (94 lb 9.2 oz)   LMP  (LMP Unknown)   BMI 18.47 kg/m²     ABDOMEN: no hernias, masses, or hepatosplenomegaly  GENITALIA: normal external genitalia, no erythema, no discharge  Circular area of erythema with well demarcated ring in mid-line of mons pubis. Some flakiness. C/w tinea infection.  URETHRA: normal urethra, normal urethral meatus  VAGINA: Normal  CERVIX: absent  UTERUS: absent  ADNEXA: no mass, fullness, tenderness    Physical Exam  Abdominal:          ASSESSMENT:   1. Vulval lesion  CULTURE, FUNGUS    CULTURE, AEROBIC  (SPECIFY SOURCE)          PLAN:   Orders Placed This Encounter    CULTURE, FUNGUS    CULTURE, AEROBIC  (SPECIFY SOURCE)    ketoconazole (NIZORAL) 2 % cream     Discussed presumed tinea skin infection. May use OTC lamisil, lotrisone, or dandruff shampoo. May also use ketoconazole cream  Return to clinic if no improvement.

## 2023-05-04 LAB — BACTERIA SPEC AEROBE CULT: NORMAL

## 2023-05-16 ENCOUNTER — PATIENT MESSAGE (OUTPATIENT)
Dept: DERMATOLOGY | Facility: CLINIC | Age: 63
End: 2023-05-16
Payer: COMMERCIAL

## 2023-05-16 ENCOUNTER — TELEPHONE (OUTPATIENT)
Dept: DERMATOLOGY | Facility: CLINIC | Age: 63
End: 2023-05-16
Payer: COMMERCIAL

## 2023-05-17 ENCOUNTER — HOSPITAL ENCOUNTER (OUTPATIENT)
Dept: ENDOCRINOLOGY | Facility: CLINIC | Age: 63
Discharge: HOME | End: 2023-05-17
Attending: INTERNAL MEDICINE
Payer: COMMERCIAL

## 2023-05-17 DIAGNOSIS — E04.2 MULTINODULAR GOITER: ICD-10-CM

## 2023-05-17 PROCEDURE — 76536 US SOFT TISSUE HEAD NECK THYROID: ICD-10-PCS | Mod: S$GLB,,, | Performed by: INTERNAL MEDICINE

## 2023-05-17 PROCEDURE — 76536 US EXAM OF HEAD AND NECK: CPT | Mod: S$GLB,,, | Performed by: INTERNAL MEDICINE

## 2023-05-23 ENCOUNTER — PATIENT MESSAGE (OUTPATIENT)
Dept: DERMATOLOGY | Facility: CLINIC | Age: 63
End: 2023-05-23
Payer: COMMERCIAL

## 2023-05-24 ENCOUNTER — TELEPHONE (OUTPATIENT)
Dept: DERMATOLOGY | Facility: CLINIC | Age: 63
End: 2023-05-24
Payer: COMMERCIAL

## 2023-05-25 ENCOUNTER — PATIENT MESSAGE (OUTPATIENT)
Dept: INTERNAL MEDICINE | Facility: CLINIC | Age: 63
End: 2023-05-25
Payer: COMMERCIAL

## 2023-05-25 DIAGNOSIS — G47.00 INSOMNIA, UNSPECIFIED TYPE: ICD-10-CM

## 2023-05-25 RX ORDER — ZOLPIDEM TARTRATE 10 MG/1
TABLET ORAL
Qty: 30 TABLET | Refills: 0 | Status: SHIPPED | OUTPATIENT
Start: 2023-05-25 | End: 2023-07-18 | Stop reason: SDUPTHER

## 2023-05-25 NOTE — TELEPHONE ENCOUNTER
No care due was identified.  [facility] Embedded Care Due Messages. Reference number: [id number].   5/25/2023 9:25:11 AM CDT

## 2023-05-29 LAB — FUNGUS SPEC CULT: NORMAL

## 2023-05-30 ENCOUNTER — OFFICE VISIT (OUTPATIENT)
Dept: ENDOCRINOLOGY | Facility: CLINIC | Age: 63
End: 2023-05-30
Payer: COMMERCIAL

## 2023-05-30 DIAGNOSIS — R49.0 HOARSENESS: Primary | ICD-10-CM

## 2023-05-30 DIAGNOSIS — M35.0B SJOGREN'S SYNDROME WITH VASCULITIS: ICD-10-CM

## 2023-05-30 DIAGNOSIS — M81.0 OSTEOPOROSIS, POSTMENOPAUSAL: ICD-10-CM

## 2023-05-30 DIAGNOSIS — E04.2 MULTINODULAR GOITER: ICD-10-CM

## 2023-05-30 DIAGNOSIS — E06.3 HYPOTHYROIDISM DUE TO HASHIMOTO'S THYROIDITIS: ICD-10-CM

## 2023-05-30 DIAGNOSIS — E03.8 HYPOTHYROIDISM DUE TO HASHIMOTO'S THYROIDITIS: ICD-10-CM

## 2023-05-30 PROCEDURE — 1159F PR MEDICATION LIST DOCUMENTED IN MEDICAL RECORD: ICD-10-PCS | Mod: CPTII,95,, | Performed by: INTERNAL MEDICINE

## 2023-05-30 PROCEDURE — 1160F RVW MEDS BY RX/DR IN RCRD: CPT | Mod: CPTII,95,, | Performed by: INTERNAL MEDICINE

## 2023-05-30 PROCEDURE — 99214 OFFICE O/P EST MOD 30 MIN: CPT | Mod: 95,,, | Performed by: INTERNAL MEDICINE

## 2023-05-30 PROCEDURE — 99214 PR OFFICE/OUTPT VISIT, EST, LEVL IV, 30-39 MIN: ICD-10-PCS | Mod: 95,,, | Performed by: INTERNAL MEDICINE

## 2023-05-30 PROCEDURE — 1159F MED LIST DOCD IN RCRD: CPT | Mod: CPTII,95,, | Performed by: INTERNAL MEDICINE

## 2023-05-30 PROCEDURE — 1160F PR REVIEW ALL MEDS BY PRESCRIBER/CLIN PHARMACIST DOCUMENTED: ICD-10-PCS | Mod: CPTII,95,, | Performed by: INTERNAL MEDICINE

## 2023-05-30 NOTE — ASSESSMENT & PLAN NOTE
--With multiple thyroid nodules  --has had multiple benign FNAs of the right mid lobe nodule, and 1 benign FNA of the left lobe nodule  --she has had 3 nondiagnostic FNAs of the right inferior pole nodule, however, molecular markers were negative on this nodule in 07/2019  --Last ultrasound with stable nodules in 5/2023  --Repeat thyroid ultrasound due in 6/2024    --Will refer to Dr. Prater for hoarseness which has worsened and less likely thyroid related given stability of nodules over time

## 2023-05-30 NOTE — PROGRESS NOTES
Subjective:      Patient ID: Berenice Hayes is a 63 y.o. female.    Chief Complaint:  Hypothyroidism    The patient location is: Home  The chief complaint leading to consultation is: Hypothyroidism, osteoporosis    Visit type: audiovisual    Face to Face time with patient: 20 min  30 minutes of total time spent on the encounter, which includes face to face time and non-face to face time preparing to see the patient (eg, review of tests), Obtaining and/or reviewing separately obtained history, Documenting clinical information in the electronic or other health record, Independently interpreting results (not separately reported) and communicating results to the patient/family/caregiver, or Care coordination (not separately reported).     Each patient to whom he or she provides medical services by telemedicine is:  (1) informed of the relationship between the physician and patient and the respective role of any other health care provider with respect to management of the patient; and (2) notified that he or she may decline to receive medical services by telemedicine and may withdraw from such care at any time.      History of Present Illness  Ms. Hayes presents for follow up of hypothyroidism and osteopenia.  Last visit 11/2022.     With Hashimoto thyroiditis with high thyroglobulin ab and negative TPO ab.   Was started and maintained on T3 only regimen by Dr. Espinal.   Currently she is taking Cytomel totalling 15 mcg per day (5 mcg TID).  Previously had palpitations when Synthroid was added to the Cytomel. No longer having palpitations on the current regimen and TSH has remained WNL. She feels like her hypothyroidism has been relatively stable. She denies any palpitations or tremor.   She has been hesitant to change back to T4 only or a combined T4/T3 regimen.     Also has MNG with right mid thyroid nodule s/p benign FNA in 2015, 2018, 2019 and left lobe nodule with benign FNA in 2016. Right inferior pole nodule  underwent FNA x 3 with non-diagnostic results. Molecular markers were negative on the right inferior nodule in 7/2019.      She has some chronic mild dysphagia with solid/dry foods that has not changed.     Since her last visit she has been having worsening hoarseness.      Neck ultrasound dated 5/2023:  Impression:     1.)  Thyroid gland is normal in size with heterogeneous echotexture and normal vascularity     2.) 1.0 x 0.7 x 1.1 cm solid, heterogeneous predominately isoechoic nodule is seen in the right superior pole     3.) 1.3 x 0.8 x 1.3 cm, solid isoechoic nodule is seen in the right mid lobe     4.) 2.6 x 1.6 x 2.0 cm solid, hypoechoic nodule is seen in the right inferior pole     5.) 0.9 x 0.8 x 0.7 cm solid, isoechoic nodule is seen in the left superior pole     6.) 0.6 x 0.3 x 0.3 cm solid, isoechoic nodule is seen in the left inferior pole     RECOMMENDATIONS:  Recommend repeat thyroid ultrasound in one year.       Has Osteoporosis and she took Atelvia 35 mg weekly since August/2012 until 2015 when she restarted ERT (vaginal and transdermal). Had decrease in BMD at the hip despite ERT.    Restarted risedronate 35 mg weekly in 11/2017.  Compliant with regimen.     Recent BMD stable at the hip but showed decline at the spine in 11/2021. I recommended she consider a stronger agent like Prolia or Reclast but she is hesitant to start as she has a lot of issues with chronic pain and doesn't want to risk this side effect.      She remains relatively active.      She takes a multivitamin and Vit D 2000 units daily.      She is currently taking oral calcium lactate twice daily. She is also getting a fair amount of calcium through her diet (eats cheese, broccoli and walnuts daily).     Follows with rheumatology for Sjogrens. On Plaquenil.     Review of Systems   Constitutional:  Negative for chills and fever.   Gastrointestinal:  Negative for nausea.     Objective:   Physical Exam  Constitutional:       General:  She is not in acute distress.     Appearance: Normal appearance. She is not ill-appearing.   Neurological:      Mental Status: She is alert.     BP Readings from Last 3 Encounters:   05/01/23 100/60   04/24/23 95/62   03/06/23 100/70     Wt Readings from Last 1 Encounters:   05/01/23 0958 42.9 kg (94 lb 9.2 oz)       There is no height or weight on file to calculate BMI.    Lab Review:   Lab Results   Component Value Date    HGBA1C 5.2 09/10/2022     Lab Results   Component Value Date    CHOL 143 09/10/2022    HDL 87 (H) 09/10/2022    LDLCALC 49.0 (L) 09/10/2022    TRIG 35 09/10/2022    CHOLHDL 60.8 (H) 09/10/2022     Lab Results   Component Value Date     03/18/2023    K 4.5 03/18/2023    CL 99 03/18/2023    CO2 33 (H) 03/18/2023    GLU 88 03/18/2023    BUN 19 03/18/2023    CREATININE 0.7 03/18/2023    CALCIUM 10.1 03/18/2023    PROT 7.5 03/18/2023    ALBUMIN 4.4 03/18/2023    BILITOT 0.6 03/18/2023    ALKPHOS 61 03/18/2023    AST 25 03/18/2023    ALT 28 03/18/2023    ANIONGAP 8 03/18/2023    ESTGFRAFRICA >60.0 06/25/2022    EGFRNONAA >60.0 06/25/2022    TSH 3.481 04/17/2023         Assessment and Plan     Hypothyroidism due to Hashimoto's thyroiditis  --Clinically and biochemically euthyroid  --Explained goals of treatment is to keep the TSH in the normal range to avoid CVS and bone complications.  --Will continue Cytomel 5 mcg TID  --TSH remains normal  --She understands that this is an atypical thyroid hormone replacement regimen and she will let me us know right away if she develops any hyperthyroid symptoms  --We discussed continuing this regimen so long as her TSH is not suppressed and she does not have any palpitations         Multinodular goiter  --With multiple thyroid nodules  --has had multiple benign FNAs of the right mid lobe nodule, and 1 benign FNA of the left lobe nodule  --she has had 3 nondiagnostic FNAs of the right inferior pole nodule, however, molecular markers were negative on this  nodule in 07/2019  --Last ultrasound with stable nodules in 5/2023  --Repeat thyroid ultrasound due in 6/2024    --Will refer to Dr. Prater for hoarseness which has worsened and less likely thyroid related given stability of nodules over time      Osteoporosis, postmenopausal  --On ERT  --She had a decline at the hip on ERT and risedronate  --Previously reviewed options and possibly starting an injection or infusion and she desires to stay on the current regimen  --She has increased weight-bearing exercise  --Will continue risedronate 35 mg weekly (restarted in 11/2017)  --repeat bone density in 11/2023  --continue vitamin-D supplementation and calcium supplementation  --stressed importance of falls avoidance      Sjogren's syndrome  Followed by rheumatology  Started on plaquenil (prescribed 2/20/19)            Follow up in 11/2023 with labs and bone density at [facility] prior to appt      Leandro Espinoza M.D. Staff Endocrinology

## 2023-05-30 NOTE — ASSESSMENT & PLAN NOTE
--On ERT  --She had a decline at the hip on ERT and risedronate  --Previously reviewed options and possibly starting an injection or infusion and she desires to stay on the current regimen  --She has increased weight-bearing exercise  --Will continue risedronate 35 mg weekly (restarted in 11/2017)  --repeat bone density in 11/2023  --continue vitamin-D supplementation and calcium supplementation  --stressed importance of falls avoidance

## 2023-06-10 ENCOUNTER — LAB VISIT (OUTPATIENT)
Dept: LAB | Facility: HOSPITAL | Age: 63
End: 2023-06-10
Attending: INTERNAL MEDICINE
Payer: COMMERCIAL

## 2023-06-10 DIAGNOSIS — I73.00 RAYNAUD'S DISEASE WITHOUT GANGRENE: ICD-10-CM

## 2023-06-10 DIAGNOSIS — M25.561 PAIN IN BOTH KNEES, UNSPECIFIED CHRONICITY: ICD-10-CM

## 2023-06-10 DIAGNOSIS — R53.83 FATIGUE, UNSPECIFIED TYPE: ICD-10-CM

## 2023-06-10 DIAGNOSIS — M79.641 BILATERAL HAND PAIN: ICD-10-CM

## 2023-06-10 DIAGNOSIS — M79.642 BILATERAL HAND PAIN: ICD-10-CM

## 2023-06-10 DIAGNOSIS — D84.9 IMMUNOSUPPRESSION: ICD-10-CM

## 2023-06-10 DIAGNOSIS — I73.81 ERYTHROMELALGIA: ICD-10-CM

## 2023-06-10 DIAGNOSIS — M35.00 SJOGREN'S SYNDROME, WITH UNSPECIFIED ORGAN INVOLVEMENT: ICD-10-CM

## 2023-06-10 DIAGNOSIS — M79.672 BILATERAL FOOT PAIN: ICD-10-CM

## 2023-06-10 DIAGNOSIS — M79.7 FIBROMYALGIA: ICD-10-CM

## 2023-06-10 DIAGNOSIS — M79.671 BILATERAL FOOT PAIN: ICD-10-CM

## 2023-06-10 DIAGNOSIS — M25.562 PAIN IN BOTH KNEES, UNSPECIFIED CHRONICITY: ICD-10-CM

## 2023-06-10 LAB
BILIRUB UR QL STRIP: NEGATIVE
CLARITY UR REFRACT.AUTO: CLEAR
COLOR UR AUTO: YELLOW
CREAT UR-MCNC: 43 MG/DL (ref 15–325)
GLUCOSE UR QL STRIP: NEGATIVE
HGB UR QL STRIP: NEGATIVE
KETONES UR QL STRIP: NEGATIVE
LEUKOCYTE ESTERASE UR QL STRIP: NEGATIVE
NITRITE UR QL STRIP: NEGATIVE
PH UR STRIP: 6 [PH] (ref 5–8)
PROT UR QL STRIP: NEGATIVE
PROT UR-MCNC: <7 MG/DL (ref 0–15)
PROT/CREAT UR: NORMAL MG/G{CREAT} (ref 0–0.2)
SP GR UR STRIP: 1.01 (ref 1–1.03)
URN SPEC COLLECT METH UR: NORMAL

## 2023-06-10 PROCEDURE — 84156 ASSAY OF PROTEIN URINE: CPT | Performed by: INTERNAL MEDICINE

## 2023-06-10 PROCEDURE — 81003 URINALYSIS AUTO W/O SCOPE: CPT | Performed by: INTERNAL MEDICINE

## 2023-06-12 ENCOUNTER — PATIENT MESSAGE (OUTPATIENT)
Dept: RHEUMATOLOGY | Facility: CLINIC | Age: 63
End: 2023-06-12
Payer: COMMERCIAL

## 2023-06-15 ENCOUNTER — OFFICE VISIT (OUTPATIENT)
Dept: CARDIOLOGY | Facility: CLINIC | Age: 63
End: 2023-06-15
Payer: COMMERCIAL

## 2023-06-15 VITALS
BODY MASS INDEX: 18.56 KG/M2 | SYSTOLIC BLOOD PRESSURE: 89 MMHG | HEART RATE: 100 BPM | HEIGHT: 60 IN | DIASTOLIC BLOOD PRESSURE: 60 MMHG | WEIGHT: 94.56 LBS

## 2023-06-15 DIAGNOSIS — I83.12 VARICOSE VEINS OF BOTH LOWER EXTREMITIES WITH INFLAMMATION: ICD-10-CM

## 2023-06-15 DIAGNOSIS — I73.00 RAYNAUD'S DISEASE WITHOUT GANGRENE: Primary | ICD-10-CM

## 2023-06-15 DIAGNOSIS — I83.11 VARICOSE VEINS OF BOTH LOWER EXTREMITIES WITH INFLAMMATION: ICD-10-CM

## 2023-06-15 DIAGNOSIS — I73.81 ERYTHROMELALGIA: ICD-10-CM

## 2023-06-15 DIAGNOSIS — I87.2 VENOUS INSUFFICIENCY OF RIGHT LOWER EXTREMITY: ICD-10-CM

## 2023-06-15 DIAGNOSIS — R60.0 EDEMA OF BOTH LOWER EXTREMITIES: ICD-10-CM

## 2023-06-15 PROCEDURE — 1159F PR MEDICATION LIST DOCUMENTED IN MEDICAL RECORD: ICD-10-PCS | Mod: CPTII,S$GLB,, | Performed by: INTERNAL MEDICINE

## 2023-06-15 PROCEDURE — 99215 OFFICE O/P EST HI 40 MIN: CPT | Mod: S$GLB,,, | Performed by: INTERNAL MEDICINE

## 2023-06-15 PROCEDURE — 3008F BODY MASS INDEX DOCD: CPT | Mod: CPTII,S$GLB,, | Performed by: INTERNAL MEDICINE

## 2023-06-15 PROCEDURE — 3074F PR MOST RECENT SYSTOLIC BLOOD PRESSURE < 130 MM HG: ICD-10-PCS | Mod: CPTII,S$GLB,, | Performed by: INTERNAL MEDICINE

## 2023-06-15 PROCEDURE — 99999 PR PBB SHADOW E&M-EST. PATIENT-LVL V: ICD-10-PCS | Mod: PBBFAC,,, | Performed by: INTERNAL MEDICINE

## 2023-06-15 PROCEDURE — 3008F PR BODY MASS INDEX (BMI) DOCUMENTED: ICD-10-PCS | Mod: CPTII,S$GLB,, | Performed by: INTERNAL MEDICINE

## 2023-06-15 PROCEDURE — 99999 PR PBB SHADOW E&M-EST. PATIENT-LVL V: CPT | Mod: PBBFAC,,, | Performed by: INTERNAL MEDICINE

## 2023-06-15 PROCEDURE — 1159F MED LIST DOCD IN RCRD: CPT | Mod: CPTII,S$GLB,, | Performed by: INTERNAL MEDICINE

## 2023-06-15 PROCEDURE — 3074F SYST BP LT 130 MM HG: CPT | Mod: CPTII,S$GLB,, | Performed by: INTERNAL MEDICINE

## 2023-06-15 PROCEDURE — 3078F DIAST BP <80 MM HG: CPT | Mod: CPTII,S$GLB,, | Performed by: INTERNAL MEDICINE

## 2023-06-15 PROCEDURE — 99215 PR OFFICE/OUTPT VISIT, EST, LEVL V, 40-54 MIN: ICD-10-PCS | Mod: S$GLB,,, | Performed by: INTERNAL MEDICINE

## 2023-06-15 PROCEDURE — 3078F PR MOST RECENT DIASTOLIC BLOOD PRESSURE < 80 MM HG: ICD-10-PCS | Mod: CPTII,S$GLB,, | Performed by: INTERNAL MEDICINE

## 2023-06-15 NOTE — PATIENT INSTRUCTIONS
Assessment/Plan:  Berenice Hayes is a 63 y.o. female with raynaud's phenomenon, erytnromelalgia, fibromyalgia, Hashimoto's Thyroiditis, who presents for a follow up appointment.     BLE Edema- Due to possible venous insufficiency and dependent edema from sitting most of the day.  Edema has improved.  BLE Venous Reflux Study on 3/16/2023 revealed right GSV reflux and no DVT.  LUIS MANUEL Study on 3/16/2023 demonstrated right resting LUIS MANUEL 0.96, is suggestive of minimal right lower extremity arterial disease.  Left resting LUIS MANUEL 0.99, is suggestive of minimal left lower extremity arterial disease.  Continue bumex 0.5 mg on Mon, Wed, Fri, as tolerated.  Pt to limit sodium intake to 2,000 mg daily.  Elevate legs when resting. Continue graduated compression hose.     3. Raynaud's disease without gangrene- Stable.  Continue current management.     3. Erythromelalgia- Stable.  Continue ASA 81 mg daily.    Follow up in 6 months

## 2023-06-15 NOTE — PROGRESS NOTES
"[facility]      Chief Complaint   Patient presents with    Edema       Patient ID: Berenice Hayes is a 63 y.o. female with raynaud's phenomenon, erytnromelalgia, fibromyalgia, Hashimoto's Thyroiditis, who presents for a follow up appointment.  Pertinent history/events are as follows:     -Pt follow by Dr. Reagan in the past for raynaud's phenomenon, erytnromelalgia (per his note on 9/30/2019):  "She is here for follow up of extensive raynaud and erytnromelalgia complicated with severe distal digits pain. No ulcers. Overtime she has found a way to adjust her living and working conditions to avoid any severe issues. Last year has been a tough with more symptoms involving her face in addition to her digits. She takes aspirin 81 mg daily. She has no macrovascular disease. LUIS MANUEL and WBI in 2013 were unremarkable. ECG 6/2017: NSR. She sees rheumatology for hashimoto + sicca.  Plan:  Continue with current medical plan and lifestyle changes.  Return sooner for concerns or questions. If symptoms persist go to the ED  I have reviewed all pertinent data on this patient    Continue to adjust lifestyle around triggers for either Raynaud or Erytnromelalgia  Exposure to cold or extreme heat   No invasive procedures   Follow up with PCP, Rheumatology, and Neurology"    -At our initial clinic visit on 2/20/2023, Mrs. Hayes reported persistent leg swelling starting 2 months ago.  States she wears OTC compression hose daily with no improvement.  She has no claudication or tissue loss.   Plan:   BLE Edema- Due to possible venous insufficiency and dependent edema from sitting most of the day.  Check BLE venous reflux study and LUIS MANUEL study.  Start bumex 0.5 mg on Mon, Wed, Fri, as tolerated by pt's blood pressure.  Check cmp in 1 week.  Pt to limit sodium intake to 2,000 mg daily.  Elevate legs when resting. Continue graduated compression hose.   Raynaud's disease without gangrene- Stable.  Continue current management. "   Erythromelalgia- Stable.  Continue ASA 81 mg daily.     HPI:  Mrs. Hayes reports improvement in leg swelling.  States she was unable to tolerate bumex.  BLE Venous Reflux Study on 3/16/2023 revealed right GSV reflux and no DVT.  LUIS MANUEL Study on 3/16/2023 demonstrated right resting LUIS MANUEL 0.96, is suggestive of minimal right lower extremity arterial disease.  Left resting LUIS MANUEL 0.99, is suggestive of minimal left lower extremity arterial disease.    Past Medical History:   Diagnosis Date    Asthma with allergic rhinitis     Bladder spasm     Dense breasts     heterogeneously/fibrocystic disease    Elevated antinuclear antibody (GUICHO) level     Erythromelalgia     Fibromyalgia     Ganglion cyst     left toe    Goiter     MNG    Headache(784.0)     Hypothyroidism     Hashimoto with + Tg ab    Kidney stone     Osteoporosis     femoral neck    Pernio 2018    Raynaud's phenomenon     Rhinitis     Scoliosis     Sjogren's syndrome     Sleep disorder     type of Narcolepsy ( resolved)    Vitamin D deficiency disease     Vulvodynia      Past Surgical History:   Procedure Laterality Date    BREAST SURGERY      fibrocystic tumor removed     SECTION      2x    CYST REMOVAL      laparoscopic cyst on ovary    HYSTERECTOMY      INJECTION OF ANESTHETIC AGENT AROUND NERVE N/A 2019    Procedure: BLOCK, NERVE COCCYGEAL  1 OF 2  Pt. can drive herself home;  Surgeon: Monique Philip MD;  Location: [facility];  Service: Pain Management;  Laterality: N/A;    INJECTION OF ANESTHETIC AGENT AROUND NERVE N/A 2019    Procedure: BLOCK, NERVE COCCYGEAL  2 OF 2  Pt. can drive herself home;  Surgeon: Monique Philip MD;  Location: [facility];  Service: Pain Management;  Laterality: N/A;    intradermal cyst       removed from left index finger    SINUS SURGERY      2x     Social History     Socioeconomic History    Marital status:    Occupational History     Employer: [employer]   Tobacco Use    Smoking status: Never     Smokeless tobacco: Never   Substance and Sexual Activity    Alcohol use: No    Drug use: No    Sexual activity: Not Currently     Birth control/protection: Surgical     Comment: single, RAMOS ov in situ    Social History Narrative         Social Determinants of Health     Financial Resource Strain: Low Risk     Difficulty of Paying Living Expenses: Not very hard   Food Insecurity: No Food Insecurity    Worried About Running Out of Food in the Last Year: Never true    Ran Out of Food in the Last Year: Never true   Transportation Needs: No Transportation Needs    Lack of Transportation (Medical): No    Lack of Transportation (Non-Medical): No   Physical Activity: Inactive    Days of Exercise per Week: 0 days    Minutes of Exercise per Session: 0 min   Stress: No Stress Concern Present    Feeling of Stress : Only a little   Social Connections: Unknown    Frequency of Communication with Friends and Family: More than three times a week    Frequency of Social Gatherings with Friends and Family: Once a week    Active Member of Clubs or Organizations: No    Attends Club or Organization Meetings: Never    Marital Status:    Housing Stability: Low Risk     Unable to Pay for Housing in the Last Year: No    Number of Places Lived in the Last Year: 1    Unstable Housing in the Last Year: No     Family History   Problem Relation Age of Onset    Diabetes Mother     Fibromyalgia Mother     Polymyalgia rheumatica Mother     Macular degeneration Mother     Arthritis Mother     Hypertension Mother     Kidney disease Mother     Pneumonia Mother     Adrenal disorder Mother         adrenal insufficiency    Coronary artery disease Father     Arthritis Father         osteoarthritis    Hypertension Father     Heart disease Father     Diabetes Father     Hyperlipidemia Father     Hyperlipidemia Brother     Cancer Brother         oral    Thyroid cancer Daughter     Cancer Daughter         thyroid    Hypothyroidism Daughter      Breast cancer Neg Hx     Ovarian cancer Neg Hx     Colon cancer Neg Hx     Melanoma Neg Hx        Review of patient's allergies indicates:  No Known Allergies    Medication List with Changes/Refills   Current Medications    ALBUTEROL (VENTOLIN HFA) 90 MCG/ACTUATION INHALER    Inhale 2 puffs into the lungs every 6 (six) hours as needed for Wheezing. Rescue    ALPRAZOLAM (XANAX) 0.25 MG TABLET    Take 1 tablet (0.25 mg total) by mouth daily as needed for Anxiety.    AMITRIPTYLINE (ELAVIL) 10 MG TABLET    TAKE 2 TABLETS ONCE DAILY WITH THE 50 MG FOR A TOTAL OF 70 MG    AMITRIPTYLINE (ELAVIL) 50 MG TABLET    TAKE 1 TABLET NIGHTLY WITH THE 10 MG AMITRIPTYLINE    AMITRIPTYLINE (ELAVIL) 50 MG TABLET    Take 1 tablet (50 mg total) by mouth every evening.    ASPIRIN (ECOTRIN) 81 MG EC TABLET    Take 81 mg by mouth once daily.    AZASITE 1 % DROP    INSTILL 1 DROP INTO LEFT EYE BID    BUMETANIDE (BUMEX) 0.5 MG TAB    Take 1 tablet (0.5 mg total) by mouth every Mon, Wed, Fri.    CLINDAMYCIN (CLEOCIN T) 1 % EXTERNAL SOLUTION    APPLY TOPICALLY TO THE AFFECTED AREA TWICE DAILY AS NEEDED FOR BREAKOUTS    ESTRADIOL (VAGIFEM) 10 MCG TAB    INSERT 1 TABLET VAGINALLY TWICE A WEEK    ESTRADIOL 0.05 MG/24 HR TD PTSW (VIVELLE-DOT) 0.05 MG/24 HR    Place 1 patch onto the skin twice a week.    FLUOCINONIDE (LIDEX) 0.05 % EXTERNAL SOLUTION    AAA scalp qday - bid prn pruritus    FLUTICASONE (FLONASE) 50 MCG/ACTUATION NASAL SPRAY    1 spray by Each Nare route once daily.    GABAPENTIN (NEURONTIN) 100 MG CAPSULE    TAKE 1 CAPSULE IN THE MORNING AND 4 CAPSULES AT BEDTIME    HYDROXYCHLOROQUINE (PLAQUENIL) 200 MG TABLET    TAKE 1 TABLET DAILY    KETOCONAZOLE (NIZORAL) 2 % CREAM    Apply topically 2 (two) times daily. Apply daily to affected area    KETOPROFEN 10 % CRPK    APPLY UP TO 4.8 GRAMS TO PAINFUL AREAS UP TO FIVE TIMES DAILY. RUB IN WELL    KETOPROFEN, BULK, TOP    Ketoprofen/flexeril/lidocaine (compound)    LIDOCAINE-PRILOCAINE (EMLA)  CREAM    Apply topically as needed.    LIOTHYRONINE (CYTOMEL) 5 MCG TAB    TAKE THREE AND ONE-HALF TABLETS ONCE DAILY    METAXALONE (SKELAXIN) 800 MG TABLET    TAKE 1 TABLET TWICE A DAY AS NEEDED FOR SPASM    NIACINAMIDE 500 MG TAB    Take 1 tablet (500 mg total) by mouth 3 (three) times daily.    NURTEC 75 MG ODT    Take by mouth.    PILOCARPINE (SALAGEN) 5 MG TAB    Take 1 tablet (5 mg total) by mouth 3 (three) times daily as needed.    PREVIDENT 5000 BOOSTER PLUS 1.1 % PSTE        RESTASIS 0.05 % OPHTHALMIC EMULSION        RISEDRONATE 35 MG TBEC    TAKE 1 TABLET ONCE A WEEK    SUMATRIPTAN (IMITREX) 100 MG TABLET    Take 1 tablet (100 mg total) by mouth every 2 (two) hours as needed for Migraine.    TRETINOIN (RETIN-A) 0.025 % GEL    Apply small amount to entire face qhs. Wash off qam and apply sunscreen.    UNABLE TO FIND    Apply 240 g topically as needed. Ketoprofen/cyclobenzaprine HCI/Lidocaine (lipomax) 10/2/5% Up to 5 times daily as needed for pain    ZOLPIDEM (AMBIEN) 10 MG TAB    TAKE 1 TABLET(10 MG) BY MOUTH EVERY NIGHT AS NEEDED       Review of Systems  Constitution: Denies chills, fever, and sweats.  HENT: Denies headaches or blurry vision.  Cardiovascular: Denies chest pain or irregular heart beat.  Respiratory: Denies cough or shortness of breath.  Gastrointestinal: Denies abdominal pain, nausea, or vomiting.  Musculoskeletal: Positive for leg swelling.   Neurological: Denies dizziness or focal weakness.  Psychiatric/Behavioral: Normal mental status.  Hematologic/Lymphatic: Denies bleeding problem or easy bruising/bleeding.  Skin: Denies rash or suspicious lesions    Physical Examination  BP (!) 89/60   Pulse 100   Ht 5' (1.524 m)   Wt 42.9 kg (94 lb 9.2 oz)   LMP  (LMP Unknown)   BMI 18.47 kg/m²     Constitutional: No acute distress, conversant  HEENT: Sclera anicteric, Pupils equal, round and reactive to light, extraocular motions intact, Oropharynx clear  Neck: No JVD, no carotid  bruits  Cardiovascular: regular rate and rhythm, no murmur, rubs or gallops, normal S1/S2  Pulmonary: Clear to auscultation bilaterally  Abdominal: Abdomen soft, nontender, nondistended, positive bowel sounds  Extremities: BLE's with trace pitting edema at ankles (L>R) with erythema and no warmth, prominent varicose veins   Pulses:  Carotid pulses are 2+ on the right side, and 2+ on the left side.  Radial pulses are 2+ on the right side, and 2+ on the left side.   Femoral pulses are 2+ on the right side, and 2+ on the left side.  Popliteal pulses are 2+ on the right side, and 2+ on the left side.   Dorsalis pedis pulses are 2+ on the right side, and 2+ on the left side.   Posterior tibial pulses are 2+ on the right side, and 2+ on the left side.    Skin: No ecchymosis, erythema, or ulcers  Psych: Alert and oriented x 3, appropriate affect  Neuro: CNII-XII intact, no focal deficits    Labs:  Most Recent Data  CBC:   Lab Results   Component Value Date    WBC 6.49 06/10/2023    HGB 14.2 06/10/2023    HCT 45.1 06/10/2023     06/10/2023    MCV 94 06/10/2023    RDW 12.7 06/10/2023     BMP:   Lab Results   Component Value Date     06/10/2023    K 4.7 06/10/2023    CL 99 06/10/2023    CO2 32 (H) 06/10/2023    BUN 19 06/10/2023    CREATININE 0.7 06/10/2023    GLU 97 06/10/2023    CALCIUM 10.4 06/10/2023    MG 2.2 07/06/2007    PHOS 3.9 05/14/2022     LFTS;   Lab Results   Component Value Date    PROT 7.8 06/10/2023    ALBUMIN 4.4 06/10/2023    BILITOT 0.5 06/10/2023    AST 23 06/10/2023    ALKPHOS 65 06/10/2023    ALT 20 06/10/2023     COAGS: No results found for: INR, PROTIME, PTT  FLP:   Lab Results   Component Value Date    CHOL 143 09/10/2022    HDL 87 (H) 09/10/2022    LDLCALC 49.0 (L) 09/10/2022    TRIG 35 09/10/2022    CHOLHDL 60.8 (H) 09/10/2022     CARDIAC: No results found for: TROPONINI, CKMB, BNP    Imaging:    BLE Venous Reflux Study 3/16/2023:  There is no evidence of a right lower extremity  DVT.  The right greater saphenous vein has reflux.  There is no evidence of a left lower extremity DVT.    LUIS MANUEL Study 3/16/2023:  Right resting LUIS MANUEL 0.96, is suggestive of minimal right lower extremity arterial disease.  Left resting LUIS MANUEL 0.99, is suggestive of minimal left lower extremity arterial disease.  PVR waveforms are mildly dampened at ankle level bilaterally.    Assessment/Plan:  Berenice Hayes is a 63 y.o. female with raynaud's phenomenon, erytnromelalgia, fibromyalgia, Hashimoto's Thyroiditis, who presents for a follow up appointment.     BLE Edema- Due to possible venous insufficiency and dependent edema from sitting most of the day.  Edema has improved.  BLE Venous Reflux Study on 3/16/2023 revealed right GSV reflux and no DVT.  LUIS MANUEL Study on 3/16/2023 demonstrated right resting LUIS MANUEL 0.96, is suggestive of minimal right lower extremity arterial disease.  Left resting LUIS MANUEL 0.99, is suggestive of minimal left lower extremity arterial disease.  Continue bumex 0.5 mg on Mon, Wed, Fri, as tolerated.  Pt to limit sodium intake to 2,000 mg daily.  Elevate legs when resting. Continue graduated compression hose.     3. Raynaud's disease without gangrene- Stable.  Continue current management.     3. Erythromelalgia- Stable.  Continue ASA 81 mg daily.    Follow up in 6 months     Total duration of face to face visit time 30 minutes.  Total time spent counseling greater than fifty percent of total visit time.  Counseling included discussion regarding imaging findings, diagnosis, possibilities, treatment options, risks and benefits.  The patient had many questions regarding the options and long-term effects.    Isidro Dash MD, PhD  Interventional Cardiology

## 2023-06-19 ENCOUNTER — OFFICE VISIT (OUTPATIENT)
Dept: PAIN MEDICINE | Facility: CLINIC | Age: 63
End: 2023-06-19
Payer: COMMERCIAL

## 2023-06-19 VITALS
HEART RATE: 102 BPM | RESPIRATION RATE: 17 BRPM | WEIGHT: 94.56 LBS | BODY MASS INDEX: 18.56 KG/M2 | SYSTOLIC BLOOD PRESSURE: 96 MMHG | HEIGHT: 60 IN | OXYGEN SATURATION: 100 % | DIASTOLIC BLOOD PRESSURE: 72 MMHG

## 2023-06-19 DIAGNOSIS — G89.4 CHRONIC PAIN DISORDER: ICD-10-CM

## 2023-06-19 DIAGNOSIS — R10.2 PELVIC PAIN IN FEMALE: ICD-10-CM

## 2023-06-19 DIAGNOSIS — M50.30 DDD (DEGENERATIVE DISC DISEASE), CERVICAL: ICD-10-CM

## 2023-06-19 DIAGNOSIS — M79.7 FIBROMYALGIA: ICD-10-CM

## 2023-06-19 DIAGNOSIS — G58.8 PUDENDAL NEURALGIA: ICD-10-CM

## 2023-06-19 DIAGNOSIS — M47.812 CERVICAL SPONDYLOSIS: Primary | ICD-10-CM

## 2023-06-19 PROCEDURE — 99999 PR PBB SHADOW E&M-EST. PATIENT-LVL V: ICD-10-PCS | Mod: PBBFAC,,, | Performed by: NURSE PRACTITIONER

## 2023-06-19 PROCEDURE — 3078F DIAST BP <80 MM HG: CPT | Mod: CPTII,S$GLB,, | Performed by: NURSE PRACTITIONER

## 2023-06-19 PROCEDURE — 1160F RVW MEDS BY RX/DR IN RCRD: CPT | Mod: CPTII,S$GLB,, | Performed by: NURSE PRACTITIONER

## 2023-06-19 PROCEDURE — 99213 PR OFFICE/OUTPT VISIT, EST, LEVL III, 20-29 MIN: ICD-10-PCS | Mod: S$GLB,,, | Performed by: NURSE PRACTITIONER

## 2023-06-19 PROCEDURE — 3074F PR MOST RECENT SYSTOLIC BLOOD PRESSURE < 130 MM HG: ICD-10-PCS | Mod: CPTII,S$GLB,, | Performed by: NURSE PRACTITIONER

## 2023-06-19 PROCEDURE — 3074F SYST BP LT 130 MM HG: CPT | Mod: CPTII,S$GLB,, | Performed by: NURSE PRACTITIONER

## 2023-06-19 PROCEDURE — 99213 OFFICE O/P EST LOW 20 MIN: CPT | Mod: S$GLB,,, | Performed by: NURSE PRACTITIONER

## 2023-06-19 PROCEDURE — 3078F PR MOST RECENT DIASTOLIC BLOOD PRESSURE < 80 MM HG: ICD-10-PCS | Mod: CPTII,S$GLB,, | Performed by: NURSE PRACTITIONER

## 2023-06-19 PROCEDURE — 1159F PR MEDICATION LIST DOCUMENTED IN MEDICAL RECORD: ICD-10-PCS | Mod: CPTII,S$GLB,, | Performed by: NURSE PRACTITIONER

## 2023-06-19 PROCEDURE — 3008F PR BODY MASS INDEX (BMI) DOCUMENTED: ICD-10-PCS | Mod: CPTII,S$GLB,, | Performed by: NURSE PRACTITIONER

## 2023-06-19 PROCEDURE — 1159F MED LIST DOCD IN RCRD: CPT | Mod: CPTII,S$GLB,, | Performed by: NURSE PRACTITIONER

## 2023-06-19 PROCEDURE — 1160F PR REVIEW ALL MEDS BY PRESCRIBER/CLIN PHARMACIST DOCUMENTED: ICD-10-PCS | Mod: CPTII,S$GLB,, | Performed by: NURSE PRACTITIONER

## 2023-06-19 PROCEDURE — 99999 PR PBB SHADOW E&M-EST. PATIENT-LVL V: CPT | Mod: PBBFAC,,, | Performed by: NURSE PRACTITIONER

## 2023-06-19 PROCEDURE — 3008F BODY MASS INDEX DOCD: CPT | Mod: CPTII,S$GLB,, | Performed by: NURSE PRACTITIONER

## 2023-06-19 NOTE — PROGRESS NOTES
Chronic patient Established Note (Follow up visit)      SUBJECTIVE:    Interval History 6/19/2023:  The patient returns to clinic today for follow up of pain. She reports increased neck pain and headaches. She has headaches that begin at the base of her head that radiates forward. She is seeing a chiropractor which has been helpful. She denies any radicular arm pain. She continues to report pelvic pain. She does acupuncture for this with benefit. She also uses a pain cream with benefit. She reports increased low back pain that radiates into the hips and lateral thighs. She is taking Gabapentin. Her pain today is 4/10.    Interval History 8/9/2022:  The patient returns to clinic today for follow up of pelvic pain. She has not been seen in over a year. She reports that she has been managing her widespread pain through acupuncture. She also sees a chiropractor with benefit. She reports increased neck pain and headaches that began in February. She did see Neurology. She reports headaches to the frontal area that is throbbing and pressure in nature. She reports neck pain, worse with flexion and extension. She denies any radicular arm pain. She continues to report pelvic pain. Her pain is well controlled with acupuncture, compound cream, and Gabapentin. Her pain today is 6/10.    nterval History 5/31/2021:  Berenice Hayes presents to the clinic for a follow-up appointment for pelvic pain. She continues to report pelvic and vaginal pain. This pain is worse with prolonged sitting. She also reports tailbone and hip pain. She denies any radicular leg pain. Her pain is worse with bending forward. She also has difficulty sleeping due to pain. She continues to participate in acupuncture with benefit. She continues to use the compound cream. She also takes Gabapentin and Skelaxin with benefit. She denies any other health changes. Her pain today is 4/10.    Interval History 05/15/20:  Patient with complex medical history with  overlapping symptoms.  Currently feels that she is better in her tailbone.  Currently, she is reporting left-sided neck pain.  This pain is worse with cold and prolonged activity.  This is associated with ear pain and left arm pain.  Of note, the ear pain is on the surface of the ear and posterior.  It is difficult to touch that area due to sensitivity.  Her arm pain is tingling, burning pain that radiates into her left arm to her hand.  This is also associated with chest wall pain.  She has had a thorough cardiac workup which was negative.  She was told she had tight strap muscles in her neck.  Bothered by Coccygeal Pain. It is a 2/10 at the moment. It is deep and dull. It does not radiate down any extremity at this point, but it has in the past.   Alleviated by Compound Cream, Sitting Down; ice packs. Aggravated by standing up prolonged times.   Paradoxical response to interventions in the past.     Left Arm Pain is shooting pain down triggered by cold. Alleviated by Lidocaine patch, Aspercream, Acupuncture, Heating Pads  Acupuncture has been suspended due to COVID 19 but was markedly helping out.     Interval History 7/2/2019;  The patient returns to clinic today for follow up. She reports increased pain that began on last week. She denies any injury or fall. She reports that she twisted over to reach for something and experienced increased pain. She describes this pain as beginning in her tailbone and radiating into both hips and down the back of her leg to mid thigh, left greater than right. She describes this pain as stabbing and burning. She continues to report intermittent radicular pain. Her pain is worse with prolonged sitting and standing. She continues to report benefit with current medication regimen. She denies any other health changes. Her pain today is 5/10.    Pain Disability Index Review:  Last 3 PDI Scores 6/19/2023 8/9/2022 5/31/2021   Pain Disability Index (PDI) 17 28 40       Pain  Medications:  Gabapentin  Skelaxin  Elavil  Compound cream     Opioid Contract: N/A     report:  Not applicable    Pain Procedures:   2/3/2015- Left pudendal nerve block  6/2/2015- Right pudendal nerve block  10/28/2015- Right pudendal nerve block  10/26/2017- Bilateral coccygeal nerve block  7/5/2019- Coccygeal nerve block  7/19/2019- Coccygeal nerve block    Physical Therapy/Home Exercise: yes    Imaging:   MRI Lumbar Spine 7/10/2019:  COMPARISON:  CT abdomen/pelvis without contrast 02/07/2019; MR lumbar spine without contrast 01/16/2017     FINDINGS:  Alignment: Mild levoconvex scoliosis.  Sagittal alignment preserved.     Vertebrae: Normal marrow signal. No fracture.     Discs: Satisfactory height and signal.     Cord: Normal.  Conus terminates at L2.     Degenerative findings:     T12-L4: No significant spinal canal stenosis or neural foraminal narrowing.     L4-L5: Mild diffuse posterior disc bulge and mild bilateral facet arthropathy with small right facet effusion without significant spinal canal stenosis.  No neural foraminal narrowing.     L5-S1: No significant spinal canal stenosis.  No neural foraminal narrowing.     Miscellaneous: Note is made of prominent bilateral nerve sheath ectasia/perineural sleeve cyst formation throughout the visualized lumbosacral spine.  Findings similar when compared to MR examination 01/16/2017.     Paraspinal muscles & soft tissues: Normal.        Impression:     Mild spondylosis, as above.  No spinal canal stenosis, significant neural foraminal narrowing or focal disc abnormality.     Nerve sheath ectasia/ perineural cyst formation, unchanged from the 01/16/2017 MRI exam.    Allergies: Review of patient's allergies indicates:  No Known Allergies    Current Medications:   Current Outpatient Medications   Medication Sig Dispense Refill    albuterol (VENTOLIN HFA) 90 mcg/actuation inhaler Inhale 2 puffs into the lungs every 6 (six) hours as needed for Wheezing. Rescue 18 g  2    amitriptyline (ELAVIL) 10 MG tablet TAKE 2 TABLETS ONCE DAILY WITH THE 50 MG FOR A TOTAL OF 70  tablet 3    amitriptyline (ELAVIL) 50 MG tablet TAKE 1 TABLET NIGHTLY WITH THE 10 MG AMITRIPTYLINE 90 tablet 3    amitriptyline (ELAVIL) 50 MG tablet Take 1 tablet (50 mg total) by mouth every evening. 7 tablet 0    aspirin (ECOTRIN) 81 MG EC tablet Take 81 mg by mouth once daily.      AZASITE 1 % Drop INSTILL 1 DROP INTO LEFT EYE BID      bumetanide (BUMEX) 0.5 MG Tab Take 1 tablet (0.5 mg total) by mouth every Mon, Wed, Fri. 90 tablet 3    clindamycin (CLEOCIN T) 1 % external solution APPLY TOPICALLY TO THE AFFECTED AREA TWICE DAILY AS NEEDED FOR BREAKOUTS 30 mL 3    estradioL (VAGIFEM) 10 mcg Tab INSERT 1 TABLET VAGINALLY TWICE A WEEK 24 tablet 3    estradiol 0.05 mg/24 hr td ptsw (VIVELLE-DOT) 0.05 mg/24 hr Place 1 patch onto the skin twice a week. 24 patch 3    fluocinonide (LIDEX) 0.05 % external solution AAA scalp qday - bid prn pruritus 60 mL 3    fluticasone (FLONASE) 50 mcg/actuation nasal spray 1 spray by Each Nare route once daily. 3 Bottle 3    gabapentin (NEURONTIN) 100 MG capsule TAKE 1 CAPSULE IN THE MORNING AND 4 CAPSULES AT BEDTIME 450 capsule 3    hydrOXYchloroQUINE (PLAQUENIL) 200 mg tablet TAKE 1 TABLET DAILY 90 tablet 3    ketoconazole (NIZORAL) 2 % cream Apply topically 2 (two) times daily. Apply daily to affected area 30 g 0    ketoprofen 10 % CrPk APPLY UP TO 4.8 GRAMS TO PAINFUL AREAS UP TO FIVE TIMES DAILY. RUB IN WELL      KETOPROFEN, BULK, TOP Ketoprofen/flexeril/lidocaine (compound)      LIDOcaine-prilocaine (EMLA) cream Apply topically as needed. 90 g 3    liothyronine (CYTOMEL) 5 MCG Tab TAKE THREE AND ONE-HALF TABLETS ONCE DAILY 315 tablet 3    metaxalone (SKELAXIN) 800 MG tablet TAKE 1 TABLET TWICE A DAY AS NEEDED FOR SPASM 180 tablet 3    niacinamide 500 mg Tab Take 1 tablet (500 mg total) by mouth 3 (three) times daily. 270 tablet 3    NURTEC 75 mg odt Take by mouth.       pilocarpine (SALAGEN) 5 MG Tab Take 1 tablet (5 mg total) by mouth 3 (three) times daily as needed. 270 tablet 3    PREVIDENT 5000 BOOSTER PLUS 1.1 % Pste       RESTASIS 0.05 % ophthalmic emulsion       risedronate 35 mg TbEC TAKE 1 TABLET ONCE A WEEK 12 tablet 3    tretinoin (RETIN-A) 0.025 % gel Apply small amount to entire face qhs. Wash off qam and apply sunscreen. 45 g 3    UNABLE TO FIND Apply 240 g topically as needed. Ketoprofen/cyclobenzaprine HCI/Lidocaine (lipomax) 10/2/5% Up to 5 times daily as needed for pain  99    zolpidem (AMBIEN) 10 mg Tab TAKE 1 TABLET(10 MG) BY MOUTH EVERY NIGHT AS NEEDED 30 tablet 0    ALPRAZolam (XANAX) 0.25 MG tablet Take 1 tablet (0.25 mg total) by mouth daily as needed for Anxiety. 3 tablet 0    sumatriptan (IMITREX) 100 MG tablet Take 1 tablet (100 mg total) by mouth every 2 (two) hours as needed for Migraine. 9 tablet 1     No current facility-administered medications for this visit.       REVIEW OF SYSTEMS:    GENERAL:  No weight loss, malaise or fevers.  HEENT:  Negative for frequent or significant headaches.  NECK:  Negative for lumps, goiter, pain and significant neck swelling.  RESPIRATORY:  Negative for cough, wheezing or shortness of breath.  CARDIOVASCULAR:  Negative for chest pain, leg swelling or palpitations.  GI:  Negative for abdominal discomfort, blood in stools or black stools or change in bowel habits.  MUSCULOSKELETAL:  See HPI.  SKIN:  Negative for lesions, rash, and itching.  PSYCH:  Negative for sleep disturbance, mood disorder and recent psychosocial stressors.  HEMATOLOGY/LYMPHOLOGY:  Negative for prolonged bleeding, bruising easily or swollen nodes.  NEURO:   No history of headaches, syncope, paralysis, seizures or tremors.  ENDO: Thyroid disorder  All other reviewed and negative other than HPI.    Past Medical History:  Past Medical History:   Diagnosis Date    Asthma with allergic rhinitis     Bladder spasm     Dense breasts      heterogeneously/fibrocystic disease    Elevated antinuclear antibody (GUICHO) level     Erythromelalgia     Fibromyalgia     Ganglion cyst     left toe    Goiter     MNG    Headache(784.0)     Hypothyroidism     Hashimoto with + Tg ab    Kidney stone     Osteoporosis     femoral neck    Pernio 2018    Raynaud's phenomenon     Rhinitis     Scoliosis     Sjogren's syndrome     Sleep disorder     type of Narcolepsy ( resolved)    Vitamin D deficiency disease     Vulvodynia        Past Surgical History:  Past Surgical History:   Procedure Laterality Date    BREAST SURGERY      fibrocystic tumor removed     SECTION      2x    CYST REMOVAL      laparoscopic cyst on ovary    HYSTERECTOMY      INJECTION OF ANESTHETIC AGENT AROUND NERVE N/A 2019    Procedure: BLOCK, NERVE COCCYGEAL  1 OF 2  Pt. can drive herself home;  Surgeon: Monique Philip MD;  Location: [facility];  Service: Pain Management;  Laterality: N/A;    INJECTION OF ANESTHETIC AGENT AROUND NERVE N/A 2019    Procedure: BLOCK, NERVE COCCYGEAL  2 OF 2  Pt. can drive herself home;  Surgeon: Monique Philip MD;  Location: [facility];  Service: Pain Management;  Laterality: N/A;    intradermal cyst       removed from left index finger    SINUS SURGERY      2x       Family History:  Family History   Problem Relation Age of Onset    Diabetes Mother     Fibromyalgia Mother     Polymyalgia rheumatica Mother     Macular degeneration Mother     Arthritis Mother     Hypertension Mother     Kidney disease Mother     Pneumonia Mother     Adrenal disorder Mother         adrenal insufficiency    Coronary artery disease Father     Arthritis Father         osteoarthritis    Hypertension Father     Heart disease Father     Diabetes Father     Hyperlipidemia Father     Hyperlipidemia Brother     Cancer Brother         oral    Thyroid cancer Daughter     Cancer Daughter         thyroid    Hypothyroidism Daughter     Breast cancer Neg Hx     Ovarian cancer Neg  Hx     Colon cancer Neg Hx     Melanoma Neg Hx        Social History:  Social History     Socioeconomic History    Marital status:    Occupational History     Employer: [employer]   Tobacco Use    Smoking status: Never    Smokeless tobacco: Never   Substance and Sexual Activity    Alcohol use: No    Drug use: No    Sexual activity: Not Currently     Birth control/protection: Surgical     Comment: single, RAMOS ov in situ    Social History Narrative         Social Determinants of Health     Financial Resource Strain: Low Risk     Difficulty of Paying Living Expenses: Not very hard   Food Insecurity: No Food Insecurity    Worried About Running Out of Food in the Last Year: Never true    Ran Out of Food in the Last Year: Never true   Transportation Needs: No Transportation Needs    Lack of Transportation (Medical): No    Lack of Transportation (Non-Medical): No   Physical Activity: Inactive    Days of Exercise per Week: 0 days    Minutes of Exercise per Session: 0 min   Stress: No Stress Concern Present    Feeling of Stress : Only a little   Social Connections: Unknown    Frequency of Communication with Friends and Family: More than three times a week    Frequency of Social Gatherings with Friends and Family: Once a week    Active Member of Clubs or Organizations: No    Attends Club or Organization Meetings: Never    Marital Status:    Housing Stability: Low Risk     Unable to Pay for Housing in the Last Year: No    Number of Places Lived in the Last Year: 1    Unstable Housing in the Last Year: No       OBJECTIVE:    BP 96/72 (BP Location: Right arm, Patient Position: Sitting, BP Method: Small (Automatic))   Pulse 102   Resp 17   Ht 5' (1.524 m)   Wt 42.9 kg (94 lb 9.2 oz)   LMP  (LMP Unknown)   SpO2 100%   BMI 18.47 kg/m²     PHYSICAL EXAMINATION:    General appearance: Well appearing, in no acute distress, alert and oriented x3.  Psych:  Mood and affect appropriate.  Skin: Skin color,  texture, turgor normal, no rashes or lesions, in both upper and lower body.  Head/face:  Atraumatic, normocephalic.  Neck: There is pain with palpation over cervical paraspinals and trapezius muscles bilaterally. Spurling's negative bilaterally. Limited ROM with pain on flexion, extension, and lateral rotation.   Cor: RRR  Pulm: Symmetric chest rise, no respiratory distress noted.   Extremities:  No deformities, edema, or skin discoloration. Good capillary refill.  Musculoskeletal: There is pain with palpation over bilateral SI joints and coccyx. Bilateral upper and lower extremity strength is normal and symmetric.  No atrophy or tone abnormalities are noted.  Neuro:  No loss of sensation is noted.  Gait: Normal.    ASSESSMENT: 63 y.o. year old female with pain, consistent with the followin. Cervical spondylosis  Ambulatory referral/consult to [facility]      2. DDD (degenerative disc disease), cervical        3. Pudendal neuralgia        4. Pelvic pain in female        5. Fibromyalgia        6. Chronic pain disorder                PLAN:     - We discussed that Dr. Philip is no longer at [facility]. I will have her establish care with Dr. Ugarte.     - Previous imaging reviewed today.    - Consider repeat pudendal block in the future.     - I have stressed the importance of physical activity and a home exercise plan to help with pain and improve health.    - Consult [facility].     - If limited relief with above, obtain cervical MRI.     - Continue acupuncture and chiropractic care.     - Continue compound cream, new prescription faxed today.     - Continue Gabapentin 100 mg in the morning and 400 mg at bedtime.     - RTC in 2 months with Dr. Ugarte to establish care.     The above plan and management options were discussed at length with patient. Patient is in agreement with the above and verbalized understanding.    Niya Irene  2023

## 2023-06-29 ENCOUNTER — PATIENT MESSAGE (OUTPATIENT)
Dept: PAIN MEDICINE | Facility: CLINIC | Age: 63
End: 2023-06-29
Payer: COMMERCIAL

## 2023-06-29 DIAGNOSIS — G50.0 TRIGEMINAL NEURALGIA: ICD-10-CM

## 2023-06-29 DIAGNOSIS — G58.8 PUDENDAL NEURALGIA: ICD-10-CM

## 2023-07-10 ENCOUNTER — TELEPHONE (OUTPATIENT)
Dept: DERMATOLOGY | Facility: CLINIC | Age: 63
End: 2023-07-10
Payer: COMMERCIAL

## 2023-07-18 ENCOUNTER — OFFICE VISIT (OUTPATIENT)
Dept: DERMATOLOGY | Facility: CLINIC | Age: 63
End: 2023-07-18
Payer: COMMERCIAL

## 2023-07-18 ENCOUNTER — PATIENT MESSAGE (OUTPATIENT)
Dept: INTERNAL MEDICINE | Facility: CLINIC | Age: 63
End: 2023-07-18
Payer: COMMERCIAL

## 2023-07-18 DIAGNOSIS — T69.1XXA CHILBLAINS, INITIAL ENCOUNTER: ICD-10-CM

## 2023-07-18 DIAGNOSIS — G47.00 INSOMNIA, UNSPECIFIED TYPE: ICD-10-CM

## 2023-07-18 DIAGNOSIS — B07.8 OTHER VIRAL WARTS: ICD-10-CM

## 2023-07-18 DIAGNOSIS — L40.0 PSORIASIS VULGARIS: ICD-10-CM

## 2023-07-18 DIAGNOSIS — T69.1XXD PERNIO, SUBSEQUENT ENCOUNTER: Primary | ICD-10-CM

## 2023-07-18 PROCEDURE — 99999 PR PBB SHADOW E&M-EST. PATIENT-LVL IV: ICD-10-PCS | Mod: PBBFAC,,, | Performed by: DERMATOLOGY

## 2023-07-18 PROCEDURE — 99214 OFFICE O/P EST MOD 30 MIN: CPT | Mod: S$GLB,,, | Performed by: DERMATOLOGY

## 2023-07-18 PROCEDURE — 1160F PR REVIEW ALL MEDS BY PRESCRIBER/CLIN PHARMACIST DOCUMENTED: ICD-10-PCS | Mod: CPTII,S$GLB,, | Performed by: DERMATOLOGY

## 2023-07-18 PROCEDURE — 99214 PR OFFICE/OUTPT VISIT, EST, LEVL IV, 30-39 MIN: ICD-10-PCS | Mod: S$GLB,,, | Performed by: DERMATOLOGY

## 2023-07-18 PROCEDURE — 99999 PR PBB SHADOW E&M-EST. PATIENT-LVL IV: CPT | Mod: PBBFAC,,, | Performed by: DERMATOLOGY

## 2023-07-18 PROCEDURE — 1159F PR MEDICATION LIST DOCUMENTED IN MEDICAL RECORD: ICD-10-PCS | Mod: CPTII,S$GLB,, | Performed by: DERMATOLOGY

## 2023-07-18 PROCEDURE — 1160F RVW MEDS BY RX/DR IN RCRD: CPT | Mod: CPTII,S$GLB,, | Performed by: DERMATOLOGY

## 2023-07-18 PROCEDURE — 1159F MED LIST DOCD IN RCRD: CPT | Mod: CPTII,S$GLB,, | Performed by: DERMATOLOGY

## 2023-07-18 RX ORDER — ZOLPIDEM TARTRATE 10 MG/1
TABLET ORAL
Qty: 30 TABLET | Refills: 2 | Status: SHIPPED | OUTPATIENT
Start: 2023-07-18 | End: 2023-11-06 | Stop reason: SDUPTHER

## 2023-07-18 RX ORDER — DESONIDE 0.5 MG/G
CREAM TOPICAL 2 TIMES DAILY
Qty: 60 G | Refills: 2 | Status: SHIPPED | OUTPATIENT
Start: 2023-07-18

## 2023-07-18 RX ORDER — IMIQUIMOD 12.5 MG/.25G
CREAM TOPICAL
Qty: 24 PACKET | Refills: 1 | Status: SHIPPED | OUTPATIENT
Start: 2023-07-18

## 2023-07-18 RX ORDER — NIACINAMIDE 500 MG
500 TABLET ORAL 2 TIMES DAILY
Qty: 180 TABLET | Refills: 2 | Status: SHIPPED | OUTPATIENT
Start: 2023-07-18

## 2023-07-18 NOTE — PROGRESS NOTES
"  Subjective:      Patient ID:  Berenice Hayes is a 63 y.o. female who presents for   Chief Complaint   Patient presents with    Rash     Groin   L elbow    Follow-up     Pernio     Rash    Follow-up    Patient with new complaint of rash  Location: groin  Duration: 3mos  Symptoms: itching, "dandruff"   Relieving factors/Previous treatments: desonide, keto cream (GYN rx), Head & Shoulders clinical strength to groin area     - pt states she had a "plaque" develop on L elbow a few months before the rash in the groin began, has since resolved.     Pt would also like a f/u for pernio.  Last time she had lost nails from pernio was in 2019.  Is having a flare again, mainly on her R middle finger x 1 month.  Last treated with nicotinamide 500mg po BID (couldn't tolerate TID due to flushing). Hasn't taken it recently - it seemed to make her erythromelalgia worse.    Pt also c/o facial redness x >1yr. No sx. No prev tx     Had flat warts in the past which spread. Now has raised warts on her knee.    Has hx of Sjogren's, fibromyalgia, thyroid disease.    Review of Systems   Constitutional:  Negative for fever and chills.   Skin:  Positive for itching and rash.     Objective:   Physical Exam   Constitutional: She appears well-developed and well-nourished. No distress.   Neurological: She is alert and oriented to person, place, and time. She is not disoriented.   Psychiatric: She has a normal mood and affect.   Skin:   Areas Examined (abnormalities noted in diagram):   Scalp / Hair Palpated and Inspected  Head / Face Inspection Performed  Neck Inspection Performed  Chest / Axilla Inspection Performed  Abdomen Inspection Performed  Genitals / Buttocks / Groin Inspection Performed  Back Inspection Performed  RUE Inspected  LUE Inspection Performed  RLE Inspected  LLE Inspection Performed  Nails and Digits Inspection Performed              Diagram Legend     Erythematous scaling macule/papule c/w actinic keratosis       Vascular " papule c/w angioma      Pigmented verrucoid papule/plaque c/w seborrheic keratosis      Yellow umbilicated papule c/w sebaceous hyperplasia      Irregularly shaped tan macule c/w lentigo     1-2 mm smooth white papules consistent with Milia      Movable subcutaneous cyst with punctum c/w epidermal inclusion cyst      Subcutaneous movable cyst c/w pilar cyst      Firm pink to brown papule c/w dermatofibroma      Pedunculated fleshy papule(s) c/w skin tag(s)      Evenly pigmented macule c/w junctional nevus     Mildly variegated pigmented, slightly irregular-bordered macule c/w mildly atypical nevus      Flesh colored to evenly pigmented papule c/w intradermal nevus       Pink pearly papule/plaque c/w basal cell carcinoma      Erythematous hyperkeratotic cursted plaque c/w SCC      Surgical scar with no sign of skin cancer recurrence      Open and closed comedones      Inflammatory papules and pustules      Verrucoid papule consistent consistent with wart     Erythematous eczematous patches and plaques     Dystrophic onycholytic nail with subungual debris c/w onychomycosis     Umbilicated papule    Erythematous-base heme-crusted tan verrucoid plaque consistent with inflamed seborrheic keratosis     Erythematous Silvery Scaling Plaque c/w Psoriasis     See annotation      Assessment / Plan:        josie Danielle encounter  -     niacinamide 500 mg Tab; Take 1 tablet (500 mg total) by mouth 2 (two) times a day.  Dispense: 180 tablet; Refill: 2    Other viral warts  -     imiquimod (ALDARA) 5 % cream; Apply small amount to affected area on knee qhs x 4 weeks. Wash off in am. Stop when red/inflamed.  Dispense: 24 packet; Refill: 1    Psoriasis vulgaris- groin  -     desonide (DESOWEN) 0.05 % cream; Apply topically 2 (two) times daily. To affected area in groin  x 1-2 wks then prn flares only  Dispense: 60 g; Refill: 2    Patient instructed in importance of daily broad spectrum sunscreen use with spf at least 30. Sun  avoidance and topical protection/protective clothing discussed.    Rtc 2-3 months

## 2023-07-18 NOTE — TELEPHONE ENCOUNTER
No care due was identified.  [facility] Embedded Care Due Messages. Reference number: [id number].   7/18/2023 9:43:47 AM CDT

## 2023-07-19 ENCOUNTER — PATIENT MESSAGE (OUTPATIENT)
Dept: OTOLARYNGOLOGY | Facility: CLINIC | Age: 63
End: 2023-07-19
Payer: COMMERCIAL

## 2023-07-22 NOTE — PATIENT INSTRUCTIONS

## 2023-07-24 ENCOUNTER — PATIENT MESSAGE (OUTPATIENT)
Dept: DERMATOLOGY | Facility: CLINIC | Age: 63
End: 2023-07-24
Payer: COMMERCIAL

## 2023-08-07 ENCOUNTER — PATIENT MESSAGE (OUTPATIENT)
Dept: INTERNAL MEDICINE | Facility: CLINIC | Age: 63
End: 2023-08-07
Payer: COMMERCIAL

## 2023-08-07 DIAGNOSIS — Z00.00 ANNUAL PHYSICAL EXAM: Primary | ICD-10-CM

## 2023-08-10 ENCOUNTER — PATIENT OUTREACH (OUTPATIENT)
Dept: ADMINISTRATIVE | Facility: HOSPITAL | Age: 63
End: 2023-08-10
Payer: COMMERCIAL

## 2023-08-19 ENCOUNTER — LAB VISIT (OUTPATIENT)
Dept: LAB | Facility: HOSPITAL | Age: 63
End: 2023-08-19
Attending: FAMILY MEDICINE
Payer: COMMERCIAL

## 2023-08-19 DIAGNOSIS — Z00.00 ANNUAL PHYSICAL EXAM: ICD-10-CM

## 2023-08-19 LAB
ANION GAP SERPL CALC-SCNC: 8 MMOL/L (ref 8–16)
BUN SERPL-MCNC: 16 MG/DL (ref 8–23)
CALCIUM SERPL-MCNC: 9.6 MG/DL (ref 8.7–10.5)
CHLORIDE SERPL-SCNC: 101 MMOL/L (ref 95–110)
CHOLEST SERPL-MCNC: 133 MG/DL (ref 120–199)
CHOLEST/HDLC SERPL: 1.7 {RATIO} (ref 2–5)
CO2 SERPL-SCNC: 31 MMOL/L (ref 23–29)
CREAT SERPL-MCNC: 0.7 MG/DL (ref 0.5–1.4)
EST. GFR  (NO RACE VARIABLE): >60 ML/MIN/1.73 M^2
ESTIMATED AVG GLUCOSE: 97 MG/DL (ref 68–131)
GLUCOSE SERPL-MCNC: 88 MG/DL (ref 70–110)
HBA1C MFR BLD: 5 % (ref 4–5.6)
HDLC SERPL-MCNC: 80 MG/DL (ref 40–75)
HDLC SERPL: 60.2 % (ref 20–50)
LDLC SERPL CALC-MCNC: 47.4 MG/DL (ref 63–159)
NONHDLC SERPL-MCNC: 53 MG/DL
POTASSIUM SERPL-SCNC: 4.9 MMOL/L (ref 3.5–5.1)
SODIUM SERPL-SCNC: 140 MMOL/L (ref 136–145)
TRIGL SERPL-MCNC: 28 MG/DL (ref 30–150)

## 2023-08-19 PROCEDURE — 83036 HEMOGLOBIN GLYCOSYLATED A1C: CPT | Performed by: FAMILY MEDICINE

## 2023-08-19 PROCEDURE — 80061 LIPID PANEL: CPT | Performed by: FAMILY MEDICINE

## 2023-08-19 PROCEDURE — 36415 COLL VENOUS BLD VENIPUNCTURE: CPT | Performed by: FAMILY MEDICINE

## 2023-08-19 PROCEDURE — 80048 BASIC METABOLIC PNL TOTAL CA: CPT | Performed by: FAMILY MEDICINE

## 2023-08-24 ENCOUNTER — OFFICE VISIT (OUTPATIENT)
Dept: INTERNAL MEDICINE | Facility: CLINIC | Age: 63
End: 2023-08-24
Payer: COMMERCIAL

## 2023-08-24 VITALS
OXYGEN SATURATION: 100 % | DIASTOLIC BLOOD PRESSURE: 72 MMHG | BODY MASS INDEX: 18.74 KG/M2 | HEART RATE: 94 BPM | HEIGHT: 60 IN | SYSTOLIC BLOOD PRESSURE: 100 MMHG | WEIGHT: 95.44 LBS

## 2023-08-24 DIAGNOSIS — D84.9 IMMUNOSUPPRESSION: ICD-10-CM

## 2023-08-24 DIAGNOSIS — Z00.00 ANNUAL PHYSICAL EXAM: Primary | ICD-10-CM

## 2023-08-24 DIAGNOSIS — Z79.899 ENCOUNTER FOR LONG-TERM (CURRENT) USE OF OTHER MEDICATIONS: ICD-10-CM

## 2023-08-24 DIAGNOSIS — Z12.12 ENCOUNTER FOR COLORECTAL CANCER SCREENING: ICD-10-CM

## 2023-08-24 DIAGNOSIS — Z12.11 ENCOUNTER FOR COLORECTAL CANCER SCREENING: ICD-10-CM

## 2023-08-24 PROCEDURE — 3078F DIAST BP <80 MM HG: CPT | Mod: CPTII,S$GLB,, | Performed by: FAMILY MEDICINE

## 2023-08-24 PROCEDURE — 3008F PR BODY MASS INDEX (BMI) DOCUMENTED: ICD-10-PCS | Mod: CPTII,S$GLB,, | Performed by: FAMILY MEDICINE

## 2023-08-24 PROCEDURE — 1159F PR MEDICATION LIST DOCUMENTED IN MEDICAL RECORD: ICD-10-PCS | Mod: CPTII,S$GLB,, | Performed by: FAMILY MEDICINE

## 2023-08-24 PROCEDURE — 3074F PR MOST RECENT SYSTOLIC BLOOD PRESSURE < 130 MM HG: ICD-10-PCS | Mod: CPTII,S$GLB,, | Performed by: FAMILY MEDICINE

## 2023-08-24 PROCEDURE — 3044F PR MOST RECENT HEMOGLOBIN A1C LEVEL <7.0%: ICD-10-PCS | Mod: CPTII,S$GLB,, | Performed by: FAMILY MEDICINE

## 2023-08-24 PROCEDURE — 3074F SYST BP LT 130 MM HG: CPT | Mod: CPTII,S$GLB,, | Performed by: FAMILY MEDICINE

## 2023-08-24 PROCEDURE — 3008F BODY MASS INDEX DOCD: CPT | Mod: CPTII,S$GLB,, | Performed by: FAMILY MEDICINE

## 2023-08-24 PROCEDURE — 99999 PR PBB SHADOW E&M-EST. PATIENT-LVL V: CPT | Mod: PBBFAC,,, | Performed by: FAMILY MEDICINE

## 2023-08-24 PROCEDURE — 99999 PR PBB SHADOW E&M-EST. PATIENT-LVL V: ICD-10-PCS | Mod: PBBFAC,,, | Performed by: FAMILY MEDICINE

## 2023-08-24 PROCEDURE — 99396 PR PREVENTIVE VISIT,EST,40-64: ICD-10-PCS | Mod: S$GLB,,, | Performed by: FAMILY MEDICINE

## 2023-08-24 PROCEDURE — 3078F PR MOST RECENT DIASTOLIC BLOOD PRESSURE < 80 MM HG: ICD-10-PCS | Mod: CPTII,S$GLB,, | Performed by: FAMILY MEDICINE

## 2023-08-24 PROCEDURE — 99396 PREV VISIT EST AGE 40-64: CPT | Mod: S$GLB,,, | Performed by: FAMILY MEDICINE

## 2023-08-24 PROCEDURE — 3044F HG A1C LEVEL LT 7.0%: CPT | Mod: CPTII,S$GLB,, | Performed by: FAMILY MEDICINE

## 2023-08-24 PROCEDURE — 1159F MED LIST DOCD IN RCRD: CPT | Mod: CPTII,S$GLB,, | Performed by: FAMILY MEDICINE

## 2023-08-24 NOTE — PROGRESS NOTES
Subjective:       Patient ID: Berenice Hayes is a 63 y.o. female.    Chief Complaint: Annual Exam    HPI    Berenice Hayes is a 63 y.o. female PMHx Hypothyroid, Osteoporosis for checkup.     Labs prior, review.    Acupuncture weekly  Started seeing chiropractor, sees ~wkly    #Cards: Venous insuff  - est w/ Dr. Dash,  6/2023 - f/u 6m    #Endo: Hypothyroid, Multinodular goiter, Osteoporosis  - est w/ Dr. Espinoza,  5/2023  - reg: Cytomel 5mcg tid  - has not done well w/ synthroid in the past  - thryoid u/s 6/2022 -- f/u 1yr  - taking Risedronate 35mg qwk since 11/2017  - repeat bone density in 11/2023     #Neuro: Trigeminal neuralgia (Lt)  - est w/ NP Adi,  2/2022  - new referral today     #Pain med: Pelvic pain  - est w/ NP Teto,  6/2023     #Rheum: Sjogren's, Fibromyalgia, Erythromelalgia, Raynauds  - est w/ Dr. Landrum-Estelle,  4/2023 - rtc 6m  - taking Plaquenil  - unable to use vasodilator for Raynauds due to low bp  - taking Elavil 60-70mg daily; Gabapentin 400mg qd  - sees ophtho twice yearly     #Uro: Gross hematuria  - est w/ Dr. Koch,  10/2021  - has seen Dr. Kenya Méndez as well  - random eps from 10/2020 to 5/2021    #Derm:  - est w/ Dr. Cobb,  7/2023     #Pelvic floor disorder  - phys therapy in past     #Obgyn:  - est w/ Dr. Sheikh,  5/2023    Review of Systems   Constitutional:  Negative for activity change and unexpected weight change.   HENT:  Positive for hearing loss and trouble swallowing. Negative for rhinorrhea.    Eyes:  Negative for discharge and visual disturbance.   Respiratory:  Negative for chest tightness and wheezing.    Cardiovascular:  Negative for chest pain and palpitations.   Gastrointestinal:  Negative for blood in stool, constipation, diarrhea and vomiting.   Endocrine: Negative for polydipsia and polyuria.   Genitourinary:  Negative for difficulty urinating, dysuria, hematuria and menstrual problem.   Musculoskeletal:  Positive for arthralgias and  neck pain. Negative for joint swelling.   Neurological:  Negative for weakness and headaches.   Psychiatric/Behavioral:  Negative for confusion and dysphoric mood.          Past Medical History:   Diagnosis Date    Asthma with allergic rhinitis     Bladder spasm     Dense breasts     heterogeneously/fibrocystic disease    Elevated antinuclear antibody (GUICHO) level     Erythromelalgia     Fibromyalgia     Ganglion cyst     left toe    Goiter     MNG    Headache(784.0)     Hypothyroidism     Hashimoto with + Tg ab    Kidney stone     Osteoporosis     femoral neck    Pernio 2018    Raynaud's phenomenon     Rhinitis     Scoliosis     Sjogren's syndrome     Sleep disorder     type of Narcolepsy ( resolved)    Vitamin D deficiency disease     Vulvodynia         Prior to Admission medications    Medication Sig Start Date End Date Taking? Authorizing Provider   albuterol (VENTOLIN HFA) 90 mcg/actuation inhaler Inhale 2 puffs into the lungs every 6 (six) hours as needed for Wheezing. Rescue 6/23/22 6/23/23  Adam Lundberg MD   ALPRAZolam (XANAX) 0.25 MG tablet Take 1 tablet (0.25 mg total) by mouth daily as needed for Anxiety. 5/1/23 5/31/23  Adam Lundberg MD   amitriptyline (ELAVIL) 10 MG tablet TAKE 2 TABLETS ONCE DAILY WITH THE 50 MG FOR A TOTAL OF 70 MG 2/1/23   Adam Lundberg MD   amitriptyline (ELAVIL) 50 MG tablet TAKE 1 TABLET NIGHTLY WITH THE 10 MG AMITRIPTYLINE 5/1/23   Adam Lundberg MD   amitriptyline (ELAVIL) 50 MG tablet Take 1 tablet (50 mg total) by mouth every evening. 5/1/23 4/30/24  Adam Lundberg MD   aspirin (ECOTRIN) 81 MG EC tablet Take 81 mg by mouth once daily.    Provider, Historical   AZASITE 1 % Drop INSTILL 1 DROP INTO LEFT EYE BID 8/24/20   Provider, Historical   bumetanide (BUMEX) 0.5 MG Tab Take 1 tablet (0.5 mg total) by mouth every Mon, Wed, Fri. 2/20/23 2/20/24  Isidro Dash MD PhD   clindamycin (CLEOCIN T) 1 % external solution APPLY TOPICALLY TO THE AFFECTED AREA  TWICE DAILY AS NEEDED FOR BREAKOUTS 2/7/23   Olga Cobb MD   desonide (DESOWEN) 0.05 % cream Apply topically 2 (two) times daily. To affected area in groin  x 1-2 wks then prn flares only 7/18/23   Olga Cobb MD   estradiol 0.05 mg/24 hr td ptsw (VIVELLE-DOT) 0.05 mg/24 hr Place 1 patch onto the skin twice a week. 3/6/23   Sandra Sheikh MD   fluocinonide (LIDEX) 0.05 % external solution AAA scalp qday - bid prn pruritus 12/23/20   Olga Cobb MD   fluticasone (FLONASE) 50 mcg/actuation nasal spray 1 spray by Each Nare route once daily. 6/15/15   Estella Serrano MD   gabapentin (NEURONTIN) 100 MG capsule TAKE 1 CAPSULE IN THE MORNING AND 4 CAPSULES AT BEDTIME 8/9/22   Niya Irene, LAURENCE   hydrOXYchloroQUINE (PLAQUENIL) 200 mg tablet TAKE 1 TABLET DAILY 3/14/23   Katt Arredondo MD   imiquimod (ALDARA) 5 % cream Apply small amount to affected area on knee qhs x 4 weeks. Wash off in am. Stop when red/inflamed. 7/18/23   Olga Cobb MD   ketoconazole (NIZORAL) 2 % cream Apply topically 2 (two) times daily. Apply daily to affected area 5/1/23   Sandra Sheikh MD   ketoprofen 10 % CrPk APPLY UP TO 4.8 GRAMS TO PAINFUL AREAS UP TO FIVE TIMES DAILY. RUB IN WELL 1/25/23   Provider, Historical   KETOPROFEN, BULK, TOP Ketoprofen/flexeril/lidocaine (compound) 9/11/20   Provider, Historical   LIDOcaine-prilocaine (EMLA) cream Apply topically as needed. 8/9/22   Niya Irene, NP   liothyronine (CYTOMEL) 5 MCG Tab TAKE THREE AND ONE-HALF TABLETS ONCE DAILY 11/11/22   Leandro Espinoza MD   metaxalone (SKELAXIN) 800 MG tablet TAKE 1 TABLET TWICE A DAY AS NEEDED FOR SPASM 6/23/22   Adam Lundberg MD   niacinamide 500 mg Tab Take 1 tablet (500 mg total) by mouth 2 (two) times a day. 7/18/23   Olga Cobb MD   NURTEC 75 mg odt Take by mouth. 3/3/22   Provider, Historical   pilocarpine (SALAGEN) 5 MG Tab Take 1 tablet (5 mg total) by mouth 3 (three) times daily as needed.  2/8/22   Adam Lundberg MD   PREVIDENT 5000 BOOSTER PLUS 1.1 % Pste  9/27/20   Provider, Historical   RESTASIS 0.05 % ophthalmic emulsion  5/29/14   Provider, Historical   risedronate 35 mg TbEC TAKE 1 TABLET ONCE A WEEK 10/10/22   Leandro Espinoza MD   sumatriptan (IMITREX) 100 MG tablet Take 1 tablet (100 mg total) by mouth every 2 (two) hours as needed for Migraine. 3/2/22 2/20/23  Nallely Joshi NP   tretinoin (RETIN-A) 0.025 % gel Apply small amount to entire face qhs. Wash off qam and apply sunscreen. 3/25/22   Olga Cobb MD   UNABLE TO FIND Apply 240 g topically as needed. Ketoprofen/cyclobenzaprine HCI/Lidocaine (lipomax) 10/2/5% Up to 5 times daily as needed for pain 5/22/19   Provider, Historical   zolpidem (AMBIEN) 10 mg Tab TAKE 1 TABLET(10 MG) BY MOUTH EVERY NIGHT AS NEEDED 7/18/23   Adam Lundberg MD        Past medical history, surgical history, and family medical history reviewed and updated as appropriate.    Medications and allergies reviewed.     Objective:          Vitals:    08/24/23 0830   BP: 100/72   BP Location: Right arm   Patient Position: Sitting   Pulse: 94   SpO2: 100%   Weight: 43.3 kg (95 lb 7.4 oz)   Height: 5' (1.524 m)     Body mass index is 18.64 kg/m².  Physical Exam  Vitals and nursing note reviewed.   Constitutional:       General: She is not in acute distress.     Appearance: She is not ill-appearing, toxic-appearing or diaphoretic.   Cardiovascular:      Rate and Rhythm: Normal rate and regular rhythm.      Pulses: Normal pulses.      Heart sounds: Normal heart sounds. No murmur heard.  Pulmonary:      Effort: Pulmonary effort is normal. No respiratory distress.   Neurological:      Mental Status: She is alert and oriented to person, place, and time.   Psychiatric:         Mood and Affect: Mood normal.         Behavior: Behavior normal.         Lab Results   Component Value Date    WBC 6.49 06/10/2023    HGB 14.2 06/10/2023    HCT 45.1 06/10/2023      06/10/2023    CHOL 133 08/19/2023    TRIG 28 (L) 08/19/2023    HDL 80 (H) 08/19/2023    ALT 20 06/10/2023    AST 23 06/10/2023     08/19/2023    K 4.9 08/19/2023     08/19/2023    CREATININE 0.7 08/19/2023    BUN 16 08/19/2023    CO2 31 (H) 08/19/2023    TSH 3.481 04/17/2023    HGBA1C 5.0 08/19/2023       Assessment:       1. Annual physical exam    2. Immunosuppression    3. Encounter for long-term (current) use of other medications    4. Encounter for colorectal cancer screening          Plan:   1. Annual physical exam    2. Immunosuppression  Overview:  Cont reg per Rheum      3. Encounter for long-term (current) use of other medications    4. Encounter for colorectal cancer screening  -     Fecal Immunochemical Test (iFOBT); Future; Expected date: 08/24/2023        Health maintenance reviewed with patient.     No follow-ups on file.    Adam Lundberg MD  Family Medicine / Primary Care  [facility]  8/24/2023

## 2023-09-09 ENCOUNTER — LAB VISIT (OUTPATIENT)
Dept: LAB | Facility: HOSPITAL | Age: 63
End: 2023-09-09
Attending: INTERNAL MEDICINE
Payer: COMMERCIAL

## 2023-09-09 DIAGNOSIS — M35.0B SJOGREN'S SYNDROME WITH VASCULITIS: ICD-10-CM

## 2023-09-09 DIAGNOSIS — M79.7 FIBROMYALGIA: ICD-10-CM

## 2023-09-09 DIAGNOSIS — R53.83 FATIGUE, UNSPECIFIED TYPE: ICD-10-CM

## 2023-09-09 DIAGNOSIS — I73.00 RAYNAUD'S DISEASE WITHOUT GANGRENE: ICD-10-CM

## 2023-09-09 DIAGNOSIS — I73.81 ERYTHROMELALGIA: ICD-10-CM

## 2023-09-09 LAB
ALBUMIN SERPL BCP-MCNC: 4.3 G/DL (ref 3.5–5.2)
ALP SERPL-CCNC: 78 U/L (ref 55–135)
ALT SERPL W/O P-5'-P-CCNC: 24 U/L (ref 10–44)
ANION GAP SERPL CALC-SCNC: 10 MMOL/L (ref 8–16)
AST SERPL-CCNC: 25 U/L (ref 10–40)
BASOPHILS # BLD AUTO: 0.08 K/UL (ref 0–0.2)
BASOPHILS NFR BLD: 1.4 % (ref 0–1.9)
BILIRUB SERPL-MCNC: 0.4 MG/DL (ref 0.1–1)
BUN SERPL-MCNC: 17 MG/DL (ref 8–23)
C3 SERPL-MCNC: 123 MG/DL (ref 50–180)
C4 SERPL-MCNC: 29 MG/DL (ref 11–44)
CALCIUM SERPL-MCNC: 10.3 MG/DL (ref 8.7–10.5)
CHLORIDE SERPL-SCNC: 98 MMOL/L (ref 95–110)
CK SERPL-CCNC: 41 U/L (ref 20–180)
CO2 SERPL-SCNC: 30 MMOL/L (ref 23–29)
CREAT SERPL-MCNC: 0.7 MG/DL (ref 0.5–1.4)
CRP SERPL-MCNC: 2.4 MG/L (ref 0–8.2)
DIFFERENTIAL METHOD: ABNORMAL
EOSINOPHIL # BLD AUTO: 0.1 K/UL (ref 0–0.5)
EOSINOPHIL NFR BLD: 2.4 % (ref 0–8)
ERYTHROCYTE [DISTWIDTH] IN BLOOD BY AUTOMATED COUNT: 12.7 % (ref 11.5–14.5)
ERYTHROCYTE [SEDIMENTATION RATE] IN BLOOD BY PHOTOMETRIC METHOD: 11 MM/HR (ref 0–36)
EST. GFR  (NO RACE VARIABLE): >60 ML/MIN/1.73 M^2
GLUCOSE SERPL-MCNC: 91 MG/DL (ref 70–110)
HCT VFR BLD AUTO: 45.6 % (ref 37–48.5)
HGB BLD-MCNC: 14.5 G/DL (ref 12–16)
IMM GRANULOCYTES # BLD AUTO: 0.02 K/UL (ref 0–0.04)
IMM GRANULOCYTES NFR BLD AUTO: 0.4 % (ref 0–0.5)
LYMPHOCYTES # BLD AUTO: 0.8 K/UL (ref 1–4.8)
LYMPHOCYTES NFR BLD: 15 % (ref 18–48)
MCH RBC QN AUTO: 30.1 PG (ref 27–31)
MCHC RBC AUTO-ENTMCNC: 31.8 G/DL (ref 32–36)
MCV RBC AUTO: 95 FL (ref 82–98)
MONOCYTES # BLD AUTO: 0.5 K/UL (ref 0.3–1)
MONOCYTES NFR BLD: 8.3 % (ref 4–15)
NEUTROPHILS # BLD AUTO: 4 K/UL (ref 1.8–7.7)
NEUTROPHILS NFR BLD: 72.5 % (ref 38–73)
NRBC BLD-RTO: 0 /100 WBC
PLATELET # BLD AUTO: 240 K/UL (ref 150–450)
PMV BLD AUTO: 10.7 FL (ref 9.2–12.9)
POTASSIUM SERPL-SCNC: 5.2 MMOL/L (ref 3.5–5.1)
PROT SERPL-MCNC: 8 G/DL (ref 6–8.4)
RBC # BLD AUTO: 4.82 M/UL (ref 4–5.4)
SODIUM SERPL-SCNC: 138 MMOL/L (ref 136–145)
WBC # BLD AUTO: 5.52 K/UL (ref 3.9–12.7)

## 2023-09-09 PROCEDURE — 86160 COMPLEMENT ANTIGEN: CPT | Performed by: INTERNAL MEDICINE

## 2023-09-09 PROCEDURE — 86160 COMPLEMENT ANTIGEN: CPT | Mod: 59 | Performed by: INTERNAL MEDICINE

## 2023-09-09 PROCEDURE — 85025 COMPLETE CBC W/AUTO DIFF WBC: CPT | Performed by: INTERNAL MEDICINE

## 2023-09-09 PROCEDURE — 86140 C-REACTIVE PROTEIN: CPT | Performed by: INTERNAL MEDICINE

## 2023-09-09 PROCEDURE — 80053 COMPREHEN METABOLIC PANEL: CPT | Performed by: INTERNAL MEDICINE

## 2023-09-09 PROCEDURE — 82550 ASSAY OF CK (CPK): CPT | Performed by: INTERNAL MEDICINE

## 2023-09-09 PROCEDURE — 85652 RBC SED RATE AUTOMATED: CPT | Performed by: INTERNAL MEDICINE

## 2023-09-09 PROCEDURE — 36415 COLL VENOUS BLD VENIPUNCTURE: CPT | Performed by: INTERNAL MEDICINE

## 2023-09-09 PROCEDURE — 86225 DNA ANTIBODY NATIVE: CPT | Performed by: INTERNAL MEDICINE

## 2023-09-10 ENCOUNTER — PATIENT MESSAGE (OUTPATIENT)
Dept: INTERNAL MEDICINE | Facility: CLINIC | Age: 63
End: 2023-09-10
Payer: COMMERCIAL

## 2023-09-10 DIAGNOSIS — E03.8 HYPOTHYROIDISM DUE TO HASHIMOTO'S THYROIDITIS: ICD-10-CM

## 2023-09-10 DIAGNOSIS — E06.3 HYPOTHYROIDISM DUE TO HASHIMOTO'S THYROIDITIS: ICD-10-CM

## 2023-09-11 ENCOUNTER — PATIENT MESSAGE (OUTPATIENT)
Dept: RHEUMATOLOGY | Facility: CLINIC | Age: 63
End: 2023-09-11
Payer: COMMERCIAL

## 2023-09-11 DIAGNOSIS — E87.5 SERUM POTASSIUM ELEVATED: Primary | ICD-10-CM

## 2023-09-11 DIAGNOSIS — R82.71 BACTERIA IN URINE: ICD-10-CM

## 2023-09-11 LAB — DSDNA AB SER-ACNC: NORMAL [IU]/ML

## 2023-09-11 RX ORDER — PILOCARPINE HYDROCHLORIDE 5 MG/1
5 TABLET, FILM COATED ORAL 3 TIMES DAILY PRN
Qty: 270 TABLET | Refills: 3 | Status: SHIPPED | OUTPATIENT
Start: 2023-09-11

## 2023-09-11 RX ORDER — RISEDRONATE SODIUM 35 MG/1
TABLET, DELAYED RELEASE ORAL
Qty: 12 TABLET | Refills: 3 | Status: SHIPPED | OUTPATIENT
Start: 2023-09-11

## 2023-09-11 NOTE — TELEPHONE ENCOUNTER
Patient informed that urine has many bacteria.  She denies symptoms of UTI.  Will check urine culture [facility] tomorrow morning 8:15 am.  And repeat lab to assess potassium.

## 2023-09-12 ENCOUNTER — LAB VISIT (OUTPATIENT)
Dept: LAB | Facility: HOSPITAL | Age: 63
End: 2023-09-12
Attending: INTERNAL MEDICINE
Payer: COMMERCIAL

## 2023-09-12 DIAGNOSIS — R82.71 BACTERIA IN URINE: ICD-10-CM

## 2023-09-12 PROCEDURE — 87086 URINE CULTURE/COLONY COUNT: CPT | Performed by: INTERNAL MEDICINE

## 2023-09-13 ENCOUNTER — PATIENT MESSAGE (OUTPATIENT)
Dept: RHEUMATOLOGY | Facility: CLINIC | Age: 63
End: 2023-09-13
Payer: COMMERCIAL

## 2023-09-13 LAB — BACTERIA UR CULT: NO GROWTH

## 2023-09-14 ENCOUNTER — PATIENT MESSAGE (OUTPATIENT)
Dept: RHEUMATOLOGY | Facility: CLINIC | Age: 63
End: 2023-09-14
Payer: COMMERCIAL

## 2023-09-27 ENCOUNTER — PATIENT MESSAGE (OUTPATIENT)
Dept: ENDOCRINOLOGY | Facility: CLINIC | Age: 63
End: 2023-09-27
Payer: COMMERCIAL

## 2023-09-28 ENCOUNTER — PATIENT MESSAGE (OUTPATIENT)
Dept: PAIN MEDICINE | Facility: CLINIC | Age: 63
End: 2023-09-28
Payer: COMMERCIAL

## 2023-09-28 DIAGNOSIS — G58.8 PUDENDAL NEURALGIA: ICD-10-CM

## 2023-09-28 DIAGNOSIS — R10.2 PELVIC PAIN IN FEMALE: ICD-10-CM

## 2023-09-28 DIAGNOSIS — G50.0 TRIGEMINAL NEURALGIA: ICD-10-CM

## 2023-10-07 ENCOUNTER — PATIENT MESSAGE (OUTPATIENT)
Dept: INTERNAL MEDICINE | Facility: CLINIC | Age: 63
End: 2023-10-07
Payer: COMMERCIAL

## 2023-10-07 DIAGNOSIS — Z78.0 MENOPAUSE: Primary | ICD-10-CM

## 2023-10-07 DIAGNOSIS — M79.7 FIBROMYALGIA: ICD-10-CM

## 2023-10-09 RX ORDER — ESTRADIOL 0.05 MG/D
1 FILM, EXTENDED RELEASE TRANSDERMAL
Qty: 24 PATCH | Refills: 0 | Status: SHIPPED | OUTPATIENT
Start: 2023-10-09 | End: 2024-02-05

## 2023-10-09 RX ORDER — GABAPENTIN 100 MG/1
CAPSULE ORAL
Qty: 450 CAPSULE | Refills: 3 | Status: SHIPPED | OUTPATIENT
Start: 2023-10-09

## 2023-10-09 RX ORDER — LIDOCAINE AND PRILOCAINE 25; 25 MG/G; MG/G
CREAM TOPICAL
Qty: 90 G | Refills: 3 | Status: SHIPPED | OUTPATIENT
Start: 2023-10-09

## 2023-10-09 RX ORDER — AMITRIPTYLINE HYDROCHLORIDE 10 MG/1
TABLET, FILM COATED ORAL
Qty: 180 TABLET | Refills: 3 | Status: SHIPPED | OUTPATIENT
Start: 2023-10-09

## 2023-10-09 RX ORDER — METAXALONE 800 MG/1
TABLET ORAL
Qty: 180 TABLET | Refills: 3 | Status: SHIPPED | OUTPATIENT
Start: 2023-10-09

## 2023-10-09 NOTE — TELEPHONE ENCOUNTER
No care due was identified.  [facility] Embedded Care Due Messages. Reference number: [id number].   10/09/2023 8:07:36 AM CDT

## 2023-10-19 ENCOUNTER — OFFICE VISIT (OUTPATIENT)
Dept: RHEUMATOLOGY | Facility: CLINIC | Age: 63
End: 2023-10-19
Payer: COMMERCIAL

## 2023-10-19 VITALS
BODY MASS INDEX: 18.74 KG/M2 | SYSTOLIC BLOOD PRESSURE: 98 MMHG | HEIGHT: 60 IN | DIASTOLIC BLOOD PRESSURE: 70 MMHG | HEART RATE: 98 BPM | WEIGHT: 95.44 LBS

## 2023-10-19 DIAGNOSIS — E87.5 HYPERKALEMIA: Primary | ICD-10-CM

## 2023-10-19 DIAGNOSIS — E87.5 SERUM POTASSIUM ELEVATED: ICD-10-CM

## 2023-10-19 DIAGNOSIS — R53.83 FATIGUE, UNSPECIFIED TYPE: ICD-10-CM

## 2023-10-19 PROCEDURE — 1160F RVW MEDS BY RX/DR IN RCRD: CPT | Mod: CPTII,S$GLB,, | Performed by: INTERNAL MEDICINE

## 2023-10-19 PROCEDURE — 3044F PR MOST RECENT HEMOGLOBIN A1C LEVEL <7.0%: ICD-10-PCS | Mod: CPTII,S$GLB,, | Performed by: INTERNAL MEDICINE

## 2023-10-19 PROCEDURE — 99999 PR PBB SHADOW E&M-EST. PATIENT-LVL IV: CPT | Mod: PBBFAC,,, | Performed by: INTERNAL MEDICINE

## 2023-10-19 PROCEDURE — 3008F BODY MASS INDEX DOCD: CPT | Mod: CPTII,S$GLB,, | Performed by: INTERNAL MEDICINE

## 2023-10-19 PROCEDURE — 99214 OFFICE O/P EST MOD 30 MIN: CPT | Mod: S$GLB,,, | Performed by: INTERNAL MEDICINE

## 2023-10-19 PROCEDURE — 1160F PR REVIEW ALL MEDS BY PRESCRIBER/CLIN PHARMACIST DOCUMENTED: ICD-10-PCS | Mod: CPTII,S$GLB,, | Performed by: INTERNAL MEDICINE

## 2023-10-19 PROCEDURE — 1159F MED LIST DOCD IN RCRD: CPT | Mod: CPTII,S$GLB,, | Performed by: INTERNAL MEDICINE

## 2023-10-19 PROCEDURE — 99214 PR OFFICE/OUTPT VISIT, EST, LEVL IV, 30-39 MIN: ICD-10-PCS | Mod: S$GLB,,, | Performed by: INTERNAL MEDICINE

## 2023-10-19 PROCEDURE — 3008F PR BODY MASS INDEX (BMI) DOCUMENTED: ICD-10-PCS | Mod: CPTII,S$GLB,, | Performed by: INTERNAL MEDICINE

## 2023-10-19 PROCEDURE — 3078F DIAST BP <80 MM HG: CPT | Mod: CPTII,S$GLB,, | Performed by: INTERNAL MEDICINE

## 2023-10-19 PROCEDURE — 3074F SYST BP LT 130 MM HG: CPT | Mod: CPTII,S$GLB,, | Performed by: INTERNAL MEDICINE

## 2023-10-19 PROCEDURE — 3074F PR MOST RECENT SYSTOLIC BLOOD PRESSURE < 130 MM HG: ICD-10-PCS | Mod: CPTII,S$GLB,, | Performed by: INTERNAL MEDICINE

## 2023-10-19 PROCEDURE — 3078F PR MOST RECENT DIASTOLIC BLOOD PRESSURE < 80 MM HG: ICD-10-PCS | Mod: CPTII,S$GLB,, | Performed by: INTERNAL MEDICINE

## 2023-10-19 PROCEDURE — 3044F HG A1C LEVEL LT 7.0%: CPT | Mod: CPTII,S$GLB,, | Performed by: INTERNAL MEDICINE

## 2023-10-19 PROCEDURE — 99999 PR PBB SHADOW E&M-EST. PATIENT-LVL IV: ICD-10-PCS | Mod: PBBFAC,,, | Performed by: INTERNAL MEDICINE

## 2023-10-19 PROCEDURE — 1159F PR MEDICATION LIST DOCUMENTED IN MEDICAL RECORD: ICD-10-PCS | Mod: CPTII,S$GLB,, | Performed by: INTERNAL MEDICINE

## 2023-10-19 ASSESSMENT — ROUTINE ASSESSMENT OF PATIENT INDEX DATA (RAPID3)
MDHAQ FUNCTION SCORE: 1.5
PATIENT GLOBAL ASSESSMENT SCORE: 6.5
FATIGUE SCORE: 7
PSYCHOLOGICAL DISTRESS SCORE: 1.1
AM STIFFNESS SCORE: 1, YES
PAIN SCORE: 7
TOTAL RAPID3 SCORE: 6.17

## 2023-10-19 NOTE — PROGRESS NOTES
"Subjective:       Patient ID: Berenice Hayes is a 63 y.o. female.    Chief Complaint: Sjogrens    HPI:  Berenice Hayes is a 63 y.o. female followed for concerns of autoimmune issues.   Diagnosed with fibromyalgia at age 34.   At age 42 had erythromyelgia with redness all over. She was treated by vascular with a medication but caused Raynauds to develop.  She saw Dr. Hill in vascular.      She has history significant pelvic discomfort since 2009.  She saw therapist for pelvic floor therapy which has helped. 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  She had pudenal nerve block 2/3/15.     She saw Dr. Harmon in the past who felt she had fibriomyalgia.  She tried amitriptyline for some time.   She takes Atelvia for OP.        She has felt bad since 2014.  "Hit with massive heat wave beyond what others feel with menopause."  Mouth was very dry and primary gave pilocarpine.  Spring of 2015 GUICHO was negative.  Previously had hand pains.  X-rays of hands normal.  She was found by endocrine to be hypoglycemic.  Ultrasound thyroid showed nodules that were negative on biopsy.  August 2015 diagnosed as having Hashimotos disease.   Synthroid did not help levels.  She is on Cytomel now.    Saw ENT for throat pain in neck since ultrasound was done.  ENT will biopsy a solid nodule on left lobe thyroid.  CT with contrast showed enlargement in the saliva gland.  Her mother had fibromyalgia and PMR as well as history ETOH abuse as an adult.  Father had osteoarthritis and required complete thumb reconstruction on one thumb.     In past dermatology did skin biopsy and diagnosed pernio (no evidence of lupus vasculitis).   Daughter with Hashimotos and thyroid Ca.    In May to Oct 2021 worked with a dietician.  Did food sensitivity and chemical sensitivity test. Was found to have issues with Tylenol and garlic.       Interval History:    Saw dermatology regarding losing nails.  In the past the Plaquenil helped the nails.  " Dermatology gave niacinamide to help but worsens erythromelalgia.  Dermatology diagnosed psoriasis vulgaris based on rash in groin.  Cream prescribed helped.     Difficulty with dry mouth that impact talking at work on phone.  Dealing with a stye.  Eye doctor has seen her who gave different antibiotic than the one she was using from a supply she had at home.   Still feels Sjogrens is getting worse with dry eyes and mouth despite using pilocarpine and Restasis. Pain 4/10 ache all over with nerve pain.   Difficult to move in morning.   Sitting worsens pain and it improves with walking.        Saw Dr. Isidro Dash vascular medicine who did full work up that showed venous insufficiency and gave low dose bumetanide 0.5 mg to 3 times a week.   She has not started bumetanide yet due to preparing for daughter's wedding.     She did not see neurology December 2022 for memory issues due to the department cancelling and she needs to reschedule.   History of migraine that improved with chiropractor and in summer 2019 had trigeminal neuralgia on left.    She will see usual pain management provider Niya Richard that she sees once a year and provides compounded pain cream.    Now reports red discoloration of hand similar to 2019 when she developed ulcers below nail especially right 3rd finger that led her to lose nails.   She continues to have episodes Raynauds and erythromyelgia on her feet that improves with elevation and worsen with walking.  Long standing history of erythromyelgia.   She gets nerve pain in feet.  Difficulty walking at end of day.  Uses compression sock.   In May to Oct 2021 worked with a dietician.  Did food sensitivity and chemical sensitivity test. Was found to have issues with Tylenol and garlic.   Doing sunflower butter on bananas.   Eating more proteins with strawberries to absorb iron.   Still sees an acupuncturist.        Review of Systems   Constitutional:  Positive for fatigue. Negative for fever and  unexpected weight change.   HENT:  Positive for mouth sores and trouble swallowing.    Eyes:  Positive for redness.   Respiratory:  Negative for cough and shortness of breath.    Cardiovascular:  Negative for chest pain.   Gastrointestinal:  Positive for constipation. Negative for diarrhea.   Endocrine: Negative.    Genitourinary:  Negative for dysuria and genital sores.   Musculoskeletal:  Positive for arthralgias.   Skin:  Negative for rash.        Redness of fingers  Hair loss with stress   Allergic/Immunologic: Negative.    Neurological:  Positive for headaches.   Hematological:  Positive for adenopathy. Bruises/bleeds easily.   Psychiatric/Behavioral: Negative.           Objective:   BP 98/70   Pulse 98   Ht 5' (1.524 m)   Wt 43.3 kg (95 lb 7.4 oz)   LMP  (LMP Unknown)   BMI 18.64 kg/m²  Virtual     Physical Exam   Constitutional: She is oriented to person, place, and time.   HENT:   Head: Normocephalic and atraumatic.   Eyes: Conjunctivae are normal.   Cardiovascular: Normal rate, regular rhythm and normal heart sounds.   Pulmonary/Chest: Effort normal and breath sounds normal.   Abdominal: Soft. Bowel sounds are normal.   Musculoskeletal:         General: No tenderness or deformity.      Cervical back: Neck supple.      Comments: Squaring right 1st CMC  28 joint count: 0 swollen and 0 tender   Neurological: She is alert and oriented to person, place, and time.   Skin: Skin is warm and dry. There is erythema.   Erythema at periungal area multiple fingers on right hand   Psychiatric: Mood and affect normal.       LABS    Component      Latest Ref Rng 9/9/2023 9/12/2023   WBC      3.90 - 12.70 K/uL 5.52     RBC      4.00 - 5.40 M/uL 4.82     Hemoglobin      12.0 - 16.0 g/dL 14.5     Hematocrit      37.0 - 48.5 % 45.6     MCV      82 - 98 fL 95     MCH      27.0 - 31.0 pg 30.1     MCHC      32.0 - 36.0 g/dL 31.8 (L)     RDW      11.5 - 14.5 % 12.7     Platelet Count      150 - 450 K/uL 240     MPV      9.2 -  12.9 fL 10.7     Immature Granulocytes      0.0 - 0.5 % 0.4     Gran # (ANC)      1.8 - 7.7 K/uL 4.0     Immature Grans (Abs)      0.00 - 0.04 K/uL 0.02     Lymph #      1.0 - 4.8 K/uL 0.8 (L)     Mono #      0.3 - 1.0 K/uL 0.5     Eos #      0.0 - 0.5 K/uL 0.1     Baso #      0.00 - 0.20 K/uL 0.08     nRBC      0 /100 WBC 0     Gran %      38.0 - 73.0 % 72.5     Lymph %      18.0 - 48.0 % 15.0 (L)     Mono %      4.0 - 15.0 % 8.3     Eosinophil %      0.0 - 8.0 % 2.4     Basophil %      0.0 - 1.9 % 1.4     Differential Method Automated     Sodium      136 - 145 mmol/L 138     Potassium      3.5 - 5.1 mmol/L 5.2 (H)  5.2 (H)    Chloride      95 - 110 mmol/L 98     CO2      23 - 29 mmol/L 30 (H)     Glucose      70 - 110 mg/dL 91     BUN      8 - 23 mg/dL 17     Creatinine      0.5 - 1.4 mg/dL 0.7     Calcium      8.7 - 10.5 mg/dL 10.3     PROTEIN TOTAL      6.0 - 8.4 g/dL 8.0     Albumin      3.5 - 5.2 g/dL 4.3     BILIRUBIN TOTAL      0.1 - 1.0 mg/dL 0.4     ALP      55 - 135 U/L 78     AST      10 - 40 U/L 25     ALT      10 - 44 U/L 24     eGFR      >60 mL/min/1.73 m^2 >60.0     Anion Gap      8 - 16 mmol/L 10     Specimen UA Urine, Clean Catch     Color, UA      Yellow, Straw, Kari  Yellow     Appearance, UA      Clear  Hazy !     pH, UA      5.0 - 8.0  6.0     Specific Gravity, UA      1.005 - 1.030  1.005     Protein, UA      Negative  Negative     Glucose, UA      Negative  Negative     Ketones, UA      Negative  Negative     Bilirubin (UA)      Negative  Negative     Occult Blood UA      Negative  Negative     NITRITE UA      Negative  Negative     Leukocytes, UA      Negative  Negative     RBC, UA      0 - 4 /hpf 2     WBC, UA      0 - 5 /hpf 5     Bacteria, UA      None-Occ /hpf Many !     Squam Epithel, UA      /hpf 2     Amorphous, UA      None-Moderate  Rare     Microscopic Comment SEE COMMENT     Urine Protein      0 - 15 mg/dL <7     Creatinine, Urine      15.0 - 325.0 mg/dL 19.0     Prot/Creat  Ratio, Urine      0.00 - 0.20  Unable to calculate     CPK      20 - 180 U/L 41     Complement (C-4)      11 - 44 mg/dL 29     Complement (C-3)      50 - 180 mg/dL 123     ds DNA Ab      Negative 1:10  Negative 1:10     Sed Rate      0 - 36 mm/Hr 11     CRP      0.0 - 8.2 mg/L 2.4          Assessment:       1. Sjogren syndrome. Chronic.  Negative SSA and SSB in past now SSA positive.  Monitor labs.    Currently symptomatic management with Plaquenil, Restasis and pilocarpine.   2. Raynauds. Symptomatic management. Still with changes around nails but no digital ulcers.  Anxiety worsens.  3. Fibromyalgia. Managed with amitriptyline. Persistent pain and unable to increase amitriptyline due to worsening fatigue  4. Hand pain b/l. X-ray without changes. Has periodic episodes of pain.  5. Osteoporosis.  Now risedronate. Still on estradiol patch.  6. Erythromelalgia.  Takes baby aspirin  7. Sleep disorder in the past. History of narcolepsy but no longer a problem. Sleep doctor in past gave Ritalin bid which helped while she took it for 2 weeks but no longer needs it since treatment of Hashimoto.  8. Increased heat in body. Improved  9. Menopause  10.  Hashimotos disease  11.  Multinodular goiter   12.  Pudenal nerve pain.  Improved with acupuncture which she continues every 1-2 weeks. Worsened with water therapy so stopped.    13.  Pernio.  Planning to speak with dermatology about restarting nicatinamide  14.  Rash on abdomen in past.  Concern for Addisons raised in this patient with hypotension.  Mother had Addisons.  Homeopath/acupuncturist gave progesterone hormone drops   15.  Fatigue.  Persistent.  16.  Decreased memory  17.  Stress.  Did eight sessions with counselor.  Anxiety worsens Raynauds.   18.  Alopecia  19.  Trigeminal neuralgia on left diagnosed in 2017  20.  Decreased memory.  6 hours of sleep a night but feels it is not enough.  21.  Episode of migraine for 10 days.  Treated by chiropractor and improved  after 5 days of treatment.  Saw neurology after virtually.   22.  Migraine.  Managed by chiropractor every 2 weeks.  23.  Psoriasis vulgaris.  Diagnosed 2023 based on rash in groin  24.  Hyperkalemia.  Family history Sigifredo    Plan:       1.  Continue Plaquenil.   (Patient had eye exam done in March 2023).  2.  Discussed her complaints of side effects with pilocarpine but she is not interested trying Evoxac (cevimiline).  She will add night time Systane ointment.   3.  Continue fibromyalgia treatment.  4.  Patient unable to use vasodilator for Raynaud's due to low BP  5.  Continue elevating legs at work and continue compression stockings  6.  Follow with counselor to help deal with stress of chronic illness.   7.  Follow with derm to discuss nicatinamide.  8.  Encourage regular sleep.  Do activities that bring raquel.  Consider artificial saliva to help with dryness that interrupt sleeing.    9.  Trial of asperecreme to hands for pain        RTO 6 months/prn

## 2023-11-01 ENCOUNTER — LAB VISIT (OUTPATIENT)
Dept: LAB | Facility: HOSPITAL | Age: 63
End: 2023-11-01
Attending: INTERNAL MEDICINE
Payer: COMMERCIAL

## 2023-11-01 ENCOUNTER — PATIENT MESSAGE (OUTPATIENT)
Dept: RHEUMATOLOGY | Facility: CLINIC | Age: 63
End: 2023-11-01
Payer: COMMERCIAL

## 2023-11-01 DIAGNOSIS — R53.83 FATIGUE, UNSPECIFIED TYPE: ICD-10-CM

## 2023-11-01 DIAGNOSIS — M79.7 FIBROMYALGIA: ICD-10-CM

## 2023-11-01 DIAGNOSIS — E06.3 HYPOTHYROIDISM DUE TO HASHIMOTO'S THYROIDITIS: ICD-10-CM

## 2023-11-01 DIAGNOSIS — I73.81 ERYTHROMELALGIA: ICD-10-CM

## 2023-11-01 DIAGNOSIS — E03.8 HYPOTHYROIDISM DUE TO HASHIMOTO'S THYROIDITIS: ICD-10-CM

## 2023-11-01 DIAGNOSIS — E87.5 HYPERKALEMIA: ICD-10-CM

## 2023-11-01 DIAGNOSIS — M81.0 OSTEOPOROSIS, POSTMENOPAUSAL: ICD-10-CM

## 2023-11-01 DIAGNOSIS — I73.00 RAYNAUD'S DISEASE WITHOUT GANGRENE: ICD-10-CM

## 2023-11-01 DIAGNOSIS — M35.0B SJOGREN'S SYNDROME WITH VASCULITIS: ICD-10-CM

## 2023-11-01 LAB
25(OH)D3+25(OH)D2 SERPL-MCNC: 75 NG/ML (ref 30–96)
ALBUMIN SERPL BCP-MCNC: 4.4 G/DL (ref 3.5–5.2)
ALP SERPL-CCNC: 61 U/L (ref 55–135)
ALT SERPL W/O P-5'-P-CCNC: 23 U/L (ref 10–44)
ANION GAP SERPL CALC-SCNC: 13 MMOL/L (ref 8–16)
AST SERPL-CCNC: 21 U/L (ref 10–40)
BASOPHILS # BLD AUTO: 0.04 K/UL (ref 0–0.2)
BASOPHILS NFR BLD: 1 % (ref 0–1.9)
BILIRUB SERPL-MCNC: 0.5 MG/DL (ref 0.1–1)
BUN SERPL-MCNC: 13 MG/DL (ref 8–23)
C3 SERPL-MCNC: 102 MG/DL (ref 50–180)
C4 SERPL-MCNC: 22 MG/DL (ref 11–44)
CALCIUM SERPL-MCNC: 9.5 MG/DL (ref 8.7–10.5)
CHLORIDE SERPL-SCNC: 98 MMOL/L (ref 95–110)
CK SERPL-CCNC: 39 U/L (ref 20–180)
CO2 SERPL-SCNC: 26 MMOL/L (ref 23–29)
CORTIS SERPL-MCNC: 11.4 UG/DL (ref 4.3–22.4)
CREAT SERPL-MCNC: 0.7 MG/DL (ref 0.5–1.4)
CRP SERPL-MCNC: 0.3 MG/L (ref 0–8.2)
DIFFERENTIAL METHOD: ABNORMAL
DSDNA AB SER-ACNC: NORMAL [IU]/ML
EOSINOPHIL # BLD AUTO: 0.2 K/UL (ref 0–0.5)
EOSINOPHIL NFR BLD: 3.8 % (ref 0–8)
ERYTHROCYTE [DISTWIDTH] IN BLOOD BY AUTOMATED COUNT: 12.5 % (ref 11.5–14.5)
ERYTHROCYTE [SEDIMENTATION RATE] IN BLOOD BY PHOTOMETRIC METHOD: 4 MM/HR (ref 0–36)
EST. GFR  (NO RACE VARIABLE): >60 ML/MIN/1.73 M^2
GLUCOSE SERPL-MCNC: 86 MG/DL (ref 70–110)
HCT VFR BLD AUTO: 44.3 % (ref 37–48.5)
HGB BLD-MCNC: 14.3 G/DL (ref 12–16)
IMM GRANULOCYTES # BLD AUTO: 0.01 K/UL (ref 0–0.04)
IMM GRANULOCYTES NFR BLD AUTO: 0.2 % (ref 0–0.5)
LYMPHOCYTES # BLD AUTO: 0.6 K/UL (ref 1–4.8)
LYMPHOCYTES NFR BLD: 15.3 % (ref 18–48)
MCH RBC QN AUTO: 30.1 PG (ref 27–31)
MCHC RBC AUTO-ENTMCNC: 32.3 G/DL (ref 32–36)
MCV RBC AUTO: 93 FL (ref 82–98)
MONOCYTES # BLD AUTO: 0.4 K/UL (ref 0.3–1)
MONOCYTES NFR BLD: 8.9 % (ref 4–15)
NEUTROPHILS # BLD AUTO: 3 K/UL (ref 1.8–7.7)
NEUTROPHILS NFR BLD: 70.8 % (ref 38–73)
NRBC BLD-RTO: 0 /100 WBC
PLATELET # BLD AUTO: 162 K/UL (ref 150–450)
PMV BLD AUTO: 9.9 FL (ref 9.2–12.9)
POTASSIUM SERPL-SCNC: 4.2 MMOL/L (ref 3.5–5.1)
PROT SERPL-MCNC: 7.6 G/DL (ref 6–8.4)
RBC # BLD AUTO: 4.75 M/UL (ref 4–5.4)
SODIUM SERPL-SCNC: 137 MMOL/L (ref 136–145)
T3 SERPL-MCNC: 113 NG/DL (ref 60–180)
T4 FREE SERPL-MCNC: 0.42 NG/DL (ref 0.71–1.51)
TSH SERPL DL<=0.005 MIU/L-ACNC: 1.46 UIU/ML (ref 0.4–4)
WBC # BLD AUTO: 4.18 K/UL (ref 3.9–12.7)

## 2023-11-01 PROCEDURE — 36415 COLL VENOUS BLD VENIPUNCTURE: CPT | Performed by: INTERNAL MEDICINE

## 2023-11-01 PROCEDURE — 86160 COMPLEMENT ANTIGEN: CPT | Performed by: INTERNAL MEDICINE

## 2023-11-01 PROCEDURE — 85025 COMPLETE CBC W/AUTO DIFF WBC: CPT | Performed by: INTERNAL MEDICINE

## 2023-11-01 PROCEDURE — 84480 ASSAY TRIIODOTHYRONINE (T3): CPT | Performed by: INTERNAL MEDICINE

## 2023-11-01 PROCEDURE — 85652 RBC SED RATE AUTOMATED: CPT | Performed by: INTERNAL MEDICINE

## 2023-11-01 PROCEDURE — 86160 COMPLEMENT ANTIGEN: CPT | Mod: 59 | Performed by: INTERNAL MEDICINE

## 2023-11-01 PROCEDURE — 80053 COMPREHEN METABOLIC PANEL: CPT | Performed by: INTERNAL MEDICINE

## 2023-11-01 PROCEDURE — 84443 ASSAY THYROID STIM HORMONE: CPT | Performed by: INTERNAL MEDICINE

## 2023-11-01 PROCEDURE — 86225 DNA ANTIBODY NATIVE: CPT | Performed by: INTERNAL MEDICINE

## 2023-11-01 PROCEDURE — 82306 VITAMIN D 25 HYDROXY: CPT | Performed by: INTERNAL MEDICINE

## 2023-11-01 PROCEDURE — 82550 ASSAY OF CK (CPK): CPT | Performed by: INTERNAL MEDICINE

## 2023-11-01 PROCEDURE — 86140 C-REACTIVE PROTEIN: CPT | Performed by: INTERNAL MEDICINE

## 2023-11-01 PROCEDURE — 82533 TOTAL CORTISOL: CPT | Performed by: INTERNAL MEDICINE

## 2023-11-01 PROCEDURE — 84439 ASSAY OF FREE THYROXINE: CPT | Performed by: INTERNAL MEDICINE

## 2023-11-03 ENCOUNTER — HOSPITAL ENCOUNTER (OUTPATIENT)
Dept: RADIOLOGY | Facility: CLINIC | Age: 63
Discharge: HOME | End: 2023-11-03
Attending: INTERNAL MEDICINE
Payer: COMMERCIAL

## 2023-11-03 DIAGNOSIS — M81.0 OSTEOPOROSIS, POSTMENOPAUSAL: ICD-10-CM

## 2023-11-03 PROCEDURE — 77080 DXA BONE DENSITY AXIAL SKELETON 1 OR MORE SITES: ICD-10-PCS | Mod: 26,,, | Performed by: INTERNAL MEDICINE

## 2023-11-03 PROCEDURE — 77080 DXA BONE DENSITY AXIAL: CPT | Mod: TC

## 2023-11-03 PROCEDURE — 77080 DXA BONE DENSITY AXIAL: CPT | Mod: 26,,, | Performed by: INTERNAL MEDICINE

## 2023-11-05 ENCOUNTER — PATIENT MESSAGE (OUTPATIENT)
Dept: INTERNAL MEDICINE | Facility: CLINIC | Age: 63
End: 2023-11-05
Payer: COMMERCIAL

## 2023-11-05 ENCOUNTER — PATIENT MESSAGE (OUTPATIENT)
Dept: PAIN MEDICINE | Facility: CLINIC | Age: 63
End: 2023-11-05
Payer: COMMERCIAL

## 2023-11-05 DIAGNOSIS — G47.00 INSOMNIA, UNSPECIFIED TYPE: ICD-10-CM

## 2023-11-06 RX ORDER — ZOLPIDEM TARTRATE 10 MG/1
TABLET ORAL
Qty: 30 TABLET | Refills: 0 | Status: SHIPPED | OUTPATIENT
Start: 2023-11-06 | End: 2024-01-10 | Stop reason: SDUPTHER

## 2023-11-06 NOTE — TELEPHONE ENCOUNTER
No care due was identified.  [facility] Embedded Care Due Messages. Reference number: [id number].   11/06/2023 9:45:24 AM CST

## 2023-11-09 ENCOUNTER — PATIENT MESSAGE (OUTPATIENT)
Dept: ENDOCRINOLOGY | Facility: CLINIC | Age: 63
End: 2023-11-09
Payer: COMMERCIAL

## 2023-11-18 ENCOUNTER — PATIENT MESSAGE (OUTPATIENT)
Dept: PAIN MEDICINE | Facility: CLINIC | Age: 63
End: 2023-11-18
Payer: COMMERCIAL

## 2023-11-27 ENCOUNTER — LAB VISIT (OUTPATIENT)
Dept: LAB | Facility: HOSPITAL | Age: 63
End: 2023-11-27
Attending: FAMILY MEDICINE
Payer: COMMERCIAL

## 2023-11-27 DIAGNOSIS — R30.0 DYSURIA: ICD-10-CM

## 2023-11-27 LAB
BILIRUB UR QL STRIP: NEGATIVE
CLARITY UR REFRACT.AUTO: CLEAR
COLOR UR AUTO: YELLOW
GLUCOSE UR QL STRIP: NEGATIVE
HGB UR QL STRIP: NEGATIVE
KETONES UR QL STRIP: ABNORMAL
LEUKOCYTE ESTERASE UR QL STRIP: NEGATIVE
NITRITE UR QL STRIP: NEGATIVE
PH UR STRIP: 6 [PH] (ref 5–8)
PROT UR QL STRIP: NEGATIVE
SP GR UR STRIP: 1.01 (ref 1–1.03)
URN SPEC COLLECT METH UR: ABNORMAL

## 2023-11-27 PROCEDURE — 81003 URINALYSIS AUTO W/O SCOPE: CPT | Performed by: FAMILY MEDICINE

## 2023-12-12 ENCOUNTER — HOSPITAL ENCOUNTER (OUTPATIENT)
Dept: RADIOLOGY | Facility: OTHER | Age: 63
Discharge: HOME | End: 2023-12-12
Attending: STUDENT IN AN ORGANIZED HEALTH CARE EDUCATION/TRAINING PROGRAM
Payer: COMMERCIAL

## 2023-12-12 ENCOUNTER — OFFICE VISIT (OUTPATIENT)
Dept: PAIN MEDICINE | Facility: CLINIC | Age: 63
End: 2023-12-12
Payer: COMMERCIAL

## 2023-12-12 ENCOUNTER — TELEPHONE (OUTPATIENT)
Dept: PAIN MEDICINE | Facility: CLINIC | Age: 63
End: 2023-12-12
Payer: COMMERCIAL

## 2023-12-12 VITALS
SYSTOLIC BLOOD PRESSURE: 105 MMHG | BODY MASS INDEX: 18.34 KG/M2 | RESPIRATION RATE: 18 BRPM | OXYGEN SATURATION: 98 % | WEIGHT: 93.94 LBS | HEART RATE: 101 BPM | DIASTOLIC BLOOD PRESSURE: 67 MMHG | TEMPERATURE: 98 F

## 2023-12-12 DIAGNOSIS — G58.8 PUDENDAL NEURALGIA: ICD-10-CM

## 2023-12-12 DIAGNOSIS — G89.4 CHRONIC PAIN DISORDER: ICD-10-CM

## 2023-12-12 DIAGNOSIS — M47.812 CERVICAL SPONDYLOSIS: ICD-10-CM

## 2023-12-12 DIAGNOSIS — M48.07 SPINAL STENOSIS, LUMBOSACRAL REGION: ICD-10-CM

## 2023-12-12 DIAGNOSIS — R10.2 PELVIC PAIN IN FEMALE: ICD-10-CM

## 2023-12-12 DIAGNOSIS — M50.30 DDD (DEGENERATIVE DISC DISEASE), CERVICAL: Primary | ICD-10-CM

## 2023-12-12 DIAGNOSIS — M51.34 DDD (DEGENERATIVE DISC DISEASE), THORACIC: ICD-10-CM

## 2023-12-12 PROCEDURE — 3074F SYST BP LT 130 MM HG: CPT | Mod: CPTII,S$GLB,, | Performed by: ANESTHESIOLOGY

## 2023-12-12 PROCEDURE — 3008F PR BODY MASS INDEX (BMI) DOCUMENTED: ICD-10-PCS | Mod: CPTII,S$GLB,, | Performed by: ANESTHESIOLOGY

## 2023-12-12 PROCEDURE — 99214 PR OFFICE/OUTPT VISIT, EST, LEVL IV, 30-39 MIN: ICD-10-PCS | Mod: S$GLB,,, | Performed by: ANESTHESIOLOGY

## 2023-12-12 PROCEDURE — 1159F PR MEDICATION LIST DOCUMENTED IN MEDICAL RECORD: ICD-10-PCS | Mod: CPTII,S$GLB,, | Performed by: ANESTHESIOLOGY

## 2023-12-12 PROCEDURE — 73521 X-RAY EXAM HIPS BI 2 VIEWS: CPT | Mod: 26,,, | Performed by: RADIOLOGY

## 2023-12-12 PROCEDURE — 1160F PR REVIEW ALL MEDS BY PRESCRIBER/CLIN PHARMACIST DOCUMENTED: ICD-10-PCS | Mod: CPTII,S$GLB,, | Performed by: ANESTHESIOLOGY

## 2023-12-12 PROCEDURE — 99999 PR PBB SHADOW E&M-EST. PATIENT-LVL V: ICD-10-PCS | Mod: PBBFAC,,, | Performed by: ANESTHESIOLOGY

## 2023-12-12 PROCEDURE — 3078F DIAST BP <80 MM HG: CPT | Mod: CPTII,S$GLB,, | Performed by: ANESTHESIOLOGY

## 2023-12-12 PROCEDURE — 3044F HG A1C LEVEL LT 7.0%: CPT | Mod: CPTII,S$GLB,, | Performed by: ANESTHESIOLOGY

## 2023-12-12 PROCEDURE — 3078F PR MOST RECENT DIASTOLIC BLOOD PRESSURE < 80 MM HG: ICD-10-PCS | Mod: CPTII,S$GLB,, | Performed by: ANESTHESIOLOGY

## 2023-12-12 PROCEDURE — 73521 X-RAY EXAM HIPS BI 2 VIEWS: CPT | Mod: TC,FY

## 2023-12-12 PROCEDURE — 73521 XR HIPS BILATERAL 2 VIEW INCL AP PELVIS: ICD-10-PCS | Mod: 26,,, | Performed by: RADIOLOGY

## 2023-12-12 PROCEDURE — 99999 PR PBB SHADOW E&M-EST. PATIENT-LVL V: CPT | Mod: PBBFAC,,, | Performed by: ANESTHESIOLOGY

## 2023-12-12 PROCEDURE — 3044F PR MOST RECENT HEMOGLOBIN A1C LEVEL <7.0%: ICD-10-PCS | Mod: CPTII,S$GLB,, | Performed by: ANESTHESIOLOGY

## 2023-12-12 PROCEDURE — 99214 OFFICE O/P EST MOD 30 MIN: CPT | Mod: S$GLB,,, | Performed by: ANESTHESIOLOGY

## 2023-12-12 PROCEDURE — 1159F MED LIST DOCD IN RCRD: CPT | Mod: CPTII,S$GLB,, | Performed by: ANESTHESIOLOGY

## 2023-12-12 PROCEDURE — 3008F BODY MASS INDEX DOCD: CPT | Mod: CPTII,S$GLB,, | Performed by: ANESTHESIOLOGY

## 2023-12-12 PROCEDURE — 3074F PR MOST RECENT SYSTOLIC BLOOD PRESSURE < 130 MM HG: ICD-10-PCS | Mod: CPTII,S$GLB,, | Performed by: ANESTHESIOLOGY

## 2023-12-12 PROCEDURE — 1160F RVW MEDS BY RX/DR IN RCRD: CPT | Mod: CPTII,S$GLB,, | Performed by: ANESTHESIOLOGY

## 2023-12-12 RX ORDER — ALPRAZOLAM 1 MG/1
1 TABLET ORAL ONCE
Qty: 1 TABLET | Refills: 0 | Status: SHIPPED | OUTPATIENT
Start: 2023-12-12 | End: 2024-03-13

## 2023-12-12 RX ORDER — LIDOCAINE 50 MG/G
1 PATCH TOPICAL DAILY
Qty: 30 PATCH | Refills: 1 | Status: SHIPPED | OUTPATIENT
Start: 2023-12-12 | End: 2024-03-13

## 2023-12-12 NOTE — TELEPHONE ENCOUNTER
----- Message from Emerald Jarquin Rosalio sent at 12/12/2023 11:46 AM CST -----  Type:  Patient Returning Call    Who Called: Pt  Who Left Message for Patient:  Does the patient know what this is regarding?:  Would the patient rather a call back or a response via [facility]? Call  Best Call Back Number: [phone]  Additional Information: Pt states office called on yesterday to reschedule 11:30 reg to 1:30p.m. today.  The earlier appt should have been cancelled. She received a call reminding her of the appt.  Pt states she will come for the 1:30pm as was informed on yesterday.  Please make the correction to reflect the rescheduled appt time.

## 2023-12-12 NOTE — TELEPHONE ENCOUNTER
Pt has been seen today.      ----- Message from Emerald Santamaria sent at 12/12/2023 11:46 AM CST -----  Type:  Patient Returning Call    Who Called: Pt  Who Left Message for Patient:  Does the patient know what this is regarding?:  Would the patient rather a call back or a response via [facility]? Call  Best Call Back Number: [phone]  Additional Information: Pt states office called on yesterday to reschedule 11:30 reg to 1:30p.m. today.  The earlier appt should have been cancelled. She received a call reminding her of the appt.  Pt states she will come for the 1:30pm as was informed on yesterday.  Please make the correction to reflect the rescheduled appt time.

## 2023-12-12 NOTE — PROGRESS NOTES
Chronic patient Established Note (Follow up visit)      SUBJECTIVE:    Interval History 12/12/2023:  Patient has Patient states that the pain is triggered by standing and movement.  Patient mentioned she made an additional appointment because about a month ago, the pain travelled 4 inches from the original pain. Intermittently patient has flare of pain of the lower back and rear end. Head makes it feel better.   Patient mentioned that she gets acupuncture which helps with the original pain but states this new pain isnt treated by the acupuncture. Patient new pain is a burning burning tighting pain. Patient pain does intermittently radiates down the leg. It isnt a continuous path down the leg. Mostly the side of the legs to the toes of the leg. Patient was diagnosis with osteoporosis and states she will start soon. Patient isnt currently on physical therapy.     Patient is on advil 60 mg   Gabapentin: 100 mg in the morning 300 mg at night   Pain cream  Skelaxin  Interval History 6/19/2023:  The patient returns to clinic today for follow up of pain. She reports increased neck pain and headaches. She has headaches that begin at the base of her head that radiates forward. She is seeing a chiropractor which has been helpful. She denies any radicular arm pain. She continues to report pelvic pain. She does acupuncture for this with benefit. She also uses a pain cream with benefit. She reports increased low back pain that radiates into the hips and lateral thighs. She is taking Gabapentin. Her pain today is 4/10.    Interval History 8/9/2022:  The patient returns to clinic today for follow up of pelvic pain. She has not been seen in over a year. She reports that she has been managing her widespread pain through acupuncture. She also sees a chiropractor with benefit. She reports increased neck pain and headaches that began in February. She did see Neurology. She reports headaches to the frontal area that is throbbing and  pressure in nature. She reports neck pain, worse with flexion and extension. She denies any radicular arm pain. She continues to report pelvic pain. Her pain is well controlled with acupuncture, compound cream, and Gabapentin. Her pain today is 6/10.    nterval History 5/31/2021:  Berenice Hayes presents to the clinic for a follow-up appointment for pelvic pain. She continues to report pelvic and vaginal pain. This pain is worse with prolonged sitting. She also reports tailbone and hip pain. She denies any radicular leg pain. Her pain is worse with bending forward. She also has difficulty sleeping due to pain. She continues to participate in acupuncture with benefit. She continues to use the compound cream. She also takes Gabapentin and Skelaxin with benefit. She denies any other health changes. Her pain today is 4/10.    Interval History 05/15/20:  Patient with complex medical history with overlapping symptoms.  Currently feels that she is better in her tailbone.  Currently, she is reporting left-sided neck pain.  This pain is worse with cold and prolonged activity.  This is associated with ear pain and left arm pain.  Of note, the ear pain is on the surface of the ear and posterior.  It is difficult to touch that area due to sensitivity.  Her arm pain is tingling, burning pain that radiates into her left arm to her hand.  This is also associated with chest wall pain.  She has had a thorough cardiac workup which was negative.  She was told she had tight strap muscles in her neck.  Bothered by Coccygeal Pain. It is a 2/10 at the moment. It is deep and dull. It does not radiate down any extremity at this point, but it has in the past.   Alleviated by Compound Cream, Sitting Down; ice packs. Aggravated by standing up prolonged times.   Paradoxical response to interventions in the past.     Left Arm Pain is shooting pain down triggered by cold. Alleviated by Lidocaine patch, Aspercream, Acupuncture, Heating  Pads  Acupuncture has been suspended due to COVID 19 but was markedly helping out.     Interval History 7/2/2019;  The patient returns to clinic today for follow up. She reports increased pain that began on last week. She denies any injury or fall. She reports that she twisted over to reach for something and experienced increased pain. She describes this pain as beginning in her tailbone and radiating into both hips and down the back of her leg to mid thigh, left greater than right. She describes this pain as stabbing and burning. She continues to report intermittent radicular pain. Her pain is worse with prolonged sitting and standing. She continues to report benefit with current medication regimen. She denies any other health changes. Her pain today is 5/10.    Pain Disability Index Review:      12/12/2023     1:50 PM 6/19/2023    10:15 AM 8/9/2022     8:51 AM   Last 3 PDI Scores   Pain Disability Index (PDI) 35 17 28       Pain Medications:  Gabapentin  Skelaxin  Elavil  Compound cream     Opioid Contract: N/A     report:  Not applicable    Pain Procedures:   2/3/2015- Left pudendal nerve block  6/2/2015- Right pudendal nerve block  10/28/2015- Right pudendal nerve block  10/26/2017- Bilateral coccygeal nerve block  7/5/2019- Coccygeal nerve block  7/19/2019- Coccygeal nerve block    Physical Therapy/Home Exercise: yes    Imaging:     DXA BONE DENSITY AXIAL SKELETON 1 OR MORE SITES 11/3/2023     CLINICAL HISTORY:  Age-related osteoporosis without current pathological fracture.  No reported history of fractures. Patient reported current osteoporosis treatment with Actonel.     TECHNIQUE:  DXA specification: [facility] Horizon A ([id number])     Bone Mineral Density scanning was performed over the hip, lumbar spine, and forearm.     Review of the images confirms satisfactory positioning and technique.     COMPARISON:  2021     FINDINGS:  Lumbar spine (L1-L4):               T-score is -1.2, and Z-score  is 0.3.     Compared with previous DXA, BMD at the lumbar spine has remained stable.     Femoral neck:                          T-score is -2.8, and Z-score is -1.4.     Total hip:                                T-score is -2.2, and Z-score is -1.2.     Compared with previous DXA, BMD at the total hip has declined 5%.     Distal 1/3 radius:                      Not applicable        Fracture Risk (FRAX)     11% risk of a major osteoporotic fracture in the next 10 years.     2.5% risk of hip fracture in the next 10 years.     Impression:     *Osteoporosis on treatment with Actonel      MRI Lumbar Spine 7/10/2019:  COMPARISON:  CT abdomen/pelvis without contrast 02/07/2019; MR lumbar spine without contrast 01/16/2017     FINDINGS:  Alignment: Mild levoconvex scoliosis.  Sagittal alignment preserved.     Vertebrae: Normal marrow signal. No fracture.     Discs: Satisfactory height and signal.     Cord: Normal.  Conus terminates at L2.     Degenerative findings:     T12-L4: No significant spinal canal stenosis or neural foraminal narrowing.     L4-L5: Mild diffuse posterior disc bulge and mild bilateral facet arthropathy with small right facet effusion without significant spinal canal stenosis.  No neural foraminal narrowing.     L5-S1: No significant spinal canal stenosis.  No neural foraminal narrowing.     Miscellaneous: Note is made of prominent bilateral nerve sheath ectasia/perineural sleeve cyst formation throughout the visualized lumbosacral spine.  Findings similar when compared to MR examination 01/16/2017.     Paraspinal muscles & soft tissues: Normal.        Impression:     Mild spondylosis, as above.  No spinal canal stenosis, significant neural foraminal narrowing or focal disc abnormality.     Nerve sheath ectasia/ perineural cyst formation, unchanged from the 01/16/2017 MRI exam.    Allergies: Review of patient's allergies indicates:  No Known Allergies    Current Medications:   Current Outpatient  Medications   Medication Sig Dispense Refill    amitriptyline (ELAVIL) 10 MG tablet TAKE 2 TABLETS ONCE DAILY WITH THE 50 MG FOR A TOTAL OF 70  tablet 3    amitriptyline (ELAVIL) 50 MG tablet TAKE 1 TABLET NIGHTLY WITH THE 10 MG AMITRIPTYLINE 90 tablet 3    amitriptyline (ELAVIL) 50 MG tablet Take 1 tablet (50 mg total) by mouth every evening. 7 tablet 0    aspirin (ECOTRIN) 81 MG EC tablet Take 81 mg by mouth once daily.      AZASITE 1 % Drop INSTILL 1 DROP INTO LEFT EYE BID      bumetanide (BUMEX) 0.5 MG Tab Take 1 tablet (0.5 mg total) by mouth every Mon, Wed, Fri. 90 tablet 3    clindamycin (CLEOCIN T) 1 % external solution APPLY TOPICALLY TO THE AFFECTED AREA TWICE DAILY AS NEEDED FOR BREAKOUTS 30 mL 3    desonide (DESOWEN) 0.05 % cream Apply topically 2 (two) times daily. To affected area in groin  x 1-2 wks then prn flares only 60 g 2    estradiol 0.05 mg/24 hr td ptsw (VIVELLE-DOT) 0.05 mg/24 hr Place 1 patch onto the skin twice a week. 24 patch 0    fluocinonide (LIDEX) 0.05 % external solution AAA scalp qday - bid prn pruritus 60 mL 3    fluticasone (FLONASE) 50 mcg/actuation nasal spray 1 spray by Each Nare route once daily. 3 Bottle 3    gabapentin (NEURONTIN) 100 MG capsule TAKE 1 CAPSULE IN THE MORNING AND 4 CAPSULES AT BEDTIME 450 capsule 3    hydrOXYchloroQUINE (PLAQUENIL) 200 mg tablet TAKE 1 TABLET DAILY 90 tablet 3    imiquimod (ALDARA) 5 % cream Apply small amount to affected area on knee qhs x 4 weeks. Wash off in am. Stop when red/inflamed. 24 packet 1    ketoconazole (NIZORAL) 2 % cream Apply topically 2 (two) times daily. Apply daily to affected area 30 g 0    ketoprofen 10 % CrPk APPLY UP TO 4.8 GRAMS TO PAINFUL AREAS UP TO FIVE TIMES DAILY. RUB IN WELL      LIDOcaine-prilocaine (EMLA) cream Apply topically as needed. 90 g 3    liothyronine (CYTOMEL) 5 MCG Tab TAKE THREE AND ONE-HALF TABLETS ONCE DAILY 315 tablet 3    metaxalone (SKELAXIN) 800 MG tablet TAKE 1 TABLET TWICE A DAY AS  NEEDED FOR SPASM 180 tablet 3    niacinamide 500 mg Tab Take 1 tablet (500 mg total) by mouth 2 (two) times a day. 180 tablet 2    NURTEC 75 mg odt Take by mouth.      pilocarpine (SALAGEN) 5 MG Tab Take 1 tablet (5 mg total) by mouth 3 (three) times daily as needed. 270 tablet 3    PREVIDENT 5000 BOOSTER PLUS 1.1 % Pste       RESTASIS 0.05 % ophthalmic emulsion       risedronate 35 mg TbEC TAKE 1 TABLET ONCE A WEEK 12 tablet 3    tretinoin (RETIN-A) 0.025 % gel Apply small amount to entire face qhs. Wash off qam and apply sunscreen. 45 g 3    UNABLE TO FIND Apply 240 g topically as needed. Ketoprofen/cyclobenzaprine HCI/Lidocaine (lipomax) 10/2/5% Up to 5 times daily as needed for pain  99    zolpidem (AMBIEN) 10 mg Tab TAKE 1 TABLET(10 MG) BY MOUTH EVERY NIGHT AS NEEDED 30 tablet 0    albuterol (VENTOLIN HFA) 90 mcg/actuation inhaler Inhale 2 puffs into the lungs every 6 (six) hours as needed for Wheezing. Rescue 18 g 2    ALPRAZolam (XANAX) 0.25 MG tablet Take 1 tablet (0.25 mg total) by mouth daily as needed for Anxiety. 3 tablet 0    KETOPROFEN, BULK, TOP Ketoprofen/flexeril/lidocaine (compound)      LIDOcaine (LIDODERM) 5 % Place 1 patch onto the skin once daily. Remove & Discard patch within 12 hours or as directed by MD 30 patch 1    sumatriptan (IMITREX) 100 MG tablet Take 1 tablet (100 mg total) by mouth every 2 (two) hours as needed for Migraine. 9 tablet 1    TENS units Candida 1 Units by Misc.(Non-Drug; Combo Route) route 2 (two) times daily as needed (Back pain). 1 each 1     No current facility-administered medications for this visit.       REVIEW OF SYSTEMS:    GENERAL:  No weight loss, malaise or fevers.  HEENT:  Negative for frequent or significant headaches.  NECK:  Negative for lumps, goiter, pain and significant neck swelling.  RESPIRATORY:  Negative for cough, wheezing or shortness of breath.  CARDIOVASCULAR:  Negative for chest pain, leg swelling or palpitations.  GI:  Negative for abdominal  discomfort, blood in stools or black stools or change in bowel habits.  MUSCULOSKELETAL:  See HPI.  SKIN:  Negative for lesions, rash, and itching.  PSYCH:  Negative for sleep disturbance, mood disorder and recent psychosocial stressors.  HEMATOLOGY/LYMPHOLOGY:  Negative for prolonged bleeding, bruising easily or swollen nodes.  NEURO:   No history of headaches, syncope, paralysis, seizures or tremors.  ENDO: Thyroid disorder  All other reviewed and negative other than HPI.    Past Medical History:  Past Medical History:   Diagnosis Date    Asthma with allergic rhinitis     Bladder spasm     Dense breasts     heterogeneously/fibrocystic disease    Elevated antinuclear antibody (GUICHO) level     Erythromelalgia     Fibromyalgia     Ganglion cyst     left toe    Goiter     MNG    Headache(784.0)     Hypothyroidism     Hashimoto with + Tg ab    Kidney stone     Osteoporosis     femoral neck    Pernio 2018    Raynaud's phenomenon     Rhinitis     Scoliosis     Sjogren's syndrome     Sleep disorder     type of Narcolepsy ( resolved)    Vitamin D deficiency disease     Vulvodynia        Past Surgical History:  Past Surgical History:   Procedure Laterality Date    BREAST SURGERY      fibrocystic tumor removed     SECTION      2x    CYST REMOVAL      laparoscopic cyst on ovary    HYSTERECTOMY      INJECTION OF ANESTHETIC AGENT AROUND NERVE N/A 2019    Procedure: BLOCK, NERVE COCCYGEAL  1 OF 2  Pt. can drive herself home;  Surgeon: Monique Philip MD;  Location: [facility];  Service: Pain Management;  Laterality: N/A;    INJECTION OF ANESTHETIC AGENT AROUND NERVE N/A 2019    Procedure: BLOCK, NERVE COCCYGEAL  2 OF 2  Pt. can drive herself home;  Surgeon: Monique Philip MD;  Location: [facility];  Service: Pain Management;  Laterality: N/A;    intradermal cyst       removed from left index finger    SINUS SURGERY      2x       Family History:  Family History   Problem Relation Age of Onset    Diabetes  Mother     Fibromyalgia Mother     Polymyalgia rheumatica Mother     Macular degeneration Mother     Arthritis Mother     Hypertension Mother     Kidney disease Mother     Pneumonia Mother     Adrenal disorder Mother         adrenal insufficiency    Coronary artery disease Father     Arthritis Father         osteoarthritis    Hypertension Father     Heart disease Father     Diabetes Father     Hyperlipidemia Father     Hyperlipidemia Brother     Cancer Brother         oral    Thyroid cancer Daughter     Cancer Daughter         thyroid    Hypothyroidism Daughter     Breast cancer Neg Hx     Ovarian cancer Neg Hx     Colon cancer Neg Hx     Melanoma Neg Hx        Social History:  Social History     Socioeconomic History    Marital status:    Occupational History     Employer: [employer]   Tobacco Use    Smoking status: Never    Smokeless tobacco: Never   Substance and Sexual Activity    Alcohol use: No    Drug use: No    Sexual activity: Not Currently     Birth control/protection: Surgical     Comment: single, RAMOS ov in situ    Social History Narrative         Social Determinants of Health     Financial Resource Strain: Low Risk  (5/30/2023)    Overall Financial Resource Strain (CARDIA)     Difficulty of Paying Living Expenses: Not very hard   Food Insecurity: No Food Insecurity (5/30/2023)    Hunger Vital Sign     Worried About Running Out of Food in the Last Year: Never true     Ran Out of Food in the Last Year: Never true   Transportation Needs: No Transportation Needs (5/30/2023)    PRAPARE - Transportation     Lack of Transportation (Medical): No     Lack of Transportation (Non-Medical): No   Physical Activity: Unknown (5/30/2023)    Exercise Vital Sign     Days of Exercise per Week: 0 days   Recent Concern: Physical Activity - Inactive (5/30/2023)    Exercise Vital Sign     Days of Exercise per Week: 0 days     Minutes of Exercise per Session: 0 min   Stress: No Stress Concern Present  (2023)    [place] [employer] - Occupational Stress Questionnaire     Feeling of Stress : Only a little   Social Connections: Unknown (2023)    Social Connection and Isolation Panel [NHANES]     Frequency of Communication with Friends and Family: More than three times a week     Frequency of Social Gatherings with Friends and Family: Once a week     Active Member of Clubs or Organizations: No     Attends Club or Organization Meetings: Never     Marital Status:    Housing Stability: Low Risk  (2023)    Housing Stability Vital Sign     Unable to Pay for Housing in the Last Year: No     Number of Places Lived in the Last Year: 1     Unstable Housing in the Last Year: No       OBJECTIVE:    /67   Pulse 101   Temp 98.3 °F (36.8 °C)   Resp 18   Wt 42.6 kg (93 lb 14.7 oz)   LMP  (LMP Unknown)   SpO2 98%   BMI 18.34 kg/m²     PHYSICAL EXAMINATION:    General appearance: Well appearing, in no acute distress, alert and oriented x3.  Psych:  Mood and affect appropriate.  Skin: Skin color, texture, turgor normal, no rashes or lesions, in both upper and lower body.  Head/face:  Atraumatic, normocephalic.  Neck: There is pain with palpation over cervical paraspinals and trapezius muscles bilaterally. Spurling's negative bilaterally. Limited ROM with pain on flexion, extension, and lateral rotation.   Cor: RRR  Pulm: Symmetric chest rise, no respiratory distress noted.   Extremities:  No deformities, edema, or skin discoloration. Good capillary refill.  Musculoskeletal: There is pain with palpation over bilateral SI joints and coccyx. Bilateral upper and lower extremity strength is normal and symmetric.  No atrophy or tone abnormalities are noted.  (+) Compression  (+) BRIDGETTE  (+) [place]  Neuro:  No loss of sensation is noted.  Gait: Normal.    ASSESSMENT: 63 y.o. year old female with pain, consistent with the followin. DDD (degenerative disc disease), cervical   LIDOcaine (LIDODERM) 5 %    [facility]ulatory referral[facility]consult to [facility]      2. Pudendal neuralgia  LIDOcaine (LIDODERM) 5 %      3. Cervical spondylosis  LIDOcaine (LIDODERM) 5 %      4. Chronic pain disorder  LIDOcaine (LIDODERM) 5 %      5. DDD (degenerative disc disease), thoracic        6. Pelvic pain in female  Ambulatory referral/consult to [facility]    CANCELED: X-Ray Pelvis Complete min 3 views      7. Spinal stenosis, lumbosacral region  Ambulatory referral/consult to [facility]    MRI Lumbar Spine Without Contrast                PLAN:   - Referred patient to the [facility] clinic  - Ordered MRI due to new back pain with radiation to the leg   - Patient states she get closterphobic after discussion agreed to potentially prescribe Xanx for MRI  - Ordered Pelvic xray due to persistent tail bone pain  - I have stressed the importance of physical activity and a home exercise plan to help with pain and improve health.  - Continue acupuncture and chiropractic care.   - Continue compound cream, new prescription faxed today.   - Continue Gabapentin 100 mg in the morning and 400 mg at bedtime.   - Discuss and ordered a TENS unit  - Ask patient to consider Cosme Chi   - Patient will return to clinic in 6 week with CARLOS EDUARDO after imaging  The above plan and management options were discussed at length with patient. Patient is in agreement with the above and verbalized understanding.    William Roa  12/12/2023      I spent a total of 30 minutes on the day of the visit.  This includes face to face time and non-face to face time preparing to see the patient by reviewing previous labs/imaging, obtaining and/or reviewing separately obtained history, documenting clinical information in the electronic or other health record, independently interpreting results and communicating results to the patient/family/caregiver.    Waldo Ugarte

## 2023-12-28 ENCOUNTER — HOSPITAL ENCOUNTER (OUTPATIENT)
Dept: RADIOLOGY | Facility: HOSPITAL | Age: 63
Discharge: HOME | End: 2023-12-28
Attending: STUDENT IN AN ORGANIZED HEALTH CARE EDUCATION/TRAINING PROGRAM
Payer: COMMERCIAL

## 2023-12-28 DIAGNOSIS — M48.07 SPINAL STENOSIS, LUMBOSACRAL REGION: ICD-10-CM

## 2023-12-28 PROCEDURE — 72148 MRI LUMBAR SPINE W/O DYE: CPT | Mod: 26,,, | Performed by: RADIOLOGY

## 2023-12-28 PROCEDURE — 72148 MRI LUMBAR SPINE WITHOUT CONTRAST: ICD-10-PCS | Mod: 26,,, | Performed by: RADIOLOGY

## 2023-12-28 PROCEDURE — 72148 MRI LUMBAR SPINE W/O DYE: CPT | Mod: TC

## 2023-12-29 ENCOUNTER — TELEPHONE (OUTPATIENT)
Dept: ADMINISTRATIVE | Facility: OTHER | Age: 63
End: 2023-12-29
Payer: COMMERCIAL

## 2023-12-29 NOTE — TELEPHONE ENCOUNTER
Spoke to patient who will call back for scheduling after her appointment of 1-23-24 with Niya Irene.

## 2024-01-08 ENCOUNTER — OFFICE VISIT (OUTPATIENT)
Dept: ENDOCRINOLOGY | Facility: CLINIC | Age: 64
End: 2024-01-08
Payer: COMMERCIAL

## 2024-01-08 VITALS
HEART RATE: 94 BPM | HEIGHT: 61 IN | BODY MASS INDEX: 17.83 KG/M2 | DIASTOLIC BLOOD PRESSURE: 69 MMHG | RESPIRATION RATE: 18 BRPM | WEIGHT: 94.44 LBS | SYSTOLIC BLOOD PRESSURE: 98 MMHG

## 2024-01-08 DIAGNOSIS — M35.0B SJOGREN'S SYNDROME WITH VASCULITIS: ICD-10-CM

## 2024-01-08 DIAGNOSIS — M81.0 OSTEOPOROSIS, POSTMENOPAUSAL: ICD-10-CM

## 2024-01-08 DIAGNOSIS — E06.3 HYPOTHYROIDISM DUE TO HASHIMOTO'S THYROIDITIS: Primary | ICD-10-CM

## 2024-01-08 DIAGNOSIS — E03.8 HYPOTHYROIDISM DUE TO HASHIMOTO'S THYROIDITIS: Primary | ICD-10-CM

## 2024-01-08 DIAGNOSIS — E04.2 MULTINODULAR GOITER: ICD-10-CM

## 2024-01-08 PROCEDURE — 1159F MED LIST DOCD IN RCRD: CPT | Mod: CPTII,S$GLB,, | Performed by: INTERNAL MEDICINE

## 2024-01-08 PROCEDURE — 3078F DIAST BP <80 MM HG: CPT | Mod: CPTII,S$GLB,, | Performed by: INTERNAL MEDICINE

## 2024-01-08 PROCEDURE — 3074F SYST BP LT 130 MM HG: CPT | Mod: CPTII,S$GLB,, | Performed by: INTERNAL MEDICINE

## 2024-01-08 PROCEDURE — 99999 PR PBB SHADOW E&M-EST. PATIENT-LVL V: CPT | Mod: PBBFAC,,, | Performed by: INTERNAL MEDICINE

## 2024-01-08 PROCEDURE — 99214 OFFICE O/P EST MOD 30 MIN: CPT | Mod: S$GLB,,, | Performed by: INTERNAL MEDICINE

## 2024-01-08 PROCEDURE — 1160F RVW MEDS BY RX/DR IN RCRD: CPT | Mod: CPTII,S$GLB,, | Performed by: INTERNAL MEDICINE

## 2024-01-08 PROCEDURE — 3008F BODY MASS INDEX DOCD: CPT | Mod: CPTII,S$GLB,, | Performed by: INTERNAL MEDICINE

## 2024-01-08 RX ORDER — ONDANSETRON 4 MG/1
4 TABLET, FILM COATED ORAL EVERY 8 HOURS PRN
Qty: 30 TABLET | Refills: 0 | Status: SHIPPED | OUTPATIENT
Start: 2024-01-08

## 2024-01-08 NOTE — ASSESSMENT & PLAN NOTE
--Explained goals of treatment is to keep the TSH in the normal range to avoid CVS and bone complications  --Will continue Cytomel 5 mcg TID pending labs  --Last TSH normal, but recently having some symptoms of cold intolerance, constipation and weight loss so will check labs to ensure she is still euthyroid  --She understands that this is an atypical thyroid hormone replacement regimen and she will let me us know right away if she develops any hyperthyroid symptoms  --We discussed continuing this regimen so long as her TSH is not suppressed and she does not have any palpitations

## 2024-01-08 NOTE — PROGRESS NOTES
Subjective:      Patient ID: Berenice Hayes is a 63 y.o. female.    Chief Complaint:  Osteoporosis and Hashimoto's Thyroiditis      History of Present Illness  Ms. Hayes presents for follow up of hypothyroidism and osteopenia.  Last visit 5/2023.     With Hashimoto thyroiditis with high thyroglobulin ab and negative TPO ab.   Was started and maintained on T3 only regimen by Dr. Espinal.   Currently she is taking Cytomel totalling 15 mcg per day (5 mcg TID).  Previously had palpitations when Synthroid was added to the Cytomel. No longer having palpitations on the current regimen and TSH has remained WNL. She feels like her hypothyroidism has been relatively stable. She denies any palpitations or tremor.   She has been hesitant to change back to T4 only or a combined T4/T3 regimen.     Also has MNG with right mid thyroid nodule s/p benign FNA in 2015, 2018, 2019 and left lobe nodule with benign FNA in 2016. Right inferior pole nodule underwent FNA x 3 with non-diagnostic results. Molecular markers were negative on the right inferior nodule in 7/2019.      She has some chronic mild dysphagia with solid/dry foods that has not changed.      Since her last visit she has been having worsening hoarseness.      Neck ultrasound dated 5/2023:  Impression:     1.)  Thyroid gland is normal in size with heterogeneous echotexture and normal vascularity     2.) 1.0 x 0.7 x 1.1 cm solid, heterogeneous predominately isoechoic nodule is seen in the right superior pole     3.) 1.3 x 0.8 x 1.3 cm, solid isoechoic nodule is seen in the right mid lobe     4.) 2.6 x 1.6 x 2.0 cm solid, hypoechoic nodule is seen in the right inferior pole     5.) 0.9 x 0.8 x 0.7 cm solid, isoechoic nodule is seen in the left superior pole     6.) 0.6 x 0.3 x 0.3 cm solid, isoechoic nodule is seen in the left inferior pole     RECOMMENDATIONS:  Recommend repeat thyroid ultrasound in one year.        Has Osteoporosis and she took Atelvia 35 mg weekly since  August/2012 until 2015 when she restarted ERT (vaginal and transdermal). Had decrease in BMD at the hip despite ERT.    Restarted risedronate 35 mg weekly in 11/2017.  Compliant with regimen.     Latest Reference Range & Units 12/17/22 08:04   C Telopeptide (CTX), Serum pg/mL 89 (L)     Recent BMD stable at the spine but showed decline at the hip in 11/2023.  Bone density two years ago had showed a decline at the spine. I recommended she consider a stronger agent like Prolia or Reclast and she would like to consider Prolia. No upcoming major dental work.      She remains relatively active.      She takes a multivitamin and Vit D 2000 units daily.      She is currently taking oral calcium lactate twice daily. She is also getting a fair amount of calcium through her diet (eats cheese, broccoli and walnuts daily).     Follows with rheumatology for Sjogrens. On Plaquenil.     Review of Systems   Constitutional:  Negative for chills and fever.   Gastrointestinal:  Negative for nausea.       Objective:   Physical Exam  Vitals and nursing note reviewed.       BP Readings from Last 3 Encounters:   01/08/24 98/69   12/12/23 105/67   10/19/23 98/70     Wt Readings from Last 1 Encounters:   01/08/24 1133 42.8 kg (94 lb 7.5 oz)       Body mass index is 17.82 kg/m².    Lab Review:   Lab Results   Component Value Date    HGBA1C 5.0 08/19/2023     Lab Results   Component Value Date    CHOL 133 08/19/2023    HDL 80 (H) 08/19/2023    LDLCALC 47.4 (L) 08/19/2023    TRIG 28 (L) 08/19/2023    CHOLHDL 60.2 (H) 08/19/2023     Lab Results   Component Value Date     11/01/2023    K 4.2 11/01/2023    CL 98 11/01/2023    CO2 26 11/01/2023    GLU 86 11/01/2023    BUN 13 11/01/2023    CREATININE 0.7 11/01/2023    CALCIUM 9.5 11/01/2023    PROT 7.6 11/01/2023    ALBUMIN 4.4 11/01/2023    BILITOT 0.5 11/01/2023    ALKPHOS 61 11/01/2023    AST 21 11/01/2023    ALT 23 11/01/2023    ANIONGAP 13 11/01/2023    ESTGFRAFRICA >60.0 06/25/2022     EGFRNONAA >60.0 06/25/2022    TSH 1.464 11/01/2023         Assessment and Plan     Hypothyroidism due to Hashimoto's thyroiditis  --Explained goals of treatment is to keep the TSH in the normal range to avoid CVS and bone complications  --Will continue Cytomel 5 mcg TID pending labs  --Last TSH normal, but recently having some symptoms of cold intolerance, constipation and weight loss so will check labs to ensure she is still euthyroid  --She understands that this is an atypical thyroid hormone replacement regimen and she will let me us know right away if she develops any hyperthyroid symptoms  --We discussed continuing this regimen so long as her TSH is not suppressed and she does not have any palpitations         Multinodular goiter  --With multiple thyroid nodules  --has had multiple benign FNAs of the right mid lobe nodule, and 1 benign FNA of the left lobe nodule  --she has had 3 nondiagnostic FNAs of the right inferior pole nodule, however, molecular markers were negative on this nodule in 07/2019  --Last ultrasound with stable nodules in 5/2023  --Repeat thyroid ultrasound in 4-5 months      Osteoporosis, postmenopausal  --On ERT  --She has had a decline at the hip and spine on ERT and risedronate  --Reviewed options and will start Prolia  --She has increased weight-bearing exercise  --Will continue risedronate 35 mg weekly (restarted in 11/2017) until Prolia started  --She understands Prolia can not be abruptly stopped or significantly delayed once started   --repeat bone density in 11/2025  --continue vitamin-D supplementation and calcium supplementation  --stressed importance of falls avoidance      Sjogren's syndrome  Followed by rheumatology  Started on plaquenil (prescribed 2/20/19)            Add TSH, free T4, T3 and CTX to labs this Saturday, follow up in 4-5 months with ultrasound prior to appt      Leandro Espinoza M.D. Staff Endocrinology

## 2024-01-08 NOTE — ASSESSMENT & PLAN NOTE
--On ERT  --She has had a decline at the hip and spine on ERT and risedronate  --Reviewed options and will start Prolia  --She has increased weight-bearing exercise  --Will continue risedronate 35 mg weekly (restarted in 11/2017) until Prolia started  --She understands Prolia can not be abruptly stopped or significantly delayed once started   --repeat bone density in 11/2025  --continue vitamin-D supplementation and calcium supplementation  --stressed importance of falls avoidance

## 2024-01-08 NOTE — ASSESSMENT & PLAN NOTE
--With multiple thyroid nodules  --has had multiple benign FNAs of the right mid lobe nodule, and 1 benign FNA of the left lobe nodule  --she has had 3 nondiagnostic FNAs of the right inferior pole nodule, however, molecular markers were negative on this nodule in 07/2019  --Last ultrasound with stable nodules in 5/2023  --Repeat thyroid ultrasound in 4-5 months

## 2024-01-10 ENCOUNTER — PATIENT MESSAGE (OUTPATIENT)
Dept: INTERNAL MEDICINE | Facility: CLINIC | Age: 64
End: 2024-01-10
Payer: COMMERCIAL

## 2024-01-10 DIAGNOSIS — R10.9 STOMACH PAIN: Primary | ICD-10-CM

## 2024-01-10 DIAGNOSIS — G47.00 INSOMNIA, UNSPECIFIED TYPE: ICD-10-CM

## 2024-01-10 RX ORDER — ZOLPIDEM TARTRATE 10 MG/1
TABLET ORAL
Qty: 30 TABLET | Refills: 2 | Status: SHIPPED | OUTPATIENT
Start: 2024-01-10

## 2024-01-13 ENCOUNTER — LAB VISIT (OUTPATIENT)
Dept: LAB | Facility: HOSPITAL | Age: 64
End: 2024-01-13
Attending: INTERNAL MEDICINE
Payer: COMMERCIAL

## 2024-01-13 DIAGNOSIS — R53.83 FATIGUE, UNSPECIFIED TYPE: ICD-10-CM

## 2024-01-13 DIAGNOSIS — M79.7 FIBROMYALGIA: ICD-10-CM

## 2024-01-13 DIAGNOSIS — I73.00 RAYNAUD'S DISEASE WITHOUT GANGRENE: ICD-10-CM

## 2024-01-13 DIAGNOSIS — I73.81 ERYTHROMELALGIA: ICD-10-CM

## 2024-01-13 DIAGNOSIS — M35.0B SJOGREN'S SYNDROME WITH VASCULITIS: ICD-10-CM

## 2024-01-13 LAB
BILIRUB UR QL STRIP: NEGATIVE
CLARITY UR REFRACT.AUTO: CLEAR
COLOR UR AUTO: YELLOW
CREAT UR-MCNC: 36 MG/DL (ref 15–325)
GLUCOSE UR QL STRIP: NEGATIVE
HGB UR QL STRIP: NEGATIVE
KETONES UR QL STRIP: NEGATIVE
LEUKOCYTE ESTERASE UR QL STRIP: NEGATIVE
NITRITE UR QL STRIP: NEGATIVE
PH UR STRIP: 6 [PH] (ref 5–8)
PROT UR QL STRIP: NEGATIVE
PROT UR-MCNC: <7 MG/DL (ref 0–15)
PROT/CREAT UR: NORMAL MG/G{CREAT} (ref 0–0.2)
SP GR UR STRIP: 1.01 (ref 1–1.03)
URN SPEC COLLECT METH UR: NORMAL

## 2024-01-13 PROCEDURE — 82570 ASSAY OF URINE CREATININE: CPT | Performed by: INTERNAL MEDICINE

## 2024-01-13 PROCEDURE — 81003 URINALYSIS AUTO W/O SCOPE: CPT | Performed by: INTERNAL MEDICINE

## 2024-01-16 ENCOUNTER — PATIENT MESSAGE (OUTPATIENT)
Dept: RHEUMATOLOGY | Facility: CLINIC | Age: 64
End: 2024-01-16
Payer: COMMERCIAL

## 2024-01-17 ENCOUNTER — PATIENT MESSAGE (OUTPATIENT)
Dept: ENDOCRINOLOGY | Facility: CLINIC | Age: 64
End: 2024-01-17
Payer: COMMERCIAL

## 2024-01-18 ENCOUNTER — PATIENT MESSAGE (OUTPATIENT)
Dept: OBSTETRICS AND GYNECOLOGY | Facility: CLINIC | Age: 64
End: 2024-01-18
Payer: COMMERCIAL

## 2024-01-18 ENCOUNTER — TELEPHONE (OUTPATIENT)
Dept: OBSTETRICS AND GYNECOLOGY | Facility: CLINIC | Age: 64
End: 2024-01-18
Payer: COMMERCIAL

## 2024-01-18 DIAGNOSIS — Z12.31 ENCOUNTER FOR SCREENING MAMMOGRAM FOR MALIGNANT NEOPLASM OF BREAST: Primary | ICD-10-CM

## 2024-01-23 ENCOUNTER — OFFICE VISIT (OUTPATIENT)
Dept: PAIN MEDICINE | Facility: CLINIC | Age: 64
End: 2024-01-23
Payer: COMMERCIAL

## 2024-01-23 VITALS
DIASTOLIC BLOOD PRESSURE: 64 MMHG | HEIGHT: 61 IN | SYSTOLIC BLOOD PRESSURE: 90 MMHG | WEIGHT: 94.56 LBS | OXYGEN SATURATION: 100 % | HEART RATE: 101 BPM | BODY MASS INDEX: 17.85 KG/M2 | RESPIRATION RATE: 12 BRPM

## 2024-01-23 DIAGNOSIS — G89.4 CHRONIC PAIN DISORDER: ICD-10-CM

## 2024-01-23 DIAGNOSIS — M53.3 SACROILIAC JOINT PAIN: ICD-10-CM

## 2024-01-23 DIAGNOSIS — M51.36 DDD (DEGENERATIVE DISC DISEASE), LUMBAR: ICD-10-CM

## 2024-01-23 DIAGNOSIS — M54.16 LUMBAR RADICULOPATHY: Primary | ICD-10-CM

## 2024-01-23 DIAGNOSIS — G58.8 PUDENDAL NEURALGIA: ICD-10-CM

## 2024-01-23 PROCEDURE — 3074F SYST BP LT 130 MM HG: CPT | Mod: CPTII,S$GLB,, | Performed by: NURSE PRACTITIONER

## 2024-01-23 PROCEDURE — 1160F RVW MEDS BY RX/DR IN RCRD: CPT | Mod: CPTII,S$GLB,, | Performed by: NURSE PRACTITIONER

## 2024-01-23 PROCEDURE — 1159F MED LIST DOCD IN RCRD: CPT | Mod: CPTII,S$GLB,, | Performed by: NURSE PRACTITIONER

## 2024-01-23 PROCEDURE — 3008F BODY MASS INDEX DOCD: CPT | Mod: CPTII,S$GLB,, | Performed by: NURSE PRACTITIONER

## 2024-01-23 PROCEDURE — 99999 PR PBB SHADOW E&M-EST. PATIENT-LVL V: CPT | Mod: PBBFAC,,, | Performed by: NURSE PRACTITIONER

## 2024-01-23 PROCEDURE — 99214 OFFICE O/P EST MOD 30 MIN: CPT | Mod: S$GLB,,, | Performed by: NURSE PRACTITIONER

## 2024-01-23 PROCEDURE — 3078F DIAST BP <80 MM HG: CPT | Mod: CPTII,S$GLB,, | Performed by: NURSE PRACTITIONER

## 2024-01-23 NOTE — PROGRESS NOTES
Chronic patient Established Note (Follow up visit)      SUBJECTIVE:    Interval History 1/23/2024:  The patient returns to clinic today for follow up of back pain. She is here today for imaging review. She reports low back and tail bone pain. She is having intermittent radiating pain into the posterolateral aspect of her left leg to the top of her foot. She describes this pain as shocking in nature. Her pain is worse with prolonged standing, especially with cooking and housework. She is participating in acupuncture with benefit. She also takes Elavil, Gabapentin, and Skelaxin. She denies any other health changes. Her pain today is 4/10.    Interval History 12/12/2023:  Patient has Patient states that the pain is triggered by standing and movement.  Patient mentioned she made an additional appointment because about a month ago, the pain travelled 4 inches from the original pain. Intermittently patient has flare of pain of the lower back and rear end. Head makes it feel better.   Patient mentioned that she gets acupuncture which helps with the original pain but states this new pain isnt treated by the acupuncture. Patient new pain is a burning burning tighting pain. Patient pain does intermittently radiates down the leg. It isnt a continuous path down the leg. Mostly the side of the legs to the toes of the leg. Patient was diagnosis with osteoporosis and states she will start soon. Patient isnt currently on physical therapy.   Patient is on advil 60 mg   Gabapentin: 100 mg in the morning 300 mg at night   Pain cream  Skelaxin    Interval History 6/19/2023:  The patient returns to clinic today for follow up of pain. She reports increased neck pain and headaches. She has headaches that begin at the base of her head that radiates forward. She is seeing a chiropractor which has been helpful. She denies any radicular arm pain. She continues to report pelvic pain. She does acupuncture for this with benefit. She also uses a  pain cream with benefit. She reports increased low back pain that radiates into the hips and lateral thighs. She is taking Gabapentin. Her pain today is 4/10.    Interval History 8/9/2022:  The patient returns to clinic today for follow up of pelvic pain. She has not been seen in over a year. She reports that she has been managing her widespread pain through acupuncture. She also sees a chiropractor with benefit. She reports increased neck pain and headaches that began in February. She did see Neurology. She reports headaches to the frontal area that is throbbing and pressure in nature. She reports neck pain, worse with flexion and extension. She denies any radicular arm pain. She continues to report pelvic pain. Her pain is well controlled with acupuncture, compound cream, and Gabapentin. Her pain today is 6/10.    nterval History 5/31/2021:  Berenice Hayes presents to the clinic for a follow-up appointment for pelvic pain. She continues to report pelvic and vaginal pain. This pain is worse with prolonged sitting. She also reports tailbone and hip pain. She denies any radicular leg pain. Her pain is worse with bending forward. She also has difficulty sleeping due to pain. She continues to participate in acupuncture with benefit. She continues to use the compound cream. She also takes Gabapentin and Skelaxin with benefit. She denies any other health changes. Her pain today is 4/10.    Interval History 05/15/20:  Patient with complex medical history with overlapping symptoms.  Currently feels that she is better in her tailbone.  Currently, she is reporting left-sided neck pain.  This pain is worse with cold and prolonged activity.  This is associated with ear pain and left arm pain.  Of note, the ear pain is on the surface of the ear and posterior.  It is difficult to touch that area due to sensitivity.  Her arm pain is tingling, burning pain that radiates into her left arm to her hand.  This is also associated  with chest wall pain.  She has had a thorough cardiac workup which was negative.  She was told she had tight strap muscles in her neck.  Bothered by Coccygeal Pain. It is a 2/10 at the moment. It is deep and dull. It does not radiate down any extremity at this point, but it has in the past.   Alleviated by Compound Cream, Sitting Down; ice packs. Aggravated by standing up prolonged times.   Paradoxical response to interventions in the past.     Left Arm Pain is shooting pain down triggered by cold. Alleviated by Lidocaine patch, Aspercream, Acupuncture, Heating Pads  Acupuncture has been suspended due to COVID 19 but was markedly helping out.     Interval History 7/2/2019;  The patient returns to clinic today for follow up. She reports increased pain that began on last week. She denies any injury or fall. She reports that she twisted over to reach for something and experienced increased pain. She describes this pain as beginning in her tailbone and radiating into both hips and down the back of her leg to mid thigh, left greater than right. She describes this pain as stabbing and burning. She continues to report intermittent radicular pain. Her pain is worse with prolonged sitting and standing. She continues to report benefit with current medication regimen. She denies any other health changes. Her pain today is 5/10.    Pain Disability Index Review:      1/23/2024     8:50 AM 12/12/2023     1:50 PM 6/19/2023    10:15 AM   Last 3 PDI Scores   Pain Disability Index (PDI) 41 [phone] 17       Pain Medications:  Gabapentin  Skelaxin  Elavil  Compound cream     Opioid Contract: N/A     report:  Not applicable    Pain Procedures:   2/3/2015- Left pudendal nerve block  6/2/2015- Right pudendal nerve block  10/28/2015- Right pudendal nerve block  10/26/2017- Bilateral coccygeal nerve block  7/5/2019- Coccygeal nerve block  7/19/2019- Coccygeal nerve block    Physical Therapy/Home Exercise: yes    Imaging:   MRI Lumbar Spine  12/28/2023:  FINDINGS:  Comparison is 07/10/2019.     No marrow replacement, marrow edema, infection, or neoplasm seen.  There are lumbar neural foramina and sacral Tarlov cysts.  No marrow replacement, marrow edema, infection, or neoplasm is seen.  No soft tissue injury or whiplash injury seen.  No pars fracture pars stress fracture seen.  The distal cord-conus is unremarkable.  There are sacral Tarlov cysts and there are nerve root sleeve cysts at all lumbar and lower thoracic levels with neural foramina enlargement.     No disc herniation, spinal canal stenosis, or neural foraminal stenosis seen.  No fracture, dislocation, or bone destruction seen.     Impression:     No significant acute process trauma or DJD seen.  No spinal canal stenosis, or neural foraminal stenosis seen.     There are nerve root sleeve cysts bilaterally throughout the lumbar spine lower thoracic levels with chronic enlargement of the neural foramina.  There are also sacral Tarlov cysts      MRI Lumbar Spine 7/10/2019:  COMPARISON:  CT abdomen/pelvis without contrast 02/07/2019; MR lumbar spine without contrast 01/16/2017     FINDINGS:  Alignment: Mild levoconvex scoliosis.  Sagittal alignment preserved.     Vertebrae: Normal marrow signal. No fracture.     Discs: Satisfactory height and signal.     Cord: Normal.  Conus terminates at L2.     Degenerative findings:     T12-L4: No significant spinal canal stenosis or neural foraminal narrowing.     L4-L5: Mild diffuse posterior disc bulge and mild bilateral facet arthropathy with small right facet effusion without significant spinal canal stenosis.  No neural foraminal narrowing.     L5-S1: No significant spinal canal stenosis.  No neural foraminal narrowing.     Miscellaneous: Note is made of prominent bilateral nerve sheath ectasia/perineural sleeve cyst formation throughout the visualized lumbosacral spine.  Findings similar when compared to MR examination 01/16/2017.     Paraspinal muscles  & soft tissues: Normal.        Impression:     Mild spondylosis, as above.  No spinal canal stenosis, significant neural foraminal narrowing or focal disc abnormality.     Nerve sheath ectasia/ perineural cyst formation, unchanged from the 01/16/2017 MRI exam.    Allergies: Review of patient's allergies indicates:  No Known Allergies    Current Medications:   Current Outpatient Medications   Medication Sig Dispense Refill    amitriptyline (ELAVIL) 10 MG tablet TAKE 2 TABLETS ONCE DAILY WITH THE 50 MG FOR A TOTAL OF 70  tablet 3    amitriptyline (ELAVIL) 50 MG tablet TAKE 1 TABLET NIGHTLY WITH THE 10 MG AMITRIPTYLINE 90 tablet 3    amitriptyline (ELAVIL) 50 MG tablet Take 1 tablet (50 mg total) by mouth every evening. 7 tablet 0    aspirin (ECOTRIN) 81 MG EC tablet Take 81 mg by mouth once daily.      AZASITE 1 % Drop INSTILL 1 DROP INTO LEFT EYE BID      bumetanide (BUMEX) 0.5 MG Tab Take 1 tablet (0.5 mg total) by mouth every Mon, Wed, Fri. 90 tablet 3    clindamycin (CLEOCIN T) 1 % external solution APPLY TOPICALLY TO THE AFFECTED AREA TWICE DAILY AS NEEDED FOR BREAKOUTS 30 mL 3    desonide (DESOWEN) 0.05 % cream Apply topically 2 (two) times daily. To affected area in groin  x 1-2 wks then prn flares only 60 g 2    fluticasone (FLONASE) 50 mcg/actuation nasal spray 1 spray by Each Nare route once daily. 3 Bottle 3    gabapentin (NEURONTIN) 100 MG capsule TAKE 1 CAPSULE IN THE MORNING AND 4 CAPSULES AT BEDTIME 450 capsule 3    hydrOXYchloroQUINE (PLAQUENIL) 200 mg tablet TAKE 1 TABLET DAILY 90 tablet 3    imiquimod (ALDARA) 5 % cream Apply small amount to affected area on knee qhs x 4 weeks. Wash off in am. Stop when red/inflamed. 24 packet 1    ketoconazole (NIZORAL) 2 % cream Apply topically 2 (two) times daily. Apply daily to affected area 30 g 0    ketoprofen 10 % CrPk APPLY UP TO 4.8 GRAMS TO PAINFUL AREAS UP TO FIVE TIMES DAILY. RUB IN WELL      LIDOcaine (LIDODERM) 5 % Place 1 patch onto the skin once  daily. Remove & Discard patch within 12 hours or as directed by MD 30 patch 1    LIDOcaine-prilocaine (EMLA) cream Apply topically as needed. 90 g 3    liothyronine (CYTOMEL) 5 MCG Tab TAKE THREE AND ONE-HALF TABLETS ONCE DAILY 315 tablet 3    metaxalone (SKELAXIN) 800 MG tablet TAKE 1 TABLET TWICE A DAY AS NEEDED FOR SPASM 180 tablet 3    NURTEC 75 mg odt Take by mouth.      ondansetron (ZOFRAN) 4 MG tablet Take 1 tablet (4 mg total) by mouth every 8 (eight) hours as needed for Nausea. 30 tablet 0    pilocarpine (SALAGEN) 5 MG Tab Take 1 tablet (5 mg total) by mouth 3 (three) times daily as needed. 270 tablet 3    PREVIDENT 5000 BOOSTER PLUS 1.1 % Pste       RESTASIS 0.05 % ophthalmic emulsion       risedronate 35 mg TbEC TAKE 1 TABLET ONCE A WEEK 12 tablet 3    TENS units Candida 1 Units by Misc.(Non-Drug; Combo Route) route 2 (two) times daily as needed (Back pain). 1 each 1    tretinoin (RETIN-A) 0.025 % gel Apply small amount to entire face qhs. Wash off qam and apply sunscreen. 45 g 3    UNABLE TO FIND Apply 240 g topically as needed. Ketoprofen/cyclobenzaprine HCI/Lidocaine (lipomax) 10/2/5% Up to 5 times daily as needed for pain  99    zolpidem (AMBIEN) 10 mg Tab TAKE 1 TABLET(10 MG) BY MOUTH EVERY NIGHT AS NEEDED 30 tablet 2    albuterol (VENTOLIN HFA) 90 mcg/actuation inhaler Inhale 2 puffs into the lungs every 6 (six) hours as needed for Wheezing. Rescue 18 g 2    ALPRAZolam (XANAX) 1 MG tablet Take 1 tablet (1 mg total) by mouth once. for 1 dose 1 tablet 0    estradiol 0.05 mg/24 hr td ptsw (VIVELLE-DOT) 0.05 mg/24 hr Place 1 patch onto the skin twice a week. 24 patch 0    fluocinonide (LIDEX) 0.05 % external solution AAA scalp qday - bid prn pruritus (Patient not taking: Reported on 1/8/2024) 60 mL 3    niacinamide 500 mg Tab Take 1 tablet (500 mg total) by mouth 2 (two) times a day. (Patient not taking: Reported on 1/8/2024) 180 tablet 2    sumatriptan (IMITREX) 100 MG tablet Take 1 tablet (100 mg total)  by mouth every 2 (two) hours as needed for Migraine. 9 tablet 1     No current facility-administered medications for this visit.       REVIEW OF SYSTEMS:    GENERAL:  No weight loss, malaise or fevers.  HEENT:  Headaches  NECK:  Negative for lumps, goiter, pain and significant neck swelling.  RESPIRATORY:  Negative for cough, wheezing or shortness of breath.  CARDIOVASCULAR:  Negative for chest pain, leg swelling or palpitations.  GI:  Negative for abdominal discomfort, blood in stools or black stools or change in bowel habits.  MUSCULOSKELETAL:  See HPI.  SKIN:  Negative for lesions, rash, and itching.  PSYCH:  Negative for sleep disturbance, mood disorder and recent psychosocial stressors.  HEMATOLOGY/LYMPHOLOGY:  Negative for prolonged bleeding, bruising easily or swollen nodes.  NEURO:   No history of headaches, syncope, paralysis, seizures or tremors.  ENDO: Thyroid disorder  All other reviewed and negative other than HPI.    Past Medical History:  Past Medical History:   Diagnosis Date    Asthma with allergic rhinitis     Bladder spasm     Dense breasts     heterogeneously/fibrocystic disease    Elevated antinuclear antibody (GUICHO) level     Erythromelalgia     Fibromyalgia     Ganglion cyst     left toe    Goiter     MNG    Headache(784.0)     Hypothyroidism     Hashimoto with + Tg ab    Kidney stone     Osteoporosis     femoral neck    Pernio 2018    Raynaud's phenomenon     Rhinitis     Scoliosis     Sjogren's syndrome     Sleep disorder     type of Narcolepsy ( resolved)    Vitamin D deficiency disease     Vulvodynia        Past Surgical History:  Past Surgical History:   Procedure Laterality Date    BREAST SURGERY      fibrocystic tumor removed     SECTION      2x    CYST REMOVAL      laparoscopic cyst on ovary    HYSTERECTOMY      INJECTION OF ANESTHETIC AGENT AROUND NERVE N/A 2019    Procedure: BLOCK, NERVE COCCYGEAL  1 OF 2  Pt. can drive herself home;  Surgeon: Monique Philip MD;   Location: [facility], [place], operated by [facility];  Service: Pain Management;  Laterality: N/A;    INJECTION OF ANESTHETIC AGENT AROUND NERVE N/A 7/19/2019    Procedure: BLOCK, NERVE COCCYGEAL  2 OF 2  Pt. can drive herself home;  Surgeon: Monique Philip MD;  Location: [facility], [place], operated by [facility];  Service: Pain Management;  Laterality: N/A;    intradermal cyst       removed from left index finger    SINUS SURGERY      2x       Family History:  Family History   Problem Relation Age of Onset    Diabetes Mother     Fibromyalgia Mother     Polymyalgia rheumatica Mother     Macular degeneration Mother     Arthritis Mother     Hypertension Mother     Kidney disease Mother     Pneumonia Mother     Adrenal disorder Mother         adrenal insufficiency    Coronary artery disease Father     Arthritis Father         osteoarthritis    Hypertension Father     Heart disease Father     Diabetes Father     Hyperlipidemia Father     Hyperlipidemia Brother     Cancer Brother         oral    Thyroid cancer Daughter     Cancer Daughter         thyroid    Hypothyroidism Daughter     Breast cancer Neg Hx     Ovarian cancer Neg Hx     Colon cancer Neg Hx     Melanoma Neg Hx        Social History:  Social History     Socioeconomic History    Marital status:    Occupational History     Employer: [employer]   Tobacco Use    Smoking status: Never    Smokeless tobacco: Never   Substance and Sexual Activity    Alcohol use: No    Drug use: No    Sexual activity: Not Currently     Birth control/protection: Surgical     Comment: single, RAMOS ov in situ    Social History Narrative         Social Determinants of Health     Financial Resource Strain: Low Risk  (1/7/2024)    Overall Financial Resource Strain (CARDIA)     Difficulty of Paying Living Expenses: Not very hard   Food Insecurity: No Food Insecurity (1/7/2024)    Hunger Vital Sign     Worried About Running Out of Food in the Last Year: Never true     Ran Out of Food in the Last Year: Never true   Transportation  "Needs: No Transportation Needs (1/7/2024)    PRAPARE - Transportation     Lack of Transportation (Medical): No     Lack of Transportation (Non-Medical): No   Physical Activity: Insufficiently Active (1/7/2024)    Exercise Vital Sign     Days of Exercise per Week: 1 day     Minutes of Exercise per Session: 20 min   Stress: No Stress Concern Present (1/7/2024)    [place] [employer] - Occupational Stress Questionnaire     Feeling of Stress : Only a little   Social Connections: Unknown (1/7/2024)    Social Connection and Isolation Panel [NHANES]     Frequency of Communication with Friends and Family: Three times a week     Frequency of Social Gatherings with Friends and Family: Once a week     Active Member of Clubs or Organizations: No     Attends Club or Organization Meetings: Never     Marital Status:    Housing Stability: Low Risk  (1/7/2024)    Housing Stability Vital Sign     Unable to Pay for Housing in the Last Year: No     Number of Places Lived in the Last Year: 1     Unstable Housing in the Last Year: No       OBJECTIVE:    BP 90/64 (BP Location: Right arm, Patient Position: Sitting, BP Method: Small (Automatic))   Pulse 101   Resp 12   Ht 5' 1.05" (1.551 m)   Wt 42.9 kg (94 lb 9.2 oz)   LMP  (LMP Unknown)   SpO2 100%   BMI 17.84 kg/m²     PHYSICAL EXAMINATION:    General appearance: Well appearing, in no acute distress, alert and oriented x3.  Psych:  Mood and affect appropriate.  Skin: Skin color, texture, turgor normal, no rashes or lesions, in both upper and lower body.  Head/face:  Atraumatic, normocephalic.  Cor: RRR  Pulm: Symmetric chest rise, no respiratory distress noted.   Back: Straight leg raise in the sitting position is negative for radicular pain. Limited ROM with pain throughout.   Extremities:  No deformities, edema, or skin discoloration. Good capillary refill.  Musculoskeletal: There is pain with palpation over bilateral SI joints. Positive BRIDGETTE on the " left. There is pain with palpation over coccyx. Bilateral lower extremity strength is normal and symmetric.  No atrophy or tone abnormalities are noted.  Neuro:  No loss of sensation is noted.  Gait: Normal.    ASSESSMENT: 63 y.o. year old female with pain, consistent with the followin. Lumbar radiculopathy  Ambulatory referral/consult to [facility]      2. DDD (degenerative disc disease), lumbar        3. Sacroiliac joint pain  Ambulatory referral/consult to [facility]      4. Pudendal neuralgia  Ambulatory referral/consult to [facility]      5. Chronic pain disorder            PLAN:     - Previous imaging was reviewed and discussed with the patient today. Labs reviewed.     - Consult [facility].     - I have stressed the importance of physical activity and a home exercise plan to help with pain and improve health.    - Continue acupuncture.     - Continue compound cream.     - Continue Gabapentin 100 mg in the morning and 400 mg at bedtime.     - RTC in 2 months or sooner if needed.     The above plan and management options were discussed at length with patient. Patient is in agreement with the above and verbalized understanding.    Niya Irene  2024

## 2024-01-24 ENCOUNTER — TELEPHONE (OUTPATIENT)
Dept: ENDOCRINOLOGY | Facility: CLINIC | Age: 64
End: 2024-01-24
Payer: COMMERCIAL

## 2024-01-24 DIAGNOSIS — E04.2 MULTINODULAR GOITER: Primary | ICD-10-CM

## 2024-02-04 DIAGNOSIS — Z78.0 MENOPAUSE: ICD-10-CM

## 2024-02-05 RX ORDER — ESTRADIOL 0.05 MG/D
1 FILM, EXTENDED RELEASE TRANSDERMAL
Qty: 24 PATCH | Refills: 0 | Status: SHIPPED | OUTPATIENT
Start: 2024-02-05 | End: 2024-03-13 | Stop reason: SDUPTHER

## 2024-02-10 ENCOUNTER — LAB VISIT (OUTPATIENT)
Dept: LAB | Facility: HOSPITAL | Age: 64
End: 2024-02-10
Attending: INTERNAL MEDICINE
Payer: COMMERCIAL

## 2024-02-10 DIAGNOSIS — I73.00 RAYNAUD'S DISEASE WITHOUT GANGRENE: ICD-10-CM

## 2024-02-10 DIAGNOSIS — M79.7 FIBROMYALGIA: ICD-10-CM

## 2024-02-10 DIAGNOSIS — R53.83 FATIGUE, UNSPECIFIED TYPE: ICD-10-CM

## 2024-02-10 DIAGNOSIS — I73.81 ERYTHROMELALGIA: ICD-10-CM

## 2024-02-10 DIAGNOSIS — M35.0B SJOGREN'S SYNDROME WITH VASCULITIS: ICD-10-CM

## 2024-02-10 LAB
ALBUMIN SERPL BCP-MCNC: 4 G/DL (ref 3.5–5.2)
ALP SERPL-CCNC: 69 U/L (ref 55–135)
ALT SERPL W/O P-5'-P-CCNC: 21 U/L (ref 10–44)
ANION GAP SERPL CALC-SCNC: 11 MMOL/L (ref 8–16)
AST SERPL-CCNC: 22 U/L (ref 10–40)
BASOPHILS # BLD AUTO: 0.07 K/UL (ref 0–0.2)
BASOPHILS NFR BLD: 1.5 % (ref 0–1.9)
BILIRUB SERPL-MCNC: 0.6 MG/DL (ref 0.1–1)
BUN SERPL-MCNC: 16 MG/DL (ref 8–23)
C3 SERPL-MCNC: 108 MG/DL (ref 50–180)
C4 SERPL-MCNC: 23 MG/DL (ref 11–44)
CALCIUM SERPL-MCNC: 10.1 MG/DL (ref 8.7–10.5)
CHLORIDE SERPL-SCNC: 98 MMOL/L (ref 95–110)
CK SERPL-CCNC: 38 U/L (ref 20–180)
CO2 SERPL-SCNC: 29 MMOL/L (ref 23–29)
CREAT SERPL-MCNC: 0.6 MG/DL (ref 0.5–1.4)
CRP SERPL-MCNC: 0.4 MG/L (ref 0–8.2)
DIFFERENTIAL METHOD BLD: ABNORMAL
EOSINOPHIL # BLD AUTO: 0.2 K/UL (ref 0–0.5)
EOSINOPHIL NFR BLD: 3.8 % (ref 0–8)
ERYTHROCYTE [DISTWIDTH] IN BLOOD BY AUTOMATED COUNT: 13.1 % (ref 11.5–14.5)
ERYTHROCYTE [SEDIMENTATION RATE] IN BLOOD BY PHOTOMETRIC METHOD: 13 MM/HR (ref 0–36)
EST. GFR  (NO RACE VARIABLE): >60 ML/MIN/1.73 M^2
GLUCOSE SERPL-MCNC: 87 MG/DL (ref 70–110)
HCT VFR BLD AUTO: 43.6 % (ref 37–48.5)
HGB BLD-MCNC: 14 G/DL (ref 12–16)
IMM GRANULOCYTES # BLD AUTO: 0.01 K/UL (ref 0–0.04)
IMM GRANULOCYTES NFR BLD AUTO: 0.2 % (ref 0–0.5)
LYMPHOCYTES # BLD AUTO: 0.6 K/UL (ref 1–4.8)
LYMPHOCYTES NFR BLD: 13.6 % (ref 18–48)
MCH RBC QN AUTO: 29.9 PG (ref 27–31)
MCHC RBC AUTO-ENTMCNC: 32.1 G/DL (ref 32–36)
MCV RBC AUTO: 93 FL (ref 82–98)
MONOCYTES # BLD AUTO: 0.4 K/UL (ref 0.3–1)
MONOCYTES NFR BLD: 7.4 % (ref 4–15)
NEUTROPHILS # BLD AUTO: 3.5 K/UL (ref 1.8–7.7)
NEUTROPHILS NFR BLD: 73.5 % (ref 38–73)
NRBC BLD-RTO: 0 /100 WBC
PLATELET # BLD AUTO: 217 K/UL (ref 150–450)
PMV BLD AUTO: 10.5 FL (ref 9.2–12.9)
POTASSIUM SERPL-SCNC: 4.2 MMOL/L (ref 3.5–5.1)
PROT SERPL-MCNC: 7.5 G/DL (ref 6–8.4)
RBC # BLD AUTO: 4.69 M/UL (ref 4–5.4)
SODIUM SERPL-SCNC: 138 MMOL/L (ref 136–145)
WBC # BLD AUTO: 4.72 K/UL (ref 3.9–12.7)

## 2024-02-10 PROCEDURE — 86160 COMPLEMENT ANTIGEN: CPT | Performed by: INTERNAL MEDICINE

## 2024-02-10 PROCEDURE — 86140 C-REACTIVE PROTEIN: CPT | Performed by: INTERNAL MEDICINE

## 2024-02-10 PROCEDURE — 82550 ASSAY OF CK (CPK): CPT | Performed by: INTERNAL MEDICINE

## 2024-02-10 PROCEDURE — 80053 COMPREHEN METABOLIC PANEL: CPT | Performed by: INTERNAL MEDICINE

## 2024-02-10 PROCEDURE — 85025 COMPLETE CBC W/AUTO DIFF WBC: CPT | Performed by: INTERNAL MEDICINE

## 2024-02-10 PROCEDURE — 86160 COMPLEMENT ANTIGEN: CPT | Mod: 59 | Performed by: INTERNAL MEDICINE

## 2024-02-10 PROCEDURE — 36415 COLL VENOUS BLD VENIPUNCTURE: CPT | Performed by: INTERNAL MEDICINE

## 2024-02-10 PROCEDURE — 86225 DNA ANTIBODY NATIVE: CPT | Performed by: INTERNAL MEDICINE

## 2024-02-10 PROCEDURE — 85652 RBC SED RATE AUTOMATED: CPT | Performed by: INTERNAL MEDICINE

## 2024-02-12 ENCOUNTER — PATIENT MESSAGE (OUTPATIENT)
Dept: RHEUMATOLOGY | Facility: CLINIC | Age: 64
End: 2024-02-12
Payer: COMMERCIAL

## 2024-02-12 LAB — DSDNA AB SER-ACNC: NORMAL [IU]/ML

## 2024-02-14 ENCOUNTER — PATIENT MESSAGE (OUTPATIENT)
Dept: INFECTIOUS DISEASES | Facility: HOSPITAL | Age: 64
End: 2024-02-14
Payer: COMMERCIAL

## 2024-02-14 ENCOUNTER — PATIENT MESSAGE (OUTPATIENT)
Dept: PAIN MEDICINE | Facility: CLINIC | Age: 64
End: 2024-02-14
Payer: COMMERCIAL

## 2024-03-07 DIAGNOSIS — M35.00 SJOGREN'S SYNDROME, WITH UNSPECIFIED ORGAN INVOLVEMENT: ICD-10-CM

## 2024-03-08 RX ORDER — HYDROXYCHLOROQUINE SULFATE 200 MG/1
TABLET, FILM COATED ORAL
Qty: 90 TABLET | Refills: 3 | Status: SHIPPED | OUTPATIENT
Start: 2024-03-08

## 2024-03-13 ENCOUNTER — TELEPHONE (OUTPATIENT)
Dept: ENDOSCOPY | Facility: HOSPITAL | Age: 64
End: 2024-03-13
Payer: COMMERCIAL

## 2024-03-13 ENCOUNTER — OFFICE VISIT (OUTPATIENT)
Dept: OBSTETRICS AND GYNECOLOGY | Facility: CLINIC | Age: 64
End: 2024-03-13
Attending: OBSTETRICS & GYNECOLOGY
Payer: COMMERCIAL

## 2024-03-13 ENCOUNTER — OFFICE VISIT (OUTPATIENT)
Dept: GASTROENTEROLOGY | Facility: CLINIC | Age: 64
End: 2024-03-13
Payer: COMMERCIAL

## 2024-03-13 ENCOUNTER — TELEPHONE (OUTPATIENT)
Dept: GASTROENTEROLOGY | Facility: CLINIC | Age: 64
End: 2024-03-13
Payer: COMMERCIAL

## 2024-03-13 VITALS
BODY MASS INDEX: 17.31 KG/M2 | SYSTOLIC BLOOD PRESSURE: 80 MMHG | HEIGHT: 61 IN | DIASTOLIC BLOOD PRESSURE: 50 MMHG | WEIGHT: 91.69 LBS

## 2024-03-13 VITALS
HEART RATE: 88 BPM | BODY MASS INDEX: 17.74 KG/M2 | HEIGHT: 61 IN | WEIGHT: 93.94 LBS | DIASTOLIC BLOOD PRESSURE: 75 MMHG | SYSTOLIC BLOOD PRESSURE: 106 MMHG

## 2024-03-13 DIAGNOSIS — E06.3 HYPOTHYROIDISM DUE TO HASHIMOTO'S THYROIDITIS: ICD-10-CM

## 2024-03-13 DIAGNOSIS — Z01.419 WELL FEMALE EXAM WITH ROUTINE GYNECOLOGICAL EXAM: Primary | ICD-10-CM

## 2024-03-13 DIAGNOSIS — M35.0B SJOGREN'S SYNDROME WITH VASCULITIS: ICD-10-CM

## 2024-03-13 DIAGNOSIS — Z78.0 MENOPAUSE: ICD-10-CM

## 2024-03-13 DIAGNOSIS — R10.13 EPIGASTRIC PAIN: ICD-10-CM

## 2024-03-13 DIAGNOSIS — R10.12 LEFT UPPER QUADRANT ABDOMINAL PAIN: ICD-10-CM

## 2024-03-13 DIAGNOSIS — E03.8 HYPOTHYROIDISM DUE TO HASHIMOTO'S THYROIDITIS: ICD-10-CM

## 2024-03-13 DIAGNOSIS — R10.13 EPIGASTRIC ABDOMINAL PAIN: Primary | ICD-10-CM

## 2024-03-13 DIAGNOSIS — Z12.31 ENCOUNTER FOR SCREENING MAMMOGRAM FOR MALIGNANT NEOPLASM OF BREAST: ICD-10-CM

## 2024-03-13 DIAGNOSIS — R10.9 ABDOMINAL PAIN, UNSPECIFIED ABDOMINAL LOCATION: Primary | ICD-10-CM

## 2024-03-13 DIAGNOSIS — N95.1 MENOPAUSE SYNDROME: ICD-10-CM

## 2024-03-13 PROCEDURE — 99204 OFFICE O/P NEW MOD 45 MIN: CPT | Mod: S$GLB,,, | Performed by: INTERNAL MEDICINE

## 2024-03-13 PROCEDURE — 1159F MED LIST DOCD IN RCRD: CPT | Mod: CPTII,S$GLB,, | Performed by: OBSTETRICS & GYNECOLOGY

## 2024-03-13 PROCEDURE — 3008F BODY MASS INDEX DOCD: CPT | Mod: CPTII,S$GLB,, | Performed by: OBSTETRICS & GYNECOLOGY

## 2024-03-13 PROCEDURE — 3008F BODY MASS INDEX DOCD: CPT | Mod: CPTII,S$GLB,, | Performed by: INTERNAL MEDICINE

## 2024-03-13 PROCEDURE — 1160F RVW MEDS BY RX/DR IN RCRD: CPT | Mod: CPTII,S$GLB,, | Performed by: OBSTETRICS & GYNECOLOGY

## 2024-03-13 PROCEDURE — 3078F DIAST BP <80 MM HG: CPT | Mod: CPTII,S$GLB,, | Performed by: OBSTETRICS & GYNECOLOGY

## 2024-03-13 PROCEDURE — 99396 PREV VISIT EST AGE 40-64: CPT | Mod: S$GLB,,, | Performed by: OBSTETRICS & GYNECOLOGY

## 2024-03-13 PROCEDURE — 99999 PR PBB SHADOW E&M-EST. PATIENT-LVL V: CPT | Mod: PBBFAC,,, | Performed by: INTERNAL MEDICINE

## 2024-03-13 PROCEDURE — 3074F SYST BP LT 130 MM HG: CPT | Mod: CPTII,S$GLB,, | Performed by: OBSTETRICS & GYNECOLOGY

## 2024-03-13 PROCEDURE — 3078F DIAST BP <80 MM HG: CPT | Mod: CPTII,S$GLB,, | Performed by: INTERNAL MEDICINE

## 2024-03-13 PROCEDURE — 3074F SYST BP LT 130 MM HG: CPT | Mod: CPTII,S$GLB,, | Performed by: INTERNAL MEDICINE

## 2024-03-13 PROCEDURE — 99999 PR PBB SHADOW E&M-EST. PATIENT-LVL V: CPT | Mod: PBBFAC,,, | Performed by: OBSTETRICS & GYNECOLOGY

## 2024-03-13 RX ORDER — NEOMYCIN SULFATE, POLYMYXIN B SULFATE AND DEXAMETHASONE 1; 3.5; 1 MG/ML; MG/ML; [USP'U]/ML
1 SUSPENSION OPHTHALMIC EVERY MORNING
COMMUNITY
Start: 2023-10-05

## 2024-03-13 RX ORDER — NEOMYCIN SULFATE, POLYMYXIN B SULFATE AND DEXAMETHASONE 3.5; 10000; 1 MG/G; [USP'U]/G; MG/G
OINTMENT OPHTHALMIC
COMMUNITY
Start: 2023-10-05

## 2024-03-13 RX ORDER — ESTRADIOL 0.05 MG/D
1 FILM, EXTENDED RELEASE TRANSDERMAL
Qty: 24 PATCH | Refills: 3 | Status: SHIPPED | OUTPATIENT
Start: 2024-03-14 | End: 2025-03-14

## 2024-03-13 RX ORDER — ESTRADIOL 10 UG/1
1 INSERT VAGINAL
COMMUNITY
Start: 2023-12-27 | End: 2024-03-13 | Stop reason: SDUPTHER

## 2024-03-13 RX ORDER — ESTRADIOL 10 UG/1
1 INSERT VAGINAL
Qty: 24 TABLET | Refills: 3 | Status: SHIPPED | OUTPATIENT
Start: 2024-03-14 | End: 2025-03-14

## 2024-03-13 NOTE — TELEPHONE ENCOUNTER
----- Message from Mynor Sandhu MD sent at 3/13/2024  2:37 PM CDT -----  GI MA team  Please schedule patient for 12 hour fasting blood work this Saturday across the street at the [facility] at 9:00 a.m. orders placed    Please schedule her next available CT enterography at the imaging center across the street from the [facility] [place] orders placed    Please have patient see endoscopy schedulers today for EGD for evaluation of abdominal pain referral placed    Please schedule patient telemedicine video visit follow-up with me 3 months

## 2024-03-13 NOTE — PROGRESS NOTES
[facility] Consultation Note    Reason for Consult:  The primary encounter diagnosis was Epigastric abdominal pain. A diagnosis of Left upper quadrant abdominal pain was also pertinent to this visit.    PCP:   Adam Lundberg   No address on file    Referring MD:  Self, Merrill  No address on file    Initial History of Present Illness (HPI):  This is a 64 y.o. female here for evaluation of intermittent epigastric right upper quadrant pain which started in March of 2021 last on average about 4 days ranges anywhere between a 1/10 to an 8/10 in intensity on a 10 point scale it has been coming probably every few months now maybe as often as every month pain is getting more significant nothing really seems to trigger it no change in diet no constipation or bowel movement changes affect it no fever no chills no shortness of breath no chest pain no unintentional weight loss she does have autoimmune disorders she has not interested in a colonoscopy currently she is willing to get an EGD CT scan and lab work she is followed closely by a rheumatologist not taking a lot NSAIDs no family history of esophageal or stomach cancer or small-bowel or colon cancer nobody with pancreatitis or pancreatic cancer nobody with ovarian uterine kidney bladder or ureter cancer although she thinks she has 2 uncles with unknown cancer she will try to figure out what those cancer types were.  No history of any trauma    Abdominal pain -as above  Reflux - no  Dysphagia - no   Bowel habits - normal  GI bleeding - none  NSAID usage - none    Interval HPI 03/13/2024:  The patient's last visit with me was on Visit date not found.      ROS:  Constitutional: No fevers, chills, No weight loss  ENT:  No heartburn no dysphagia no odynophagia no hoarseness  CV: No chest pain, no palpitation  Pulm: No cough, No shortness of breath, no wheezing  Ophtho: No vision changes  GI: see HPI  Derm: No rash, no itching  Heme: No  lymphadenopathy, No easy bruising  MSK: No significant arthritis, osteoporosis  : No dysuria, No hematuria  Endo: No hot or cold intolerance  Neuro: No syncope, No seizure, no strokes  Psych: No uncontrolled anxiety, No uncontrolled depression    Medical History:  has a past medical history of Asthma with allergic rhinitis, Bladder spasm, Dense breasts, Elevated antinuclear antibody (GUICHO) level, Erythromelalgia, Fibromyalgia, Ganglion cyst, Goiter, Headache(784.0), Hypothyroidism, Kidney stone, Osteoporosis, Pernio (2018), Raynaud's phenomenon, Rhinitis, Scoliosis, Sjogren's syndrome, Sleep disorder, Vitamin D deficiency disease, and Vulvodynia.    Surgical History:  has a past surgical history that includes Breast surgery; Sinus surgery; Cyst Removal; intradermal cyst ;  section; Hysterectomy; Injection of anesthetic agent around nerve (N/A, 2019); and Injection of anesthetic agent around nerve (N/A, 2019).    Family History: family history includes Adrenal disorder in her mother; Arthritis in her father and mother; Cancer in her brother and daughter; Coronary artery disease in her father; Diabetes in her father and mother; Fibromyalgia in her mother; Heart disease in her father; Hyperlipidemia in her brother and father; Hypertension in her father and mother; Hypothyroidism in her daughter; Kidney disease in her mother; Macular degeneration in her mother; Pneumonia in her mother; Polymyalgia rheumatica in her mother; Thyroid cancer in her daughter..     Social History:  reports that she has never smoked. She has never used smokeless tobacco. She reports that she does not drink alcohol and does not use drugs.    Review of patient's allergies indicates:  No Known Allergies    Medication List with Changes/Refills   Current Medications    AMITRIPTYLINE (ELAVIL) 10 MG TABLET    TAKE 2 TABLETS ONCE DAILY WITH THE 50 MG FOR A TOTAL OF 70 MG    AMITRIPTYLINE (ELAVIL) 50 MG TABLET    TAKE 1 TABLET NIGHTLY  WITH THE 10 MG AMITRIPTYLINE    AMITRIPTYLINE (ELAVIL) 50 MG TABLET    Take 1 tablet (50 mg total) by mouth every evening.    ASPIRIN (ECOTRIN) 81 MG EC TABLET    Take 81 mg by mouth once daily.    AZASITE 1 % DROP    INSTILL 1 DROP INTO LEFT EYE BID    BUMETANIDE (BUMEX) 0.5 MG TAB    Take 1 tablet (0.5 mg total) by mouth every Mon, Wed, Fri.    CLINDAMYCIN (CLEOCIN T) 1 % EXTERNAL SOLUTION    APPLY TOPICALLY TO THE AFFECTED AREA TWICE DAILY AS NEEDED FOR BREAKOUTS    DESONIDE (DESOWEN) 0.05 % CREAM    Apply topically 2 (two) times daily. To affected area in groin  x 1-2 wks then prn flares only    ESTRADIOL (VAGIFEM) 10 MCG TAB    Place 1 tablet (10 mcg total) vaginally twice a week.    ESTRADIOL 0.05 MG/24 HR TD PTSW (VIVELLE-DOT) 0.05 MG/24 HR    Place 1 patch onto the skin twice a week.    FLUOCINONIDE (LIDEX) 0.05 % EXTERNAL SOLUTION    AAA scalp qday - bid prn pruritus    FLUTICASONE (FLONASE) 50 MCG/ACTUATION NASAL SPRAY    1 spray by Each Nare route once daily.    GABAPENTIN (NEURONTIN) 100 MG CAPSULE    TAKE 1 CAPSULE IN THE MORNING AND 4 CAPSULES AT BEDTIME    HYDROXYCHLOROQUINE (PLAQUENIL) 200 MG TABLET    TAKE 1 TABLET DAILY    IMIQUIMOD (ALDARA) 5 % CREAM    Apply small amount to affected area on knee qhs x 4 weeks. Wash off in am. Stop when red/inflamed.    KETOCONAZOLE (NIZORAL) 2 % CREAM    Apply topically 2 (two) times daily. Apply daily to affected area    KETOPROFEN 10 % CRPK    APPLY UP TO 4.8 GRAMS TO PAINFUL AREAS UP TO FIVE TIMES DAILY. RUB IN WELL    LIDOCAINE (LIDODERM) 5 %    Place 1 patch onto the skin once daily. Remove & Discard patch within 12 hours or as directed by MD    LIDOCAINE-PRILOCAINE (EMLA) CREAM    Apply topically as needed.    LIOTHYRONINE (CYTOMEL) 5 MCG TAB    TAKE THREE AND ONE-HALF TABLETS ONCE DAILY    MAXITROL 3.5 MG/G-10,000 UNIT/G-0.1 % OINT    SMARTSI.25 Inch(es) Left Eye Every Evening    MAXITROL 3.5MG/ML-10,000 UNIT/ML-0.1 % DRPS    Place 1 drop into the left  "eye every morning.    METAXALONE (SKELAXIN) 800 MG TABLET    TAKE 1 TABLET TWICE A DAY AS NEEDED FOR SPASM    NIACINAMIDE 500 MG TAB    Take 1 tablet (500 mg total) by mouth 2 (two) times a day.    NURTEC 75 MG ODT    Take by mouth.    ONDANSETRON (ZOFRAN) 4 MG TABLET    Take 1 tablet (4 mg total) by mouth every 8 (eight) hours as needed for Nausea.    PILOCARPINE (SALAGEN) 5 MG TAB    Take 1 tablet (5 mg total) by mouth 3 (three) times daily as needed.    PREVIDENT 5000 BOOSTER PLUS 1.1 % PSTE        RESTASIS 0.05 % OPHTHALMIC EMULSION        RISEDRONATE 35 MG TBEC    TAKE 1 TABLET ONCE A WEEK    SUMATRIPTAN (IMITREX) 100 MG TABLET    Take 1 tablet (100 mg total) by mouth every 2 (two) hours as needed for Migraine.    TENS UNITS JOAO    1 Units by Misc.(Non-Drug; Combo Route) route 2 (two) times daily as needed (Back pain).    TRETINOIN (RETIN-A) 0.025 % GEL    Apply small amount to entire face qhs. Wash off qam and apply sunscreen.    UNABLE TO FIND    Apply 240 g topically as needed. Ketoprofen/cyclobenzaprine HCI/Lidocaine (lipomax) 10/2/5% Up to 5 times daily as needed for pain    ZOLPIDEM (AMBIEN) 10 MG TAB    TAKE 1 TABLET(10 MG) BY MOUTH EVERY NIGHT AS NEEDED         Objective Findings:    Vital Signs:  /75   Pulse 88   Ht 5' 1" (1.549 m)   Wt 42.6 kg (93 lb 14.7 oz)   LMP  (LMP Unknown)   BMI 17.75 kg/m²   Body mass index is 17.75 kg/m².    Physical Exam:  General Appearance: Well appearing in no acute distress  Eyes:    No scleral icterus  ENT:  No lesions or masses   Lungs: CTA bilaterally, no wheezes, no rhonchi, no rales  Heart:  S1, S2 normal, no murmurs heard  Abdomen:  Thin, Non distended, soft, no guarding, no rebound, no tenderness, no appreciated ascites, no bruits, no hepatosplenomegaly,  No CVA tenderness, no appreciated hernias, no Levine sign, no McBurney point tenderness  Musculoskeletal:  No major joint deformities  Skin: No petechiae or rash on exposed skin areas  Neurologic:  " Alert and oriented x4  Psychiatric:  Normal speech mentation and affect    Labs:  Lab Results   Component Value Date    WBC 4.72 02/10/2024    HGB 14.0 02/10/2024    HCT 43.6 02/10/2024     02/10/2024    CHOL 133 08/19/2023    TRIG 28 (L) 08/19/2023    HDL 80 (H) 08/19/2023    ALT 21 02/10/2024    AST 22 02/10/2024     02/10/2024    K 4.2 02/10/2024    CL 98 02/10/2024    CREATININE 0.6 02/10/2024    BUN 16 02/10/2024    CO2 29 02/10/2024    TSH 1.439 01/13/2024    HGBA1C 5.0 08/19/2023       Medical Decision Making:  Lab work reviewed  Lab work talk given  CT scan talk given  EGD colonoscopy talk given  Patient currently not interested in colonoscopy willing to get an EGD only right now      Assessment:  1. Epigastric abdominal pain    2. Left upper quadrant abdominal pain         Recommendations:  1. 12 hour fasting blood work  2. CT enterography close attention to the pancreas and small-bowel for evaluation of epigastric left upper quadrant pain  3. Referral to endoscopy schedulers for urgent EGD for evaluation epigastric left upper quadrant pain  4. If above evaluation is unrevealing will recommend right upper quadrant ultrasound and colonoscopy  5. Return GI clinic 2-3 months video visit follow-up to discuss results and to see if patient is willing to undergo screening optical colonoscopy     Follow up in about 3 months (around 6/13/2024).      Order summary:  Orders Placed This Encounter    CT Enterography Abd_Pelvis With Contrast    Comprehensive Metabolic Panel    Lipase    CBC Auto Differential    TISSUE TRANSGLUTAMINASE (TTG), IGA    IgA    Iron and TIBC    Ferritin    Vitamin B12    Vitamin D         Thank you so much for allowing me to participate in the care of Berenice Hayes    Mynor Sandhu MD    DISCLAIMER: This note was prepared with [employer] voice recognition transcription software. Garbled syntax, mangled or inadvertent pronouns, and other bizarre constructions may be attributed  to that software system. While efforts were made to correct any mistakes made by this voice recognition program, some errors and/or omissions may remain in the note that were missed when the note was originally created.

## 2024-03-13 NOTE — PATIENT INSTRUCTIONS
"Procedure: EGD    Diagnosis: Abdominal pain, epigastric pain, left upper quadrant pain    Procedure Timin-4 weeks    *If within 4 weeks selected, please percy as high priority*    *If greater than 12 weeks, please select "5-12 weeks" and delay sending until 3 months prior to requested date*    Provider: Myself    Location: No Preference    Additional Scheduling Information:  Please expedite with those added EGD slots    Prep Specifications:Standard prep    Is the patient taking a GLP-1 Agonist:no    Have you attached a patient to this message: yes   "

## 2024-03-13 NOTE — TELEPHONE ENCOUNTER
Spoke to Berenice to schedule procedure(s) Upper Endoscopy (EGD)       Physician to perform procedure(s) Dr. MARTA Sandhu  Date of Procedure (s) 04/12/24  Arrival Time 2:25 PM  Time of Procedure(s) 3:25 PM   Location of Procedure(s) [address]  Type of Rx Prep sent to patient: Other  Instructions provided to patient via [facility]    Patient was informed on the following information and verbalized understanding. Screening questionnaire reviewed with patient and complete. If procedure requires anesthesia, a responsible adult needs to be present to accompany the patient home, patient cannot drive after receiving anesthesia. Appointment details are tentative, especially check-in time. Patient will receive a prep-op call 7 days prior to confirm check-in time for procedure. If applicable the patient should contact their pharmacy to verify Rx for procedure prep is ready for pick-up. Patient was advised to call the scheduling department at [phone] if pharmacy states no Rx is available. Patient was advised to call the endoscopy scheduling department if any questions or concerns arise.      [facility] Scheduling Department

## 2024-03-13 NOTE — Clinical Note
GI MA team Please schedule patient for 12 hour fasting blood work this Saturday across the street at the [facility] at 9:00 a.m. orders placed  Please schedule her next available CT enterography at the imaging center across the street from the [facility] [place] orders placed  Please have patient see endoscopy schedulers today for EGD for evaluation of abdominal pain referral placed  Please schedule patient telemedicine video visit follow-up with me 3 months

## 2024-03-13 NOTE — PROGRESS NOTES
SUBJECTIVE:   64 y.o. female   for routine gyn exam. No LMP recorded (lmp unknown). Patient has had a hysterectomy..  She desires refill of Vagifem and vivelle patch for pelvic floor issues.          Past Medical History:   Diagnosis Date    Asthma with allergic rhinitis     Bladder spasm     Dense breasts     heterogeneously/fibrocystic disease    Elevated antinuclear antibody (GUICHO) level     Erythromelalgia     Fibromyalgia     Ganglion cyst     left toe    Goiter     MNG    Headache(784.0)     Hypothyroidism     Hashimoto with + Tg ab    Kidney stone     Osteoporosis     femoral neck    Pernio 2018    Raynaud's phenomenon     Rhinitis     Scoliosis     Sjogren's syndrome     Sleep disorder     type of Narcolepsy ( resolved)    Vitamin D deficiency disease     Vulvodynia      Past Surgical History:   Procedure Laterality Date    BREAST SURGERY      fibrocystic tumor removed     SECTION      2x    CYST REMOVAL      laparoscopic cyst on ovary    HYSTERECTOMY      INJECTION OF ANESTHETIC AGENT AROUND NERVE N/A 2019    Procedure: BLOCK, NERVE COCCYGEAL  1 OF 2  Pt. can drive herself home;  Surgeon: Monique Philip MD;  Location: [facility];  Service: Pain Management;  Laterality: N/A;    INJECTION OF ANESTHETIC AGENT AROUND NERVE N/A 2019    Procedure: BLOCK, NERVE COCCYGEAL  2 OF 2  Pt. can drive herself home;  Surgeon: Monique Philip MD;  Location: [facility];  Service: Pain Management;  Laterality: N/A;    intradermal cyst       removed from left index finger    SINUS SURGERY      2x     Social History     Socioeconomic History    Marital status:    Occupational History     Employer: [employer]   Tobacco Use    Smoking status: Never    Smokeless tobacco: Never   Substance and Sexual Activity    Alcohol use: No    Drug use: No    Sexual activity: Not Currently     Birth control/protection: Surgical     Comment: single, RAMOS ov in situ    Social History Narrative          Social Determinants of Health     Financial Resource Strain: Low Risk  (1/7/2024)    Overall Financial Resource Strain (CARDIA)     Difficulty of Paying Living Expenses: Not very hard   Food Insecurity: No Food Insecurity (1/7/2024)    Hunger Vital Sign     Worried About Running Out of Food in the Last Year: Never true     Ran Out of Food in the Last Year: Never true   Transportation Needs: No Transportation Needs (1/7/2024)    PRAPARE - Transportation     Lack of Transportation (Medical): No     Lack of Transportation (Non-Medical): No   Physical Activity: Insufficiently Active (1/7/2024)    Exercise Vital Sign     Days of Exercise per Week: 1 day     Minutes of Exercise per Session: 20 min   Stress: No Stress Concern Present (1/7/2024)    [employer] - Occupational Stress Questionnaire     Feeling of Stress : Only a little   Social Connections: Unknown (1/7/2024)    Social Connection and Isolation Panel [NHANES]     Frequency of Communication with Friends and Family: Three times a week     Frequency of Social Gatherings with Friends and Family: Once a week     Active Member of Clubs or Organizations: No     Attends Club or Organization Meetings: Never     Marital Status:    Housing Stability: Low Risk  (1/7/2024)    Housing Stability Vital Sign     Unable to Pay for Housing in the Last Year: No     Number of Places Lived in the Last Year: 1     Unstable Housing in the Last Year: No     Family History   Problem Relation Age of Onset    Diabetes Mother     Fibromyalgia Mother     Polymyalgia rheumatica Mother     Macular degeneration Mother     Arthritis Mother     Hypertension Mother     Kidney disease Mother     Pneumonia Mother     Adrenal disorder Mother         adrenal insufficiency    Coronary artery disease Father     Arthritis Father         osteoarthritis    Hypertension Father     Heart disease Father     Diabetes Father     Hyperlipidemia Father     Hyperlipidemia  Brother     Cancer Brother         oral    Thyroid cancer Daughter     Cancer Daughter         thyroid    Hypothyroidism Daughter     Breast cancer Neg Hx     Ovarian cancer Neg Hx     Colon cancer Neg Hx     Melanoma Neg Hx      OB History    Para Term  AB Living   2 2 2     2   SAB IAB Ectopic Multiple Live Births                  # Outcome Date GA Lbr Jarrod/2nd Weight Sex Delivery Anes PTL Lv   2 Term            1 Term                  Current Outpatient Medications   Medication Sig Dispense Refill    amitriptyline (ELAVIL) 10 MG tablet TAKE 2 TABLETS ONCE DAILY WITH THE 50 MG FOR A TOTAL OF 70  tablet 3    amitriptyline (ELAVIL) 50 MG tablet TAKE 1 TABLET NIGHTLY WITH THE 10 MG AMITRIPTYLINE 90 tablet 3    amitriptyline (ELAVIL) 50 MG tablet Take 1 tablet (50 mg total) by mouth every evening. 7 tablet 0    aspirin (ECOTRIN) 81 MG EC tablet Take 81 mg by mouth once daily.      AZASITE 1 % Drop INSTILL 1 DROP INTO LEFT EYE BID      clindamycin (CLEOCIN T) 1 % external solution APPLY TOPICALLY TO THE AFFECTED AREA TWICE DAILY AS NEEDED FOR BREAKOUTS 30 mL 3    desonide (DESOWEN) 0.05 % cream Apply topically 2 (two) times daily. To affected area in groin  x 1-2 wks then prn flares only 60 g 2    fluocinonide (LIDEX) 0.05 % external solution AAA scalp qday - bid prn pruritus 60 mL 3    fluticasone (FLONASE) 50 mcg/actuation nasal spray 1 spray by Each Nare route once daily. 3 Bottle 3    gabapentin (NEURONTIN) 100 MG capsule TAKE 1 CAPSULE IN THE MORNING AND 4 CAPSULES AT BEDTIME 450 capsule 3    hydroxychloroquine (PLAQUENIL) 200 mg tablet TAKE 1 TABLET DAILY 90 tablet 3    imiquimod (ALDARA) 5 % cream Apply small amount to affected area on knee qhs x 4 weeks. Wash off in am. Stop when red/inflamed. 24 packet 1    ketoconazole (NIZORAL) 2 % cream Apply topically 2 (two) times daily. Apply daily to affected area 30 g 0    ketoprofen 10 % CrPk APPLY UP TO 4.8 GRAMS TO PAINFUL AREAS UP TO FIVE TIMES  DAILY. RUB IN WELL      LIDOcaine (LIDODERM) 5 % Place 1 patch onto the skin once daily. Remove & Discard patch within 12 hours or as directed by MD 30 patch 1    LIDOcaine-prilocaine (EMLA) cream Apply topically as needed. 90 g 3    liothyronine (CYTOMEL) 5 MCG Tab TAKE THREE AND ONE-HALF TABLETS ONCE DAILY 315 tablet 3    metaxalone (SKELAXIN) 800 MG tablet TAKE 1 TABLET TWICE A DAY AS NEEDED FOR SPASM 180 tablet 3    niacinamide 500 mg Tab Take 1 tablet (500 mg total) by mouth 2 (two) times a day. 180 tablet 2    NURTEC 75 mg odt Take by mouth.      ondansetron (ZOFRAN) 4 MG tablet Take 1 tablet (4 mg total) by mouth every 8 (eight) hours as needed for Nausea. 30 tablet 0    pilocarpine (SALAGEN) 5 MG Tab Take 1 tablet (5 mg total) by mouth 3 (three) times daily as needed. 270 tablet 3    PREVIDENT 5000 BOOSTER PLUS 1.1 % Pste       RESTASIS 0.05 % ophthalmic emulsion       risedronate 35 mg TbEC TAKE 1 TABLET ONCE A WEEK 12 tablet 3    TENS units Candida 1 Units by Misc.(Non-Drug; Combo Route) route 2 (two) times daily as needed (Back pain). 1 each 1    tretinoin (RETIN-A) 0.025 % gel Apply small amount to entire face qhs. Wash off qam and apply sunscreen. 45 g 3    UNABLE TO FIND Apply 240 g topically as needed. Ketoprofen/cyclobenzaprine HCI/Lidocaine (lipomax) 10/2/5% Up to 5 times daily as needed for pain  99    zolpidem (AMBIEN) 10 mg Tab TAKE 1 TABLET(10 MG) BY MOUTH EVERY NIGHT AS NEEDED 30 tablet 2    bumetanide (BUMEX) 0.5 MG Tab Take 1 tablet (0.5 mg total) by mouth every Mon, Wed, Fri. 90 tablet 3    [START ON 3/14/2024] estradioL (VAGIFEM) 10 mcg Tab Place 1 tablet (10 mcg total) vaginally twice a week. 24 tablet 3    [START ON 3/14/2024] estradiol 0.05 mg/24 hr td ptsw (VIVELLE-DOT) 0.05 mg/24 hr Place 1 patch onto the skin twice a week. 24 patch 3    sumatriptan (IMITREX) 100 MG tablet Take 1 tablet (100 mg total) by mouth every 2 (two) hours as needed for Migraine. 9 tablet 1     No current  "facility-administered medications for this visit.     Allergies: Patient has no known allergies.     ROS:  Constitutional: no weight loss, weight gain, fever, fatigue  Eyes:  No vision changes, glasses/contacts  ENT/Mouth: No ulcers, sinus problems, ears ringing, headache  Cardiovascular: No inability to lie flat, chest pain, exercise intolerance, swelling, heart palpitations  Respiratory: No wheezing, coughing blood, shortness of breath, or cough  Gastrointestinal: No diarrhea, bloody stool, nausea/vomiting, constipation, gas, hemorrhoids  Genitourinary: No blood in urine, painful urination, urgency of urination, frequency of urination, incomplete emptying, incontinence, abnormal bleeding, painful periods, heavy periods, vaginal discharge, vaginal odor, painful intercourse, sexual problems, bleeding after intercourse.  Musculoskeletal: No muscle weakness  Skin/Breast: No painful breasts, nipple discharge, masses, rash, ulcers  Neurological: No passing out, seizures, numbness, headache  Endocrine: No diabetes, hypothyroid, hyperthyroid, hot flashes, hair loss, abnormal hair growth, acne  Psychiatric: No depression, crying  Hematologic: No bruises, bleeding, swollen lymph nodes, anemia.      OBJECTIVE:   The patient appears well, alert, oriented x 3, in no distress.  BP (!) 80/50 (BP Location: Right arm, Patient Position: Sitting, BP Method: Medium (Manual))   Ht 5' 1" (1.549 m)   Wt 41.6 kg (91 lb 11.4 oz)   LMP  (LMP Unknown)   BMI 17.33 kg/m²   NECK: no thyromegaly, trachea midline  SKIN: no acne, striae, hirsutism  CHEST: CTAB  CV: RRR  BREAST EXAM: breasts appear normal, no suspicious masses, no skin or nipple changes or axillary nodes  ABDOMEN: no hernias, masses, or hepatosplenomegaly  GENITALIA: normal external genitalia, no erythema, no discharge  URETHRA: normal urethra, normal urethral meatus  VAGINA: Normal  CERVIX: absent  UTERUS: absent  ADNEXA: no mass, fullness, tenderness      ASSESSMENT:   1. " Well female exam with routine gynecological exam        2. Encounter for screening mammogram for malignant neoplasm of breast  Mammo Digital Screening Bilat w/ Be      3. Menopause syndrome        4. Menopause  estradiol 0.05 mg/24 hr td ptsw (VIVELLE-DOT) 0.05 mg/24 hr          PLAN:   Orders Placed This Encounter    Mammo Digital Screening Bilat w/ Be    estradiol 0.05 mg/24 hr td ptsw (VIVELLE-DOT) 0.05 mg/24 hr    estradioL (VAGIFEM) 10 mcg Tab     Discussed ERT, WHI, healthy lifestyle including regular exercise, healthy eating, etc.  Return to clinic in 1 year

## 2024-03-13 NOTE — PROGRESS NOTES
"GENERAL GI PATIENT INTAKE:    COVID symptoms in the last 7 days (runny nose, sore throat, congestion, cough, fever): No  PCP: Adam Lundberg  If not PCP-  number given to establish [phone]: N/A    ALLERGIES REVIEWED:  Yes    CHIEF COMPLAINT:    Chief Complaint   Patient presents with    GI Problem     Burning Sensation Stomach January  Pressure sensation on abdomen         VITAL SIGNS:  /75   Pulse 88   Ht 5' 1" (1.549 m)   Wt 42.6 kg (93 lb 14.7 oz)   LMP  (LMP Unknown)   BMI 17.75 kg/m²      Change in medical, surgical, family or social history: No      REVIEWED MEDICATION LIST RECONCILED INCLUDING ABOVE MEDS:  Yes     "

## 2024-03-13 NOTE — TELEPHONE ENCOUNTER
"----- Message from Mynor Sandhu MD sent at 3/13/2024  2:38 PM CDT -----  Procedure: EGD    Diagnosis: Abdominal pain, epigastric pain, left upper quadrant pain    Procedure Timin-4 weeks    #If within 4 weeks selected, please percy as high priority#    #If greater than 12 weeks, please select "5-12 weeks" and delay sending until 3 months prior to requested date#    Provider: Myself    Location: No Preference    Additional Scheduling Information:  Please expedite with those added EGD slots    Prep Specifications:Standard prep    Is the patient taking a GLP-1 Agonist:no    Have you attached a patient to this message: yes   "

## 2024-03-13 NOTE — TELEPHONE ENCOUNTER
Patient will call to schedule CT once she looks at her work schedule. She is aware she will need to fast 4 hours prior to the appointment time and be [facility] one hour prior to the appointment time.   Emy

## 2024-03-14 ENCOUNTER — PATIENT MESSAGE (OUTPATIENT)
Dept: GASTROENTEROLOGY | Facility: CLINIC | Age: 64
End: 2024-03-14
Payer: COMMERCIAL

## 2024-03-16 ENCOUNTER — LAB VISIT (OUTPATIENT)
Dept: LAB | Facility: HOSPITAL | Age: 64
End: 2024-03-16
Attending: INTERNAL MEDICINE
Payer: COMMERCIAL

## 2024-03-16 DIAGNOSIS — M79.7 FIBROMYALGIA: ICD-10-CM

## 2024-03-16 DIAGNOSIS — R10.12 LEFT UPPER QUADRANT ABDOMINAL PAIN: ICD-10-CM

## 2024-03-16 DIAGNOSIS — I73.81 ERYTHROMELALGIA: ICD-10-CM

## 2024-03-16 DIAGNOSIS — I73.00 RAYNAUD'S DISEASE WITHOUT GANGRENE: ICD-10-CM

## 2024-03-16 DIAGNOSIS — M35.0B SJOGREN'S SYNDROME WITH VASCULITIS: ICD-10-CM

## 2024-03-16 DIAGNOSIS — R53.83 FATIGUE, UNSPECIFIED TYPE: ICD-10-CM

## 2024-03-16 DIAGNOSIS — R10.13 EPIGASTRIC ABDOMINAL PAIN: ICD-10-CM

## 2024-03-16 LAB
25(OH)D3+25(OH)D2 SERPL-MCNC: 68 NG/ML (ref 30–96)
ALBUMIN SERPL BCP-MCNC: 4.2 G/DL (ref 3.5–5.2)
ALBUMIN SERPL BCP-MCNC: 4.2 G/DL (ref 3.5–5.2)
ALP SERPL-CCNC: 65 U/L (ref 55–135)
ALP SERPL-CCNC: 65 U/L (ref 55–135)
ALT SERPL W/O P-5'-P-CCNC: 27 U/L (ref 10–44)
ALT SERPL W/O P-5'-P-CCNC: 27 U/L (ref 10–44)
ANION GAP SERPL CALC-SCNC: 9 MMOL/L (ref 8–16)
ANION GAP SERPL CALC-SCNC: 9 MMOL/L (ref 8–16)
AST SERPL-CCNC: 24 U/L (ref 10–40)
AST SERPL-CCNC: 24 U/L (ref 10–40)
BASOPHILS # BLD AUTO: 0.05 K/UL (ref 0–0.2)
BASOPHILS # BLD AUTO: 0.05 K/UL (ref 0–0.2)
BASOPHILS NFR BLD: 0.6 % (ref 0–1.9)
BASOPHILS NFR BLD: 0.6 % (ref 0–1.9)
BILIRUB SERPL-MCNC: 0.5 MG/DL (ref 0.1–1)
BILIRUB SERPL-MCNC: 0.5 MG/DL (ref 0.1–1)
BUN SERPL-MCNC: 13 MG/DL (ref 8–23)
BUN SERPL-MCNC: 13 MG/DL (ref 8–23)
C3 SERPL-MCNC: 113 MG/DL (ref 50–180)
C4 SERPL-MCNC: 23 MG/DL (ref 11–44)
CALCIUM SERPL-MCNC: 10 MG/DL (ref 8.7–10.5)
CALCIUM SERPL-MCNC: 10 MG/DL (ref 8.7–10.5)
CHLORIDE SERPL-SCNC: 102 MMOL/L (ref 95–110)
CHLORIDE SERPL-SCNC: 102 MMOL/L (ref 95–110)
CK SERPL-CCNC: 36 U/L (ref 20–180)
CO2 SERPL-SCNC: 29 MMOL/L (ref 23–29)
CO2 SERPL-SCNC: 29 MMOL/L (ref 23–29)
CREAT SERPL-MCNC: 0.6 MG/DL (ref 0.5–1.4)
CREAT SERPL-MCNC: 0.6 MG/DL (ref 0.5–1.4)
CRP SERPL-MCNC: 1.7 MG/L (ref 0–8.2)
DIFFERENTIAL METHOD BLD: ABNORMAL
DIFFERENTIAL METHOD BLD: ABNORMAL
EOSINOPHIL # BLD AUTO: 0.1 K/UL (ref 0–0.5)
EOSINOPHIL # BLD AUTO: 0.1 K/UL (ref 0–0.5)
EOSINOPHIL NFR BLD: 1.6 % (ref 0–8)
EOSINOPHIL NFR BLD: 1.6 % (ref 0–8)
ERYTHROCYTE [DISTWIDTH] IN BLOOD BY AUTOMATED COUNT: 13.2 % (ref 11.5–14.5)
ERYTHROCYTE [DISTWIDTH] IN BLOOD BY AUTOMATED COUNT: 13.2 % (ref 11.5–14.5)
ERYTHROCYTE [SEDIMENTATION RATE] IN BLOOD BY PHOTOMETRIC METHOD: 4 MM/HR (ref 0–36)
EST. GFR  (NO RACE VARIABLE): >60 ML/MIN/1.73 M^2
EST. GFR  (NO RACE VARIABLE): >60 ML/MIN/1.73 M^2
FERRITIN SERPL-MCNC: 22 NG/ML (ref 20–300)
GLUCOSE SERPL-MCNC: 90 MG/DL (ref 70–110)
GLUCOSE SERPL-MCNC: 90 MG/DL (ref 70–110)
HCT VFR BLD AUTO: 43.6 % (ref 37–48.5)
HCT VFR BLD AUTO: 43.6 % (ref 37–48.5)
HGB BLD-MCNC: 14 G/DL (ref 12–16)
HGB BLD-MCNC: 14 G/DL (ref 12–16)
IGA SERPL-MCNC: 129 MG/DL (ref 40–350)
IMM GRANULOCYTES # BLD AUTO: 0.04 K/UL (ref 0–0.04)
IMM GRANULOCYTES # BLD AUTO: 0.04 K/UL (ref 0–0.04)
IMM GRANULOCYTES NFR BLD AUTO: 0.4 % (ref 0–0.5)
IMM GRANULOCYTES NFR BLD AUTO: 0.4 % (ref 0–0.5)
IRON SERPL-MCNC: 96 UG/DL (ref 30–160)
LIPASE SERPL-CCNC: 28 U/L (ref 4–60)
LYMPHOCYTES # BLD AUTO: 0.7 K/UL (ref 1–4.8)
LYMPHOCYTES # BLD AUTO: 0.7 K/UL (ref 1–4.8)
LYMPHOCYTES NFR BLD: 8 % (ref 18–48)
LYMPHOCYTES NFR BLD: 8 % (ref 18–48)
MCH RBC QN AUTO: 30.2 PG (ref 27–31)
MCH RBC QN AUTO: 30.2 PG (ref 27–31)
MCHC RBC AUTO-ENTMCNC: 32.1 G/DL (ref 32–36)
MCHC RBC AUTO-ENTMCNC: 32.1 G/DL (ref 32–36)
MCV RBC AUTO: 94 FL (ref 82–98)
MCV RBC AUTO: 94 FL (ref 82–98)
MONOCYTES # BLD AUTO: 0.6 K/UL (ref 0.3–1)
MONOCYTES # BLD AUTO: 0.6 K/UL (ref 0.3–1)
MONOCYTES NFR BLD: 6.6 % (ref 4–15)
MONOCYTES NFR BLD: 6.6 % (ref 4–15)
NEUTROPHILS # BLD AUTO: 7.4 K/UL (ref 1.8–7.7)
NEUTROPHILS # BLD AUTO: 7.4 K/UL (ref 1.8–7.7)
NEUTROPHILS NFR BLD: 82.8 % (ref 38–73)
NEUTROPHILS NFR BLD: 82.8 % (ref 38–73)
NRBC BLD-RTO: 0 /100 WBC
NRBC BLD-RTO: 0 /100 WBC
PLATELET # BLD AUTO: 199 K/UL (ref 150–450)
PLATELET # BLD AUTO: 199 K/UL (ref 150–450)
PMV BLD AUTO: 10.2 FL (ref 9.2–12.9)
PMV BLD AUTO: 10.2 FL (ref 9.2–12.9)
POTASSIUM SERPL-SCNC: 4.7 MMOL/L (ref 3.5–5.1)
POTASSIUM SERPL-SCNC: 4.7 MMOL/L (ref 3.5–5.1)
PROT SERPL-MCNC: 7.2 G/DL (ref 6–8.4)
PROT SERPL-MCNC: 7.2 G/DL (ref 6–8.4)
RBC # BLD AUTO: 4.63 M/UL (ref 4–5.4)
RBC # BLD AUTO: 4.63 M/UL (ref 4–5.4)
SATURATED IRON: 22 % (ref 20–50)
SODIUM SERPL-SCNC: 140 MMOL/L (ref 136–145)
SODIUM SERPL-SCNC: 140 MMOL/L (ref 136–145)
TOTAL IRON BINDING CAPACITY: 429 UG/DL (ref 250–450)
TRANSFERRIN SERPL-MCNC: 290 MG/DL (ref 200–375)
VIT B12 SERPL-MCNC: 1170 PG/ML (ref 210–950)
WBC # BLD AUTO: 8.97 K/UL (ref 3.9–12.7)
WBC # BLD AUTO: 8.97 K/UL (ref 3.9–12.7)

## 2024-03-16 PROCEDURE — 36415 COLL VENOUS BLD VENIPUNCTURE: CPT | Performed by: INTERNAL MEDICINE

## 2024-03-16 PROCEDURE — 82728 ASSAY OF FERRITIN: CPT | Performed by: INTERNAL MEDICINE

## 2024-03-16 PROCEDURE — 82306 VITAMIN D 25 HYDROXY: CPT | Performed by: INTERNAL MEDICINE

## 2024-03-16 PROCEDURE — 82607 VITAMIN B-12: CPT | Performed by: INTERNAL MEDICINE

## 2024-03-16 PROCEDURE — 86225 DNA ANTIBODY NATIVE: CPT | Performed by: INTERNAL MEDICINE

## 2024-03-16 PROCEDURE — 80053 COMPREHEN METABOLIC PANEL: CPT | Performed by: INTERNAL MEDICINE

## 2024-03-16 PROCEDURE — 85025 COMPLETE CBC W/AUTO DIFF WBC: CPT | Performed by: INTERNAL MEDICINE

## 2024-03-16 PROCEDURE — 86160 COMPLEMENT ANTIGEN: CPT | Performed by: INTERNAL MEDICINE

## 2024-03-16 PROCEDURE — 83690 ASSAY OF LIPASE: CPT | Performed by: INTERNAL MEDICINE

## 2024-03-16 PROCEDURE — 86140 C-REACTIVE PROTEIN: CPT | Performed by: INTERNAL MEDICINE

## 2024-03-16 PROCEDURE — 83540 ASSAY OF IRON: CPT | Performed by: INTERNAL MEDICINE

## 2024-03-16 PROCEDURE — 86364 TISS TRNSGLTMNASE EA IG CLAS: CPT | Performed by: INTERNAL MEDICINE

## 2024-03-16 PROCEDURE — 82784 ASSAY IGA/IGD/IGG/IGM EACH: CPT | Performed by: INTERNAL MEDICINE

## 2024-03-16 PROCEDURE — 82550 ASSAY OF CK (CPK): CPT | Performed by: INTERNAL MEDICINE

## 2024-03-16 PROCEDURE — 86160 COMPLEMENT ANTIGEN: CPT | Mod: 59 | Performed by: INTERNAL MEDICINE

## 2024-03-16 PROCEDURE — 85652 RBC SED RATE AUTOMATED: CPT | Performed by: INTERNAL MEDICINE

## 2024-03-17 ENCOUNTER — PATIENT MESSAGE (OUTPATIENT)
Dept: GASTROENTEROLOGY | Facility: CLINIC | Age: 64
End: 2024-03-17
Payer: COMMERCIAL

## 2024-03-17 DIAGNOSIS — R79.89 ABNORMAL CBC MEASUREMENT: Primary | ICD-10-CM

## 2024-03-17 NOTE — PROGRESS NOTES
Important:    Nasreen please schedule Berenice for clean-catch mid stream urine and urine culture this week for evaluation of a normal white count but mildly increase neutrophils which could be a sign of an infection that she may not be aware of orders have been placed    Looks like your CBC shows a little bit of elevated neutrophils over the last several months very nonspecific maybe worthwhile checking a urine clean-catch mid stream with urine culture may also be of interest for you if you think you have an infection somewhere else to discuss with your primary care doctor.

## 2024-03-18 ENCOUNTER — PATIENT MESSAGE (OUTPATIENT)
Dept: RHEUMATOLOGY | Facility: CLINIC | Age: 64
End: 2024-03-18
Payer: COMMERCIAL

## 2024-03-18 LAB — DSDNA AB SER-ACNC: NORMAL [IU]/ML

## 2024-03-20 LAB — TTG IGA SER-ACNC: 0.3 U/ML

## 2024-03-24 ENCOUNTER — PATIENT MESSAGE (OUTPATIENT)
Dept: RHEUMATOLOGY | Facility: CLINIC | Age: 64
End: 2024-03-24
Payer: COMMERCIAL

## 2024-03-28 ENCOUNTER — INFUSION (OUTPATIENT)
Dept: INFECTIOUS DISEASES | Facility: HOSPITAL | Age: 64
End: 2024-03-28
Payer: COMMERCIAL

## 2024-03-28 VITALS
HEART RATE: 83 BPM | OXYGEN SATURATION: 100 % | WEIGHT: 93.25 LBS | RESPIRATION RATE: 16 BRPM | HEIGHT: 61 IN | SYSTOLIC BLOOD PRESSURE: 117 MMHG | TEMPERATURE: 98 F | BODY MASS INDEX: 17.61 KG/M2 | DIASTOLIC BLOOD PRESSURE: 69 MMHG

## 2024-03-28 DIAGNOSIS — M81.0 OSTEOPOROSIS, POSTMENOPAUSAL: Primary | ICD-10-CM

## 2024-03-28 PROCEDURE — 63600175 PHARM REV CODE 636 W HCPCS: Mod: JZ,JG | Performed by: INTERNAL MEDICINE

## 2024-03-28 PROCEDURE — 96372 THER/PROPH/DIAG INJ SC/IM: CPT

## 2024-03-28 RX ADMIN — DENOSUMAB 60 MG: 60 INJECTION SUBCUTANEOUS at 04:03

## 2024-03-28 NOTE — PROGRESS NOTES
Pt arrived to infusion clinic for her first prolia injection. Pt states taking calcium/vitamin D. Pt denies any dental procedures.  Instructed pt to wait 15 minutes to be observed. Pt verbalized understanding.    NAD noted at this time.

## 2024-04-01 DIAGNOSIS — R10.12 LEFT UPPER QUADRANT PAIN: ICD-10-CM

## 2024-04-01 DIAGNOSIS — R10.13 EPIGASTRIC PAIN: Primary | ICD-10-CM

## 2024-04-12 ENCOUNTER — HOSPITAL ENCOUNTER (OUTPATIENT)
Facility: HOSPITAL | Age: 64
Discharge: HOME | End: 2024-04-12
Attending: INTERNAL MEDICINE | Admitting: INTERNAL MEDICINE
Payer: COMMERCIAL

## 2024-04-12 ENCOUNTER — TELEPHONE (OUTPATIENT)
Dept: ENDOSCOPY | Facility: HOSPITAL | Age: 64
End: 2024-04-12

## 2024-04-12 ENCOUNTER — ANESTHESIA EVENT (OUTPATIENT)
Dept: ENDOSCOPY | Facility: HOSPITAL | Age: 64
End: 2024-04-12
Payer: COMMERCIAL

## 2024-04-12 ENCOUNTER — ANESTHESIA (OUTPATIENT)
Dept: ENDOSCOPY | Facility: HOSPITAL | Age: 64
End: 2024-04-12
Payer: COMMERCIAL

## 2024-04-12 VITALS
RESPIRATION RATE: 16 BRPM | OXYGEN SATURATION: 100 % | BODY MASS INDEX: 17.75 KG/M2 | WEIGHT: 94 LBS | SYSTOLIC BLOOD PRESSURE: 99 MMHG | HEIGHT: 61 IN | DIASTOLIC BLOOD PRESSURE: 60 MMHG | HEART RATE: 75 BPM | TEMPERATURE: 98 F

## 2024-04-12 DIAGNOSIS — K29.70 GASTRITIS, PRESENCE OF BLEEDING UNSPECIFIED, UNSPECIFIED CHRONICITY, UNSPECIFIED GASTRITIS TYPE: Primary | ICD-10-CM

## 2024-04-12 PROCEDURE — 88305 TISSUE EXAM BY PATHOLOGIST: CPT | Mod: 26,,, | Performed by: PATHOLOGY

## 2024-04-12 PROCEDURE — 88305 TISSUE EXAM BY PATHOLOGIST: CPT | Performed by: PATHOLOGY

## 2024-04-12 PROCEDURE — E9220 PRA ENDO ANESTHESIA: HCPCS | Mod: ,,, | Performed by: NURSE ANESTHETIST, CERTIFIED REGISTERED

## 2024-04-12 PROCEDURE — 27201012 HC FORCEPS, HOT/COLD, DISP: Performed by: INTERNAL MEDICINE

## 2024-04-12 PROCEDURE — 37000008 HC ANESTHESIA 1ST 15 MINUTES: Performed by: INTERNAL MEDICINE

## 2024-04-12 PROCEDURE — 43239 EGD BIOPSY SINGLE/MULTIPLE: CPT | Performed by: INTERNAL MEDICINE

## 2024-04-12 PROCEDURE — 63600175 PHARM REV CODE 636 W HCPCS: Performed by: NURSE ANESTHETIST, CERTIFIED REGISTERED

## 2024-04-12 PROCEDURE — 25000003 PHARM REV CODE 250: Performed by: NURSE ANESTHETIST, CERTIFIED REGISTERED

## 2024-04-12 PROCEDURE — 43239 EGD BIOPSY SINGLE/MULTIPLE: CPT | Mod: ,,, | Performed by: INTERNAL MEDICINE

## 2024-04-12 PROCEDURE — 37000009 HC ANESTHESIA EA ADD 15 MINS: Performed by: INTERNAL MEDICINE

## 2024-04-12 RX ORDER — PANTOPRAZOLE SODIUM 40 MG/1
40 TABLET, DELAYED RELEASE ORAL
Qty: 90 TABLET | Refills: 3 | Status: SHIPPED | OUTPATIENT
Start: 2024-04-12 | End: 2025-04-12

## 2024-04-12 RX ORDER — PROPOFOL 10 MG/ML
VIAL (ML) INTRAVENOUS CONTINUOUS PRN
Status: DISCONTINUED | OUTPATIENT
Start: 2024-04-12 | End: 2024-04-12

## 2024-04-12 RX ORDER — PROPOFOL 10 MG/ML
VIAL (ML) INTRAVENOUS
Status: DISCONTINUED | OUTPATIENT
Start: 2024-04-12 | End: 2024-04-12

## 2024-04-12 RX ORDER — SODIUM CHLORIDE 9 MG/ML
INJECTION, SOLUTION INTRAVENOUS CONTINUOUS
Status: DISCONTINUED | OUTPATIENT
Start: 2024-04-12 | End: 2024-04-12 | Stop reason: HOSPADM

## 2024-04-12 RX ORDER — LIDOCAINE HYDROCHLORIDE 20 MG/ML
INJECTION INTRAVENOUS
Status: DISCONTINUED | OUTPATIENT
Start: 2024-04-12 | End: 2024-04-12

## 2024-04-12 RX ADMIN — PROPOFOL 40 MG: 10 INJECTION, EMULSION INTRAVENOUS at 03:04

## 2024-04-12 RX ADMIN — LIDOCAINE HYDROCHLORIDE 40 MG: 20 INJECTION INTRAVENOUS at 03:04

## 2024-04-12 RX ADMIN — PROPOFOL 150 MCG/KG/MIN: 10 INJECTION, EMULSION INTRAVENOUS at 03:04

## 2024-04-12 RX ADMIN — SODIUM CHLORIDE: 0.9 INJECTION, SOLUTION INTRAVENOUS at 03:04

## 2024-04-12 NOTE — H&P
[facility] 4[facility]  History & Physical    Subjective:      Chief Complaint/Reason for Admission:     Procedure: EGD     Diagnosis: Abdominal pain, epigastric pain, left upper quadrant pain       Berenice Hayes is a 64 y.o. female.    Past Medical History:   Diagnosis Date    Asthma with allergic rhinitis     Bladder spasm     Dense breasts     heterogeneously/fibrocystic disease    Elevated antinuclear antibody (GUICHO) level     Erythromelalgia     Fibromyalgia     Ganglion cyst     left toe    Goiter     MNG    Headache(784.0)     Hypothyroidism     Hashimoto with + Tg ab    Kidney stone     Osteoporosis     femoral neck    Pernio 2018    Raynaud's phenomenon     Rhinitis     Scoliosis     Sjogren's syndrome     Sleep disorder     type of Narcolepsy ( resolved)    Vitamin D deficiency disease     Vulvodynia      Past Surgical History:   Procedure Laterality Date    BREAST SURGERY      fibrocystic tumor removed     SECTION      2x    CYST REMOVAL      laparoscopic cyst on ovary    HYSTERECTOMY      INJECTION OF ANESTHETIC AGENT AROUND NERVE N/A 2019    Procedure: BLOCK, NERVE COCCYGEAL  1 OF 2  Pt. can drive herself home;  Surgeon: Monique Philip MD;  Location: [facility] PAIN [facility]GT;  Service: Pain Management;  Laterality: N/A;    INJECTION OF ANESTHETIC AGENT AROUND NERVE N/A 2019    Procedure: BLOCK, NERVE COCCYGEAL  2 OF 2  Pt. can drive herself home;  Surgeon: Monique Philip MD;  Location: [facility]IN [facility];  Service: Pain Management;  Laterality: N/A;    intradermal cyst       removed from left index finger    SINUS SURGERY      2x     Social History     Tobacco Use    Smoking status: Never    Smokeless tobacco: Never   Substance Use Topics    Alcohol use: No    Drug use: No       PTA Medications   Medication Sig    amitriptyline (ELAVIL) 10 MG tablet TAKE 2 TABLETS ONCE DAILY WITH THE 50 MG FOR A TOTAL OF 70 MG    amitriptyline (ELAVIL) 50 MG tablet TAKE 1 TABLET NIGHTLY WITH THE 10 MG  AMITRIPTYLINE    amitriptyline (ELAVIL) 50 MG tablet Take 1 tablet (50 mg total) by mouth every evening.    estradioL (VAGIFEM) 10 mcg Tab Place 1 tablet (10 mcg total) vaginally twice a week.    estradiol 0.05 mg/24 hr td ptsw (VIVELLE-DOT) 0.05 mg/24 hr Place 1 patch onto the skin twice a week.    gabapentin (NEURONTIN) 100 MG capsule TAKE 1 CAPSULE IN THE MORNING AND 4 CAPSULES AT BEDTIME    hydroxychloroquine (PLAQUENIL) 200 mg tablet TAKE 1 TABLET DAILY    liothyronine (CYTOMEL) 5 MCG Tab TAKE THREE AND ONE-HALF TABLETS ONCE DAILY    metaxalone (SKELAXIN) 800 MG tablet TAKE 1 TABLET TWICE A DAY AS NEEDED FOR SPASM    niacinamide 500 mg Tab Take 1 tablet (500 mg total) by mouth 2 (two) times a day.    RESTASIS 0.05 % ophthalmic emulsion     aspirin (ECOTRIN) 81 MG EC tablet Take 81 mg by mouth once daily.    AZASITE 1 % Drop INSTILL 1 DROP INTO LEFT EYE BID    clindamycin (CLEOCIN T) 1 % external solution APPLY TOPICALLY TO THE AFFECTED AREA TWICE DAILY AS NEEDED FOR BREAKOUTS    desonide (DESOWEN) 0.05 % cream Apply topically 2 (two) times daily. To affected area in groin  x 1-2 wks then prn flares only    fluticasone (FLONASE) 50 mcg/actuation nasal spray 1 spray by Each Nare route once daily.    imiquimod (ALDARA) 5 % cream Apply small amount to affected area on knee qhs x 4 weeks. Wash off in am. Stop when red/inflamed.    ketoconazole (NIZORAL) 2 % cream Apply topically 2 (two) times daily. Apply daily to affected area    ketoprofen 10 % CrPk APPLY UP TO 4.8 GRAMS TO PAINFUL AREAS UP TO FIVE TIMES DAILY. RUB IN WELL    LIDOcaine-prilocaine (EMLA) cream Apply topically as needed.    MAXITROL 3.5 mg/g-10,000 unit/g-0.1 % Oint SMARTSI.25 Inch(es) Left Eye Every Evening    MAXITROL 3.5mg/mL-10,000 unit/mL-0.1 % DrpS Place 1 drop into the left eye every morning.    NURTEC 75 mg odt Take by mouth.    pilocarpine (SALAGEN) 5 MG Tab Take 1 tablet (5 mg total) by mouth 3 (three) times daily as needed.     PREVIDENT 5000 BOOSTER PLUS 1.1 % Pste     risedronate 35 mg TbEC TAKE 1 TABLET ONCE A WEEK    sumatriptan (IMITREX) 100 MG tablet Take 1 tablet (100 mg total) by mouth every 2 (two) hours as needed for Migraine.    tretinoin (RETIN-A) 0.025 % gel Apply small amount to entire face qhs. Wash off qam and apply sunscreen.    UNABLE TO FIND Apply 240 g topically as needed. Ketoprofen/cyclobenzaprine HCI/Lidocaine (lipomax) 10/2/5% Up to 5 times daily as needed for pain    zolpidem (AMBIEN) 10 mg Tab TAKE 1 TABLET(10 MG) BY MOUTH EVERY NIGHT AS NEEDED     Review of patient's allergies indicates:  No Known Allergies     Review of Systems   Constitutional:  Negative for fever.       Objective:      Vital Signs (Most Recent)  Temp: 98.1 °F (36.7 °C) (04/12/24 1446)  Pulse: 90 (04/12/24 1446)  Resp: 16 (04/12/24 1446)  BP: 115/60 (04/12/24 1446)  SpO2: 100 % (04/12/24 1446)    Vital Signs Range (Last 24H):  Temp:  [98.1 °F (36.7 °C)]   Pulse:  [90]   Resp:  [16]   BP: (115)/(60)   SpO2:  [100 %]     Physical Exam  Cardiovascular:      Rate and Rhythm: Normal rate.   Neurological:      Mental Status: She is alert and oriented to person, place, and time.             Assessment:      Procedure: EGD     Diagnosis: Abdominal pain, epigastric pain, left upper quadrant pain         Plan:      Procedure: EGD     Diagnosis: Abdominal pain, epigastric pain, left upper quadrant pain

## 2024-04-12 NOTE — ANESTHESIA PREPROCEDURE EVALUATION
04/12/2024  Berenice Hayes is a 64 y.o., female.        Patient Name: Berenice Hayes  YOB: 1960  MRN: [id number]  CSN: [id number]      Code Status: No Order   Date of Procedure: 4/12/2024  Anesthesia: Choice Procedure: Procedure(s) (LRB):  EGD (ESOPHAGOGASTRODUODENOSCOPY) (N/A)  Pre-Operative Diagnosis: Abdominal pain, unspecified abdominal location [R10.9]  Epigastric pain [R10.13]  Left upper quadrant abdominal pain [R10.12]  Proceduralist: Surgeon(s) and Role:     * Mynor Sandhu MD - Primary        SUBJECTIVE:   Berenice Hayes is a 64 y.o. female who  has a past medical history of Asthma with allergic rhinitis, Bladder spasm, Dense breasts, Elevated antinuclear antibody (GUICHO) level, Erythromelalgia, Fibromyalgia, Ganglion cyst, Goiter, Headache(784.0), Hypothyroidism, Kidney stone, Osteoporosis, Pernio (2018), Raynaud's phenomenon, Rhinitis, Scoliosis, Sjogren's syndrome, Sleep disorder, Vitamin D deficiency disease, and Vulvodynia. No notes on file    ALLERGIES:   Review of patient's allergies indicates:  No Known Allergies  MEDICATIONS:     Current Facility-Administered Medications   Medication Dose Route Frequency Provider Last Rate Last Admin    0.9%  NaCl infusion   Intravenous Continuous Mynor Sandhu MD              History:     Patient Active Problem List   Diagnosis    Fibromyalgia    Bladder spasm    Osteoporosis, postmenopausal    Fibrocystic breast changes    Vulvar vestibulodynia    Vascular disorder: Erytnromelalgia    Raynaud's disease    Incomplete defecation    Chronic constipation    Rectocele    Disorder of muscle    Anxiety    Bilateral hand pain    Fatigue    Menopause syndrome    Pudendal neuralgia    Urinary hesitancy    Hypothyroidism due to Hashimoto's thyroiditis    Multinodular goiter    Encounter for herb and vitamin supplement management    Gluten  free diet    Family history of ischemic heart disease (IHD)    Cystocele, midline    Estefani syndrome    Chronic pain syndrome    Trigeminal neuralgia    Hip pain, right    Sjogren's syndrome    Pernio    Coccydynia    Muscle tension dysphonia    Encounter for long-term (current) use of other medications    Chronic nonintractable headache    Erythromelalgia    Varicose veins of both lower extremities with inflammation    Edema of both lower extremities    Venous insufficiency of right lower extremity    Immunosuppression     Past Medical History:   Diagnosis Date    Asthma with allergic rhinitis     Bladder spasm     Dense breasts     heterogeneously/fibrocystic disease    Elevated antinuclear antibody (GUICHO) level     Erythromelalgia     Fibromyalgia     Ganglion cyst     left toe    Goiter     MNG    Headache(784.0)     Hypothyroidism     Hashimoto with + Tg ab    Kidney stone     Osteoporosis     femoral neck    Pernio 2018    Raynaud's phenomenon     Rhinitis     Scoliosis     Sjogren's syndrome     Sleep disorder     type of Narcolepsy ( resolved)    Vitamin D deficiency disease     Vulvodynia      Surgical History:    has a past surgical history that includes Breast surgery; Sinus surgery; Cyst Removal; intradermal cyst ;  section; Hysterectomy; Injection of anesthetic agent around nerve (N/A, 2019); and Injection of anesthetic agent around nerve (N/A, 2019).   Social History:    reports that she is not currently sexually active. She reports using the following method of birth control/protection: Surgical.  reports that she has never smoked. She has never used smokeless tobacco. She reports that she does not drink alcohol and does not use drugs.     Pre-op Assessment    I have reviewed the Patient Summary Reports.     I have reviewed the Nursing Notes. I have reviewed the NPO Status.   I have reviewed the Medications.     Review of Systems  Anesthesia Hx:  No problems with previous Anesthesia              Denies Family Hx of Anesthesia complications.    Denies Personal Hx of Anesthesia complications.                    Hematology/Oncology:  Hematology Normal                                     Cardiovascular:  Cardiovascular Normal                                            Pulmonary:     Denies Asthma.                    Musculoskeletal:  Musculoskeletal Normal                Neurological:  Neurology Normal                                      Dermatological:  Skin Normal        Physical Exam  General: Cooperative, Alert and Oriented    Airway:  Mallampati: II   Mouth Opening: Normal  TM Distance: Normal  Tongue: Normal  Neck ROM: Normal ROM    Dental:  Intact        Anesthesia Plan  Type of Anesthesia, risks & benefits discussed:    Anesthesia Type: Gen Natural Airway  Intra-op Monitoring Plan: Standard ASA Monitors  Induction:  IV  Informed Consent: Informed consent signed with the Patient and all parties understand the risks and agree with anesthesia plan.  All questions answered.   ASA Score: 2  Day of Surgery Review of History & Physical: H&P Update referred to the surgeon/provider.I have interviewed and examined the patient. I have reviewed the patient's H&P dated: There are no significant changes.     Ready For Surgery From Anesthesia Perspective.     .

## 2024-04-12 NOTE — [employer]ATION PATIENT INSTRUCTIONS
Discharge Summary/Instructions after an Endoscopic Procedure  Patient Name: Berenice Hayes  Patient MRN: [id number]  Patient YOB: 1960     Friday, April 12, 2024  Mynor Sandhu MD  Dear patient,  As a result of recent federal legislation (The Federal Cures Act), you may   receive lab or pathology results from your procedure in your [facility]   account before your physician is able to contact you. Your physician or   their representative will relay the results to you with their   recommendations at their soonest availability.  Thank you,  RESTRICTIONS:  During your procedure today, you received medications for sedation.  These   medications may affect your judgment, balance and coordination.  Therefore,   for 24 hours, you have the following restrictions:   - DO NOT drive a car, operate machinery, make legal/financial decisions,   sign important papers or drink alcohol.    ACTIVITY:  Today: no heavy lifting, straining or running due to procedural   sedation/anesthesia.  The following day: return to full activity including work.  DIET:  Eat and drink normally unless instructed otherwise.     TREATMENT FOR COMMON SIDE EFFECTS:  - Mild abdominal pain, nausea, belching, bloating or excessive gas:  rest,   eat lightly and use a heating pad.  - Sore Throat: treat with throat lozenges and/or gargle with warm salt   water.  - Because air was used during the procedure, expelling large amounts of air   from your rectum or belching is normal.  - If a bowel prep was taken, you may not have a bowel movement for 1-3 days.    This is normal.  SYMPTOMS TO WATCH FOR AND REPORT TO YOUR PHYSICIAN:  1. Abdominal pain or bloating, other than gas cramps.  2. Chest pain.  3. Back pain.  4. Signs of infection such as: chills or fever occurring within 24 hours   after the procedure.  5. Rectal bleeding, which would show as bright red, maroon, or black stools.   (A tablespoon of blood from the rectum is not serious, especially if    hemorrhoids are present.)  6. Vomiting.  7. Weakness or dizziness.  GO DIRECTLY TO THE NEAREST EMERGENCY ROOM IF YOU HAVE ANY OF THE FOLLOWING:      Difficulty breathing              Chills and/or fever over 101 F   Persistent vomiting and/or vomiting blood   Severe abdominal pain   Severe chest pain   Black, tarry stools   Bleeding- more than one tablespoon   Any other symptom or condition that you feel may need urgent attention  Your doctor recommends these additional instructions:  If any biopsies were taken, your doctors clinic will contact you in 1 to 2   weeks with any results.  - Discharge patient to home.   - Await pathology results.   -Don't forget to reschedule your CT enterography scan.  - Telephone endoscopist for pathology results in 3 weeks.   - Return to GI clinic at the next available appointment.   - Return to primary care physician.   - Consider avoiding all non-steroidal anti-inflammatory drugs (aspirin,   ibuprofen, naproxen, etc.), unless needed for cardiovascular protection.    Recommend you discuss with your prescribing doctor (of your aspirin) to see   if cardiovascular benefits of your aspirin outweigh the risks of GI   bleeding.  - Use Protonix 40 mg once daily (or any other full strength proton pump   inhibitor) - best taken 45 minutes before your first protein containing   meal.   - The findings and recommendations were discussed with the patient.  For questions, problems or results please call your physician - Mynor Sandhu MD at Work:  [phone].  [facility] [place], EMERGENCY ROOM PHONE NUMBER: [phone]  IF A COMPLICATION OR EMERGENCY SITUATION ARISES AND YOU ARE UNABLE TO REACH   YOUR PHYSICIAN - GO DIRECTLY TO THE EMERGENCY ROOM.  Mynor Sandhu MD  4/12/2024 3:45:13 PM  This report has been verified and signed electronically.  Dear patient,  As a result of recent federal legislation (The Federal Cures Act), you may   receive lab or pathology results from your  procedure in your [facility]   account before your physician is able to contact you. Your physician or   their representative will relay the results to you with their   recommendations at their soonest availability.  Thank you,  PROVATION

## 2024-04-12 NOTE — TRANSFER OF CARE
"Anesthesia Transfer of Care Note    Patient: Berenice Hayes    Procedure(s) Performed: Procedure(s) (LRB):  EGD (ESOPHAGOGASTRODUODENOSCOPY) (N/A)    Patient location: PACU    Anesthesia Type: general    Transport from OR: Transported from OR on room air with adequate spontaneous ventilation    Post pain: adequate analgesia    Post assessment: no apparent anesthetic complications and tolerated procedure well    Post vital signs: stable    Level of consciousness: awake, alert and oriented    Nausea/Vomiting: no nausea/vomiting    Complications: none    Transfer of care protocol was followed      Last vitals: Visit Vitals  /60 (BP Location: Left arm, Patient Position: Lying)   Pulse 90   Temp 36.7 °C (98.1 °F) (Temporal)   Resp 16   Ht 5' 1" (1.549 m)   Wt 42.6 kg (94 lb)   LMP  (LMP Unknown)   SpO2 100%   Breastfeeding No   BMI 17.76 kg/m²     "

## 2024-04-12 NOTE — H&P
[facility] 4[facility]  History & Physical    Subjective:      Chief Complaint/Reason for Admission:     Procedure: EGD     Diagnosis: Abdominal pain, epigastric pain, left upper quadrant pain       Berenice Hayes is a 64 y.o. female.    Past Medical History:   Diagnosis Date    Asthma with allergic rhinitis     Bladder spasm     Dense breasts     heterogeneously/fibrocystic disease    Elevated antinuclear antibody (GUICHO) level     Erythromelalgia     Fibromyalgia     Ganglion cyst     left toe    Goiter     MNG    Headache(784.0)     Hypothyroidism     Hashimoto with + Tg ab    Kidney stone     Osteoporosis     femoral neck    Pernio 2018    Raynaud's phenomenon     Rhinitis     Scoliosis     Sjogren's syndrome     Sleep disorder     type of Narcolepsy ( resolved)    Vitamin D deficiency disease     Vulvodynia      Past Surgical History:   Procedure Laterality Date    BREAST SURGERY      fibrocystic tumor removed     SECTION      2x    CYST REMOVAL      laparoscopic cyst on ovary    HYSTERECTOMY      INJECTION OF ANESTHETIC AGENT AROUND NERVE N/A 2019    Procedure: BLOCK, NERVE COCCYGEAL  1 OF 2  Pt. can drive herself home;  Surgeon: Monique Philip MD;  Location: [facility] PAIN [facility]GT;  Service: Pain Management;  Laterality: N/A;    INJECTION OF ANESTHETIC AGENT AROUND NERVE N/A 2019    Procedure: BLOCK, NERVE COCCYGEAL  2 OF 2  Pt. can drive herself home;  Surgeon: Monique Philip MD;  Location: [facility]IN [facility];  Service: Pain Management;  Laterality: N/A;    intradermal cyst       removed from left index finger    SINUS SURGERY      2x     Social History     Tobacco Use    Smoking status: Never    Smokeless tobacco: Never   Substance Use Topics    Alcohol use: No    Drug use: No       PTA Medications   Medication Sig    amitriptyline (ELAVIL) 10 MG tablet TAKE 2 TABLETS ONCE DAILY WITH THE 50 MG FOR A TOTAL OF 70 MG    amitriptyline (ELAVIL) 50 MG tablet TAKE 1 TABLET NIGHTLY WITH THE 10 MG  AMITRIPTYLINE    amitriptyline (ELAVIL) 50 MG tablet Take 1 tablet (50 mg total) by mouth every evening.    estradioL (VAGIFEM) 10 mcg Tab Place 1 tablet (10 mcg total) vaginally twice a week.    estradiol 0.05 mg/24 hr td ptsw (VIVELLE-DOT) 0.05 mg/24 hr Place 1 patch onto the skin twice a week.    gabapentin (NEURONTIN) 100 MG capsule TAKE 1 CAPSULE IN THE MORNING AND 4 CAPSULES AT BEDTIME    hydroxychloroquine (PLAQUENIL) 200 mg tablet TAKE 1 TABLET DAILY    liothyronine (CYTOMEL) 5 MCG Tab TAKE THREE AND ONE-HALF TABLETS ONCE DAILY    metaxalone (SKELAXIN) 800 MG tablet TAKE 1 TABLET TWICE A DAY AS NEEDED FOR SPASM    niacinamide 500 mg Tab Take 1 tablet (500 mg total) by mouth 2 (two) times a day.    RESTASIS 0.05 % ophthalmic emulsion     aspirin (ECOTRIN) 81 MG EC tablet Take 81 mg by mouth once daily.    AZASITE 1 % Drop INSTILL 1 DROP INTO LEFT EYE BID    clindamycin (CLEOCIN T) 1 % external solution APPLY TOPICALLY TO THE AFFECTED AREA TWICE DAILY AS NEEDED FOR BREAKOUTS    desonide (DESOWEN) 0.05 % cream Apply topically 2 (two) times daily. To affected area in groin  x 1-2 wks then prn flares only    fluticasone (FLONASE) 50 mcg/actuation nasal spray 1 spray by Each Nare route once daily.    imiquimod (ALDARA) 5 % cream Apply small amount to affected area on knee qhs x 4 weeks. Wash off in am. Stop when red/inflamed.    ketoconazole (NIZORAL) 2 % cream Apply topically 2 (two) times daily. Apply daily to affected area    ketoprofen 10 % CrPk APPLY UP TO 4.8 GRAMS TO PAINFUL AREAS UP TO FIVE TIMES DAILY. RUB IN WELL    LIDOcaine-prilocaine (EMLA) cream Apply topically as needed.    MAXITROL 3.5 mg/g-10,000 unit/g-0.1 % Oint SMARTSI.25 Inch(es) Left Eye Every Evening    MAXITROL 3.5mg/mL-10,000 unit/mL-0.1 % DrpS Place 1 drop into the left eye every morning.    NURTEC 75 mg odt Take by mouth.    pilocarpine (SALAGEN) 5 MG Tab Take 1 tablet (5 mg total) by mouth 3 (three) times daily as needed.     PREVIDENT 5000 BOOSTER PLUS 1.1 % Pste     risedronate 35 mg TbEC TAKE 1 TABLET ONCE A WEEK    sumatriptan (IMITREX) 100 MG tablet Take 1 tablet (100 mg total) by mouth every 2 (two) hours as needed for Migraine.    tretinoin (RETIN-A) 0.025 % gel Apply small amount to entire face qhs. Wash off qam and apply sunscreen.    UNABLE TO FIND Apply 240 g topically as needed. Ketoprofen/cyclobenzaprine HCI/Lidocaine (lipomax) 10/2/5% Up to 5 times daily as needed for pain    zolpidem (AMBIEN) 10 mg Tab TAKE 1 TABLET(10 MG) BY MOUTH EVERY NIGHT AS NEEDED     Review of patient's allergies indicates:  No Known Allergies     Review of Systems   Constitutional:  Positive for fever.       Objective:      Vital Signs (Most Recent)  Temp: 98.1 °F (36.7 °C) (04/12/24 1446)  Pulse: 90 (04/12/24 1446)  Resp: 16 (04/12/24 1446)  BP: 115/60 (04/12/24 1446)  SpO2: 100 % (04/12/24 1446)    Vital Signs Range (Last 24H):  Temp:  [98.1 °F (36.7 °C)]   Pulse:  [90]   Resp:  [16]   BP: (115)/(60)   SpO2:  [100 %]     Physical Exam  Neurological:      Mental Status: She is alert and oriented to person, place, and time.             Assessment:      Procedure: EGD     Diagnosis: Abdominal pain, epigastric pain, left upper quadrant pain         Plan:      Procedure: EGD     Diagnosis: Abdominal pain, epigastric pain, left upper quadrant pain

## 2024-04-13 NOTE — ANESTHESIA POSTPROCEDURE EVALUATION
Anesthesia Post Evaluation    Patient: Berenice Hayes    Procedure(s) Performed: Procedure(s) (LRB):  EGD (ESOPHAGOGASTRODUODENOSCOPY) (N/A)    Final Anesthesia Type: general      Patient location during evaluation: GI PACU  Patient participation: Yes- Able to Participate  Level of consciousness: awake and alert  Post-procedure vital signs: reviewed and stable  Pain management: adequate  Airway patency: patent    PONV status at discharge: No PONV  Anesthetic complications: no      Cardiovascular status: blood pressure returned to baseline  Respiratory status: unassisted and spontaneous ventilation  Hydration status: euvolemic  Follow-up not needed.              Vitals Value Taken Time   BP 99/60 04/12/24 1608   Temp 36.8 °C (98.2 °F) 04/12/24 1544   Pulse 75 04/12/24 1608   Resp 16 04/12/24 1544   SpO2 100 % 04/12/24 1608         Event Time   Out of Recovery 16:09:09         Pain/Zabrina Score: Zabrina Score: 9 (4/12/2024  3:47 PM)

## 2024-04-18 ENCOUNTER — PATIENT MESSAGE (OUTPATIENT)
Dept: GASTROENTEROLOGY | Facility: CLINIC | Age: 64
End: 2024-04-18
Payer: COMMERCIAL

## 2024-04-18 LAB
FINAL PATHOLOGIC DIAGNOSIS: NORMAL
GROSS: NORMAL
Lab: NORMAL

## 2024-04-21 ENCOUNTER — PATIENT MESSAGE (OUTPATIENT)
Dept: RHEUMATOLOGY | Facility: CLINIC | Age: 64
End: 2024-04-21
Payer: COMMERCIAL

## 2024-04-21 ENCOUNTER — PATIENT MESSAGE (OUTPATIENT)
Dept: ENDOCRINOLOGY | Facility: CLINIC | Age: 64
End: 2024-04-21
Payer: COMMERCIAL

## 2024-04-21 DIAGNOSIS — E06.3 HYPOTHYROIDISM DUE TO HASHIMOTO'S THYROIDITIS: Primary | ICD-10-CM

## 2024-04-21 DIAGNOSIS — E03.8 HYPOTHYROIDISM DUE TO HASHIMOTO'S THYROIDITIS: Primary | ICD-10-CM

## 2024-04-26 ENCOUNTER — TELEPHONE (OUTPATIENT)
Dept: RHEUMATOLOGY | Facility: CLINIC | Age: 64
End: 2024-04-26
Payer: COMMERCIAL

## 2024-04-26 NOTE — TELEPHONE ENCOUNTER
----- Message from Alejandrina Rea sent at 4/26/2024 10:52 AM CDT -----  Regarding: Pt called to speak to the nurse regarding her 5/8/24 labs and pt would like a call back today  Patient Advice:    Pt called to speak to the nurse regarding her 5/8/24 labs and pt would like a call back today    Pt can be reached at [phone]

## 2024-05-02 ENCOUNTER — PATIENT MESSAGE (OUTPATIENT)
Dept: GASTROENTEROLOGY | Facility: CLINIC | Age: 64
End: 2024-05-02
Payer: COMMERCIAL

## 2024-05-02 ENCOUNTER — HOSPITAL ENCOUNTER (OUTPATIENT)
Dept: RADIOLOGY | Facility: HOSPITAL | Age: 64
Discharge: HOME | End: 2024-05-02
Attending: INTERNAL MEDICINE
Payer: COMMERCIAL

## 2024-05-02 DIAGNOSIS — R10.13 EPIGASTRIC PAIN: ICD-10-CM

## 2024-05-02 DIAGNOSIS — R10.12 LEFT UPPER QUADRANT PAIN: Primary | ICD-10-CM

## 2024-05-02 DIAGNOSIS — K86.89 DILATED PANCREATIC DUCT: ICD-10-CM

## 2024-05-02 DIAGNOSIS — R10.12 LEFT UPPER QUADRANT PAIN: ICD-10-CM

## 2024-05-02 LAB
CREAT SERPL-MCNC: 0.5 MG/DL (ref 0.5–1.4)
SAMPLE: NORMAL

## 2024-05-02 PROCEDURE — 25500020 PHARM REV CODE 255: Performed by: INTERNAL MEDICINE

## 2024-05-02 PROCEDURE — A9698 NON-RAD CONTRAST MATERIALNOC: HCPCS | Performed by: INTERNAL MEDICINE

## 2024-05-02 PROCEDURE — 74177 CT ABD & PELVIS W/CONTRAST: CPT | Mod: TC

## 2024-05-02 PROCEDURE — 74177 CT ABD & PELVIS W/CONTRAST: CPT | Mod: 26,,, | Performed by: RADIOLOGY

## 2024-05-02 RX ADMIN — BARIUM SULFATE 225 ML: 1 SUSPENSION ORAL at 04:05

## 2024-05-02 RX ADMIN — BARIUM SULFATE 450 ML: 1 SUSPENSION ORAL at 04:05

## 2024-05-02 RX ADMIN — IOHEXOL 100 ML: 350 INJECTION, SOLUTION INTRAVENOUS at 04:05

## 2024-05-03 ENCOUNTER — PATIENT MESSAGE (OUTPATIENT)
Dept: ENDOSCOPY | Facility: HOSPITAL | Age: 64
End: 2024-05-03
Payer: COMMERCIAL

## 2024-05-03 ENCOUNTER — PATIENT MESSAGE (OUTPATIENT)
Dept: GASTROENTEROLOGY | Facility: CLINIC | Age: 64
End: 2024-05-03
Payer: COMMERCIAL

## 2024-05-03 NOTE — PROGRESS NOTES
Penny and or [place] - please try to expedite a [facility] appointment or EUS with advanced endoscopy Service for evaluation epigastric pain left upper quadrant and abnormal dilated pancreatic duct.  Thank you    Impression:    Dilated proximal pancreatic duct.  No obvious pancreatic mass.  Further evaluation MRCP and/or ERCP is recommended given patient history.    ancreas: No obvious pancreatic mass.  The proximal pancreatic duct is dilated measuring up to 6 mm (axial series 2, image 74).    Berenice you have a nonspecific dilation of your pancreatic duct I am going to refer you to our pancreas specialist advanced endoscopy Service for further evaluation.    This report was flagged in Epic as abnormal.    Electronically signed by resident: Harinder Brice  Date:                                            05/02/2024  Time:                                           16:08    Electronically signed by:Edis Storey MD  Date:                                            05/02/2024

## 2024-05-03 NOTE — PROGRESS NOTES
Nasreen please order right upper quadrant ultrasound at the end of next week of the following week to evaluate the gallbladder orders placed

## 2024-05-04 ENCOUNTER — LAB VISIT (OUTPATIENT)
Dept: LAB | Facility: HOSPITAL | Age: 64
End: 2024-05-04
Attending: INTERNAL MEDICINE
Payer: COMMERCIAL

## 2024-05-04 DIAGNOSIS — I73.00 RAYNAUD'S DISEASE WITHOUT GANGRENE: ICD-10-CM

## 2024-05-04 DIAGNOSIS — R53.83 FATIGUE, UNSPECIFIED TYPE: ICD-10-CM

## 2024-05-04 DIAGNOSIS — M35.0B SJOGREN'S SYNDROME WITH VASCULITIS: ICD-10-CM

## 2024-05-04 DIAGNOSIS — I73.81 ERYTHROMELALGIA: ICD-10-CM

## 2024-05-04 DIAGNOSIS — M79.7 FIBROMYALGIA: ICD-10-CM

## 2024-05-04 DIAGNOSIS — E03.8 HYPOTHYROIDISM DUE TO HASHIMOTO'S THYROIDITIS: ICD-10-CM

## 2024-05-04 DIAGNOSIS — E06.3 HYPOTHYROIDISM DUE TO HASHIMOTO'S THYROIDITIS: ICD-10-CM

## 2024-05-04 LAB
ALBUMIN SERPL BCP-MCNC: 4.3 G/DL (ref 3.5–5.2)
ALP SERPL-CCNC: 78 U/L (ref 55–135)
ALT SERPL W/O P-5'-P-CCNC: 29 U/L (ref 10–44)
ANION GAP SERPL CALC-SCNC: 12 MMOL/L (ref 8–16)
AST SERPL-CCNC: 29 U/L (ref 10–40)
BASOPHILS # BLD AUTO: 0.06 K/UL (ref 0–0.2)
BASOPHILS NFR BLD: 0.9 % (ref 0–1.9)
BILIRUB SERPL-MCNC: 0.4 MG/DL (ref 0.1–1)
BUN SERPL-MCNC: 15 MG/DL (ref 8–23)
C3 SERPL-MCNC: 114 MG/DL (ref 50–180)
C4 SERPL-MCNC: 24 MG/DL (ref 11–44)
CALCIUM SERPL-MCNC: 9.9 MG/DL (ref 8.7–10.5)
CHLORIDE SERPL-SCNC: 98 MMOL/L (ref 95–110)
CK SERPL-CCNC: 45 U/L (ref 20–180)
CO2 SERPL-SCNC: 28 MMOL/L (ref 23–29)
CREAT SERPL-MCNC: 0.7 MG/DL (ref 0.5–1.4)
CRP SERPL-MCNC: 2.6 MG/L (ref 0–8.2)
DIFFERENTIAL METHOD BLD: ABNORMAL
EOSINOPHIL # BLD AUTO: 0.2 K/UL (ref 0–0.5)
EOSINOPHIL NFR BLD: 2.4 % (ref 0–8)
ERYTHROCYTE [DISTWIDTH] IN BLOOD BY AUTOMATED COUNT: 12.7 % (ref 11.5–14.5)
ERYTHROCYTE [SEDIMENTATION RATE] IN BLOOD BY PHOTOMETRIC METHOD: 9 MM/HR (ref 0–36)
EST. GFR  (NO RACE VARIABLE): >60 ML/MIN/1.73 M^2
GLUCOSE SERPL-MCNC: 96 MG/DL (ref 70–110)
HCT VFR BLD AUTO: 39.7 % (ref 37–48.5)
HGB BLD-MCNC: 12.5 G/DL (ref 12–16)
IMM GRANULOCYTES # BLD AUTO: 0.02 K/UL (ref 0–0.04)
IMM GRANULOCYTES NFR BLD AUTO: 0.3 % (ref 0–0.5)
LYMPHOCYTES # BLD AUTO: 0.8 K/UL (ref 1–4.8)
LYMPHOCYTES NFR BLD: 11.7 % (ref 18–48)
MCH RBC QN AUTO: 29.4 PG (ref 27–31)
MCHC RBC AUTO-ENTMCNC: 31.5 G/DL (ref 32–36)
MCV RBC AUTO: 93 FL (ref 82–98)
MONOCYTES # BLD AUTO: 0.6 K/UL (ref 0.3–1)
MONOCYTES NFR BLD: 8.1 % (ref 4–15)
NEUTROPHILS # BLD AUTO: 5.2 K/UL (ref 1.8–7.7)
NEUTROPHILS NFR BLD: 76.6 % (ref 38–73)
NRBC BLD-RTO: 0 /100 WBC
PLATELET # BLD AUTO: 255 K/UL (ref 150–450)
PMV BLD AUTO: 10 FL (ref 9.2–12.9)
POTASSIUM SERPL-SCNC: 4.1 MMOL/L (ref 3.5–5.1)
PROT SERPL-MCNC: 7.9 G/DL (ref 6–8.4)
RBC # BLD AUTO: 4.25 M/UL (ref 4–5.4)
SODIUM SERPL-SCNC: 138 MMOL/L (ref 136–145)
T3 SERPL-MCNC: 103 NG/DL (ref 60–180)
T4 FREE SERPL-MCNC: 0.55 NG/DL (ref 0.71–1.51)
TSH SERPL DL<=0.005 MIU/L-ACNC: 1.34 UIU/ML (ref 0.4–4)
WBC # BLD AUTO: 6.77 K/UL (ref 3.9–12.7)

## 2024-05-04 PROCEDURE — 86225 DNA ANTIBODY NATIVE: CPT | Performed by: INTERNAL MEDICINE

## 2024-05-04 PROCEDURE — 36415 COLL VENOUS BLD VENIPUNCTURE: CPT | Performed by: INTERNAL MEDICINE

## 2024-05-04 PROCEDURE — 86140 C-REACTIVE PROTEIN: CPT | Performed by: INTERNAL MEDICINE

## 2024-05-04 PROCEDURE — 86160 COMPLEMENT ANTIGEN: CPT | Performed by: INTERNAL MEDICINE

## 2024-05-04 PROCEDURE — 84480 ASSAY TRIIODOTHYRONINE (T3): CPT | Performed by: INTERNAL MEDICINE

## 2024-05-04 PROCEDURE — 85025 COMPLETE CBC W/AUTO DIFF WBC: CPT | Performed by: INTERNAL MEDICINE

## 2024-05-04 PROCEDURE — 84439 ASSAY OF FREE THYROXINE: CPT | Performed by: INTERNAL MEDICINE

## 2024-05-04 PROCEDURE — 80053 COMPREHEN METABOLIC PANEL: CPT | Performed by: INTERNAL MEDICINE

## 2024-05-04 PROCEDURE — 86160 COMPLEMENT ANTIGEN: CPT | Mod: 59 | Performed by: INTERNAL MEDICINE

## 2024-05-04 PROCEDURE — 84443 ASSAY THYROID STIM HORMONE: CPT | Performed by: INTERNAL MEDICINE

## 2024-05-04 PROCEDURE — 82550 ASSAY OF CK (CPK): CPT | Performed by: INTERNAL MEDICINE

## 2024-05-04 PROCEDURE — 85652 RBC SED RATE AUTOMATED: CPT | Performed by: INTERNAL MEDICINE

## 2024-05-06 ENCOUNTER — PATIENT MESSAGE (OUTPATIENT)
Dept: RHEUMATOLOGY | Facility: CLINIC | Age: 64
End: 2024-05-06
Payer: COMMERCIAL

## 2024-05-06 LAB — DSDNA AB SER-ACNC: NORMAL [IU]/ML

## 2024-05-08 ENCOUNTER — PATIENT OUTREACH (OUTPATIENT)
Dept: ADMINISTRATIVE | Facility: HOSPITAL | Age: 64
End: 2024-05-08
Payer: COMMERCIAL

## 2024-05-08 ENCOUNTER — HOSPITAL ENCOUNTER (OUTPATIENT)
Dept: ENDOCRINOLOGY | Facility: CLINIC | Age: 64
Discharge: HOME | End: 2024-05-08
Attending: INTERNAL MEDICINE
Payer: COMMERCIAL

## 2024-05-08 DIAGNOSIS — E04.2 MULTINODULAR GOITER: ICD-10-CM

## 2024-05-08 PROCEDURE — 76536 US EXAM OF HEAD AND NECK: CPT | Mod: S$GLB,,, | Performed by: INTERNAL MEDICINE

## 2024-05-09 ENCOUNTER — PATIENT MESSAGE (OUTPATIENT)
Dept: PAIN MEDICINE | Facility: CLINIC | Age: 64
End: 2024-05-09
Payer: COMMERCIAL

## 2024-05-10 ENCOUNTER — PATIENT MESSAGE (OUTPATIENT)
Dept: INTERNAL MEDICINE | Facility: CLINIC | Age: 64
End: 2024-05-10
Payer: COMMERCIAL

## 2024-05-10 DIAGNOSIS — G47.00 INSOMNIA, UNSPECIFIED TYPE: ICD-10-CM

## 2024-05-11 RX ORDER — ZOLPIDEM TARTRATE 10 MG/1
TABLET ORAL
Qty: 30 TABLET | Refills: 2 | Status: SHIPPED | OUTPATIENT
Start: 2024-05-11

## 2024-05-12 ENCOUNTER — PATIENT MESSAGE (OUTPATIENT)
Dept: GASTROENTEROLOGY | Facility: CLINIC | Age: 64
End: 2024-05-12
Payer: COMMERCIAL

## 2024-05-12 NOTE — PROGRESS NOTES
Berenice your EGD pathology was benign no celiac sprue no H pylori no dysplasia  1. Duodenum, biopsy:Duodenal mucosa with no diagnostic abnormality.  Villous architecture is preserved with no intraepithelial lymphocytosis.    2. Stomach, biopsy:Gastric antral and oxyntic mucosa with minimal chronic inactive gastritis.  Negative for Helicobacter pylori.   Comment: Interp By Sharif Saab M.D., Signed on 04/18/2024

## 2024-05-13 ENCOUNTER — OFFICE VISIT (OUTPATIENT)
Dept: ENDOCRINOLOGY | Facility: CLINIC | Age: 64
End: 2024-05-13
Payer: COMMERCIAL

## 2024-05-13 VITALS — WEIGHT: 92.38 LBS | BODY MASS INDEX: 17.44 KG/M2 | HEIGHT: 61 IN

## 2024-05-13 DIAGNOSIS — E06.3 HYPOTHYROIDISM DUE TO HASHIMOTO'S THYROIDITIS: Primary | ICD-10-CM

## 2024-05-13 DIAGNOSIS — M35.0B SJOGREN'S SYNDROME WITH VASCULITIS: ICD-10-CM

## 2024-05-13 DIAGNOSIS — M81.0 OSTEOPOROSIS, POSTMENOPAUSAL: ICD-10-CM

## 2024-05-13 DIAGNOSIS — E04.2 MULTINODULAR GOITER: ICD-10-CM

## 2024-05-13 DIAGNOSIS — E03.8 HYPOTHYROIDISM DUE TO HASHIMOTO'S THYROIDITIS: Primary | ICD-10-CM

## 2024-05-13 PROCEDURE — 99214 OFFICE O/P EST MOD 30 MIN: CPT | Mod: S$GLB,,, | Performed by: INTERNAL MEDICINE

## 2024-05-13 PROCEDURE — G2211 COMPLEX E/M VISIT ADD ON: HCPCS | Mod: S$GLB,,, | Performed by: INTERNAL MEDICINE

## 2024-05-13 PROCEDURE — 3008F BODY MASS INDEX DOCD: CPT | Mod: CPTII,S$GLB,, | Performed by: INTERNAL MEDICINE

## 2024-05-13 PROCEDURE — 99999 PR PBB SHADOW E&M-EST. PATIENT-LVL V: CPT | Mod: PBBFAC,,, | Performed by: INTERNAL MEDICINE

## 2024-05-13 PROCEDURE — 1159F MED LIST DOCD IN RCRD: CPT | Mod: CPTII,S$GLB,, | Performed by: INTERNAL MEDICINE

## 2024-05-13 PROCEDURE — 1160F RVW MEDS BY RX/DR IN RCRD: CPT | Mod: CPTII,S$GLB,, | Performed by: INTERNAL MEDICINE

## 2024-05-13 NOTE — ASSESSMENT & PLAN NOTE
--On ERT  --She has had a decline at the hip and spine on ERT and risedronate  --Reviewed options and received first dose of Prolia in 3/2024  --She has increased weight-bearing exercise  --Was on risedronate weekly from 11/2017 until 3/2024  --She understands Prolia can not be abruptly stopped or significantly delayed once started   --Repeat bone density in 11/2025  --continue vitamin-D supplementation and calcium supplementation  --stressed importance of falls avoidance

## 2024-05-13 NOTE — PROGRESS NOTES
Subjective:      Patient ID: Berenice Hayes is a 64 y.o. female.    Chief Complaint:  Hypothyroidism, Osteoporosis, and Thyroid Nodule      History of Present Illness  Ms. Hayes presents for follow up of hypothyroidism and osteopenia.  Last visit 1/2024.     With Hashimoto thyroiditis with high thyroglobulin ab and negative TPO ab.   Was started and maintained on T3 only regimen by Dr. Espinal.   Currently she is taking Cytomel totalling 15 mcg per day (5 mcg TID).  Previously had palpitations when Synthroid was added to the Cytomel. No longer having palpitations on the current regimen and TSH has remained WNL. She feels like her hypothyroidism has been relatively stable. She denies any palpitations or tremor.   She has been hesitant to change back to T4 only or a combined T4/T3 regimen.     Also has MNG with right mid thyroid nodule s/p benign FNA in 2015, 2018, 2019 and left lobe nodule with benign FNA in 2016. Right inferior pole nodule underwent FNA x 3 with non-diagnostic results. Molecular markers were negative on the right inferior nodule in 7/2019.      She has some chronic mild dysphagia with solid/dry foods that has not changed.      Neck ultrasound dated 5/2024:  Impression:     1. Thyroid gland is normal in size with heterogenous echotexture.  2. 3 nodules in the right thyroid described above.  All of the nodules are stable.  3. 2 nodules in the left thyroid described above.  Both of the nodules are stable.  None meet criteria for fine-needle aspiration.  4. No abnormal lymph nodes are identified.     RECOMMENDATIONS:  The right middle lobe nodule meets criteria for FNA, but given 3 previously benign results with no interval growth, continued ultrasound surveillance is recommended.     Follow-up ultrasound in 1-2 years is recommended.        Has Osteoporosis and she took Atelvia 35 mg weekly since August/2012 until 2015 when she restarted ERT (vaginal and transdermal). Had decrease in BMD at the hip  despite ERT.      Recent BMD stable at the spine but showed decline at the hip in 11/2023.  Bone density two years ago had showed a decline at the spine.    Restarted risedronate 35 mg weekly in 11/2017 and stopped in 3/2024 prior to getting Prolia. First dose of Prolia in 3/2024. No significant side effects. Having some constipation that is tolerable and I advised her this is not a common side effect of Prolia but is listed as a potential side effect in up to 3% of patients.        Latest Reference Range & Units 12/17/22 08:04   C Telopeptide (CTX), Serum pg/mL 89 (L)         She remains relatively active.      She takes a multivitamin and Vit D 2000 units daily.      She is currently taking oral calcium lactate twice daily. She is also getting a fair amount of calcium through her diet (eats cheese, broccoli and walnuts daily).     Follows with rheumatology for Sjogrens. On Plaquenil.     Review of Systems   Constitutional:  Negative for chills and fever.   Gastrointestinal:  Negative for nausea.       Objective:   Physical Exam  Vitals and nursing note reviewed.       BP Readings from Last 3 Encounters:   04/12/24 99/60   03/28/24 117/69   03/13/24 106/75     Wt Readings from Last 1 Encounters:   05/13/24 1131 41.9 kg (92 lb 6 oz)       Body mass index is 17.45 kg/m².    Lab Review:   Lab Results   Component Value Date    HGBA1C 5.0 08/19/2023     Lab Results   Component Value Date    CHOL 133 08/19/2023    HDL 80 (H) 08/19/2023    LDLCALC 47.4 (L) 08/19/2023    TRIG 28 (L) 08/19/2023    CHOLHDL 60.2 (H) 08/19/2023     Lab Results   Component Value Date     05/04/2024    K 4.1 05/04/2024    CL 98 05/04/2024    CO2 28 05/04/2024    GLU 96 05/04/2024    BUN 15 05/04/2024    CREATININE 0.7 05/04/2024    CALCIUM 9.9 05/04/2024    PROT 7.9 05/04/2024    ALBUMIN 4.3 05/04/2024    BILITOT 0.4 05/04/2024    ALKPHOS 78 05/04/2024    AST 29 05/04/2024    ALT 29 05/04/2024    ANIONGAP 12 05/04/2024    ASIA  >60.0 06/25/2022    EGFRNONAA >60.0 06/25/2022    TSH 1.342 05/04/2024         Assessment and Plan     Hypothyroidism due to Hashimoto's thyroiditis  --Explained goals of treatment is to keep the TSH in the normal range to avoid CVS and bone complications  --Will continue Cytomel 5 mcg TID pending labs  --Last TSH normal, but recently having some symptoms of cold intolerance, constipation and weight loss so will check labs to ensure she is still euthyroid  --She understands that this is an atypical thyroid hormone replacement regimen and she will let me us know right away if she develops any hyperthyroid symptoms  --We discussed continuing this regimen so long as her TSH is not suppressed and she does not have any palpitations         Multinodular goiter  --With multiple thyroid nodules  --has had multiple benign FNAs of the right mid lobe nodule, and 1 benign FNA of the left lobe nodule  --she has had 3 nondiagnostic FNAs of the right inferior pole nodule, however, molecular markers were negative on this nodule in 07/2019  --Last ultrasound with stable nodules in 5/2024  --Repeat thyroid ultrasound 5/2025      Osteoporosis, postmenopausal  --On ERT  --She has had a decline at the hip and spine on ERT and risedronate  --Reviewed options and received first dose of Prolia in 3/2024  --She has increased weight-bearing exercise  --Was on risedronate weekly from 11/2017 until 3/2024  --She understands Prolia can not be abruptly stopped or significantly delayed once started   --Repeat bone density in 11/2025  --continue vitamin-D supplementation and calcium supplementation  --stressed importance of falls avoidance      Sjogren's syndrome  Followed by rheumatology  Started on plaquenil (prescribed 2/20/19)            Follow up in 6 months with thyroid labs prior to appt       Visit today included increased complexity associated with the care of the episodic problem thyroid nodule, osteoporosis, hypothyroidism addressed and  managing the longitudinal care of the patient due to the serious and/or complex managed problem(s).       Leandro Espinoza M.D. Staff Endocrinology

## 2024-05-18 ENCOUNTER — HOSPITAL ENCOUNTER (OUTPATIENT)
Dept: RADIOLOGY | Facility: HOSPITAL | Age: 64
Discharge: HOME | End: 2024-05-18
Attending: INTERNAL MEDICINE
Payer: COMMERCIAL

## 2024-05-18 ENCOUNTER — PATIENT MESSAGE (OUTPATIENT)
Dept: GASTROENTEROLOGY | Facility: CLINIC | Age: 64
End: 2024-05-18
Payer: COMMERCIAL

## 2024-05-18 DIAGNOSIS — R93.5 ABNORMAL ABDOMINAL CT SCAN: ICD-10-CM

## 2024-05-18 DIAGNOSIS — R10.13 EPIGASTRIC PAIN: ICD-10-CM

## 2024-05-18 DIAGNOSIS — R10.12 LEFT UPPER QUADRANT ABDOMINAL PAIN: Primary | ICD-10-CM

## 2024-05-18 DIAGNOSIS — K86.89 DILATED PANCREATIC DUCT: ICD-10-CM

## 2024-05-18 DIAGNOSIS — R10.12 LEFT UPPER QUADRANT PAIN: ICD-10-CM

## 2024-05-18 PROCEDURE — 76705 ECHO EXAM OF ABDOMEN: CPT | Mod: 26,,, | Performed by: RADIOLOGY

## 2024-05-18 PROCEDURE — 76705 ECHO EXAM OF ABDOMEN: CPT | Mod: TC

## 2024-05-18 NOTE — PROGRESS NOTES
IMPORTANT:    Nasreen please offer to schedule a lease for MRI MRCP for further evaluation of her left upper quadrant pain and recommendations by Radiology on her ultrasound stating Upper normal pancreatic duct at the pancreatic head.  Of note, the pancreas is better visualized on CT and, if clinically indicated, previous recommendation for further evaluation with MRCP.  Orders have been placed      Berenice your ultrasound was read as follows no gallstones but radiology does make a recommendation for follow-up MRI MRCP please let me know if your claustrophobic or have metal implants that would prevent you from getting an MRI scan which is a powerful magnet.    Impression:    No significant abnormality.    Upper normal pancreatic duct at the pancreatic head.  Of note, the pancreas is better visualized on CT and, if clinically indicated, previous recommendation for further evaluation with MRCP and/or ERCP based on the CT of May 2, 2024 is unchanged.      Electronically signed by:Fatmua Huggins MD  Date:                                            05/18/2024

## 2024-05-19 DIAGNOSIS — F40.240 CLAUSTROPHOBIA: Primary | ICD-10-CM

## 2024-05-19 RX ORDER — DIAZEPAM 5 MG/1
5 TABLET ORAL
Qty: 1 TABLET | Refills: 0 | Status: SHIPPED | OUTPATIENT
Start: 2024-05-19

## 2024-05-22 ENCOUNTER — OFFICE VISIT (OUTPATIENT)
Dept: INTERNAL MEDICINE | Facility: CLINIC | Age: 64
End: 2024-05-22
Payer: COMMERCIAL

## 2024-05-22 VITALS
WEIGHT: 93.25 LBS | SYSTOLIC BLOOD PRESSURE: 96 MMHG | DIASTOLIC BLOOD PRESSURE: 62 MMHG | HEART RATE: 101 BPM | BODY MASS INDEX: 17.61 KG/M2 | OXYGEN SATURATION: 98 % | HEIGHT: 61 IN

## 2024-05-22 DIAGNOSIS — G80.1 PAINE SYNDROME: Primary | ICD-10-CM

## 2024-05-22 DIAGNOSIS — I73.81 ERYTHROMELALGIA: ICD-10-CM

## 2024-05-22 DIAGNOSIS — E06.3 HYPOTHYROIDISM DUE TO HASHIMOTO'S THYROIDITIS: ICD-10-CM

## 2024-05-22 DIAGNOSIS — D84.9 IMMUNOSUPPRESSION: ICD-10-CM

## 2024-05-22 DIAGNOSIS — Z79.899 ENCOUNTER FOR LONG-TERM (CURRENT) USE OF OTHER MEDICATIONS: ICD-10-CM

## 2024-05-22 DIAGNOSIS — Q02 PAINE SYNDROME: Primary | ICD-10-CM

## 2024-05-22 DIAGNOSIS — Q87.89 PAINE SYNDROME: Primary | ICD-10-CM

## 2024-05-22 DIAGNOSIS — E03.8 HYPOTHYROIDISM DUE TO HASHIMOTO'S THYROIDITIS: ICD-10-CM

## 2024-05-22 DIAGNOSIS — M81.0 OSTEOPOROSIS, POSTMENOPAUSAL: ICD-10-CM

## 2024-05-22 PROCEDURE — 3074F SYST BP LT 130 MM HG: CPT | Mod: CPTII,S$GLB,, | Performed by: FAMILY MEDICINE

## 2024-05-22 PROCEDURE — 99999 PR PBB SHADOW E&M-EST. PATIENT-LVL III: CPT | Mod: PBBFAC,,, | Performed by: FAMILY MEDICINE

## 2024-05-22 PROCEDURE — 3008F BODY MASS INDEX DOCD: CPT | Mod: CPTII,S$GLB,, | Performed by: FAMILY MEDICINE

## 2024-05-22 PROCEDURE — 3078F DIAST BP <80 MM HG: CPT | Mod: CPTII,S$GLB,, | Performed by: FAMILY MEDICINE

## 2024-05-22 PROCEDURE — 1159F MED LIST DOCD IN RCRD: CPT | Mod: CPTII,S$GLB,, | Performed by: FAMILY MEDICINE

## 2024-05-22 PROCEDURE — 99214 OFFICE O/P EST MOD 30 MIN: CPT | Mod: S$GLB,,, | Performed by: FAMILY MEDICINE

## 2024-05-22 RX ORDER — ALPRAZOLAM 1 MG/1
1 TABLET ORAL ONCE AS NEEDED
Qty: 1 TABLET | Refills: 0 | Status: SHIPPED | OUTPATIENT
Start: 2024-05-22 | End: 2024-05-23

## 2024-05-22 RX ORDER — AMITRIPTYLINE HYDROCHLORIDE 50 MG/1
TABLET, FILM COATED ORAL
Qty: 90 TABLET | Refills: 3 | Status: CANCELLED | OUTPATIENT
Start: 2024-05-22

## 2024-05-22 RX ORDER — AMITRIPTYLINE HYDROCHLORIDE 50 MG/1
TABLET, FILM COATED ORAL
Qty: 90 TABLET | Refills: 3 | Status: SHIPPED | OUTPATIENT
Start: 2024-05-22

## 2024-05-22 NOTE — Clinical Note
Met with patient today for checkup, she had questions about MRI scheduling. After looking at upcoming visits, my guess is that she will meet with Dr. Baez first with advanced endoscopy as scheduled in June and imaging will be decided then. Let me know if anything else I can help communicate with her about MRI scheduling or if should just wait for appointment with their team first. Thank you

## 2024-05-22 NOTE — PROGRESS NOTES
Subjective:       Patient ID: Berenice Hayes is a 64 y.o. female.    Chief Complaint: Follow-up (Follow up from enscopy )    HPI    Berenice Hayes is a 64 y.o. female PMHx Hypothyroid, Osteoporosis for checkup.     Review of recent gi workup     #Cards: Venous insuff  - est w/ Dr. Dash,  6/2023 - f/u 6m     #Endo: Hypothyroid, Multinodular goiter, Osteoporosis  - est w/ Dr. Espinoza, lv 5/2024 - f/u 6m  - reg: Cytomel 5 tid  - has not done well w/ synthroid in the past  - thryoid u/s 5/2024 - f/u u/s in 1yr  - taking Risedronate 35mg qwk since 11/2017  - repeat bone density in 11/2023    #GI:   - est w/ Dr. Sandhu,  3/2024  - egd 4/12/24  - u/s 5/18/24 - rec MRI MRCP for further eval     #Neuro: Trigeminal neuralgia (Lt)  - est w/ NP Adi,  2/2022  - new referral today     #Pain med: Pelvic pain  - est w/ NP Teto,  1/2024     #Rheum: Sjogren's, Fibromyalgia, Erythromelalgia, Raynauds  - est w/ Dr. Landrum-Estelle,  10/2023  - taking Plaquenil  - unable to use vasodilator for Raynauds due to low bp  - taking Elavil 60-70mg daily; Gabapentin 400mg qd  - sees ophtho twice yearly     #Uro: Gross hematuria  - est w/ Dr. Koch, lv 10/2021  - has seen Dr. Kenya Méndez as well  - random eps from 10/2020 to 5/2021     #Derm:  - est w/ Dr. Cobb,  7/2023     #Pelvic floor disorder  - phys therapy in past     #Obgyn:  - est w/ Dr. Sheikh,  3/2024    Review of Systems   Constitutional:  Negative for fever.   Respiratory:  Negative for shortness of breath.    Gastrointestinal:  Negative for abdominal pain.         Past Medical History:   Diagnosis Date    Asthma with allergic rhinitis     Bladder spasm     Dense breasts     heterogeneously/fibrocystic disease    Elevated antinuclear antibody (GUICHO) level     Erythromelalgia     Fibromyalgia     Ganglion cyst     left toe    Goiter     MNG    Headache(784.0)     Hypothyroidism     Hashimoto with + Tg ab    Kidney stone     Osteoporosis     femoral neck     [place] 2018    Raynaud's phenomenon     Rhinitis     Scoliosis     Sjogren's syndrome     Sleep disorder     type of Narcolepsy ( resolved)    Vitamin D deficiency disease     Vulvodynia         Prior to Admission medications    Medication Sig Start Date End Date Taking? Authorizing Provider   amitriptyline (ELAVIL) 10 MG tablet TAKE 2 TABLETS ONCE DAILY WITH THE 50 MG FOR A TOTAL OF 70 MG 10/9/23   Adam Lundberg MD   amitriptyline (ELAVIL) 50 MG tablet TAKE 1 TABLET NIGHTLY WITH THE 10 MG AMITRIPTYLINE 5/1/23   Adam Lundberg MD   aspirin (ECOTRIN) 81 MG EC tablet Take 81 mg by mouth once daily.    Provider, Historical   AZASITE 1 % Drop INSTILL 1 DROP INTO LEFT EYE BID 8/24/20   Provider, Historical   clindamycin (CLEOCIN T) 1 % external solution APPLY TOPICALLY TO THE AFFECTED AREA TWICE DAILY AS NEEDED FOR BREAKOUTS 2/7/23   Olga Cobb MD   desonide (DESOWEN) 0.05 % cream Apply topically 2 (two) times daily. To affected area in groin  x 1-2 wks then prn flares only 7/18/23   Olga Cobb MD   diazePAM (VALIUM) 5 MG tablet Take 1 tablet (5 mg total) by mouth On call Procedure for Anxiety (Take one pill 2 hours prior to MRI scan for claustrophobia). 5/19/24   Mynor Sandhu MD   estradioL (VAGIFEM) 10 mcg Tab Place 1 tablet (10 mcg total) vaginally twice a week. 3/14/24 3/14/25  Sandra Sheikh MD   estradiol 0.05 mg/24 hr td ptsw (VIVELLE-DOT) 0.05 mg/24 hr Place 1 patch onto the skin twice a week. 3/14/24 3/14/25  Sandra Sheikh MD   fluticasone (FLONASE) 50 mcg/actuation nasal spray 1 spray by Each Nare route once daily. 6/15/15   Estella Serrano MD   gabapentin (NEURONTIN) 100 MG capsule TAKE 1 CAPSULE IN THE MORNING AND 4 CAPSULES AT BEDTIME 10/9/23   Niya Irene, LAURENCE   hydroxychloroquine (PLAQUENIL) 200 mg tablet TAKE 1 TABLET DAILY 3/8/24   Katt Arredondo MD   imiquimod (ALDARA) 5 % cream Apply small amount to affected area on knee qhs x 4 weeks. Wash  off in am. Stop when red/inflamed. 23   Olga Cobb MD   ketoconazole (NIZORAL) 2 % cream Apply topically 2 (two) times daily. Apply daily to affected area 23   Sandra Sheikh MD   ketoprofen 10 % CrPk APPLY UP TO 4.8 GRAMS TO PAINFUL AREAS UP TO FIVE TIMES DAILY. RUB IN WELL 23   Provider, Historical   LIDOcaine-prilocaine (EMLA) cream Apply topically as needed. 10/9/23   Niya Irene, NP   liothyronine (CYTOMEL) 5 MCG Tab TAKE THREE AND ONE-HALF TABLETS ONCE DAILY 10/10/23   Leandro Espinoza MD   MAXITROL 3.5 mg/g-10,000 unit/g-0.1 % Oint SMARTSI.25 Inch(es) Left Eye Every Evening 10/5/23   Provider, Historical   MAXITROL 3.5mg/mL-10,000 unit/mL-0.1 % DrpS Place 1 drop into the left eye every morning. 10/5/23   Provider, Historical   metaxalone (SKELAXIN) 800 MG tablet TAKE 1 TABLET TWICE A DAY AS NEEDED FOR SPASM 10/9/23   Adam Lundberg MD   niacinamide 500 mg Tab Take 1 tablet (500 mg total) by mouth 2 (two) times a day. 23   Olga Cobb MD   NURTEC 75 mg odt Take by mouth. 3/3/22   Provider, Historical   pantoprazole (PROTONIX) 40 MG tablet Take 1 tablet (40 mg total) by mouth before breakfast. 24  Mynor Sandhu MD   pilocarpine (SALAGEN) 5 MG Tab Take 1 tablet (5 mg total) by mouth 3 (three) times daily as needed. 23   Adam Lundberg MD   PREVIDENT 5000 BOOSTER PLUS 1.1 % Pste  20   Provider, Historical   RESTASIS 0.05 % ophthalmic emulsion  14   Provider, Historical   risedronate 35 mg TbEC TAKE 1 TABLET ONCE A WEEK 23   Leandro Espinoza MD   sumatriptan (IMITREX) 100 MG tablet Take 1 tablet (100 mg total) by mouth every 2 (two) hours as needed for Migraine. 3/2/22 3/13/24  Nallely Joshi, NP   tretinoin (RETIN-A) 0.025 % gel Apply small amount to entire face qhs. Wash off qam and apply sunscreen. 3/25/22   Olga Cobb MD   UNABLE TO FIND Apply 240 g topically as needed. Ketoprofen/cyclobenzaprine HCI/Lidocaine  "(lipomax) 10/2/5% Up to 5 times daily as needed for pain 5/22/19   Provider, Historical   zolpidem (AMBIEN) 10 mg Tab TAKE 1 TABLET(10 MG) BY MOUTH EVERY NIGHT AS NEEDED 5/11/24   Adam Lundberg MD        Past medical history, surgical history, and family medical history reviewed and updated as appropriate.    Medications and allergies reviewed.     Objective:          Vitals:    05/22/24 0820   BP: 96/62   BP Location: Right arm   Patient Position: Sitting   BP Method: Small (Manual)   Pulse: 101   SpO2: 98%   Weight: 42.3 kg (93 lb 4.1 oz)   Height: 5' 1" (1.549 m)     Body mass index is 17.62 kg/m².  Physical Exam  Vitals and nursing note reviewed.   Constitutional:       General: She is not in acute distress.     Appearance: She is not ill-appearing, toxic-appearing or diaphoretic.   Pulmonary:      Effort: Pulmonary effort is normal. No respiratory distress.   Neurological:      Mental Status: She is alert.   Psychiatric:         Mood and Affect: Mood normal.         Behavior: Behavior normal.         Lab Results   Component Value Date    WBC 6.77 05/04/2024    HGB 12.5 05/04/2024    HCT 39.7 05/04/2024     05/04/2024    CHOL 133 08/19/2023    TRIG 28 (L) 08/19/2023    HDL 80 (H) 08/19/2023    ALT 29 05/04/2024    AST 29 05/04/2024     05/04/2024    K 4.1 05/04/2024    CL 98 05/04/2024    CREATININE 0.7 05/04/2024    BUN 15 05/04/2024    CO2 28 05/04/2024    TSH 1.342 05/04/2024    HGBA1C 5.0 08/19/2023       Assessment:       1. Estefani syndrome    2. Encounter for long-term (current) use of other medications    3. Immunosuppression    4. Erythromelalgia    5. Hypothyroidism due to Hashimoto's thyroiditis    6. Osteoporosis, postmenopausal          Plan:   1. Estefani syndrome  Overview:  Cont reg per specialists      2. Encounter for long-term (current) use of other medications    3. Immunosuppression  Overview:  Cont reg per Rheum      4. Erythromelalgia  Overview:  Cont following with " specialists      5. Hypothyroidism due to Hashimoto's thyroiditis    6. Osteoporosis, postmenopausal    Other orders  -     ALPRAZolam (XANAX) 1 MG tablet; Take 1 tablet (1 mg total) by mouth once as needed for Anxiety.  Dispense: 1 tablet; Refill: 0  -     amitriptyline (ELAVIL) 50 MG tablet; TAKE 1 TABLET NIGHTLY WITH THE 10 MG AMITRIPTYLINE  Dispense: 90 tablet; Refill: 3        Health maintenance reviewed with patient.     Total time spent face-to face with patient counseling or coordinating care including prognosis, risks and benefits of treatment, instructions as well as non-face-to-face time personally spent reviewing medial record, medical documentation, and coordination of care is 30 minutes.      No follow-ups on file.    Adam Lundberg MD  Family Medicine / Primary Care  [facility]  5/22/2024

## 2024-05-29 ENCOUNTER — PATIENT MESSAGE (OUTPATIENT)
Dept: INTERNAL MEDICINE | Facility: CLINIC | Age: 64
End: 2024-05-29
Payer: COMMERCIAL

## 2024-05-29 DIAGNOSIS — Z12.11 ENCOUNTER FOR COLORECTAL CANCER SCREENING: Primary | ICD-10-CM

## 2024-05-29 DIAGNOSIS — Z12.12 ENCOUNTER FOR COLORECTAL CANCER SCREENING: Primary | ICD-10-CM

## 2024-06-12 ENCOUNTER — HOSPITAL ENCOUNTER (OUTPATIENT)
Dept: RADIOLOGY | Facility: HOSPITAL | Age: 64
Discharge: HOME | End: 2024-06-12
Attending: OBSTETRICS & GYNECOLOGY
Payer: COMMERCIAL

## 2024-06-12 ENCOUNTER — OFFICE VISIT (OUTPATIENT)
Dept: DERMATOLOGY | Facility: CLINIC | Age: 64
End: 2024-06-12
Payer: COMMERCIAL

## 2024-06-12 VITALS — WEIGHT: 94 LBS | BODY MASS INDEX: 17.76 KG/M2

## 2024-06-12 DIAGNOSIS — T69.1XXD PERNIO, SUBSEQUENT ENCOUNTER: ICD-10-CM

## 2024-06-12 DIAGNOSIS — L40.0 PSORIASIS VULGARIS: Primary | ICD-10-CM

## 2024-06-12 DIAGNOSIS — Z12.83 SKIN CANCER SCREENING: ICD-10-CM

## 2024-06-12 DIAGNOSIS — Z12.31 ENCOUNTER FOR SCREENING MAMMOGRAM FOR MALIGNANT NEOPLASM OF BREAST: ICD-10-CM

## 2024-06-12 PROCEDURE — 77063 BREAST TOMOSYNTHESIS BI: CPT | Mod: TC

## 2024-06-12 PROCEDURE — 77063 BREAST TOMOSYNTHESIS BI: CPT | Mod: 26,,, | Performed by: RADIOLOGY

## 2024-06-12 PROCEDURE — 99999 PR PBB SHADOW E&M-EST. PATIENT-LVL I: CPT | Mod: PBBFAC,,, | Performed by: DERMATOLOGY

## 2024-06-12 PROCEDURE — 77067 SCR MAMMO BI INCL CAD: CPT | Mod: 26,,, | Performed by: RADIOLOGY

## 2024-06-12 PROCEDURE — 99214 OFFICE O/P EST MOD 30 MIN: CPT | Mod: S$GLB,,, | Performed by: DERMATOLOGY

## 2024-06-12 RX ORDER — TRIAMCINOLONE ACETONIDE 1 MG/G
CREAM TOPICAL 2 TIMES DAILY
Qty: 90 G | Refills: 3 | Status: SHIPPED | OUTPATIENT
Start: 2024-06-12

## 2024-06-12 NOTE — PROGRESS NOTES
I have seen the patient, reviewed the Resident's progress note. I have personally interviewed and examined the patient at bedside and agree with the findings.     Gretchen Hameed MD  [facility]

## 2024-06-12 NOTE — PROGRESS NOTES
Subjective:      Patient ID:  Berenice Hayes is a 64 y.o. female who presents for follow-up on legs    HPI  Berenice Hayes is a 64 y.o. female w/ hx of Sjogren's, fibromyalgia, thyroid disease who presents for follow-up for spots on L knee.     Last office visit 7-18-23 by Dr. Cobb . Hx of bx proven pernio Sjogrens, and erythromelalgia currently taking niacinamide 500 mg QD and plaquenil. She had episode of pernio after being off niacinamide for several years in Feb. She restarted and hasn't had an issue.  Also given desonide given for groin psoriasis that helps but returns when she stops.  and given imiquimod for warts on knees which she doesn't think has helped. Never biopsied in the past.     Review of Systems   Skin:  Positive for itching, rash, daily sunscreen use and activity-related sunscreen use. Negative for recent sunburn and wears hat.   Hematologic/Lymphatic: Bruises/bleeds easily (bruise).       Objective:   Physical Exam   Constitutional: She appears well-developed and well-nourished. No distress.   Neurological: She is alert. She is not disoriented.   Skin:   Areas Examined (abnormalities noted in diagram):   Head / Face Inspection Performed  Neck Inspection Performed  Chest / Axilla Inspection Performed  Abdomen Inspection Performed  Genitals / Buttocks / Groin Inspection Performed  Back Inspection Performed  RUE Inspected  LUE Inspection Performed  RLE Inspected  LLE Inspection Performed  Nails and Digits Inspection Performed            Diagram Legend     Erythematous scaling macule/papule c/w actinic keratosis       Vascular papule c/w angioma      Pigmented verrucoid papule/plaque c/w seborrheic keratosis      Yellow umbilicated papule c/w sebaceous hyperplasia      Irregularly shaped tan macule c/w lentigo     1-2 mm smooth white papules consistent with Milia      Movable subcutaneous cyst with punctum c/w epidermal inclusion cyst      Subcutaneous movable cyst c/w pilar cyst      Firm  pink to brown papule c/w dermatofibroma      Pedunculated fleshy papule(s) c/w skin tag(s)      Evenly pigmented macule c/w junctional nevus     Mildly variegated pigmented, slightly irregular-bordered macule c/w mildly atypical nevus      Flesh colored to evenly pigmented papule c/w intradermal nevus       Pink pearly papule/plaque c/w basal cell carcinoma      Erythematous hyperkeratotic cursted plaque c/w SCC      Surgical scar with no sign of skin cancer recurrence      Open and closed comedones      Inflammatory papules and pustules      Verrucoid papule consistent consistent with wart     Erythematous eczematous patches and plaques     Dystrophic onycholytic nail with subungual debris c/w onychomycosis     Umbilicated papule    Erythematous-base heme-crusted tan verrucoid plaque consistent with inflamed seborrheic keratosis     Erythematous Silvery Scaling Plaque c/w Psoriasis     See annotation          Assessment / Plan:        Psoriasis vulgaris  Mild  Never biopsy proven. Suspect the erythematous scaly papules present on groin, elbow and knee are c/w pso. We will trial topical steroids and can consider biopsy if not resolved  -Start triamcinolone 0.1% cream BID to arm and leg, avoid applying to groin  -Continue desonide to groin   -     triamcinolone acetonide 0.1% (KENALOG) 0.1 % cream; Apply topically 2 (two) times daily. To affected areas of skin for 2 weeks out of a month. Avoid applying to face, groin, or armpits.  Dispense: 90 g; Refill: 3    Pernio, subsequent encounter  Bx proven in 2019  -continue to follow-up with rheum    RTC  for TBSE. Pt stated she will call and make appointment when her other medical have become less busy (she is undergoing workup for pancreas).

## 2024-06-13 ENCOUNTER — TELEPHONE (OUTPATIENT)
Dept: ENDOSCOPY | Facility: HOSPITAL | Age: 64
End: 2024-06-13
Payer: COMMERCIAL

## 2024-06-13 ENCOUNTER — OFFICE VISIT (OUTPATIENT)
Dept: GASTROENTEROLOGY | Facility: CLINIC | Age: 64
End: 2024-06-13
Payer: COMMERCIAL

## 2024-06-13 ENCOUNTER — PATIENT MESSAGE (OUTPATIENT)
Dept: GASTROENTEROLOGY | Facility: CLINIC | Age: 64
End: 2024-06-13
Payer: COMMERCIAL

## 2024-06-13 VITALS
HEART RATE: 103 BPM | HEIGHT: 60 IN | SYSTOLIC BLOOD PRESSURE: 94 MMHG | WEIGHT: 94.13 LBS | DIASTOLIC BLOOD PRESSURE: 65 MMHG | BODY MASS INDEX: 18.48 KG/M2

## 2024-06-13 DIAGNOSIS — R93.5 ABNORMAL CT OF THE ABDOMEN: Primary | ICD-10-CM

## 2024-06-13 PROCEDURE — 99213 OFFICE O/P EST LOW 20 MIN: CPT | Mod: S$GLB,,, | Performed by: INTERNAL MEDICINE

## 2024-06-13 PROCEDURE — 1159F MED LIST DOCD IN RCRD: CPT | Mod: CPTII,S$GLB,, | Performed by: INTERNAL MEDICINE

## 2024-06-13 PROCEDURE — 1160F RVW MEDS BY RX/DR IN RCRD: CPT | Mod: CPTII,S$GLB,, | Performed by: INTERNAL MEDICINE

## 2024-06-13 PROCEDURE — 99999 PR PBB SHADOW E&M-EST. PATIENT-LVL III: CPT | Mod: PBBFAC,,, | Performed by: INTERNAL MEDICINE

## 2024-06-13 PROCEDURE — 3008F BODY MASS INDEX DOCD: CPT | Mod: CPTII,S$GLB,, | Performed by: INTERNAL MEDICINE

## 2024-06-13 PROCEDURE — 3078F DIAST BP <80 MM HG: CPT | Mod: CPTII,S$GLB,, | Performed by: INTERNAL MEDICINE

## 2024-06-13 PROCEDURE — 3074F SYST BP LT 130 MM HG: CPT | Mod: CPTII,S$GLB,, | Performed by: INTERNAL MEDICINE

## 2024-06-13 NOTE — PROGRESS NOTES
SUBJECTIVE:         Chief Complaint:   Chief Complaint   Patient presents with    OTHER     Dilated Duct        History of Present Illness:  Patient is a 64 y.o. female presents with chronic abdominal pain and a dilated PD on CT scan.  She has had a long history of chronic abdominal pain issue.  She gets occasional bouts of intense pain.  Recently she underwent a CT enterography that showed a focally dilated pancreatic duct around the genu of the pancreas.  She has had no reports of pancreatitis.  She has had an upper endoscopy that showed no evidence of peptic ulcer disease.  OBJECTIVE:     Vital Signs (Most Recent)  Pulse: 103 (06/13/24 0855)  BP: 94/65 (06/13/24 0855)    General: well developed, well nourished    Diagnostic Results:  Labs: Reviewed  CT: Reviewed      ASSESSMENT/PLAN:     She has a focally dilated pancreatic duct the main concern for the etiology being a main duct intra papillary mucinous neoplasm.  We personally reviewed the CT together and I look back at prior CT scans that showed only prominence of the pancreatic duct.  It appeared to measure approximately 4 mm in 2021.  She has not had pancreatitis.  We discussed what a main duct IPMN is and how it typically is followed.  We would like to do an upper endoscopic ultrasound to better evaluate for pancreatic duct.  At least the MRI that is scheduled can be abandoned.  She will likely require yearly surveillance for this main duct IPMN.

## 2024-06-13 NOTE — TELEPHONE ENCOUNTER
Telephone patient to schedule EUS   with no answer. Direct contact given to call back to schedule procedure.

## 2024-06-13 NOTE — PROGRESS NOTES
GENERAL GI PATIENT INTAKE:    COVID symptoms in the last 7 days (runny nose, sore throat, congestion, cough, fever): No  PCP: Adam Lundberg  If not PCP-  number given to establish [phone]: N/A    ALLERGIES REVIEWED:  N/A    CHIEF COMPLAINT:    Chief Complaint   Patient presents with    OTHER     Dilated Duct        VITAL SIGNS:  BP 94/65   Pulse 103   Ht 5' (1.524 m)   Wt 42.7 kg (94 lb 2.2 oz)   LMP  (LMP Unknown)   BMI 18.38 kg/m²      Change in medical, surgical, family or social history: No      REVIEWED MEDICATION LIST RECONCILED INCLUDING ABOVE MEDS:  Yes

## 2024-06-14 ENCOUNTER — TELEPHONE (OUTPATIENT)
Dept: ENDOSCOPY | Facility: HOSPITAL | Age: 64
End: 2024-06-14
Payer: COMMERCIAL

## 2024-06-14 ENCOUNTER — PATIENT MESSAGE (OUTPATIENT)
Dept: ENDOSCOPY | Facility: HOSPITAL | Age: 64
End: 2024-06-14
Payer: COMMERCIAL

## 2024-06-14 DIAGNOSIS — K86.89 DILATED PANCREATIC DUCT: Primary | ICD-10-CM

## 2024-06-14 NOTE — TELEPHONE ENCOUNTER
Spoke to patient to schedule procedure(s) Upper Endoscopy Ultrasound (EUS)       Physician to perform procedure(s) Dr. DEMETRIO Baez  Date of Procedure (s) 7/26/2024  Arrival Time 2:00 PM  Time of Procedure(s) 3:00 PM   Location of Procedure(s) [address]  Type of Rx Prep sent to patient: Other  Instructions provided to patient via [facility]    Patient was informed on the following information and verbalized understanding. Screening questionnaire reviewed with patient and complete. If procedure requires anesthesia, a responsible adult needs to be present to accompany the patient home, patient cannot drive after receiving anesthesia. Appointment details are tentative, especially check-in time. Patient will receive a prep-op call 7 days prior to confirm check-in time for procedure. If applicable the patient should contact their pharmacy to verify Rx for procedure prep is ready for pick-up. Patient was advised to call the scheduling department at [phone] if pharmacy states no Rx is available. Patient was advised to call the endoscopy scheduling department if any questions or concerns arise.      [facility] Scheduling Department

## 2024-06-24 ENCOUNTER — OFFICE VISIT (OUTPATIENT)
Dept: RHEUMATOLOGY | Facility: CLINIC | Age: 64
End: 2024-06-24
Payer: COMMERCIAL

## 2024-06-24 VITALS
DIASTOLIC BLOOD PRESSURE: 66 MMHG | SYSTOLIC BLOOD PRESSURE: 96 MMHG | BODY MASS INDEX: 18.17 KG/M2 | HEART RATE: 101 BPM | HEIGHT: 60 IN | WEIGHT: 92.56 LBS

## 2024-06-24 DIAGNOSIS — R53.83 FATIGUE, UNSPECIFIED TYPE: ICD-10-CM

## 2024-06-24 DIAGNOSIS — M79.7 FIBROMYALGIA: ICD-10-CM

## 2024-06-24 DIAGNOSIS — I73.00 RAYNAUD'S DISEASE WITHOUT GANGRENE: ICD-10-CM

## 2024-06-24 DIAGNOSIS — I73.81 ERYTHROMELALGIA: ICD-10-CM

## 2024-06-24 DIAGNOSIS — M35.0B SJOGREN'S SYNDROME WITH VASCULITIS: Primary | ICD-10-CM

## 2024-06-24 PROCEDURE — 3008F BODY MASS INDEX DOCD: CPT | Mod: CPTII,S$GLB,, | Performed by: INTERNAL MEDICINE

## 2024-06-24 PROCEDURE — 1160F RVW MEDS BY RX/DR IN RCRD: CPT | Mod: CPTII,S$GLB,, | Performed by: INTERNAL MEDICINE

## 2024-06-24 PROCEDURE — 3074F SYST BP LT 130 MM HG: CPT | Mod: CPTII,S$GLB,, | Performed by: INTERNAL MEDICINE

## 2024-06-24 PROCEDURE — 1159F MED LIST DOCD IN RCRD: CPT | Mod: CPTII,S$GLB,, | Performed by: INTERNAL MEDICINE

## 2024-06-24 PROCEDURE — 99999 PR PBB SHADOW E&M-EST. PATIENT-LVL V: CPT | Mod: PBBFAC,,, | Performed by: INTERNAL MEDICINE

## 2024-06-24 PROCEDURE — 99215 OFFICE O/P EST HI 40 MIN: CPT | Mod: S$GLB,,, | Performed by: INTERNAL MEDICINE

## 2024-06-24 PROCEDURE — 3078F DIAST BP <80 MM HG: CPT | Mod: CPTII,S$GLB,, | Performed by: INTERNAL MEDICINE

## 2024-06-24 ASSESSMENT — ROUTINE ASSESSMENT OF PATIENT INDEX DATA (RAPID3)
MDHAQ FUNCTION SCORE: 1.4
WHEN YOU AWAKENED IN THE MORNING OVER THE LAST WEEK, PLEASE INDICATE THE AMOUNT OF TIME IT TAKES UNTIL YOU ARE AS LIMBER AS YOU WILL BE FOR THE DAY: 1 HOUR
FATIGUE SCORE: 5
PAIN SCORE: 5.5
AM STIFFNESS SCORE: 1, YES
TOTAL RAPID3 SCORE: 5.05
PSYCHOLOGICAL DISTRESS SCORE: 1.1
PATIENT GLOBAL ASSESSMENT SCORE: 5

## 2024-06-24 NOTE — PROGRESS NOTES
"Subjective:       Patient ID: Berenice Hayes is a 64 y.o. female.    Chief Complaint: Sjogrens    HPI:  Berenice Hayes is a 64 y.o. female followed for concerns of autoimmune issues.   Diagnosed with fibromyalgia at age 34.   At age 42 had erythromyelgia with redness all over. She was treated by vascular with a medication but caused Raynauds to develop.  She saw Dr. Hill in vascular.      She has history significant pelvic discomfort since 2009.  She saw therapist for pelvic floor therapy which has helped. 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  She had pudenal nerve block 2/3/15.     She saw Dr. Harmon in the past who felt she had fibriomyalgia.  She tried amitriptyline for some time.   She takes Atelvia for OP.        She has felt bad since 2014.  "Hit with massive heat wave beyond what others feel with menopause."  Mouth was very dry and primary gave pilocarpine.  Spring of 2015 GUICHO was negative.  Previously had hand pains.  X-rays of hands normal.  She was found by endocrine to be hypoglycemic.  Ultrasound thyroid showed nodules that were negative on biopsy.  August 2015 diagnosed as having Hashimotos disease.   Synthroid did not help levels.  She is on Cytomel now.    Saw ENT for throat pain in neck since ultrasound was done.  ENT will biopsy a solid nodule on left lobe thyroid.  CT with contrast showed enlargement in the saliva gland.  Her mother had fibromyalgia and PMR as well as history ETOH abuse as an adult.  Father had osteoarthritis and required complete thumb reconstruction on one thumb.     In past dermatology did skin biopsy and diagnosed pernio (no evidence of lupus vasculitis).   Daughter with Hashimotos and thyroid Ca.    In May to Oct 2021 worked with a dietician.  Did food sensitivity and chemical sensitivity test. Was found to have issues with Tylenol and garlic.       Interval History:    Flare of stomach issue that was associated with elevated CRP.  Saw GI who did " colonoscopy and saw iron deposits but she does not take iron.   Had CT scan that showed dilated pancreatic duct.  She will endoscope with ultrasound to evaluate the duct.      Saw dermatology of pernio lesions on hands.  Now seeing Dr. Hameed who felt the new rash on knees was psoriasis.      Saw dermatology Dr. Cobb regarding losing nails.  In the past the Plaquenil helped the nails.  Dermatology gave niacinamide to help but worsens erythromelalgia.  Dermatology diagnosed psoriasis vulgaris based on rash in groin.  Cream prescribed helped.     Difficulty with dry mouth that impact talking at work on phone.  Dealing with a stye.  Eye doctor has seen her who gave different antibiotic than the one she was using from a supply she had at home.   Still feels Sjogrens is getting worse with dry eyes and mouth despite using pilocarpine and Restasis. Pain 4/10 ache all over with nerve pain.   Difficult to move in morning.   Sitting worsens pain and it improves with walking.        Saw Dr. Isidro Dash vascular medicine who did full work up that showed venous insufficiency and gave low dose bumetanide 0.5 mg to 3 times a week.   She has not started bumetanide yet due to preparing for daughter's wedding.     She did not see neurology December 2022 for memory issues due to the department cancelling and she needs to reschedule.   History of migraine that improved with chiropractor and in summer 2019 had trigeminal neuralgia on left.    She will see usual pain management provider Niya Richard that she sees once a year and provides compounded pain cream.    Now reports red discoloration of hand similar to 2019 when she developed ulcers below nail especially right 3rd finger that led her to lose nails.   She continues to have episodes Raynauds and erythromyelgia on her feet that improves with elevation and worsen with walking.  Long standing history of erythromyelgia.   She gets nerve pain in feet.  Difficulty walking at end of  day.  Uses compression sock.   In May to Oct 2021 worked with a dietician.  Did food sensitivity and chemical sensitivity test. Was found to have issues with Tylenol and garlic.   Doing sunflower butter on bananas.   Eating more proteins with strawberries to absorb iron.   Still sees an acupuncturist.        Review of Systems   Constitutional:  Positive for fatigue. Negative for fever and unexpected weight change.   HENT:  Positive for mouth sores and trouble swallowing.    Eyes:  Positive for redness.   Respiratory:  Negative for cough and shortness of breath.    Cardiovascular:  Negative for chest pain.   Gastrointestinal:  Positive for constipation. Negative for diarrhea.   Endocrine: Negative.    Genitourinary:  Negative for dysuria and genital sores.   Musculoskeletal:  Positive for arthralgias.   Skin:  Negative for rash.        Redness of fingers  Hair loss with stress   Allergic/Immunologic: Negative.    Neurological:  Positive for headaches.   Hematological:  Positive for adenopathy. Bruises/bleeds easily.   Psychiatric/Behavioral: Negative.           Objective:   BP 96/66   Pulse 101   Ht 5' (1.524 m)   Wt 42 kg (92 lb 9.5 oz)   LMP  (LMP Unknown)   BMI 18.08 kg/m²  Virtual     Physical Exam   Constitutional: She is oriented to person, place, and time.   HENT:   Head: Normocephalic and atraumatic.   Eyes: Conjunctivae are normal.   Cardiovascular: Normal rate, regular rhythm and normal heart sounds.   Pulmonary/Chest: Effort normal and breath sounds normal.   Abdominal: Soft. Bowel sounds are normal.   Musculoskeletal:         General: No tenderness or deformity.      Cervical back: Neck supple.      Comments: Squaring right 1st CMC  28 joint count: 0 swollen and 0 tender   Neurological: She is alert and oriented to person, place, and time.   Skin: Skin is warm and dry. There is erythema.   Erythema at periungal area multiple fingers on both hands   Psychiatric: Mood and affect normal.        LABS    Component      Latest Ref Rng 5/2/2024 5/4/2024   WBC      3.90 - 12.70 K/uL  6.77    RBC      4.00 - 5.40 M/uL  4.25    Hemoglobin      12.0 - 16.0 g/dL  12.5    Hematocrit      37.0 - 48.5 %  39.7    MCV      82 - 98 fL  93    MCH      27.0 - 31.0 pg  29.4    MCHC      32.0 - 36.0 g/dL  31.5 (L)    RDW      11.5 - 14.5 %  12.7    Platelet Count      150 - 450 K/uL  255    MPV      9.2 - 12.9 fL  10.0    Immature Granulocytes      0.0 - 0.5 %  0.3    Gran # (ANC)      1.8 - 7.7 K/uL  5.2    Immature Grans (Abs)      0.00 - 0.04 K/uL  0.02    Lymph #      1.0 - 4.8 K/uL  0.8 (L)    Mono #      0.3 - 1.0 K/uL  0.6    Eos #      0.0 - 0.5 K/uL  0.2    Baso #      0.00 - 0.20 K/uL  0.06    nRBC      0 /100 WBC  0    Gran %      38.0 - 73.0 %  76.6 (H)    Lymph %      18.0 - 48.0 %  11.7 (L)    Mono %      4.0 - 15.0 %  8.1    Eos %      0.0 - 8.0 %  2.4    Basophil %      0.0 - 1.9 %  0.9    Differential Method  Automated    Sodium      136 - 145 mmol/L  138    Potassium      3.5 - 5.1 mmol/L  4.1    Chloride      95 - 110 mmol/L  98    CO2      23 - 29 mmol/L  28    Glucose      70 - 110 mg/dL  96    BUN      8 - 23 mg/dL  15    Creatinine      0.5 - 1.4 mg/dL  0.7    Calcium      8.7 - 10.5 mg/dL  9.9    PROTEIN TOTAL      6.0 - 8.4 g/dL  7.9    Albumin      3.5 - 5.2 g/dL  4.3    BILIRUBIN TOTAL      0.1 - 1.0 mg/dL  0.4    ALP      55 - 135 U/L  78    AST      10 - 40 U/L  29    ALT      10 - 44 U/L  29    eGFR      >60 mL/min/1.73 m^2  >60.0    Anion Gap      8 - 16 mmol/L  12    Specimen UA  Urine, Clean Catch    Color, UA      Yellow, Straw, Kari   Yellow    Appearance, UA      Clear   Hazy !    pH, UA      5.0 - 8.0   6.0    Spec Grav UA      1.005 - 1.030   1.010    Protein, UA      Negative   Negative    Glucose, UA      Negative   Negative    Ketones, UA      Negative   Trace !    Bilirubin (UA)      Negative   Negative    Blood, UA      Negative   Negative    NITRITE UA      Negative    Negative    Leukocyte Esterase, UA      Negative   Negative    Urine Protein      0 - 15 mg/dL  <7    Urine Creatinine      15.0 - 325.0 mg/dL  32.0    Urine Protein/Creatinine Ratio      0.00 - 0.20   Unable to calculate    POC Creatinine      0.5 - 1.4 mg/dL 0.5     Sample VENOUS     CPK      20 - 180 U/L  45    Complement (C-4)      11 - 44 mg/dL  24    Complement (C-3)      50 - 180 mg/dL  114    ds DNA Ab      Negative 1:10   Negative 1:10    Sed Rate      0 - 36 mm/Hr  9    CRP      0.0 - 8.2 mg/L  2.6    TSH      0.400 - 4.000 uIU/mL  1.342    Free T4      0.71 - 1.51 ng/dL  0.55 (L)    T3, Total      60 - 180 ng/dL  103       Legend:  (L) Low  (H) High  ! Abnormal    Assessment:       1. Sjogren syndrome. Chronic.  Negative SSA and SSB in past now SSA positive.  Monitor labs.    Currently symptomatic management with Plaquenil, Restasis and pilocarpine. As well as OTC Systance.  2. Raynauds. Symptomatic management. Still with changes around nails but no digital ulcers.  Anxiety worsens.  3. Fibromyalgia. Managed with amitriptyline. Persistent pain and unable to increase amitriptyline due to worsening fatigue  4. Hand pain b/l. X-ray without changes. Has periodic episodes of pain.  5. Osteoporosis.  Now risedronate. Still on estradiol patch.  6. Erythromelalgia.  Takes baby aspirin  7. Sleep disorder in the past. History of narcolepsy but no longer a problem. Sleep doctor in past gave Ritalin bid which helped while she took it for 2 weeks but no longer needs it since treatment of Hashimoto.  8. Increased heat in body. Improved  9. Menopause  10.  Hashimotos disease  11.  Multinodular goiter   12.  Pudenal nerve pain.  Improved with acupuncture which she continues every 1-2 weeks. Worsened with water therapy so stopped.    13.  Pernio.  Planning to speak with dermatology about restarting nicatinamide  14.  Rash on abdomen in past.  Concern for Addisons raised in this patient with hypotension.  Mother had  Addisons.  Homeopath/acupuncturist gave progesterone hormone drops   15.  Fatigue.  Persistent.  16.  Decreased memory  17.  Stress.  Did eight sessions with counselor.  Anxiety worsens Raynauds.   18.  Alopecia  19.  Trigeminal neuralgia on left diagnosed in 2017  20.  Decreased memory.  6 hours of sleep a night but feels it is not enough.  21.  Episode of migraine for 10 days.  Treated by chiropractor and improved after 5 days of treatment.  Saw neurology after virtually.   22.  Migraine.  Managed by chiropractor every 2 weeks.  23.  Psoriasis vulgaris.  Diagnosed 2023 based on rash in groin  24.  Hyperkalemia.  Family history [place]    Plan:       1.  Continue Plaquenil.   (Patient had eye exam done in May 2024).  2.  Discussed her complaints of side effects with pilocarpine but she is not interested trying Evoxac (cevimiline).  She uses Systane ointment at night if very dry.   3.  Continue fibromyalgia treatment.  4.  Patient unable to use vasodilator for Raynaud's due to low BP  5.  Continue elevating legs at work and continue compression stockings  6.  Follow with counselor to help deal with stress of chronic illness.   7.  Follow with derm to discuss nicatinamide.  8.  Encourage regular sleep.  Do activities that bring raquel.  Consider artificial saliva to help with dryness that interrupt sleeing.    9.  Message to Dermatology regarding Plaquenil and possibility of inducing or worsening psorias.         RTO 6 months/prn

## 2024-06-24 NOTE — Clinical Note
Patient is on Plaquenil.  Do you think it induced psoriasis?  Should we stop Plaquenil?  All other rheumatology meds would have a more significant side effect profile.

## 2024-06-25 ENCOUNTER — PATIENT MESSAGE (OUTPATIENT)
Dept: RHEUMATOLOGY | Facility: CLINIC | Age: 64
End: 2024-06-25
Payer: COMMERCIAL

## 2024-06-25 ENCOUNTER — TELEPHONE (OUTPATIENT)
Dept: RHEUMATOLOGY | Facility: CLINIC | Age: 64
End: 2024-06-25
Payer: COMMERCIAL

## 2024-06-25 NOTE — TELEPHONE ENCOUNTER
----- Message from Gretchen Hameed MD sent at 6/25/2024  1:26 PM CDT -----  I don't think so. I would keep her on the Plaquenil.  I'm not even totally sure her skin rash is psoriasis; it's a little odd.  We may biopsy her next time.  ----- Message -----  From: Katt Arredondo MD  Sent: 6/24/2024  11:33 AM CDT  To: Gretchen Hameed MD    Patient is on Plaquenil.  Do you think it induced psoriasis?  Should we stop Plaquenil?  All other rheumatology meds would have a more significant side effect profile.

## 2024-07-04 ENCOUNTER — PATIENT MESSAGE (OUTPATIENT)
Dept: ENDOCRINOLOGY | Facility: CLINIC | Age: 64
End: 2024-07-04
Payer: COMMERCIAL

## 2024-07-19 ENCOUNTER — TELEPHONE (OUTPATIENT)
Dept: GASTROENTEROLOGY | Facility: CLINIC | Age: 64
End: 2024-07-19
Payer: COMMERCIAL

## 2024-07-23 ENCOUNTER — TELEPHONE (OUTPATIENT)
Dept: GASTROENTEROLOGY | Facility: CLINIC | Age: 64
End: 2024-07-23
Payer: COMMERCIAL

## 2024-07-23 NOTE — TELEPHONE ENCOUNTER
Spoke with patient to confirmed EUS on 7/26  Reviewed prep instructions, arrival time and location, and medication.  Patient confirmed that he does have a ride for the procedure.  Patient had no additional questions.

## 2024-07-23 NOTE — TELEPHONE ENCOUNTER
----- Message from Laura Bowling RN sent at 7/23/2024  1:42 PM CDT -----  Regarding: FW: Pt returned call for Nasreen  Contact: [phone]    ----- Message -----  From: Gail Ahmadi  Sent: 7/23/2024  12:49 PM CDT  To: Laura Bowling RN  Subject: FW: Pt returned call for Nasreen                       ----- Message -----  From: Yomaira Devries  Sent: 7/22/2024   4:02 PM CDT  To: [facility] Endoscopy Schedulers  Subject: Pt returned call for Nasreen                         Type:  Patient Returning Call        Who Called:CHELSEA SALTER [[id number]]        Who Left Message for Patient:Nasreen        Does the patient know what this is regarding?Pt returned call for Nasreen. Please advise         Best Call Back Number:Telephone Information:  Mobile          [phone]            Additional Information:

## 2024-07-26 ENCOUNTER — ANESTHESIA EVENT (OUTPATIENT)
Dept: ENDOSCOPY | Facility: HOSPITAL | Age: 64
End: 2024-07-26
Payer: COMMERCIAL

## 2024-07-26 ENCOUNTER — ANESTHESIA (OUTPATIENT)
Dept: ENDOSCOPY | Facility: HOSPITAL | Age: 64
End: 2024-07-26
Payer: COMMERCIAL

## 2024-07-26 ENCOUNTER — HOSPITAL ENCOUNTER (OUTPATIENT)
Facility: HOSPITAL | Age: 64
Discharge: HOME | End: 2024-07-26
Attending: INTERNAL MEDICINE | Admitting: INTERNAL MEDICINE
Payer: COMMERCIAL

## 2024-07-26 VITALS
BODY MASS INDEX: 18.46 KG/M2 | TEMPERATURE: 98 F | SYSTOLIC BLOOD PRESSURE: 107 MMHG | RESPIRATION RATE: 20 BRPM | HEIGHT: 60 IN | HEART RATE: 82 BPM | OXYGEN SATURATION: 100 % | DIASTOLIC BLOOD PRESSURE: 64 MMHG | WEIGHT: 94 LBS

## 2024-07-26 DIAGNOSIS — K86.89 DILATED PANCREATIC DUCT: ICD-10-CM

## 2024-07-26 PROCEDURE — 63600175 PHARM REV CODE 636 W HCPCS: Performed by: NURSE ANESTHETIST, CERTIFIED REGISTERED

## 2024-07-26 PROCEDURE — 37000008 HC ANESTHESIA 1ST 15 MINUTES: Performed by: INTERNAL MEDICINE

## 2024-07-26 PROCEDURE — D9220A PRA ANESTHESIA: Mod: ANES,,, | Performed by: ANESTHESIOLOGY

## 2024-07-26 PROCEDURE — 25000003 PHARM REV CODE 250: Performed by: NURSE ANESTHETIST, CERTIFIED REGISTERED

## 2024-07-26 PROCEDURE — D9220A PRA ANESTHESIA: Mod: CRNA,,, | Performed by: NURSE ANESTHETIST, CERTIFIED REGISTERED

## 2024-07-26 PROCEDURE — 37000009 HC ANESTHESIA EA ADD 15 MINS: Performed by: INTERNAL MEDICINE

## 2024-07-26 PROCEDURE — 43259 EGD US EXAM DUODENUM/JEJUNUM: CPT | Mod: ,,, | Performed by: INTERNAL MEDICINE

## 2024-07-26 PROCEDURE — 43259 EGD US EXAM DUODENUM/JEJUNUM: CPT | Performed by: INTERNAL MEDICINE

## 2024-07-26 RX ORDER — SODIUM CHLORIDE 9 MG/ML
INJECTION, SOLUTION INTRAVENOUS CONTINUOUS
Status: DISCONTINUED | OUTPATIENT
Start: 2024-07-26 | End: 2024-07-26 | Stop reason: HOSPADM

## 2024-07-26 RX ORDER — SODIUM CHLORIDE 0.9 % (FLUSH) 0.9 %
3 SYRINGE (ML) INJECTION
Status: DISCONTINUED | OUTPATIENT
Start: 2024-07-26 | End: 2024-07-26 | Stop reason: HOSPADM

## 2024-07-26 RX ORDER — GLUCAGON 1 MG
1 KIT INJECTION
Status: DISCONTINUED | OUTPATIENT
Start: 2024-07-26 | End: 2024-07-26 | Stop reason: HOSPADM

## 2024-07-26 RX ORDER — PROPOFOL 10 MG/ML
VIAL (ML) INTRAVENOUS CONTINUOUS PRN
Status: DISCONTINUED | OUTPATIENT
Start: 2024-07-26 | End: 2024-07-26

## 2024-07-26 RX ORDER — LIDOCAINE HYDROCHLORIDE 20 MG/ML
INJECTION INTRAVENOUS
Status: DISCONTINUED | OUTPATIENT
Start: 2024-07-26 | End: 2024-07-26

## 2024-07-26 RX ORDER — PROPOFOL 10 MG/ML
VIAL (ML) INTRAVENOUS
Status: DISCONTINUED | OUTPATIENT
Start: 2024-07-26 | End: 2024-07-26

## 2024-07-26 RX ORDER — SODIUM CHLORIDE 0.9 % (FLUSH) 0.9 %
10 SYRINGE (ML) INJECTION
Status: DISCONTINUED | OUTPATIENT
Start: 2024-07-26 | End: 2024-07-26 | Stop reason: HOSPADM

## 2024-07-26 RX ADMIN — PROPOFOL 100 MG: 10 INJECTION, EMULSION INTRAVENOUS at 02:07

## 2024-07-26 RX ADMIN — PROPOFOL 150 MCG/KG/MIN: 10 INJECTION, EMULSION INTRAVENOUS at 02:07

## 2024-07-26 RX ADMIN — LIDOCAINE HYDROCHLORIDE 50 MG: 20 INJECTION INTRAVENOUS at 02:07

## 2024-07-26 RX ADMIN — SODIUM CHLORIDE: 0.9 INJECTION, SOLUTION INTRAVENOUS at 02:07

## 2024-07-26 NOTE — TRANSFER OF CARE
Anesthesia Transfer of Care Note    Patient: Berenice Hayes    Procedure(s) Performed: Procedure(s) (LRB):  ULTRASOUND, UPPER GI TRACT, ENDOSCOPIC (N/A)    Patient location: [facility]    Anesthesia Type: general    Transport from OR: Transported from OR on 6-10 L/min O2 by face mask with adequate spontaneous ventilation    Post pain: adequate analgesia    Post assessment: no apparent anesthetic complications and tolerated procedure well    Post vital signs: stable    Level of consciousness: awake and alert    Nausea/Vomiting: no nausea/vomiting    Complications: none    Transfer of care protocol was followed      Last vitals: Visit Vitals  /68 (BP Location: Left arm, Patient Position: Lying)   Pulse 92   Temp 37.1 °C (98.8 °F) (Temporal)   Resp 16   Ht 5' (1.524 m)   Wt 42.6 kg (94 lb)   LMP  (LMP Unknown)   SpO2 100%   Breastfeeding No   BMI 18.36 kg/m²

## 2024-07-26 NOTE — ANESTHESIA PREPROCEDURE EVALUATION
07/26/2024  [facility]  Anesthesia Pre-Operative Evaluation         Patient Name: Berenice Hayes  YOB: 1960  MRN: [id number]    SUBJECTIVE:     Pre-operative evaluation for Procedure(s) (LRB):  ULTRASOUND, UPPER GI TRACT, ENDOSCOPIC (N/A)     07/26/2024    Berenice Hayes is a 64 y.o. female     Full medical history listed below      LDA: None documented.    Prev airway: None documented.     GTTs: None documented.        Patient Active Problem List   Diagnosis    Fibromyalgia    Bladder spasm    Osteoporosis, postmenopausal    Fibrocystic breast changes    Vulvar vestibulodynia    Vascular disorder: Erytnromelalgia    Raynaud's disease    Incomplete defecation    Chronic constipation    Rectocele    Disorder of muscle    Anxiety    Bilateral hand pain    Fatigue    Menopause syndrome    Pudendal neuralgia    Urinary hesitancy    Hypothyroidism due to Hashimoto's thyroiditis    Multinodular goiter    Encounter for herb and vitamin supplement management    Gluten free diet    Family history of ischemic heart disease (IHD)    Cystocele, midline    Estefani syndrome    Chronic pain syndrome    Trigeminal neuralgia    Hip pain, right    Sjogren's syndrome    Pernio    Coccydynia    Muscle tension dysphonia    Encounter for long-term (current) use of other medications    Chronic nonintractable headache    Erythromelalgia    Varicose veins of both lower extremities with inflammation    Edema of both lower extremities    Venous insufficiency of right lower extremity    Immunosuppression    Abnormal CT of the abdomen       Review of patient's allergies indicates:  No Known Allergies    Current Inpatient Medications:      No current facility-administered medications on file prior to encounter.     Current Outpatient Medications on File Prior to Encounter   Medication Sig Dispense Refill     amitriptyline (ELAVIL) 10 MG tablet TAKE 2 TABLETS ONCE DAILY WITH THE 50 MG FOR A TOTAL OF 70  tablet 3    amitriptyline (ELAVIL) 50 MG tablet TAKE 1 TABLET NIGHTLY WITH THE 10 MG AMITRIPTYLINE 90 tablet 3    fluticasone (FLONASE) 50 mcg/actuation nasal spray 1 spray by Each Nare route once daily. 3 Bottle 3    gabapentin (NEURONTIN) 100 MG capsule TAKE 1 CAPSULE IN THE MORNING AND 4 CAPSULES AT BEDTIME 450 capsule 3    hydroxychloroquine (PLAQUENIL) 200 mg tablet TAKE 1 TABLET DAILY 90 tablet 3    liothyronine (CYTOMEL) 5 MCG Tab TAKE THREE AND ONE-HALF TABLETS ONCE DAILY 315 tablet 3    metaxalone (SKELAXIN) 800 MG tablet TAKE 1 TABLET TWICE A DAY AS NEEDED FOR SPASM 180 tablet 3    niacinamide 500 mg Tab Take 1 tablet (500 mg total) by mouth 2 (two) times a day. 180 tablet 2    pilocarpine (SALAGEN) 5 MG Tab Take 1 tablet (5 mg total) by mouth 3 (three) times daily as needed. 270 tablet 3    zolpidem (AMBIEN) 10 mg Tab TAKE 1 TABLET(10 MG) BY MOUTH EVERY NIGHT AS NEEDED 30 tablet 2    ALPRAZolam (XANAX) 1 MG tablet Take 1 tablet (1 mg total) by mouth once as needed for Anxiety. 1 tablet 0    aspirin (ECOTRIN) 81 MG EC tablet Take 81 mg by mouth once daily.      AZASITE 1 % Drop INSTILL 1 DROP INTO LEFT EYE BID      clindamycin (CLEOCIN T) 1 % external solution APPLY TOPICALLY TO THE AFFECTED AREA TWICE DAILY AS NEEDED FOR BREAKOUTS 30 mL 3    desonide (DESOWEN) 0.05 % cream Apply topically 2 (two) times daily. To affected area in groin  x 1-2 wks then prn flares only 60 g 2    diazePAM (VALIUM) 5 MG tablet Take 1 tablet (5 mg total) by mouth On call Procedure for Anxiety (Take one pill 2 hours prior to MRI scan for claustrophobia). 1 tablet 0    estradioL (VAGIFEM) 10 mcg Tab Place 1 tablet (10 mcg total) vaginally twice a week. 24 tablet 3    estradiol 0.05 mg/24 hr td ptsw (VIVELLE-DOT) 0.05 mg/24 hr Place 1 patch onto the skin twice a week. 24 patch 3    imiquimod (ALDARA) 5 % cream Apply small  amount to affected area on knee qhs x 4 weeks. Wash off in am. Stop when red/inflamed. 24 packet 1    ketoconazole (NIZORAL) 2 % cream Apply topically 2 (two) times daily. Apply daily to affected area 30 g 0    ketoprofen 10 % CrPk APPLY UP TO 4.8 GRAMS TO PAINFUL AREAS UP TO FIVE TIMES DAILY. RUB IN WELL      LIDOcaine-prilocaine (EMLA) cream Apply topically as needed. 90 g 3    MAXITROL 3.5 mg/g-10,000 unit/g-0.1 % Oint SMARTSI.25 Inch(es) Left Eye Every Evening      MAXITROL 3.5mg/mL-10,000 unit/mL-0.1 % DrpS Place 1 drop into the left eye every morning.      NURTEC 75 mg odt Take by mouth.      pantoprazole (PROTONIX) 40 MG tablet Take 1 tablet (40 mg total) by mouth before breakfast. 90 tablet 3    PREVIDENT 5000 BOOSTER PLUS 1.1 % Pste       RESTASIS 0.05 % ophthalmic emulsion       risedronate 35 mg TbEC TAKE 1 TABLET ONCE A WEEK 12 tablet 3    sumatriptan (IMITREX) 100 MG tablet Take 1 tablet (100 mg total) by mouth every 2 (two) hours as needed for Migraine. 9 tablet 1    tretinoin (RETIN-A) 0.025 % gel Apply small amount to entire face qhs. Wash off qam and apply sunscreen. 45 g 3    triamcinolone acetonide 0.1% (KENALOG) 0.1 % cream Apply topically 2 (two) times daily. To affected areas of skin for 2 weeks out of a month. Avoid applying to face, groin, or armpits. 90 g 3    UNABLE TO FIND Apply 240 g topically as needed. Ketoprofen/cyclobenzaprine HCI/Lidocaine (lipomax) 10/2/5% Up to 5 times daily as needed for pain  99       Past Surgical History:   Procedure Laterality Date    BREAST SURGERY      fibrocystic tumor removed     SECTION      2x    CYST REMOVAL      laparoscopic cyst on ovary    ESOPHAGOGASTRODUODENOSCOPY N/A 2024    Procedure: EGD (ESOPHAGOGASTRODUODENOSCOPY);  Surgeon: Mynor Sandhu MD;  Location: [address];  Service: Endoscopy;  Laterality: N/A;  -precall complete-MS    HYSTERECTOMY      INJECTION OF ANESTHETIC AGENT AROUND NERVE N/A 2019     Procedure: BLOCK, NERVE COCCYGEAL  1 OF 2  Pt. can drive herself home;  Surgeon: Monique Philip MD;  Location: [facility];  Service: Pain Management;  Laterality: N/A;    INJECTION OF ANESTHETIC AGENT AROUND NERVE N/A 7/19/2019    Procedure: BLOCK, NERVE COCCYGEAL  2 OF 2  Pt. can drive herself home;  Surgeon: Monique Philip MD;  Location: [facility];  Service: Pain Management;  Laterality: N/A;    intradermal cyst       removed from left index finger    SINUS SURGERY      2x       Social History     Socioeconomic History    Marital status:    Occupational History     Employer: [employer]   Tobacco Use    Smoking status: Never    Smokeless tobacco: Never   Substance and Sexual Activity    Alcohol use: No    Drug use: No    Sexual activity: Not Currently     Birth control/protection: Surgical     Comment: single, RAMOS ov in situ    Social History Narrative    She is     She has 2 children a son who is a  and a daughter 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     Social Determinants of Health     Financial Resource Strain: Low Risk  (1/7/2024)    Overall Financial Resource Strain (CARDIA)     Difficulty of Paying Living Expenses: Not very hard   Food Insecurity: No Food Insecurity (1/7/2024)    Hunger Vital Sign     Worried About Running Out of Food in the Last Year: Never true     Ran Out of Food in the Last Year: Never true   Transportation Needs: No Transportation Needs (1/7/2024)    PRAPARE - Transportation     Lack of Transportation (Medical): No     Lack of Transportation (Non-Medical): No   Physical Activity: Insufficiently Active (1/7/2024)    Exercise Vital Sign     Days of Exercise per Week: 1 day     Minutes of Exercise per Session: 20 min   Stress: No Stress Concern Present (1/7/2024)    [place] [employer] [place] [employer] - Occupational Stress Questionnaire     Feeling of Stress : Only a  "little   Housing Stability: Low Risk  (1/7/2024)    Housing Stability Vital Sign     Unable to Pay for Housing in the Last Year: No     Number of Places Lived in the Last Year: 1     Unstable Housing in the Last Year: No       OBJECTIVE:     Vital Signs Range (Last 24H):  Temp:  [37.1 °C (98.8 °F)]   Pulse:  [92]   Resp:  [16]   BP: (107)/(68)   SpO2:  [100 %]       CBC:   No results for input(s): "WBC", "RBC", "HGB", "HCT", "PLT", "MCV", "MCH", "MCHC" in the last 72 hours.    CMP: No results for input(s): "NA", "K", "CL", "CO2", "BUN", "CREATININE", "GLU", "MG", "PHOS", "CALCIUM", "ALBUMIN", "PROT", "ALKPHOS", "ALT", "AST", "BILITOT" in the last 72 hours.    INR:  No results for input(s): "PT", "INR", "PROTIME", "APTT" in the last 72 hours.    Diagnostic Studies: No relevant studies.    EKG:   Results for orders placed or performed during the hospital encounter of 03/29/21   EKG 12-lead    Collection Time: 03/29/21 11:08 AM    Narrative    Test Reason : R10.9,    Vent. Rate : 106 BPM     Atrial Rate : 106 BPM     P-R Int : 144 ms          QRS Dur : 066 ms      QT Int : 346 ms       P-R-T Axes : 082 093 074 degrees     QTc Int : 459 ms    Sinus tachycardia  Biatrial enlargement  Rightward axis  Anterior infarct (cited on or before 29-MAR-2021)  Abnormal ECG  When compared with ECG of 20-JUN-2017 08:17,  No significant change was found  Confirmed by Sonal Bonds MD ([id number]) on 3/30/2021 7:23:16 AM    Referred By: TOMMY KIMBALL           Confirmed By:Sonal Bonds MD        2D ECHO:   No results found for this or any previous visit.         ASSESSMENT/PLAN:           Pre-op Assessment    I have reviewed the Patient Summary Reports.     I have reviewed the Nursing Notes. I have reviewed the NPO Status.   I have reviewed the Medications.     Review of Systems  Anesthesia Hx:   History of prior surgery of interest to airway management or planning:          Denies Family Hx of Anesthesia complications.    Denies " Personal Hx of Anesthesia complications.                        Physical Exam  General: Well nourished, Cooperative, Alert and Oriented    Airway:  Mallampati: II   Mouth Opening: Normal  TM Distance: Normal  Tongue: Normal  Neck ROM: Normal ROM    Dental:  Intact    Chest/Lungs:  Clear to auscultation, Normal Respiratory Rate    Heart:  Rate: Normal  Rhythm: Regular Rhythm        Anesthesia Plan  Type of Anesthesia, risks & benefits discussed:    Anesthesia Type: Gen Natural Airway  Intra-op Monitoring Plan: Standard ASA Monitors  Post Op Pain Control Plan: multimodal analgesia and IV/PO Opioids PRN  Induction:  IV  Airway Plan: Video  Informed Consent: Informed consent signed with the Patient and all parties understand the risks and agree with anesthesia plan.  All questions answered.   ASA Score: 2  Day of Surgery Review of History & Physical: H&P Update referred to the surgeon/provider.    Ready For Surgery From Anesthesia Perspective.     .

## 2024-07-26 NOTE — PROVATION PATIENT INSTRUCTIONS
Discharge Summary/Instructions after an Endoscopic Procedure  Patient Name: Berenice Hayes  Patient MRN: [id number]  Patient YOB: 1960     Friday, July 26, 2024  Salty Baez MD  Dear patient,  As a result of recent federal legislation (The Federal Cures Act), you may   receive lab or pathology results from your procedure in your [facility]   account before your physician is able to contact you. Your physician or   their representative will relay the results to you with their   recommendations at their soonest availability.  Thank you,  RESTRICTIONS:  During your procedure today, you received medications for sedation.  These   medications may affect your judgment, balance and coordination.  Therefore,   for 24 hours, you have the following restrictions:   - DO NOT drive a car, operate machinery, make legal/financial decisions,   sign important papers or drink alcohol.    ACTIVITY:  Today: no heavy lifting, straining or running due to procedural   sedation/anesthesia.  The following day: return to full activity including work.  DIET:  Eat and drink normally unless instructed otherwise.     TREATMENT FOR COMMON SIDE EFFECTS:  - Mild abdominal pain, nausea, belching, bloating or excessive gas:  rest,   eat lightly and use a heating pad.  - Sore Throat: treat with throat lozenges and/or gargle with warm salt   water.  - Because air was used during the procedure, expelling large amounts of air   from your rectum or belching is normal.  - If a bowel prep was taken, you may not have a bowel movement for 1-3 days.    This is normal.  SYMPTOMS TO WATCH FOR AND REPORT TO YOUR PHYSICIAN:  1. Abdominal pain or bloating, other than gas cramps.  2. Chest pain.  3. Back pain.  4. Signs of infection such as: chills or fever occurring within 24 hours   after the procedure.  5. Rectal bleeding, which would show as bright red, maroon, or black stools.   (A tablespoon of blood from the rectum is not serious, especially if    hemorrhoids are present.)  6. Vomiting.  7. Weakness or dizziness.  GO DIRECTLY TO THE NEAREST EMERGENCY ROOM IF YOU HAVE ANY OF THE FOLLOWING:      Difficulty breathing              Chills and/or fever over 101 F   Persistent vomiting and/or vomiting blood   Severe abdominal pain   Severe chest pain   Black, tarry stools   Bleeding- more than one tablespoon   Any other symptom or condition that you feel may need urgent attention  Your doctor recommends these additional instructions:  If any biopsies were taken, your doctors clinic will contact you in 1 to 2   weeks with any results.  - Discharge patient to home.   - Resume previous diet.   - Continue present medications.   - Return to primary care physician at appointment to be scheduled.  For questions, problems or results please call your physician - Salty Baez MD at Work:  [phone].  [facility], EMERGENCY ROOM PHONE NUMBER: [phone]  IF A COMPLICATION OR EMERGENCY SITUATION ARISES AND YOU ARE UNABLE TO REACH   YOUR PHYSICIAN - GO DIRECTLY TO THE EMERGENCY ROOM.  Salty Baez MD  7/26/2024 3:09:22 PM  This report has been verified and signed electronically.  Dear patient,  As a result of recent federal legislation (The Federal Cures Act), you may   receive lab or pathology results from your procedure in your [facility]   account before your physician is able to contact you. Your physician or   their representative will relay the results to you with their   recommendations at their soonest availability.  Thank you,  PROVATION

## 2024-07-26 NOTE — H&P
Short Stay Endoscopy History and Physical    PCP - Adam Lundberg MD  Referring Physician - Salty Baez MD  [address]  [address]  [place] [id number]    Procedure - eus  ASA - per anesthesia  Mallampati - per anesthesia  History of Anesthesia problems - no  Family history Anesthesia problems -  no   Plan of anesthesia - General    HPI:  This is a 64 y.o. female here for evaluation of: dilated pd    Reflux - no  Dysphagia - no  Abdominal pain - no  Diarrhea - no    ROS:  Constitutional: No fevers, chills, No weight loss  CV: No chest pain  Pulm: No cough, No shortness of breath  Ophtho: No vision changes  GI: see HPI  Derm: No rash    Medical History:  has a past medical history of Asthma with allergic rhinitis, Bladder spasm, Dense breasts, Elevated antinuclear antibody (GUICHO) level, Erythromelalgia, Fibromyalgia, Ganglion cyst, Goiter, Headache(784.0), Hypothyroidism, Kidney stone, Osteoporosis, Pernio (2018), Raynaud's phenomenon, Rhinitis, Scoliosis, Sjogren's syndrome, Sleep disorder, Vitamin D deficiency disease, and Vulvodynia.    Surgical History:  has a past surgical history that includes Breast surgery; Sinus surgery; Cyst Removal; intradermal cyst ;  section; Hysterectomy; Injection of anesthetic agent around nerve (N/A, 2019); Injection of anesthetic agent around nerve (N/A, 2019); and Esophagogastroduodenoscopy (N/A, 2024).    Family History: family history includes Adrenal disorder in her mother; Arthritis in her father and mother; Cancer in her brother and daughter; Coronary artery disease in her father; Diabetes in her father and mother; Fibromyalgia in her mother; Heart disease in her father; Hyperlipidemia in her brother and father; Hypertension in her father and mother; Hypothyroidism in her daughter; Kidney disease in her mother; Macular degeneration in her mother; Pneumonia in her mother; Polymyalgia rheumatica in her mother; Stomach cancer in her  paternal uncle; Thyroid cancer in her daughter..    Social History:  reports that she has never smoked. She has never used smokeless tobacco. She reports that she does not drink alcohol and does not use drugs.    Review of patient's allergies indicates:  No Known Allergies    Medications:   Medications Prior to Admission   Medication Sig Dispense Refill Last Dose    amitriptyline (ELAVIL) 10 MG tablet TAKE 2 TABLETS ONCE DAILY WITH THE 50 MG FOR A TOTAL OF 70  tablet 3 7/25/2024    amitriptyline (ELAVIL) 50 MG tablet TAKE 1 TABLET NIGHTLY WITH THE 10 MG AMITRIPTYLINE 90 tablet 3 7/25/2024    fluticasone (FLONASE) 50 mcg/actuation nasal spray 1 spray by Each Nare route once daily. 3 Bottle 3 7/25/2024    gabapentin (NEURONTIN) 100 MG capsule TAKE 1 CAPSULE IN THE MORNING AND 4 CAPSULES AT BEDTIME 450 capsule 3 7/25/2024    hydroxychloroquine (PLAQUENIL) 200 mg tablet TAKE 1 TABLET DAILY 90 tablet 3 7/25/2024    liothyronine (CYTOMEL) 5 MCG Tab TAKE THREE AND ONE-HALF TABLETS ONCE DAILY 315 tablet 3 7/26/2024    metaxalone (SKELAXIN) 800 MG tablet TAKE 1 TABLET TWICE A DAY AS NEEDED FOR SPASM 180 tablet 3 7/25/2024    niacinamide 500 mg Tab Take 1 tablet (500 mg total) by mouth 2 (two) times a day. 180 tablet 2 7/25/2024    pilocarpine (SALAGEN) 5 MG Tab Take 1 tablet (5 mg total) by mouth 3 (three) times daily as needed. 270 tablet 3 7/25/2024    zolpidem (AMBIEN) 10 mg Tab TAKE 1 TABLET(10 MG) BY MOUTH EVERY NIGHT AS NEEDED 30 tablet 2 7/25/2024    ALPRAZolam (XANAX) 1 MG tablet Take 1 tablet (1 mg total) by mouth once as needed for Anxiety. 1 tablet 0     aspirin (ECOTRIN) 81 MG EC tablet Take 81 mg by mouth once daily.       AZASITE 1 % Drop INSTILL 1 DROP INTO LEFT EYE BID       clindamycin (CLEOCIN T) 1 % external solution APPLY TOPICALLY TO THE AFFECTED AREA TWICE DAILY AS NEEDED FOR BREAKOUTS 30 mL 3     desonide (DESOWEN) 0.05 % cream Apply topically 2 (two) times daily. To affected area in groin  x 1-2  wks then prn flares only 60 g 2     diazePAM (VALIUM) 5 MG tablet Take 1 tablet (5 mg total) by mouth On call Procedure for Anxiety (Take one pill 2 hours prior to MRI scan for claustrophobia). 1 tablet 0     estradioL (VAGIFEM) 10 mcg Tab Place 1 tablet (10 mcg total) vaginally twice a week. 24 tablet 3     estradiol 0.05 mg/24 hr td ptsw (VIVELLE-DOT) 0.05 mg/24 hr Place 1 patch onto the skin twice a week. 24 patch 3     imiquimod (ALDARA) 5 % cream Apply small amount to affected area on knee qhs x 4 weeks. Wash off in am. Stop when red/inflamed. 24 packet 1     ketoconazole (NIZORAL) 2 % cream Apply topically 2 (two) times daily. Apply daily to affected area 30 g 0     ketoprofen 10 % CrPk APPLY UP TO 4.8 GRAMS TO PAINFUL AREAS UP TO FIVE TIMES DAILY. RUB IN WELL       LIDOcaine-prilocaine (EMLA) cream Apply topically as needed. 90 g 3     MAXITROL 3.5 mg/g-10,000 unit/g-0.1 % Oint SMARTSI.25 Inch(es) Left Eye Every Evening       MAXITROL 3.5mg/mL-10,000 unit/mL-0.1 % DrpS Place 1 drop into the left eye every morning.       NURTEC 75 mg odt Take by mouth.       pantoprazole (PROTONIX) 40 MG tablet Take 1 tablet (40 mg total) by mouth before breakfast. 90 tablet 3     PREVIDENT 5000 BOOSTER PLUS 1.1 % Pste        RESTASIS 0.05 % ophthalmic emulsion        risedronate 35 mg TbEC TAKE 1 TABLET ONCE A WEEK 12 tablet 3     sumatriptan (IMITREX) 100 MG tablet Take 1 tablet (100 mg total) by mouth every 2 (two) hours as needed for Migraine. 9 tablet 1     tretinoin (RETIN-A) 0.025 % gel Apply small amount to entire face qhs. Wash off qam and apply sunscreen. 45 g 3     triamcinolone acetonide 0.1% (KENALOG) 0.1 % cream Apply topically 2 (two) times daily. To affected areas of skin for 2 weeks out of a month. Avoid applying to face, groin, or armpits. 90 g 3     UNABLE TO FIND Apply 240 g topically as needed. Ketoprofen/cyclobenzaprine HCI/Lidocaine (lipomax) 10/2/5% Up to 5 times daily as needed for pain  99         Physical Exam:    Vital Signs:   Vitals:    07/26/24 1428   BP: 107/68   Pulse: 92   Resp: 16   Temp: 98.8 °F (37.1 °C)       General Appearance: Well appearing in no acute distress    Labs:  Lab Results   Component Value Date    WBC 6.77 05/04/2024    HGB 12.5 05/04/2024    HCT 39.7 05/04/2024     05/04/2024    CHOL 133 08/19/2023    TRIG 28 (L) 08/19/2023    HDL 80 (H) 08/19/2023    ALT 29 05/04/2024    AST 29 05/04/2024     05/04/2024    K 4.1 05/04/2024    CL 98 05/04/2024    CREATININE 0.7 05/04/2024    BUN 15 05/04/2024    CO2 28 05/04/2024    TSH 1.342 05/04/2024    HGBA1C 5.0 08/19/2023       I have explained the risks and benefits of this endoscopic procedure to the patient including but not limited to bleeding, inflammation, infection, perforation, and death.      Salty Baez MD

## 2024-07-29 NOTE — ANESTHESIA POSTPROCEDURE EVALUATION
Anesthesia Post Evaluation    Patient: Berenice Hayes    Procedure(s) Performed: Procedure(s) (LRB):  ULTRASOUND, UPPER GI TRACT, ENDOSCOPIC (N/A)    Final Anesthesia Type: general      Patient location during evaluation: PACU  Patient participation: Yes- Able to Participate  Level of consciousness: awake and alert and oriented  Post-procedure vital signs: reviewed and stable  Pain management: adequate  Airway patency: patent    PONV status at discharge: No PONV  Anesthetic complications: no      Cardiovascular status: blood pressure returned to baseline and hemodynamically stable  Respiratory status: unassisted  Hydration status: euvolemic  Follow-up not needed.              Vitals Value Taken Time   /64 07/26/24 1545   Temp 36.7 °C (98 °F) 07/26/24 1545   Pulse 82 07/26/24 1545   Resp 20 07/26/24 1545   SpO2 100 % 07/26/24 1545         No case tracking events are documented in the log.      Pain/Zabrina Score: No data recorded

## 2024-08-01 NOTE — ASSESSMENT & PLAN NOTE
--With multiple thyroid nodules  --has had multiple benign FNAs of the right mid lobe nodule, and 1 benign FNA of the left lobe nodule  --she has had 3 nondiagnostic FNAs of the right inferior pole nodule, however, molecular markers were negative on this nodule in 07/2019  --Last ultrasound with stable nodules in 5/2024  --Repeat thyroid ultrasound 5/2025     Outreach attempt was made to schedule a Medicare Wellness Visit. This was the first attempt. Contact was not made, left message.

## 2024-08-13 ENCOUNTER — LAB VISIT (OUTPATIENT)
Dept: LAB | Facility: HOSPITAL | Age: 64
End: 2024-08-13
Attending: FAMILY MEDICINE
Payer: COMMERCIAL

## 2024-08-13 DIAGNOSIS — Z12.11 ENCOUNTER FOR COLORECTAL CANCER SCREENING: ICD-10-CM

## 2024-08-13 DIAGNOSIS — Z12.12 ENCOUNTER FOR COLORECTAL CANCER SCREENING: ICD-10-CM

## 2024-08-13 LAB — HEMOCCULT STL QL IA: NEGATIVE

## 2024-08-13 PROCEDURE — 82274 ASSAY TEST FOR BLOOD FECAL: CPT | Performed by: FAMILY MEDICINE

## 2024-08-18 ENCOUNTER — PATIENT MESSAGE (OUTPATIENT)
Dept: DERMATOLOGY | Facility: CLINIC | Age: 64
End: 2024-08-18
Payer: COMMERCIAL

## 2024-08-19 ENCOUNTER — PATIENT MESSAGE (OUTPATIENT)
Dept: INTERNAL MEDICINE | Facility: CLINIC | Age: 64
End: 2024-08-19
Payer: COMMERCIAL

## 2024-08-19 DIAGNOSIS — Z00.00 ANNUAL PHYSICAL EXAM: Primary | ICD-10-CM

## 2024-08-26 DIAGNOSIS — T69.1XXA CHILBLAINS, INITIAL ENCOUNTER: ICD-10-CM

## 2024-08-26 RX ORDER — NIACINAMIDE 500 MG
500 TABLET ORAL 2 TIMES DAILY
Qty: 180 TABLET | Refills: 2 | Status: SHIPPED | OUTPATIENT
Start: 2024-08-26

## 2024-08-31 ENCOUNTER — LAB VISIT (OUTPATIENT)
Dept: LAB | Facility: HOSPITAL | Age: 64
End: 2024-08-31
Attending: FAMILY MEDICINE
Payer: COMMERCIAL

## 2024-08-31 DIAGNOSIS — Z00.00 ANNUAL PHYSICAL EXAM: ICD-10-CM

## 2024-08-31 LAB
ANION GAP SERPL CALC-SCNC: 9 MMOL/L (ref 8–16)
BUN SERPL-MCNC: 15 MG/DL (ref 8–23)
CALCIUM SERPL-MCNC: 9.4 MG/DL (ref 8.7–10.5)
CHLORIDE SERPL-SCNC: 104 MMOL/L (ref 95–110)
CHOLEST SERPL-MCNC: 133 MG/DL (ref 120–199)
CHOLEST/HDLC SERPL: 1.7 {RATIO} (ref 2–5)
CO2 SERPL-SCNC: 26 MMOL/L (ref 23–29)
CREAT SERPL-MCNC: 0.7 MG/DL (ref 0.5–1.4)
EST. GFR  (NO RACE VARIABLE): >60 ML/MIN/1.73 M^2
ESTIMATED AVG GLUCOSE: 105 MG/DL (ref 68–131)
GLUCOSE SERPL-MCNC: 89 MG/DL (ref 70–110)
HBA1C MFR BLD: 5.3 % (ref 4–5.6)
HDLC SERPL-MCNC: 80 MG/DL (ref 40–75)
HDLC SERPL: 60.2 % (ref 20–50)
LDLC SERPL CALC-MCNC: 47 MG/DL (ref 63–159)
NONHDLC SERPL-MCNC: 53 MG/DL
POTASSIUM SERPL-SCNC: 4.8 MMOL/L (ref 3.5–5.1)
SODIUM SERPL-SCNC: 139 MMOL/L (ref 136–145)
TRIGL SERPL-MCNC: 30 MG/DL (ref 30–150)

## 2024-08-31 PROCEDURE — 83036 HEMOGLOBIN GLYCOSYLATED A1C: CPT | Performed by: FAMILY MEDICINE

## 2024-08-31 PROCEDURE — 80061 LIPID PANEL: CPT | Performed by: FAMILY MEDICINE

## 2024-08-31 PROCEDURE — 80048 BASIC METABOLIC PNL TOTAL CA: CPT | Performed by: FAMILY MEDICINE

## 2024-08-31 PROCEDURE — 36415 COLL VENOUS BLD VENIPUNCTURE: CPT | Performed by: FAMILY MEDICINE

## 2024-09-03 ENCOUNTER — OFFICE VISIT (OUTPATIENT)
Dept: INTERNAL MEDICINE | Facility: CLINIC | Age: 64
End: 2024-09-03
Payer: COMMERCIAL

## 2024-09-03 VITALS
HEIGHT: 60 IN | SYSTOLIC BLOOD PRESSURE: 100 MMHG | HEART RATE: 82 BPM | DIASTOLIC BLOOD PRESSURE: 60 MMHG | OXYGEN SATURATION: 100 % | WEIGHT: 94.38 LBS | BODY MASS INDEX: 18.53 KG/M2

## 2024-09-03 DIAGNOSIS — Z00.00 ANNUAL PHYSICAL EXAM: Primary | ICD-10-CM

## 2024-09-03 DIAGNOSIS — G89.29 CHRONIC PAIN OF RIGHT WRIST: ICD-10-CM

## 2024-09-03 DIAGNOSIS — Z79.899 ENCOUNTER FOR LONG-TERM (CURRENT) USE OF OTHER MEDICATIONS: ICD-10-CM

## 2024-09-03 DIAGNOSIS — M25.531 CHRONIC PAIN OF RIGHT WRIST: ICD-10-CM

## 2024-09-03 PROCEDURE — 1159F MED LIST DOCD IN RCRD: CPT | Mod: CPTII,S$GLB,, | Performed by: FAMILY MEDICINE

## 2024-09-03 PROCEDURE — 99999 PR PBB SHADOW E&M-EST. PATIENT-LVL V: CPT | Mod: PBBFAC,,, | Performed by: FAMILY MEDICINE

## 2024-09-03 PROCEDURE — 3074F SYST BP LT 130 MM HG: CPT | Mod: CPTII,S$GLB,, | Performed by: FAMILY MEDICINE

## 2024-09-03 PROCEDURE — 3044F HG A1C LEVEL LT 7.0%: CPT | Mod: CPTII,S$GLB,, | Performed by: FAMILY MEDICINE

## 2024-09-03 PROCEDURE — 99396 PREV VISIT EST AGE 40-64: CPT | Mod: S$GLB,,, | Performed by: FAMILY MEDICINE

## 2024-09-03 PROCEDURE — 3078F DIAST BP <80 MM HG: CPT | Mod: CPTII,S$GLB,, | Performed by: FAMILY MEDICINE

## 2024-09-03 PROCEDURE — 3008F BODY MASS INDEX DOCD: CPT | Mod: CPTII,S$GLB,, | Performed by: FAMILY MEDICINE

## 2024-09-03 RX ORDER — PILOCARPINE HYDROCHLORIDE 5 MG/1
5 TABLET, FILM COATED ORAL 3 TIMES DAILY PRN
Qty: 270 TABLET | Refills: 3 | Status: SHIPPED | OUTPATIENT
Start: 2024-09-03

## 2024-09-03 RX ORDER — AMITRIPTYLINE HYDROCHLORIDE 10 MG/1
TABLET, FILM COATED ORAL
Qty: 180 TABLET | Refills: 3 | Status: SHIPPED | OUTPATIENT
Start: 2024-09-03

## 2024-09-03 NOTE — PROGRESS NOTES
Subjective:       Patient ID: Berenice Hayes is a 64 y.o. female.    Chief Complaint: Annual Exam    HPI    Berenice Hayes is a 64 y.o. female PMHx Hypothyroid, Osteoporosis for checkup.     Right wrist pain     #Cards: Venous insuff  - est w/ Dr. Dash,  6/2023 - f/u 6m     #Endo: Hypothyroid, Multinodular goiter, Osteoporosis  - est w/ Dr. Espinoza,  5/2024 - upcoming 11/2024  - reg: Cytomel 5 tid  - reg: Prolia q6m (started 2024)  - has not done well w/ synthroid in the past  - thryoid u/s 5/2024 - f/u u/s in 1yr     #GI:   - est w/ Dr. Sandhu,  3/2024  - est w/ Dr. Baez,  6/2024  - egd 4/12/24  - u/s 5/18/24  - upper eus 7/26/24 - no sign of significant pathology in the entire pancreas     #Neuro: Trigeminal neuralgia (Lt)  - est w/ NP Adi,  2/2022     #Pain med: Pelvic pain  - est w/ NP Teto,  1/2024     #Rheum: Sjogren's, Fibromyalgia, Erythromelalgia, Raynauds  - est w/ Dr. Landrum-Estelle,  6/2024 - rtc 6m  - taking Plaquenil  - unable to use vasodilator for Raynauds due to low bp  - taking Elavil 60-70mg daily; Gabapentin 400mg qd  - sees ophtho twice yearly     #Uro: Gross hematuria  - est w/ Dr. Koch,  10/2021  - has seen Dr. Kenya Méndez as well  - random eps from 10/2020 to 5/2021     #Derm:  - est w/ Dr. Hameed,  6/2024     #Pelvic floor disorder  - phys therapy in past     #Obgyn:  - est w/ Dr. Sheikh,  3/2024    #BMI 18    Review of Systems   Constitutional:  Negative for activity change and unexpected weight change.   HENT:  Negative for rhinorrhea.    Eyes:  Negative for discharge and visual disturbance.   Respiratory:  Negative for chest tightness and wheezing.    Cardiovascular:  Negative for chest pain and palpitations.   Gastrointestinal:  Negative for blood in stool, constipation, diarrhea and vomiting.   Endocrine: Negative for polydipsia and polyuria.   Genitourinary:  Negative for difficulty urinating, dysuria, hematuria and menstrual problem.    Musculoskeletal:  Positive for arthralgias and neck pain. Negative for joint swelling.   Neurological:  Negative for weakness and headaches.   Psychiatric/Behavioral:  Negative for confusion and dysphoric mood.          Past Medical History:   Diagnosis Date    Asthma with allergic rhinitis     Bladder spasm     Dense breasts     heterogeneously/fibrocystic disease    Elevated antinuclear antibody (GUICHO) level     Erythromelalgia     Fibromyalgia     Ganglion cyst     left toe    Goiter     MNG    Headache(784.0)     Hypothyroidism     Hashimoto with + Tg ab    Kidney stone     Osteoporosis     femoral neck    Pernio 2018    Raynaud's phenomenon     Rhinitis     Scoliosis     Sjogren's syndrome     Sleep disorder     type of Narcolepsy ( resolved)    Vitamin D deficiency disease     Vulvodynia         Prior to Admission medications    Medication Sig Start Date End Date Taking? Authorizing Provider   ALPRAZolam (XANAX) 1 MG tablet Take 1 tablet (1 mg total) by mouth once as needed for Anxiety. 5/22/24 5/23/24  Adam Lundberg MD   amitriptyline (ELAVIL) 10 MG tablet TAKE 2 TABLETS ONCE DAILY WITH THE 50 MG FOR A TOTAL OF 70 MG 10/9/23   Adam Lundberg MD   amitriptyline (ELAVIL) 50 MG tablet TAKE 1 TABLET NIGHTLY WITH THE 10 MG AMITRIPTYLINE 5/22/24   Adam Lundberg MD   aspirin (ECOTRIN) 81 MG EC tablet Take 81 mg by mouth once daily.    Provider, Historical   AZASITE 1 % Drop INSTILL 1 DROP INTO LEFT EYE BID 8/24/20   Provider, Historical   clindamycin (CLEOCIN T) 1 % external solution APPLY TOPICALLY TO THE AFFECTED AREA TWICE DAILY AS NEEDED FOR BREAKOUTS 2/7/23   Olga Cobb MD   desonide (DESOWEN) 0.05 % cream Apply topically 2 (two) times daily. To affected area in groin  x 1-2 wks then prn flares only 7/18/23   Olga Cobb MD   diazePAM (VALIUM) 5 MG tablet Take 1 tablet (5 mg total) by mouth On call Procedure for Anxiety (Take one pill 2 hours prior to MRI scan for claustrophobia). 5/19/24    Mynor Sandhu MD   estradioL (VAGIFEM) 10 mcg Tab Place 1 tablet (10 mcg total) vaginally twice a week. 3/14/24 3/14/25  Sandra Sheikh MD   estradiol 0.05 mg/24 hr td ptsw (VIVELLE-DOT) 0.05 mg/24 hr Place 1 patch onto the skin twice a week. 3/14/24 3/14/25  Sandra Sheikh MD   fluticasone (FLONASE) 50 mcg/actuation nasal spray 1 spray by Each Nare route once daily. 6/15/15   Estella Serrano MD   gabapentin (NEURONTIN) 100 MG capsule TAKE 1 CAPSULE IN THE MORNING AND 4 CAPSULES AT BEDTIME 10/9/23   Niya Irene NP   hydroxychloroquine (PLAQUENIL) 200 mg tablet TAKE 1 TABLET DAILY 3/8/24   Katt Arredondo MD   imiquimod (ALDARA) 5 % cream Apply small amount to affected area on knee qhs x 4 weeks. Wash off in am. Stop when red/inflamed. 23   Olga Cobb MD   ketoconazole (NIZORAL) 2 % cream Apply topically 2 (two) times daily. Apply daily to affected area 23   Sandra Sheikh MD   ketoprofen 10 % CrPk APPLY UP TO 4.8 GRAMS TO PAINFUL AREAS UP TO FIVE TIMES DAILY. RUB IN WELL 23   Provider, Historical   LIDOcaine-prilocaine (EMLA) cream Apply topically as needed. 10/9/23   Niya Irene NP   liothyronine (CYTOMEL) 5 MCG Tab TAKE THREE AND ONE-HALF TABLETS ONCE DAILY 10/10/23   Leandro Espinoza MD   MAXITROL 3.5 mg/g-10,000 unit/g-0.1 % Oint SMARTSI.25 Inch(es) Left Eye Every Evening 10/5/23   Provider, Historical   MAXITROL 3.5mg/mL-10,000 unit/mL-0.1 % DrpS Place 1 drop into the left eye every morning. 10/5/23   Provider, Historical   metaxalone (SKELAXIN) 800 MG tablet TAKE 1 TABLET TWICE A DAY AS NEEDED FOR SPASM 10/9/23   Adam Lundberg MD   niacinamide 500 mg Tab Take 1 tablet (500 mg total) by mouth 2 (two) times a day. 24   Gretchen Hameed MD   NURTEC 75 mg odt Take by mouth. 3/3/22   Provider, Historical   pantoprazole (PROTONIX) 40 MG tablet Take 1 tablet (40 mg total) by mouth before breakfast. 24  Mynor Sandhu MD    pilocarpine (SALAGEN) 5 MG Tab Take 1 tablet (5 mg total) by mouth 3 (three) times daily as needed. 9/11/23   Adam Lundberg MD   PREVIDENT 5000 BOOSTER PLUS 1.1 % Pste  9/27/20   Provider, Historical   RESTASIS 0.05 % ophthalmic emulsion  5/29/14   Provider, Historical   risedronate 35 mg TbEC TAKE 1 TABLET ONCE A WEEK 9/11/23   Leandro Espinoza MD   sumatriptan (IMITREX) 100 MG tablet Take 1 tablet (100 mg total) by mouth every 2 (two) hours as needed for Migraine. 3/2/22 3/13/24  Nallely Joshi NP   tretinoin (RETIN-A) 0.025 % gel Apply small amount to entire face qhs. Wash off qam and apply sunscreen. 3/25/22   Olga Cobb MD   triamcinolone acetonide 0.1% (KENALOG) 0.1 % cream Apply topically 2 (two) times daily. To affected areas of skin for 2 weeks out of a month. Avoid applying to face, groin, or armpits. 6/12/24   Chloe Carney MD   UNABLE TO FIND Apply 240 g topically as needed. Ketoprofen/cyclobenzaprine HCI/Lidocaine (lipomax) 10/2/5% Up to 5 times daily as needed for pain 5/22/19   Provider, Historical   zolpidem (AMBIEN) 10 mg Tab TAKE 1 TABLET(10 MG) BY MOUTH EVERY NIGHT AS NEEDED 5/11/24   Adam Lundberg MD        Past medical history, surgical history, and family medical history reviewed and updated as appropriate.    Medications and allergies reviewed.     Objective:          Vitals:    09/03/24 0840   BP: 100/60   BP Location: Right arm   Patient Position: Sitting   BP Method: Medium (Manual)   Pulse: 82   SpO2: 100%   Weight: 42.8 kg (94 lb 5.7 oz)   Height: 5' (1.524 m)     Body mass index is 18.43 kg/m².  Physical Exam  Vitals and nursing note reviewed.   Constitutional:       General: She is not in acute distress.     Appearance: She is not ill-appearing, toxic-appearing or diaphoretic.   Cardiovascular:      Rate and Rhythm: Normal rate and regular rhythm.      Pulses: Normal pulses.      Heart sounds: Normal heart sounds. No murmur heard.  Pulmonary:      Effort: Pulmonary  effort is normal. No respiratory distress.   Neurological:      Mental Status: She is alert and oriented to person, place, and time.   Psychiatric:         Mood and Affect: Mood normal.         Behavior: Behavior normal.         Lab Results   Component Value Date    WBC 6.77 05/04/2024    HGB 12.5 05/04/2024    HCT 39.7 05/04/2024     05/04/2024    CHOL 133 08/31/2024    TRIG 30 08/31/2024    HDL 80 (H) 08/31/2024    ALT 29 05/04/2024    AST 29 05/04/2024     08/31/2024    K 4.8 08/31/2024     08/31/2024    CREATININE 0.7 08/31/2024    BUN 15 08/31/2024    CO2 26 08/31/2024    TSH 1.342 05/04/2024    HGBA1C 5.3 08/31/2024       Assessment:       1. Annual physical exam    2. Encounter for long-term (current) use of other medications    3. Chronic pain of right wrist          Plan:   1. Annual physical exam    2. Encounter for long-term (current) use of other medications    3. Chronic pain of right wrist  -     Ambulatory referral/consult to Orthopedics; Future; Expected date: 09/03/2024    Other orders  -     pilocarpine (SALAGEN) 5 MG Tab; Take 1 tablet (5 mg total) by mouth 3 (three) times daily as needed.  Dispense: 270 tablet; Refill: 3  -     amitriptyline (ELAVIL) 10 MG tablet; TAKE 2 TABLETS ONCE DAILY WITH THE 50 MG FOR A TOTAL OF 70 MG  Dispense: 180 tablet; Refill: 3        Health maintenance reviewed with patient.     Follow up in about 1 year (around 9/3/2025) for Annual.    Adam Lundberg MD  Family Medicine / Primary Care  [facility]  9/3/2024

## 2024-09-19 ENCOUNTER — PATIENT MESSAGE (OUTPATIENT)
Dept: RHEUMATOLOGY | Facility: CLINIC | Age: 64
End: 2024-09-19
Payer: COMMERCIAL

## 2024-09-28 ENCOUNTER — LAB VISIT (OUTPATIENT)
Dept: LAB | Facility: HOSPITAL | Age: 64
End: 2024-09-28
Attending: INTERNAL MEDICINE
Payer: COMMERCIAL

## 2024-09-28 DIAGNOSIS — R53.83 FATIGUE, UNSPECIFIED TYPE: ICD-10-CM

## 2024-09-28 DIAGNOSIS — I73.00 RAYNAUD'S DISEASE WITHOUT GANGRENE: ICD-10-CM

## 2024-09-28 DIAGNOSIS — I73.81 ERYTHROMELALGIA: ICD-10-CM

## 2024-09-28 DIAGNOSIS — M35.0B SJOGREN'S SYNDROME WITH VASCULITIS: ICD-10-CM

## 2024-09-28 DIAGNOSIS — M79.7 FIBROMYALGIA: ICD-10-CM

## 2024-09-28 LAB
ALBUMIN SERPL BCP-MCNC: 4.1 G/DL (ref 3.5–5.2)
ALP SERPL-CCNC: 64 U/L (ref 55–135)
ALT SERPL W/O P-5'-P-CCNC: 22 U/L (ref 10–44)
ANION GAP SERPL CALC-SCNC: 9 MMOL/L (ref 8–16)
AST SERPL-CCNC: 28 U/L (ref 10–40)
BASOPHILS # BLD AUTO: 0.05 K/UL (ref 0–0.2)
BASOPHILS NFR BLD: 1.1 % (ref 0–1.9)
BILIRUB SERPL-MCNC: 0.4 MG/DL (ref 0.1–1)
BUN SERPL-MCNC: 15 MG/DL (ref 8–23)
C3 SERPL-MCNC: 95 MG/DL (ref 50–180)
C4 SERPL-MCNC: 19 MG/DL (ref 11–44)
CALCIUM SERPL-MCNC: 9.4 MG/DL (ref 8.7–10.5)
CHLORIDE SERPL-SCNC: 101 MMOL/L (ref 95–110)
CK SERPL-CCNC: 40 U/L (ref 20–180)
CO2 SERPL-SCNC: 27 MMOL/L (ref 23–29)
CREAT SERPL-MCNC: 0.6 MG/DL (ref 0.5–1.4)
CRP SERPL-MCNC: 0.5 MG/L (ref 0–8.2)
DIFFERENTIAL METHOD BLD: ABNORMAL
EOSINOPHIL # BLD AUTO: 0.1 K/UL (ref 0–0.5)
EOSINOPHIL NFR BLD: 2.7 % (ref 0–8)
ERYTHROCYTE [DISTWIDTH] IN BLOOD BY AUTOMATED COUNT: 16.8 % (ref 11.5–14.5)
ERYTHROCYTE [SEDIMENTATION RATE] IN BLOOD BY PHOTOMETRIC METHOD: 12 MM/HR (ref 0–36)
EST. GFR  (NO RACE VARIABLE): >60 ML/MIN/1.73 M^2
GLUCOSE SERPL-MCNC: 96 MG/DL (ref 70–110)
HCT VFR BLD AUTO: 39.4 % (ref 37–48.5)
HGB BLD-MCNC: 11.8 G/DL (ref 12–16)
IMM GRANULOCYTES # BLD AUTO: 0.01 K/UL (ref 0–0.04)
IMM GRANULOCYTES NFR BLD AUTO: 0.2 % (ref 0–0.5)
LYMPHOCYTES # BLD AUTO: 0.7 K/UL (ref 1–4.8)
LYMPHOCYTES NFR BLD: 16 % (ref 18–48)
MCH RBC QN AUTO: 24.9 PG (ref 27–31)
MCHC RBC AUTO-ENTMCNC: 29.9 G/DL (ref 32–36)
MCV RBC AUTO: 83 FL (ref 82–98)
MONOCYTES # BLD AUTO: 0.4 K/UL (ref 0.3–1)
MONOCYTES NFR BLD: 9.7 % (ref 4–15)
NEUTROPHILS # BLD AUTO: 3.1 K/UL (ref 1.8–7.7)
NEUTROPHILS NFR BLD: 70.3 % (ref 38–73)
NRBC BLD-RTO: 0 /100 WBC
PLATELET # BLD AUTO: 216 K/UL (ref 150–450)
PMV BLD AUTO: 9.6 FL (ref 9.2–12.9)
POTASSIUM SERPL-SCNC: 4 MMOL/L (ref 3.5–5.1)
PROT SERPL-MCNC: 7.3 G/DL (ref 6–8.4)
RBC # BLD AUTO: 4.74 M/UL (ref 4–5.4)
SODIUM SERPL-SCNC: 137 MMOL/L (ref 136–145)
WBC # BLD AUTO: 4.43 K/UL (ref 3.9–12.7)

## 2024-09-28 PROCEDURE — 85025 COMPLETE CBC W/AUTO DIFF WBC: CPT | Performed by: INTERNAL MEDICINE

## 2024-09-28 PROCEDURE — 82550 ASSAY OF CK (CPK): CPT | Performed by: INTERNAL MEDICINE

## 2024-09-28 PROCEDURE — 86160 COMPLEMENT ANTIGEN: CPT | Performed by: INTERNAL MEDICINE

## 2024-09-28 PROCEDURE — 86160 COMPLEMENT ANTIGEN: CPT | Mod: 59 | Performed by: INTERNAL MEDICINE

## 2024-09-28 PROCEDURE — 80053 COMPREHEN METABOLIC PANEL: CPT | Performed by: INTERNAL MEDICINE

## 2024-09-28 PROCEDURE — 85652 RBC SED RATE AUTOMATED: CPT | Performed by: INTERNAL MEDICINE

## 2024-09-28 PROCEDURE — 36415 COLL VENOUS BLD VENIPUNCTURE: CPT | Performed by: INTERNAL MEDICINE

## 2024-09-28 PROCEDURE — 86225 DNA ANTIBODY NATIVE: CPT | Performed by: INTERNAL MEDICINE

## 2024-09-28 PROCEDURE — 86140 C-REACTIVE PROTEIN: CPT | Performed by: INTERNAL MEDICINE

## 2024-09-30 ENCOUNTER — PATIENT MESSAGE (OUTPATIENT)
Dept: RHEUMATOLOGY | Facility: CLINIC | Age: 64
End: 2024-09-30
Payer: COMMERCIAL

## 2024-09-30 LAB — DSDNA AB SER-ACNC: NORMAL [IU]/ML

## 2024-10-01 ENCOUNTER — PATIENT MESSAGE (OUTPATIENT)
Dept: INTERNAL MEDICINE | Facility: CLINIC | Age: 64
End: 2024-10-01
Payer: COMMERCIAL

## 2024-10-01 DIAGNOSIS — D64.9 NORMOCYTIC ANEMIA: Primary | ICD-10-CM

## 2024-10-02 DIAGNOSIS — G50.0 TRIGEMINAL NEURALGIA: ICD-10-CM

## 2024-10-02 DIAGNOSIS — G58.8 PUDENDAL NEURALGIA: ICD-10-CM

## 2024-10-02 RX ORDER — LIDOCAINE AND PRILOCAINE 25; 25 MG/G; MG/G
CREAM TOPICAL
Qty: 120 G | Refills: 2 | Status: SHIPPED | OUTPATIENT
Start: 2024-10-02

## 2024-10-04 ENCOUNTER — INFUSION (OUTPATIENT)
Dept: INFECTIOUS DISEASES | Facility: HOSPITAL | Age: 64
End: 2024-10-04
Payer: COMMERCIAL

## 2024-10-04 VITALS
OXYGEN SATURATION: 100 % | BODY MASS INDEX: 18.28 KG/M2 | WEIGHT: 93.13 LBS | TEMPERATURE: 98 F | HEIGHT: 60 IN | HEART RATE: 97 BPM | DIASTOLIC BLOOD PRESSURE: 67 MMHG | RESPIRATION RATE: 18 BRPM | SYSTOLIC BLOOD PRESSURE: 116 MMHG

## 2024-10-04 DIAGNOSIS — M81.0 OSTEOPOROSIS, POSTMENOPAUSAL: Primary | ICD-10-CM

## 2024-10-04 PROCEDURE — 63600175 PHARM REV CODE 636 W HCPCS: Mod: JZ,JG | Performed by: INTERNAL MEDICINE

## 2024-10-04 PROCEDURE — 96372 THER/PROPH/DIAG INJ SC/IM: CPT

## 2024-10-04 RX ADMIN — DENOSUMAB 60 MG: 60 INJECTION SUBCUTANEOUS at 04:10

## 2024-10-04 NOTE — PROGRESS NOTES
Patient arrives for Prolia injection - confirms use of calcium and vitamin D supplements and denies dental procedures over past 3 months - administered per guidelines     Limited head-to-toe assessment due to privacy issues and visit reason though the opportunity was given for patient to express any concerns

## 2024-10-08 DIAGNOSIS — E06.3 HYPOTHYROIDISM DUE TO HASHIMOTO'S THYROIDITIS: ICD-10-CM

## 2024-10-08 DIAGNOSIS — E04.2 MULTINODULAR GOITER: ICD-10-CM

## 2024-10-08 DIAGNOSIS — Z80.8 FAMILY HISTORY OF THYROID CANCER: ICD-10-CM

## 2024-10-08 RX ORDER — LIOTHYRONINE SODIUM 5 UG/1
TABLET ORAL
Qty: 315 TABLET | Refills: 3 | Status: SHIPPED | OUTPATIENT
Start: 2024-10-08

## 2024-10-20 ENCOUNTER — PATIENT MESSAGE (OUTPATIENT)
Dept: INTERNAL MEDICINE | Facility: CLINIC | Age: 64
End: 2024-10-20
Payer: COMMERCIAL

## 2024-10-20 DIAGNOSIS — G47.00 INSOMNIA, UNSPECIFIED TYPE: ICD-10-CM

## 2024-10-20 DIAGNOSIS — R10.2 PELVIC PAIN IN FEMALE: ICD-10-CM

## 2024-10-20 DIAGNOSIS — L40.0 PSORIASIS VULGARIS: ICD-10-CM

## 2024-10-21 RX ORDER — ZOLPIDEM TARTRATE 10 MG/1
TABLET ORAL
Qty: 30 TABLET | Refills: 2 | Status: SHIPPED | OUTPATIENT
Start: 2024-10-21

## 2024-10-21 RX ORDER — ZOLPIDEM TARTRATE 10 MG/1
TABLET ORAL
Qty: 30 TABLET | Refills: 2 | Status: SHIPPED | OUTPATIENT
Start: 2024-10-21 | End: 2024-10-21

## 2024-10-21 RX ORDER — DESONIDE 0.5 MG/G
CREAM TOPICAL
Qty: 60 G | Refills: 2 | Status: SHIPPED | OUTPATIENT
Start: 2024-10-21

## 2024-10-21 RX ORDER — GABAPENTIN 100 MG/1
CAPSULE ORAL
Qty: 270 CAPSULE | Refills: 1 | Status: SHIPPED | OUTPATIENT
Start: 2024-10-21

## 2024-10-21 NOTE — TELEPHONE ENCOUNTER
Encounter Date: 06/12/2024    Psoriasis vulgaris  Mild  Never biopsy proven. Suspect the erythematous scaly papules present on groin, elbow and knee are c/w pso. We will trial topical steroids and can consider biopsy if not resolved  -Start triamcinolone 0.1% cream BID to arm and leg, avoid applying to groin  -Continue desonide to groin   -     triamcinolone acetonide 0.1% (KENALOG) 0.1 % cream; Apply topically 2 (two) times daily. To affected areas of skin for 2 weeks out of a month. Avoid applying to face, groin, or armpits.  Dispense: 90 g; Refill: 3     Pernio, subsequent encounter  Bx proven in 2019  -continue to follow-up with rheum     RTC  for TBSE. Pt stated she will call and make appointment when her other medical have become less busy (she is undergoing workup for pancreas).

## 2024-10-23 ENCOUNTER — PATIENT MESSAGE (OUTPATIENT)
Dept: INTERNAL MEDICINE | Facility: CLINIC | Age: 64
End: 2024-10-23
Payer: COMMERCIAL

## 2024-10-26 ENCOUNTER — LAB VISIT (OUTPATIENT)
Dept: LAB | Facility: HOSPITAL | Age: 64
End: 2024-10-26
Attending: INTERNAL MEDICINE
Payer: COMMERCIAL

## 2024-10-26 DIAGNOSIS — D64.9 NORMOCYTIC ANEMIA: ICD-10-CM

## 2024-10-26 DIAGNOSIS — E06.3 HYPOTHYROIDISM DUE TO HASHIMOTO'S THYROIDITIS: ICD-10-CM

## 2024-10-26 LAB
ERYTHROCYTE [DISTWIDTH] IN BLOOD BY AUTOMATED COUNT: 16.4 % (ref 11.5–14.5)
FERRITIN SERPL-MCNC: 8 NG/ML (ref 20–300)
FOLATE SERPL-MCNC: 12.1 NG/ML (ref 4–24)
HCT VFR BLD AUTO: 39.1 % (ref 37–48.5)
HGB BLD-MCNC: 12.4 G/DL (ref 12–16)
IRON SERPL-MCNC: 69 UG/DL (ref 30–160)
MCH RBC QN AUTO: 26.9 PG (ref 27–31)
MCHC RBC AUTO-ENTMCNC: 31.7 G/DL (ref 32–36)
MCV RBC AUTO: 85 FL (ref 82–98)
PLATELET # BLD AUTO: 201 K/UL (ref 150–450)
PMV BLD AUTO: 10.1 FL (ref 9.2–12.9)
RBC # BLD AUTO: 4.61 M/UL (ref 4–5.4)
SATURATED IRON: 15 % (ref 20–50)
T3 SERPL-MCNC: 89 NG/DL (ref 60–180)
T4 FREE SERPL-MCNC: 0.53 NG/DL (ref 0.71–1.51)
TOTAL IRON BINDING CAPACITY: 471 UG/DL (ref 250–450)
TRANSFERRIN SERPL-MCNC: 318 MG/DL (ref 200–375)
TSH SERPL DL<=0.005 MIU/L-ACNC: 1.23 UIU/ML (ref 0.4–4)
VIT B12 SERPL-MCNC: 1134 PG/ML (ref 210–950)
WBC # BLD AUTO: 4.62 K/UL (ref 3.9–12.7)

## 2024-10-26 PROCEDURE — 85027 COMPLETE CBC AUTOMATED: CPT | Performed by: FAMILY MEDICINE

## 2024-10-26 PROCEDURE — 84439 ASSAY OF FREE THYROXINE: CPT | Performed by: INTERNAL MEDICINE

## 2024-10-26 PROCEDURE — 82607 VITAMIN B-12: CPT | Performed by: FAMILY MEDICINE

## 2024-10-26 PROCEDURE — 84480 ASSAY TRIIODOTHYRONINE (T3): CPT | Performed by: INTERNAL MEDICINE

## 2024-10-26 PROCEDURE — 36415 COLL VENOUS BLD VENIPUNCTURE: CPT | Performed by: FAMILY MEDICINE

## 2024-10-26 PROCEDURE — 82728 ASSAY OF FERRITIN: CPT | Performed by: FAMILY MEDICINE

## 2024-10-26 PROCEDURE — 82746 ASSAY OF FOLIC ACID SERUM: CPT | Performed by: FAMILY MEDICINE

## 2024-10-26 PROCEDURE — 83540 ASSAY OF IRON: CPT | Performed by: FAMILY MEDICINE

## 2024-10-26 PROCEDURE — 84443 ASSAY THYROID STIM HORMONE: CPT | Performed by: INTERNAL MEDICINE

## 2024-10-28 ENCOUNTER — PATIENT MESSAGE (OUTPATIENT)
Dept: INTERNAL MEDICINE | Facility: CLINIC | Age: 64
End: 2024-10-28
Payer: COMMERCIAL

## 2024-10-28 DIAGNOSIS — R79.0 LOW FERRITIN: Primary | ICD-10-CM

## 2024-10-29 PROBLEM — R79.0 LOW FERRITIN: Status: ACTIVE | Noted: 2024-10-29

## 2024-11-04 ENCOUNTER — PATIENT MESSAGE (OUTPATIENT)
Dept: PAIN MEDICINE | Facility: CLINIC | Age: 64
End: 2024-11-04
Payer: COMMERCIAL

## 2024-11-04 DIAGNOSIS — G50.0 TRIGEMINAL NEURALGIA: ICD-10-CM

## 2024-11-04 DIAGNOSIS — G58.8 PUDENDAL NEURALGIA: ICD-10-CM

## 2024-11-04 RX ORDER — LIDOCAINE AND PRILOCAINE 25; 25 MG/G; MG/G
CREAM TOPICAL
Qty: 120 G | Refills: 2 | Status: CANCELLED | OUTPATIENT
Start: 2024-11-04

## 2024-11-12 ENCOUNTER — OFFICE VISIT (OUTPATIENT)
Dept: PAIN MEDICINE | Facility: CLINIC | Age: 64
End: 2024-11-12
Payer: COMMERCIAL

## 2024-11-12 VITALS
DIASTOLIC BLOOD PRESSURE: 65 MMHG | OXYGEN SATURATION: 99 % | SYSTOLIC BLOOD PRESSURE: 98 MMHG | TEMPERATURE: 98 F | BODY MASS INDEX: 18.73 KG/M2 | HEART RATE: 96 BPM | WEIGHT: 95.88 LBS

## 2024-11-12 DIAGNOSIS — M47.812 CERVICAL SPONDYLOSIS: ICD-10-CM

## 2024-11-12 DIAGNOSIS — G50.0 TRIGEMINAL NEURALGIA: ICD-10-CM

## 2024-11-12 DIAGNOSIS — R10.2 PELVIC PAIN IN FEMALE: ICD-10-CM

## 2024-11-12 DIAGNOSIS — M54.16 LUMBAR RADICULOPATHY: ICD-10-CM

## 2024-11-12 DIAGNOSIS — G89.4 CHRONIC PAIN DISORDER: Primary | ICD-10-CM

## 2024-11-12 DIAGNOSIS — G58.8 PUDENDAL NEURALGIA: ICD-10-CM

## 2024-11-12 PROCEDURE — 1160F RVW MEDS BY RX/DR IN RCRD: CPT | Mod: CPTII,S$GLB,, | Performed by: NURSE PRACTITIONER

## 2024-11-12 PROCEDURE — 3044F HG A1C LEVEL LT 7.0%: CPT | Mod: CPTII,S$GLB,, | Performed by: NURSE PRACTITIONER

## 2024-11-12 PROCEDURE — 3008F BODY MASS INDEX DOCD: CPT | Mod: CPTII,S$GLB,, | Performed by: NURSE PRACTITIONER

## 2024-11-12 PROCEDURE — 99213 OFFICE O/P EST LOW 20 MIN: CPT | Mod: S$GLB,,, | Performed by: NURSE PRACTITIONER

## 2024-11-12 PROCEDURE — 99999 PR PBB SHADOW E&M-EST. PATIENT-LVL V: CPT | Mod: PBBFAC,,, | Performed by: NURSE PRACTITIONER

## 2024-11-12 PROCEDURE — 3074F SYST BP LT 130 MM HG: CPT | Mod: CPTII,S$GLB,, | Performed by: NURSE PRACTITIONER

## 2024-11-12 PROCEDURE — 1159F MED LIST DOCD IN RCRD: CPT | Mod: CPTII,S$GLB,, | Performed by: NURSE PRACTITIONER

## 2024-11-12 PROCEDURE — 3078F DIAST BP <80 MM HG: CPT | Mod: CPTII,S$GLB,, | Performed by: NURSE PRACTITIONER

## 2024-11-12 RX ORDER — LIDOCAINE AND PRILOCAINE 25; 25 MG/G; MG/G
CREAM TOPICAL
Qty: 120 G | Refills: 2 | Status: SHIPPED | OUTPATIENT
Start: 2024-11-12

## 2024-11-12 NOTE — PROGRESS NOTES
Chronic patient Established Note (Follow up visit)      SUBJECTIVE:    Interval History 11/12/2024:  The patient returns to clinic today for follow up of back pain. She continues to report low back and tailbone pain. She is having intermittent radiating pain into the posterolateral of the left leg to mid thigh. She denies any right leg pain. She reports an episode of severe pain on Saturday. She is participating in acupuncture with benefit. She does see a chiropractor twice a month for her neck pain and migraines. She does have osteoporosis. She is on Prolia. She is currently taking Gabapentin and Elavil. She also uses compound cream with benefit. She also uses Lidocaine/Prilocaine cream. She denies any other health changes. Her pain today is 4/10.    Interval History 1/23/2024:  The patient returns to clinic today for follow up of back pain. She is here today for imaging review. She reports low back and tail bone pain. She is having intermittent radiating pain into the posterolateral aspect of her left leg to the top of her foot. She describes this pain as shocking in nature. Her pain is worse with prolonged standing, especially with cooking and housework. She is participating in acupuncture with benefit. She also takes Elavil, Gabapentin, and Skelaxin. She denies any other health changes. Her pain today is 4/10.    Interval History 12/12/2023:  Patient has Patient states that the pain is triggered by standing and movement.  Patient mentioned she made an additional appointment because about a month ago, the pain travelled 4 inches from the original pain. Intermittently patient has flare of pain of the lower back and rear end. Head makes it feel better.   Patient mentioned that she gets acupuncture which helps with the original pain but states this new pain isnt treated by the acupuncture. Patient new pain is a burning burning tighting pain. Patient pain does intermittently radiates down the leg. It isnt a  continuous path down the leg. Mostly the side of the legs to the toes of the leg. Patient was diagnosis with osteoporosis and states she will start soon. Patient isnt currently on physical therapy.   Patient is on advil 60 mg   Gabapentin: 100 mg in the morning 300 mg at night   Pain cream  Skelaxin    Interval History 6/19/2023:  The patient returns to clinic today for follow up of pain. She reports increased neck pain and headaches. She has headaches that begin at the base of her head that radiates forward. She is seeing a chiropractor which has been helpful. She denies any radicular arm pain. She continues to report pelvic pain. She does acupuncture for this with benefit. She also uses a pain cream with benefit. She reports increased low back pain that radiates into the hips and lateral thighs. She is taking Gabapentin. Her pain today is 4/10.    Interval History 8/9/2022:  The patient returns to clinic today for follow up of pelvic pain. She has not been seen in over a year. She reports that she has been managing her widespread pain through acupuncture. She also sees a chiropractor with benefit. She reports increased neck pain and headaches that began in February. She did see Neurology. She reports headaches to the frontal area that is throbbing and pressure in nature. She reports neck pain, worse with flexion and extension. She denies any radicular arm pain. She continues to report pelvic pain. Her pain is well controlled with acupuncture, compound cream, and Gabapentin. Her pain today is 6/10.    nterval History 5/31/2021:  Berenice Hayes presents to the clinic for a follow-up appointment for pelvic pain. She continues to report pelvic and vaginal pain. This pain is worse with prolonged sitting. She also reports tailbone and hip pain. She denies any radicular leg pain. Her pain is worse with bending forward. She also has difficulty sleeping due to pain. She continues to participate in acupuncture with  benefit. She continues to use the compound cream. She also takes Gabapentin and Skelaxin with benefit. She denies any other health changes. Her pain today is 4/10.    Interval History 05/15/20:  Patient with complex medical history with overlapping symptoms.  Currently feels that she is better in her tailbone.  Currently, she is reporting left-sided neck pain.  This pain is worse with cold and prolonged activity.  This is associated with ear pain and left arm pain.  Of note, the ear pain is on the surface of the ear and posterior.  It is difficult to touch that area due to sensitivity.  Her arm pain is tingling, burning pain that radiates into her left arm to her hand.  This is also associated with chest wall pain.  She has had a thorough cardiac workup which was negative.  She was told she had tight strap muscles in her neck.  Bothered by Coccygeal Pain. It is a 2/10 at the moment. It is deep and dull. It does not radiate down any extremity at this point, but it has in the past.   Alleviated by Compound Cream, Sitting Down; ice packs. Aggravated by standing up prolonged times.   Paradoxical response to interventions in the past.     Left Arm Pain is shooting pain down triggered by cold. Alleviated by Lidocaine patch, Aspercream, Acupuncture, Heating Pads  Acupuncture has been suspended due to COVID 19 but was markedly helping out.     Interval History 7/2/2019;  The patient returns to clinic today for follow up. She reports increased pain that began on last week. She denies any injury or fall. She reports that she twisted over to reach for something and experienced increased pain. She describes this pain as beginning in her tailbone and radiating into both hips and down the back of her leg to mid thigh, left greater than right. She describes this pain as stabbing and burning. She continues to report intermittent radicular pain. Her pain is worse with prolonged sitting and standing. She continues to report benefit  with current medication regimen. She denies any other health changes. Her pain today is 5/10.    Pain Disability Index Review:      11/12/2024     8:38 AM 1/23/2024     8:50 AM 12/12/2023     1:50 PM   Last 3 PDI Scores   Pain Disability Index (PDI) 35 41 35       Pain Medications:  Gabapentin  Skelaxin  Elavil  Compound cream     Opioid Contract: N/A     report:  Not applicable    Pain Procedures:   2/3/2015- Left pudendal nerve block  6/2/2015- Right pudendal nerve block  10/28/2015- Right pudendal nerve block  10/26/2017- Bilateral coccygeal nerve block  7/5/2019- Coccygeal nerve block  7/19/2019- Coccygeal nerve block    Physical Therapy/Home Exercise: yes    Imaging:   MRI Lumbar Spine 12/28/2023:  FINDINGS:  Comparison is 07/10/2019.     No marrow replacement, marrow edema, infection, or neoplasm seen.  There are lumbar neural foramina and sacral Tarlov cysts.  No marrow replacement, marrow edema, infection, or neoplasm is seen.  No soft tissue injury or whiplash injury seen.  No pars fracture pars stress fracture seen.  The distal cord-conus is unremarkable.  There are sacral Tarlov cysts and there are nerve root sleeve cysts at all lumbar and lower thoracic levels with neural foramina enlargement.     No disc herniation, spinal canal stenosis, or neural foraminal stenosis seen.  No fracture, dislocation, or bone destruction seen.     Impression:     No significant acute process trauma or DJD seen.  No spinal canal stenosis, or neural foraminal stenosis seen.     There are nerve root sleeve cysts bilaterally throughout the lumbar spine lower thoracic levels with chronic enlargement of the neural foramina.  There are also sacral Tarlov cysts      MRI Lumbar Spine 7/10/2019:  COMPARISON:  CT abdomen/pelvis without contrast 02/07/2019; MR lumbar spine without contrast 01/16/2017     FINDINGS:  Alignment: Mild levoconvex scoliosis.  Sagittal alignment preserved.     Vertebrae: Normal marrow signal. No  fracture.     Discs: Satisfactory height and signal.     Cord: Normal.  Conus terminates at L2.     Degenerative findings:     T12-L4: No significant spinal canal stenosis or neural foraminal narrowing.     L4-L5: Mild diffuse posterior disc bulge and mild bilateral facet arthropathy with small right facet effusion without significant spinal canal stenosis.  No neural foraminal narrowing.     L5-S1: No significant spinal canal stenosis.  No neural foraminal narrowing.     Miscellaneous: Note is made of prominent bilateral nerve sheath ectasia/perineural sleeve cyst formation throughout the visualized lumbosacral spine.  Findings similar when compared to MR examination 01/16/2017.     Paraspinal muscles & soft tissues: Normal.        Impression:     Mild spondylosis, as above.  No spinal canal stenosis, significant neural foraminal narrowing or focal disc abnormality.     Nerve sheath ectasia/ perineural cyst formation, unchanged from the 01/16/2017 MRI exam.    Allergies: Review of patient's allergies indicates:  No Known Allergies    Current Medications:   Current Outpatient Medications   Medication Sig Dispense Refill    amitriptyline (ELAVIL) 10 MG tablet TAKE 2 TABLETS ONCE DAILY WITH THE 50 MG FOR A TOTAL OF 70  tablet 3    amitriptyline (ELAVIL) 50 MG tablet TAKE 1 TABLET NIGHTLY WITH THE 10 MG AMITRIPTYLINE 90 tablet 3    aspirin (ECOTRIN) 81 MG EC tablet Take 81 mg by mouth once daily.      AZASITE 1 % Drop INSTILL 1 DROP INTO LEFT EYE BID      clindamycin (CLEOCIN T) 1 % external solution APPLY TOPICALLY TO THE AFFECTED AREA TWICE DAILY AS NEEDED FOR BREAKOUTS 30 mL 3    desonide (DESOWEN) 0.05 % cream AAA BID  x 1-2 wks then prn flares only 60 g 2    diazePAM (VALIUM) 5 MG tablet Take 1 tablet (5 mg total) by mouth On call Procedure for Anxiety (Take one pill 2 hours prior to MRI scan for claustrophobia). 1 tablet 0    estradioL (VAGIFEM) 10 mcg Tab Place 1 tablet (10 mcg total) vaginally twice a  week. 24 tablet 3    estradiol 0.05 mg/24 hr td ptsw (VIVELLE-DOT) 0.05 mg/24 hr Place 1 patch onto the skin twice a week. 24 patch 3    fluticasone (FLONASE) 50 mcg/actuation nasal spray 1 spray by Each Nare route once daily. 3 Bottle 3    gabapentin (NEURONTIN) 100 MG capsule TAKE 1 CAPSULE IN THE MORNING AND 4 CAPSULES AT BEDTIME 270 capsule 1    hydroxychloroquine (PLAQUENIL) 200 mg tablet TAKE 1 TABLET DAILY 90 tablet 3    imiquimod (ALDARA) 5 % cream Apply small amount to affected area on knee qhs x 4 weeks. Wash off in am. Stop when red/inflamed. 24 packet 1    ketoconazole (NIZORAL) 2 % cream Apply topically 2 (two) times daily. Apply daily to affected area 30 g 0    ketoprofen 10 % CrPk       LIDOcaine-prilocaine (EMLA) cream APPLY 2 GRAMS 3-4 TIMES DAILY FOR TREATMENT OF PAIN 120 g 2    liothyronine (CYTOMEL) 5 MCG Tab TAKE THREE AND ONE-HALF TABLETS ONCE DAILY 315 tablet 3    MAXITROL 3.5 mg/g-10,000 unit/g-0.1 % Oint SMARTSI.25 Inch(es) Left Eye Every Evening      MAXITROL 3.5mg/mL-10,000 unit/mL-0.1 % DrpS Place 1 drop into the left eye every morning.      metaxalone (SKELAXIN) 800 MG tablet TAKE 1 TABLET TWICE A DAY AS NEEDED FOR SPASM 180 tablet 3    niacinamide 500 mg Tab Take 1 tablet (500 mg total) by mouth 2 (two) times a day. 180 tablet 2    NURTEC 75 mg odt Take by mouth.      pantoprazole (PROTONIX) 40 MG tablet Take 1 tablet (40 mg total) by mouth before breakfast. 90 tablet 3    pilocarpine (SALAGEN) 5 MG Tab Take 1 tablet (5 mg total) by mouth 3 (three) times daily as needed. 270 tablet 3    PREVIDENT 5000 BOOSTER PLUS 1.1 % Pste       RESTASIS 0.05 % ophthalmic emulsion       risedronate 35 mg TbEC TAKE 1 TABLET ONCE A WEEK 12 tablet 3    tretinoin (RETIN-A) 0.025 % gel Apply small amount to entire face qhs. Wash off qam and apply sunscreen. 45 g 3    triamcinolone acetonide 0.1% (KENALOG) 0.1 % cream Apply topically 2 (two) times daily. To affected areas of skin for 2 weeks out of a  month. Avoid applying to face, groin, or armpits. 90 g 3    UNABLE TO FIND Apply 240 g topically as needed. Ketoprofen/cyclobenzaprine HCI/Lidocaine (lipomax) 10/2/5% Up to 5 times daily as needed for pain  99    zolpidem (AMBIEN) 10 mg Tab TAKE 1 TABLET(10 MG) BY MOUTH EVERY NIGHT AS NEEDED 30 tablet 2    ALPRAZolam (XANAX) 1 MG tablet Take 1 tablet (1 mg total) by mouth once as needed for Anxiety. (Patient not taking: Reported on 9/3/2024) 1 tablet 0    sumatriptan (IMITREX) 100 MG tablet Take 1 tablet (100 mg total) by mouth every 2 (two) hours as needed for Migraine. 9 tablet 1     No current facility-administered medications for this visit.       REVIEW OF SYSTEMS:    GENERAL:  No weight loss, malaise or fevers.  HEENT:  Headaches  NECK:  Negative for lumps, goiter, pain and significant neck swelling.  RESPIRATORY:  Negative for cough, wheezing or shortness of breath.  CARDIOVASCULAR:  Negative for chest pain, leg swelling or palpitations.  GI:  Negative for abdominal discomfort, blood in stools or black stools or change in bowel habits.  MUSCULOSKELETAL:  See HPI.  SKIN:  Negative for lesions, rash, and itching.  PSYCH:  Negative for sleep disturbance, mood disorder and recent psychosocial stressors.  HEMATOLOGY/LYMPHOLOGY:  Negative for prolonged bleeding, bruising easily or swollen nodes.  NEURO:   No history of headaches, syncope, paralysis, seizures or tremors.  ENDO: Thyroid disorder  All other reviewed and negative other than HPI.    Past Medical History:  Past Medical History:   Diagnosis Date    Asthma with allergic rhinitis     Bladder spasm     Dense breasts     heterogeneously/fibrocystic disease    Elevated antinuclear antibody (GUICHO) level     Erythromelalgia     Fibromyalgia     Ganglion cyst     left toe    Goiter     MNG    Headache(784.0)     Hypothyroidism     Hashimoto with + Tg ab    Kidney stone     Osteoporosis     femoral neck    Pernio 2018    Raynaud's phenomenon     Rhinitis      Scoliosis     Sjogren's syndrome     Sleep disorder     type of Narcolepsy ( resolved)    Vitamin D deficiency disease     Vulvodynia        Past Surgical History:  Past Surgical History:   Procedure Laterality Date    BREAST SURGERY      fibrocystic tumor removed     SECTION      2x    CYST REMOVAL      laparoscopic cyst on ovary    ENDOSCOPIC ULTRASOUND OF UPPER GASTROINTESTINAL TRACT N/A 2024    Procedure: ULTRASOUND, UPPER GI TRACT, ENDOSCOPIC;  Surgeon: Salty Baez MD;  Location: [facility] (2ND FLR);  Service: Endoscopy;  Laterality: N/A;   portal- EUS for her with me for dilated PD, [facility] or [place], 3-6 weeks. [id number]   PRECALL ATTEMPTED-LM/RT  -pt confirmed appt, see telephone encounter -KPvt    ESOPHAGOGASTRODUODENOSCOPY N/A 2024    Procedure: EGD (ESOPHAGOGASTRODUODENOSCOPY);  Surgeon: Mynor Sandhu MD;  Location: [facility] (4TH FLR);  Service: Endoscopy;  Laterality: N/A;  -precall complete-MS    HYSTERECTOMY      INJECTION OF ANESTHETIC AGENT AROUND NERVE N/A 2019    Procedure: BLOCK, NERVE COCCYGEAL  1 OF 2  Pt. can drive herself home;  Surgeon: Monique Philip MD;  Location: [facility] PAIN MGT;  Service: Pain Management;  Laterality: N/A;    INJECTION OF ANESTHETIC AGENT AROUND NERVE N/A 2019    Procedure: BLOCK, NERVE COCCYGEAL  2 OF 2  Pt. can drive herself home;  Surgeon: Monique Philip MD;  Location: [facility] PAIN MGT;  Service: Pain Management;  Laterality: N/A;    intradermal cyst       removed from left index finger    SINUS SURGERY      2x       Family History:  Family History   Problem Relation Name Age of Onset    Diabetes Mother      Fibromyalgia Mother      Polymyalgia rheumatica Mother      Macular degeneration Mother      Arthritis Mother      Hypertension Mother      Kidney disease Mother      Pneumonia Mother      Adrenal disorder Mother          adrenal insufficiency    Coronary artery disease Father      Arthritis Father           osteoarthritis    Hypertension Father      Heart disease Father      Diabetes Father      Hyperlipidemia Father      Hyperlipidemia Brother      Cancer Brother          oral    Thyroid cancer Daughter      Cancer Daughter          thyroid    Hypothyroidism Daughter      Stomach cancer Paternal Uncle      Breast cancer Neg Hx      Ovarian cancer Neg Hx      Colon cancer Neg Hx      Melanoma Neg Hx         Social History:  Social History     Socioeconomic History    Marital status:    Occupational History     Employer: [employer]   Tobacco Use    Smoking status: Never    Smokeless tobacco: Never   Substance and Sexual Activity    Alcohol use: No    Drug use: No    Sexual activity: Not Currently     Birth control/protection: Surgical     Comment: single, RAMOS ov in situ    Social History Narrative    She is     She has 2 children a son who is a  and a daughter 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     Social Drivers of Health     Financial Resource Strain: Low Risk  (1/7/2024)    Overall Financial Resource Strain (CARDIA)     Difficulty of Paying Living Expenses: Not very hard   Food Insecurity: No Food Insecurity (1/7/2024)    Hunger Vital Sign     Worried About Running Out of Food in the Last Year: Never true     Ran Out of Food in the Last Year: Never true   Transportation Needs: No Transportation Needs (1/7/2024)    PRAPARE - Transportation     Lack of Transportation (Medical): No     Lack of Transportation (Non-Medical): No   Physical Activity: Insufficiently Active (1/7/2024)    Exercise Vital Sign     Days of Exercise per Week: 1 day     Minutes of Exercise per Session: 20 min   Stress: No Stress Concern Present (1/7/2024)    [employer] [place] [employer] - Occupational Stress Questionnaire     Feeling of Stress : Only a little   Housing Stability: Low Risk  (1/7/2024)    Housing Stability Vital  Sign     Unable to Pay for Housing in the Last Year: No     Number of Places Lived in the Last Year: 1     Unstable Housing in the Last Year: No       OBJECTIVE:    BP 98/65 (Patient Position: Sitting)   Pulse 96   Temp 98.3 °F (36.8 °C)   Wt 43.5 kg (95 lb 14.4 oz)   LMP  (LMP Unknown)   SpO2 99%   BMI 18.73 kg/m²     PHYSICAL EXAMINATION:    General appearance: Well appearing, in no acute distress, alert and oriented x3.  Psych:  Mood and affect appropriate.  Skin: Skin color, texture, turgor normal, no rashes or lesions, in both upper and lower body.  Head/face:  Atraumatic, normocephalic.  Cor: RRR  Pulm: Symmetric chest rise, no respiratory distress noted.   Back: Straight leg raise in the sitting position is negative for radicular pain. Limited ROM with pain throughout.   Extremities:  No deformities, edema, or skin discoloration. Good capillary refill.  Musculoskeletal: There is pain with palpation over coccyx. Bilateral upper and lower extremity strength is normal and symmetric.  No atrophy or tone abnormalities are noted.  Neuro:  No loss of sensation is noted.  Gait: Normal.    ASSESSMENT: 64 y.o. year old female with pain, consistent with the followin. Chronic pain disorder        2. Pudendal neuralgia  LIDOcaine-prilocaine (EMLA) cream      3. Pelvic pain in female        4. Lumbar radiculopathy        5. Trigeminal neuralgia  LIDOcaine-prilocaine (EMLA) cream      6. Cervical spondylosis              PLAN:     - Previous imaging reviewed today. Labs reviewed.     - I have stressed the importance of physical activity and a home exercise plan to help with pain and improve health.    - Continue acupuncture.     - Continue compound cream. Refill provided.     - Continue Gabapentin 100 mg in the morning and 400 mg at bedtime.     - RTC PRN.     The above plan and management options were discussed at length with patient. Patient is in agreement with the above and verbalized understanding.    Niya  LUCIANA Irene  11/12/2024

## 2024-11-14 ENCOUNTER — OFFICE VISIT (OUTPATIENT)
Dept: ENDOCRINOLOGY | Facility: CLINIC | Age: 64
End: 2024-11-14
Payer: COMMERCIAL

## 2024-11-14 DIAGNOSIS — M81.0 OSTEOPOROSIS, POSTMENOPAUSAL: ICD-10-CM

## 2024-11-14 DIAGNOSIS — E04.2 MULTINODULAR GOITER: ICD-10-CM

## 2024-11-14 DIAGNOSIS — M35.0B SJOGREN SYNDROME WITH VASCULITIS: ICD-10-CM

## 2024-11-14 DIAGNOSIS — E06.3 HYPOTHYROIDISM DUE TO HASHIMOTO'S THYROIDITIS: Primary | ICD-10-CM

## 2024-11-14 PROCEDURE — 1159F MED LIST DOCD IN RCRD: CPT | Mod: CPTII,95,, | Performed by: INTERNAL MEDICINE

## 2024-11-14 PROCEDURE — G2211 COMPLEX E/M VISIT ADD ON: HCPCS | Mod: 95,,, | Performed by: INTERNAL MEDICINE

## 2024-11-14 PROCEDURE — 3044F HG A1C LEVEL LT 7.0%: CPT | Mod: CPTII,95,, | Performed by: INTERNAL MEDICINE

## 2024-11-14 PROCEDURE — 99214 OFFICE O/P EST MOD 30 MIN: CPT | Mod: 95,,, | Performed by: INTERNAL MEDICINE

## 2024-11-14 PROCEDURE — 1160F RVW MEDS BY RX/DR IN RCRD: CPT | Mod: CPTII,95,, | Performed by: INTERNAL MEDICINE

## 2024-11-14 NOTE — ASSESSMENT & PLAN NOTE
--With multiple thyroid nodules  --has had multiple benign FNAs of the right mid lobe nodule, and 1 benign FNA of the left lobe nodule  --she has had 3 nondiagnostic FNAs of the right inferior pole nodule, however, molecular markers were negative on this nodule in 07/2019  --Last ultrasound with stable nodules in 5/2024  --Repeat thyroid ultrasound 5/2025

## 2024-11-14 NOTE — PROGRESS NOTES
Subjective:      Patient ID: Berenice Hayes is a 64 y.o. female.    Chief Complaint:  Hypothyroidism    The patient location is: Home  The chief complaint leading to consultation is: Hypothyroidism, osteoporosis, thyroid nodule    Visit type: audiovisual    Face to Face time with patient: 10 min  25 minutes of total time spent on the encounter, which includes face to face time and non-face to face time preparing to see the patient (eg, review of tests), Obtaining and/or reviewing separately obtained history, Documenting clinical information in the electronic or other health record, Independently interpreting results (not separately reported) and communicating results to the patient/family/caregiver, or Care coordination (not separately reported).     Each patient to whom he or she provides medical services by telemedicine is:  (1) informed of the relationship between the physician and patient and the respective role of any other health care provider with respect to management of the patient; and (2) notified that he or she may decline to receive medical services by telemedicine and may withdraw from such care at any time.    History of Present Illness  Ms. Hayes presents for follow up of hypothyroidism and osteopenia.  Last visit 5/2024.     Since her last visit she was diagnosed with iron deficiency and has been supplementing iron through her diet.      With Hashimoto thyroiditis with high thyroglobulin ab and negative TPO ab.   Was started and maintained on T3 only regimen by Dr. Espinal.   Currently she is taking Cytomel totalling 15 mcg per day (5 mcg TID).  Previously had palpitations when Synthroid was added to the Cytomel. No longer having palpitations on the current regimen and TSH has remained WNL.  She denies any palpitations or tremor.   She has been hesitant to change back to T4 only or a combined T4/T3 regimen.     Also has MNG with right mid thyroid nodule s/p benign FNA in 2015, 2018, 2019 and left  lobe nodule with benign FNA in 2016. Right inferior pole nodule underwent FNA x 3 with non-diagnostic results. Molecular markers were negative on the right inferior nodule in 7/2019.      She has some chronic mild dysphagia with solid/dry foods that has not changed. She attributes a lot of this to dryness from Sjogren's.      Neck ultrasound dated 5/2024:  Impression:     1. Thyroid gland is normal in size with heterogenous echotexture.  2. 3 nodules in the right thyroid described above.  All of the nodules are stable.  3. 2 nodules in the left thyroid described above.  Both of the nodules are stable.  None meet criteria for fine-needle aspiration.  4. No abnormal lymph nodes are identified.     RECOMMENDATIONS:  The right middle lobe nodule meets criteria for FNA, but given 3 previously benign results with no interval growth, continued ultrasound surveillance is recommended.     Follow-up ultrasound in 1-2 years is recommended.        Has Osteoporosis and she took Atelvia 35 mg weekly since August/2012 until 2015 when she restarted ERT (vaginal and transdermal). Had decrease in BMD at the hip despite ERT.       Recent BMD stable at the spine but showed decline at the hip in 11/2023.  Bone density two years ago had showed a decline at the spine.     Restarted risedronate 35 mg weekly in 11/2017 and stopped in 3/2024 prior to getting Prolia. First dose of Prolia in 3/2024, last dose of Prolia in 10/2024. No significant side effects.     She remains relatively active.      She takes a multivitamin and Vit D 2000 units daily.      She is currently taking oral calcium lactate twice daily. She is also getting a fair amount of calcium through her diet (eats cheese, broccoli and walnuts daily).     Follows with rheumatology for Sjogrens. On Plaquenil.     Review of Systems   Constitutional:  Negative for chills and fever.   Gastrointestinal:  Negative for nausea.       Objective:   Physical Exam  Constitutional:        General: She is not in acute distress.     Appearance: Normal appearance. She is not ill-appearing.   Neurological:      Mental Status: She is alert.       BP Readings from Last 3 Encounters:   11/12/24 98/65   10/04/24 116/67   09/03/24 100/60     Wt Readings from Last 1 Encounters:   11/12/24 0837 43.5 kg (95 lb 14.4 oz)       There is no height or weight on file to calculate BMI.    Lab Review:   Lab Results   Component Value Date    HGBA1C 5.3 08/31/2024     Lab Results   Component Value Date    CHOL 133 08/31/2024    HDL 80 (H) 08/31/2024    LDLCALC 47.0 (L) 08/31/2024    TRIG 30 08/31/2024    CHOLHDL 60.2 (H) 08/31/2024     Lab Results   Component Value Date     09/28/2024    K 4.0 09/28/2024     09/28/2024    CO2 27 09/28/2024    GLU 96 09/28/2024    BUN 15 09/28/2024    CREATININE 0.6 09/28/2024    CALCIUM 9.4 09/28/2024    PROT 7.3 09/28/2024    ALBUMIN 4.1 09/28/2024    BILITOT 0.4 09/28/2024    ALKPHOS 64 09/28/2024    AST 28 09/28/2024    ALT 22 09/28/2024    ANIONGAP 9 09/28/2024    ESTGFRAFRICA >60.0 06/25/2022    EGFRNONAA >60.0 06/25/2022    TSH 1.233 10/26/2024         Assessment and Plan     Hypothyroidism due to Hashimoto's thyroiditis  --Explained goals of treatment is to keep the TSH in the normal range to avoid CVS and bone complications, and she is in agreement with this plan  --Will continue Cytomel 5 mcg TID pending labs  --No overt hypothyroid s/s at this time  --She understands that this is an atypical thyroid hormone replacement regimen and she will let me us know right away if she develops any hyperthyroid symptoms  --We discussed continuing this regimen so long as her TSH is not suppressed and she does not have any palpitations    Multinodular goiter  --With multiple thyroid nodules  --has had multiple benign FNAs of the right mid lobe nodule, and 1 benign FNA of the left lobe nodule  --she has had 3 nondiagnostic FNAs of the right inferior pole nodule, however, molecular  markers were negative on this nodule in 07/2019  --Last ultrasound with stable nodules in 5/2024  --Repeat thyroid ultrasound 5/2025    Osteoporosis, postmenopausal  --On ERT  --She has had a decline at the hip and spine on ERT and risedronate  --Reviewed options and received first dose of Prolia in 3/2024, last Prolia dose was in 10/2024  --She has increased weight-bearing exercise  --Was on risedronate weekly from 11/2017 until 3/2024  --She understands Prolia can not be abruptly stopped or significantly delayed once started   --Repeat bone density in 11/2025  --continue vitamin-D supplementation and calcium supplementation  --stressed importance of falls avoidance      Sjogren's syndrome  Followed by rheumatology  Started on plaquenil (prescribed 2/20/19)      Follow up in 6 months with labs prior to appt and ultrasound day of appt      Visit today included increased complexity associated with the care of the episodic problem hypothyroidism, thyroid nodules, osteoporosis addressed and managing the longitudinal care of the patient due to the serious and/or complex managed problem(s).       Leandro Espinoza M.D. Staff Endocrinology

## 2024-11-14 NOTE — ASSESSMENT & PLAN NOTE
--Explained goals of treatment is to keep the TSH in the normal range to avoid CVS and bone complications, and she is in agreement with this plan  --Will continue Cytomel 5 mcg TID pending labs  --No overt hypothyroid s/s at this time  --She understands that this is an atypical thyroid hormone replacement regimen and she will let me us know right away if she develops any hyperthyroid symptoms  --We discussed continuing this regimen so long as her TSH is not suppressed and she does not have any palpitations

## 2024-11-14 NOTE — ASSESSMENT & PLAN NOTE
--On ERT  --She has had a decline at the hip and spine on ERT and risedronate  --Reviewed options and received first dose of Prolia in 3/2024, last Prolia dose was in 10/2024  --She has increased weight-bearing exercise  --Was on risedronate weekly from 11/2017 until 3/2024  --She understands Prolia can not be abruptly stopped or significantly delayed once started   --Repeat bone density in 11/2025  --continue vitamin-D supplementation and calcium supplementation  --stressed importance of falls avoidance

## 2024-12-02 ENCOUNTER — PATIENT MESSAGE (OUTPATIENT)
Dept: RHEUMATOLOGY | Facility: CLINIC | Age: 64
End: 2024-12-02
Payer: COMMERCIAL

## 2024-12-02 ENCOUNTER — PATIENT MESSAGE (OUTPATIENT)
Dept: INTERNAL MEDICINE | Facility: CLINIC | Age: 64
End: 2024-12-02
Payer: COMMERCIAL

## 2024-12-02 DIAGNOSIS — M35.00 SJOGREN'S SYNDROME, WITH UNSPECIFIED ORGAN INVOLVEMENT: ICD-10-CM

## 2024-12-02 RX ORDER — HYDROXYCHLOROQUINE SULFATE 200 MG/1
200 TABLET, FILM COATED ORAL DAILY
Qty: 90 TABLET | Refills: 3 | Status: SHIPPED | OUTPATIENT
Start: 2024-12-02

## 2024-12-02 RX ORDER — HYDROXYCHLOROQUINE SULFATE 200 MG/1
200 TABLET, FILM COATED ORAL DAILY
Qty: 90 TABLET | Refills: 3 | Status: CANCELLED | OUTPATIENT
Start: 2024-12-02

## 2024-12-05 ENCOUNTER — PATIENT MESSAGE (OUTPATIENT)
Dept: PAIN MEDICINE | Facility: CLINIC | Age: 64
End: 2024-12-05
Payer: COMMERCIAL

## 2024-12-28 ENCOUNTER — LAB VISIT (OUTPATIENT)
Dept: LAB | Facility: HOSPITAL | Age: 64
End: 2024-12-28
Attending: INTERNAL MEDICINE
Payer: COMMERCIAL

## 2024-12-28 DIAGNOSIS — I73.81 ERYTHROMELALGIA: ICD-10-CM

## 2024-12-28 DIAGNOSIS — R79.0 LOW FERRITIN: ICD-10-CM

## 2024-12-28 DIAGNOSIS — R53.83 FATIGUE, UNSPECIFIED TYPE: ICD-10-CM

## 2024-12-28 DIAGNOSIS — M35.0B SJOGREN'S SYNDROME WITH VASCULITIS: ICD-10-CM

## 2024-12-28 DIAGNOSIS — M79.7 FIBROMYALGIA: ICD-10-CM

## 2024-12-28 DIAGNOSIS — I73.00 RAYNAUD'S DISEASE WITHOUT GANGRENE: ICD-10-CM

## 2024-12-28 LAB
ALBUMIN SERPL BCP-MCNC: 4.2 G/DL (ref 3.5–5.2)
ALP SERPL-CCNC: 60 U/L (ref 40–150)
ALT SERPL W/O P-5'-P-CCNC: 26 U/L (ref 10–44)
ANION GAP SERPL CALC-SCNC: 12 MMOL/L (ref 8–16)
AST SERPL-CCNC: 25 U/L (ref 10–40)
BASOPHILS # BLD AUTO: 0.06 K/UL (ref 0–0.2)
BASOPHILS NFR BLD: 1.3 % (ref 0–1.9)
BILIRUB SERPL-MCNC: 0.5 MG/DL (ref 0.1–1)
BUN SERPL-MCNC: 17 MG/DL (ref 8–23)
C3 SERPL-MCNC: 98 MG/DL (ref 50–180)
C4 SERPL-MCNC: 20 MG/DL (ref 11–44)
CALCIUM SERPL-MCNC: 9.6 MG/DL (ref 8.7–10.5)
CHLORIDE SERPL-SCNC: 101 MMOL/L (ref 95–110)
CK SERPL-CCNC: 36 U/L (ref 20–180)
CO2 SERPL-SCNC: 26 MMOL/L (ref 23–29)
CREAT SERPL-MCNC: 0.7 MG/DL (ref 0.5–1.4)
CRP SERPL-MCNC: 0.5 MG/L (ref 0–8.2)
DIFFERENTIAL METHOD BLD: ABNORMAL
EOSINOPHIL # BLD AUTO: 0.1 K/UL (ref 0–0.5)
EOSINOPHIL NFR BLD: 2.4 % (ref 0–8)
ERYTHROCYTE [DISTWIDTH] IN BLOOD BY AUTOMATED COUNT: 16 % (ref 11.5–14.5)
ERYTHROCYTE [DISTWIDTH] IN BLOOD BY AUTOMATED COUNT: 16 % (ref 11.5–14.5)
ERYTHROCYTE [SEDIMENTATION RATE] IN BLOOD BY PHOTOMETRIC METHOD: 11 MM/HR (ref 0–36)
EST. GFR  (NO RACE VARIABLE): >60 ML/MIN/1.73 M^2
FERRITIN SERPL-MCNC: 19 NG/ML (ref 20–300)
GLUCOSE SERPL-MCNC: 98 MG/DL (ref 70–110)
HCT VFR BLD AUTO: 42.9 % (ref 37–48.5)
HCT VFR BLD AUTO: 42.9 % (ref 37–48.5)
HGB BLD-MCNC: 13.9 G/DL (ref 12–16)
HGB BLD-MCNC: 13.9 G/DL (ref 12–16)
IMM GRANULOCYTES # BLD AUTO: 0.01 K/UL (ref 0–0.04)
IMM GRANULOCYTES NFR BLD AUTO: 0.2 % (ref 0–0.5)
IRON SERPL-MCNC: 115 UG/DL (ref 30–160)
LYMPHOCYTES # BLD AUTO: 0.7 K/UL (ref 1–4.8)
LYMPHOCYTES NFR BLD: 15 % (ref 18–48)
MCH RBC QN AUTO: 29.1 PG (ref 27–31)
MCH RBC QN AUTO: 29.1 PG (ref 27–31)
MCHC RBC AUTO-ENTMCNC: 32.4 G/DL (ref 32–36)
MCHC RBC AUTO-ENTMCNC: 32.4 G/DL (ref 32–36)
MCV RBC AUTO: 90 FL (ref 82–98)
MCV RBC AUTO: 90 FL (ref 82–98)
MONOCYTES # BLD AUTO: 0.4 K/UL (ref 0.3–1)
MONOCYTES NFR BLD: 8.4 % (ref 4–15)
NEUTROPHILS # BLD AUTO: 3.4 K/UL (ref 1.8–7.7)
NEUTROPHILS NFR BLD: 72.7 % (ref 38–73)
NRBC BLD-RTO: 0 /100 WBC
PLATELET # BLD AUTO: 182 K/UL (ref 150–450)
PLATELET # BLD AUTO: 182 K/UL (ref 150–450)
PMV BLD AUTO: 10.4 FL (ref 9.2–12.9)
PMV BLD AUTO: 10.4 FL (ref 9.2–12.9)
POTASSIUM SERPL-SCNC: 4.2 MMOL/L (ref 3.5–5.1)
PROT SERPL-MCNC: 7.5 G/DL (ref 6–8.4)
RBC # BLD AUTO: 4.78 M/UL (ref 4–5.4)
RBC # BLD AUTO: 4.78 M/UL (ref 4–5.4)
SATURATED IRON: 26 % (ref 20–50)
SODIUM SERPL-SCNC: 139 MMOL/L (ref 136–145)
TOTAL IRON BINDING CAPACITY: 445 UG/DL (ref 250–450)
TRANSFERRIN SERPL-MCNC: 301 MG/DL (ref 200–375)
WBC # BLD AUTO: 4.66 K/UL (ref 3.9–12.7)
WBC # BLD AUTO: 4.66 K/UL (ref 3.9–12.7)

## 2024-12-28 PROCEDURE — 85652 RBC SED RATE AUTOMATED: CPT | Performed by: INTERNAL MEDICINE

## 2024-12-28 PROCEDURE — 86160 COMPLEMENT ANTIGEN: CPT | Mod: 59 | Performed by: INTERNAL MEDICINE

## 2024-12-28 PROCEDURE — 86140 C-REACTIVE PROTEIN: CPT | Performed by: INTERNAL MEDICINE

## 2024-12-28 PROCEDURE — 82728 ASSAY OF FERRITIN: CPT | Performed by: FAMILY MEDICINE

## 2024-12-28 PROCEDURE — 83540 ASSAY OF IRON: CPT | Performed by: FAMILY MEDICINE

## 2024-12-28 PROCEDURE — 36415 COLL VENOUS BLD VENIPUNCTURE: CPT | Performed by: INTERNAL MEDICINE

## 2024-12-28 PROCEDURE — 86225 DNA ANTIBODY NATIVE: CPT | Performed by: INTERNAL MEDICINE

## 2024-12-28 PROCEDURE — 80053 COMPREHEN METABOLIC PANEL: CPT | Performed by: INTERNAL MEDICINE

## 2024-12-28 PROCEDURE — 82550 ASSAY OF CK (CPK): CPT | Performed by: INTERNAL MEDICINE

## 2024-12-28 PROCEDURE — 86160 COMPLEMENT ANTIGEN: CPT | Performed by: INTERNAL MEDICINE

## 2024-12-28 PROCEDURE — 85025 COMPLETE CBC W/AUTO DIFF WBC: CPT | Performed by: INTERNAL MEDICINE

## 2024-12-30 LAB — DSDNA AB SER-ACNC: NORMAL [IU]/ML

## 2025-02-12 ENCOUNTER — PATIENT MESSAGE (OUTPATIENT)
Dept: DERMATOLOGY | Facility: CLINIC | Age: 65
End: 2025-02-12
Payer: COMMERCIAL

## 2025-02-20 ENCOUNTER — PATIENT MESSAGE (OUTPATIENT)
Dept: DERMATOLOGY | Facility: CLINIC | Age: 65
End: 2025-02-20
Payer: COMMERCIAL

## 2025-02-20 ENCOUNTER — TELEPHONE (OUTPATIENT)
Dept: DERMATOLOGY | Facility: CLINIC | Age: 65
End: 2025-02-20
Payer: COMMERCIAL

## 2025-03-03 DIAGNOSIS — R10.2 PELVIC PAIN IN FEMALE: ICD-10-CM

## 2025-03-03 RX ORDER — GABAPENTIN 100 MG/1
CAPSULE ORAL
Qty: 270 CAPSULE | Refills: 4 | Status: SHIPPED | OUTPATIENT
Start: 2025-03-03

## 2025-03-10 DIAGNOSIS — Z78.0 MENOPAUSE: ICD-10-CM

## 2025-03-10 RX ORDER — ESTRADIOL 0.05 MG/D
FILM, EXTENDED RELEASE TRANSDERMAL
Qty: 24 PATCH | Refills: 0 | Status: SHIPPED | OUTPATIENT
Start: 2025-03-10

## 2025-03-16 ENCOUNTER — PATIENT MESSAGE (OUTPATIENT)
Dept: RHEUMATOLOGY | Facility: CLINIC | Age: 65
End: 2025-03-16
Payer: COMMERCIAL

## 2025-03-17 ENCOUNTER — OFFICE VISIT (OUTPATIENT)
Dept: DERMATOLOGY | Facility: CLINIC | Age: 65
End: 2025-03-17
Payer: COMMERCIAL

## 2025-03-17 DIAGNOSIS — L82.1 SEBORRHEIC KERATOSIS: ICD-10-CM

## 2025-03-17 DIAGNOSIS — T69.1XXD PERNIO, SUBSEQUENT ENCOUNTER: Primary | ICD-10-CM

## 2025-03-17 PROCEDURE — 99214 OFFICE O/P EST MOD 30 MIN: CPT | Mod: S$GLB,,, | Performed by: DERMATOLOGY

## 2025-03-17 PROCEDURE — 99999 PR PBB SHADOW E&M-EST. PATIENT-LVL I: CPT | Mod: PBBFAC,,, | Performed by: DERMATOLOGY

## 2025-03-17 RX ORDER — CLOBETASOL PROPIONATE 0.5 MG/G
OINTMENT TOPICAL
Qty: 30 G | Refills: 2 | Status: SHIPPED | OUTPATIENT
Start: 2025-03-17

## 2025-03-17 NOTE — PROGRESS NOTES
Subjective:      Patient ID:  Berenice Hayes is a 65 y.o. female who presents for spot check.    HPI  Berenice Hayes is a 64 y.o. female w/ hx of Sjogren's, fibromyalgia, thyroid disease who presents for follow-up.    LOV 06/2024 for suspected PV to the LE. Sh has a Hx of bx proven pernio, Sjogrens, and erythromelalgia currently taking niacinamide 500 mg QD and plaquenil per rheumatology. Today, she describes a new itchy mole on her back that she would like evaluation of. She states that it was severely itchy for 2-3 weeks and she has applied CeraVe anti itch to the area with relief.    She also states that she has been having a flare of her pernio on her fingers. She attributes this current flare to increased stress at work. She has been having increased erosions and cracking to the tips of her fingers.    Review of Systems    Objective:   Physical Exam   Skin:               Diagram Legend     Erythematous scaling macule/papule c/w actinic keratosis       Vascular papule c/w angioma      Pigmented verrucoid papule/plaque c/w seborrheic keratosis      Yellow umbilicated papule c/w sebaceous hyperplasia      Irregularly shaped tan macule c/w lentigo     1-2 mm smooth white papules consistent with Milia      Movable subcutaneous cyst with punctum c/w epidermal inclusion cyst      Subcutaneous movable cyst c/w pilar cyst      Firm pink to brown papule c/w dermatofibroma      Pedunculated fleshy papule(s) c/w skin tag(s)      Evenly pigmented macule c/w junctional nevus     Mildly variegated pigmented, slightly irregular-bordered macule c/w mildly atypical nevus      Flesh colored to evenly pigmented papule c/w intradermal nevus       Pink pearly papule/plaque c/w basal cell carcinoma      Erythematous hyperkeratotic cursted plaque c/w SCC      Surgical scar with no sign of skin cancer recurrence      Open and closed comedones      Inflammatory papules and pustules      Verrucoid papule consistent consistent  with wart     Erythematous eczematous patches and plaques     Dystrophic onycholytic nail with subungual debris c/w onychomycosis     Umbilicated papule    Erythematous-base heme-crusted tan verrucoid plaque consistent with inflamed seborrheic keratosis     Erythematous Silvery Scaling Plaque c/w Psoriasis     See annotation      Assessment / Plan:        Shashi, subsequent encounter  Currently on Niacinamide 500 mg QD and plaquenil. Flaring to multiple fingers associated with increased stress at work. Pt is not a candidate for CCB as pt also has co-morbid erythromelalgia.  -  Start clobetasol 0.05% (TEMOVATE) 0.05 % Oint; Apply two times a day to hands as needed.  Dispense: 30 g; Refill: 2  -  Recommend continuing to wear warming gloves to decrease incidence of flares  -  Recommend Follow-up with Rheumatology to evaluate need for additional therapy.    Seborrheic keratosis  Counseled on benign etiology. Since pt was severely bothered by the itch, offered to treat with cryotherapy, but pt declined as she is now not bothered by the area.  - Reassurance.

## 2025-03-19 ENCOUNTER — PATIENT OUTREACH (OUTPATIENT)
Dept: ADMINISTRATIVE | Facility: HOSPITAL | Age: 65
End: 2025-03-19
Payer: COMMERCIAL

## 2025-03-19 NOTE — PROGRESS NOTES
Chart reviewed and updated. Reconciled immunizations, health maintenance and care everywhere.  Scanned eye exam 03.13.2025 from [facility].      Elly Rodriguez [facility]  Panel Care Coordinator  [facility]  [phone]  For Adam Lundberg MD

## 2025-04-11 ENCOUNTER — INFUSION (OUTPATIENT)
Dept: INFECTIOUS DISEASES | Facility: HOSPITAL | Age: 65
End: 2025-04-11
Payer: COMMERCIAL

## 2025-04-11 VITALS
HEIGHT: 61 IN | BODY MASS INDEX: 17.96 KG/M2 | WEIGHT: 95.13 LBS | RESPIRATION RATE: 19 BRPM | OXYGEN SATURATION: 100 % | SYSTOLIC BLOOD PRESSURE: 101 MMHG | DIASTOLIC BLOOD PRESSURE: 62 MMHG | HEART RATE: 96 BPM | TEMPERATURE: 98 F

## 2025-04-11 DIAGNOSIS — M81.0 OSTEOPOROSIS, POSTMENOPAUSAL: Primary | ICD-10-CM

## 2025-04-11 PROCEDURE — 96372 THER/PROPH/DIAG INJ SC/IM: CPT

## 2025-04-11 PROCEDURE — 63600175 PHARM REV CODE 636 W HCPCS: Mod: JZ,TB | Performed by: INTERNAL MEDICINE

## 2025-04-11 RX ADMIN — DENOSUMAB 60 MG: 60 INJECTION SUBCUTANEOUS at 03:04

## 2025-04-15 ENCOUNTER — PATIENT MESSAGE (OUTPATIENT)
Dept: ENDOCRINOLOGY | Facility: CLINIC | Age: 65
End: 2025-04-15
Payer: COMMERCIAL

## 2025-04-16 DIAGNOSIS — T69.1XXA CHILBLAINS, INITIAL ENCOUNTER: ICD-10-CM

## 2025-04-16 DIAGNOSIS — G47.00 INSOMNIA, UNSPECIFIED TYPE: ICD-10-CM

## 2025-04-16 RX ORDER — NIACINAMIDE 500 MG
500 TABLET ORAL 2 TIMES DAILY
Qty: 180 TABLET | Refills: 1 | Status: SHIPPED | OUTPATIENT
Start: 2025-04-16

## 2025-04-16 RX ORDER — ESTRADIOL 10 UG/1
TABLET, FILM COATED VAGINAL
Qty: 24 TABLET | Refills: 0 | Status: SHIPPED | OUTPATIENT
Start: 2025-04-16

## 2025-04-16 RX ORDER — ZOLPIDEM TARTRATE 10 MG/1
TABLET ORAL
Qty: 30 TABLET | Refills: 2 | Status: SHIPPED | OUTPATIENT
Start: 2025-04-16

## 2025-04-16 NOTE — TELEPHONE ENCOUNTER
Unable to retrieve patient chart and identify care due.  [facility] Embedded Care Due Messages. Reference number: [id number].   4/16/2025 11:07:39 AM CDT

## 2025-05-12 ENCOUNTER — PATIENT MESSAGE (OUTPATIENT)
Dept: OBSTETRICS AND GYNECOLOGY | Facility: CLINIC | Age: 65
End: 2025-05-12
Payer: COMMERCIAL

## 2025-05-12 ENCOUNTER — PATIENT MESSAGE (OUTPATIENT)
Dept: INTERNAL MEDICINE | Facility: CLINIC | Age: 65
End: 2025-05-12
Payer: COMMERCIAL

## 2025-05-12 DIAGNOSIS — M79.7 FIBROMYALGIA: ICD-10-CM

## 2025-05-12 DIAGNOSIS — E04.2 MULTINODULAR GOITER: ICD-10-CM

## 2025-05-12 DIAGNOSIS — Z78.0 MENOPAUSE: ICD-10-CM

## 2025-05-12 DIAGNOSIS — Z80.8 FAMILY HISTORY OF THYROID CANCER: ICD-10-CM

## 2025-05-12 DIAGNOSIS — E06.3 HYPOTHYROIDISM DUE TO HASHIMOTO'S THYROIDITIS: ICD-10-CM

## 2025-05-12 DIAGNOSIS — Z12.31 ENCOUNTER FOR SCREENING MAMMOGRAM FOR MALIGNANT NEOPLASM OF BREAST: Primary | ICD-10-CM

## 2025-05-12 RX ORDER — ESTRADIOL 10 UG/1
1 TABLET, FILM COATED VAGINAL
Qty: 24 TABLET | Refills: 0 | Status: SHIPPED | OUTPATIENT
Start: 2025-05-12

## 2025-05-12 RX ORDER — ESTRADIOL 0.05 MG/D
1 FILM, EXTENDED RELEASE TRANSDERMAL
Qty: 24 PATCH | Refills: 0 | Status: SHIPPED | OUTPATIENT
Start: 2025-05-12

## 2025-05-13 RX ORDER — LIOTHYRONINE SODIUM 5 UG/1
TABLET ORAL
Qty: 315 TABLET | Refills: 3 | Status: SHIPPED | OUTPATIENT
Start: 2025-05-13

## 2025-05-13 RX ORDER — AMITRIPTYLINE HYDROCHLORIDE 50 MG/1
TABLET, FILM COATED ORAL
Qty: 90 TABLET | Refills: 3 | Status: SHIPPED | OUTPATIENT
Start: 2025-05-13

## 2025-05-13 RX ORDER — METAXALONE 800 MG/1
TABLET ORAL
Qty: 180 TABLET | Refills: 3 | Status: SHIPPED | OUTPATIENT
Start: 2025-05-13

## 2025-05-13 RX ORDER — AMITRIPTYLINE HYDROCHLORIDE 10 MG/1
TABLET, FILM COATED ORAL
Qty: 180 TABLET | Refills: 3 | Status: SHIPPED | OUTPATIENT
Start: 2025-05-13

## 2025-05-13 NOTE — TELEPHONE ENCOUNTER
No care due was identified.  [facility] Embedded Care Due Messages. Reference number: [id number].   5/13/2025 8:13:07 AM CDT

## 2025-06-12 ENCOUNTER — OFFICE VISIT (OUTPATIENT)
Dept: OBSTETRICS AND GYNECOLOGY | Facility: CLINIC | Age: 65
End: 2025-06-12
Attending: OBSTETRICS & GYNECOLOGY
Payer: COMMERCIAL

## 2025-06-12 VITALS
WEIGHT: 93.5 LBS | SYSTOLIC BLOOD PRESSURE: 102 MMHG | DIASTOLIC BLOOD PRESSURE: 56 MMHG | BODY MASS INDEX: 18.36 KG/M2 | HEIGHT: 60 IN

## 2025-06-12 DIAGNOSIS — Z79.890 NEED FOR PROPHYLACTIC HORMONE REPLACEMENT THERAPY (POSTMENOPAUSAL): ICD-10-CM

## 2025-06-12 DIAGNOSIS — N39.8 VOIDING DYSFUNCTION: ICD-10-CM

## 2025-06-12 DIAGNOSIS — Z78.0 MENOPAUSE: ICD-10-CM

## 2025-06-12 DIAGNOSIS — Z01.419 WELL FEMALE EXAM WITH ROUTINE GYNECOLOGICAL EXAM: Primary | ICD-10-CM

## 2025-06-12 PROCEDURE — 3074F SYST BP LT 130 MM HG: CPT | Mod: CPTII,S$GLB,, | Performed by: OBSTETRICS & GYNECOLOGY

## 2025-06-12 PROCEDURE — 99999 PR PBB SHADOW E&M-EST. PATIENT-LVL V: CPT | Mod: PBBFAC,,, | Performed by: OBSTETRICS & GYNECOLOGY

## 2025-06-12 PROCEDURE — 3078F DIAST BP <80 MM HG: CPT | Mod: CPTII,S$GLB,, | Performed by: OBSTETRICS & GYNECOLOGY

## 2025-06-12 PROCEDURE — 1160F RVW MEDS BY RX/DR IN RCRD: CPT | Mod: CPTII,S$GLB,, | Performed by: OBSTETRICS & GYNECOLOGY

## 2025-06-12 PROCEDURE — 99396 PREV VISIT EST AGE 40-64: CPT | Mod: S$GLB,,, | Performed by: OBSTETRICS & GYNECOLOGY

## 2025-06-12 PROCEDURE — 1159F MED LIST DOCD IN RCRD: CPT | Mod: CPTII,S$GLB,, | Performed by: OBSTETRICS & GYNECOLOGY

## 2025-06-12 PROCEDURE — 3008F BODY MASS INDEX DOCD: CPT | Mod: CPTII,S$GLB,, | Performed by: OBSTETRICS & GYNECOLOGY

## 2025-06-12 RX ORDER — ESTRADIOL 10 UG/1
1 TABLET, FILM COATED VAGINAL
Qty: 24 TABLET | Refills: 3 | Status: SHIPPED | OUTPATIENT
Start: 2025-06-12

## 2025-06-12 RX ORDER — ESTRADIOL 0.05 MG/D
1 FILM, EXTENDED RELEASE TRANSDERMAL
Qty: 24 PATCH | Refills: 3 | Status: SHIPPED | OUTPATIENT
Start: 2025-06-12

## 2025-06-12 NOTE — PROGRESS NOTES
SUBJECTIVE:   65 y.o. female   for routine gyn exam. No LMP recorded (lmp unknown). Patient has had a hysterectomy..  She desires refill of ERT. Has urinary dysfunction.         Past Medical History:   Diagnosis Date    Asthma with allergic rhinitis     Bladder spasm     Dense breasts     heterogeneously/fibrocystic disease    Elevated antinuclear antibody (GUICHO) level     Erythromelalgia     Fibromyalgia     Ganglion cyst     left toe    Goiter     MNG    Headache(784.0)     Hypothyroidism     Hashimoto with + Tg ab    Kidney stone     Osteoporosis     femoral neck    Pernio 2018    Raynaud's phenomenon     Rhinitis     Scoliosis     Sjogren's syndrome     Sleep disorder     type of Narcolepsy ( resolved)    Vitamin D deficiency disease     Vulvodynia      Past Surgical History:   Procedure Laterality Date    BREAST SURGERY      fibrocystic tumor removed     SECTION      2x    CYST REMOVAL      laparoscopic cyst on ovary    ENDOSCOPIC ULTRASOUND OF UPPER GASTROINTESTINAL TRACT N/A 2024    Procedure: ULTRASOUND, UPPER GI TRACT, ENDOSCOPIC;  Surgeon: Salty Baez MD;  Location: [facility] (2ND FLR);  Service: Endoscopy;  Laterality: N/A;   portal- EUS for her with me for dilated PD, [facility] or Alee, 3-6 weeks. [id number]   PRECALL ATTEMPTED-LM/RT  -pt confirmed appt, see telephone encounter -KPvt    ESOPHAGOGASTRODUODENOSCOPY N/A 2024    Procedure: EGD (ESOPHAGOGASTRODUODENOSCOPY);  Surgeon: Mynor Sandhu MD;  Location: [facility] (4TH FLR);  Service: Endoscopy;  Laterality: N/A;  -precall complete-MS    HYSTERECTOMY      INJECTION OF ANESTHETIC AGENT AROUND NERVE N/A 2019    Procedure: BLOCK, NERVE COCCYGEAL  1 OF 2  Pt. can drive herself home;  Surgeon: Monique Philip MD;  Location: [facility] PAIN MGT;  Service: Pain Management;  Laterality: N/A;    INJECTION OF ANESTHETIC AGENT AROUND NERVE N/A 2019    Procedure: BLOCK, NERVE COCCYGEAL  2 OF 2  Pt. can drive  herself home;  Surgeon: Monique Philip MD;  Location: [facility] PAIN T;  Service: Pain Management;  Laterality: N/A;    intradermal cyst       removed from left index finger    SINUS SURGERY      2x     Social History[1]  Family History   Problem Relation Name Age of Onset    Diabetes Mother      Fibromyalgia Mother      Polymyalgia rheumatica Mother      Macular degeneration Mother      Arthritis Mother      Hypertension Mother      Kidney disease Mother      Pneumonia Mother      Adrenal disorder Mother          adrenal insufficiency    Coronary artery disease Father      Arthritis Father          osteoarthritis    Hypertension Father      Heart disease Father      Diabetes Father      Hyperlipidemia Father      Hyperlipidemia Brother      Cancer Brother          oral    Thyroid cancer Daughter      Cancer Daughter          thyroid    Hypothyroidism Daughter      Stomach cancer Paternal Uncle      Breast cancer Neg Hx      Ovarian cancer Neg Hx      Colon cancer Neg Hx      Melanoma Neg Hx       OB History    Para Term  AB Living   2 2 2   2   SAB IAB Ectopic Multiple Live Births             # Outcome Date GA Lbr Jarrod/2nd Weight Sex Type Anes PTL Lv   2 Term            1 Term                  Current Medications[2]  Allergies: Patient has no known allergies.     ROS:  Constitutional: no weight loss, weight gain, fever, fatigue  Eyes:  No vision changes, glasses/contacts  ENT/Mouth: No ulcers, sinus problems, ears ringing, headache  Cardiovascular: No inability to lie flat, chest pain, exercise intolerance, swelling, heart palpitations  Respiratory: No wheezing, coughing blood, shortness of breath, or cough  Gastrointestinal: No diarrhea, bloody stool, nausea/vomiting, constipation, gas, hemorrhoids  Genitourinary: No blood in urine, painful urination, urgency of urination, frequency of urination, incomplete emptying, incontinence, abnormal bleeding, painful periods, heavy periods, vaginal discharge,  vaginal odor, painful intercourse, sexual problems, bleeding after intercourse.  Musculoskeletal: No muscle weakness  Skin/Breast: No painful breasts, nipple discharge, masses, rash, ulcers  Neurological: No passing out, seizures, numbness, headache  Endocrine: No diabetes, hypothyroid, hyperthyroid, hot flashes, hair loss, abnormal hair growth, acne  Psychiatric: No depression, crying  Hematologic: No bruises, bleeding, swollen lymph nodes, anemia.      OBJECTIVE:   The patient appears well, alert, oriented x 3, in no distress.  BP (!) 102/56 (Patient Position: Sitting)   Ht 5' (1.524 m)   Wt 42.4 kg (93 lb 7.6 oz)   LMP  (LMP Unknown)   BMI 18.26 kg/m²   NECK: no thyromegaly, trachea midline  SKIN: no acne, striae, hirsutism  CHEST: CTAB  CV: RRR  BREAST EXAM: breasts appear normal, no suspicious masses, no skin or nipple changes or axillary nodes  ABDOMEN: no hernias, masses, or hepatosplenomegaly  GENITALIA: normal external genitalia, no erythema, no discharge  URETHRA: normal urethra, normal urethral meatus  VAGINA: Normal  CERVIX: absent  UTERUS: absent  ADNEXA: no mass, fullness, tenderness      ASSESSMENT:   1. Well female exam with routine gynecological exam        2. Menopause  estradiol 0.05 mg/24 hr td ptsw (VIVELLE-DOT) 0.05 mg/24 hr      3. Need for prophylactic hormone replacement therapy (postmenopausal)        4. Voiding dysfunction  Ambulatory referral/consult to Urogynecology          PLAN:   Orders Placed This Encounter    Ambulatory referral/consult to Urogynecology    estradiol 0.05 mg/24 hr td ptsw (VIVELLE-DOT) 0.05 mg/24 hr    estradioL (VAGIFEM) 10 mcg Tab     Discussed ERT, WHI, healthy lifestyle including regular exercise, healthy eating, etc.  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     Social Drivers of Health     Financial Resource Strain: Low Risk  (1/7/2024)    Overall Financial Resource Strain (CARDIA)     Difficulty of Paying Living Expenses: Not very hard   Food Insecurity: No Food Insecurity (1/7/2024)    Hunger Vital Sign     Worried About Running Out of Food in the Last Year: Never true     Ran Out of Food in the Last Year: Never true   Transportation Needs: No Transportation Needs (1/7/2024)    PRAPARE - Transportation     Lack of Transportation (Medical): No     Lack of Transportation (Non-Medical): No   Physical Activity: Insufficiently Active (1/7/2024)    Exercise Vital Sign     Days of Exercise per Week: 1 day     Minutes of Exercise per Session: 20 min   Stress: No Stress Concern Present (1/7/2024)    [employer] [place] [employer] - Occupational Stress Questionnaire     Feeling of Stress : Only a little   Housing Stability: Low Risk  (1/7/2024)    Housing Stability Vital Sign     Unable to Pay for Housing in the Last Year: No     Number of Places Lived in the Last Year: 1     Unstable Housing in the Last Year: No   [2]   Current Outpatient Medications   Medication Sig Dispense Refill    amitriptyline (ELAVIL) 10 MG tablet TAKE 2 TABLETS ONCE DAILY WITH THE 50 MG FOR A TOTAL OF 70  tablet 3    amitriptyline (ELAVIL) 50 MG tablet TAKE 1 TABLET NIGHTLY WITH THE 10 MG AMITRIPTYLINE 90 tablet 3    aspirin (ECOTRIN) 81 MG EC tablet Take 81 mg by mouth once daily.      AZASITE 1 % Drop INSTILL 1 DROP INTO LEFT EYE BID      clindamycin (CLEOCIN T) 1 % external solution APPLY TOPICALLY TO  THE AFFECTED AREA TWICE DAILY AS NEEDED FOR BREAKOUTS 30 mL 3    clobetasol 0.05% (TEMOVATE) 0.05 % Oint Apply two times a day to hands as needed. 30 g 2    desonide (DESOWEN) 0.05 % cream AAA BID  x 1-2 wks then prn flares only 60 g 2    fluticasone (FLONASE) 50 mcg/actuation nasal spray 1 spray by Each Nare route once daily. 3 Bottle 3    gabapentin (NEURONTIN) 100 MG capsule TAKE 1 CAPSULE IN THE MORNING AND 4 CAPSULES AT BEDTIME 270 capsule 4    hydroxychloroquine (PLAQUENIL) 200 mg tablet Take 1 tablet (200 mg total) by mouth once daily. 90 tablet 3    imiquimod (ALDARA) 5 % cream Apply small amount to affected area on knee qhs x 4 weeks. Wash off in am. Stop when red/inflamed. 24 packet 1    ketoconazole (NIZORAL) 2 % cream Apply topically 2 (two) times daily. Apply daily to affected area 30 g 0    ketoprofen 10 % CrPk       LIDOcaine-prilocaine (EMLA) cream Apply topically as needed (pain). 120 g 2    liothyronine (CYTOMEL) 5 MCG Tab TAKE THREE AND ONE-HALF TABLETS ONCE DAILY 315 tablet 3    MAXITROL 3.5 mg/g-10,000 unit/g-0.1 % Oint SMARTSI.25 Inch(es) Left Eye Every Evening      MAXITROL 3.5mg/mL-10,000 unit/mL-0.1 % DrpS Place 1 drop into the left eye every morning.      metaxalone (SKELAXIN) 800 MG tablet TAKE 1 TABLET TWICE A DAY AS NEEDED FOR SPASM 180 tablet 3    niacinamide 500 mg Tab Take 1 tablet (500 mg total) by mouth 2 (two) times a day. 180 tablet 1    NURTEC 75 mg odt Take by mouth.      pilocarpine (SALAGEN) 5 MG Tab Take 1 tablet (5 mg total) by mouth 3 (three) times daily as needed. 270 tablet 3    PREVIDENT 5000 BOOSTER PLUS 1.1 % Pste       RESTASIS 0.05 % ophthalmic emulsion       tretinoin (RETIN-A) 0.025 % gel Apply small amount to entire face qhs. Wash off qam and apply sunscreen. 45 g 3    triamcinolone acetonide 0.1% (KENALOG) 0.1 % cream Apply topically 2 (two) times daily. To affected areas of skin for 2 weeks out of a month. Avoid applying to face, groin, or armpits. 90 g 3     UNABLE TO FIND Apply 240 g topically as needed. Ketoprofen/cyclobenzaprine HCI/Lidocaine (lipomax) 10/2/5% Up to 5 times daily as needed for pain  99    zolpidem (AMBIEN) 10 mg Tab TAKE 1 TABLET(10 MG) BY MOUTH EVERY NIGHT AS NEEDED 30 tablet 2    diazePAM (VALIUM) 5 MG tablet Take 1 tablet (5 mg total) by mouth On call Procedure for Anxiety (Take one pill 2 hours prior to MRI scan for claustrophobia). (Patient not taking: Reported on 6/12/2025) 1 tablet 0    estradioL (VAGIFEM) 10 mcg Tab Place 1 tablet (10 mcg total) vaginally twice a week. 24 tablet 3    estradiol 0.05 mg/24 hr td ptsw (VIVELLE-DOT) 0.05 mg/24 hr Place 1 patch onto the skin twice a week. 24 patch 3    risedronate 35 mg TbEC TAKE 1 TABLET ONCE A WEEK (Patient not taking: Reported on 6/12/2025) 12 tablet 3    sumatriptan (IMITREX) 100 MG tablet Take 1 tablet (100 mg total) by mouth every 2 (two) hours as needed for Migraine. 9 tablet 1     No current facility-administered medications for this visit.

## 2025-06-14 ENCOUNTER — HOSPITAL ENCOUNTER (OUTPATIENT)
Dept: RADIOLOGY | Facility: HOSPITAL | Age: 65
Discharge: HOME | End: 2025-06-14
Attending: OBSTETRICS & GYNECOLOGY
Payer: COMMERCIAL

## 2025-06-14 DIAGNOSIS — Z12.31 ENCOUNTER FOR SCREENING MAMMOGRAM FOR MALIGNANT NEOPLASM OF BREAST: ICD-10-CM

## 2025-06-14 PROCEDURE — 77067 SCR MAMMO BI INCL CAD: CPT | Mod: 26,,, | Performed by: RADIOLOGY

## 2025-06-14 PROCEDURE — 77063 BREAST TOMOSYNTHESIS BI: CPT | Mod: TC

## 2025-06-14 PROCEDURE — 77063 BREAST TOMOSYNTHESIS BI: CPT | Mod: 26,,, | Performed by: RADIOLOGY

## 2025-06-28 ENCOUNTER — LAB VISIT (OUTPATIENT)
Dept: LAB | Facility: HOSPITAL | Age: 65
End: 2025-06-28
Attending: INTERNAL MEDICINE
Payer: COMMERCIAL

## 2025-06-28 DIAGNOSIS — E06.3 HYPOTHYROIDISM DUE TO HASHIMOTO'S THYROIDITIS: ICD-10-CM

## 2025-06-28 LAB
T3FREE SERPL-MCNC: 138 NG/DL (ref 60–180)
T4 FREE SERPL-MCNC: 0.52 NG/DL (ref 0.71–1.51)
TSH SERPL-ACNC: 1.05 UIU/ML (ref 0.4–4)

## 2025-06-28 PROCEDURE — 84443 ASSAY THYROID STIM HORMONE: CPT

## 2025-06-28 PROCEDURE — 84439 ASSAY OF FREE THYROXINE: CPT

## 2025-06-28 PROCEDURE — 36415 COLL VENOUS BLD VENIPUNCTURE: CPT

## 2025-06-28 PROCEDURE — 84480 ASSAY TRIIODOTHYRONINE (T3): CPT

## 2025-06-30 ENCOUNTER — RESULTS FOLLOW-UP (OUTPATIENT)
Dept: ENDOCRINOLOGY | Facility: CLINIC | Age: 65
End: 2025-06-30

## 2025-07-03 ENCOUNTER — HOSPITAL ENCOUNTER (OUTPATIENT)
Dept: ENDOCRINOLOGY | Facility: CLINIC | Age: 65
Discharge: HOME | End: 2025-07-03
Attending: INTERNAL MEDICINE
Payer: COMMERCIAL

## 2025-07-03 ENCOUNTER — OFFICE VISIT (OUTPATIENT)
Dept: ENDOCRINOLOGY | Facility: CLINIC | Age: 65
End: 2025-07-03
Payer: COMMERCIAL

## 2025-07-03 VITALS — BODY MASS INDEX: 18.27 KG/M2 | HEIGHT: 60 IN | WEIGHT: 93.06 LBS

## 2025-07-03 DIAGNOSIS — M35.0B SJOGREN SYNDROME WITH VASCULITIS: ICD-10-CM

## 2025-07-03 DIAGNOSIS — E06.3 HYPOTHYROIDISM DUE TO HASHIMOTO'S THYROIDITIS: Primary | ICD-10-CM

## 2025-07-03 DIAGNOSIS — E04.2 MULTINODULAR GOITER: ICD-10-CM

## 2025-07-03 DIAGNOSIS — M81.0 OSTEOPOROSIS, POSTMENOPAUSAL: ICD-10-CM

## 2025-07-03 PROCEDURE — G2211 COMPLEX E/M VISIT ADD ON: HCPCS | Mod: S$GLB,,, | Performed by: INTERNAL MEDICINE

## 2025-07-03 PROCEDURE — 1159F MED LIST DOCD IN RCRD: CPT | Mod: CPTII,S$GLB,, | Performed by: INTERNAL MEDICINE

## 2025-07-03 PROCEDURE — 99999 PR PBB SHADOW E&M-EST. PATIENT-LVL V: CPT | Mod: PBBFAC,,, | Performed by: INTERNAL MEDICINE

## 2025-07-03 PROCEDURE — 3008F BODY MASS INDEX DOCD: CPT | Mod: CPTII,S$GLB,, | Performed by: INTERNAL MEDICINE

## 2025-07-03 PROCEDURE — 76536 US EXAM OF HEAD AND NECK: CPT | Mod: S$GLB,,, | Performed by: INTERNAL MEDICINE

## 2025-07-03 PROCEDURE — 99214 OFFICE O/P EST MOD 30 MIN: CPT | Mod: S$GLB,,, | Performed by: INTERNAL MEDICINE

## 2025-07-03 PROCEDURE — 1160F RVW MEDS BY RX/DR IN RCRD: CPT | Mod: CPTII,S$GLB,, | Performed by: INTERNAL MEDICINE

## 2025-07-03 NOTE — ASSESSMENT & PLAN NOTE
--Explained goals of treatment is to keep the TSH in the normal range to avoid CVS and bone complications, and she is in agreement with this plan  --Will continue Cytomel 5 mcg TID   --No overt hypothyroid s/s at this time  --She understands that this is an atypical thyroid hormone replacement regimen and she will let me us know right away if she develops any hyperthyroid symptoms  --We discussed continuing this regimen so long as her TSH is not suppressed and she does not have any palpitations

## 2025-07-03 NOTE — ASSESSMENT & PLAN NOTE
--On ERT  --She has had a decline at the hip and spine on ERT and risedronate  --Changed to Prolia in 3/2024, 3rd dose in 4/2025  --She has increased weight-bearing exercise  --Was on risedronate weekly from 11/2017 until 3/2024  --She understands Prolia can not be abruptly stopped or significantly delayed once started   --Repeat bone density in 11/2025  --continue vitamin-D supplementation and calcium supplementation  --stressed importance of falls avoidance

## 2025-07-03 NOTE — ASSESSMENT & PLAN NOTE
--With multiple thyroid nodules  --has had multiple benign FNAs of the right mid lobe nodule, and 1 benign FNA of the left lobe nodule  --she has had 3 nondiagnostic FNAs of the right inferior pole nodule, however, molecular markers were negative on this nodule in 07/2019  --Ultrasound done today showed stable nodules  --Repeat thyroid ultrasound in one year

## 2025-07-03 NOTE — PROGRESS NOTES
Subjective:      Patient ID: Berenice Hayes is a 65 y.o. female.    Chief Complaint:  Hypothyroidism, Thyroid Nodule, and Osteoporosis      History of Present Illness  Ms. Hayes presents for follow up of hypothyroidism and osteopenia.  Last visit 11/2024.      No significant medical changes since last visit.      With Hashimoto thyroiditis with high thyroglobulin ab and negative TPO ab.   Was started and maintained on T3 only regimen by Dr. Espinal.   Currently she is taking Cytomel totalling 15 mcg per day (5 mcg TID).  Previously had palpitations when Synthroid was added to the Cytomel. No longer having palpitations on the current regimen and TSH has remained WNL.  She denies any palpitations or tremor.   She has been hesitant to change back to T4 only or a combined T4/T3 regimen.     Also has MNG with right mid thyroid nodule s/p benign FNA in 2015, 2018, 2019 and left lobe nodule with benign FNA in 2016. Right inferior pole nodule underwent FNA x 3 with non-diagnostic results. Molecular markers were negative on the right inferior nodule in 7/2019.      She has some chronic mild dysphagia with solid/dry foods that has not changed. She attributes a lot of this to dryness from Sjogren's.      Neck ultrasound done prior to today's visit:  Stable with no significant changes     Has Osteoporosis and she took Atelvia 35 mg weekly since August/2012 until 2015 when she restarted ERT (vaginal and transdermal). Had decrease in BMD at the hip despite ERT.       Recent BMD stable at the spine but showed decline at the hip in 11/2023.  Bone density two years ago had showed a decline at the spine.     Restarted risedronate 35 mg weekly in 11/2017 and stopped in 3/2024 prior to getting Prolia. First dose of Prolia in 3/2024, 3rd dose of Prolia in 4/2025. No significant side effects.     She remains relatively active.      She takes a multivitamin and Vit D 2000 units daily.      She is currently taking oral calcium lactate  twice daily. She is also getting a fair amount of calcium through her diet (eats cheese, broccoli and walnuts daily).     Follows with rheumatology for Sjogrens. On Plaquenil.    Review of Systems   Constitutional:  Negative for chills and fever.   Gastrointestinal:  Negative for nausea.       Objective:   Physical Exam  Vitals and nursing note reviewed.       BP Readings from Last 3 Encounters:   06/12/25 (!) 102/56   04/11/25 101/62   11/12/24 98/65     Wt Readings from Last 1 Encounters:   07/03/25 1013 42.2 kg (93 lb 0.6 oz)       Body mass index is 18.17 kg/m².    Lab Review:   Lab Results   Component Value Date    HGBA1C 5.3 08/31/2024     Lab Results   Component Value Date    CHOL 133 08/31/2024    HDL 80 (H) 08/31/2024    LDLCALC 47.0 (L) 08/31/2024    TRIG 30 08/31/2024    CHOLHDL 60.2 (H) 08/31/2024     Lab Results   Component Value Date     12/28/2024    K 4.2 12/28/2024     12/28/2024    CO2 26 12/28/2024    GLU 98 12/28/2024    BUN 17 12/28/2024    CREATININE 0.7 12/28/2024    CALCIUM 9.6 12/28/2024    PROT 7.5 12/28/2024    ALBUMIN 4.2 12/28/2024    BILITOT 0.5 12/28/2024    ALKPHOS 60 12/28/2024    AST 25 12/28/2024    ALT 26 12/28/2024    ANIONGAP 12 12/28/2024    ESTGFRAFRICA >60.0 06/25/2022    EGFRNONAA >60.0 06/25/2022    TSH 1.053 06/28/2025         Assessment and Plan     Hypothyroidism due to Hashimoto's thyroiditis  --Explained goals of treatment is to keep the TSH in the normal range to avoid CVS and bone complications, and she is in agreement with this plan  --Will continue Cytomel 5 mcg TID   --No overt hypothyroid s/s at this time  --She understands that this is an atypical thyroid hormone replacement regimen and she will let me us know right away if she develops any hyperthyroid symptoms  --We discussed continuing this regimen so long as her TSH is not suppressed and she does not have any palpitations    Multinodular goiter  --With multiple thyroid nodules  --has had multiple  benign FNAs of the right mid lobe nodule, and 1 benign FNA of the left lobe nodule  --she has had 3 nondiagnostic FNAs of the right inferior pole nodule, however, molecular markers were negative on this nodule in 07/2019  --Ultrasound done today showed stable nodules  --Repeat thyroid ultrasound in one year    Osteoporosis, postmenopausal  --On ERT  --She has had a decline at the hip and spine on ERT and risedronate  --Changed to Prolia in 3/2024, 3rd dose in 4/2025  --She has increased weight-bearing exercise  --Was on risedronate weekly from 11/2017 until 3/2024  --She understands Prolia can not be abruptly stopped or significantly delayed once started   --Repeat bone density in 11/2025  --continue vitamin-D supplementation and calcium supplementation  --stressed importance of falls avoidance      Sjogren's syndrome  Followed by rheumatology  Started on plaquenil (prescribed 2/20/19)    Visit today included increased complexity associated with the care of the episodic problem hypothyroidism, osteoporosis addressed and managing the longitudinal care of the patient due to the serious and/or complex managed problem(s).     Bone density 11/2025 with labs and follow up after bone density    Leandro Espinoza M.D. Staff Endocrinology

## 2025-08-12 ENCOUNTER — OFFICE VISIT (OUTPATIENT)
Dept: URGENT CARE | Facility: CLINIC | Age: 65
End: 2025-08-12
Payer: COMMERCIAL

## 2025-08-12 VITALS
BODY MASS INDEX: 20.62 KG/M2 | RESPIRATION RATE: 16 BRPM | TEMPERATURE: 97 F | SYSTOLIC BLOOD PRESSURE: 89 MMHG | WEIGHT: 105 LBS | OXYGEN SATURATION: 99 % | HEART RATE: 101 BPM | DIASTOLIC BLOOD PRESSURE: 65 MMHG | HEIGHT: 60 IN

## 2025-08-12 DIAGNOSIS — R31.9 URINARY TRACT INFECTION WITH HEMATURIA, SITE UNSPECIFIED: Primary | ICD-10-CM

## 2025-08-12 DIAGNOSIS — N39.0 URINARY TRACT INFECTION WITH HEMATURIA, SITE UNSPECIFIED: Primary | ICD-10-CM

## 2025-08-12 LAB
BILIRUBIN, UA POC OHS: NEGATIVE
BLOOD, UA POC OHS: ABNORMAL
CLARITY, UA POC OHS: ABNORMAL
COLOR, UA POC OHS: YELLOW
GLUCOSE, UA POC OHS: NEGATIVE
KETONES, UA POC OHS: NEGATIVE
LEUKOCYTES, UA POC OHS: ABNORMAL
NITRITE, UA POC OHS: NEGATIVE
PH, UA POC OHS: 6.5
PROTEIN, UA POC OHS: NEGATIVE
SPECIFIC GRAVITY, UA POC OHS: <=1.005
UROBILINOGEN, UA POC OHS: 0.2

## 2025-08-12 PROCEDURE — 99214 OFFICE O/P EST MOD 30 MIN: CPT | Mod: S$GLB,,, | Performed by: FAMILY MEDICINE

## 2025-08-12 PROCEDURE — 81003 URINALYSIS AUTO W/O SCOPE: CPT | Mod: QW,S$GLB,, | Performed by: FAMILY MEDICINE

## 2025-08-12 RX ORDER — NITROFURANTOIN 25; 75 MG/1; MG/1
100 CAPSULE ORAL 2 TIMES DAILY
Qty: 14 CAPSULE | Refills: 0 | Status: SHIPPED | OUTPATIENT
Start: 2025-08-12 | End: 2025-08-19

## 2025-08-24 ENCOUNTER — PATIENT MESSAGE (OUTPATIENT)
Dept: INTERNAL MEDICINE | Facility: CLINIC | Age: 65
End: 2025-08-24
Payer: COMMERCIAL

## 2025-08-24 DIAGNOSIS — Z00.00 ANNUAL PHYSICAL EXAM: Primary | ICD-10-CM

## 2025-08-24 DIAGNOSIS — G47.00 INSOMNIA, UNSPECIFIED TYPE: ICD-10-CM

## 2025-08-24 DIAGNOSIS — Z79.899 DRUG THERAPY: ICD-10-CM

## 2025-08-25 RX ORDER — ZOLPIDEM TARTRATE 10 MG/1
TABLET ORAL
Qty: 30 TABLET | Refills: 2 | Status: SHIPPED | OUTPATIENT
Start: 2025-08-25

## 2025-08-30 ENCOUNTER — LAB VISIT (OUTPATIENT)
Dept: LAB | Facility: HOSPITAL | Age: 65
End: 2025-08-30
Attending: THORACIC SURGERY (CARDIOTHORACIC VASCULAR SURGERY)
Payer: COMMERCIAL

## 2025-08-30 DIAGNOSIS — Z79.899 DRUG THERAPY: ICD-10-CM

## 2025-08-30 DIAGNOSIS — Z00.00 ANNUAL PHYSICAL EXAM: ICD-10-CM

## 2025-08-30 LAB
ALBUMIN SERPL BCP-MCNC: 4.1 G/DL (ref 3.5–5.2)
ALP SERPL-CCNC: 55 UNIT/L (ref 40–150)
ALT SERPL W/O P-5'-P-CCNC: 26 UNIT/L (ref 0–55)
ANION GAP (OHS): 9 MMOL/L (ref 8–16)
AST SERPL-CCNC: 28 UNIT/L (ref 0–50)
BILIRUB SERPL-MCNC: 0.6 MG/DL (ref 0.1–1)
BUN SERPL-MCNC: 14 MG/DL (ref 8–23)
CALCIUM SERPL-MCNC: 9.1 MG/DL (ref 8.7–10.5)
CHLORIDE SERPL-SCNC: 101 MMOL/L (ref 95–110)
CHOLEST SERPL-MCNC: 127 MG/DL (ref 120–199)
CHOLEST/HDLC SERPL: 2 {RATIO} (ref 2–5)
CO2 SERPL-SCNC: 28 MMOL/L (ref 23–29)
CREAT SERPL-MCNC: 0.5 MG/DL (ref 0.5–1.4)
EAG (OHS): 100 MG/DL (ref 68–131)
ERYTHROCYTE [DISTWIDTH] IN BLOOD BY AUTOMATED COUNT: 13 % (ref 11.5–14.5)
FERRITIN SERPL-MCNC: 50 NG/ML (ref 20–300)
GFR SERPLBLD CREATININE-BSD FMLA CKD-EPI: >60 ML/MIN/1.73/M2
GLUCOSE SERPL-MCNC: 92 MG/DL (ref 70–110)
HBA1C MFR BLD: 5.1 % (ref 4–5.6)
HCT VFR BLD AUTO: 42.3 % (ref 37–48.5)
HDLC SERPL-MCNC: 63 MG/DL (ref 40–75)
HDLC SERPL: 49.6 % (ref 20–50)
HGB BLD-MCNC: 13.7 GM/DL (ref 12–16)
IRON SATN MFR SERPL: 26 % (ref 20–50)
IRON SERPL-MCNC: 90 UG/DL (ref 30–160)
LDLC SERPL CALC-MCNC: 56.8 MG/DL (ref 63–159)
MCH RBC QN AUTO: 30.2 PG (ref 27–31)
MCHC RBC AUTO-ENTMCNC: 32.4 G/DL (ref 32–36)
MCV RBC AUTO: 93 FL (ref 82–98)
NONHDLC SERPL-MCNC: 64 MG/DL
PLATELET # BLD AUTO: 179 K/UL (ref 150–450)
PMV BLD AUTO: 9.9 FL (ref 9.2–12.9)
POTASSIUM SERPL-SCNC: 4.2 MMOL/L (ref 3.5–5.1)
PROT SERPL-MCNC: 7 GM/DL (ref 6–8.4)
RBC # BLD AUTO: 4.54 M/UL (ref 4–5.4)
SODIUM SERPL-SCNC: 138 MMOL/L (ref 136–145)
TIBC SERPL-MCNC: 352 UG/DL (ref 250–450)
TRANSFERRIN SERPL-MCNC: 238 MG/DL (ref 200–375)
TRIGL SERPL-MCNC: 36 MG/DL (ref 30–150)
TSH SERPL-ACNC: 1.38 UIU/ML (ref 0.4–4)
WBC # BLD AUTO: 3.7 K/UL (ref 3.9–12.7)

## 2025-08-30 PROCEDURE — 83540 ASSAY OF IRON: CPT

## 2025-08-30 PROCEDURE — 80061 LIPID PANEL: CPT

## 2025-08-30 PROCEDURE — 82728 ASSAY OF FERRITIN: CPT

## 2025-08-30 PROCEDURE — 84443 ASSAY THYROID STIM HORMONE: CPT

## 2025-08-30 PROCEDURE — 80053 COMPREHEN METABOLIC PANEL: CPT

## 2025-08-30 PROCEDURE — 83036 HEMOGLOBIN GLYCOSYLATED A1C: CPT

## 2025-08-30 PROCEDURE — 36415 COLL VENOUS BLD VENIPUNCTURE: CPT

## 2025-08-30 PROCEDURE — 85027 COMPLETE CBC AUTOMATED: CPT

## 2025-09-04 ENCOUNTER — IMMUNIZATION (OUTPATIENT)
Dept: INTERNAL MEDICINE | Facility: CLINIC | Age: 65
End: 2025-09-04
Payer: COMMERCIAL

## 2025-09-04 ENCOUNTER — OFFICE VISIT (OUTPATIENT)
Dept: INTERNAL MEDICINE | Facility: CLINIC | Age: 65
End: 2025-09-04
Payer: COMMERCIAL

## 2025-09-04 VITALS
WEIGHT: 95.44 LBS | OXYGEN SATURATION: 100 % | HEIGHT: 60 IN | SYSTOLIC BLOOD PRESSURE: 100 MMHG | HEART RATE: 64 BPM | DIASTOLIC BLOOD PRESSURE: 62 MMHG | BODY MASS INDEX: 18.74 KG/M2

## 2025-09-04 DIAGNOSIS — Z79.899 DRUG THERAPY: ICD-10-CM

## 2025-09-04 DIAGNOSIS — M81.0 OSTEOPOROSIS, POSTMENOPAUSAL: ICD-10-CM

## 2025-09-04 DIAGNOSIS — Z12.11 ENCOUNTER FOR COLORECTAL CANCER SCREENING: ICD-10-CM

## 2025-09-04 DIAGNOSIS — Z23 NEED FOR VACCINATION: Primary | ICD-10-CM

## 2025-09-04 DIAGNOSIS — Z12.12 ENCOUNTER FOR COLORECTAL CANCER SCREENING: ICD-10-CM

## 2025-09-04 DIAGNOSIS — E06.3 HYPOTHYROIDISM DUE TO HASHIMOTO'S THYROIDITIS: ICD-10-CM

## 2025-09-04 DIAGNOSIS — Q87.89 PAINE SYNDROME: ICD-10-CM

## 2025-09-04 DIAGNOSIS — G89.4 CHRONIC PAIN SYNDROME: ICD-10-CM

## 2025-09-04 DIAGNOSIS — G80.1 PAINE SYNDROME: ICD-10-CM

## 2025-09-04 DIAGNOSIS — E04.2 MULTINODULAR GOITER: ICD-10-CM

## 2025-09-04 DIAGNOSIS — Z00.00 ANNUAL PHYSICAL EXAM: Primary | ICD-10-CM

## 2025-09-04 DIAGNOSIS — Q02 PAINE SYNDROME: ICD-10-CM

## 2025-09-04 PROBLEM — R79.0 LOW FERRITIN: Status: RESOLVED | Noted: 2024-10-29 | Resolved: 2025-09-04

## 2025-09-04 PROCEDURE — 3044F HG A1C LEVEL LT 7.0%: CPT | Mod: CPTII,S$GLB,, | Performed by: FAMILY MEDICINE

## 2025-09-04 PROCEDURE — 3008F BODY MASS INDEX DOCD: CPT | Mod: CPTII,S$GLB,, | Performed by: FAMILY MEDICINE

## 2025-09-04 PROCEDURE — 3074F SYST BP LT 130 MM HG: CPT | Mod: CPTII,S$GLB,, | Performed by: FAMILY MEDICINE

## 2025-09-04 PROCEDURE — 1159F MED LIST DOCD IN RCRD: CPT | Mod: CPTII,S$GLB,, | Performed by: FAMILY MEDICINE

## 2025-09-04 PROCEDURE — 99396 PREV VISIT EST AGE 40-64: CPT | Mod: S$GLB,,, | Performed by: FAMILY MEDICINE

## 2025-09-04 PROCEDURE — 99999 PR PBB SHADOW E&M-EST. PATIENT-LVL V: CPT | Mod: PBBFAC,,, | Performed by: FAMILY MEDICINE

## 2025-09-04 PROCEDURE — 3078F DIAST BP <80 MM HG: CPT | Mod: CPTII,S$GLB,, | Performed by: FAMILY MEDICINE

## 2025-09-04 PROCEDURE — 90661 CCIIV3 VAC ABX FR 0.5 ML IM: CPT | Mod: S$GLB,,, | Performed by: INTERNAL MEDICINE

## 2025-09-04 PROCEDURE — 90471 IMMUNIZATION ADMIN: CPT | Mod: S$GLB,,, | Performed by: INTERNAL MEDICINE

## (undated) DEVICE — BANDAGE ADHESIVE